# Patient Record
Sex: FEMALE | Race: BLACK OR AFRICAN AMERICAN | Employment: OTHER | URBAN - METROPOLITAN AREA
[De-identification: names, ages, dates, MRNs, and addresses within clinical notes are randomized per-mention and may not be internally consistent; named-entity substitution may affect disease eponyms.]

---

## 2017-06-16 ENCOUNTER — OFFICE VISIT (OUTPATIENT)
Dept: FAMILY MEDICINE CLINIC | Age: 72
End: 2017-06-16

## 2017-06-16 VITALS
SYSTOLIC BLOOD PRESSURE: 128 MMHG | WEIGHT: 202.6 LBS | RESPIRATION RATE: 24 BRPM | DIASTOLIC BLOOD PRESSURE: 88 MMHG | TEMPERATURE: 97.8 F | HEART RATE: 75 BPM | HEIGHT: 60 IN | BODY MASS INDEX: 39.78 KG/M2 | OXYGEN SATURATION: 96 %

## 2017-06-16 DIAGNOSIS — R93.3 ABNORMAL COLONOSCOPY: ICD-10-CM

## 2017-06-16 DIAGNOSIS — E78.2 MIXED HYPERLIPIDEMIA: Chronic | ICD-10-CM

## 2017-06-16 DIAGNOSIS — J44.9 CHRONIC OBSTRUCTIVE PULMONARY DISEASE, UNSPECIFIED COPD TYPE (HCC): Chronic | ICD-10-CM

## 2017-06-16 DIAGNOSIS — Z99.2 TYPE 2 DIABETES MELLITUS WITH CHRONIC KIDNEY DISEASE ON CHRONIC DIALYSIS, WITH LONG-TERM CURRENT USE OF INSULIN (HCC): ICD-10-CM

## 2017-06-16 DIAGNOSIS — I10 ESSENTIAL HYPERTENSION: Chronic | ICD-10-CM

## 2017-06-16 DIAGNOSIS — Z99.2 HEMODIALYSIS PATIENT (HCC): Chronic | ICD-10-CM

## 2017-06-16 DIAGNOSIS — Z79.4 TYPE 2 DIABETES MELLITUS WITH CHRONIC KIDNEY DISEASE ON CHRONIC DIALYSIS, WITH LONG-TERM CURRENT USE OF INSULIN (HCC): Primary | Chronic | ICD-10-CM

## 2017-06-16 DIAGNOSIS — Z11.59 NEED FOR HEPATITIS C SCREENING TEST: ICD-10-CM

## 2017-06-16 DIAGNOSIS — Z13.820 OSTEOPOROSIS SCREENING: ICD-10-CM

## 2017-06-16 DIAGNOSIS — J44.9 CHRONIC OBSTRUCTIVE PULMONARY DISEASE, UNSPECIFIED COPD TYPE (HCC): ICD-10-CM

## 2017-06-16 DIAGNOSIS — E03.9 HYPOTHYROIDISM, UNSPECIFIED TYPE: ICD-10-CM

## 2017-06-16 DIAGNOSIS — E11.22 TYPE 2 DIABETES MELLITUS WITH CHRONIC KIDNEY DISEASE ON CHRONIC DIALYSIS, WITH LONG-TERM CURRENT USE OF INSULIN (HCC): ICD-10-CM

## 2017-06-16 DIAGNOSIS — E11.22 TYPE 2 DIABETES MELLITUS WITH CHRONIC KIDNEY DISEASE ON CHRONIC DIALYSIS, WITH LONG-TERM CURRENT USE OF INSULIN (HCC): Primary | Chronic | ICD-10-CM

## 2017-06-16 DIAGNOSIS — I10 ESSENTIAL HYPERTENSION: ICD-10-CM

## 2017-06-16 DIAGNOSIS — Z99.2 TYPE 2 DIABETES MELLITUS WITH CHRONIC KIDNEY DISEASE ON CHRONIC DIALYSIS, WITH LONG-TERM CURRENT USE OF INSULIN (HCC): Primary | Chronic | ICD-10-CM

## 2017-06-16 DIAGNOSIS — N18.6 TYPE 2 DIABETES MELLITUS WITH CHRONIC KIDNEY DISEASE ON CHRONIC DIALYSIS, WITH LONG-TERM CURRENT USE OF INSULIN (HCC): ICD-10-CM

## 2017-06-16 DIAGNOSIS — G47.30 SLEEP APNEA, UNSPECIFIED TYPE: ICD-10-CM

## 2017-06-16 DIAGNOSIS — J30.9 ALLERGIC RHINITIS, UNSPECIFIED ALLERGIC RHINITIS TRIGGER, UNSPECIFIED RHINITIS SEASONALITY: ICD-10-CM

## 2017-06-16 DIAGNOSIS — K21.9 GASTROESOPHAGEAL REFLUX DISEASE WITHOUT ESOPHAGITIS: ICD-10-CM

## 2017-06-16 DIAGNOSIS — Z79.4 TYPE 2 DIABETES MELLITUS WITH CHRONIC KIDNEY DISEASE ON CHRONIC DIALYSIS, WITH LONG-TERM CURRENT USE OF INSULIN (HCC): ICD-10-CM

## 2017-06-16 DIAGNOSIS — E78.2 MIXED HYPERLIPIDEMIA: ICD-10-CM

## 2017-06-16 DIAGNOSIS — J45.40 MODERATE PERSISTENT ASTHMA WITHOUT COMPLICATION: Chronic | ICD-10-CM

## 2017-06-16 DIAGNOSIS — Z99.2 HEMODIALYSIS PATIENT (HCC): ICD-10-CM

## 2017-06-16 DIAGNOSIS — E03.9 HYPOTHYROIDISM, UNSPECIFIED TYPE: Chronic | ICD-10-CM

## 2017-06-16 DIAGNOSIS — E55.9 VITAMIN D DEFICIENCY: ICD-10-CM

## 2017-06-16 DIAGNOSIS — N18.6 TYPE 2 DIABETES MELLITUS WITH CHRONIC KIDNEY DISEASE ON CHRONIC DIALYSIS, WITH LONG-TERM CURRENT USE OF INSULIN (HCC): Primary | Chronic | ICD-10-CM

## 2017-06-16 LAB
ALBUMIN SERPL-MCNC: 4.1 G/DL (ref 3.9–4.9)
ALP BLD-CCNC: 111 U/L (ref 40–130)
ALT SERPL-CCNC: 24 U/L (ref 0–33)
ANION GAP SERPL CALCULATED.3IONS-SCNC: 14 MEQ/L (ref 7–13)
AST SERPL-CCNC: 24 U/L (ref 0–35)
BASOPHILS ABSOLUTE: 0 K/UL (ref 0–0.2)
BASOPHILS RELATIVE PERCENT: 0.6 %
BILIRUB SERPL-MCNC: 0.2 MG/DL (ref 0–1.2)
BUN BLDV-MCNC: 36 MG/DL (ref 8–23)
CALCIUM SERPL-MCNC: 9 MG/DL (ref 8.6–10.2)
CHLORIDE BLD-SCNC: 102 MEQ/L (ref 98–107)
CHOLESTEROL, TOTAL: 231 MG/DL (ref 0–199)
CO2: 29 MEQ/L (ref 22–29)
CREAT SERPL-MCNC: 3.77 MG/DL (ref 0.5–0.9)
EOSINOPHILS ABSOLUTE: 0.2 K/UL (ref 0–0.7)
EOSINOPHILS RELATIVE PERCENT: 2.9 %
GFR AFRICAN AMERICAN: 14.3
GFR NON-AFRICAN AMERICAN: 11.8
GLOBULIN: 2.8 G/DL (ref 2.3–3.5)
GLUCOSE BLD-MCNC: 75 MG/DL (ref 74–109)
HBA1C MFR BLD: 5.6 % (ref 4.8–5.9)
HCT VFR BLD CALC: 36.1 % (ref 37–47)
HDLC SERPL-MCNC: 81 MG/DL (ref 40–59)
HEMOGLOBIN: 11.1 G/DL (ref 12–16)
HEPATITIS C ANTIBODY INTERPRETATION: NORMAL
LDL CHOLESTEROL CALCULATED: 122 MG/DL (ref 0–129)
LYMPHOCYTES ABSOLUTE: 0.7 K/UL (ref 1–4.8)
LYMPHOCYTES RELATIVE PERCENT: 10.9 %
MCH RBC QN AUTO: 23.9 PG (ref 27–31.3)
MCHC RBC AUTO-ENTMCNC: 30.8 % (ref 33–37)
MCV RBC AUTO: 77.6 FL (ref 82–100)
MONOCYTES ABSOLUTE: 0.8 K/UL (ref 0.2–0.8)
MONOCYTES RELATIVE PERCENT: 13.2 %
NEUTROPHILS ABSOLUTE: 4.5 K/UL (ref 1.4–6.5)
NEUTROPHILS RELATIVE PERCENT: 72.4 %
PDW BLD-RTO: 19.8 % (ref 11.5–14.5)
PLATELET # BLD: 245 K/UL (ref 130–400)
POTASSIUM SERPL-SCNC: 5.2 MEQ/L (ref 3.5–5.1)
RBC # BLD: 4.65 M/UL (ref 4.2–5.4)
SODIUM BLD-SCNC: 145 MEQ/L (ref 132–144)
TOTAL PROTEIN: 6.9 G/DL (ref 6.4–8.1)
TRIGL SERPL-MCNC: 139 MG/DL (ref 0–200)
TSH REFLEX: 1.74 UIU/ML (ref 0.27–4.2)
WBC # BLD: 6.3 K/UL (ref 4.8–10.8)

## 2017-06-16 PROCEDURE — 99204 OFFICE O/P NEW MOD 45 MIN: CPT | Performed by: NURSE PRACTITIONER

## 2017-06-16 RX ORDER — PRAVASTATIN SODIUM 40 MG
40 TABLET ORAL DAILY
COMMUNITY
End: 2017-06-16 | Stop reason: SDUPTHER

## 2017-06-16 RX ORDER — SODIUM BICARBONATE 325 MG/1
10 TABLET ORAL 4 TIMES DAILY
COMMUNITY
End: 2017-06-16 | Stop reason: SDUPTHER

## 2017-06-16 RX ORDER — METOPROLOL SUCCINATE 25 MG/1
25 TABLET, EXTENDED RELEASE ORAL DAILY
Qty: 90 TABLET | Refills: 0 | Status: SHIPPED | OUTPATIENT
Start: 2017-06-16 | End: 2018-08-10 | Stop reason: ALTCHOICE

## 2017-06-16 RX ORDER — CINACALCET 30 MG/1
90 TABLET, FILM COATED ORAL DAILY
Qty: 90 TABLET | Refills: 0 | Status: SHIPPED | OUTPATIENT
Start: 2017-06-16 | End: 2017-07-12

## 2017-06-16 RX ORDER — BUDESONIDE AND FORMOTEROL FUMARATE DIHYDRATE 160; 4.5 UG/1; UG/1
2 AEROSOL RESPIRATORY (INHALATION) 2 TIMES DAILY
Qty: 3 INHALER | Refills: 1 | Status: SHIPPED | OUTPATIENT
Start: 2017-06-16 | End: 2017-12-04 | Stop reason: SDUPTHER

## 2017-06-16 RX ORDER — LANCETS 30 GAUGE
1 EACH MISCELLANEOUS 4 TIMES DAILY
Qty: 200 EACH | Refills: 5 | Status: SHIPPED | OUTPATIENT
Start: 2017-06-16 | End: 2018-03-12

## 2017-06-16 RX ORDER — FLUTICASONE PROPIONATE 50 MCG
1 SPRAY, SUSPENSION (ML) NASAL DAILY
Qty: 3 BOTTLE | Refills: 1 | Status: SHIPPED | OUTPATIENT
Start: 2017-06-16

## 2017-06-16 RX ORDER — OMEPRAZOLE 20 MG/1
40 CAPSULE, DELAYED RELEASE ORAL DAILY
Qty: 90 CAPSULE | Refills: 2 | Status: SHIPPED | OUTPATIENT
Start: 2017-06-16 | End: 2017-07-12 | Stop reason: ALTCHOICE

## 2017-06-16 RX ORDER — METOPROLOL SUCCINATE 25 MG/1
25 TABLET, EXTENDED RELEASE ORAL DAILY
COMMUNITY
End: 2017-06-16 | Stop reason: SDUPTHER

## 2017-06-16 RX ORDER — ALBUTEROL SULFATE 90 UG/1
2 AEROSOL, METERED RESPIRATORY (INHALATION) EVERY 6 HOURS PRN
COMMUNITY
End: 2017-06-16 | Stop reason: SDUPTHER

## 2017-06-16 RX ORDER — PRAVASTATIN SODIUM 40 MG
40 TABLET ORAL DAILY
Qty: 90 TABLET | Refills: 1 | Status: SHIPPED | OUTPATIENT
Start: 2017-06-16 | End: 2017-11-08 | Stop reason: SDUPTHER

## 2017-06-16 RX ORDER — SODIUM BICARBONATE 325 MG/1
325 TABLET ORAL 4 TIMES DAILY
Qty: 120 TABLET | Refills: 0 | Status: SHIPPED | OUTPATIENT
Start: 2017-06-16 | End: 2017-07-12 | Stop reason: ALTCHOICE

## 2017-06-16 RX ORDER — MONTELUKAST SODIUM 10 MG/1
10 TABLET ORAL NIGHTLY
Qty: 90 TABLET | Refills: 1 | Status: SHIPPED | OUTPATIENT
Start: 2017-06-16 | End: 2017-12-07 | Stop reason: SDUPTHER

## 2017-06-16 RX ORDER — MONTELUKAST SODIUM 10 MG/1
10 TABLET ORAL NIGHTLY
COMMUNITY
End: 2017-06-16 | Stop reason: SDUPTHER

## 2017-06-16 RX ORDER — GLUCOSAMINE HCL/CHONDROITIN SU 500-400 MG
1 CAPSULE ORAL 4 TIMES DAILY
Qty: 100 STRIP | Refills: 0 | Status: SHIPPED | OUTPATIENT
Start: 2017-06-16 | End: 2018-06-03 | Stop reason: SDUPTHER

## 2017-06-16 RX ORDER — DEXTROSE 4 G
1 TABLET,CHEWABLE ORAL 4 TIMES DAILY
Qty: 200 EACH | Refills: 0 | Status: SHIPPED | OUTPATIENT
Start: 2017-06-16 | End: 2017-08-02 | Stop reason: SDUPTHER

## 2017-06-16 RX ORDER — ALBUTEROL SULFATE 90 UG/1
2 AEROSOL, METERED RESPIRATORY (INHALATION) EVERY 6 HOURS PRN
Qty: 3 INHALER | Refills: 0 | Status: SHIPPED | OUTPATIENT
Start: 2017-06-16 | End: 2017-08-15 | Stop reason: SDUPTHER

## 2017-06-16 RX ORDER — OMEPRAZOLE 20 MG/1
40 CAPSULE, DELAYED RELEASE ORAL DAILY
COMMUNITY
End: 2017-06-16 | Stop reason: SDUPTHER

## 2017-06-16 RX ORDER — MOMETASONE FUROATE 50 UG/1
2 SPRAY, METERED NASAL DAILY
COMMUNITY
End: 2017-06-16 | Stop reason: CLARIF

## 2017-06-16 RX ORDER — BUDESONIDE AND FORMOTEROL FUMARATE DIHYDRATE 160; 4.5 UG/1; UG/1
2 AEROSOL RESPIRATORY (INHALATION) 2 TIMES DAILY
COMMUNITY
End: 2017-06-16 | Stop reason: SDUPTHER

## 2017-06-16 RX ORDER — GLUCOSAMINE HCL/CHONDROITIN SU 500-400 MG
1 CAPSULE ORAL 4 TIMES DAILY
Qty: 360 STRIP | Refills: 1 | Status: SHIPPED | OUTPATIENT
Start: 2017-06-16

## 2017-06-16 RX ORDER — ALBUTEROL SULFATE 0.63 MG/3ML
1 SOLUTION RESPIRATORY (INHALATION) EVERY 6 HOURS PRN
Qty: 270 ML | Refills: 3 | Status: SHIPPED | OUTPATIENT
Start: 2017-06-16 | End: 2018-03-29 | Stop reason: SDUPTHER

## 2017-06-16 RX ORDER — CINACALCET 30 MG/1
90 TABLET, FILM COATED ORAL DAILY
COMMUNITY
End: 2017-06-16 | Stop reason: SDUPTHER

## 2017-06-16 RX ORDER — DIPHENHYDRAMINE HYDROCHLORIDE 25 MG/1
1 CAPSULE, LIQUID FILLED ORAL 4 TIMES DAILY
Qty: 1 KIT | Refills: 0 | Status: SHIPPED | OUTPATIENT
Start: 2017-06-16 | End: 2018-07-08 | Stop reason: SDUPTHER

## 2017-06-16 ASSESSMENT — PATIENT HEALTH QUESTIONNAIRE - PHQ9
SUM OF ALL RESPONSES TO PHQ9 QUESTIONS 1 & 2: 0
SUM OF ALL RESPONSES TO PHQ QUESTIONS 1-9: 0
1. LITTLE INTEREST OR PLEASURE IN DOING THINGS: 0
2. FEELING DOWN, DEPRESSED OR HOPELESS: 0

## 2017-06-16 ASSESSMENT — ENCOUNTER SYMPTOMS
CHEST TIGHTNESS: 1
DIARRHEA: 0
COUGH: 1
SHORTNESS OF BREATH: 1
BLURRED VISION: 0
VISUAL CHANGE: 0
CONSTIPATION: 0
WHEEZING: 1
BLOOD IN STOOL: 0

## 2017-06-19 ENCOUNTER — HOSPITAL ENCOUNTER (OUTPATIENT)
Dept: GENERAL RADIOLOGY | Age: 72
Discharge: HOME | End: 2017-06-19
Payer: COMMERCIAL

## 2017-06-19 DIAGNOSIS — Z13.820 OSTEOPOROSIS SCREENING: ICD-10-CM

## 2017-06-19 PROCEDURE — 77080 DXA BONE DENSITY AXIAL: CPT

## 2017-06-21 ENCOUNTER — OFFICE VISIT (OUTPATIENT)
Dept: GASTROENTEROLOGY | Age: 72
End: 2017-06-21

## 2017-06-21 ENCOUNTER — TELEPHONE (OUTPATIENT)
Dept: FAMILY MEDICINE CLINIC | Age: 72
End: 2017-06-21

## 2017-06-21 VITALS
TEMPERATURE: 98 F | HEIGHT: 60 IN | BODY MASS INDEX: 39.46 KG/M2 | DIASTOLIC BLOOD PRESSURE: 72 MMHG | WEIGHT: 201 LBS | SYSTOLIC BLOOD PRESSURE: 125 MMHG | RESPIRATION RATE: 18 BRPM | HEART RATE: 60 BPM

## 2017-06-21 DIAGNOSIS — G47.30 SLEEP APNEA, UNSPECIFIED TYPE: ICD-10-CM

## 2017-06-21 DIAGNOSIS — Z86.010 HISTORY OF COLONIC POLYPS: ICD-10-CM

## 2017-06-21 DIAGNOSIS — J45.40 MODERATE PERSISTENT ASTHMA WITHOUT COMPLICATION: Primary | ICD-10-CM

## 2017-06-21 DIAGNOSIS — R93.3 ABNORMAL COLONOSCOPY: Primary | ICD-10-CM

## 2017-06-21 DIAGNOSIS — J44.9 CHRONIC OBSTRUCTIVE PULMONARY DISEASE, UNSPECIFIED COPD TYPE (HCC): ICD-10-CM

## 2017-06-21 PROCEDURE — 99203 OFFICE O/P NEW LOW 30 MIN: CPT | Performed by: INTERNAL MEDICINE

## 2017-06-21 ASSESSMENT — ENCOUNTER SYMPTOMS
ANAL BLEEDING: 0
BLOOD IN STOOL: 0
RESPIRATORY NEGATIVE: 1
EYES NEGATIVE: 1
ABDOMINAL PAIN: 0
RECTAL PAIN: 0
VOMITING: 0
NAUSEA: 0
CONSTIPATION: 0
ABDOMINAL DISTENTION: 0
DIARRHEA: 0

## 2017-06-22 ENCOUNTER — TELEPHONE (OUTPATIENT)
Dept: FAMILY MEDICINE CLINIC | Age: 72
End: 2017-06-22

## 2017-07-12 ENCOUNTER — OFFICE VISIT (OUTPATIENT)
Dept: FAMILY MEDICINE CLINIC | Age: 72
End: 2017-07-12

## 2017-07-12 VITALS — OXYGEN SATURATION: 95 %

## 2017-07-12 DIAGNOSIS — J44.9 CHRONIC OBSTRUCTIVE PULMONARY DISEASE, UNSPECIFIED COPD TYPE (HCC): Primary | ICD-10-CM

## 2017-07-12 PROCEDURE — G8427 DOCREV CUR MEDS BY ELIG CLIN: HCPCS | Performed by: NURSE PRACTITIONER

## 2017-07-12 PROCEDURE — 3017F COLORECTAL CA SCREEN DOC REV: CPT | Performed by: NURSE PRACTITIONER

## 2017-07-12 PROCEDURE — 99212 OFFICE O/P EST SF 10 MIN: CPT | Performed by: NURSE PRACTITIONER

## 2017-07-12 PROCEDURE — 1036F TOBACCO NON-USER: CPT | Performed by: NURSE PRACTITIONER

## 2017-07-12 PROCEDURE — 3014F SCREEN MAMMO DOC REV: CPT | Performed by: NURSE PRACTITIONER

## 2017-07-12 PROCEDURE — G8417 CALC BMI ABV UP PARAM F/U: HCPCS | Performed by: NURSE PRACTITIONER

## 2017-07-12 PROCEDURE — G8926 SPIRO NO PERF OR DOC: HCPCS | Performed by: NURSE PRACTITIONER

## 2017-07-12 PROCEDURE — 1090F PRES/ABSN URINE INCON ASSESS: CPT | Performed by: NURSE PRACTITIONER

## 2017-07-12 PROCEDURE — 3023F SPIROM DOC REV: CPT | Performed by: NURSE PRACTITIONER

## 2017-07-12 PROCEDURE — 1123F ACP DISCUSS/DSCN MKR DOCD: CPT | Performed by: NURSE PRACTITIONER

## 2017-07-12 PROCEDURE — G8399 PT W/DXA RESULTS DOCUMENT: HCPCS | Performed by: NURSE PRACTITIONER

## 2017-07-12 PROCEDURE — 4040F PNEUMOC VAC/ADMIN/RCVD: CPT | Performed by: NURSE PRACTITIONER

## 2017-07-12 RX ORDER — PEN NEEDLE, DIABETIC 31 GX5/16"
NEEDLE, DISPOSABLE MISCELLANEOUS
Refills: 0 | COMMUNITY
Start: 2017-06-16 | End: 2017-11-08 | Stop reason: RX

## 2017-07-12 RX ORDER — GLUCOSAMINE HCL/CHONDROITIN SU 500-400 MG
CAPSULE ORAL
Refills: 11 | COMMUNITY
Start: 2017-06-16 | End: 2018-07-09 | Stop reason: SDUPTHER

## 2017-07-12 RX ORDER — CINACALCET HYDROCHLORIDE 90 MG/1
TABLET, COATED ORAL
COMMUNITY
Start: 2017-06-19 | End: 2017-08-15 | Stop reason: ALTCHOICE

## 2017-07-14 ENCOUNTER — TELEPHONE (OUTPATIENT)
Dept: FAMILY MEDICINE CLINIC | Age: 72
End: 2017-07-14

## 2017-07-14 DIAGNOSIS — E11.8 DIABETIC FOOT (HCC): Primary | ICD-10-CM

## 2017-07-17 ENCOUNTER — TELEPHONE (OUTPATIENT)
Dept: FAMILY MEDICINE CLINIC | Age: 72
End: 2017-07-17

## 2017-08-02 DIAGNOSIS — E11.22 TYPE 2 DIABETES MELLITUS WITH CHRONIC KIDNEY DISEASE ON CHRONIC DIALYSIS, WITH LONG-TERM CURRENT USE OF INSULIN (HCC): Chronic | ICD-10-CM

## 2017-08-02 DIAGNOSIS — Z99.2 TYPE 2 DIABETES MELLITUS WITH CHRONIC KIDNEY DISEASE ON CHRONIC DIALYSIS, WITH LONG-TERM CURRENT USE OF INSULIN (HCC): Chronic | ICD-10-CM

## 2017-08-02 DIAGNOSIS — N18.6 TYPE 2 DIABETES MELLITUS WITH CHRONIC KIDNEY DISEASE ON CHRONIC DIALYSIS, WITH LONG-TERM CURRENT USE OF INSULIN (HCC): Chronic | ICD-10-CM

## 2017-08-02 DIAGNOSIS — Z79.4 TYPE 2 DIABETES MELLITUS WITH CHRONIC KIDNEY DISEASE ON CHRONIC DIALYSIS, WITH LONG-TERM CURRENT USE OF INSULIN (HCC): Chronic | ICD-10-CM

## 2017-08-03 RX ORDER — LANCETS
EACH MISCELLANEOUS
Qty: 200 EACH | Refills: 3 | Status: SHIPPED | OUTPATIENT
Start: 2017-08-03

## 2017-08-15 DIAGNOSIS — J45.40 MODERATE PERSISTENT ASTHMA WITHOUT COMPLICATION: Chronic | ICD-10-CM

## 2017-08-15 DIAGNOSIS — J44.9 CHRONIC OBSTRUCTIVE PULMONARY DISEASE, UNSPECIFIED COPD TYPE (HCC): Chronic | ICD-10-CM

## 2017-08-15 RX ORDER — CINACALCET HYDROCHLORIDE 30 MG/1
TABLET, COATED ORAL
COMMUNITY
Start: 2017-07-13

## 2017-09-08 ENCOUNTER — TELEPHONE (OUTPATIENT)
Dept: FAMILY MEDICINE CLINIC | Age: 72
End: 2017-09-08

## 2017-09-22 RX ORDER — INSULIN ASPART 100 [IU]/ML
INJECTION, SOLUTION INTRAVENOUS; SUBCUTANEOUS
Qty: 30 ML | Refills: 2 | Status: SHIPPED | OUTPATIENT
Start: 2017-09-22 | End: 2017-12-19 | Stop reason: SDUPTHER

## 2017-12-04 DIAGNOSIS — J44.9 CHRONIC OBSTRUCTIVE PULMONARY DISEASE, UNSPECIFIED COPD TYPE (HCC): Chronic | ICD-10-CM

## 2017-12-04 DIAGNOSIS — J45.40 MODERATE PERSISTENT ASTHMA WITHOUT COMPLICATION: Chronic | ICD-10-CM

## 2017-12-04 RX ORDER — BUDESONIDE AND FORMOTEROL FUMARATE DIHYDRATE 160; 4.5 UG/1; UG/1
2 AEROSOL RESPIRATORY (INHALATION) 2 TIMES DAILY
Qty: 3 INHALER | Refills: 1 | Status: SHIPPED | OUTPATIENT
Start: 2017-12-04 | End: 2018-07-06 | Stop reason: SDUPTHER

## 2017-12-07 DIAGNOSIS — J44.9 CHRONIC OBSTRUCTIVE PULMONARY DISEASE, UNSPECIFIED COPD TYPE (HCC): Chronic | ICD-10-CM

## 2017-12-07 DIAGNOSIS — J45.40 MODERATE PERSISTENT ASTHMA WITHOUT COMPLICATION: Chronic | ICD-10-CM

## 2017-12-07 RX ORDER — MONTELUKAST SODIUM 10 MG/1
10 TABLET ORAL NIGHTLY
Qty: 90 TABLET | Refills: 1 | Status: SHIPPED | OUTPATIENT
Start: 2017-12-07 | End: 2018-07-27 | Stop reason: SDUPTHER

## 2017-12-19 RX ORDER — INSULIN DETEMIR 100 [IU]/ML
INJECTION, SOLUTION SUBCUTANEOUS
Qty: 15 ML | Refills: 1 | Status: SHIPPED | OUTPATIENT
Start: 2017-12-19 | End: 2018-07-09 | Stop reason: SDUPTHER

## 2018-03-05 DIAGNOSIS — N18.6 TYPE 2 DIABETES MELLITUS WITH CHRONIC KIDNEY DISEASE ON CHRONIC DIALYSIS, WITH LONG-TERM CURRENT USE OF INSULIN (HCC): Primary | ICD-10-CM

## 2018-03-05 DIAGNOSIS — E11.22 TYPE 2 DIABETES MELLITUS WITH CHRONIC KIDNEY DISEASE ON CHRONIC DIALYSIS, WITH LONG-TERM CURRENT USE OF INSULIN (HCC): Primary | ICD-10-CM

## 2018-03-05 DIAGNOSIS — Z99.2 TYPE 2 DIABETES MELLITUS WITH CHRONIC KIDNEY DISEASE ON CHRONIC DIALYSIS, WITH LONG-TERM CURRENT USE OF INSULIN (HCC): ICD-10-CM

## 2018-03-05 DIAGNOSIS — Z79.4 TYPE 2 DIABETES MELLITUS WITH CHRONIC KIDNEY DISEASE ON CHRONIC DIALYSIS, WITH LONG-TERM CURRENT USE OF INSULIN (HCC): Primary | ICD-10-CM

## 2018-03-05 DIAGNOSIS — N18.6 TYPE 2 DIABETES MELLITUS WITH CHRONIC KIDNEY DISEASE ON CHRONIC DIALYSIS, WITH LONG-TERM CURRENT USE OF INSULIN (HCC): ICD-10-CM

## 2018-03-05 DIAGNOSIS — E11.22 TYPE 2 DIABETES MELLITUS WITH CHRONIC KIDNEY DISEASE ON CHRONIC DIALYSIS, WITH LONG-TERM CURRENT USE OF INSULIN (HCC): ICD-10-CM

## 2018-03-05 DIAGNOSIS — Z99.2 TYPE 2 DIABETES MELLITUS WITH CHRONIC KIDNEY DISEASE ON CHRONIC DIALYSIS, WITH LONG-TERM CURRENT USE OF INSULIN (HCC): Primary | ICD-10-CM

## 2018-03-05 DIAGNOSIS — Z79.4 TYPE 2 DIABETES MELLITUS WITH CHRONIC KIDNEY DISEASE ON CHRONIC DIALYSIS, WITH LONG-TERM CURRENT USE OF INSULIN (HCC): ICD-10-CM

## 2018-03-05 LAB — HBA1C MFR BLD: 5.5 % (ref 4.8–5.9)

## 2018-03-06 ENCOUNTER — TELEPHONE (OUTPATIENT)
Dept: FAMILY MEDICINE CLINIC | Age: 73
End: 2018-03-06

## 2018-03-12 ENCOUNTER — OFFICE VISIT (OUTPATIENT)
Dept: FAMILY MEDICINE CLINIC | Age: 73
End: 2018-03-12
Payer: MEDICARE

## 2018-03-12 VITALS
TEMPERATURE: 97.4 F | SYSTOLIC BLOOD PRESSURE: 108 MMHG | HEART RATE: 78 BPM | OXYGEN SATURATION: 98 % | DIASTOLIC BLOOD PRESSURE: 74 MMHG | HEIGHT: 60 IN | WEIGHT: 213 LBS | RESPIRATION RATE: 16 BRPM | BODY MASS INDEX: 41.82 KG/M2

## 2018-03-12 DIAGNOSIS — Z79.4 TYPE 2 DIABETES MELLITUS WITH CHRONIC KIDNEY DISEASE ON CHRONIC DIALYSIS, WITH LONG-TERM CURRENT USE OF INSULIN (HCC): Primary | ICD-10-CM

## 2018-03-12 DIAGNOSIS — N18.6 TYPE 2 DIABETES MELLITUS WITH CHRONIC KIDNEY DISEASE ON CHRONIC DIALYSIS, WITH LONG-TERM CURRENT USE OF INSULIN (HCC): Primary | ICD-10-CM

## 2018-03-12 DIAGNOSIS — G47.30 SLEEP APNEA, UNSPECIFIED TYPE: ICD-10-CM

## 2018-03-12 DIAGNOSIS — E55.9 VITAMIN D DEFICIENCY: ICD-10-CM

## 2018-03-12 DIAGNOSIS — Z12.31 ENCOUNTER FOR SCREENING MAMMOGRAM FOR BREAST CANCER: ICD-10-CM

## 2018-03-12 DIAGNOSIS — J44.9 CHRONIC OBSTRUCTIVE PULMONARY DISEASE, UNSPECIFIED COPD TYPE (HCC): Chronic | ICD-10-CM

## 2018-03-12 DIAGNOSIS — E11.22 TYPE 2 DIABETES MELLITUS WITH CHRONIC KIDNEY DISEASE ON CHRONIC DIALYSIS, WITH LONG-TERM CURRENT USE OF INSULIN (HCC): Primary | ICD-10-CM

## 2018-03-12 DIAGNOSIS — E66.01 MORBID OBESITY WITH BMI OF 40.0-44.9, ADULT (HCC): ICD-10-CM

## 2018-03-12 DIAGNOSIS — Z99.2 TYPE 2 DIABETES MELLITUS WITH CHRONIC KIDNEY DISEASE ON CHRONIC DIALYSIS, WITH LONG-TERM CURRENT USE OF INSULIN (HCC): Primary | ICD-10-CM

## 2018-03-12 DIAGNOSIS — Z99.2 DIALYSIS PATIENT (HCC): ICD-10-CM

## 2018-03-12 DIAGNOSIS — Z12.11 SCREENING FOR COLON CANCER: ICD-10-CM

## 2018-03-12 PROCEDURE — 1123F ACP DISCUSS/DSCN MKR DOCD: CPT | Performed by: NURSE PRACTITIONER

## 2018-03-12 PROCEDURE — G8926 SPIRO NO PERF OR DOC: HCPCS | Performed by: NURSE PRACTITIONER

## 2018-03-12 PROCEDURE — G8427 DOCREV CUR MEDS BY ELIG CLIN: HCPCS | Performed by: NURSE PRACTITIONER

## 2018-03-12 PROCEDURE — 99214 OFFICE O/P EST MOD 30 MIN: CPT | Performed by: NURSE PRACTITIONER

## 2018-03-12 PROCEDURE — 3044F HG A1C LEVEL LT 7.0%: CPT | Performed by: NURSE PRACTITIONER

## 2018-03-12 PROCEDURE — 1036F TOBACCO NON-USER: CPT | Performed by: NURSE PRACTITIONER

## 2018-03-12 PROCEDURE — 3017F COLORECTAL CA SCREEN DOC REV: CPT | Performed by: NURSE PRACTITIONER

## 2018-03-12 PROCEDURE — 4040F PNEUMOC VAC/ADMIN/RCVD: CPT | Performed by: NURSE PRACTITIONER

## 2018-03-12 PROCEDURE — 1090F PRES/ABSN URINE INCON ASSESS: CPT | Performed by: NURSE PRACTITIONER

## 2018-03-12 PROCEDURE — 3023F SPIROM DOC REV: CPT | Performed by: NURSE PRACTITIONER

## 2018-03-12 PROCEDURE — G8482 FLU IMMUNIZE ORDER/ADMIN: HCPCS | Performed by: NURSE PRACTITIONER

## 2018-03-12 PROCEDURE — G8417 CALC BMI ABV UP PARAM F/U: HCPCS | Performed by: NURSE PRACTITIONER

## 2018-03-12 PROCEDURE — 3014F SCREEN MAMMO DOC REV: CPT | Performed by: NURSE PRACTITIONER

## 2018-03-12 PROCEDURE — G8399 PT W/DXA RESULTS DOCUMENT: HCPCS | Performed by: NURSE PRACTITIONER

## 2018-03-12 RX ORDER — ALBUTEROL SULFATE 90 UG/1
2 AEROSOL, METERED RESPIRATORY (INHALATION) EVERY 4 HOURS PRN
Qty: 2 INHALER | Refills: 5 | Status: SHIPPED | OUTPATIENT
Start: 2018-03-12

## 2018-03-13 ENCOUNTER — TELEPHONE (OUTPATIENT)
Dept: FAMILY MEDICINE CLINIC | Age: 73
End: 2018-03-13

## 2018-03-13 ASSESSMENT — ENCOUNTER SYMPTOMS
CONSTIPATION: 0
CHEST TIGHTNESS: 0
APNEA: 0
DIARRHEA: 0
SHORTNESS OF BREATH: 1
TROUBLE SWALLOWING: 0
SINUS PRESSURE: 0
STRIDOR: 0
BLOOD IN STOOL: 0
CHOKING: 0
COUGH: 0
WHEEZING: 1

## 2018-03-13 NOTE — PROGRESS NOTES
SHORT PEN 31G X 8 MM MISC       Sucroferric Oxyhydroxide 500 MG CHEW Take 1 tablet by mouth 3 times daily (with meals) 270 tablet 1    SENSIPAR 30 MG tablet       Accu-Chek Softclix Lancets MISC USE AS DIRECTED FOUR TIMES DAILY 200 each 3    Alcohol Swabs 70 % PADS TEST QID  11    aspirin 325 MG EC tablet Take 325 mg by mouth daily      metoprolol succinate (TOPROL XL) 25 MG extended release tablet Take 1 tablet by mouth daily 90 tablet 0    Sucroferric Oxyhydroxide (VELPHORO) 500 MG CHEW Take 1 tablet by mouth 3 times daily 270 tablet 1    albuterol (ACCUNEB) 0.63 MG/3ML nebulizer solution Take 3 mLs by nebulization every 6 hours as needed for Wheezing 270 mL 3    insulin aspart (NOVOLOG) 100 UNIT/ML injection pen Inject 10 Units into the skin 3 times daily (before meals) 1 Pen 0    Blood Glucose Monitoring Suppl (BLOOD GLUCOSE MONITOR SYSTEM) w/Device KIT 1 kit by Does not apply route 4 times daily 1 kit 0    Glucose Blood (BLOOD GLUCOSE TEST STRIPS) STRP 1 strip by In Vitro route 4 times daily 100 strip 0    Glucose Blood (BLOOD GLUCOSE TEST STRIPS) STRP 1 each by Does not apply route 4 times daily 360 strip 1    fluticasone (FLONASE) 50 MCG/ACT nasal spray 1 spray by Nasal route daily 3 Bottle 1     No current facility-administered medications for this visit. PMH, Surgical Hx, Family Hx, and Social Hx reviewed and updated. Health Maintenance reviewed. Objective    Vitals:    03/12/18 1558   BP: 108/74   Pulse: 78   Resp: 16   Temp: 97.4 °F (36.3 °C)   TempSrc: Temporal   SpO2: 98%   Weight: 213 lb (96.6 kg)   Height: 5' (1.524 m)       Physical Exam   Constitutional: She is oriented to person, place, and time. She appears well-developed and well-nourished. HENT:   Head: Normocephalic and atraumatic.    Right Ear: Hearing, external ear and ear canal normal.   Left Ear: Hearing, external ear and ear canal normal.   Mouth/Throat: Uvula is midline and mucous membranes are normal.   Eyes:

## 2018-03-19 ENCOUNTER — HOSPITAL ENCOUNTER (OUTPATIENT)
Dept: WOMENS IMAGING | Age: 73
Discharge: HOME | End: 2018-03-21
Payer: MEDICARE

## 2018-03-19 DIAGNOSIS — Z12.31 ENCOUNTER FOR SCREENING MAMMOGRAM FOR BREAST CANCER: ICD-10-CM

## 2018-03-19 DIAGNOSIS — E78.2 MIXED HYPERLIPIDEMIA: ICD-10-CM

## 2018-03-19 PROCEDURE — 77067 SCR MAMMO BI INCL CAD: CPT

## 2018-03-19 RX ORDER — PRAVASTATIN SODIUM 40 MG
TABLET ORAL
Qty: 90 TABLET | Refills: 1 | Status: SHIPPED | OUTPATIENT
Start: 2018-03-19 | End: 2018-08-01 | Stop reason: SDUPTHER

## 2018-03-26 ENCOUNTER — TELEPHONE (OUTPATIENT)
Dept: FAMILY MEDICINE CLINIC | Age: 73
End: 2018-03-26

## 2018-03-26 DIAGNOSIS — J45.40 MODERATE PERSISTENT ASTHMA WITHOUT COMPLICATION: ICD-10-CM

## 2018-03-26 DIAGNOSIS — J45.40 MODERATE PERSISTENT ASTHMA WITHOUT COMPLICATION: Chronic | ICD-10-CM

## 2018-03-26 DIAGNOSIS — J44.9 CHRONIC OBSTRUCTIVE PULMONARY DISEASE, UNSPECIFIED COPD TYPE (HCC): Primary | ICD-10-CM

## 2018-03-26 DIAGNOSIS — J44.9 CHRONIC OBSTRUCTIVE PULMONARY DISEASE, UNSPECIFIED COPD TYPE (HCC): Chronic | ICD-10-CM

## 2018-03-27 ENCOUNTER — HOSPITAL ENCOUNTER (OUTPATIENT)
Dept: GENERAL RADIOLOGY | Age: 73
Discharge: HOME | End: 2018-03-29
Payer: MEDICARE

## 2018-03-27 DIAGNOSIS — R05.9 COUGH: ICD-10-CM

## 2018-03-27 DIAGNOSIS — R06.02 SOB (SHORTNESS OF BREATH): ICD-10-CM

## 2018-03-27 PROCEDURE — 71046 X-RAY EXAM CHEST 2 VIEWS: CPT

## 2018-03-29 RX ORDER — ALBUTEROL SULFATE 0.63 MG/3ML
1 SOLUTION RESPIRATORY (INHALATION)
Qty: 270 ML | Refills: 3 | Status: SHIPPED | OUTPATIENT
Start: 2018-03-29 | End: 2018-06-20 | Stop reason: SDUPTHER

## 2018-03-29 RX ORDER — SOFT LENS DISINFECTANT
1 SOLUTION, NON-ORAL MISCELLANEOUS
Qty: 1 DEVICE | Refills: 0 | Status: SHIPPED | OUTPATIENT
Start: 2018-03-29

## 2018-04-11 ENCOUNTER — OFFICE VISIT (OUTPATIENT)
Dept: FAMILY MEDICINE CLINIC | Age: 73
End: 2018-04-11
Payer: MEDICARE

## 2018-04-11 VITALS
BODY MASS INDEX: 41.43 KG/M2 | HEIGHT: 60 IN | WEIGHT: 211 LBS | OXYGEN SATURATION: 99 % | SYSTOLIC BLOOD PRESSURE: 110 MMHG | TEMPERATURE: 97.8 F | HEART RATE: 80 BPM | RESPIRATION RATE: 16 BRPM | DIASTOLIC BLOOD PRESSURE: 68 MMHG

## 2018-04-11 DIAGNOSIS — R05.9 COUGH: ICD-10-CM

## 2018-04-11 DIAGNOSIS — H65.91 FLUID LEVEL BEHIND TYMPANIC MEMBRANE OF RIGHT EAR: ICD-10-CM

## 2018-04-11 DIAGNOSIS — R09.82 POST-NASAL DRIP: ICD-10-CM

## 2018-04-11 DIAGNOSIS — H66.002 ACUTE SUPPURATIVE OTITIS MEDIA OF LEFT EAR WITHOUT SPONTANEOUS RUPTURE OF TYMPANIC MEMBRANE, RECURRENCE NOT SPECIFIED: Primary | ICD-10-CM

## 2018-04-11 PROCEDURE — G8399 PT W/DXA RESULTS DOCUMENT: HCPCS | Performed by: NURSE PRACTITIONER

## 2018-04-11 PROCEDURE — G8427 DOCREV CUR MEDS BY ELIG CLIN: HCPCS | Performed by: NURSE PRACTITIONER

## 2018-04-11 PROCEDURE — G8417 CALC BMI ABV UP PARAM F/U: HCPCS | Performed by: NURSE PRACTITIONER

## 2018-04-11 PROCEDURE — 1090F PRES/ABSN URINE INCON ASSESS: CPT | Performed by: NURSE PRACTITIONER

## 2018-04-11 PROCEDURE — 1123F ACP DISCUSS/DSCN MKR DOCD: CPT | Performed by: NURSE PRACTITIONER

## 2018-04-11 PROCEDURE — 3014F SCREEN MAMMO DOC REV: CPT | Performed by: NURSE PRACTITIONER

## 2018-04-11 PROCEDURE — 99213 OFFICE O/P EST LOW 20 MIN: CPT | Performed by: NURSE PRACTITIONER

## 2018-04-11 PROCEDURE — 3017F COLORECTAL CA SCREEN DOC REV: CPT | Performed by: NURSE PRACTITIONER

## 2018-04-11 PROCEDURE — 4040F PNEUMOC VAC/ADMIN/RCVD: CPT | Performed by: NURSE PRACTITIONER

## 2018-04-11 PROCEDURE — 1036F TOBACCO NON-USER: CPT | Performed by: NURSE PRACTITIONER

## 2018-04-11 RX ORDER — GUAIFENESIN AND CODEINE PHOSPHATE 100; 10 MG/5ML; MG/5ML
5 SOLUTION ORAL 2 TIMES DAILY PRN
Qty: 50 ML | Refills: 0 | Status: SHIPPED | OUTPATIENT
Start: 2018-04-11 | End: 2018-04-16

## 2018-04-11 RX ORDER — AMOXICILLIN AND CLAVULANATE POTASSIUM 500; 125 MG/1; MG/1
1 TABLET, FILM COATED ORAL 2 TIMES DAILY
Qty: 20 TABLET | Refills: 0 | Status: SHIPPED | OUTPATIENT
Start: 2018-04-11 | End: 2018-04-21

## 2018-04-11 RX ORDER — LEVOCETIRIZINE DIHYDROCHLORIDE 5 MG/1
5 TABLET, FILM COATED ORAL NIGHTLY
Qty: 30 TABLET | Refills: 3 | Status: SHIPPED | OUTPATIENT
Start: 2018-04-11 | End: 2018-08-10

## 2018-04-11 ASSESSMENT — ENCOUNTER SYMPTOMS
WHEEZING: 1
ABDOMINAL PAIN: 0
COUGH: 1
NAUSEA: 0
SORE THROAT: 0
VOMITING: 0
SWOLLEN GLANDS: 0
RHINORRHEA: 1
SINUS PAIN: 0
DIARRHEA: 0

## 2018-04-27 ENCOUNTER — TELEPHONE (OUTPATIENT)
Dept: FAMILY MEDICINE CLINIC | Age: 73
End: 2018-04-27

## 2018-04-27 DIAGNOSIS — Z99.2 HEMODIALYSIS PATIENT (HCC): ICD-10-CM

## 2018-04-27 DIAGNOSIS — N18.6 END STAGE RENAL DISEASE (HCC): Primary | ICD-10-CM

## 2018-05-08 RX ORDER — OMEPRAZOLE 20 MG/1
40 CAPSULE, DELAYED RELEASE ORAL DAILY
Qty: 180 CAPSULE | Refills: 3 | Status: SHIPPED | OUTPATIENT
Start: 2018-05-08

## 2018-06-20 ENCOUNTER — TELEPHONE (OUTPATIENT)
Dept: FAMILY MEDICINE CLINIC | Age: 73
End: 2018-06-20

## 2018-06-20 DIAGNOSIS — J44.9 CHRONIC OBSTRUCTIVE PULMONARY DISEASE, UNSPECIFIED COPD TYPE (HCC): Chronic | ICD-10-CM

## 2018-06-20 DIAGNOSIS — J45.40 MODERATE PERSISTENT ASTHMA WITHOUT COMPLICATION: Chronic | ICD-10-CM

## 2018-06-21 RX ORDER — ALBUTEROL SULFATE 0.63 MG/3ML
1 SOLUTION RESPIRATORY (INHALATION)
Qty: 270 ML | Refills: 3 | Status: SHIPPED | OUTPATIENT
Start: 2018-06-21 | End: 2018-06-21 | Stop reason: SDUPTHER

## 2018-06-22 RX ORDER — ALBUTEROL SULFATE 0.63 MG/3ML
1 SOLUTION RESPIRATORY (INHALATION)
Qty: 270 ML | Refills: 3 | Status: SHIPPED | OUTPATIENT
Start: 2018-06-22

## 2018-07-06 DIAGNOSIS — J44.9 CHRONIC OBSTRUCTIVE PULMONARY DISEASE, UNSPECIFIED COPD TYPE (HCC): Chronic | ICD-10-CM

## 2018-07-06 DIAGNOSIS — J45.40 MODERATE PERSISTENT ASTHMA WITHOUT COMPLICATION: Chronic | ICD-10-CM

## 2018-07-06 RX ORDER — BUDESONIDE AND FORMOTEROL FUMARATE DIHYDRATE 160; 4.5 UG/1; UG/1
2 AEROSOL RESPIRATORY (INHALATION) 2 TIMES DAILY
Qty: 3 INHALER | Refills: 1 | Status: SHIPPED | OUTPATIENT
Start: 2018-07-06 | End: 2019-01-21 | Stop reason: SDUPTHER

## 2018-07-06 NOTE — TELEPHONE ENCOUNTER
Pharmacy requests refill on medication.  Please approve or deny this request.    LOV 4/11/2018    Future Appointments  Date Time Provider Gisell Pacheco   9/12/2018 12:15 PM [address] [facility], [facility] [place] [address]

## 2018-07-08 DIAGNOSIS — Z79.4 TYPE 2 DIABETES MELLITUS WITH CHRONIC KIDNEY DISEASE ON CHRONIC DIALYSIS, WITH LONG-TERM CURRENT USE OF INSULIN (HCC): Chronic | ICD-10-CM

## 2018-07-08 DIAGNOSIS — E11.22 TYPE 2 DIABETES MELLITUS WITH CHRONIC KIDNEY DISEASE ON CHRONIC DIALYSIS, WITH LONG-TERM CURRENT USE OF INSULIN (HCC): Chronic | ICD-10-CM

## 2018-07-08 DIAGNOSIS — Z99.2 TYPE 2 DIABETES MELLITUS WITH CHRONIC KIDNEY DISEASE ON CHRONIC DIALYSIS, WITH LONG-TERM CURRENT USE OF INSULIN (HCC): Chronic | ICD-10-CM

## 2018-07-08 DIAGNOSIS — N18.6 TYPE 2 DIABETES MELLITUS WITH CHRONIC KIDNEY DISEASE ON CHRONIC DIALYSIS, WITH LONG-TERM CURRENT USE OF INSULIN (HCC): Chronic | ICD-10-CM

## 2018-07-09 RX ORDER — INSULIN DETEMIR 100 [IU]/ML
8 INJECTION, SOLUTION SUBCUTANEOUS NIGHTLY
Qty: 15 ML | Refills: 5 | Status: SHIPPED | OUTPATIENT
Start: 2018-07-09

## 2018-07-09 RX ORDER — INSULIN ASPART 100 [IU]/ML
INJECTION, SOLUTION INTRAVENOUS; SUBCUTANEOUS
Qty: 15 ML | Refills: 5 | Status: SHIPPED | OUTPATIENT
Start: 2018-07-09

## 2018-07-09 RX ORDER — BLOOD-GLUCOSE METER
EACH MISCELLANEOUS
Qty: 1 KIT | Refills: 0 | Status: SHIPPED | OUTPATIENT
Start: 2018-07-09

## 2018-07-09 RX ORDER — PEN NEEDLE, DIABETIC 31 GX5/16"
NEEDLE, DISPOSABLE MISCELLANEOUS
Qty: 100 EACH | Refills: 5 | Status: SHIPPED | OUTPATIENT
Start: 2018-07-09

## 2018-07-09 RX ORDER — GLUCOSAMINE HCL/CHONDROITIN SU 500-400 MG
CAPSULE ORAL
Qty: 100 EACH | Refills: 0 | Status: SHIPPED | OUTPATIENT
Start: 2018-07-09

## 2018-07-27 ENCOUNTER — TELEPHONE (OUTPATIENT)
Dept: FAMILY MEDICINE CLINIC | Age: 73
End: 2018-07-27

## 2018-07-27 DIAGNOSIS — J44.9 CHRONIC OBSTRUCTIVE PULMONARY DISEASE, UNSPECIFIED COPD TYPE (HCC): Chronic | ICD-10-CM

## 2018-07-27 DIAGNOSIS — J45.40 MODERATE PERSISTENT ASTHMA WITHOUT COMPLICATION: Chronic | ICD-10-CM

## 2018-07-27 RX ORDER — MONTELUKAST SODIUM 10 MG/1
10 TABLET ORAL NIGHTLY
Qty: 90 TABLET | Refills: 2 | Status: SHIPPED | OUTPATIENT
Start: 2018-07-27

## 2018-07-27 NOTE — TELEPHONE ENCOUNTER
Pt requests refill on medication.  Please approve or deny this request.    LOV 4/11/2018    Future Appointments  Date Time Provider Gisell Pacheco   9/12/2018 12:15 PM 32 [facility], [address] [place] [address]

## 2018-08-01 DIAGNOSIS — E78.2 MIXED HYPERLIPIDEMIA: ICD-10-CM

## 2018-08-01 NOTE — TELEPHONE ENCOUNTER
pharmacy requesting refill. Please approve or deny this refill request. The order is pended. Thank you.     LOV 4/11/2018    Next Visit Date:  Future Appointments  Date Time Provider Gisell Pacheco   9/12/2018 12:15 PM [facility], [address] [facility],[address]

## 2018-08-02 RX ORDER — PRAVASTATIN SODIUM 40 MG
TABLET ORAL
Qty: 90 TABLET | Refills: 1 | Status: SHIPPED | OUTPATIENT
Start: 2018-08-02

## 2018-08-08 ENCOUNTER — SURG/PROC ORDERS (OUTPATIENT)
Dept: INTERVENTIONAL RADIOLOGY/VASCULAR | Age: 73
End: 2018-08-08

## 2018-08-08 DIAGNOSIS — Z99.2 HEMODIALYSIS PATIENT (HCC): ICD-10-CM

## 2018-08-08 DIAGNOSIS — T82.898A INADEQUATE FLOW OF HEMODIALYSIS AV FISTULA, INITIAL ENCOUNTER (HCC): Primary | ICD-10-CM

## 2018-08-08 RX ORDER — LIDOCAINE HYDROCHLORIDE 20 MG/ML
5 INJECTION, SOLUTION INFILTRATION; PERINEURAL
Status: CANCELLED | OUTPATIENT
Start: 2018-08-08 | End: 2019-08-08

## 2018-08-08 RX ORDER — HEPARIN SODIUM 1000 [USP'U]/ML
10000 INJECTION, SOLUTION INTRAVENOUS; SUBCUTANEOUS ONCE
Status: CANCELLED | OUTPATIENT
Start: 2018-08-08 | End: 2018-08-08

## 2018-08-08 RX ORDER — 0.9 % SODIUM CHLORIDE 0.9 %
1000 INTRAVENOUS SOLUTION INTRAVENOUS
Status: CANCELLED | OUTPATIENT
Start: 2018-08-08 | End: 2019-08-08

## 2018-08-10 ENCOUNTER — HOSPITAL ENCOUNTER (OUTPATIENT)
Dept: INTERVENTIONAL RADIOLOGY/VASCULAR | Age: 73
Discharge: HOME | End: 2018-08-12
Payer: MEDICARE

## 2018-08-10 VITALS
DIASTOLIC BLOOD PRESSURE: 65 MMHG | RESPIRATION RATE: 12 BRPM | TEMPERATURE: 97.9 F | SYSTOLIC BLOOD PRESSURE: 145 MMHG | OXYGEN SATURATION: 95 % | HEART RATE: 78 BPM

## 2018-08-10 DIAGNOSIS — T82.898A INADEQUATE FLOW OF HEMODIALYSIS AV FISTULA, INITIAL ENCOUNTER (HCC): ICD-10-CM

## 2018-08-10 PROCEDURE — 2709999900

## 2018-08-10 PROCEDURE — 6360000004 HC RX CONTRAST MEDICATION: Performed by: RADIOLOGY

## 2018-08-10 PROCEDURE — 36907 BALO ANGIOP CTR DIALYSIS SEG: CPT | Performed by: RADIOLOGY

## 2018-08-10 PROCEDURE — 2500000003 HC RX 250 WO HCPCS: Performed by: NURSE PRACTITIONER

## 2018-08-10 PROCEDURE — 2580000003 HC RX 258: Performed by: NURSE PRACTITIONER

## 2018-08-10 PROCEDURE — 6360000002 HC RX W HCPCS: Performed by: NURSE PRACTITIONER

## 2018-08-10 PROCEDURE — 36902 INTRO CATH DIALYSIS CIRCUIT: CPT | Performed by: RADIOLOGY

## 2018-08-10 PROCEDURE — 99204 OFFICE O/P NEW MOD 45 MIN: CPT | Performed by: RADIOLOGY

## 2018-08-10 RX ORDER — HEPARIN SODIUM 1000 [USP'U]/ML
10000 INJECTION, SOLUTION INTRAVENOUS; SUBCUTANEOUS ONCE
Status: COMPLETED | OUTPATIENT
Start: 2018-08-10 | End: 2018-08-10

## 2018-08-10 RX ORDER — IODIXANOL 320 MG/ML
100 INJECTION, SOLUTION INTRAVASCULAR
Status: COMPLETED | OUTPATIENT
Start: 2018-08-10 | End: 2018-08-10

## 2018-08-10 RX ORDER — FENTANYL CITRATE 50 UG/ML
50 INJECTION, SOLUTION INTRAMUSCULAR; INTRAVENOUS
Status: DISCONTINUED | OUTPATIENT
Start: 2018-08-10 | End: 2018-08-13 | Stop reason: HOSPADM

## 2018-08-10 RX ORDER — IODIXANOL 320 MG/ML
INJECTION, SOLUTION INTRAVASCULAR
Status: COMPLETED | OUTPATIENT
Start: 2018-08-10 | End: 2018-08-10

## 2018-08-10 RX ORDER — 0.9 % SODIUM CHLORIDE 0.9 %
250 INTRAVENOUS SOLUTION INTRAVENOUS
Status: DISCONTINUED | OUTPATIENT
Start: 2018-08-10 | End: 2018-08-13 | Stop reason: HOSPADM

## 2018-08-10 RX ORDER — LIDOCAINE HYDROCHLORIDE 20 MG/ML
5 INJECTION, SOLUTION INFILTRATION; PERINEURAL
Status: DISCONTINUED | OUTPATIENT
Start: 2018-08-10 | End: 2018-08-13 | Stop reason: HOSPADM

## 2018-08-10 RX ORDER — ONDANSETRON 2 MG/ML
4 INJECTION INTRAMUSCULAR; INTRAVENOUS ONCE
Status: COMPLETED | OUTPATIENT
Start: 2018-08-10 | End: 2018-08-10

## 2018-08-10 RX ORDER — MIDAZOLAM HYDROCHLORIDE 1 MG/ML
2 INJECTION INTRAMUSCULAR; INTRAVENOUS
Status: DISCONTINUED | OUTPATIENT
Start: 2018-08-10 | End: 2018-08-13 | Stop reason: HOSPADM

## 2018-08-10 RX ORDER — 0.9 % SODIUM CHLORIDE 0.9 %
1000 INTRAVENOUS SOLUTION INTRAVENOUS
Status: DISCONTINUED | OUTPATIENT
Start: 2018-08-10 | End: 2018-08-13 | Stop reason: HOSPADM

## 2018-08-10 RX ADMIN — FENTANYL CITRATE 50 MCG: 50 INJECTION, SOLUTION INTRAMUSCULAR; INTRAVENOUS at 10:05

## 2018-08-10 RX ADMIN — HEPARIN SODIUM 1000 UNITS: 1000 INJECTION, SOLUTION INTRAVENOUS; SUBCUTANEOUS at 09:38

## 2018-08-10 RX ADMIN — ONDANSETRON 4 MG: 2 INJECTION INTRAMUSCULAR; INTRAVENOUS at 12:35

## 2018-08-10 RX ADMIN — SODIUM CHLORIDE 250 ML: 9 INJECTION, SOLUTION INTRAVENOUS at 09:40

## 2018-08-10 RX ADMIN — MIDAZOLAM HYDROCHLORIDE 2 MG: 1 INJECTION, SOLUTION INTRAMUSCULAR; INTRAVENOUS at 10:06

## 2018-08-10 RX ADMIN — IODIXANOL 15 ML: 320 INJECTION, SOLUTION INTRAVASCULAR at 10:55

## 2018-08-10 RX ADMIN — FENTANYL CITRATE 50 MCG: 50 INJECTION, SOLUTION INTRAMUSCULAR; INTRAVENOUS at 11:14

## 2018-08-10 RX ADMIN — IODIXANOL 100 ML: 320 INJECTION, SOLUTION INTRAVASCULAR at 09:35

## 2018-08-10 RX ADMIN — LIDOCAINE HYDROCHLORIDE 5 ML: 20 INJECTION, SOLUTION INFILTRATION; PERINEURAL at 10:10

## 2018-08-10 RX ADMIN — MIDAZOLAM HYDROCHLORIDE 0.5 MG: 1 INJECTION, SOLUTION INTRAMUSCULAR; INTRAVENOUS at 11:13

## 2018-08-10 RX ADMIN — SODIUM CHLORIDE 1000 ML: 9 INJECTION, SOLUTION INTRAVENOUS at 09:38

## 2018-08-10 ASSESSMENT — ENCOUNTER SYMPTOMS
VOMITING: 0
CONSTIPATION: 0
DOUBLE VISION: 0
COUGH: 0
HEMOPTYSIS: 0
EYES NEGATIVE: 1
PHOTOPHOBIA: 0
ABDOMINAL PAIN: 0
BACK PAIN: 0
WHEEZING: 0
GASTROINTESTINAL NEGATIVE: 1
BLURRED VISION: 0
NAUSEA: 0
SPUTUM PRODUCTION: 0
DIARRHEA: 0
RESPIRATORY NEGATIVE: 1
SHORTNESS OF BREATH: 0
HEARTBURN: 0

## 2018-08-10 ASSESSMENT — PAIN SCALES - GENERAL
PAINLEVEL_OUTOF10: 0
PAINLEVEL_OUTOF10: 0
PAINLEVEL_OUTOF10: 7

## 2018-08-10 ASSESSMENT — PAIN - FUNCTIONAL ASSESSMENT: PAIN_FUNCTIONAL_ASSESSMENT: 0-10

## 2018-08-10 NOTE — SEDATION DOCUMENTATION
insert balloon to open narrowed site. Berenstein 5F catheter advanced over glidewire using fluoro guidance for positioning. Pt tolerating well, stating \"I'm doing fine\" when asked. VSS. At 1056, Dr. Elian Whitt using fluoro guidance to place Conquest 40 PTA dilation catheter [id number] with RBP 40atm/nominal 8atm (LOT [id number] exp 2021-03-31) and used with 20/30 Indeflator Inflation Device (LOT [id number] exp 2018-12-31). At 1058, balloon inflated x 1 minute. Balloon removed and contrast injected with images obtained with pt instructed to hold breath. Balloon reinserted and advanced slightly in vein and inflated again x 1 minute. Pt tolerating well stating only feels little pressure. Balloon removed. At 1110, Dr. Elian Whitt inserted Lutonix O35 Drug Coated Balloon PTA catheter [id number] with rated RBP 11atm/nominal 6atm (LOT [id number] exp 2019-08-07). Balloon inflated x 2 minute. Pt c/o pain at this time, medicated with additional versed 0.5mg and Fentanyl 50mcg per verbal Dr. Elian Whitt, see eMar. Dr. Elian Whitt inflated balloon promixally x 1 min. Balloon removed. At  Dr. Elian Whitt now using Conquest 40 PTA Dilation catheter [id number] with RBP 35atm/nominal 8atm (LOT [id number] exp 2020-11-30), again placed with fluoro guidance. Balloon inflated x 1 min. Balloon repositioned using fluoro and inflated again x 1 min. Balloon removed. Berenstein placed and contrast injected and fluoro images obtained with pt instructed to hold breath. Venous flow improved. Dr. Elian Whitt removed both arterial and venous sheath and pursestring sutures used to close sites. Pt tolerating well after add'l versed/fentanyl given. Sites cleansed and DSD applied by Ashlee rad tech. Pt A&Ox 4, resp even and unlabored, skin P/W/D. Pt to go back to CT holding for recovery. Electronically signed by Yen Lund RN on 8/10/2018 at 11:43 AM

## 2018-08-10 NOTE — PRE SEDATION
[place] GOYO Canales CNP   LEVEMIR FLEXTOUCH 100 UNIT/ML injection pen INJECT 8 UNITS INTO THE SKIN NIGHTLY 7/9/18 [facility] CNP   NOVOLOG FLEXPEN 100 UNIT/ML injection pen INJECT 10 UNITS INTO THE SKIN THREE TIMES DAILY BEFORE MEALS 7/9/18 [facility] GOYO Damian CNP   Alcohol Swabs 70 % PADS TEST FOUR TIMES DAILY 7/9/18 [facility] GOYO Damian CNP   SYMBICORT 160-4.5 MCG/ACT AERO INHALE 2 PUFFS INTO THE LUNGS 2 TIMES DAILY 7/6/18 [facility] GOYO Damian CNP   albuterol (ACCUNEB) 0.63 MG/3ML nebulizer solution Take 3 mLs by nebulization every 4-6 hours as needed for Wheezing or Shortness of Breath 6/22/18   GOYO Albarado CNP-LUCITA NANDA PLUS strip TEST FOUR TIMES DAILY 6/3/18   [facility] GOYO Damian CNP   omeprazole (PRILOSEC) 20 MG delayed release capsule Take 2 capsules by mouth daily 5/8/18 [facility] GOYO Damian CNP   Respiratory Therapy Supplies (NEBULIZER) BRANDO 1 kit by Does not apply route every 4-6 hours as needed (shortness of breath or wheezing) 3/29/18   [facility] GOYO Damian CNP   albuterol sulfate HFA (VENTOLIN HFA) 108 (90 Base) MCG/ACT inhaler Inhale 2 puffs into the lungs every 4 hours as needed for Wheezing 3/12/18   [facility] GOYO Damian CNP   vitamin D (CHOLECALCIFEROL) 5000 units CAPS capsule Take 1 capsule by mouth daily 3/12/18   [facility]   SENSIPAR 30 MG tablet  7/13/17   Historical Provider, MD Buck Softclix Lancets MISC USE AS DIRECTED FOUR TIMES DAILY 8/3/17   [facility] We-07-A 1498 GOYO Canales CNP   aspirin 325 MG EC tablet Take 325 mg by mouth daily    Historical Provider, MD   Sucroferric Oxyhydroxide (VELPHORO) 500 MG CHEW Take 1 tablet by mouth 3 times daily 6/16/17   [facility] We-07-A 1498 GOYO Canales CNP   Glucose Blood (BLOOD GLUCOSE TEST STRIPS) STRP 1 each by Does not apply

## 2018-08-10 NOTE — PROGRESS NOTES
Patient to Ct holding room prior to Fistulogram. Procedure was explained and patient has no questions. She signed her consent at [facility]. IV was started without difficulty. Patient tolerated well. Sister at bedside. Chart reviewed and up to date currently.

## 2018-08-10 NOTE — H&P
tenderness. There is no rebound and no guarding. Musculoskeletal: She exhibits no edema, tenderness or deformity. Arms:  Neurological: She is alert and oriented to person, place, and time. Skin: Skin is warm and dry. No rash noted. She is not diaphoretic. No erythema. No pallor. Psychiatric: She has a normal mood and affect. Her behavior is normal. Judgment and thought content normal.       ASSESSMENT AND PLAN:    Assessment: Patient with RUE AVG and previous angioplasty of central vein. Now with high pressure and low flow during dialysis. Plan: Fistulogram with likely angioplasty with drug eluting balloon.      Georg Kussmaul, MD

## 2018-08-11 NOTE — PROGRESS NOTES
Spoke to patient. She recently came home after having a dialysis treatment that went well. The sutures were removed from the right upper arm and that area is slightly sore. No complaints of any additional nausea after arriving home yesterday.

## 2018-12-10 DIAGNOSIS — E78.2 MIXED HYPERLIPIDEMIA: ICD-10-CM

## 2018-12-10 RX ORDER — PRAVASTATIN SODIUM 40 MG
TABLET ORAL
Qty: 90 TABLET | Refills: 1 | OUTPATIENT
Start: 2018-12-10

## 2019-01-21 DIAGNOSIS — J44.9 CHRONIC OBSTRUCTIVE PULMONARY DISEASE, UNSPECIFIED COPD TYPE (HCC): Chronic | ICD-10-CM

## 2019-01-21 DIAGNOSIS — J45.40 MODERATE PERSISTENT ASTHMA WITHOUT COMPLICATION: Chronic | ICD-10-CM

## 2019-01-22 RX ORDER — BUDESONIDE AND FORMOTEROL FUMARATE DIHYDRATE 160; 4.5 UG/1; UG/1
AEROSOL RESPIRATORY (INHALATION)
Qty: 3 INHALER | Refills: 1 | Status: SHIPPED | OUTPATIENT
Start: 2019-01-22 | End: 2019-07-08 | Stop reason: SDUPTHER

## 2019-08-06 ENCOUNTER — APPOINTMENT (OUTPATIENT)
Dept: PREADMISSION TESTING | Facility: HOSPITAL | Age: 74
End: 2019-08-06

## 2019-08-06 VITALS
HEIGHT: 59 IN | RESPIRATION RATE: 18 BRPM | DIASTOLIC BLOOD PRESSURE: 62 MMHG | HEART RATE: 109 BPM | BODY MASS INDEX: 37.29 KG/M2 | SYSTOLIC BLOOD PRESSURE: 111 MMHG | TEMPERATURE: 97.2 F | OXYGEN SATURATION: 99 % | WEIGHT: 185 LBS

## 2019-08-06 LAB
ANION GAP SERPL CALCULATED.3IONS-SCNC: 16.9 MMOL/L (ref 5–15)
BUN BLD-MCNC: 15 MG/DL (ref 8–23)
BUN/CREAT SERPL: 4.4 (ref 7–25)
CALCIUM SPEC-SCNC: 8.5 MG/DL (ref 8.6–10.5)
CHLORIDE SERPL-SCNC: 94 MMOL/L (ref 98–107)
CO2 SERPL-SCNC: 26.1 MMOL/L (ref 22–29)
CREAT BLD-MCNC: 3.38 MG/DL (ref 0.57–1)
DEPRECATED RDW RBC AUTO: 54.1 FL (ref 37–54)
ERYTHROCYTE [DISTWIDTH] IN BLOOD BY AUTOMATED COUNT: 17.8 % (ref 12.3–15.4)
GFR SERPL CREATININE-BSD FRML MDRD: 16 ML/MIN/1.73
GLUCOSE BLD-MCNC: 117 MG/DL (ref 65–99)
HCT VFR BLD AUTO: 30 % (ref 34–46.6)
HGB BLD-MCNC: 8.7 G/DL (ref 12–15.9)
MCH RBC QN AUTO: 24.5 PG (ref 26.6–33)
MCHC RBC AUTO-ENTMCNC: 29 G/DL (ref 31.5–35.7)
MCV RBC AUTO: 84.5 FL (ref 79–97)
PLATELET # BLD AUTO: 290 10*3/MM3 (ref 140–450)
PMV BLD AUTO: 9.9 FL (ref 6–12)
POTASSIUM BLD-SCNC: 3.2 MMOL/L (ref 3.5–5.2)
RBC # BLD AUTO: 3.55 10*6/MM3 (ref 3.77–5.28)
SODIUM BLD-SCNC: 137 MMOL/L (ref 136–145)
WBC NRBC COR # BLD: 8.71 10*3/MM3 (ref 3.4–10.8)

## 2019-08-06 PROCEDURE — 36415 COLL VENOUS BLD VENIPUNCTURE: CPT

## 2019-08-06 PROCEDURE — 93005 ELECTROCARDIOGRAM TRACING: CPT

## 2019-08-06 PROCEDURE — 85027 COMPLETE CBC AUTOMATED: CPT | Performed by: SURGERY

## 2019-08-06 PROCEDURE — 93010 ELECTROCARDIOGRAM REPORT: CPT | Performed by: INTERNAL MEDICINE

## 2019-08-06 PROCEDURE — 80048 BASIC METABOLIC PNL TOTAL CA: CPT | Performed by: SURGERY

## 2019-08-06 RX ORDER — MONTELUKAST SODIUM 10 MG/1
10 TABLET ORAL NIGHTLY
COMMUNITY

## 2019-08-06 RX ORDER — CINACALCET HYDROCHLORIDE 60 MG/1
90 TABLET, COATED ORAL DAILY
COMMUNITY
End: 2019-11-18

## 2019-08-06 RX ORDER — OMEPRAZOLE 40 MG/1
40 CAPSULE, DELAYED RELEASE ORAL EVERY MORNING
COMMUNITY

## 2019-08-06 RX ORDER — BUDESONIDE AND FORMOTEROL FUMARATE DIHYDRATE 160; 4.5 UG/1; UG/1
2 AEROSOL RESPIRATORY (INHALATION) 2 TIMES DAILY
COMMUNITY

## 2019-08-06 RX ORDER — METOPROLOL SUCCINATE 25 MG/1
25 TABLET, EXTENDED RELEASE ORAL EVERY MORNING
COMMUNITY
End: 2021-08-31 | Stop reason: HOSPADM

## 2019-08-06 NOTE — DISCHARGE INSTRUCTIONS
Take the following medications the morning of surgery with a small sip of water: METOPROLOL, OMEPRAZOLE, AND  SYMBICORT    ARRIVE AT 7AM    General Instructions:  • Do not eat solid food after midnight the night before surgery.  • You may drink clear liquids day of surgery but must stop at least one hour before your hospital arrival time.  • It is beneficial for you to have a clear drink that contains carbohydrates the day of surgery.  We suggest a 12 to 20 ounce bottle of Gatorade or Powerade for non-diabetic patients or a 12 to 20 ounce bottle of G2 or Powerade Zero for diabetic patients. (Pediatric patients, are not advised to drink a 12 to 20 ounce carbohydrate drink)    Clear liquids are liquids you can see through.  Nothing red in color.     Plain water                               Sports drinks  Sodas                                   Gelatin (Jell-O)  Fruit juices without pulp such as white grape juice and apple juice  Popsicles that contain no fruit or yogurt  Tea or coffee (no cream or milk added)  Gatorade / Powerade  G2 / Powerade Zero    • Infants may have breast milk up to four hours before surgery.  • Infants drinking formula may drink formula up to six hours before surgery.   • Patients who avoid smoking, chewing tobacco and alcohol for 4 weeks prior to surgery have a reduced risk of post-operative complications.  Quit smoking as many days before surgery as you can.  • Do not smoke, use chewing tobacco or drink alcohol the day of surgery.   • If applicable bring your C-PAP/ BI-PAP machine.  • Bring any papers given to you in the doctor’s office.  • Wear clean comfortable clothes and socks.  • Do not wear contact lenses, false eyelashes or make-up.  Bring a case for your glasses.   • Bring crutches or walker if applicable.  • Remove all piercings.  Leave jewelry and any other valuables at home.  • Hair extensions with metal clips must be removed prior to surgery.  • The Pre-Admission Testing nurse  will instruct you to bring medications if unable to obtain an accurate list in Pre-Admission Testing.            Preventing a Surgical Site Infection:  • For 2 to 3 days before surgery, avoid shaving with a razor because the razor can irritate skin and make it easier to develop an infection.    • Any areas of open skin can increase the risk of a post-operative wound infection by allowing bacteria to enter and travel throughout the body.  Notify your surgeon if you have any skin wounds / rashes even if it is not near the expected surgical site.  The area will need assessed to determine if surgery should be delayed until it is healed.  • The night prior to surgery sleep in a clean bed with clean clothing.  Do not allow pets to sleep with you.  • Shower on the morning of surgery using a fresh bar of anti-bacterial soap (such as Dial) and clean washcloth.  Dry with a clean towel and dress in clean clothing.  • Ask your surgeon if you will be receiving antibiotics prior to surgery.  • Make sure you, your family, and all healthcare providers clean their hands with soap and water or an alcohol based hand  before caring for you or your wound.    Day of surgery:  Upon arrival, a Pre-op nurse and Anesthesiologist will review your health history, obtain vital signs, and answer questions you may have.  The only belongings needed at this time will be a list of your home medications and if applicable your C-PAP/BI-PAP machine.  If you are staying overnight your family can leave the rest of your belongings in the car and bring them to your room later.  A Pre-op nurse will start an IV and you may receive medication in preparation for surgery, including something to help you relax.  Your family will be able to see you in the Pre-op area.  While you are in surgery your family should notify the waiting room  if they leave the waiting room area and provide a contact phone number.    Please be aware that surgery  does come with discomfort.  We want to make every effort to control your discomfort so please discuss any uncontrolled symptoms with your nurse.   Your doctor will most likely have prescribed pain medications.      If you are going home after surgery you will receive individualized written care instructions before being discharged.  A responsible adult must drive you to and from the hospital on the day of your surgery and stay with you for 24 hours.    If you are staying overnight following surgery, you will be transported to your hospital room following the recovery period.  [facility] has all private rooms.    You have received a list of surgical assistants for your reference.  If you have any questions please call Pre-Admission Testing at [phone].  Deductibles and co-payments are collected on the day of service. Please be prepared to pay the required co-pay, deductible or deposit on the day of service as defined by your plan.

## 2019-08-09 ENCOUNTER — HOSPITAL ENCOUNTER (OUTPATIENT)
Facility: HOSPITAL | Age: 74
Setting detail: HOSPITAL OUTPATIENT SURGERY
Discharge: HOME | End: 2019-08-09
Attending: SURGERY | Admitting: SURGERY

## 2019-08-09 ENCOUNTER — ANESTHESIA EVENT (OUTPATIENT)
Dept: PERIOP | Facility: HOSPITAL | Age: 74
End: 2019-08-09

## 2019-08-09 ENCOUNTER — ANESTHESIA (OUTPATIENT)
Dept: PERIOP | Facility: HOSPITAL | Age: 74
End: 2019-08-09

## 2019-08-09 ENCOUNTER — APPOINTMENT (OUTPATIENT)
Dept: GENERAL RADIOLOGY | Facility: HOSPITAL | Age: 74
End: 2019-08-09

## 2019-08-09 VITALS
RESPIRATION RATE: 20 BRPM | SYSTOLIC BLOOD PRESSURE: 140 MMHG | BODY MASS INDEX: 37.58 KG/M2 | HEART RATE: 100 BPM | WEIGHT: 186.4 LBS | TEMPERATURE: 97.6 F | OXYGEN SATURATION: 94 % | DIASTOLIC BLOOD PRESSURE: 64 MMHG | HEIGHT: 59 IN

## 2019-08-09 DIAGNOSIS — T82.868A: ICD-10-CM

## 2019-08-09 PROBLEM — Z99.2 ESRD (END STAGE RENAL DISEASE) ON DIALYSIS (HCC): Status: ACTIVE | Noted: 2019-08-09

## 2019-08-09 PROBLEM — N18.6 ESRD (END STAGE RENAL DISEASE) ON DIALYSIS: Status: ACTIVE | Noted: 2019-08-09

## 2019-08-09 LAB
ANION GAP SERPL CALCULATED.3IONS-SCNC: 17.6 MMOL/L (ref 5–15)
BUN BLD-MCNC: 21 MG/DL (ref 8–23)
BUN/CREAT SERPL: 4.5 (ref 7–25)
CALCIUM SPEC-SCNC: 8 MG/DL (ref 8.6–10.5)
CHLORIDE SERPL-SCNC: 91 MMOL/L (ref 98–107)
CO2 SERPL-SCNC: 24.4 MMOL/L (ref 22–29)
CREAT BLD-MCNC: 4.7 MG/DL (ref 0.57–1)
GFR SERPL CREATININE-BSD FRML MDRD: 11 ML/MIN/1.73
GFR SERPL CREATININE-BSD FRML MDRD: ABNORMAL ML/MIN/1.73
GLUCOSE BLD-MCNC: 118 MG/DL (ref 65–99)
GLUCOSE BLDC GLUCOMTR-MCNC: 121 MG/DL (ref 70–130)
GLUCOSE BLDC GLUCOMTR-MCNC: 123 MG/DL (ref 70–130)
POTASSIUM BLD-SCNC: 3.6 MMOL/L (ref 3.5–5.2)
SODIUM BLD-SCNC: 133 MMOL/L (ref 136–145)

## 2019-08-09 PROCEDURE — C1887 CATHETER, GUIDING: HCPCS | Performed by: SURGERY

## 2019-08-09 PROCEDURE — 25010000002 HEPARIN (PORCINE) PER 1000 UNITS: Performed by: SURGERY

## 2019-08-09 PROCEDURE — 82962 GLUCOSE BLOOD TEST: CPT

## 2019-08-09 PROCEDURE — 87075 CULTR BACTERIA EXCEPT BLOOD: CPT | Performed by: SURGERY

## 2019-08-09 PROCEDURE — 25010000003 CEFAZOLIN PER 500 MG: Performed by: SURGERY

## 2019-08-09 PROCEDURE — 25010000002 VANCOMYCIN 1 G RECONSTITUTED SOLUTION: Performed by: NURSE ANESTHETIST, CERTIFIED REGISTERED

## 2019-08-09 PROCEDURE — 25010000002 FENTANYL CITRATE (PF) 100 MCG/2ML SOLUTION: Performed by: NURSE ANESTHETIST, CERTIFIED REGISTERED

## 2019-08-09 PROCEDURE — 25010000002 PHENYLEPHRINE PER 1 ML: Performed by: NURSE ANESTHETIST, CERTIFIED REGISTERED

## 2019-08-09 PROCEDURE — 25010000002 MIDAZOLAM PER 1 MG: Performed by: ANESTHESIOLOGY

## 2019-08-09 PROCEDURE — C1894 INTRO/SHEATH, NON-LASER: HCPCS | Performed by: SURGERY

## 2019-08-09 PROCEDURE — 25010000002 SUCCINYLCHOLINE PER 20 MG: Performed by: NURSE ANESTHETIST, CERTIFIED REGISTERED

## 2019-08-09 PROCEDURE — 87070 CULTURE OTHR SPECIMN AEROBIC: CPT | Performed by: SURGERY

## 2019-08-09 PROCEDURE — C1769 GUIDE WIRE: HCPCS | Performed by: SURGERY

## 2019-08-09 PROCEDURE — 0 IOPAMIDOL PER 1 ML: Performed by: SURGERY

## 2019-08-09 PROCEDURE — 80048 BASIC METABOLIC PNL TOTAL CA: CPT | Performed by: SURGERY

## 2019-08-09 PROCEDURE — C1874 STENT, COATED/COV W/DEL SYS: HCPCS | Performed by: SURGERY

## 2019-08-09 PROCEDURE — 25010000003 CEFAZOLIN IN DEXTROSE 2-4 GM/100ML-% SOLUTION: Performed by: SURGERY

## 2019-08-09 PROCEDURE — 25010000002 HEPARIN (PORCINE) PER 1000 UNITS: Performed by: NURSE ANESTHETIST, CERTIFIED REGISTERED

## 2019-08-09 PROCEDURE — C1768 GRAFT, VASCULAR: HCPCS | Performed by: SURGERY

## 2019-08-09 PROCEDURE — 25010000002 HYDROMORPHONE PER 4 MG: Performed by: NURSE ANESTHETIST, CERTIFIED REGISTERED

## 2019-08-09 PROCEDURE — C1725 CATH, TRANSLUMIN NON-LASER: HCPCS | Performed by: SURGERY

## 2019-08-09 PROCEDURE — 25010000003 LIDOCAINE 1 % SOLUTION 20 ML VIAL: Performed by: SURGERY

## 2019-08-09 PROCEDURE — 25010000002 PROTAMINE SULFATE PER 10 MG: Performed by: NURSE ANESTHETIST, CERTIFIED REGISTERED

## 2019-08-09 PROCEDURE — C1757 CATH, THROMBECTOMY/EMBOLECT: HCPCS | Performed by: SURGERY

## 2019-08-09 PROCEDURE — 25010000002 PROPOFOL 10 MG/ML EMULSION: Performed by: NURSE ANESTHETIST, CERTIFIED REGISTERED

## 2019-08-09 PROCEDURE — 25010000002 ONDANSETRON PER 1 MG: Performed by: NURSE ANESTHETIST, CERTIFIED REGISTERED

## 2019-08-09 PROCEDURE — 87205 SMEAR GRAM STAIN: CPT | Performed by: SURGERY

## 2019-08-09 DEVICE — GRFT VASC PROPAT HEP IR 4/7 38 45CM: Type: IMPLANTABLE DEVICE | Status: FUNCTIONAL

## 2019-08-09 DEVICE — STENTGR ENDOPROSTH VIABAHN RO HEP 9F 9MM 10X120CM: Type: IMPLANTABLE DEVICE | Status: FUNCTIONAL

## 2019-08-09 RX ORDER — HYDROCODONE BITARTRATE AND ACETAMINOPHEN 5; 325 MG/1; MG/1
1-2 TABLET ORAL EVERY 4 HOURS PRN
Qty: 20 TABLET | Refills: 0 | Status: SHIPPED | OUTPATIENT
Start: 2019-08-09 | End: 2019-11-30 | Stop reason: HOSPADM

## 2019-08-09 RX ORDER — ONDANSETRON 2 MG/ML
INJECTION INTRAMUSCULAR; INTRAVENOUS AS NEEDED
Status: DISCONTINUED | OUTPATIENT
Start: 2019-08-09 | End: 2019-08-09 | Stop reason: SURG

## 2019-08-09 RX ORDER — HYDROMORPHONE HYDROCHLORIDE 1 MG/ML
0.5 INJECTION, SOLUTION INTRAMUSCULAR; INTRAVENOUS; SUBCUTANEOUS
Status: DISCONTINUED | OUTPATIENT
Start: 2019-08-09 | End: 2019-08-09 | Stop reason: HOSPADM

## 2019-08-09 RX ORDER — PROTAMINE SULFATE 10 MG/ML
INJECTION, SOLUTION INTRAVENOUS AS NEEDED
Status: DISCONTINUED | OUTPATIENT
Start: 2019-08-09 | End: 2019-08-09 | Stop reason: SURG

## 2019-08-09 RX ORDER — VANCOMYCIN HYDROCHLORIDE 1 G/20ML
INJECTION, POWDER, LYOPHILIZED, FOR SOLUTION INTRAVENOUS AS NEEDED
Status: DISCONTINUED | OUTPATIENT
Start: 2019-08-09 | End: 2019-08-09 | Stop reason: SURG

## 2019-08-09 RX ORDER — SUCCINYLCHOLINE CHLORIDE 20 MG/ML
INJECTION INTRAMUSCULAR; INTRAVENOUS AS NEEDED
Status: DISCONTINUED | OUTPATIENT
Start: 2019-08-09 | End: 2019-08-09 | Stop reason: SURG

## 2019-08-09 RX ORDER — ROCURONIUM BROMIDE 10 MG/ML
INJECTION, SOLUTION INTRAVENOUS AS NEEDED
Status: DISCONTINUED | OUTPATIENT
Start: 2019-08-09 | End: 2019-08-09 | Stop reason: SURG

## 2019-08-09 RX ORDER — SODIUM CHLORIDE 0.9 % (FLUSH) 0.9 %
1-10 SYRINGE (ML) INJECTION AS NEEDED
Status: DISCONTINUED | OUTPATIENT
Start: 2019-08-09 | End: 2019-08-09 | Stop reason: HOSPADM

## 2019-08-09 RX ORDER — HYDROCODONE BITARTRATE AND ACETAMINOPHEN 7.5; 325 MG/1; MG/1
1 TABLET ORAL ONCE AS NEEDED
Status: COMPLETED | OUTPATIENT
Start: 2019-08-09 | End: 2019-08-09

## 2019-08-09 RX ORDER — OXYCODONE AND ACETAMINOPHEN 7.5; 325 MG/1; MG/1
1 TABLET ORAL ONCE AS NEEDED
Status: DISCONTINUED | OUTPATIENT
Start: 2019-08-09 | End: 2019-08-09 | Stop reason: HOSPADM

## 2019-08-09 RX ORDER — EPHEDRINE SULFATE 50 MG/ML
5 INJECTION, SOLUTION INTRAVENOUS ONCE AS NEEDED
Status: DISCONTINUED | OUTPATIENT
Start: 2019-08-09 | End: 2019-08-09 | Stop reason: HOSPADM

## 2019-08-09 RX ORDER — DIPHENHYDRAMINE HYDROCHLORIDE 50 MG/ML
12.5 INJECTION INTRAMUSCULAR; INTRAVENOUS
Status: DISCONTINUED | OUTPATIENT
Start: 2019-08-09 | End: 2019-08-09 | Stop reason: HOSPADM

## 2019-08-09 RX ORDER — PROMETHAZINE HYDROCHLORIDE 25 MG/ML
6.25 INJECTION, SOLUTION INTRAMUSCULAR; INTRAVENOUS
Status: DISCONTINUED | OUTPATIENT
Start: 2019-08-09 | End: 2019-08-09 | Stop reason: HOSPADM

## 2019-08-09 RX ORDER — MIDAZOLAM HYDROCHLORIDE 1 MG/ML
2 INJECTION INTRAMUSCULAR; INTRAVENOUS
Status: DISCONTINUED | OUTPATIENT
Start: 2019-08-09 | End: 2019-08-09 | Stop reason: HOSPADM

## 2019-08-09 RX ORDER — HEPARIN SODIUM 1000 [USP'U]/ML
INJECTION, SOLUTION INTRAVENOUS; SUBCUTANEOUS AS NEEDED
Status: DISCONTINUED | OUTPATIENT
Start: 2019-08-09 | End: 2019-08-09 | Stop reason: SURG

## 2019-08-09 RX ORDER — PROMETHAZINE HYDROCHLORIDE 25 MG/ML
12.5 INJECTION, SOLUTION INTRAMUSCULAR; INTRAVENOUS ONCE AS NEEDED
Status: DISCONTINUED | OUTPATIENT
Start: 2019-08-09 | End: 2019-08-09 | Stop reason: HOSPADM

## 2019-08-09 RX ORDER — FENTANYL CITRATE 50 UG/ML
INJECTION, SOLUTION INTRAMUSCULAR; INTRAVENOUS AS NEEDED
Status: DISCONTINUED | OUTPATIENT
Start: 2019-08-09 | End: 2019-08-09 | Stop reason: SURG

## 2019-08-09 RX ORDER — NALOXONE HCL 0.4 MG/ML
0.2 VIAL (ML) INJECTION AS NEEDED
Status: DISCONTINUED | OUTPATIENT
Start: 2019-08-09 | End: 2019-08-09 | Stop reason: HOSPADM

## 2019-08-09 RX ORDER — CEFAZOLIN SODIUM 2 G/100ML
2 INJECTION, SOLUTION INTRAVENOUS ONCE
Status: COMPLETED | OUTPATIENT
Start: 2019-08-09 | End: 2019-08-09

## 2019-08-09 RX ORDER — ONDANSETRON 2 MG/ML
4 INJECTION INTRAMUSCULAR; INTRAVENOUS ONCE AS NEEDED
Status: DISCONTINUED | OUTPATIENT
Start: 2019-08-09 | End: 2019-08-09 | Stop reason: HOSPADM

## 2019-08-09 RX ORDER — HYDRALAZINE HYDROCHLORIDE 20 MG/ML
5 INJECTION INTRAMUSCULAR; INTRAVENOUS
Status: DISCONTINUED | OUTPATIENT
Start: 2019-08-09 | End: 2019-08-09 | Stop reason: HOSPADM

## 2019-08-09 RX ORDER — ALBUTEROL SULFATE 2.5 MG/3ML
2.5 SOLUTION RESPIRATORY (INHALATION) 3 TIMES DAILY PRN
COMMUNITY

## 2019-08-09 RX ORDER — SODIUM CHLORIDE 9 MG/ML
9 INJECTION, SOLUTION INTRAVENOUS CONTINUOUS
Status: DISCONTINUED | OUTPATIENT
Start: 2019-08-09 | End: 2019-08-09 | Stop reason: HOSPADM

## 2019-08-09 RX ORDER — ACETAMINOPHEN 325 MG/1
650 TABLET ORAL EVERY 6 HOURS PRN
COMMUNITY
End: 2021-08-31 | Stop reason: HOSPADM

## 2019-08-09 RX ORDER — LIDOCAINE HYDROCHLORIDE 20 MG/ML
INJECTION, SOLUTION INFILTRATION; PERINEURAL AS NEEDED
Status: DISCONTINUED | OUTPATIENT
Start: 2019-08-09 | End: 2019-08-09 | Stop reason: SURG

## 2019-08-09 RX ORDER — MIDAZOLAM HYDROCHLORIDE 1 MG/ML
1 INJECTION INTRAMUSCULAR; INTRAVENOUS
Status: DISCONTINUED | OUTPATIENT
Start: 2019-08-09 | End: 2019-08-09 | Stop reason: HOSPADM

## 2019-08-09 RX ORDER — PROMETHAZINE HYDROCHLORIDE 25 MG/1
25 TABLET ORAL ONCE AS NEEDED
Status: DISCONTINUED | OUTPATIENT
Start: 2019-08-09 | End: 2019-08-09 | Stop reason: HOSPADM

## 2019-08-09 RX ORDER — FENTANYL CITRATE 50 UG/ML
50 INJECTION, SOLUTION INTRAMUSCULAR; INTRAVENOUS
Status: DISCONTINUED | OUTPATIENT
Start: 2019-08-09 | End: 2019-08-09 | Stop reason: HOSPADM

## 2019-08-09 RX ORDER — DIPHENHYDRAMINE HCL 25 MG
25 CAPSULE ORAL
Status: DISCONTINUED | OUTPATIENT
Start: 2019-08-09 | End: 2019-08-09 | Stop reason: HOSPADM

## 2019-08-09 RX ORDER — EPHEDRINE SULFATE 50 MG/ML
INJECTION, SOLUTION INTRAVENOUS AS NEEDED
Status: DISCONTINUED | OUTPATIENT
Start: 2019-08-09 | End: 2019-08-09 | Stop reason: SURG

## 2019-08-09 RX ORDER — LIDOCAINE HYDROCHLORIDE 10 MG/ML
0.5 INJECTION, SOLUTION EPIDURAL; INFILTRATION; INTRACAUDAL; PERINEURAL ONCE AS NEEDED
Status: DISCONTINUED | OUTPATIENT
Start: 2019-08-09 | End: 2019-08-09 | Stop reason: HOSPADM

## 2019-08-09 RX ORDER — LABETALOL HYDROCHLORIDE 5 MG/ML
5 INJECTION, SOLUTION INTRAVENOUS
Status: DISCONTINUED | OUTPATIENT
Start: 2019-08-09 | End: 2019-08-09 | Stop reason: HOSPADM

## 2019-08-09 RX ORDER — SODIUM CHLORIDE, SODIUM LACTATE, POTASSIUM CHLORIDE, CALCIUM CHLORIDE 600; 310; 30; 20 MG/100ML; MG/100ML; MG/100ML; MG/100ML
9 INJECTION, SOLUTION INTRAVENOUS CONTINUOUS
Status: DISCONTINUED | OUTPATIENT
Start: 2019-08-09 | End: 2019-08-09

## 2019-08-09 RX ORDER — PROMETHAZINE HYDROCHLORIDE 25 MG/1
25 SUPPOSITORY RECTAL ONCE AS NEEDED
Status: DISCONTINUED | OUTPATIENT
Start: 2019-08-09 | End: 2019-08-09 | Stop reason: HOSPADM

## 2019-08-09 RX ORDER — ACETAMINOPHEN 325 MG/1
650 TABLET ORAL ONCE AS NEEDED
Status: DISCONTINUED | OUTPATIENT
Start: 2019-08-09 | End: 2019-08-09 | Stop reason: HOSPADM

## 2019-08-09 RX ORDER — FAMOTIDINE 10 MG/ML
20 INJECTION, SOLUTION INTRAVENOUS ONCE
Status: COMPLETED | OUTPATIENT
Start: 2019-08-09 | End: 2019-08-09

## 2019-08-09 RX ORDER — PROPOFOL 10 MG/ML
VIAL (ML) INTRAVENOUS AS NEEDED
Status: DISCONTINUED | OUTPATIENT
Start: 2019-08-09 | End: 2019-08-09 | Stop reason: SURG

## 2019-08-09 RX ADMIN — PHENYLEPHRINE HYDROCHLORIDE 100 MCG: 10 INJECTION INTRAVENOUS at 12:48

## 2019-08-09 RX ADMIN — FENTANYL CITRATE 100 MCG: 50 INJECTION INTRAMUSCULAR; INTRAVENOUS at 11:04

## 2019-08-09 RX ADMIN — PROPOFOL 150 MG: 10 INJECTION, EMULSION INTRAVENOUS at 11:04

## 2019-08-09 RX ADMIN — PROTAMINE SULFATE 20 MG: 10 INJECTION, SOLUTION INTRAVENOUS at 13:29

## 2019-08-09 RX ADMIN — IOPAMIDOL 44 ML: 510 INJECTION, SOLUTION INTRAVASCULAR at 11:10

## 2019-08-09 RX ADMIN — SUCCINYLCHOLINE CHLORIDE 140 MG: 20 INJECTION, SOLUTION INTRAMUSCULAR; INTRAVENOUS; PARENTERAL at 11:04

## 2019-08-09 RX ADMIN — HYDROCODONE BITARTRATE AND ACETAMINOPHEN 1 TABLET: 7.5; 325 TABLET ORAL at 15:07

## 2019-08-09 RX ADMIN — SODIUM CHLORIDE 9 ML/HR: 9 INJECTION, SOLUTION INTRAVENOUS at 07:55

## 2019-08-09 RX ADMIN — EPHEDRINE SULFATE 10 MG: 50 INJECTION INTRAMUSCULAR; INTRAVENOUS; SUBCUTANEOUS at 12:29

## 2019-08-09 RX ADMIN — FENTANYL CITRATE 50 MCG: 50 INJECTION, SOLUTION INTRAMUSCULAR; INTRAVENOUS at 14:25

## 2019-08-09 RX ADMIN — MIDAZOLAM 1 MG: 1 INJECTION INTRAMUSCULAR; INTRAVENOUS at 10:38

## 2019-08-09 RX ADMIN — EPHEDRINE SULFATE 20 MG: 50 INJECTION INTRAMUSCULAR; INTRAVENOUS; SUBCUTANEOUS at 11:11

## 2019-08-09 RX ADMIN — ROCURONIUM BROMIDE 10 MG: 10 INJECTION INTRAVENOUS at 11:04

## 2019-08-09 RX ADMIN — FENTANYL CITRATE 50 MCG: 50 INJECTION, SOLUTION INTRAMUSCULAR; INTRAVENOUS at 14:37

## 2019-08-09 RX ADMIN — PHENYLEPHRINE HYDROCHLORIDE 100 MCG: 10 INJECTION INTRAVENOUS at 13:18

## 2019-08-09 RX ADMIN — SUGAMMADEX 200 MG: 100 INJECTION, SOLUTION INTRAVENOUS at 13:57

## 2019-08-09 RX ADMIN — PHENYLEPHRINE HYDROCHLORIDE 100 MCG: 10 INJECTION INTRAVENOUS at 12:32

## 2019-08-09 RX ADMIN — PHENYLEPHRINE HYDROCHLORIDE 100 MCG: 10 INJECTION INTRAVENOUS at 13:08

## 2019-08-09 RX ADMIN — ROCURONIUM BROMIDE 10 MG: 10 INJECTION INTRAVENOUS at 12:11

## 2019-08-09 RX ADMIN — LIDOCAINE HYDROCHLORIDE 80 MG: 20 INJECTION, SOLUTION INFILTRATION; PERINEURAL at 11:04

## 2019-08-09 RX ADMIN — HEPARIN SODIUM 5000 UNITS: 1000 INJECTION, SOLUTION INTRAVENOUS; SUBCUTANEOUS at 12:26

## 2019-08-09 RX ADMIN — ONDANSETRON 4 MG: 2 INJECTION INTRAMUSCULAR; INTRAVENOUS at 13:18

## 2019-08-09 RX ADMIN — EPHEDRINE SULFATE 20 MG: 50 INJECTION INTRAMUSCULAR; INTRAVENOUS; SUBCUTANEOUS at 11:22

## 2019-08-09 RX ADMIN — PHENYLEPHRINE HYDROCHLORIDE 200 MCG: 10 INJECTION INTRAVENOUS at 13:44

## 2019-08-09 RX ADMIN — ROCURONIUM BROMIDE 30 MG: 10 INJECTION INTRAVENOUS at 11:08

## 2019-08-09 RX ADMIN — MIDAZOLAM 1 MG: 1 INJECTION INTRAMUSCULAR; INTRAVENOUS at 08:35

## 2019-08-09 RX ADMIN — VANCOMYCIN HYDROCHLORIDE 1.5 G: 1 INJECTION, POWDER, LYOPHILIZED, FOR SOLUTION INTRAVENOUS at 12:25

## 2019-08-09 RX ADMIN — CEFAZOLIN SODIUM 2 G: 2 INJECTION, SOLUTION INTRAVENOUS at 11:15

## 2019-08-09 RX ADMIN — FAMOTIDINE 20 MG: 10 INJECTION INTRAVENOUS at 08:35

## 2019-08-09 RX ADMIN — PROPOFOL 20 MG: 10 INJECTION, EMULSION INTRAVENOUS at 12:17

## 2019-08-09 RX ADMIN — HYDROMORPHONE HYDROCHLORIDE 0.5 MG: 1 INJECTION, SOLUTION INTRAMUSCULAR; INTRAVENOUS; SUBCUTANEOUS at 14:56

## 2019-08-09 NOTE — OP NOTE
Date of Admission:  8/9/2019  Today's Date:  08/09/19  Thierry Hardy MD  [facility]    Preoperative Diagnosis:   Thrombosed dialysis access.  Deep hemodialysis access graft with multiple areas of infiltration.    Postoperative Diagnosis:   Same    Procedure Performed:   Right arm hemodialysis graft revision with thrombectomy with tunneling of new looped upper arm graft with 7 mm internal ringed graft.    8 mm angioplasty of subclavian vein stent.    CPT:  67362 Revision w thrombectomy  + 49584 Central angioplasty    Surgeon:   Thierry Hardy MD    Assistant:    Abel FERRELL , provided critical assistance in exposure, retraction, and suctioning that overall decrease blood loss and operative time.    Anesthesia:   General    Estimated Blood Loss:   100-250 cc    Findings:    Successful creation of new upper arm loop graft with arterial and venous graft anastomoses to previously created loop graft.  Thrombectomy of the venous limb with embolectomy catheter, graft thrombectomy catheter, and adherent graft thrombectomy catheter.  Mid axillary artery required stent graft revision with 8 x 10 Viabahn stent graft.  There was some central venous stenosis that was treated with an 8 mm balloon to high pressures.    Implants:    Implant Name Type Inv. Item Serial No.  Lot No. LRB No. Used   GRFT VASC PROPAT HEP IR 4/7 38 45CM - [id number] - [id number] Implant GRFT VASC PROPAT HEP IR 4/7 38 45CM [id number] [employer]  Right 1   STENTGR ENDOPROSTH VIABAHN RO HEP 9F 9MM [id number] - [id number] - [id number] Implant STENTGR ENDOPROSTH VIABAHN RO HEP 9F 9MM [id number] [employer]  Right 1       Staff:   Circulator: Liz Victor RN; Albert Gutierrez RN  Scrub Person: Osman Perea; Geremias Wilson  Assistant: Ankit Best  Vascular Radiology Technician: Marija Duffy Terri    Specimen:   none    Complications:   none    Dispo:   to PACU    Indication for  procedure:   74 y.o. female with thrombosed right upper extremity graft that was percutaneously declotted a couple weeks ago she week thrombosed.  It was elected to place a temporary tunneled catheter and allow for a open graft thrombectomy.  I discussed the risk, benefits, and alternatives to said procedure with the patient and family.  Informed consent was obtained.    Description of procedure:   The patient was taken to the operating room. Anesthesia was induced without difficulty. Surgical sites were prepped and draped in the usual sterile manner. A full surgical timeout was performed. The duplex ultrasound was used to map the trajectory of the previous graft. There were multiple large infiltrations on the medial aspect of the arm. I elected to make a roughly 4-inch incision and dissect down to the infiltration cavities. The first one was encountered; it had resolving old hematoma that was almost a chocolate consistency. I did elect to take some cultures of this although it did not appear surgically to be infected. After the entire cavity was irrigated all this debris was removed. It was irrigated with sterile water. There was a more lateral hematoma that appeared to be a more fresh hematoma, more consistency of jello, that was irrigated and evacuated. A more lateral caudal incision was made over the arterial and venous limbs of the graft; they were circumferentially controlled, and I planned to tunnel a new loop arm graft due to the deep nature of her current arm graft. I did this by making a counterincision down near the antecubitum and used a curved [place] tunneler in sequential manner to tunnel a new 4 x 7 Propaten and join the graft. Heparin was administered. The arterial side of the graft was transected. A #4 Zulema embolectomy catheter was used in multiple passes to remove the arterial plug. There was pulsatile normal end-bleeding. The limb was flushed with heparin saline and clamped with a vascular clamp.  Similar maneuvers were taken on the venous side. First the #4 embolectomy catheter was taken and a moderate amount of clot was removed. I elected to take a graft thrombectomy catheter and keep that in the Viabahn stents. Multiple passes were taken and a large amount of very adherent debris was removed. Then I used a large adherent clot catheter to remove the residual. An 11-Danish sheath was placed and central venogram showed some narrowing in the axillary vein. This was angioplastied and stented with an 8 x 10 Viabahn stent graft. There were some trailing edge stenoses potentially at the central portion of the subclavian vein stent that was angioplastied with resolution. At this point the arteriogram was made of the arterial limb which showed no evidence of flow deficit. Vascular anastomoses were performed to both the arterial and venous limbs with the newly tunneled loop graft. This was done with a 5-0 HS-7 suture. Appropriate flushing maneuvers were taken prior to reinstitution of flow. Good Doppler signal in the graft was obtained. Wounds were all thoroughly irrigated with antibiotic-containing solution. The hematoma cavity itself in the medial incision was closed and all deep tissues closed with Vicryl sutures and the skin stapled. Overall the patient tolerated the procedure reasonably well.     Thierry Hardy MD  08/09/19     Active Hospital Problems    Diagnosis  POA   • **Thrombosis of kidney dialysis arteriovenous graft (CMS/[facility]) [T82.868A]  Yes   • ESRD (end stage renal disease) on dialysis (CMS/[facility]) [N18.6, Z99.2]  Not Applicable      Resolved Hospital Problems   No resolved problems to display.

## 2019-08-09 NOTE — ANESTHESIA POSTPROCEDURE EVALUATION
"Patient: Nellie Vegas    Procedure Summary     Date:  08/09/19 Room / Location:  [facility] OR 18 [facility] / [facility] HYBRID OR 18/19    Anesthesia Start:  1055 Anesthesia Stop:  1422    Procedure:  RIGHT ARM DIALYSIS GRAFT revision and THROMBECTOMY (Right ) Diagnosis:      Surgeon:  Thierry Hardy MD Provider:  Freddie Qiu MD    Anesthesia Type:  general ASA Status:  3          Anesthesia Type: general  Last vitals  BP   140/67 (08/09/19 1532)   Temp   36.4 °C (97.6 °F) (08/09/19 1525)   Pulse   87 (08/09/19 1532)   Resp   16 (08/09/19 1532)     SpO2   94 % (08/09/19 1532)     Post Anesthesia Care and Evaluation    Patient location during evaluation: bedside  Patient participation: complete - patient participated  Level of consciousness: awake  Pain score: 2  Pain management: adequate  Airway patency: patent  Anesthetic complications: No anesthetic complications    Cardiovascular status: acceptable  Respiratory status: acceptable  Hydration status: acceptable    Comments: /67 (BP Location: Left arm, Patient Position: Lying)   Pulse 87   Temp 36.4 °C (97.6 °F) (Oral)   Resp 16   Ht 149.9 cm (59\")   Wt 84.6 kg (186 lb 6.4 oz)   SpO2 94%   BMI 37.65 kg/m²         "

## 2019-08-09 NOTE — ANESTHESIA PREPROCEDURE EVALUATION
Anesthesia Evaluation     Patient summary reviewed and Nursing notes reviewed   NPO Solid Status: > 8 hours  NPO Liquid Status: > 2 hours           Airway   Mallampati: III  Neck ROM: full  No difficulty expected  Dental    (+) edentulous    Pulmonary     breath sounds clear to auscultation  (+) a smoker Former, COPD, asthma, shortness of breath, sleep apnea,   Cardiovascular     (+) hypertension, hyperlipidemia,       Neuro/Psych  (+) weakness,     GI/Hepatic/Renal/Endo    (+)  GERD,  renal disease, diabetes mellitus,     Musculoskeletal     Abdominal   (+) obese,    Substance History      OB/GYN          Other                        Anesthesia Plan    ASA 3     general     intravenous induction   Anesthetic plan, all risks, benefits, and alternatives have been provided, discussed and informed consent has been obtained with: patient.

## 2019-08-09 NOTE — ANESTHESIA PROCEDURE NOTES
Airway  Urgency: elective    Airway not difficult    General Information and Staff    Patient location during procedure: OR  CRNA: Heidi Strickland CRNA    Indications and Patient Condition  Indications for airway management: airway protection    Preoxygenated: yes  Mask difficulty assessment: 1 - vent by mask    Final Airway Details  Final airway type: endotracheal airway      Successful airway: ETT  Cuffed: yes   Successful intubation technique: direct laryngoscopy  Endotracheal tube insertion site: oral  Blade: Tony  Blade size: 3  ETT size (mm): 7.5  Cormack-Lehane Classification: grade I - full view of glottis  Placement verified by: chest auscultation and capnometry   Measured from: lips  ETT to lips (cm): 21  Number of attempts at approach: 1    Additional Comments   ett cuff up at MOP

## 2019-08-10 ENCOUNTER — HOSPITAL ENCOUNTER (OUTPATIENT)
Facility: HOSPITAL | Age: 74
Setting detail: OBSERVATION
Discharge: HOME | End: 2019-08-11
Attending: EMERGENCY MEDICINE | Admitting: HOSPITALIST

## 2019-08-10 ENCOUNTER — APPOINTMENT (OUTPATIENT)
Dept: CT IMAGING | Facility: HOSPITAL | Age: 74
End: 2019-08-10

## 2019-08-10 DIAGNOSIS — N18.5 ANEMIA OF CHRONIC RENAL FAILURE, STAGE 5 (HCC): Primary | ICD-10-CM

## 2019-08-10 DIAGNOSIS — I95.9 HYPOTENSION, UNSPECIFIED HYPOTENSION TYPE: ICD-10-CM

## 2019-08-10 DIAGNOSIS — D63.1 ANEMIA OF CHRONIC RENAL FAILURE, STAGE 5 (HCC): Primary | ICD-10-CM

## 2019-08-10 LAB
ABO GROUP BLD: NORMAL
ALBUMIN SERPL-MCNC: 3.1 G/DL (ref 3.5–5.2)
ALBUMIN/GLOB SERPL: 1 G/DL
ALP SERPL-CCNC: 152 U/L (ref 39–117)
ALT SERPL W P-5'-P-CCNC: <5 U/L (ref 1–33)
ANION GAP SERPL CALCULATED.3IONS-SCNC: 17.7 MMOL/L (ref 5–15)
ANISOCYTOSIS BLD QL: NORMAL
AST SERPL-CCNC: 11 U/L (ref 1–32)
BASOPHILS # BLD AUTO: 0.03 10*3/MM3 (ref 0–0.2)
BASOPHILS NFR BLD AUTO: 0.3 % (ref 0–1.5)
BILIRUB SERPL-MCNC: 0.3 MG/DL (ref 0.2–1.2)
BLD GP AB SCN SERPL QL: NEGATIVE
BUN BLD-MCNC: 13 MG/DL (ref 8–23)
BUN/CREAT SERPL: 3.3 (ref 7–25)
CALCIUM SPEC-SCNC: 7.9 MG/DL (ref 8.6–10.5)
CHLORIDE SERPL-SCNC: 96 MMOL/L (ref 98–107)
CO2 SERPL-SCNC: 22.3 MMOL/L (ref 22–29)
CREAT BLD-MCNC: 3.89 MG/DL (ref 0.57–1)
DEPRECATED RDW RBC AUTO: 59.8 FL (ref 37–54)
EOSINOPHIL # BLD AUTO: 0.06 10*3/MM3 (ref 0–0.4)
EOSINOPHIL NFR BLD AUTO: 0.6 % (ref 0.3–6.2)
ERYTHROCYTE [DISTWIDTH] IN BLOOD BY AUTOMATED COUNT: 18.7 % (ref 12.3–15.4)
GFR SERPL CREATININE-BSD FRML MDRD: 14 ML/MIN/1.73
GFR SERPL CREATININE-BSD FRML MDRD: ABNORMAL ML/MIN/1.73
GLOBULIN UR ELPH-MCNC: 3 GM/DL
GLUCOSE BLD-MCNC: 130 MG/DL (ref 65–99)
GLUCOSE BLDC GLUCOMTR-MCNC: 109 MG/DL (ref 70–130)
HCT VFR BLD AUTO: 26.3 % (ref 34–46.6)
HGB BLD-MCNC: 7.1 G/DL (ref 12–15.9)
LIPASE SERPL-CCNC: 20 U/L (ref 13–60)
LYMPHOCYTES # BLD AUTO: 0.49 10*3/MM3 (ref 0.7–3.1)
LYMPHOCYTES NFR BLD AUTO: 4.7 % (ref 19.6–45.3)
MCH RBC QN AUTO: 24.4 PG (ref 26.6–33)
MCHC RBC AUTO-ENTMCNC: 27 G/DL (ref 31.5–35.7)
MCV RBC AUTO: 90.4 FL (ref 79–97)
MONOCYTES # BLD AUTO: 0.86 10*3/MM3 (ref 0.1–0.9)
MONOCYTES NFR BLD AUTO: 8.2 % (ref 5–12)
NEUTROPHILS # BLD AUTO: 8.92 10*3/MM3 (ref 1.7–7)
NEUTROPHILS NFR BLD AUTO: 84.6 % (ref 42.7–76)
PLAT MORPH BLD: NORMAL
PLATELET # BLD AUTO: 144 10*3/MM3 (ref 140–450)
PMV BLD AUTO: 9.7 FL (ref 6–12)
POLYCHROMASIA BLD QL SMEAR: NORMAL
POTASSIUM BLD-SCNC: 3.4 MMOL/L (ref 3.5–5.2)
PROT SERPL-MCNC: 6.1 G/DL (ref 6–8.5)
RBC # BLD AUTO: 2.91 10*6/MM3 (ref 3.77–5.28)
RH BLD: POSITIVE
SODIUM BLD-SCNC: 136 MMOL/L (ref 136–145)
T&S EXPIRATION DATE: NORMAL
TROPONIN T SERPL-MCNC: 0.02 NG/ML (ref 0–0.03)
WBC MORPH BLD: NORMAL
WBC NRBC COR # BLD: 10.53 10*3/MM3 (ref 3.4–10.8)

## 2019-08-10 PROCEDURE — 86900 BLOOD TYPING SEROLOGIC ABO: CPT

## 2019-08-10 PROCEDURE — 86901 BLOOD TYPING SEROLOGIC RH(D): CPT

## 2019-08-10 PROCEDURE — 74176 CT ABD & PELVIS W/O CONTRAST: CPT

## 2019-08-10 PROCEDURE — 84484 ASSAY OF TROPONIN QUANT: CPT | Performed by: EMERGENCY MEDICINE

## 2019-08-10 PROCEDURE — G0378 HOSPITAL OBSERVATION PER HR: HCPCS

## 2019-08-10 PROCEDURE — 80053 COMPREHEN METABOLIC PANEL: CPT | Performed by: EMERGENCY MEDICINE

## 2019-08-10 PROCEDURE — 85025 COMPLETE CBC W/AUTO DIFF WBC: CPT | Performed by: EMERGENCY MEDICINE

## 2019-08-10 PROCEDURE — 36415 COLL VENOUS BLD VENIPUNCTURE: CPT | Performed by: EMERGENCY MEDICINE

## 2019-08-10 PROCEDURE — 86923 COMPATIBILITY TEST ELECTRIC: CPT

## 2019-08-10 PROCEDURE — 86901 BLOOD TYPING SEROLOGIC RH(D): CPT | Performed by: EMERGENCY MEDICINE

## 2019-08-10 PROCEDURE — 86850 RBC ANTIBODY SCREEN: CPT | Performed by: EMERGENCY MEDICINE

## 2019-08-10 PROCEDURE — 86900 BLOOD TYPING SEROLOGIC ABO: CPT | Performed by: EMERGENCY MEDICINE

## 2019-08-10 PROCEDURE — 93010 ELECTROCARDIOGRAM REPORT: CPT | Performed by: INTERNAL MEDICINE

## 2019-08-10 PROCEDURE — 70450 CT HEAD/BRAIN W/O DYE: CPT

## 2019-08-10 PROCEDURE — P9016 RBC LEUKOCYTES REDUCED: HCPCS

## 2019-08-10 PROCEDURE — 93005 ELECTROCARDIOGRAM TRACING: CPT | Performed by: EMERGENCY MEDICINE

## 2019-08-10 PROCEDURE — 36430 TRANSFUSION BLD/BLD COMPNT: CPT

## 2019-08-10 PROCEDURE — 82962 GLUCOSE BLOOD TEST: CPT

## 2019-08-10 PROCEDURE — 99285 EMERGENCY DEPT VISIT HI MDM: CPT

## 2019-08-10 PROCEDURE — 83690 ASSAY OF LIPASE: CPT | Performed by: EMERGENCY MEDICINE

## 2019-08-10 PROCEDURE — 85007 BL SMEAR W/DIFF WBC COUNT: CPT | Performed by: EMERGENCY MEDICINE

## 2019-08-10 RX ORDER — NITROGLYCERIN 0.4 MG/1
0.4 TABLET SUBLINGUAL
Status: DISCONTINUED | OUTPATIENT
Start: 2019-08-10 | End: 2019-08-11

## 2019-08-10 RX ORDER — NICOTINE POLACRILEX 4 MG
15 LOZENGE BUCCAL
Status: DISCONTINUED | OUTPATIENT
Start: 2019-08-10 | End: 2019-08-11

## 2019-08-10 RX ORDER — DEXTROSE MONOHYDRATE 25 G/50ML
25 INJECTION, SOLUTION INTRAVENOUS
Status: DISCONTINUED | OUTPATIENT
Start: 2019-08-10 | End: 2019-08-11

## 2019-08-10 RX ORDER — ALBUTEROL SULFATE 2.5 MG/3ML
2.5 SOLUTION RESPIRATORY (INHALATION) EVERY 6 HOURS PRN
Status: DISCONTINUED | OUTPATIENT
Start: 2019-08-10 | End: 2019-08-11 | Stop reason: HOSPADM

## 2019-08-10 RX ORDER — SODIUM CHLORIDE 0.9 % (FLUSH) 0.9 %
10 SYRINGE (ML) INJECTION AS NEEDED
Status: DISCONTINUED | OUTPATIENT
Start: 2019-08-10 | End: 2019-08-11 | Stop reason: HOSPADM

## 2019-08-10 RX ORDER — INSULIN GLARGINE 100 [IU]/ML
8 INJECTION, SOLUTION SUBCUTANEOUS NIGHTLY
Status: DISCONTINUED | OUTPATIENT
Start: 2019-08-10 | End: 2019-08-11 | Stop reason: HOSPADM

## 2019-08-10 RX ORDER — MONTELUKAST SODIUM 10 MG/1
10 TABLET ORAL NIGHTLY
Status: DISCONTINUED | OUTPATIENT
Start: 2019-08-10 | End: 2019-08-11

## 2019-08-10 RX ORDER — REPAGLINIDE 2 MG/1
2 TABLET ORAL
Status: ON HOLD | COMMUNITY
End: 2020-12-07

## 2019-08-10 RX ORDER — BUDESONIDE AND FORMOTEROL FUMARATE DIHYDRATE 160; 4.5 UG/1; UG/1
2 AEROSOL RESPIRATORY (INHALATION)
Status: DISCONTINUED | OUTPATIENT
Start: 2019-08-10 | End: 2019-08-11 | Stop reason: HOSPADM

## 2019-08-10 RX ADMIN — MONTELUKAST SODIUM 10 MG: 10 TABLET, FILM COATED ORAL at 23:38

## 2019-08-10 NOTE — ED NOTES
IV therapy unable to gain IV access. MD aware. Will see if lab is able to get blood collection     Cathie Cuevas, RN  08/10/19 8558

## 2019-08-10 NOTE — ED NOTES
Lab has been called multiple times by this RN and charge RN asking for someone to please draw this pt labs.      Cathie Cuevas, DRE  08/10/19 2832

## 2019-08-10 NOTE — ED NOTES
Attempted IV access with no success. IV therapy called due to pt having very limited access. IV therapy currently at bedside     Cathie Cuevas, RN  08/10/19 8463

## 2019-08-10 NOTE — ED NOTES
Pt reporting bilateral lower abdominal pain that started today. Pt denies any n/v/d.     Cathie Cuevas, RN  08/10/19 5835

## 2019-08-10 NOTE — ED PROVIDER NOTES
EMERGENCY DEPARTMENT ENCOUNTER    CHIEF COMPLAINT  Chief Complaint: Hypotension  History given by: Patient   History limited by: Nothing  Room Number: 20/20  PMD: Laurent Cortes DO      HPI:  Pt is a 74 y.o. female who presents complaining of hypotension that began today when her dialysis team noticed her BP was systolic 60's. Pt is also complaining of diffuse abdominal pain that began this morning, loss of appetite, and trouble speaking. Pt denies vomiting, diarrhea, cough, SOA, fever, and chills. Pt reports she had surgery yesterday for a dialysis graft and has not been eating or drinking since. Pt reports dialysis gave her 2L of fluids with no change in BP.    Duration:  PTA (Several hours)  Onset: Gradual  Timing: Constant  Location: [facility]  Radiation: N/a  Quality: Hypotension  Intensity/Severity: Moderate  Progression: Improved  Associated Symptoms: Abdominal pain, loss of appetite, and trouble speaking  Aggravating Factors: None  Alleviating Factors: None  Previous Episodes: None  Treatment before arrival: Dialysis team gave her 2L of fluids with no improvement in BP    PAST MEDICAL HISTORY  Active Ambulatory Problems     Diagnosis Date Noted   • ESRD (end stage renal disease) on dialysis (CMS/[facility]) 08/09/2019   • Thrombosis of kidney dialysis arteriovenous graft (CMS/[facility]) 08/09/2019     Resolved Ambulatory Problems     Diagnosis Date Noted   • No Resolved Ambulatory Problems     Past Medical History:   Diagnosis Date   • Anemia    • Asthma    • COPD (chronic obstructive pulmonary disease) (CMS/[facility])    • Diabetes mellitus (CMS/[facility])    • Dialysis patient (CMS/HCC)    • Disease of thyroid gland    • GERD (gastroesophageal reflux disease)    • History of blood clots    • History of kidney stones    • Hyperlipidemia    • Hypertension    • Knee pain, bilateral    • Renal disease    • Sleep apnea    • SOB (shortness of breath)    • Thrombosis of renal dialysis arteriovenous graft (CMS/[facility])    • Weakness         PAST SURGICAL HISTORY  Past Surgical History:   Procedure Laterality Date   • ARTERIOVENOUS FISTULA Right    • ARTERIOVENOUS FISTULA Left     REMOVED   • CENTRAL VENOUS CATHETER TUNNELED INSERTION DOUBLE LUMEN     •  SECTION     • POLYPECTOMY      UTERINE       FAMILY HISTORY  Family History   Problem Relation Age of Onset   • Malig Hyperthermia Neg Hx        SOCIAL HISTORY  Social History     Socioeconomic History   • Marital status:      Spouse name: Not on file   • Number of children: Not on file   • Years of education: Not on file   • Highest education level: Not on file   Tobacco Use   • Smoking status: Former Smoker     Packs/day: 0.00     Years: 4.00     Pack years: 0.00     Types: Cigarettes     Last attempt to quit:      Years since quittin.6   • Smokeless tobacco: Never Used   • Tobacco comment: 3 CIGS PER DAY   Substance and Sexual Activity   • Alcohol use: No     Frequency: Never   • Drug use: No       ALLERGIES  Sulfa antibiotics and Latex    REVIEW OF SYSTEMS  Review of Systems   Constitutional: Positive for appetite change (loss of). Negative for chills and fever.        Hypotension   HENT: Negative for sore throat.    Eyes: Negative.    Respiratory: Negative for cough and shortness of breath.    Cardiovascular: Negative for chest pain.   Gastrointestinal: Positive for abdominal pain. Negative for diarrhea and vomiting.   Genitourinary: Negative for dysuria.   Musculoskeletal: Negative for neck pain.   Skin: Negative for rash.   Allergic/Immunologic: Negative.    Neurological: Positive for speech difficulty. Negative for weakness, numbness and headaches.   Hematological: Negative.    Psychiatric/Behavioral: Negative.    All other systems reviewed and are negative.      PHYSICAL EXAM  ED Triage Vitals   Temp Heart Rate Resp BP SpO2   08/10/19 1046 08/10/19 1046 08/10/19 1046 08/10/19 1046 08/10/19 1046   98.1 °F (36.7 °C) 80 20 92/67 98 %      Temp src Heart Rate Source  Patient Position BP Location FiO2 (%)   -- 08/10/19 1046 08/10/19 1106 -- --    Monitor Lying         Physical Exam   Constitutional: She is oriented to person, place, and time. No distress.   Awake, alert, and oriented    HENT:   Head: Normocephalic and atraumatic.   Eyes: EOM are normal. Pupils are equal, round, and reactive to light.   Neck: Normal range of motion. Neck supple.   Cardiovascular: Normal rate, regular rhythm and normal heart sounds.   Tunnel catheter to L chest wall   Pulmonary/Chest: Effort normal and breath sounds normal. No respiratory distress.   Abdominal: Soft. There is tenderness (diffuse). There is no rebound and no guarding.   Musculoskeletal: Normal range of motion. She exhibits no edema.   Shunt in R arm   Neurological: She is alert and oriented to person, place, and time. She has normal sensation and normal strength.   Skin: Skin is warm and dry. No rash noted.   Psychiatric: Mood and affect normal.   Nursing note and vitals reviewed.      LAB RESULTS  Lab Results (last 24 hours)     Procedure Component Value Units Date/Time    CBC & Differential [[id number]] Collected:  08/10/19 1352    Specimen:  Blood Updated:  08/10/19 1431    Narrative:       The following orders were created for panel order CBC & Differential.  Procedure                               Abnormality         Status                     ---------                               -----------         ------                     Scan Slide[[id number]]                                       Final result               CBC Auto Differential[[id number]]        Abnormal            Final result                 Please view results for these tests on the individual orders.    Lipase [[id number]]  (Normal) Collected:  08/10/19 1352    Specimen:  Blood Updated:  08/10/19 1459     Lipase 20 U/L     CBC Auto Differential [[id number]]  (Abnormal) Collected:  08/10/19 1352    Specimen:  Blood Updated:  08/10/19 1431     WBC 10.53 10*3/mm3      RBC  2.91 10*6/mm3      Hemoglobin 7.1 g/dL      Hematocrit 26.3 %      MCV 90.4 fL      MCH 24.4 pg      MCHC 27.0 g/dL      RDW 18.7 %      RDW-SD 59.8 fl      MPV 9.7 fL      Platelets 144 10*3/mm3      Neutrophil % 84.6 %      Lymphocyte % 4.7 %      Monocyte % 8.2 %      Eosinophil % 0.6 %      Basophil % 0.3 %      Neutrophils, Absolute 8.92 10*3/mm3      Lymphocytes, Absolute 0.49 10*3/mm3      Monocytes, Absolute 0.86 10*3/mm3      Eosinophils, Absolute 0.06 10*3/mm3      Basophils, Absolute 0.03 10*3/mm3     Scan Slide [[id number]] Collected:  08/10/19 1352    Specimen:  Blood Updated:  08/10/19 1431     Anisocytosis Mod/2+     Polychromasia Slight/1+     WBC Morphology Normal     Platelet Morphology Normal    Comprehensive Metabolic Panel [[id number]]  (Abnormal) Collected:  08/10/19 1553    Specimen:  Blood Updated:  08/10/19 1626     Glucose 130 mg/dL      BUN 13 mg/dL      Creatinine 3.89 mg/dL      Sodium 136 mmol/L      Potassium 3.4 mmol/L      Chloride 96 mmol/L      CO2 22.3 mmol/L      Calcium 7.9 mg/dL      Total Protein 6.1 g/dL      Albumin 3.10 g/dL      ALT (SGPT) <5 U/L      AST (SGOT) 11 U/L      Alkaline Phosphatase 152 U/L      Total Bilirubin 0.3 mg/dL      eGFR Non African Amer -- mL/min/1.73      Comment: <15 Indicative of kidney failure.        eGFR   Amer 14 mL/min/1.73      Comment: <15 Indicative of kidney failure.        Globulin 3.0 gm/dL      A/G Ratio 1.0 g/dL      BUN/Creatinine Ratio 3.3     Anion Gap 17.7 mmol/L     Narrative:       GFR Normal >60  Chronic Kidney Disease <60  Kidney Failure <15    Troponin [[id number]]  (Normal) Collected:  08/10/19 1553    Specimen:  Blood Updated:  08/10/19 1626     Troponin T 0.021 ng/mL     Narrative:       Troponin T Reference Range:  <= 0.03 ng/mL-   Negative for AMI  >0.03 ng/mL-     Abnormal for myocardial necrosis.  Clinicians would have to utilize clinical acumen, EKG, Troponin and serial changes to determine if it is an Acute  Myocardial Infarction or myocardial injury due to an underlying chronic condition.           I ordered the above labs and reviewed the results    RADIOLOGY  CT Abdomen Pelvis Without Contrast   Final Result   No acute process is identified within the abdomen or pelvis.   Sludge and possible tiny gallstones are evident within the gallbladder   lumen. The kidneys are atrophic and contain multiple calcifications that   may be vascular or nonobstructing renal calculi. A moderate-sized simple   renal cyst is noted on the right. There is moderately extensive colonic   diverticulosis without evidence of diverticulitis. Multiple small   calcified uterine leiomyomas are noted.       This report was finalized on 8/10/2019 1:24 PM by Dr. Shahram Lan M.D.          CT Head Without Contrast   Final Result   FINDINGS: The brain and ventricular system appear structurally within  normal limits for patient of this age. There is no evidence of recent or  old infarct or hemorrhage. There are no masses. Minimal calcification is  noted in the basal ganglia bilaterally. The visualized paranasal sinuses  appear grossly well aerated.              I ordered the above noted radiological studies. Interpreted by radiologist. Discussed with radiologist (). Reviewed by me in PACS.       PROCEDURES  Procedures     EKG          EKG time: 1148  Rhythm/Rate: SR 80  P waves and ID: Nml  QRS, axis: RBBB   ST and T waves: Nml     Interpreted Contemporaneously by me, independently viewed  Unchanged compared to prior 8/6/19        PROGRESS AND CONSULTS   1324: Spoke with  (radiology) who stated that CT head was negative and CT A/P showed atrophic kidneys and diverticulosis but no acute changes    1417: Rechecked pt who is resting comfortably and in NAD. Informed pt of negative CT results. Discussed waiting on pending blood work.     1638: Rechecked pt who is resting comfortably and in NAD. Discussed blood work results which showed  hemoglobin of 7.1. Discussed need for blood transfusion and difficulty getting line in. Discussed plan to admit for transfusion and to contact  (nephrology) about possibly using port access. Pt understands and agrees with the plan, all questions answered.    1501: Spoke with  ([facility]) who agreed to admit     1503: Spoke with  (neprhology) who stated the dialysis catheter can be used for transfusion      MEDICAL DECISION MAKING  Results were reviewed/discussed with the patient and they were also made aware of online access. Pt also made aware that some labs, such as cultures, will not be resulted during ER visit and follow up with PMD is necessary.     MDM  Number of Diagnoses or Management Options     Amount and/or Complexity of Data Reviewed  Clinical lab tests: ordered and reviewed (Hemoglobin 7.1)  Tests in the radiology section of CPT®: ordered and reviewed (CT head negative acute. CT A/P showed atropic kidneys and diverticulosis but no acute findings. )  Tests in the medicine section of CPT®: ordered and reviewed (See procedure notes)  Discussion of test results with the performing providers: yes ( (radiology))  Decide to obtain previous medical records or to obtain history from someone other than the patient: yes  Discuss the patient with other providers: yes ( ([facility])   (nephrology))  Independent visualization of images, tracings, or specimens: yes    Patient Progress  Patient progress: stable         DIAGNOSIS  Final diagnoses:   Anemia of chronic renal failure, stage 5 (CMS/HCC)   Hypotension, unspecified hypotension type- during dialysis       DISPOSITION  ADMISSION TO TELE    Discussed treatment plan and reason for admission with pt/family and admitting physician.  Pt/family voiced understanding of the plan for admission for further testing/treatment as needed.       Latest Documented Vital Signs:  As of 3:03 PM  BP- 97/43 HR- 76 Temp- 98.1 °F (36.7 °C) O2  sat- 92%    --  Documentation assistance provided by nolberto Kenyon for .  Information recorded by the mamieibmercedes was done at my direction and has been verified and validated by me.                   Aiyana Kenyon  08/10/19 1520       Tobi Ford MD  08/10/19 [id number]

## 2019-08-10 NOTE — ED NOTES
Bandage to SIXTO changed due to previous one falling off and dirty. RN replaced dressing with telfa dressing.     Cathie Cuevas RN  08/1945

## 2019-08-10 NOTE — ED TRIAGE NOTES
Pt was at dialysis long-term through and states BP systolic 60s Dialysis center bolused 2 L BP now 92/67.

## 2019-08-11 VITALS
HEIGHT: 59 IN | TEMPERATURE: 98.6 F | BODY MASS INDEX: 37.88 KG/M2 | RESPIRATION RATE: 18 BRPM | OXYGEN SATURATION: 100 % | WEIGHT: 187.9 LBS | DIASTOLIC BLOOD PRESSURE: 70 MMHG | SYSTOLIC BLOOD PRESSURE: 126 MMHG | HEART RATE: 88 BPM

## 2019-08-11 LAB
ABO + RH BLD: NORMAL
BH BB BLOOD EXPIRATION DATE: NORMAL
BH BB BLOOD TYPE BARCODE: 7300
BH BB DISPENSE STATUS: NORMAL
BH BB PRODUCT CODE: NORMAL
BH BB UNIT NUMBER: NORMAL
GLUCOSE BLDC GLUCOMTR-MCNC: 100 MG/DL (ref 70–130)
GLUCOSE BLDC GLUCOMTR-MCNC: 109 MG/DL (ref 70–130)
UNIT  ABO: NORMAL
UNIT  RH: NORMAL

## 2019-08-11 PROCEDURE — 94799 UNLISTED PULMONARY SVC/PX: CPT

## 2019-08-11 PROCEDURE — G0378 HOSPITAL OBSERVATION PER HR: HCPCS

## 2019-08-11 PROCEDURE — 82962 GLUCOSE BLOOD TEST: CPT

## 2019-08-11 PROCEDURE — 94640 AIRWAY INHALATION TREATMENT: CPT

## 2019-08-11 RX ORDER — REPAGLINIDE 2 MG/1
2 TABLET ORAL
Status: DISCONTINUED | OUTPATIENT
Start: 2019-08-11 | End: 2019-08-11 | Stop reason: HOSPADM

## 2019-08-11 RX ORDER — ASPIRIN 325 MG
325 TABLET ORAL DAILY
Status: DISCONTINUED | OUTPATIENT
Start: 2019-08-11 | End: 2019-08-11 | Stop reason: HOSPADM

## 2019-08-11 RX ORDER — MELATONIN
1000 DAILY
Status: DISCONTINUED | OUTPATIENT
Start: 2019-08-11 | End: 2019-08-11 | Stop reason: HOSPADM

## 2019-08-11 RX ORDER — METOPROLOL SUCCINATE 25 MG/1
25 TABLET, EXTENDED RELEASE ORAL DAILY
Status: DISCONTINUED | OUTPATIENT
Start: 2019-08-11 | End: 2019-08-11 | Stop reason: HOSPADM

## 2019-08-11 RX ORDER — MONTELUKAST SODIUM 10 MG/1
10 TABLET ORAL NIGHTLY
Status: DISCONTINUED | OUTPATIENT
Start: 2019-08-11 | End: 2019-08-11 | Stop reason: HOSPADM

## 2019-08-11 RX ORDER — PANTOPRAZOLE SODIUM 40 MG/1
40 TABLET, DELAYED RELEASE ORAL EVERY MORNING
Status: DISCONTINUED | OUTPATIENT
Start: 2019-08-12 | End: 2019-08-11 | Stop reason: HOSPADM

## 2019-08-11 RX ADMIN — BUDESONIDE AND FORMOTEROL FUMARATE DIHYDRATE 2 PUFF: 160; 4.5 AEROSOL RESPIRATORY (INHALATION) at 07:55

## 2019-08-11 RX ADMIN — BUDESONIDE AND FORMOTEROL FUMARATE DIHYDRATE 2 PUFF: 160; 4.5 AEROSOL RESPIRATORY (INHALATION) at 00:01

## 2019-08-11 NOTE — PLAN OF CARE
Problem: Patient Care Overview  Goal: Plan of Care Review  Outcome: Ongoing (interventions implemented as appropriate)   08/11/19 0640   Coping/Psychosocial   Plan of Care Reviewed With patient   Plan of Care Review   Progress no change   OTHER   Outcome Summary patient admitted from ED for anemia and hypotension during HD yesterday. 1 unit PRBC given overnight-recheck hgb with am labs. surgical site dsg to RUE CDI-no s/s bleeding overnight. VSS. Nephrology consulted for HD mgmt. 2L o2 in place while asleep d/t sleep apnea and pt does not have cpap machine at bedside. No falls. ctm closely      Goal: Individualization and Mutuality  Outcome: Ongoing (interventions implemented as appropriate)    Goal: Discharge Needs Assessment  Outcome: Ongoing (interventions implemented as appropriate)      Problem: Anemia (Adult)  Goal: Symptom Improvement  Outcome: Ongoing (interventions implemented as appropriate)

## 2019-08-11 NOTE — NURSING NOTE
Lab has attempted multiple times to obtain blood to recheck H&H. Per Dr. Chowdhury, patient is ok to be discharged but will need H&H drawn with next dialysis.

## 2019-08-11 NOTE — PLAN OF CARE
Problem: Patient Care Overview  Goal: Plan of Care Review  Outcome: Ongoing (interventions implemented as appropriate)    Goal: Individualization and Mutuality  Outcome: Ongoing (interventions implemented as appropriate)    Goal: Discharge Needs Assessment  Outcome: Ongoing (interventions implemented as appropriate)      Problem: Fall Risk (Adult)  Goal: Identify Related Risk Factors and Signs and Symptoms  Outcome: Ongoing (interventions implemented as appropriate)    Goal: Absence of Fall  Outcome: Ongoing (interventions implemented as appropriate)      Problem: Pain, Acute (Adult)  Goal: Identify Related Risk Factors and Signs and Symptoms  Outcome: Outcome(s) achieved Date Met: 08/11/19    Goal: Acceptable Pain Control/Comfort Level  Outcome: Ongoing (interventions implemented as appropriate)      Problem: Anemia (Adult)  Goal: Identify Related Risk Factors and Signs and Symptoms  Outcome: Outcome(s) achieved Date Met: 08/11/19    Goal: Symptom Improvement  Outcome: Ongoing (interventions implemented as appropriate)

## 2019-08-11 NOTE — H&P
Short stay summary    Primary care physician  Dr. Laurent Cortes    Chief complaint  Hypertension  Low hemoglobin    History of present illness  74-year-old -American female with history of end-stage renal disease on hemodialysis COPD diabetes mellitus hypertension hyperlipidemia and chronic anemia brought to the emergency with low blood pressure during hemodialysis and also found to have low hemoglobin admitted for blood transfusion.  Patient has no specific complaint except generalized weakness and was dizzy during hemodialysis.  Patient denies any chest pain increased shortness of breath abdominal pain nausea vomiting diarrhea.  Patient denies any fever chills night sweats weight loss or weight gain.  Patient also denies any black stools blood in the stools and back to baseline admitted for blood transfusion.    PAST MEDICAL HISTORY  • Anemia     • Asthma     • COPD (chronic obstructive pulmonary disease) (CMS/HCC)     • Diabetes mellitus (CMS/HCC)     • Dialysis patient (CMS/[facility])     • Disease of thyroid gland     • GERD (gastroesophageal reflux disease)     • History of blood clots     • History of kidney stones     • Hyperlipidemia     • Hypertension     • Knee pain, bilateral     • Renal disease     • Sleep apnea     • SOB (shortness of breath)     • 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)     • Weakness        PAST SURGICAL HISTORY  Surgical History   Past Surgical History:   Procedure Laterality Date   • ARTERIOVENOUS FISTULA Right     • ARTERIOVENOUS FISTULA Left       REMOVED   • CENTRAL VENOUS CATHETER TUNNELED INSERTION DOUBLE LUMEN       •  SECTION       • POLYPECTOMY         UTERINE         FAMILY HISTORY        Family History   Problem Relation Age of Onset   • Malig Hyperthermia Neg Hx           SOCIAL HISTORY  Social History   Social History            Socioeconomic History   • Marital status:        Spouse name: Not on file   • Number of children: Not on file  "  • Years of education: Not on file   • Highest education level: Not on file   Tobacco Use   • Smoking status: Former Smoker       Packs/day: 0.00       Years: 4.00       Pack years: 0.00       Types: Cigarettes       Last attempt to quit: 1966       Years since quittin.6   • Smokeless tobacco: Never Used   • Tobacco comment: 3 CIGS PER DAY   Substance and Sexual Activity   • Alcohol use: No       Frequency: Never   • Drug use: No         ALLERGIES  Sulfa antibiotics and Latex  Home medications reviewed     REVIEW OF SYSTEMS  Constitutional: Positive for appetite change (loss of). Negative for chills and fever.   HENT: Negative for sore throat.    Eyes: Negative.    Respiratory: Negative for cough and shortness of breath.    Cardiovascular: Negative for chest pain.   Gastrointestinal: Positive for abdominal pain. Negative for diarrhea and vomiting.   Genitourinary: Negative for dysuria.   Musculoskeletal: Negative for neck pain.   Skin: Negative for rash.   Allergic/Immunologic: Negative.    Neurological: Positive for speech difficulty. Negative for weakness, numbness and headaches.   Hematological: Negative.    Psychiatric/Behavioral: Negative.    All other systems reviewed and are negative.     PHYSICAL EXAM  Blood pressure 126/70, pulse 88, temperature 98.6 °F (37 °C), temperature source Oral, resp. rate 18, height 149.9 cm (59\"), weight 85.2 kg (187 lb 14.4 oz), SpO2 100 %.    Constitutional: She is oriented to person, place, and time. No distress.   Awake, alert, and oriented    Head: Normocephalic and atraumatic.   Eyes: EOM are normal. Pupils are equal, round, and reactive to light.   Neck: Normal range of motion. Neck supple.   Cardiovascular: Normal rate, regular rhythm and normal heart sounds.   Tunnel catheter to L chest wall   Pulmonary/Chest: Effort normal and breath sounds normal. No respiratory distress.   Abdominal: Soft. There is tenderness (diffuse). There is no rebound and no guarding. "   Musculoskeletal: Normal range of motion. She exhibits no edema.   Neurological: She is alert and oriented to person, place, and time. She has normal sensation and normal strength.   Skin: Skin is warm and dry. No rash noted.   Psychiatric: Mood and affect normal.     LAB RESULTS  Lab Results (last 24 hours)     Procedure Component Value Units Date/Time    POC Glucose Once [[id number]]  (Normal) Collected:  08/11/19 1110    Specimen:  Blood Updated:  08/11/19 1113     Glucose 109 mg/dL     POC Glucose Once [[id number]]  (Normal) Collected:  08/11/19 0636    Specimen:  Blood Updated:  08/11/19 0639     Glucose 100 mg/dL     POC Glucose Once [[id number]]  (Normal) Collected:  08/10/19 2340    Specimen:  Blood Updated:  08/10/19 2341     Glucose 109 mg/dL     Comprehensive Metabolic Panel [[id number]]  (Abnormal) Collected:  08/10/19 1553    Specimen:  Blood Updated:  08/10/19 1626     Glucose 130 mg/dL      BUN 13 mg/dL      Creatinine 3.89 mg/dL      Sodium 136 mmol/L      Potassium 3.4 mmol/L      Chloride 96 mmol/L      CO2 22.3 mmol/L      Calcium 7.9 mg/dL      Total Protein 6.1 g/dL      Albumin 3.10 g/dL      ALT (SGPT) <5 U/L      AST (SGOT) 11 U/L      Alkaline Phosphatase 152 U/L      Total Bilirubin 0.3 mg/dL      eGFR Non African Amer -- mL/min/1.73      Comment: <15 Indicative of kidney failure.        eGFR   Amer 14 mL/min/1.73      Comment: <15 Indicative of kidney failure.        Globulin 3.0 gm/dL      A/G Ratio 1.0 g/dL      BUN/Creatinine Ratio 3.3     Anion Gap 17.7 mmol/L     Narrative:       GFR Normal >60  Chronic Kidney Disease <60  Kidney Failure <15    Troponin [[id number]]  (Normal) Collected:  08/10/19 1553    Specimen:  Blood Updated:  08/10/19 1626     Troponin T 0.021 ng/mL     Narrative:       Troponin T Reference Range:  <= 0.03 ng/mL-   Negative for AMI  >0.03 ng/mL-     Abnormal for myocardial necrosis.  Clinicians would have to utilize clinical acumen, EKG, Troponin and serial  changes to determine if it is an Acute Myocardial Infarction or myocardial injury due to an underlying chronic condition.     Lipase [[id number]]  (Normal) Collected:  08/10/19 1352    Specimen:  Blood Updated:  08/10/19 1459     Lipase 20 U/L     CBC & Differential [[id number]] Collected:  08/10/19 1352    Specimen:  Blood Updated:  08/10/19 1431    Narrative:       The following orders were created for panel order CBC & Differential.  Procedure                               Abnormality         Status                     ---------                               -----------         ------                     Scan Slide[[id number]]                                       Final result               CBC Auto Differential[[id number]]        Abnormal            Final result                 Please view results for these tests on the individual orders.    CBC Auto Differential [[id number]]  (Abnormal) Collected:  08/10/19 1352    Specimen:  Blood Updated:  08/10/19 1431     WBC 10.53 10*3/mm3      RBC 2.91 10*6/mm3      Hemoglobin 7.1 g/dL      Hematocrit 26.3 %      MCV 90.4 fL      MCH 24.4 pg      MCHC 27.0 g/dL      RDW 18.7 %      RDW-SD 59.8 fl      MPV 9.7 fL      Platelets 144 10*3/mm3      Neutrophil % 84.6 %      Lymphocyte % 4.7 %      Monocyte % 8.2 %      Eosinophil % 0.6 %      Basophil % 0.3 %      Neutrophils, Absolute 8.92 10*3/mm3      Lymphocytes, Absolute 0.49 10*3/mm3      Monocytes, Absolute 0.86 10*3/mm3      Eosinophils, Absolute 0.06 10*3/mm3      Basophils, Absolute 0.03 10*3/mm3     Scan Slide [[id number]] Collected:  08/10/19 1352    Specimen:  Blood Updated:  08/10/19 1431     Anisocytosis Mod/2+     Polychromasia Slight/1+     WBC Morphology Normal     Platelet Morphology Normal        Imaging Results (last 24 hours)     Procedure Component Value Units Date/Time    CT Abdomen Pelvis Without Contrast [[id number]] Collected:  08/10/19 1313     Updated:  08/10/19 1328    Narrative:       CT ABDOMEN PELVIS  WO CONTRAST-     CLINICAL HISTORY: Diffuse abdominal pain. Hypotension. Renal failure.     TECHNIQUE: Spiral CT images were acquired through the abdomen and pelvis  with no oral or IV contrast and were reconstructed in 3 mm thick slices.     Radiation dose reduction techniques were utilized, including automated  exposure control and exposure modulation based on body size.     COMPARISON: None     FINDINGS: The liver, spleen, pancreas, and adrenal glands appear within  normal limits. Sludge and possible tiny gallstones are noted layering  posteriorly within the lumen of gallbladder. The gallbladder is  otherwise unremarkable. There is no bile duct dilatation. Both kidneys  are atrophic. There is a large approximately 5.5 cm diameter simple cyst  protruding superiorly from the upper pole of the right kidney. Multiple  small calcifications are present within both kidneys that is suspected  to be primarily vascular in etiology, although small nonobstructing  renal calculi cannot be entirely excluded. A very tiny hiatal hernia is  noted. The stomach is otherwise unremarkable. The small bowel is normal  in caliber. There are multiple diverticuli scattered throughout the  entire colon that are most numerous within the left colon and sigmoid  colon. There is no CT evidence of diverticulitis and there are multiple  small partially calcified leiomyomas scattered throughout the uterus.  They measure up to 2.7 cm in diameter. The abdominal aorta is normal in  caliber. It contains multiple small calcified atherosclerotic plaques.       Impression:       No acute process is identified within the abdomen or pelvis.  Sludge and possible tiny gallstones are evident within the gallbladder  lumen. The kidneys are atrophic and contain multiple calcifications that  may be vascular or nonobstructing renal calculi. A moderate-sized simple  renal cyst is noted on the right. There is moderately extensive colonic  diverticulosis without  evidence of diverticulitis. Multiple small  calcified uterine leiomyomas are noted.     This report was finalized on 8/10/2019 1:24 PM by Dr. Shahram Lan M.D.       CT Head Without Contrast [[id number]] Collected:  08/10/19 1310     Updated:  08/10/19 1316    Narrative:       CT HEAD WO CONTRAST-     CLINICAL HISTORY: Weakness. Confusion.     TECHNIQUE: Transverse 3 mm thick images were acquired from the base of  the skull to the vertex without IV contrast.     Radiation dose reduction techniques were utilized, including automated  exposure control and exposure modulation based on body size.     COMPARISON: None     FINDINGS: The brain and ventricular system appear structurally within  normal limits for patient of this age. There is no evidence of recent or  old infarct or hemorrhage. There are no masses. Minimal calcification is  noted in the basal ganglia bilaterally. The visualized paranasal sinuses  appear grossly well aerated.     IMPRESSIONS: Negative CT scan of the head.     This report was finalized on 8/10/2019 1:13 PM by Dr. Shahram Lan M.D.           EKG                              Rhythm/Rate: SR 80  P waves and ME: Nml  QRS, axis: RBBB   ST and T waves: Nml       Current Facility-Administered Medications:   •  albuterol (PROVENTIL) nebulizer solution 0.083% 2.5 mg/3mL, 2.5 mg, Nebulization, Q6H PRN, Haja Chowdhury MD  •  aspirin tablet 325 mg, 325 mg, Oral, Daily, Haja Chowdhury MD  •  budesonide-formoterol (SYMBICORT) 160-4.5 MCG/ACT inhaler 2 puff, 2 puff, Inhalation, BID - RT, Haja Chowdhury MD, 2 puff at 08/11/19 0755  •  calcium acetate (PHOSLO) capsule 667 mg, 667 mg, Oral, TID With Meals, Haja Chowdhury MD  •  cholecalciferol (VITAMIN D3) tablet 1,000 Units, 1,000 Units, Oral, Daily, Haja Chowdhury MD  •  insulin glargine (LANTUS) injection 8 Units, 8 Units, Subcutaneous, Nightly, Haja Chowdhury MD  •  insulin lispro (humaLOG) injection 0-7 Units, 0-7 Units, Subcutaneous, 4x Daily With Meals &  Nightly, Allan Chowdhury MD  •  metoprolol succinate XL (TOPROL-XL) 24 hr tablet 25 mg, 25 mg, Oral, Daily, Allan Chowdhury MD  •  montelukast (SINGULAIR) tablet 10 mg, 10 mg, Oral, Nightly, Allan Chowdhury MD  •  [START ON 8/12/2019] pantoprazole (PROTONIX) EC tablet 40 mg, 40 mg, Oral, QAM, Allan Chowdhury MD  •  repaglinide (PRANDIN) tablet 2 mg, 2 mg, Oral, TID AC, Allan Chowdhury MD  •  [COMPLETED] Insert peripheral IV, , , Once **AND** sodium chloride 0.9 % flush 10 mL, 10 mL, Intravenous, PRN, Tobi Ford MD     ASSESSMENT  Chronic anemia with drop in H&H  Transient hypotension during hemodialysis resolved  End-stage renal disease on hemodialysis  Hypertension  Asthma  Diabetes mellitus  Gastroesophageal for disease    Hospital course  Patient admitted on monitor floor and received 2 units packed RBC after type and cross match and her blood pressure remained stable.  Patient home medication continued and followed by nephrology.  Patient feeling fine and her blood pressure is 126 x 70 and I am waiting for a H&H after transfusion if hemoglobin looks better she will be discharged home on home medication and continue hemodialysis 3 times a week and follow-up with primary care doctor and nephrology.      Discharge diet regular    Activity as tolerated     Medication as above    Follow-up with primary doctor in 1 week and follow-up with nephrology per the instruction and keep the appointment for hemodialysis     ALLAN CHOWDHURY MD

## 2019-08-11 NOTE — CONSULTS
[facility]                                                                                             Nephrology Initial Consult Note    Patient Identification:  Name: Nellie Vegas MRN: [id number]  Age: 74 y.o. : 1945  Sex: female  Date:2019    Requesting Physician: As per consult order.  Reason for Consultation: End-stage renal disease, dialysis management  Information from:patient/ family/ chart      History of Present Illness: This is a 74 y.o. year old female with a history of end-stage renal disease on dialysis .  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  Per previous discussions with vascular surgery if she thrombosed again the plan was to pursue new access placement.  She became somewhat hypotensive and more confused by the end of her treatment so EMS was called and she was transferred to the emergency department.  She was noted to be more anemic and was transfused yesterday.  She feels back to normal today with no complaints of chest pain shortness of breath or edema.  Denies any cramping, no fevers or chills.      The following medical history and medications personally reviewed by me:    Problem List:   Patient Active Problem List    Diagnosis   • Anemia of chronic renal failure, stage 5 (CMS/[facility]) [N18.5, D63.1]   • ESRD (end stage renal disease) on dialysis (CMS/[facility]) [N18.6, Z99.2]   • Thrombosis of kidney dialysis arteriovenous graft (CMS/[facility]) [T82.868A]       Past Medical History:  Past Medical History:   Diagnosis Date   • Anemia    • Asthma    • COPD (chronic obstructive pulmonary disease) (CMS/[facility])    • Diabetes mellitus (CMS/[facility])    • Dialysis patient (CMS/[facility])    • Disease of thyroid gland    • GERD (gastroesophageal reflux disease)    •  History of blood clots    • History of kidney stones    • Hyperlipidemia    • Hypertension    • Knee pain, bilateral    • Renal disease    • Sleep apnea     CPAP   • SOB (shortness of breath)    • 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)    • Weakness        Past Surgical History:  Past Surgical History:   Procedure Laterality Date   • ARTERIOVENOUS FISTULA Right    • ARTERIOVENOUS FISTULA Left     REMOVED   • CENTRAL VENOUS CATHETER TUNNELED INSERTION DOUBLE LUMEN     •  SECTION     • POLYPECTOMY      UTERINE        Home Meds:   Medications Prior to Admission   Medication Sig Dispense Refill Last Dose   • acetaminophen (TYLENOL) 325 MG tablet Take 650 mg by mouth Every 6 (Six) Hours As Needed for Mild Pain .   2019   • albuterol (PROVENTIL) (2.5 MG/3ML) 0.083% nebulizer solution Take 2.5 mg by nebulization 3 (Three) Times a Day As Needed for Wheezing. UNSURE OF DOSAGE   Past Week at Unknown time   • aspirin 325 MG EC tablet Take 325 mg by mouth Daily. HELD FOR SURGERY   Past Week at Unknown time   • budesonide-formoterol (SYMBICORT) 160-4.5 MCG/ACT inhaler Inhale 2 puffs 2 (Two) Times a Day.   Past Week at Unknown time   • Cholecalciferol (VITAMIN D3) 5000 units capsule capsule Take 5,000 Units by mouth Daily.   2019 at Unknown time   • cinacalcet (SENSIPAR) 60 MG tablet Take 90 mg by mouth Daily.   2019 at Unknown time   • HYDROcodone-acetaminophen (NORCO) 5-325 MG per tablet Take 1-2 tablets by mouth Every 4 (Four) Hours As Needed (Pain). 20 tablet 0    • insulin detemir (LEVEMIR) 100 UNIT/ML injection Inject 8 Units under the skin into the appropriate area as directed Every Night.   2019 at Unknown time   • metoprolol succinate XL (TOPROL-XL) 25 MG 24 hr tablet Take 25 mg by mouth Daily.   2019 at Unknown time   • montelukast (SINGULAIR) 10 MG tablet Take 10 mg by mouth Every Night.   2019 at Unknown time   • omeprazole (priLOSEC) 40 MG capsule Take 40 mg by  mouth Daily.   8/9/2019 at Unknown time   • repaglinide (PRANDIN) 2 MG tablet Take 2 mg by mouth 3 (Three) Times a Day Before Meals. If not eating patient does not take      • Sucroferric Oxyhydroxide (VELPHORO) 500 MG chewable tablet Chew 500 mg 3 (Three) Times a Day.   8/9/2019 at Unknown time       Current Meds:   Current Facility-Administered Medications   Medication Dose Route Frequency Provider Last Rate Last Dose   • albuterol (PROVENTIL) nebulizer solution 0.083% 2.5 mg/3mL  2.5 mg Nebulization Q6H PRN Haja Chowdhury MD       • budesonide-formoterol (SYMBICORT) 160-4.5 MCG/ACT inhaler 2 puff  2 puff Inhalation BID - RT Haja Chowdhury MD   2 puff at 08/11/19 0755   • dextrose (D50W) 25 g/ 50mL Intravenous Solution 25 g  25 g Intravenous Q15 Min PRN Haja Chowdhury MD       • dextrose (GLUTOSE) oral gel 15 g  15 g Oral Q15 Min PRN Haja Chowdhury MD       • glucagon (human recombinant) (GLUCAGEN DIAGNOSTIC) injection 1 mg  1 mg Subcutaneous PRN Haja Chowdhury MD       • insulin glargine (LANTUS) injection 8 Units  8 Units Subcutaneous Nightly Haja Chowdhury MD       • insulin lispro (humaLOG) injection 0-7 Units  0-7 Units Subcutaneous 4x Daily With Meals & Nightly Haja Chowdhury MD       • montelukast (SINGULAIR) tablet 10 mg  10 mg Oral Nightly Haja Chowdhury MD   10 mg at 08/10/19 2338   • nitroglycerin (NITROSTAT) SL tablet 0.4 mg  0.4 mg Sublingual Q5 Min PRN Haja Chowdhury MD       • sodium chloride 0.9 % flush 10 mL  10 mL Intravenous PRN Tobi Ford MD           Allergies:  Allergies   Allergen Reactions   • Sulfa Antibiotics Hives   • Latex Rash       Social History:   Social History     Socioeconomic History   • Marital status:      Spouse name: Not on file   • Number of children: Not on file   • Years of education: Not on file   • Highest education level: Not on file   Tobacco Use   • Smoking status: Former Smoker     Packs/day: 0.00     Years: 4.00     Pack years: 0.00     Types: Cigarettes      "Last attempt to quit: 1966     Years since quittin.6   • Smokeless tobacco: Never Used   • Tobacco comment: 3 CIGS PER DAY   Substance and Sexual Activity   • Alcohol use: No     Frequency: Never   • Drug use: No        Family History:  Family History   Problem Relation Age of Onset   • Malig Hyperthermia Neg Hx         Review of Systems: as per HPI, in addition:    General:      Denies any weakness / fatigue,                       No fevers / chills                       no weight loss  HEENT:       no dysphagia / odynophagia  Neck:           normal range of motion, no swelling  Respiratory: no cough / congestion                      No shortness of air                       No wheezing  CV:              No chest pain                       No palpitations  Abdomen/GI: no nausea / vomiting                      No diarrhea / constipation                      No abdominal pain  :             no dysuria / urinary frequency                       No urgency, normal output  Endocrine:   no polyuria / polydipsia,                      No heat or cold intolerance  Skin:           no rashes or skin breakdown   Vascular:   No edema                     No claudication  Psych:        no depression/ anxiety  Neuro:        no focal weakness, no seizures  Musculoskeletal: no joint pain or deformities      Physical Exam:  Vitals:   Temp (24hrs), Av.1 °F (36.7 °C), Min:97.4 °F (36.3 °C), Max:98.6 °F (37 °C)    /70 (BP Location: Left arm, Patient Position: Lying)   Pulse 88   Temp 98.6 °F (37 °C) (Oral)   Resp 18   Ht 149.9 cm (59\")   Wt 85.2 kg (187 lb 14.4 oz)   SpO2 100%   BMI 37.95 kg/m²   Intake/Output:     Intake/Output Summary (Last 24 hours) at 2019 1118  Last data filed at 2019 0934  Gross per 24 hour   Intake 1110 ml   Output --   Net 1110 ml        Wt Readings from Last 1 Encounters:   19 0500 85.2 kg (187 lb 14.4 oz)   08/10/19 1106 86.5 kg (190 lb 12.8 oz)       Exam:    General " Appearance:  Awake, alert, oriented x3, no acute distress  Obese, well-appearing   Head and Face:  Normocephalic, atraumatic, mucus membranes moist, oropharynx clear   Eyes:  No icterus, pupils equal round and reactive to light, extraocular movements intact    ENMT: Moist mucosa, tongue symmetric    Neck: Supple  no jugular venous distention  no thyromegaly   Pulmonary:  Respiratory effort: Normal  Auscultation of lungs: Clear bilaterally  No wheezes  No rhonchi  Good air movement, good expansion   Chest wall:  No tenderness or deformity   Cardiovascular:  Auscultation of the heart: Normal rhythm, no murmurs  No edema of extremities    Abdomen:  Abdomen: soft, non-tender, normal bowel sounds all four quadrants, no masses   Liver and spleen: no hepatosplenomegaly   Musculoskeletal: Digits and nails: normal  Normal range of motion  No joint swelling or gross deformities    Skin: Skin inspection: color normal, no visible rashes or lesions  Skin palpation: texture, turgor normal, no palpable lesions   Lymphatic:  no cervical lymphadenopathy   Vascular: AV graft with no palpable thrill or audible bruit   Psychiatric: Judgement and insight: normal  Orientation to person place and time: normal  Mood and affect: normal       DATA:  Radiology and Labs:  The following labs independently reviewed by me  Old records independently reviewed showing end-stage renal disease on dialysis  The following radiologic studies independently viewed by me, findings CT head nothing acute, CT abdomen and pelvis no acute process, some gallstones, nonspecific calcifications, simple renal cyst and diverticulosis were seen.  Interval notes, chart personally reviewed by me.   New problems include hypotension, anemia, both improved  Discussed with patient herself at bedside  Platelet count 144,000  Dialysis PT access: Right arm AV graft is clotted, right chest tunneled catheter in place    Risk/ complexity of medical care/ medical decision  making  Other risk          Labs:   Recent Results (from the past 24 hour(s))   Lipase    Collection Time: 08/10/19  1:52 PM   Result Value Ref Range    Lipase 20 13 - 60 U/L   CBC Auto Differential    Collection Time: 08/10/19  1:52 PM   Result Value Ref Range    WBC 10.53 3.40 - 10.80 10*3/mm3    RBC 2.91 (L) 3.77 - 5.28 10*6/mm3    Hemoglobin 7.1 (L) 12.0 - 15.9 g/dL    Hematocrit 26.3 (L) 34.0 - 46.6 %    MCV 90.4 79.0 - 97.0 fL    MCH 24.4 (L) 26.6 - 33.0 pg    MCHC 27.0 (L) 31.5 - 35.7 g/dL    RDW 18.7 (H) 12.3 - 15.4 %    RDW-SD 59.8 (H) 37.0 - 54.0 fl    MPV 9.7 6.0 - 12.0 fL    Platelets 144 140 - 450 10*3/mm3    Neutrophil % 84.6 (H) 42.7 - 76.0 %    Lymphocyte % 4.7 (L) 19.6 - 45.3 %    Monocyte % 8.2 5.0 - 12.0 %    Eosinophil % 0.6 0.3 - 6.2 %    Basophil % 0.3 0.0 - 1.5 %    Neutrophils, Absolute 8.92 (H) 1.70 - 7.00 10*3/mm3    Lymphocytes, Absolute 0.49 (L) 0.70 - 3.10 10*3/mm3    Monocytes, Absolute 0.86 0.10 - 0.90 10*3/mm3    Eosinophils, Absolute 0.06 0.00 - 0.40 10*3/mm3    Basophils, Absolute 0.03 0.00 - 0.20 10*3/mm3   Scan Slide    Collection Time: 08/10/19  1:52 PM   Result Value Ref Range    Anisocytosis Mod/2+ None Seen    Polychromasia Slight/1+ None Seen    WBC Morphology Normal Normal    Platelet Morphology Normal Normal   Comprehensive Metabolic Panel    Collection Time: 08/10/19  3:53 PM   Result Value Ref Range    Glucose 130 (H) 65 - 99 mg/dL    BUN 13 8 - 23 mg/dL    Creatinine 3.89 (H) 0.57 - 1.00 mg/dL    Sodium 136 136 - 145 mmol/L    Potassium 3.4 (L) 3.5 - 5.2 mmol/L    Chloride 96 (L) 98 - 107 mmol/L    CO2 22.3 22.0 - 29.0 mmol/L    Calcium 7.9 (L) 8.6 - 10.5 mg/dL    Total Protein 6.1 6.0 - 8.5 g/dL    Albumin 3.10 (L) 3.50 - 5.20 g/dL    ALT (SGPT) <5 1 - 33 U/L    AST (SGOT) 11 1 - 32 U/L    Alkaline Phosphatase 152 (H) 39 - 117 U/L    Total Bilirubin 0.3 0.2 - 1.2 mg/dL    eGFR Non African Amer  >60 mL/min/1.73    eGFR  African Amer 14 (L) >60 mL/min/1.73    Globulin 3.0  gm/dL    A/G Ratio 1.0 g/dL    BUN/Creatinine Ratio 3.3 (L) 7.0 - 25.0    Anion Gap 17.7 (H) 5.0 - 15.0 mmol/L   Troponin    Collection Time: 08/10/19  3:53 PM   Result Value Ref Range    Troponin T 0.021 0.000 - 0.030 ng/mL   Type & Screen    Collection Time: 08/10/19  3:53 PM   Result Value Ref Range    ABO Type B     RH type Positive     Antibody Screen Negative     T&S Expiration Date 8/13/2019 11:59:59 PM    Prepare RBC, 1 Units    Collection Time: 08/10/19  7:27 PM   Result Value Ref Range    Product Code [id number]     Unit Number [id number]     UNIT  ABO B     UNIT  RH POS     Dispense Status IS     Blood Type BPOS     Blood Expiration Date [id number]     Blood Type Barcode 7300    POC Glucose Once    Collection Time: 08/10/19 11:40 PM   Result Value Ref Range    Glucose 109 70 - 130 mg/dL   POC Glucose Once    Collection Time: 08/11/19  6:36 AM   Result Value Ref Range    Glucose 100 70 - 130 mg/dL   POC Glucose Once    Collection Time: 08/11/19 11:10 AM   Result Value Ref Range    Glucose 109 70 - 130 mg/dL       Radiology:  Imaging Results (last 24 hours)     Procedure Component Value Units Date/Time    CT Abdomen Pelvis Without Contrast [[id number]] Collected:  08/10/19 1313     Updated:  08/10/19 1328    Narrative:       CT ABDOMEN PELVIS WO CONTRAST-     CLINICAL HISTORY: Diffuse abdominal pain. Hypotension. Renal failure.     TECHNIQUE: Spiral CT images were acquired through the abdomen and pelvis  with no oral or IV contrast and were reconstructed in 3 mm thick slices.     Radiation dose reduction techniques were utilized, including automated  exposure control and exposure modulation based on body size.     COMPARISON: None     FINDINGS: The liver, spleen, pancreas, and adrenal glands appear within  normal limits. Sludge and possible tiny gallstones are noted layering  posteriorly within the lumen of gallbladder. The gallbladder is  otherwise unremarkable. There is no bile duct dilatation. Both  kidneys  are atrophic. There is a large approximately 5.5 cm diameter simple cyst  protruding superiorly from the upper pole of the right kidney. Multiple  small calcifications are present within both kidneys that is suspected  to be primarily vascular in etiology, although small nonobstructing  renal calculi cannot be entirely excluded. A very tiny hiatal hernia is  noted. The stomach is otherwise unremarkable. The small bowel is normal  in caliber. There are multiple diverticuli scattered throughout the  entire colon that are most numerous within the left colon and sigmoid  colon. There is no CT evidence of diverticulitis and there are multiple  small partially calcified leiomyomas scattered throughout the uterus.  They measure up to 2.7 cm in diameter. The abdominal aorta is normal in  caliber. It contains multiple small calcified atherosclerotic plaques.       Impression:       No acute process is identified within the abdomen or pelvis.  Sludge and possible tiny gallstones are evident within the gallbladder  lumen. The kidneys are atrophic and contain multiple calcifications that  may be vascular or nonobstructing renal calculi. A moderate-sized simple  renal cyst is noted on the right. There is moderately extensive colonic  diverticulosis without evidence of diverticulitis. Multiple small  calcified uterine leiomyomas are noted.     This report was finalized on 8/10/2019 1:24 PM by Dr. Shahram Lan M.D.       CT Head Without Contrast [[id number]] Collected:  08/10/19 1310     Updated:  08/10/19 1316    Narrative:       CT HEAD WO CONTRAST-     CLINICAL HISTORY: Weakness. Confusion.     TECHNIQUE: Transverse 3 mm thick images were acquired from the base of  the skull to the vertex without IV contrast.     Radiation dose reduction techniques were utilized, including automated  exposure control and exposure modulation based on body size.     COMPARISON: None     FINDINGS: The brain and ventricular system appear  structurally within  normal limits for patient of this age. There is no evidence of recent or  old infarct or hemorrhage. There are no masses. Minimal calcification is  noted in the basal ganglia bilaterally. The visualized paranasal sinuses  appear grossly well aerated.     IMPRESSIONS: Negative CT scan of the head.     This report was finalized on 8/10/2019 1:13 PM by Dr. Shahram Lan M.D.                  ASSESSMENT:   End-stage renal disease on dialysis Tuesday Thursday Saturday  Clotted right arm AV graft tunnel catheter in place  Anemia, improved after transfusion, recheck hemoglobin pending  Hypertension  Hypotension after dialysis yesterday, likely related to excessive ultrafiltration.  She will need her dry weight adjusted at the dialysis unit  Anemia of chronic kidney disease  COPD  Secondary hyperparathyroidism  Hyperphosphatemia      PLAN:   No need for additional dialysis today, continue Tuesday Thursday Saturday schedule  Hypotension after dialysis yesterday likely due to excessive ultrafiltration, she will need her dry weight adjusted  Recheck hemoglobin after transfusion, no occult bleeding, CT negative  Continue Sensipar and phosphorus binders  Stable for discharge at any time from a renal standpoint      I appreciate the opportunity to participate in this patient's care.  Please call with any questions or concerns.       Chester Kang M.D  [facility]  Office phone number: [phone]  Answering service phone number: [phone]        8/11/2019        Dictation via Dragon dictation software

## 2019-08-12 LAB
BACTERIA SPEC AEROBE CULT: NORMAL
BACTERIA SPEC AEROBE CULT: NORMAL
GRAM STN SPEC: NORMAL

## 2019-08-14 LAB
BACTERIA SPEC ANAEROBE CULT: NORMAL
BACTERIA SPEC ANAEROBE CULT: NORMAL

## 2019-08-15 ENCOUNTER — APPOINTMENT (OUTPATIENT)
Dept: PREADMISSION TESTING | Facility: HOSPITAL | Age: 74
End: 2019-08-15

## 2019-08-21 ENCOUNTER — APPOINTMENT (OUTPATIENT)
Dept: PREADMISSION TESTING | Facility: HOSPITAL | Age: 74
End: 2019-08-21

## 2019-08-21 VITALS
RESPIRATION RATE: 20 BRPM | HEIGHT: 59 IN | DIASTOLIC BLOOD PRESSURE: 76 MMHG | WEIGHT: 183 LBS | BODY MASS INDEX: 36.89 KG/M2 | TEMPERATURE: 98.3 F | OXYGEN SATURATION: 100 % | SYSTOLIC BLOOD PRESSURE: 150 MMHG | HEART RATE: 79 BPM

## 2019-08-21 LAB
DEPRECATED RDW RBC AUTO: 61.4 FL (ref 37–54)
ERYTHROCYTE [DISTWIDTH] IN BLOOD BY AUTOMATED COUNT: 19.3 % (ref 12.3–15.4)
HCT VFR BLD AUTO: 28.1 % (ref 34–46.6)
HGB BLD-MCNC: 7.7 G/DL (ref 12–15.9)
MCH RBC QN AUTO: 24.2 PG (ref 26.6–33)
MCHC RBC AUTO-ENTMCNC: 27.4 G/DL (ref 31.5–35.7)
MCV RBC AUTO: 88.4 FL (ref 79–97)
PLATELET # BLD AUTO: 303 10*3/MM3 (ref 140–450)
PMV BLD AUTO: 9.9 FL (ref 6–12)
RBC # BLD AUTO: 3.18 10*6/MM3 (ref 3.77–5.28)
WBC NRBC COR # BLD: 9.2 10*3/MM3 (ref 3.4–10.8)

## 2019-08-21 PROCEDURE — 36415 COLL VENOUS BLD VENIPUNCTURE: CPT

## 2019-08-21 PROCEDURE — 85027 COMPLETE CBC AUTOMATED: CPT | Performed by: SURGERY

## 2019-08-21 RX ORDER — CINACALCET 90 MG/1
90 TABLET, FILM COATED ORAL EVERY EVENING
Status: ON HOLD | COMMUNITY
End: 2020-12-07

## 2019-08-21 NOTE — DISCHARGE INSTRUCTIONS
Take the following medications the morning of surgery with a small sip of water:metoprolol, omeprazole, inhalers    Arrival time 5:30 am    General Instructions:  • Do not eat solid food after midnight the night before surgery.  • You may drink clear liquids day of surgery but must stop at least one hour before your hospital arrival time.  • It is beneficial for you to have a clear drink that contains carbohydrates the day of surgery.  We suggest a 12 to 20 ounce bottle of Gatorade or Powerade for non-diabetic patients or a 12 to 20 ounce bottle of G2 or Powerade Zero for diabetic patients. (Pediatric patients, are not advised to drink a 12 to 20 ounce carbohydrate drink)    Clear liquids are liquids you can see through.  Nothing red in color.     Plain water                               Sports drinks  Sodas                                   Gelatin (Jell-O)  Fruit juices without pulp such as white grape juice and apple juice  Popsicles that contain no fruit or yogurt  Tea or coffee (no cream or milk added)  Gatorade / Powerade  G2 / Powerade Zero    • Infants may have breast milk up to four hours before surgery.  • Infants drinking formula may drink formula up to six hours before surgery.   • Patients who avoid smoking, chewing tobacco and alcohol for 4 weeks prior to surgery have a reduced risk of post-operative complications.  Quit smoking as many days before surgery as you can.  • Do not smoke, use chewing tobacco or drink alcohol the day of surgery.   • If applicable bring your C-PAP/ BI-PAP machine.  • Bring any papers given to you in the doctor’s office.  • Wear clean comfortable clothes and socks.  • Do not wear contact lenses, false eyelashes or make-up.  Bring a case for your glasses.   • Bring crutches or walker if applicable.  • Remove all piercings.  Leave jewelry and any other valuables at home.  • Hair extensions with metal clips must be removed prior to surgery.  • The Pre-Admission Testing nurse  will instruct you to bring medications if unable to obtain an accurate list in Pre-Admission Testing.        If you were given a blood bank ID arm band remember to bring it with you the day of surgery.    Preventing a Surgical Site Infection:  • For 2 to 3 days before surgery, avoid shaving with a razor because the razor can irritate skin and make it easier to develop an infection.    • Any areas of open skin can increase the risk of a post-operative wound infection by allowing bacteria to enter and travel throughout the body.  Notify your surgeon if you have any skin wounds / rashes even if it is not near the expected surgical site.  The area will need assessed to determine if surgery should be delayed until it is healed.  • The night prior to surgery sleep in a clean bed with clean clothing.  Do not allow pets to sleep with you.  • Shower on the morning of surgery using a fresh bar of anti-bacterial soap (such as Dial) and clean washcloth.  Dry with a clean towel and dress in clean clothing.  • Ask your surgeon if you will be receiving antibiotics prior to surgery.  • Make sure you, your family, and all healthcare providers clean their hands with soap and water or an alcohol based hand  before caring for you or your wound.    Day of surgery:  Upon arrival, a Pre-op nurse and Anesthesiologist will review your health history, obtain vital signs, and answer questions you may have.  The only belongings needed at this time will be a list of your home medications and if applicable your C-PAP/BI-PAP machine.  If you are staying overnight your family can leave the rest of your belongings in the car and bring them to your room later.  A Pre-op nurse will start an IV and you may receive medication in preparation for surgery, including something to help you relax.  Your family will be able to see you in the Pre-op area.  While you are in surgery your family should notify the waiting room  if they leave  the waiting room area and provide a contact phone number.    Please be aware that surgery does come with discomfort.  We want to make every effort to control your discomfort so please discuss any uncontrolled symptoms with your nurse.   Your doctor will most likely have prescribed pain medications.      If you are going home after surgery you will receive individualized written care instructions before being discharged.  A responsible adult must drive you to and from the hospital on the day of your surgery and stay with you for 24 hours.    If you are staying overnight following surgery, you will be transported to your hospital room following the recovery period.  [facility] has all private rooms.    You have received a list of surgical assistants for your reference.  If you have any questions please call Pre-Admission Testing at [phone].  Deductibles and co-payments are collected on the day of service. Please be prepared to pay the required co-pay, deductible or deposit on the day of service as defined by your plan.

## 2019-08-22 ENCOUNTER — APPOINTMENT (OUTPATIENT)
Dept: GENERAL RADIOLOGY | Facility: HOSPITAL | Age: 74
End: 2019-08-22

## 2019-08-22 ENCOUNTER — HOSPITAL ENCOUNTER (OUTPATIENT)
Facility: HOSPITAL | Age: 74
Setting detail: HOSPITAL OUTPATIENT SURGERY
Discharge: HOME | End: 2019-08-22
Attending: SURGERY | Admitting: SURGERY

## 2019-08-22 ENCOUNTER — ANESTHESIA EVENT (OUTPATIENT)
Dept: PERIOP | Facility: HOSPITAL | Age: 74
End: 2019-08-22

## 2019-08-22 ENCOUNTER — ANESTHESIA (OUTPATIENT)
Dept: PERIOP | Facility: HOSPITAL | Age: 74
End: 2019-08-22

## 2019-08-22 VITALS
WEIGHT: 184.7 LBS | DIASTOLIC BLOOD PRESSURE: 53 MMHG | OXYGEN SATURATION: 99 % | HEART RATE: 75 BPM | RESPIRATION RATE: 18 BRPM | BODY MASS INDEX: 37.24 KG/M2 | HEIGHT: 59 IN | TEMPERATURE: 98.6 F | SYSTOLIC BLOOD PRESSURE: 98 MMHG

## 2019-08-22 LAB
ANION GAP SERPL CALCULATED.3IONS-SCNC: 14.2 MMOL/L (ref 5–15)
BUN BLD-MCNC: 19 MG/DL (ref 8–23)
BUN/CREAT SERPL: 4.1 (ref 7–25)
CALCIUM SPEC-SCNC: 8.1 MG/DL (ref 8.6–10.5)
CHLORIDE SERPL-SCNC: 100 MMOL/L (ref 98–107)
CO2 SERPL-SCNC: 21.8 MMOL/L (ref 22–29)
CREAT BLD-MCNC: 4.64 MG/DL (ref 0.57–1)
GFR SERPL CREATININE-BSD FRML MDRD: 11 ML/MIN/1.73
GFR SERPL CREATININE-BSD FRML MDRD: ABNORMAL ML/MIN/{1.73_M2}
GLUCOSE BLD-MCNC: 119 MG/DL (ref 65–99)
GLUCOSE BLDC GLUCOMTR-MCNC: 113 MG/DL (ref 70–130)
POTASSIUM BLD-SCNC: 3.7 MMOL/L (ref 3.5–5.2)
SODIUM BLD-SCNC: 136 MMOL/L (ref 136–145)

## 2019-08-22 PROCEDURE — C1769 GUIDE WIRE: HCPCS | Performed by: SURGERY

## 2019-08-22 PROCEDURE — C1894 INTRO/SHEATH, NON-LASER: HCPCS | Performed by: SURGERY

## 2019-08-22 PROCEDURE — 25010000002 HEPARIN (PORCINE) PER 1000 UNITS: Performed by: NURSE ANESTHETIST, CERTIFIED REGISTERED

## 2019-08-22 PROCEDURE — 25010000002 FENTANYL CITRATE (PF) 100 MCG/2ML SOLUTION: Performed by: NURSE ANESTHETIST, CERTIFIED REGISTERED

## 2019-08-22 PROCEDURE — 82962 GLUCOSE BLOOD TEST: CPT

## 2019-08-22 PROCEDURE — 25010000003 CEFAZOLIN IN DEXTROSE 2-4 GM/100ML-% SOLUTION: Performed by: SURGERY

## 2019-08-22 PROCEDURE — C1757 CATH, THROMBECTOMY/EMBOLECT: HCPCS | Performed by: SURGERY

## 2019-08-22 PROCEDURE — C1725 CATH, TRANSLUMIN NON-LASER: HCPCS | Performed by: SURGERY

## 2019-08-22 PROCEDURE — 25010000002 PROPOFOL 10 MG/ML EMULSION: Performed by: NURSE ANESTHETIST, CERTIFIED REGISTERED

## 2019-08-22 PROCEDURE — C1874 STENT, COATED/COV W/DEL SYS: HCPCS | Performed by: SURGERY

## 2019-08-22 PROCEDURE — 0 IOPAMIDOL PER 1 ML: Performed by: SURGERY

## 2019-08-22 PROCEDURE — 25010000002 HEPARIN (PORCINE) PER 1000 UNITS: Performed by: SURGERY

## 2019-08-22 PROCEDURE — 25010000002 PHENYLEPHRINE PER 1 ML: Performed by: NURSE ANESTHETIST, CERTIFIED REGISTERED

## 2019-08-22 PROCEDURE — C1887 CATHETER, GUIDING: HCPCS | Performed by: SURGERY

## 2019-08-22 PROCEDURE — 25010000003 LIDOCAINE 1 % SOLUTION 20 ML VIAL: Performed by: SURGERY

## 2019-08-22 PROCEDURE — 25010000003 CEFAZOLIN PER 500 MG: Performed by: SURGERY

## 2019-08-22 PROCEDURE — 80048 BASIC METABOLIC PNL TOTAL CA: CPT | Performed by: SURGERY

## 2019-08-22 PROCEDURE — 25010000002 ONDANSETRON PER 1 MG: Performed by: NURSE ANESTHETIST, CERTIFIED REGISTERED

## 2019-08-22 DEVICE — VIABAHN SX ENDO HEPARIN 35 9MMX10CM 9FR 120CMCATH
Type: IMPLANTABLE DEVICE | Site: ARM | Status: FUNCTIONAL
Brand: GORE VIABAHN ENDOPROSTHESIS WITH HEPARIN

## 2019-08-22 RX ORDER — DIPHENHYDRAMINE HCL 25 MG
25 CAPSULE ORAL
Status: DISCONTINUED | OUTPATIENT
Start: 2019-08-22 | End: 2019-08-22 | Stop reason: HOSPADM

## 2019-08-22 RX ORDER — DIPHENHYDRAMINE HYDROCHLORIDE 50 MG/ML
12.5 INJECTION INTRAMUSCULAR; INTRAVENOUS
Status: DISCONTINUED | OUTPATIENT
Start: 2019-08-22 | End: 2019-08-22 | Stop reason: HOSPADM

## 2019-08-22 RX ORDER — SODIUM CHLORIDE 9 MG/ML
9 INJECTION, SOLUTION INTRAVENOUS CONTINUOUS PRN
Status: DISCONTINUED | OUTPATIENT
Start: 2019-08-22 | End: 2019-08-22 | Stop reason: HOSPADM

## 2019-08-22 RX ORDER — PROMETHAZINE HYDROCHLORIDE 25 MG/1
25 SUPPOSITORY RECTAL ONCE AS NEEDED
Status: DISCONTINUED | OUTPATIENT
Start: 2019-08-22 | End: 2019-08-22 | Stop reason: HOSPADM

## 2019-08-22 RX ORDER — LABETALOL HYDROCHLORIDE 5 MG/ML
5 INJECTION, SOLUTION INTRAVENOUS
Status: DISCONTINUED | OUTPATIENT
Start: 2019-08-22 | End: 2019-08-22 | Stop reason: HOSPADM

## 2019-08-22 RX ORDER — ONDANSETRON 2 MG/ML
4 INJECTION INTRAMUSCULAR; INTRAVENOUS ONCE AS NEEDED
Status: DISCONTINUED | OUTPATIENT
Start: 2019-08-22 | End: 2019-08-22 | Stop reason: HOSPADM

## 2019-08-22 RX ORDER — FAMOTIDINE 10 MG/ML
20 INJECTION, SOLUTION INTRAVENOUS ONCE
Status: COMPLETED | OUTPATIENT
Start: 2019-08-22 | End: 2019-08-22

## 2019-08-22 RX ORDER — ONDANSETRON 2 MG/ML
INJECTION INTRAMUSCULAR; INTRAVENOUS AS NEEDED
Status: DISCONTINUED | OUTPATIENT
Start: 2019-08-22 | End: 2019-08-22 | Stop reason: SURG

## 2019-08-22 RX ORDER — FLUMAZENIL 0.1 MG/ML
0.2 INJECTION INTRAVENOUS AS NEEDED
Status: DISCONTINUED | OUTPATIENT
Start: 2019-08-22 | End: 2019-08-22 | Stop reason: HOSPADM

## 2019-08-22 RX ORDER — NALOXONE HCL 0.4 MG/ML
0.2 VIAL (ML) INJECTION AS NEEDED
Status: DISCONTINUED | OUTPATIENT
Start: 2019-08-22 | End: 2019-08-22 | Stop reason: HOSPADM

## 2019-08-22 RX ORDER — LIDOCAINE HYDROCHLORIDE 10 MG/ML
0.5 INJECTION, SOLUTION EPIDURAL; INFILTRATION; INTRACAUDAL; PERINEURAL ONCE AS NEEDED
Status: DISCONTINUED | OUTPATIENT
Start: 2019-08-22 | End: 2019-08-22 | Stop reason: HOSPADM

## 2019-08-22 RX ORDER — HEPARIN SODIUM 1000 [USP'U]/ML
INJECTION, SOLUTION INTRAVENOUS; SUBCUTANEOUS AS NEEDED
Status: DISCONTINUED | OUTPATIENT
Start: 2019-08-22 | End: 2019-08-22 | Stop reason: SURG

## 2019-08-22 RX ORDER — SODIUM CHLORIDE 0.9 % (FLUSH) 0.9 %
1-10 SYRINGE (ML) INJECTION AS NEEDED
Status: DISCONTINUED | OUTPATIENT
Start: 2019-08-22 | End: 2019-08-22 | Stop reason: HOSPADM

## 2019-08-22 RX ORDER — PROPOFOL 10 MG/ML
VIAL (ML) INTRAVENOUS CONTINUOUS PRN
Status: DISCONTINUED | OUTPATIENT
Start: 2019-08-22 | End: 2019-08-22 | Stop reason: SURG

## 2019-08-22 RX ORDER — MIDAZOLAM HYDROCHLORIDE 1 MG/ML
2 INJECTION INTRAMUSCULAR; INTRAVENOUS
Status: DISCONTINUED | OUTPATIENT
Start: 2019-08-22 | End: 2019-08-22 | Stop reason: HOSPADM

## 2019-08-22 RX ORDER — HYDRALAZINE HYDROCHLORIDE 20 MG/ML
5 INJECTION INTRAMUSCULAR; INTRAVENOUS
Status: DISCONTINUED | OUTPATIENT
Start: 2019-08-22 | End: 2019-08-22 | Stop reason: HOSPADM

## 2019-08-22 RX ORDER — PROMETHAZINE HYDROCHLORIDE 25 MG/1
25 TABLET ORAL ONCE AS NEEDED
Status: DISCONTINUED | OUTPATIENT
Start: 2019-08-22 | End: 2019-08-22 | Stop reason: HOSPADM

## 2019-08-22 RX ORDER — PROMETHAZINE HYDROCHLORIDE 25 MG/ML
12.5 INJECTION, SOLUTION INTRAMUSCULAR; INTRAVENOUS ONCE AS NEEDED
Status: DISCONTINUED | OUTPATIENT
Start: 2019-08-22 | End: 2019-08-22 | Stop reason: HOSPADM

## 2019-08-22 RX ORDER — ACETAMINOPHEN 325 MG/1
650 TABLET ORAL ONCE AS NEEDED
Status: DISCONTINUED | OUTPATIENT
Start: 2019-08-22 | End: 2019-08-22 | Stop reason: HOSPADM

## 2019-08-22 RX ORDER — HYDROCODONE BITARTRATE AND ACETAMINOPHEN 7.5; 325 MG/1; MG/1
1 TABLET ORAL ONCE AS NEEDED
Status: COMPLETED | OUTPATIENT
Start: 2019-08-22 | End: 2019-08-22

## 2019-08-22 RX ORDER — PROMETHAZINE HYDROCHLORIDE 25 MG/ML
6.25 INJECTION, SOLUTION INTRAMUSCULAR; INTRAVENOUS
Status: DISCONTINUED | OUTPATIENT
Start: 2019-08-22 | End: 2019-08-22 | Stop reason: HOSPADM

## 2019-08-22 RX ORDER — EPHEDRINE SULFATE 50 MG/ML
5 INJECTION, SOLUTION INTRAVENOUS ONCE AS NEEDED
Status: DISCONTINUED | OUTPATIENT
Start: 2019-08-22 | End: 2019-08-22 | Stop reason: HOSPADM

## 2019-08-22 RX ORDER — HYDROMORPHONE HYDROCHLORIDE 1 MG/ML
0.5 INJECTION, SOLUTION INTRAMUSCULAR; INTRAVENOUS; SUBCUTANEOUS
Status: DISCONTINUED | OUTPATIENT
Start: 2019-08-22 | End: 2019-08-22 | Stop reason: HOSPADM

## 2019-08-22 RX ORDER — CEFAZOLIN SODIUM 2 G/100ML
2 INJECTION, SOLUTION INTRAVENOUS ONCE
Status: COMPLETED | OUTPATIENT
Start: 2019-08-22 | End: 2019-08-22

## 2019-08-22 RX ORDER — PROPOFOL 10 MG/ML
VIAL (ML) INTRAVENOUS AS NEEDED
Status: DISCONTINUED | OUTPATIENT
Start: 2019-08-22 | End: 2019-08-22 | Stop reason: SURG

## 2019-08-22 RX ORDER — MIDAZOLAM HYDROCHLORIDE 1 MG/ML
1 INJECTION INTRAMUSCULAR; INTRAVENOUS
Status: DISCONTINUED | OUTPATIENT
Start: 2019-08-22 | End: 2019-08-22 | Stop reason: HOSPADM

## 2019-08-22 RX ORDER — LIDOCAINE HYDROCHLORIDE 20 MG/ML
INJECTION, SOLUTION INFILTRATION; PERINEURAL AS NEEDED
Status: DISCONTINUED | OUTPATIENT
Start: 2019-08-22 | End: 2019-08-22 | Stop reason: SURG

## 2019-08-22 RX ORDER — FENTANYL CITRATE 50 UG/ML
50 INJECTION, SOLUTION INTRAMUSCULAR; INTRAVENOUS
Status: DISCONTINUED | OUTPATIENT
Start: 2019-08-22 | End: 2019-08-22 | Stop reason: HOSPADM

## 2019-08-22 RX ORDER — OXYCODONE AND ACETAMINOPHEN 7.5; 325 MG/1; MG/1
1 TABLET ORAL ONCE AS NEEDED
Status: DISCONTINUED | OUTPATIENT
Start: 2019-08-22 | End: 2019-08-22 | Stop reason: HOSPADM

## 2019-08-22 RX ADMIN — PHENYLEPHRINE HYDROCHLORIDE 100 MCG: 10 INJECTION INTRAVENOUS at 07:53

## 2019-08-22 RX ADMIN — IOPAMIDOL 50 ML: 510 INJECTION, SOLUTION INTRAVASCULAR at 08:25

## 2019-08-22 RX ADMIN — PHENYLEPHRINE HYDROCHLORIDE 200 MCG: 10 INJECTION INTRAVENOUS at 08:17

## 2019-08-22 RX ADMIN — PHENYLEPHRINE HYDROCHLORIDE 100 MCG: 10 INJECTION INTRAVENOUS at 08:11

## 2019-08-22 RX ADMIN — LIDOCAINE HYDROCHLORIDE 80 MG: 20 INJECTION, SOLUTION INFILTRATION; PERINEURAL at 07:42

## 2019-08-22 RX ADMIN — PHENYLEPHRINE HYDROCHLORIDE 200 MCG: 10 INJECTION INTRAVENOUS at 08:04

## 2019-08-22 RX ADMIN — ONDANSETRON 4 MG: 2 INJECTION INTRAMUSCULAR; INTRAVENOUS at 07:42

## 2019-08-22 RX ADMIN — FAMOTIDINE 20 MG: 10 INJECTION INTRAVENOUS at 07:20

## 2019-08-22 RX ADMIN — SODIUM CHLORIDE 9 ML/HR: 9 INJECTION, SOLUTION INTRAVENOUS at 07:20

## 2019-08-22 RX ADMIN — HEPARIN SODIUM 7500 UNITS: 1000 INJECTION, SOLUTION INTRAVENOUS; SUBCUTANEOUS at 08:04

## 2019-08-22 RX ADMIN — PHENYLEPHRINE HYDROCHLORIDE 100 MCG: 10 INJECTION INTRAVENOUS at 07:56

## 2019-08-22 RX ADMIN — PHENYLEPHRINE HYDROCHLORIDE 100 MCG: 10 INJECTION INTRAVENOUS at 07:46

## 2019-08-22 RX ADMIN — HYDROCODONE BITARTRATE AND ACETAMINOPHEN 1 TABLET: 7.5; 325 TABLET ORAL at 09:27

## 2019-08-22 RX ADMIN — FENTANYL CITRATE 50 MCG: 50 INJECTION, SOLUTION INTRAMUSCULAR; INTRAVENOUS at 09:27

## 2019-08-22 RX ADMIN — PROPOFOL 100 MG: 10 INJECTION, EMULSION INTRAVENOUS at 07:42

## 2019-08-22 RX ADMIN — PHENYLEPHRINE HYDROCHLORIDE 100 MCG: 10 INJECTION INTRAVENOUS at 08:44

## 2019-08-22 RX ADMIN — PROPOFOL 140 MCG/KG/MIN: 10 INJECTION, EMULSION INTRAVENOUS at 07:42

## 2019-08-22 RX ADMIN — CEFAZOLIN SODIUM 2 G: 2 INJECTION, SOLUTION INTRAVENOUS at 07:53

## 2019-08-22 NOTE — DISCHARGE INSTRUCTIONS
Start taking your Eliquis tomorrow      Outpatient Surgery Guidelines, Adult  Outpatient procedures are those for which the person having the procedure is allowed to go home the same day as the procedure. Various procedures are done on an outpatient basis. You should follow some general guidelines if you will be having an outpatient procedure.  AFTER THE  PROCEDURE  After surgery, you will be taken to a recovery area, where your progress will be monitored. If there are no complications, you will be allowed to go home when you are awake, stable, and taking fluids well. You may have numbness around the surgical site. Healing will take some time. You will have tenderness at the surgical site and may have some swelling and bruising. You may also have some nausea.  HOME CARE INSTRUCTIONS  · Do not drive for 24 hours, or as directed by your health care provider. Do not drive while taking prescription pain medicines.  · Do not drink alcohol for 24 hours.  · Do not make important decisions or sign legal documents for 24 hours.  · Plan on having a responsible adult stay with your for 24 hours following your procedure.  · You may resume a normal diet and activities as directed.  · Do not lift anything heavier than 10 pounds (4.5 kg) or play contact sports until your health care provider says it is okay.  · Only take over-the-counter or prescription medicines as directed by your health care provider.  · Follow up with your health care provider as directed.  · If you have sleep apnea, surgery and certain medicines can increase your risk for breathing problems. Follow instructions from your health care provider about wearing your sleep device:  ? Anytime you are sleeping, including during daytime naps.  ? While taking prescription pain medicines, sleeping medicines, or medicines that make you drowsy.  ·   SEEK MEDICAL CARE IF:  · You have increased bleeding (more than a small spot) from the surgical site.  · You have  redness, swelling, or increasing pain in the wound.  · You see pus coming from the wound.  · You have a fever > 101.  · You notice a bad smell coming from the wound or dressing.  · You feel lightheaded or faint.  · You develop a rash.  · You have trouble breathing.  · You develop allergies.  MAKE SURE YOU:  · Understand these instructions.  · Will watch your condition.  · Will get help right away if you are not doing well or get worse.

## 2019-08-22 NOTE — ANESTHESIA POSTPROCEDURE EVALUATION
"Patient: Nellie Vegas    Procedure Summary     Date:  08/22/19 [facility] / [facility]    Anesthesia Start:  0729 Anesthesia Stop:  0921    Procedure:  RIGHT ARM DIALYSIS GRAFT THROMBECTOMY WITH STENTING (Right ) Diagnosis:      Surgeon:  Thierry Hardy MD Provider:  Cesar Bardales MD    Anesthesia Type:  MAC ASA Status:  3          Anesthesia Type: MAC  Last vitals  BP   103/54 (08/22/19 0935)   Temp   37 °C (98.6 °F) (08/22/19 0917)   Pulse   75 (08/22/19 0935)   Resp   18 (08/22/19 0935)     SpO2   98 % (08/22/19 0935)     Post Anesthesia Care and Evaluation    Patient location during evaluation: PHASE II  Patient participation: complete - patient participated  Level of consciousness: awake and alert  Pain score: 0  Pain management: adequate  Airway patency: patent  Anesthetic complications: No anesthetic complications    Cardiovascular status: acceptable  Respiratory status: acceptable  Hydration status: acceptable    Comments: /54   Pulse 75   Temp 37 °C (98.6 °F) (Oral)   Resp 18   Ht 149.9 cm (59\")   Wt 83.8 kg (184 lb 11.2 oz)   SpO2 98%   BMI 37.30 kg/m²       "

## 2019-08-22 NOTE — ANESTHESIA PREPROCEDURE EVALUATION
Anesthesia Evaluation     Patient summary reviewed and Nursing notes reviewed   NPO Solid Status: > 8 hours  NPO Liquid Status: > 2 hours           Airway   Mallampati: II  TM distance: >3 FB  Neck ROM: full  Dental - normal exam   (+) edentulous    Pulmonary - normal exam   (+) COPD, asthma, shortness of breath, sleep apnea,   Cardiovascular - normal exam    ECG reviewed    (+) hypertension, hyperlipidemia,       Neuro/Psych  (+) headaches, weakness,     GI/Hepatic/Renal/Endo    (+)  GERD,  diabetes mellitus,     Musculoskeletal (-) negative ROS    Abdominal    Substance History - negative use     OB/GYN negative ob/gyn ROS         Other                Anesthesia Evaluation     Patient summary reviewed and Nursing notes reviewed   NPO Solid Status: > 8 hours  NPO Liquid Status: > 2 hours           Airway   Mallampati: III  Neck ROM: full  No difficulty expected  Dental    (+) edentulous    Pulmonary     breath sounds clear to auscultation  (+) a smoker Former, COPD, asthma, shortness of breath, sleep apnea,   Cardiovascular     (+) hypertension, hyperlipidemia,       Neuro/Psych  (+) weakness,     GI/Hepatic/Renal/Endo    (+)  GERD,  renal disease, diabetes mellitus,     Musculoskeletal     Abdominal   (+) obese,    Substance History      OB/GYN          Other                        Anesthesia Plan    ASA 3     general     intravenous induction   Anesthetic plan, all risks, benefits, and alternatives have been provided, discussed and informed consent has been obtained with: patient.             Anesthesia Plan    ASA 3     MAC     Anesthetic plan, all risks, benefits, and alternatives have been provided, discussed and informed consent has been obtained with: patient.

## 2019-08-22 NOTE — H&P
Name: Nellie Vegas ADMIT: 2019   : 1945  PCP: Laurent Cortes DO    MRN: [id number] LOS: 0 days   AGE/SEX: 74 y.o. female  ROOM: [facility]    Pre-operative H&P    Patient Care Team:  Laurent Cortes DO as PCP - General (Internal Medicine)  Kvng Guerra MD as Consulting Physician (Pulmonary Disease)  No chief complaint on file.    CC: Fistula thrombectomy preoperative evaluation.    Subjective     History of Present Illness  Review of Systems    Past Medical History:   Diagnosis Date   • Anemia    • Anesthesia complication     mother woke up in combative state   • Arthritis     knees   • Asthma    • COPD (chronic obstructive pulmonary disease) (CMS/HCC)    • Diabetes mellitus (CMS/HCC)     type 2   • Dialysis patient (CMS/HCC)    • Disease of thyroid gland    • GERD (gastroesophageal reflux disease)    • Headache     after dialysis   • History of blood clots    • History of kidney stones    • Hyperlipidemia    • Hypertension    • Knee pain, bilateral    • Renal disease    • Sleep apnea     CPAP   • SOB (shortness of breath)    • 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)    • Weakness      Past Surgical History:   Procedure Laterality Date   • ARTERIOVENOUS FISTULA Right    • ARTERIOVENOUS FISTULA Left     REMOVED   • CENTRAL VENOUS CATHETER TUNNELED INSERTION DOUBLE LUMEN     •  SECTION     • POLYPECTOMY      UTERINE   • SHUNT O GRAM Right 2019    Procedure: RIGHT ARM DIALYSIS GRAFT revision and THROMBECTOMY;  Surgeon: Thierry Hardy MD;  Location: [facility] ;  Service: Vascular     Family History   Problem Relation Age of Onset   • Malig Hyperthermia Neg Hx      Social History     Tobacco Use   • Smoking status: Former Smoker     Packs/day: 0.00     Years: 4.00     Pack years: 0.00     Types: Cigarettes     Last attempt to quit: 1966     Years since quittin.6   • Smokeless tobacco: Never Used   • Tobacco comment: 3  CIGS PER DAY   Substance Use Topics   • Alcohol use: No     Frequency: Never   • Drug use: No     Medications Prior to Admission   Medication Sig Dispense Refill Last Dose   • budesonide-formoterol (SYMBICORT) 160-4.5 MCG/ACT inhaler Inhale 2 puffs 2 (Two) Times a Day.   8/22/2019 at 0300   • Cholecalciferol (VITAMIN D3) 5000 units capsule capsule Take 5,000 Units by mouth Every Morning.   8/21/2019 at 1200   • cinacalcet (SENSIPAR) 60 MG tablet Take 90 mg by mouth Daily.   8/21/2019 at 1600   • cinacalcet (SENSIPAR) 90 MG tablet Take 90 mg by mouth Every Evening.   8/21/2019 at 1600   • metoprolol succinate XL (TOPROL-XL) 25 MG 24 hr tablet Take 25 mg by mouth Every Morning.   8/22/2019 at 0300   • montelukast (SINGULAIR) 10 MG tablet Take 10 mg by mouth Every Night.   8/21/2019 at 2100   • omeprazole (priLOSEC) 40 MG capsule Take 40 mg by mouth Every Morning.   8/22/2019 at 0300   • repaglinide (PRANDIN) 2 MG tablet Take 2 mg by mouth 3 (Three) Times a Day Before Meals. If not eating patient does not take   8/21/2019 at 1900   • acetaminophen (TYLENOL) 325 MG tablet Take 650 mg by mouth Every 6 (Six) Hours As Needed for Mild Pain .   8/22/2019   • albuterol (PROVENTIL) (2.5 MG/3ML) 0.083% nebulizer solution Take 2.5 mg by nebulization 3 (Three) Times a Day As Needed for Wheezing. UNSURE OF DOSAGE   8/15/2019   • aspirin 325 MG EC tablet Take 325 mg by mouth Every Night. HELD FOR SURGERY    8/15/2019   • HYDROcodone-acetaminophen (NORCO) 5-325 MG per tablet Take 1-2 tablets by mouth Every 4 (Four) Hours As Needed (Pain). 20 tablet 0 8/19/2019   • insulin detemir (LEVEMIR) 100 UNIT/ML injection Inject 8 Units under the skin into the appropriate area as directed Every Night.   8/20/2019   • Sucroferric Oxyhydroxide (VELPHORO) 500 MG chewable tablet Chew 500 mg 3 (Three) Times a Day.   Unknown at Unknown time       ceFAZolin 2 g Intravenous Once       sodium chloride 9 mL/hr Last Rate: 9 mL/hr (08/22/19 6248)  "    fentanyl  •  lidocaine PF 1%  •  midazolam **OR** midazolam  •  sodium chloride  •  sodium chloride  Sulfa antibiotics and Latex    Objective     Physical Exam:  Physical Exam    Vital Signs and Labs:  Vital Signs Patient Vitals for the past 24 hrs:   BP Temp Temp src Pulse Resp SpO2 Height Weight   08/22/19 0703 116/56 98.2 °F (36.8 °C) Oral 91 18 98 % 149.9 cm (59\") 83.8 kg (184 lb 11.2 oz)     I/O:  No intake/output data recorded.    CBC    Results from last 7 days   Lab Units 08/21/19  1206   WBC 10*3/mm3 9.20   HEMOGLOBIN g/dL 7.7*   PLATELETS 10*3/mm3 303     BMP     Cr Clearance Estimated Creatinine Clearance: 12.8 mL/min (A) (by C-G formula based on SCr of 3.89 mg/dL (H)).  Coag       Active Hospital Problems    Diagnosis  POA   • **Thrombosis of kidney dialysis arteriovenous graft (CMS/[facility]) [T82.868A]  Yes   • ESRD (end stage renal disease) on dialysis (CMS/[facility]) [N18.6, Z99.2]  Not Applicable      Resolved Hospital Problems   No resolved problems to display.       Assessment/Plan       Thrombosis of kidney dialysis arteriovenous graft (CMS/[facility])    ESRD (end stage renal disease) on dialysis (CMS/[facility])      74 y.o. female patient with thrombosed newly placed revised axillary artery axillary vein graft.  She is currently dialyzing through a tunneled catheter.  Discussed with her and her family about attempted salvage with graft thrombectomy.    I discussed the patients findings and my recommendations with patient and family.    Thierry Hardy MD  08/22/19  7:27 AM    Please call my office with any question: [phone]          "

## 2019-08-22 NOTE — OP NOTE
Date of Admission:  8/22/2019  Today's Date:  08/22/19  Thierry Hardy MD  Psychiatric    Preoperative Diagnosis:   Thrombosed dialysis access.    Postoperative Diagnosis:   Same    Procedure Performed:   Right arm arteriovenous graft thrombectomy with arterial anastomosis angioplasty.  Central venous stent grafting.    CPT:  51866 Revision w thrombectomy  + 13824 Central stent placement    Surgeon:   Thierry Hardy MD    Assistant:    Abel FERRELL , provided critical assistance in exposure, retraction, and suctioning that overall decrease blood loss and operative time.    Anesthesia:   MAC with local    Estimated Blood Loss:   100-250 cc    Findings:    Successful thrombectomy of right arm axillary vein axillary artery loop graft.  There appeared to be some densely adherent thrombus on the central portion of her previously placed subclavian vein stent.  This did not respond to thrombectomy.  This was extended with a 9 x 10 Viabahn stent graft centrally.  There was some stenosis at the arterial anastomosis which responded well to a 5 mm angioplasty.    Implants:    Implant Name Type Inv. Item Serial No.  Lot No. LRB No. Used   STENTGR ENDOPROSTH VIABAHN RO HEP 9F 9MM [id number] - [id number] - [id number] Implant STENTGR ENDOPROSTH VIABAHN RO HEP 9F 9MM [id number] [employer] [id number] Right 1       Staff:   Circulator: Liz Victor RN  Scrub Person: Osman Perea  Assistant: Ankit Best  Vascular Radiology Technician: Marivel Godwin    Specimen:   none    Complications:   none    Dispo:   to PACU    Indication for procedure:   74 y.o. female with difficult dialysis access options.  She is currently dialyzing through a left subclavian tunneled catheter.  I revised her right arm graft approximately 2 weeks ago.  It had no thrill no bruit last week in the office.  I tried to schedule her for last week but due to illness she was unable to make it.  She reports  today for attempted graft salvage.    Description of procedure:   The patient was taken to the operating room. Anesthesia was induced without difficulty. Surgical sites were prepped and draped in the usual sterile manner. A full surgical timeout was performed. An area of overlying lateral limb of graft was infiltrated with local. Roughly a 1/4-inch incision was made with a #15 blade. Circumferential control was obtained of the graft just deep. Then 7500 units of heparin was administered. Clamps were placed on the graft. Graftotomy was made. Multiple passes with a #4 Zulema and a large clot-removing catheter were taken with densely adherent subcutaneous thrombus being returned. I was able to get brisk back bleeding. An 11-Yi sheath was placed and central venogram was obtained. There was some thrombus on the central portion of her subclavian vein stent that I was unable to remove despite recurrent attempts at thrombectomy. I elected to extend this slightly central down into the superior vena cava with a 9 x 10 Viabahn stent graft manually within the superior vena cava. All overlaps and residual venous outflow stenosis were angioplastied with a 9 Lohman balloon. The limb was thoroughly irrigated with heparin solution. Arterial limb thrombectomy was performed with a #4 Zulema. I had some difficulty getting this to track across the anastomosis. I was able to get a little bit of bleeding but not brisk enough to be normal. We elected to do an arteriogram which showed a residual small thrombus at the arterial anastomosis with some stenosis. A 5 mm partial inflated balloon was used as an over-the-wire thrombectomy and ultimately an angioplasty of the arterial anastomotic stenosis. This angiographically gave good results with good flow down the brachial artery. Graftotomy was repaired with a 5-0 running to and fro Prolene suture. Appropriate flushing maneuvers were taken prior to re-initiation of inline graft flow.  Overall good Doppler signal was appreciated in the graft as well as at the wrist. The wound was irrigated with antibiotic-containing solution. Good hemostasis was confirmed. Deep tissues were closed with 3-0 Vicryl. Superficial tissues were stapled closed. Overall the patient tolerated the procedure well.     Thierry Hardy MD  08/22/19     Active Hospital Problems    Diagnosis  POA   • **Thrombosis of kidney dialysis arteriovenous graft (CMS/[facility]) [T82.868A]  Yes   • ESRD (end stage renal disease) on dialysis (CMS/[facility]) [N18.6, Z99.2]  Not Applicable      Resolved Hospital Problems   No resolved problems to display.

## 2019-08-29 ENCOUNTER — TRANSCRIBE ORDERS (OUTPATIENT)
Dept: ADMINISTRATIVE | Facility: HOSPITAL | Age: 74
End: 2019-08-29

## 2019-08-29 DIAGNOSIS — D50.8 OTHER IRON DEFICIENCY ANEMIA: Primary | ICD-10-CM

## 2019-08-30 ENCOUNTER — HOSPITAL ENCOUNTER (OUTPATIENT)
Dept: INFUSION THERAPY | Facility: HOSPITAL | Age: 74
Discharge: HOME | End: 2019-08-30
Admitting: INTERNAL MEDICINE

## 2019-08-30 VITALS
SYSTOLIC BLOOD PRESSURE: 150 MMHG | OXYGEN SATURATION: 100 % | TEMPERATURE: 97.8 F | HEART RATE: 81 BPM | RESPIRATION RATE: 18 BRPM | DIASTOLIC BLOOD PRESSURE: 65 MMHG

## 2019-08-30 DIAGNOSIS — D50.8 OTHER IRON DEFICIENCY ANEMIA: ICD-10-CM

## 2019-08-30 LAB
ABO GROUP BLD: NORMAL
BLD GP AB SCN SERPL QL: NEGATIVE
RH BLD: POSITIVE
T&S EXPIRATION DATE: NORMAL

## 2019-08-30 PROCEDURE — 86923 COMPATIBILITY TEST ELECTRIC: CPT

## 2019-08-30 PROCEDURE — 86900 BLOOD TYPING SEROLOGIC ABO: CPT

## 2019-08-30 PROCEDURE — 86900 BLOOD TYPING SEROLOGIC ABO: CPT | Performed by: INTERNAL MEDICINE

## 2019-08-30 PROCEDURE — 86901 BLOOD TYPING SEROLOGIC RH(D): CPT | Performed by: INTERNAL MEDICINE

## 2019-08-30 PROCEDURE — 36415 COLL VENOUS BLD VENIPUNCTURE: CPT

## 2019-08-30 PROCEDURE — 86850 RBC ANTIBODY SCREEN: CPT | Performed by: INTERNAL MEDICINE

## 2019-08-30 PROCEDURE — 36430 TRANSFUSION BLD/BLD COMPNT: CPT

## 2019-08-30 PROCEDURE — P9016 RBC LEUKOCYTES REDUCED: HCPCS

## 2019-08-31 LAB
ABO + RH BLD: NORMAL
BH BB BLOOD EXPIRATION DATE: NORMAL
BH BB BLOOD TYPE BARCODE: 7300
BH BB DISPENSE STATUS: NORMAL
BH BB PRODUCT CODE: NORMAL
BH BB UNIT NUMBER: NORMAL
UNIT  ABO: NORMAL
UNIT  RH: NORMAL

## 2019-10-11 LAB
AVERAGE GLUCOSE: NORMAL
HBA1C MFR BLD: 5.5 %

## 2019-11-18 ENCOUNTER — OFFICE VISIT (OUTPATIENT)
Dept: ENDOCRINOLOGY | Age: 74
End: 2019-11-18

## 2019-11-18 VITALS
WEIGHT: 179 LBS | HEART RATE: 88 BPM | DIASTOLIC BLOOD PRESSURE: 86 MMHG | HEIGHT: 61 IN | SYSTOLIC BLOOD PRESSURE: 138 MMHG | OXYGEN SATURATION: 98 % | BODY MASS INDEX: 33.79 KG/M2

## 2019-11-18 DIAGNOSIS — N18.6 ESRD (END STAGE RENAL DISEASE) ON DIALYSIS (HCC): ICD-10-CM

## 2019-11-18 DIAGNOSIS — Z99.2 ESRD (END STAGE RENAL DISEASE) ON DIALYSIS (HCC): ICD-10-CM

## 2019-11-18 DIAGNOSIS — E04.1 THYROID NODULE: Primary | ICD-10-CM

## 2019-11-18 PROCEDURE — 99204 OFFICE O/P NEW MOD 45 MIN: CPT | Performed by: INTERNAL MEDICINE

## 2019-11-18 NOTE — PROGRESS NOTES
Chief Complaint   Patient presents with   • Diabetes   NEW PATIENT APPOINTMENT/ POSSIBLE THYROID NODULE      74-year-old -American female is here as a new patient for the evaluation of thyroid nodule.  Consulted by Dr. Guerra.   Patient reports that the left thyroid lobe nodule was found incidentally on a CT scan of the chest which was performed for her COPD and asthma.  Patient never had a thyroid ultrasound.  Questionable history of family history of thyroid disease.  Does complain of difficulty in breathing, occasional issues of choking, no issues with swallowing or change in voice.  Never been on thyroid medication.    Her energy levels are poor especially due to her breathing situation, weight has been stable for most part.    Type 2 diabetes mellitus  On Levemir 8 units at bedtime, Prandin 2 mg 3 times a day with meals.  Managed by primary care physician.      Reviewed primary care physician's/consulting physician documentation and lab results :     I have reviewed the patient's allergies, medicines, past medical hx, family hx and social hx in detail.    Past Medical History:   Diagnosis Date   • Anemia    • Anesthesia complication     mother woke up in combative state   • Arthritis     knees   • Asthma    • COPD (chronic obstructive pulmonary disease) (CMS/[facility])    • Diabetes mellitus (CMS/HCC)     type 2   • Dialysis patient (CMS/[facility])    • Disease of thyroid gland    • GERD (gastroesophageal reflux disease)    • Headache     after dialysis   • History of blood clots    • History of kidney stones    • Hyperlipidemia    • Hypertension    • Knee pain, bilateral    • Renal disease    • Sleep apnea     CPAP   • SOB (shortness of breath)    • Thrombosis of renal dialysis arteriovenous graft (CMS/[facility])    • Weakness        Family History   Problem Relation Age of Onset   • Malig Hyperthermia Neg Hx        Social History     Socioeconomic History   • Marital status:      Spouse name: Not on file   •  Number of children: Not on file   • Years of education: Not on file   • Highest education level: Not on file   Tobacco Use   • Smoking status: Former Smoker     Packs/day: 0.00     Years: 4.00     Pack years: 0.00     Types: Cigarettes     Last attempt to quit: 1966     Years since quittin.9   • Smokeless tobacco: Never Used   • Tobacco comment: 3 CIGS PER DAY   Substance and Sexual Activity   • Alcohol use: No     Frequency: Never   • Drug use: No       Allergies   Allergen Reactions   • Sulfa Antibiotics Hives   • Latex Rash         Current Outpatient Medications:   •  albuterol (PROVENTIL) (2.5 MG/3ML) 0.083% nebulizer solution, Take 2.5 mg by nebulization 3 (Three) Times a Day As Needed for Wheezing. UNSURE OF DOSAGE, Disp: , Rfl:   •  apixaban (ELIQUIS) 5 MG tablet tablet, Take 5 mg by mouth 1 (One) Time., Disp: , Rfl:   •  budesonide-formoterol (SYMBICORT) 160-4.5 MCG/ACT inhaler, Inhale 2 puffs 2 (Two) Times a Day., Disp: , Rfl:   •  Cholecalciferol (VITAMIN D3) 5000 units capsule capsule, Take 5,000 Units by mouth Every Morning., Disp: , Rfl:   •  cinacalcet (SENSIPAR) 90 MG tablet, Take 90 mg by mouth Every Evening., Disp: , Rfl:   •  HYDROcodone-acetaminophen (NORCO) 5-325 MG per tablet, Take 1-2 tablets by mouth Every 4 (Four) Hours As Needed (Pain)., Disp: 20 tablet, Rfl: 0  •  insulin detemir (LEVEMIR) 100 UNIT/ML injection, Inject 8 Units under the skin into the appropriate area as directed Every Night., Disp: , Rfl:   •  metoprolol succinate XL (TOPROL-XL) 25 MG 24 hr tablet, Take 25 mg by mouth Every Morning., Disp: , Rfl:   •  montelukast (SINGULAIR) 10 MG tablet, Take 10 mg by mouth Every Night., Disp: , Rfl:   •  omeprazole (priLOSEC) 40 MG capsule, Take 40 mg by mouth Every Morning., Disp: , Rfl:   •  repaglinide (PRANDIN) 2 MG tablet, Take 2 mg by mouth 3 (Three) Times a Day Before Meals. If not eating patient does not take, Disp: , Rfl:   •  acetaminophen (TYLENOL) 325 MG tablet, Take 650  "mg by mouth Every 6 (Six) Hours As Needed for Mild Pain ., Disp: , Rfl:   •  aspirin 325 MG EC tablet, Take 325 mg by mouth Every Night. HELD FOR SURGERY , Disp: , Rfl:   •  Sucroferric Oxyhydroxide (VELPHORO) 500 MG chewable tablet, Chew 500 mg 3 (Three) Times a Day., Disp: , Rfl:     Review of Systems   Constitutional: Positive for appetite change and fatigue. Negative for fever.   Eyes: Negative for visual disturbance.   Respiratory: Negative for shortness of breath.    Cardiovascular: Positive for leg swelling. Negative for palpitations.   Gastrointestinal: Negative for abdominal pain and vomiting.   Endocrine: Positive for polydipsia. Negative for polyuria.   Musculoskeletal: Negative for joint swelling and neck pain.   Skin: Negative for rash.   Neurological: Negative for weakness and numbness.   Psychiatric/Behavioral: Negative for behavioral problems.     I have reviewed the ROS as documented by the MA; Dee Linares MD.      Objective:     /86   Pulse 88   Ht 154.9 cm (61\")   Wt 81.2 kg (179 lb)   SpO2 98%   BMI 33.82 kg/m²     Physical Exam   Constitutional: She is oriented to person, place, and time. She appears well-nourished.   Obese     HENT:   Head: Normocephalic and atraumatic.   Wide neck   Eyes: Conjunctivae and EOM are normal. No scleral icterus.   Neck: Normal range of motion. Neck supple. No thyromegaly present.   Acanthosis nigricans, nodule palpable   Cardiovascular: Normal rate and normal heart sounds.   Pulmonary/Chest: Effort normal and breath sounds normal. No stridor. She has no wheezes.   Abdominal: Soft. Bowel sounds are normal. She exhibits no distension. There is no tenderness.   Central obesity   Musculoskeletal: She exhibits edema. She exhibits no tenderness.   Neurological: She is alert and oriented to person, place, and time.   Skin: Skin is warm and dry. She is not diaphoretic.   Psychiatric: She has a normal mood and affect.   Vitals reviewed.         Results " "Review:    I reviewed the patient's new clinical results.    Hospital Outpatient Visit on 08/30/2019   Component Date Value Ref Range Status   • ABO Type 08/30/2019 B   Final   • RH type 08/30/2019 Positive   Final   • Antibody Screen 08/30/2019 Negative   Final   • T&S Expiration Date 08/30/2019 9/2/2019 11:59:59 PM   Final   • Product Code 08/31/2019 [id number]   Final   • Unit Number 08/31/2019 [id number]   Final   • UNIT  ABO 08/31/2019 B   Final   • UNIT  RH 08/31/2019 POS   Final   • Dispense Status 08/31/2019 PT   Final   • Blood Type 08/31/2019 BPOS   Final   • Blood Expiration Date 08/31/2019 [id number]   Final   • Blood Type Barcode 08/31/2019 7300   Final       Nellie was seen today for diabetes.    Diagnoses and all orders for this visit:    Thyroid nodule  -     TSH  -     T4, Free  -     Thyroid Peroxidase Antibody  -     US Thyroid; Future    ESRD (end stage renal disease) on dialysis (CMS/[facility])  -     TSH  -     T4, Free  -     Thyroid Peroxidase Antibody  -     US Thyroid; Future      Thyroid nodule  Proceed with thyroid ultrasound  Based on the thyroid ultrasound findings would proceed with a biopsy.  Given patient's other comorbidities especially her breathing situation if the nodule is not greater than 1.5 cm in size might proceed with serial monitoring with frequent thyroid ultrasounds then proceeding with a biopsy.    Type 2 diabetes mellitus  Patient would want to get this managed through her primary care.  HbA1c from October-5.5%.  This could be spuriously better given her renal issue and also the report injections.    Chronic fatigue  Check thyroid panel    Thank you for asking me to see your patient, Nellie Vegas in consultation.        Dee Linares MD  11/18/19    EMR Dragon / transcription disclaimer:     \"Dictated utilizing Dragon dictation\".   "

## 2019-11-19 LAB
T4 FREE SERPL-MCNC: 1.07 NG/DL (ref 0.93–1.7)
THYROPEROXIDASE AB SERPL-ACNC: 11 IU/ML (ref 0–34)
TSH SERPL DL<=0.005 MIU/L-ACNC: 0.79 UIU/ML (ref 0.27–4.2)

## 2019-11-27 ENCOUNTER — APPOINTMENT (OUTPATIENT)
Dept: GENERAL RADIOLOGY | Facility: HOSPITAL | Age: 74
End: 2019-11-27

## 2019-11-27 ENCOUNTER — HOSPITAL ENCOUNTER (INPATIENT)
Facility: HOSPITAL | Age: 74
LOS: 2 days | Discharge: HOME | End: 2019-11-30
Attending: EMERGENCY MEDICINE | Admitting: HOSPITALIST

## 2019-11-27 DIAGNOSIS — N18.6 ESRD (END STAGE RENAL DISEASE) (HCC): ICD-10-CM

## 2019-11-27 DIAGNOSIS — J44.1 COPD EXACERBATION (HCC): Primary | ICD-10-CM

## 2019-11-27 DIAGNOSIS — R77.8 ELEVATED TROPONIN: ICD-10-CM

## 2019-11-27 DIAGNOSIS — B34.8 RHINOVIRUS: ICD-10-CM

## 2019-11-27 PROCEDURE — 71046 X-RAY EXAM CHEST 2 VIEWS: CPT

## 2019-11-27 PROCEDURE — 93005 ELECTROCARDIOGRAM TRACING: CPT | Performed by: EMERGENCY MEDICINE

## 2019-11-27 PROCEDURE — 99284 EMERGENCY DEPT VISIT MOD MDM: CPT

## 2019-11-27 RX ORDER — SODIUM CHLORIDE 0.9 % (FLUSH) 0.9 %
10 SYRINGE (ML) INJECTION AS NEEDED
Status: DISCONTINUED | OUTPATIENT
Start: 2019-11-27 | End: 2019-11-30 | Stop reason: HOSPADM

## 2019-11-28 ENCOUNTER — APPOINTMENT (OUTPATIENT)
Dept: CARDIOLOGY | Facility: HOSPITAL | Age: 74
End: 2019-11-28

## 2019-11-28 PROBLEM — J44.1 COPD EXACERBATION (HCC): Status: ACTIVE | Noted: 2019-11-28

## 2019-11-28 LAB
ALBUMIN SERPL-MCNC: 3.3 G/DL (ref 3.5–5.2)
ALBUMIN/GLOB SERPL: 0.9 G/DL
ALP SERPL-CCNC: 238 U/L (ref 39–117)
ALT SERPL W P-5'-P-CCNC: 16 U/L (ref 1–33)
ANION GAP SERPL CALCULATED.3IONS-SCNC: 15.6 MMOL/L (ref 5–15)
AORTIC DIMENSIONLESS INDEX: 1 (DI)
AST SERPL-CCNC: 21 U/L (ref 1–32)
B PARAPERT DNA SPEC QL NAA+PROBE: NOT DETECTED
B PERT DNA SPEC QL NAA+PROBE: NOT DETECTED
BASOPHILS # BLD AUTO: 0.04 10*3/MM3 (ref 0–0.2)
BASOPHILS NFR BLD AUTO: 0.7 % (ref 0–1.5)
BH CV ECHO MEAS - ACS: 1.7 CM
BH CV ECHO MEAS - AO MAX PG (FULL): 7.1 MMHG
BH CV ECHO MEAS - AO MAX PG: 20.8 MMHG
BH CV ECHO MEAS - AO MEAN PG (FULL): 2 MMHG
BH CV ECHO MEAS - AO MEAN PG: 9 MMHG
BH CV ECHO MEAS - AO ROOT AREA (BSA CORRECTED): 1.9
BH CV ECHO MEAS - AO ROOT AREA: 8.6 CM^2
BH CV ECHO MEAS - AO ROOT DIAM: 3.3 CM
BH CV ECHO MEAS - AO V2 MAX: 228 CM/SEC
BH CV ECHO MEAS - AO V2 MEAN: 129 CM/SEC
BH CV ECHO MEAS - AO V2 VTI: 33.1 CM
BH CV ECHO MEAS - AVA(I,A): 3.1 CM^2
BH CV ECHO MEAS - AVA(I,D): 3.1 CM^2
BH CV ECHO MEAS - AVA(V,A): 2.5 CM^2
BH CV ECHO MEAS - AVA(V,D): 2.5 CM^2
BH CV ECHO MEAS - BSA(HAYCOCK): 1.9 M^2
BH CV ECHO MEAS - BSA: 1.8 M^2
BH CV ECHO MEAS - BZI_BMI: 34.2 KILOGRAMS/M^2
BH CV ECHO MEAS - BZI_METRIC_HEIGHT: 152 CM
BH CV ECHO MEAS - BZI_METRIC_WEIGHT: 79 KG
BH CV ECHO MEAS - EDV(CUBED): 132.7 ML
BH CV ECHO MEAS - EDV(MOD-SP2): 33 ML
BH CV ECHO MEAS - EDV(MOD-SP4): 30 ML
BH CV ECHO MEAS - EDV(TEICH): 123.8 ML
BH CV ECHO MEAS - EF(CUBED): 77.5 %
BH CV ECHO MEAS - EF(MOD-BP): 61 %
BH CV ECHO MEAS - EF(MOD-SP2): 63.6 %
BH CV ECHO MEAS - EF(MOD-SP4): 63.3 %
BH CV ECHO MEAS - EF(TEICH): 69.4 %
BH CV ECHO MEAS - ESV(CUBED): 29.8 ML
BH CV ECHO MEAS - ESV(MOD-SP2): 12 ML
BH CV ECHO MEAS - ESV(MOD-SP4): 11 ML
BH CV ECHO MEAS - ESV(TEICH): 37.9 ML
BH CV ECHO MEAS - FS: 39.2 %
BH CV ECHO MEAS - IVS/LVPW: 0.91
BH CV ECHO MEAS - IVSD: 1 CM
BH CV ECHO MEAS - LAT PEAK E' VEL: 7.4 CM/SEC
BH CV ECHO MEAS - LV DIASTOLIC VOL/BSA (35-75): 17.1 ML/M^2
BH CV ECHO MEAS - LV MASS(C)D: 200.8 GRAMS
BH CV ECHO MEAS - LV MASS(C)DI: 114.3 GRAMS/M^2
BH CV ECHO MEAS - LV MAX PG: 13.7 MMHG
BH CV ECHO MEAS - LV MEAN PG: 7 MMHG
BH CV ECHO MEAS - LV SYSTOLIC VOL/BSA (12-30): 6.3 ML/M^2
BH CV ECHO MEAS - LV V1 MAX: 185 CM/SEC
BH CV ECHO MEAS - LV V1 MEAN: 129 CM/SEC
BH CV ECHO MEAS - LV V1 VTI: 32.3 CM
BH CV ECHO MEAS - LVIDD: 5.1 CM
BH CV ECHO MEAS - LVIDS: 3.1 CM
BH CV ECHO MEAS - LVLD AP2: 5.4 CM
BH CV ECHO MEAS - LVLD AP4: 5.8 CM
BH CV ECHO MEAS - LVLS AP2: 3.8 CM
BH CV ECHO MEAS - LVLS AP4: 4.3 CM
BH CV ECHO MEAS - LVOT AREA (M): 3.1 CM^2
BH CV ECHO MEAS - LVOT AREA: 3.1 CM^2
BH CV ECHO MEAS - LVOT DIAM: 2 CM
BH CV ECHO MEAS - LVPWD: 1.1 CM
BH CV ECHO MEAS - MED PEAK E' VEL: 6.31 CM/SEC
BH CV ECHO MEAS - MV A DUR: 98 SEC
BH CV ECHO MEAS - MV A MAX VEL: 94.3 CM/SEC
BH CV ECHO MEAS - MV DEC SLOPE: 700 CM/SEC^2
BH CV ECHO MEAS - MV DEC TIME: 161 SEC
BH CV ECHO MEAS - MV E MAX VEL: 55.7 CM/SEC
BH CV ECHO MEAS - MV E/A: 0.59
BH CV ECHO MEAS - MV MAX PG: 4.8 MMHG
BH CV ECHO MEAS - MV MEAN PG: 2 MMHG
BH CV ECHO MEAS - MV P1/2T MAX VEL: 82.8 CM/SEC
BH CV ECHO MEAS - MV P1/2T: 34.6 MSEC
BH CV ECHO MEAS - MV V2 MAX: 109 CM/SEC
BH CV ECHO MEAS - MV V2 MEAN: 60.6 CM/SEC
BH CV ECHO MEAS - MV V2 VTI: 18.7 CM
BH CV ECHO MEAS - MVA P1/2T LCG: 2.7 CM^2
BH CV ECHO MEAS - MVA(P1/2T): 6.4 CM^2
BH CV ECHO MEAS - MVA(VTI): 5.4 CM^2
BH CV ECHO MEAS - PA ACC TIME: 0.04 SEC
BH CV ECHO MEAS - PA MAX PG (FULL): 2.3 MMHG
BH CV ECHO MEAS - PA MAX PG: 11.2 MMHG
BH CV ECHO MEAS - PA PR(ACCEL): 60.1 MMHG
BH CV ECHO MEAS - PA V2 MAX: 167 CM/SEC
BH CV ECHO MEAS - PVA(V,A): 3.1 CM^2
BH CV ECHO MEAS - PVA(V,D): 3.1 CM^2
BH CV ECHO MEAS - QP/QS: 1.1
BH CV ECHO MEAS - RV MAX PG: 8.9 MMHG
BH CV ECHO MEAS - RV MEAN PG: 5 MMHG
BH CV ECHO MEAS - RV V1 MAX: 149 CM/SEC
BH CV ECHO MEAS - RV V1 MEAN: 107 CM/SEC
BH CV ECHO MEAS - RV V1 VTI: 32.2 CM
BH CV ECHO MEAS - RVOT AREA: 3.5 CM^2
BH CV ECHO MEAS - RVOT DIAM: 2.1 CM
BH CV ECHO MEAS - SI(AO): 161.2 ML/M^2
BH CV ECHO MEAS - SI(CUBED): 58.6 ML/M^2
BH CV ECHO MEAS - SI(LVOT): 57.8 ML/M^2
BH CV ECHO MEAS - SI(MOD-SP2): 12 ML/M^2
BH CV ECHO MEAS - SI(MOD-SP4): 10.8 ML/M^2
BH CV ECHO MEAS - SI(TEICH): 48.9 ML/M^2
BH CV ECHO MEAS - SV(AO): 283.1 ML
BH CV ECHO MEAS - SV(CUBED): 102.9 ML
BH CV ECHO MEAS - SV(LVOT): 101.5 ML
BH CV ECHO MEAS - SV(MOD-SP2): 21 ML
BH CV ECHO MEAS - SV(MOD-SP4): 19 ML
BH CV ECHO MEAS - SV(RVOT): 111.5 ML
BH CV ECHO MEAS - SV(TEICH): 85.9 ML
BH CV ECHO MEAS - TAPSE (>1.6): 2.9 CM2
BH CV ECHO MEASUREMENTS AVERAGE E/E' RATIO: 8.13
BH CV XLRA - RV BASE: 3.5 CM
BH CV XLRA - TDI S': 16.9 CM/SEC
BILIRUB SERPL-MCNC: 0.4 MG/DL (ref 0.2–1.2)
BUN BLD-MCNC: 9 MG/DL (ref 8–23)
BUN/CREAT SERPL: 2.7 (ref 7–25)
C PNEUM DNA NPH QL NAA+NON-PROBE: NOT DETECTED
CALCIUM SPEC-SCNC: 7.8 MG/DL (ref 8.6–10.5)
CHLORIDE SERPL-SCNC: 99 MMOL/L (ref 98–107)
CO2 SERPL-SCNC: 26.4 MMOL/L (ref 22–29)
CREAT BLD-MCNC: 3.3 MG/DL (ref 0.57–1)
DEPRECATED RDW RBC AUTO: 52.3 FL (ref 37–54)
EOSINOPHIL # BLD AUTO: 0.12 10*3/MM3 (ref 0–0.4)
EOSINOPHIL NFR BLD AUTO: 2.1 % (ref 0.3–6.2)
ERYTHROCYTE [DISTWIDTH] IN BLOOD BY AUTOMATED COUNT: 17.8 % (ref 12.3–15.4)
FLUAV H1 2009 PAND RNA NPH QL NAA+PROBE: NOT DETECTED
FLUAV H1 HA GENE NPH QL NAA+PROBE: NOT DETECTED
FLUAV H3 RNA NPH QL NAA+PROBE: NOT DETECTED
FLUAV SUBTYP SPEC NAA+PROBE: NOT DETECTED
FLUBV RNA ISLT QL NAA+PROBE: NOT DETECTED
GFR SERPL CREATININE-BSD FRML MDRD: 17 ML/MIN/1.73
GLOBULIN UR ELPH-MCNC: 3.5 GM/DL
GLUCOSE BLD-MCNC: 68 MG/DL (ref 65–99)
GLUCOSE BLDC GLUCOMTR-MCNC: 141 MG/DL (ref 70–130)
GLUCOSE BLDC GLUCOMTR-MCNC: 236 MG/DL (ref 70–130)
GLUCOSE BLDC GLUCOMTR-MCNC: 270 MG/DL (ref 70–130)
HADV DNA SPEC NAA+PROBE: NOT DETECTED
HCOV 229E RNA SPEC QL NAA+PROBE: NOT DETECTED
HCOV HKU1 RNA SPEC QL NAA+PROBE: NOT DETECTED
HCOV NL63 RNA SPEC QL NAA+PROBE: NOT DETECTED
HCOV OC43 RNA SPEC QL NAA+PROBE: NOT DETECTED
HCT VFR BLD AUTO: 34.3 % (ref 34–46.6)
HGB BLD-MCNC: 10.1 G/DL (ref 12–15.9)
HMPV RNA NPH QL NAA+NON-PROBE: NOT DETECTED
HOLD SPECIMEN: NORMAL
HOLD SPECIMEN: NORMAL
HPIV1 RNA SPEC QL NAA+PROBE: NOT DETECTED
HPIV2 RNA SPEC QL NAA+PROBE: NOT DETECTED
HPIV3 RNA NPH QL NAA+PROBE: NOT DETECTED
HPIV4 P GENE NPH QL NAA+PROBE: NOT DETECTED
IMM GRANULOCYTES # BLD AUTO: 0.02 10*3/MM3 (ref 0–0.05)
IMM GRANULOCYTES NFR BLD AUTO: 0.4 % (ref 0–0.5)
LEFT ATRIUM VOLUME INDEX: 21 ML/M2
LV EF 2D ECHO EST: 61 %
LYMPHOCYTES # BLD AUTO: 0.4 10*3/MM3 (ref 0.7–3.1)
LYMPHOCYTES NFR BLD AUTO: 7.2 % (ref 19.6–45.3)
M PNEUMO IGG SER IA-ACNC: NOT DETECTED
MAXIMAL PREDICTED HEART RATE: 147 BPM
MCH RBC QN AUTO: 23.9 PG (ref 26.6–33)
MCHC RBC AUTO-ENTMCNC: 29.4 G/DL (ref 31.5–35.7)
MCV RBC AUTO: 81.1 FL (ref 79–97)
MONOCYTES # BLD AUTO: 0.75 10*3/MM3 (ref 0.1–0.9)
MONOCYTES NFR BLD AUTO: 13.4 % (ref 5–12)
NEUTROPHILS # BLD AUTO: 4.26 10*3/MM3 (ref 1.7–7)
NEUTROPHILS NFR BLD AUTO: 76.2 % (ref 42.7–76)
NRBC BLD AUTO-RTO: 0.2 /100 WBC (ref 0–0.2)
NT-PROBNP SERPL-MCNC: 7686 PG/ML (ref 5–900)
PLATELET # BLD AUTO: 201 10*3/MM3 (ref 140–450)
PMV BLD AUTO: 9.3 FL (ref 6–12)
POTASSIUM BLD-SCNC: 3.5 MMOL/L (ref 3.5–5.2)
PROT SERPL-MCNC: 6.8 G/DL (ref 6–8.5)
RBC # BLD AUTO: 4.23 10*6/MM3 (ref 3.77–5.28)
RHINOVIRUS RNA SPEC NAA+PROBE: DETECTED
RSV RNA NPH QL NAA+NON-PROBE: NOT DETECTED
SODIUM BLD-SCNC: 141 MMOL/L (ref 136–145)
STRESS TARGET HR: 125 BPM
TROPONIN T SERPL-MCNC: 0.04 NG/ML (ref 0–0.03)
TROPONIN T SERPL-MCNC: 0.05 NG/ML (ref 0–0.03)
WBC NRBC COR # BLD: 5.59 10*3/MM3 (ref 3.4–10.8)
WHOLE BLOOD HOLD SPECIMEN: NORMAL
WHOLE BLOOD HOLD SPECIMEN: NORMAL

## 2019-11-28 PROCEDURE — 84484 ASSAY OF TROPONIN QUANT: CPT | Performed by: EMERGENCY MEDICINE

## 2019-11-28 PROCEDURE — 63710000001 INSULIN LISPRO (HUMAN) PER 5 UNITS: Performed by: HOSPITALIST

## 2019-11-28 PROCEDURE — 83880 ASSAY OF NATRIURETIC PEPTIDE: CPT | Performed by: EMERGENCY MEDICINE

## 2019-11-28 PROCEDURE — 94640 AIRWAY INHALATION TREATMENT: CPT

## 2019-11-28 PROCEDURE — 93306 TTE W/DOPPLER COMPLETE: CPT | Performed by: INTERNAL MEDICINE

## 2019-11-28 PROCEDURE — 94799 UNLISTED PULMONARY SVC/PX: CPT

## 2019-11-28 PROCEDURE — 93010 ELECTROCARDIOGRAM REPORT: CPT | Performed by: INTERNAL MEDICINE

## 2019-11-28 PROCEDURE — 93306 TTE W/DOPPLER COMPLETE: CPT

## 2019-11-28 PROCEDURE — 63710000001 INSULIN GLARGINE PER 5 UNITS: Performed by: HOSPITALIST

## 2019-11-28 PROCEDURE — 80053 COMPREHEN METABOLIC PANEL: CPT | Performed by: EMERGENCY MEDICINE

## 2019-11-28 PROCEDURE — 25010000002 METHYLPREDNISOLONE PER 125 MG: Performed by: EMERGENCY MEDICINE

## 2019-11-28 PROCEDURE — 85025 COMPLETE CBC W/AUTO DIFF WBC: CPT | Performed by: EMERGENCY MEDICINE

## 2019-11-28 PROCEDURE — 82962 GLUCOSE BLOOD TEST: CPT

## 2019-11-28 PROCEDURE — 25010000002 METHYLPREDNISOLONE PER 40 MG: Performed by: HOSPITALIST

## 2019-11-28 PROCEDURE — 0100U HC BIOFIRE FILMARRAY RESP PANEL 2: CPT | Performed by: EMERGENCY MEDICINE

## 2019-11-28 RX ORDER — DEXTROMETHORPHAN POLISTIREX 30 MG/5ML
60 SUSPENSION ORAL EVERY 12 HOURS SCHEDULED
Status: DISCONTINUED | OUTPATIENT
Start: 2019-11-28 | End: 2019-11-30 | Stop reason: HOSPADM

## 2019-11-28 RX ORDER — IPRATROPIUM BROMIDE AND ALBUTEROL SULFATE 2.5; .5 MG/3ML; MG/3ML
3 SOLUTION RESPIRATORY (INHALATION) ONCE
Status: DISCONTINUED | OUTPATIENT
Start: 2019-11-28 | End: 2019-11-28

## 2019-11-28 RX ORDER — ISOSORBIDE DINITRATE 10 MG/1
10 TABLET ORAL
Status: DISCONTINUED | OUTPATIENT
Start: 2019-11-28 | End: 2019-11-30 | Stop reason: HOSPADM

## 2019-11-28 RX ORDER — METOPROLOL SUCCINATE 25 MG/1
25 TABLET, EXTENDED RELEASE ORAL EVERY MORNING
Status: DISCONTINUED | OUTPATIENT
Start: 2019-11-29 | End: 2019-11-29

## 2019-11-28 RX ORDER — AMLODIPINE BESYLATE 10 MG/1
10 TABLET ORAL
Status: DISCONTINUED | OUTPATIENT
Start: 2019-11-28 | End: 2019-11-30 | Stop reason: HOSPADM

## 2019-11-28 RX ORDER — INSULIN GLARGINE 100 [IU]/ML
10 INJECTION, SOLUTION SUBCUTANEOUS NIGHTLY
Status: DISCONTINUED | OUTPATIENT
Start: 2019-11-28 | End: 2019-11-30 | Stop reason: HOSPADM

## 2019-11-28 RX ORDER — IPRATROPIUM BROMIDE AND ALBUTEROL SULFATE 2.5; .5 MG/3ML; MG/3ML
3 SOLUTION RESPIRATORY (INHALATION)
Status: DISCONTINUED | OUTPATIENT
Start: 2019-11-28 | End: 2019-11-30 | Stop reason: HOSPADM

## 2019-11-28 RX ORDER — METHYLPREDNISOLONE SODIUM SUCCINATE 125 MG/2ML
125 INJECTION, POWDER, LYOPHILIZED, FOR SOLUTION INTRAMUSCULAR; INTRAVENOUS ONCE
Status: COMPLETED | OUTPATIENT
Start: 2019-11-28 | End: 2019-11-28

## 2019-11-28 RX ORDER — IPRATROPIUM BROMIDE AND ALBUTEROL SULFATE 2.5; .5 MG/3ML; MG/3ML
3 SOLUTION RESPIRATORY (INHALATION) ONCE
Status: COMPLETED | OUTPATIENT
Start: 2019-11-28 | End: 2019-11-28

## 2019-11-28 RX ORDER — REPAGLINIDE 2 MG/1
2 TABLET ORAL
Status: DISCONTINUED | OUTPATIENT
Start: 2019-11-28 | End: 2019-11-28

## 2019-11-28 RX ORDER — GUAIFENESIN 600 MG/1
600 TABLET, EXTENDED RELEASE ORAL EVERY 12 HOURS SCHEDULED
Status: DISCONTINUED | OUTPATIENT
Start: 2019-11-28 | End: 2019-11-30 | Stop reason: HOSPADM

## 2019-11-28 RX ORDER — ASPIRIN 81 MG/1
81 TABLET, CHEWABLE ORAL DAILY
Status: DISCONTINUED | OUTPATIENT
Start: 2019-11-28 | End: 2019-11-30 | Stop reason: HOSPADM

## 2019-11-28 RX ORDER — METHYLPREDNISOLONE SODIUM SUCCINATE 125 MG/2ML
125 INJECTION, POWDER, LYOPHILIZED, FOR SOLUTION INTRAMUSCULAR; INTRAVENOUS ONCE
Status: DISCONTINUED | OUTPATIENT
Start: 2019-11-28 | End: 2019-11-28

## 2019-11-28 RX ORDER — BUDESONIDE AND FORMOTEROL FUMARATE DIHYDRATE 160; 4.5 UG/1; UG/1
2 AEROSOL RESPIRATORY (INHALATION) 2 TIMES DAILY
Status: DISCONTINUED | OUTPATIENT
Start: 2019-11-28 | End: 2019-11-30 | Stop reason: HOSPADM

## 2019-11-28 RX ORDER — MONTELUKAST SODIUM 10 MG/1
10 TABLET ORAL NIGHTLY
Status: DISCONTINUED | OUTPATIENT
Start: 2019-11-28 | End: 2019-11-30 | Stop reason: HOSPADM

## 2019-11-28 RX ORDER — CETIRIZINE HYDROCHLORIDE 10 MG/1
5 TABLET ORAL DAILY
Status: DISCONTINUED | OUTPATIENT
Start: 2019-11-28 | End: 2019-11-30 | Stop reason: HOSPADM

## 2019-11-28 RX ORDER — METHYLPREDNISOLONE SODIUM SUCCINATE 40 MG/ML
40 INJECTION, POWDER, LYOPHILIZED, FOR SOLUTION INTRAMUSCULAR; INTRAVENOUS EVERY 12 HOURS
Status: DISCONTINUED | OUTPATIENT
Start: 2019-11-28 | End: 2019-11-29

## 2019-11-28 RX ORDER — PANTOPRAZOLE SODIUM 40 MG/1
40 TABLET, DELAYED RELEASE ORAL EVERY MORNING
Status: DISCONTINUED | OUTPATIENT
Start: 2019-11-29 | End: 2019-11-30 | Stop reason: HOSPADM

## 2019-11-28 RX ORDER — ASPIRIN 81 MG/1
324 TABLET, CHEWABLE ORAL ONCE
Status: COMPLETED | OUTPATIENT
Start: 2019-11-28 | End: 2019-11-28

## 2019-11-28 RX ORDER — PANTOPRAZOLE SODIUM 40 MG/1
40 TABLET, DELAYED RELEASE ORAL
Status: DISCONTINUED | OUTPATIENT
Start: 2019-11-28 | End: 2019-11-28

## 2019-11-28 RX ORDER — AMLODIPINE BESYLATE 5 MG/1
5 TABLET ORAL
Status: DISCONTINUED | OUTPATIENT
Start: 2019-11-28 | End: 2019-11-28

## 2019-11-28 RX ORDER — DOXYCYCLINE 100 MG/1
100 CAPSULE ORAL ONCE
Status: DISCONTINUED | OUTPATIENT
Start: 2019-11-28 | End: 2019-11-28

## 2019-11-28 RX ADMIN — IPRATROPIUM BROMIDE AND ALBUTEROL SULFATE 3 ML: 2.5; .5 SOLUTION RESPIRATORY (INHALATION) at 07:24

## 2019-11-28 RX ADMIN — METHYLPREDNISOLONE SODIUM SUCCINATE 40 MG: 40 INJECTION, POWDER, FOR SOLUTION INTRAMUSCULAR; INTRAVENOUS at 08:25

## 2019-11-28 RX ADMIN — INSULIN LISPRO 3 UNITS: 100 INJECTION, SOLUTION INTRAVENOUS; SUBCUTANEOUS at 21:26

## 2019-11-28 RX ADMIN — ISOSORBIDE DINITRATE 10 MG: 10 TABLET ORAL at 18:19

## 2019-11-28 RX ADMIN — APIXABAN 5 MG: 5 TABLET, FILM COATED ORAL at 21:26

## 2019-11-28 RX ADMIN — GUAIFENESIN 600 MG: 600 TABLET, EXTENDED RELEASE ORAL at 21:26

## 2019-11-28 RX ADMIN — MONTELUKAST SODIUM 10 MG: 10 TABLET, FILM COATED ORAL at 21:26

## 2019-11-28 RX ADMIN — IPRATROPIUM BROMIDE AND ALBUTEROL SULFATE 3 ML: 2.5; .5 SOLUTION RESPIRATORY (INHALATION) at 00:18

## 2019-11-28 RX ADMIN — APIXABAN 5 MG: 5 TABLET, FILM COATED ORAL at 15:47

## 2019-11-28 RX ADMIN — INSULIN GLARGINE 10 UNITS: 100 INJECTION, SOLUTION SUBCUTANEOUS at 21:27

## 2019-11-28 RX ADMIN — AMLODIPINE BESYLATE 10 MG: 10 TABLET ORAL at 15:41

## 2019-11-28 RX ADMIN — CETIRIZINE HYDROCHLORIDE 5 MG: 10 TABLET, FILM COATED ORAL at 15:41

## 2019-11-28 RX ADMIN — GUAIFENESIN 600 MG: 600 TABLET, EXTENDED RELEASE ORAL at 15:41

## 2019-11-28 RX ADMIN — BUDESONIDE AND FORMOTEROL FUMARATE DIHYDRATE 2 PUFF: 160; 4.5 AEROSOL RESPIRATORY (INHALATION) at 19:53

## 2019-11-28 RX ADMIN — IPRATROPIUM BROMIDE AND ALBUTEROL SULFATE 3 ML: 2.5; .5 SOLUTION RESPIRATORY (INHALATION) at 15:08

## 2019-11-28 RX ADMIN — IPRATROPIUM BROMIDE AND ALBUTEROL SULFATE 3 ML: 2.5; .5 SOLUTION RESPIRATORY (INHALATION) at 04:28

## 2019-11-28 RX ADMIN — PANTOPRAZOLE SODIUM 40 MG: 40 TABLET, DELAYED RELEASE ORAL at 08:25

## 2019-11-28 RX ADMIN — IPRATROPIUM BROMIDE AND ALBUTEROL SULFATE 3 ML: 2.5; .5 SOLUTION RESPIRATORY (INHALATION) at 23:42

## 2019-11-28 RX ADMIN — ASPIRIN 324 MG: 81 TABLET, CHEWABLE ORAL at 03:17

## 2019-11-28 RX ADMIN — ISOSORBIDE DINITRATE 10 MG: 10 TABLET ORAL at 15:41

## 2019-11-28 RX ADMIN — METHYLPREDNISOLONE SODIUM SUCCINATE 125 MG: 125 INJECTION, POWDER, FOR SOLUTION INTRAMUSCULAR; INTRAVENOUS at 01:01

## 2019-11-28 RX ADMIN — ASPIRIN 81 MG: 81 TABLET, CHEWABLE ORAL at 15:47

## 2019-11-28 RX ADMIN — SODIUM CHLORIDE, PRESERVATIVE FREE 10 ML: 5 INJECTION INTRAVENOUS at 01:02

## 2019-11-28 RX ADMIN — METHYLPREDNISOLONE SODIUM SUCCINATE 40 MG: 40 INJECTION, POWDER, FOR SOLUTION INTRAMUSCULAR; INTRAVENOUS at 18:19

## 2019-11-28 RX ADMIN — SODIUM CHLORIDE, PRESERVATIVE FREE 10 ML: 5 INJECTION INTRAVENOUS at 21:26

## 2019-11-28 RX ADMIN — IPRATROPIUM BROMIDE AND ALBUTEROL SULFATE 3 ML: 2.5; .5 SOLUTION RESPIRATORY (INHALATION) at 10:46

## 2019-11-28 RX ADMIN — INSULIN LISPRO 4 UNITS: 100 INJECTION, SOLUTION INTRAVENOUS; SUBCUTANEOUS at 14:31

## 2019-11-29 LAB
ALBUMIN SERPL-MCNC: 3.5 G/DL (ref 3.5–5.2)
ALBUMIN/GLOB SERPL: 1.1 G/DL
ALP SERPL-CCNC: 220 U/L (ref 39–117)
ALT SERPL W P-5'-P-CCNC: 15 U/L (ref 1–33)
ANION GAP SERPL CALCULATED.3IONS-SCNC: 17.2 MMOL/L (ref 5–15)
AST SERPL-CCNC: 20 U/L (ref 1–32)
BASOPHILS # BLD AUTO: 0.02 10*3/MM3 (ref 0–0.2)
BASOPHILS NFR BLD AUTO: 0.2 % (ref 0–1.5)
BILIRUB SERPL-MCNC: 0.2 MG/DL (ref 0.2–1.2)
BUN BLD-MCNC: 26 MG/DL (ref 8–23)
BUN/CREAT SERPL: 4.2 (ref 7–25)
CALCIUM SPEC-SCNC: 7.9 MG/DL (ref 8.6–10.5)
CHLORIDE SERPL-SCNC: 95 MMOL/L (ref 98–107)
CHOLEST SERPL-MCNC: 185 MG/DL (ref 0–200)
CO2 SERPL-SCNC: 24.8 MMOL/L (ref 22–29)
CREAT BLD-MCNC: 6.12 MG/DL (ref 0.57–1)
DEPRECATED RDW RBC AUTO: 48.6 FL (ref 37–54)
EOSINOPHIL # BLD AUTO: 0 10*3/MM3 (ref 0–0.4)
EOSINOPHIL NFR BLD AUTO: 0 % (ref 0.3–6.2)
ERYTHROCYTE [DISTWIDTH] IN BLOOD BY AUTOMATED COUNT: 17.1 % (ref 12.3–15.4)
GFR SERPL CREATININE-BSD FRML MDRD: 8 ML/MIN/1.73
GFR SERPL CREATININE-BSD FRML MDRD: ABNORMAL ML/MIN/{1.73_M2}
GLOBULIN UR ELPH-MCNC: 3.2 GM/DL
GLUCOSE BLD-MCNC: 122 MG/DL (ref 65–99)
GLUCOSE BLDC GLUCOMTR-MCNC: 103 MG/DL (ref 70–130)
GLUCOSE BLDC GLUCOMTR-MCNC: 124 MG/DL (ref 70–130)
GLUCOSE BLDC GLUCOMTR-MCNC: 127 MG/DL (ref 70–130)
GLUCOSE BLDC GLUCOMTR-MCNC: 137 MG/DL (ref 70–130)
HBA1C MFR BLD: 5.7 % (ref 4.8–5.6)
HBV SURFACE AG SERPL QL IA: NORMAL
HCT VFR BLD AUTO: 31.4 % (ref 34–46.6)
HDLC SERPL-MCNC: 73 MG/DL (ref 40–60)
HGB BLD-MCNC: 9.8 G/DL (ref 12–15.9)
IMM GRANULOCYTES # BLD AUTO: 0.1 10*3/MM3 (ref 0–0.05)
IMM GRANULOCYTES NFR BLD AUTO: 0.9 % (ref 0–0.5)
LDLC SERPL CALC-MCNC: 99 MG/DL (ref 0–100)
LDLC/HDLC SERPL: 1.36 {RATIO}
LYMPHOCYTES # BLD AUTO: 0.24 10*3/MM3 (ref 0.7–3.1)
LYMPHOCYTES NFR BLD AUTO: 2.1 % (ref 19.6–45.3)
MCH RBC QN AUTO: 24.9 PG (ref 26.6–33)
MCHC RBC AUTO-ENTMCNC: 31.2 G/DL (ref 31.5–35.7)
MCV RBC AUTO: 79.7 FL (ref 79–97)
MONOCYTES # BLD AUTO: 1.05 10*3/MM3 (ref 0.1–0.9)
MONOCYTES NFR BLD AUTO: 9.3 % (ref 5–12)
NEUTROPHILS # BLD AUTO: 9.94 10*3/MM3 (ref 1.7–7)
NEUTROPHILS NFR BLD AUTO: 87.5 % (ref 42.7–76)
NRBC BLD AUTO-RTO: 0.1 /100 WBC (ref 0–0.2)
NT-PROBNP SERPL-MCNC: 8707 PG/ML (ref 5–900)
PLATELET # BLD AUTO: 234 10*3/MM3 (ref 140–450)
PMV BLD AUTO: 9.4 FL (ref 6–12)
POTASSIUM BLD-SCNC: 4.3 MMOL/L (ref 3.5–5.2)
PROT SERPL-MCNC: 6.7 G/DL (ref 6–8.5)
RBC # BLD AUTO: 3.94 10*6/MM3 (ref 3.77–5.28)
SODIUM BLD-SCNC: 137 MMOL/L (ref 136–145)
T4 FREE SERPL-MCNC: 1.05 NG/DL (ref 0.93–1.7)
TRIGL SERPL-MCNC: 65 MG/DL (ref 0–150)
TROPONIN T SERPL-MCNC: 0.03 NG/ML (ref 0–0.03)
TSH SERPL DL<=0.05 MIU/L-ACNC: 0.42 UIU/ML (ref 0.27–4.2)
VLDLC SERPL-MCNC: 13 MG/DL (ref 5–40)
WBC NRBC COR # BLD: 11.35 10*3/MM3 (ref 3.4–10.8)

## 2019-11-29 PROCEDURE — 94799 UNLISTED PULMONARY SVC/PX: CPT

## 2019-11-29 PROCEDURE — 85025 COMPLETE CBC W/AUTO DIFF WBC: CPT | Performed by: HOSPITALIST

## 2019-11-29 PROCEDURE — 63710000001 INSULIN GLARGINE PER 5 UNITS: Performed by: HOSPITALIST

## 2019-11-29 PROCEDURE — 5A1D70Z PERFORMANCE OF URINARY FILTRATION, INTERMITTENT, LESS THAN 6 HOURS PER DAY: ICD-10-PCS | Performed by: INTERNAL MEDICINE

## 2019-11-29 PROCEDURE — 84439 ASSAY OF FREE THYROXINE: CPT | Performed by: HOSPITALIST

## 2019-11-29 PROCEDURE — 84484 ASSAY OF TROPONIN QUANT: CPT | Performed by: HOSPITALIST

## 2019-11-29 PROCEDURE — 80061 LIPID PANEL: CPT | Performed by: HOSPITALIST

## 2019-11-29 PROCEDURE — 25010000002 METHYLPREDNISOLONE PER 40 MG: Performed by: HOSPITALIST

## 2019-11-29 PROCEDURE — 82962 GLUCOSE BLOOD TEST: CPT

## 2019-11-29 PROCEDURE — 83880 ASSAY OF NATRIURETIC PEPTIDE: CPT | Performed by: HOSPITALIST

## 2019-11-29 PROCEDURE — 84443 ASSAY THYROID STIM HORMONE: CPT | Performed by: HOSPITALIST

## 2019-11-29 PROCEDURE — 80053 COMPREHEN METABOLIC PANEL: CPT | Performed by: HOSPITALIST

## 2019-11-29 PROCEDURE — 83036 HEMOGLOBIN GLYCOSYLATED A1C: CPT | Performed by: HOSPITALIST

## 2019-11-29 PROCEDURE — 87340 HEPATITIS B SURFACE AG IA: CPT | Performed by: INTERNAL MEDICINE

## 2019-11-29 RX ORDER — ALBUMIN (HUMAN) 12.5 G/50ML
12.5 SOLUTION INTRAVENOUS AS NEEDED
Status: DISCONTINUED | OUTPATIENT
Start: 2019-11-29 | End: 2019-11-30 | Stop reason: HOSPADM

## 2019-11-29 RX ORDER — METHYLPREDNISOLONE SODIUM SUCCINATE 40 MG/ML
20 INJECTION, POWDER, LYOPHILIZED, FOR SOLUTION INTRAMUSCULAR; INTRAVENOUS EVERY 12 HOURS
Status: DISCONTINUED | OUTPATIENT
Start: 2019-11-29 | End: 2019-11-30

## 2019-11-29 RX ORDER — METOPROLOL SUCCINATE 50 MG/1
50 TABLET, EXTENDED RELEASE ORAL EVERY MORNING
Status: DISCONTINUED | OUTPATIENT
Start: 2019-11-30 | End: 2019-11-30 | Stop reason: HOSPADM

## 2019-11-29 RX ADMIN — INSULIN GLARGINE 10 UNITS: 100 INJECTION, SOLUTION SUBCUTANEOUS at 21:29

## 2019-11-29 RX ADMIN — GUAIFENESIN 600 MG: 600 TABLET, EXTENDED RELEASE ORAL at 21:28

## 2019-11-29 RX ADMIN — MONTELUKAST SODIUM 10 MG: 10 TABLET, FILM COATED ORAL at 21:28

## 2019-11-29 RX ADMIN — PANTOPRAZOLE SODIUM 40 MG: 40 TABLET, DELAYED RELEASE ORAL at 06:21

## 2019-11-29 RX ADMIN — BUDESONIDE AND FORMOTEROL FUMARATE DIHYDRATE 2 PUFF: 160; 4.5 AEROSOL RESPIRATORY (INHALATION) at 10:14

## 2019-11-29 RX ADMIN — AMLODIPINE BESYLATE 10 MG: 10 TABLET ORAL at 09:43

## 2019-11-29 RX ADMIN — BUDESONIDE AND FORMOTEROL FUMARATE DIHYDRATE 2 PUFF: 160; 4.5 AEROSOL RESPIRATORY (INHALATION) at 20:09

## 2019-11-29 RX ADMIN — SODIUM CHLORIDE, PRESERVATIVE FREE 10 ML: 5 INJECTION INTRAVENOUS at 09:42

## 2019-11-29 RX ADMIN — METHYLPREDNISOLONE SODIUM SUCCINATE 20 MG: 40 INJECTION, POWDER, FOR SOLUTION INTRAMUSCULAR; INTRAVENOUS at 21:28

## 2019-11-29 RX ADMIN — METHYLPREDNISOLONE SODIUM SUCCINATE 40 MG: 40 INJECTION, POWDER, FOR SOLUTION INTRAMUSCULAR; INTRAVENOUS at 09:42

## 2019-11-29 RX ADMIN — ISOSORBIDE DINITRATE 10 MG: 10 TABLET ORAL at 18:10

## 2019-11-29 RX ADMIN — GUAIFENESIN 600 MG: 600 TABLET, EXTENDED RELEASE ORAL at 09:43

## 2019-11-29 RX ADMIN — IPRATROPIUM BROMIDE AND ALBUTEROL SULFATE 3 ML: 2.5; .5 SOLUTION RESPIRATORY (INHALATION) at 06:30

## 2019-11-29 RX ADMIN — APIXABAN 5 MG: 5 TABLET, FILM COATED ORAL at 09:43

## 2019-11-29 RX ADMIN — DEXTROMETHORPHAN POLISTIREX 60 MG: 30 SUSPENSION ORAL at 09:50

## 2019-11-29 RX ADMIN — ISOSORBIDE DINITRATE 10 MG: 10 TABLET ORAL at 09:43

## 2019-11-29 RX ADMIN — CETIRIZINE HYDROCHLORIDE 5 MG: 10 TABLET, FILM COATED ORAL at 09:42

## 2019-11-29 RX ADMIN — ASPIRIN 81 MG: 81 TABLET, CHEWABLE ORAL at 09:43

## 2019-11-29 RX ADMIN — IPRATROPIUM BROMIDE AND ALBUTEROL SULFATE 3 ML: 2.5; .5 SOLUTION RESPIRATORY (INHALATION) at 03:39

## 2019-11-29 RX ADMIN — DEXTROMETHORPHAN POLISTIREX 60 MG: 30 SUSPENSION ORAL at 22:37

## 2019-11-29 RX ADMIN — METOPROLOL SUCCINATE 25 MG: 25 TABLET, FILM COATED, EXTENDED RELEASE ORAL at 06:21

## 2019-11-29 RX ADMIN — APIXABAN 5 MG: 5 TABLET, FILM COATED ORAL at 21:28

## 2019-11-30 VITALS
HEART RATE: 68 BPM | TEMPERATURE: 97.6 F | BODY MASS INDEX: 33.33 KG/M2 | WEIGHT: 169.75 LBS | DIASTOLIC BLOOD PRESSURE: 60 MMHG | RESPIRATION RATE: 20 BRPM | HEIGHT: 60 IN | SYSTOLIC BLOOD PRESSURE: 109 MMHG | OXYGEN SATURATION: 96 %

## 2019-11-30 LAB
GLUCOSE BLDC GLUCOMTR-MCNC: 108 MG/DL (ref 70–130)
GLUCOSE BLDC GLUCOMTR-MCNC: 160 MG/DL (ref 70–130)
GLUCOSE BLDC GLUCOMTR-MCNC: 92 MG/DL (ref 70–130)

## 2019-11-30 PROCEDURE — 63710000001 PREDNISONE PER 1 MG: Performed by: INTERNAL MEDICINE

## 2019-11-30 PROCEDURE — 25010000002 METHYLPREDNISOLONE PER 40 MG: Performed by: HOSPITALIST

## 2019-11-30 PROCEDURE — 94799 UNLISTED PULMONARY SVC/PX: CPT

## 2019-11-30 PROCEDURE — 82962 GLUCOSE BLOOD TEST: CPT

## 2019-11-30 PROCEDURE — 94618 PULMONARY STRESS TESTING: CPT

## 2019-11-30 PROCEDURE — 5A1D70Z PERFORMANCE OF URINARY FILTRATION, INTERMITTENT, LESS THAN 6 HOURS PER DAY: ICD-10-PCS | Performed by: INTERNAL MEDICINE

## 2019-11-30 PROCEDURE — 63710000001 INSULIN LISPRO (HUMAN) PER 5 UNITS: Performed by: HOSPITALIST

## 2019-11-30 RX ORDER — GUAIFENESIN 600 MG/1
600 TABLET, EXTENDED RELEASE ORAL EVERY 12 HOURS SCHEDULED
Start: 2019-11-30 | End: 2021-08-31 | Stop reason: HOSPADM

## 2019-11-30 RX ORDER — PREDNISONE 20 MG/1
20 TABLET ORAL 2 TIMES DAILY WITH MEALS
Qty: 6 TABLET | Refills: 0 | Status: SHIPPED | OUTPATIENT
Start: 2019-11-30 | End: 2019-12-03

## 2019-11-30 RX ORDER — ALBUMIN (HUMAN) 12.5 G/50ML
12.5 SOLUTION INTRAVENOUS AS NEEDED
Status: CANCELLED | OUTPATIENT
Start: 2019-11-30 | End: 2019-12-01

## 2019-11-30 RX ORDER — AMLODIPINE BESYLATE 10 MG/1
10 TABLET ORAL
Qty: 30 TABLET | Refills: 0 | Status: SHIPPED | OUTPATIENT
Start: 2019-12-01 | End: 2021-07-14 | Stop reason: HOSPADM

## 2019-11-30 RX ORDER — PREDNISONE 20 MG/1
20 TABLET ORAL 2 TIMES DAILY WITH MEALS
Status: DISCONTINUED | OUTPATIENT
Start: 2019-11-30 | End: 2019-11-30 | Stop reason: HOSPADM

## 2019-11-30 RX ORDER — DEXTROMETHORPHAN POLISTIREX 30 MG/5ML
60 SUSPENSION ORAL EVERY 12 HOURS SCHEDULED
Qty: 280 ML | Status: ON HOLD
Start: 2019-11-30 | End: 2020-12-07

## 2019-11-30 RX ORDER — ISOSORBIDE DINITRATE 10 MG/1
10 TABLET ORAL
Qty: 90 TABLET | Refills: 0 | Status: SHIPPED | OUTPATIENT
Start: 2019-11-30 | End: 2021-07-14 | Stop reason: HOSPADM

## 2019-11-30 RX ADMIN — INSULIN LISPRO 2 UNITS: 100 INJECTION, SOLUTION INTRAVENOUS; SUBCUTANEOUS at 08:07

## 2019-11-30 RX ADMIN — AMLODIPINE BESYLATE 10 MG: 10 TABLET ORAL at 08:08

## 2019-11-30 RX ADMIN — IPRATROPIUM BROMIDE AND ALBUTEROL SULFATE 3 ML: 2.5; .5 SOLUTION RESPIRATORY (INHALATION) at 00:01

## 2019-11-30 RX ADMIN — GUAIFENESIN 600 MG: 600 TABLET, EXTENDED RELEASE ORAL at 08:08

## 2019-11-30 RX ADMIN — IPRATROPIUM BROMIDE AND ALBUTEROL SULFATE 3 ML: 2.5; .5 SOLUTION RESPIRATORY (INHALATION) at 15:45

## 2019-11-30 RX ADMIN — PREDNISONE 20 MG: 20 TABLET ORAL at 18:27

## 2019-11-30 RX ADMIN — IPRATROPIUM BROMIDE AND ALBUTEROL SULFATE 3 ML: 2.5; .5 SOLUTION RESPIRATORY (INHALATION) at 03:23

## 2019-11-30 RX ADMIN — MONTELUKAST SODIUM 10 MG: 10 TABLET, FILM COATED ORAL at 19:49

## 2019-11-30 RX ADMIN — ISOSORBIDE DINITRATE 10 MG: 10 TABLET ORAL at 08:08

## 2019-11-30 RX ADMIN — GUAIFENESIN 600 MG: 600 TABLET, EXTENDED RELEASE ORAL at 19:48

## 2019-11-30 RX ADMIN — APIXABAN 5 MG: 5 TABLET, FILM COATED ORAL at 19:49

## 2019-11-30 RX ADMIN — CETIRIZINE HYDROCHLORIDE 5 MG: 10 TABLET, FILM COATED ORAL at 08:08

## 2019-11-30 RX ADMIN — METOPROLOL SUCCINATE 50 MG: 50 TABLET, FILM COATED, EXTENDED RELEASE ORAL at 06:48

## 2019-11-30 RX ADMIN — APIXABAN 5 MG: 5 TABLET, FILM COATED ORAL at 08:08

## 2019-11-30 RX ADMIN — ISOSORBIDE DINITRATE 10 MG: 10 TABLET ORAL at 12:01

## 2019-11-30 RX ADMIN — ISOSORBIDE DINITRATE 10 MG: 10 TABLET ORAL at 18:35

## 2019-11-30 RX ADMIN — BUDESONIDE AND FORMOTEROL FUMARATE DIHYDRATE 2 PUFF: 160; 4.5 AEROSOL RESPIRATORY (INHALATION) at 19:54

## 2019-11-30 RX ADMIN — ASPIRIN 81 MG: 81 TABLET, CHEWABLE ORAL at 08:08

## 2019-11-30 RX ADMIN — METHYLPREDNISOLONE SODIUM SUCCINATE 20 MG: 40 INJECTION, POWDER, FOR SOLUTION INTRAMUSCULAR; INTRAVENOUS at 08:08

## 2019-11-30 RX ADMIN — PANTOPRAZOLE SODIUM 40 MG: 40 TABLET, DELAYED RELEASE ORAL at 06:48

## 2019-11-30 RX ADMIN — DEXTROMETHORPHAN POLISTIREX 60 MG: 30 SUSPENSION ORAL at 08:10

## 2019-12-01 ENCOUNTER — READMISSION MANAGEMENT (OUTPATIENT)
Dept: CALL CENTER | Facility: HOSPITAL | Age: 74
End: 2019-12-01

## 2019-12-01 NOTE — OUTREACH NOTE
Prep Survey      Responses   Facility patient discharged from?  [facility]   Is patient eligible?  Yes   Discharge diagnosis  COPD   Does the patient have one of the following disease processes/diagnoses(primary or secondary)?  COPD/Pneumonia   Is there a DME ordered?  Yes   What DME was ordered?  [facility]   Comments regarding appointments  see AVS   Prep survey completed?  Yes          Gayatri Kaminski RN

## 2019-12-01 NOTE — DISCHARGE SUMMARY
PHYSICIAN DISCHARGE SUMMARY                                                                        [facility]    Patient Identification:  Name: Nellie Vegas  Age: 74 y.o.  Sex: female  :  1945  MRN: [id number]  Primary Care Physician: Laurent Cortes DO    Admit date: 2019  Discharge date and time:19    Discharged Condition: good    Discharge Diagnoses:  COPD exacerbation (CMS/[facility])   acute hypoxic respiratory failure  esrd on hd  Anemia of esrd  Rhinovirus  Diabetes  Obesity affecting all aspects of care  Patient Active Problem List   Diagnosis Code   • ESRD (end stage renal disease) on dialysis (CMS/[facility]ly [facility]) N18.6, Z99.2   • Thrombosis of kidney dialysis arteriovenous graft (CMS/HCC) T82.868A   • Anemia of chronic renal failure, stage 5 (CMS/HCC) N18.5, D63.1   • COPD exacerbation (CMS/HCC) J44.1       PMHX:   Past Medical History:   Diagnosis Date   • Anemia    • Anesthesia complication     mother woke up in combative state   • Arthritis     knees   • Asthma    • COPD (chronic obstructive pulmonary disease) (CMS/[facility])    • Diabetes mellitus (CMS/[facility])     type 2   • Dialysis patient (CMS/[facility]ly [facility])    • Disease of thyroid gland    • GERD (gastroesophageal reflux disease)    • Headache     after dialysis   • History of blood clots    • History of kidney stones    • Hyperlipidemia    • Hypertension    • Knee pain, bilateral    • Renal disease    • Sleep apnea     CPAP   • SOB (shortness of breath)    • Thrombosis of renal dialysis arteriovenous graft (CMS/[facility])    • Weakness      PSHX:   Past Surgical History:   Procedure Laterality Date   • ARTERIOVENOUS FISTULA Right    • ARTERIOVENOUS FISTULA Left     REMOVED   • CENTRAL VENOUS CATHETER TUNNELED INSERTION DOUBLE LUMEN     •  SECTION     • POLYPECTOMY      UTERINE   • SHUNT O GRAM Right 2019    Procedure: RIGHT ARM DIALYSIS GRAFT revision and THROMBECTOMY;   Surgeon: Thierry Hardy MD;  Location: [facility] OR 18/19;  Service: Vascular   • SHUNT O GRAM Right 8/22/2019    Procedure: RIGHT ARM DIALYSIS GRAFT THROMBECTOMY WITH STENTING;  Surgeon: Thierry Hardy MD;  Location: [facility] 18/19;  Service: Vascular       Hospital Course: Nellie Vegas  Was admitted with shortness of breath, cough and congestion; she had a copd exacerbation and tested positive for rhinovirus; she was seen by pulmonary and started on steroids and mininebs; she also has esrd and is on dialysis; nephrology was consulted and this was continued; she improved fairly quickly and was changed to po steroids and cleared for discharge; home oxygen was arranged; she will follow up with her pcp, nephrology and pulmonary    Consults:     Consults     Date and Time Order Name Status Description    11/29/2019 0030 Inpatient Pulmonology Consult Completed     11/28/2019 1241 Inpatient Pulmonology Consult Completed     11/28/2019 1240 Inpatient Nephrology Consult      11/28/2019 0318 VENTURA (on-call MD unless specified) Completed         Results from last 7 days   Lab Units 11/29/19  0533   WBC 10*3/mm3 11.35*   HEMOGLOBIN g/dL 9.8*   HEMATOCRIT % 31.4*   PLATELETS 10*3/mm3 234     Results from last 7 days   Lab Units 11/29/19  0533   SODIUM mmol/L 137   POTASSIUM mmol/L 4.3   CHLORIDE mmol/L 95*   CO2 mmol/L 24.8   BUN mg/dL 26*   CREATININE mg/dL 6.12*   GLUCOSE mg/dL 122*   CALCIUM mg/dL 7.9*     Significant Diagnostic Studies:   Labs in chart were reviewed.  Lab Results   Component Value Date    WBC 11.35 (H) 11/29/2019    HGB 9.8 (L) 11/29/2019    HCT 31.4 (L) 11/29/2019     11/29/2019     Lab Results   Component Value Date     11/29/2019    K 4.3 11/29/2019    CL 95 (L) 11/29/2019    CO2 24.8 11/29/2019    BUN 26 (H) 11/29/2019    CREATININE 6.12 (H) 11/29/2019    GLUCOSE 122 (H) 11/29/2019     Lab Results   Component Value Date    CALCIUM 7.9 (L) 11/29/2019     Lab Results  "  Component Value Date    AST 20 11/29/2019    ALT 15 11/29/2019    ALKPHOS 220 (H) 11/29/2019     Imaging Results (All)     Procedure Component Value Units Date/Time    XR Chest 2 View [[id number]] Collected:  11/28/19 0017     Updated:  11/28/19 0022    Narrative:       PA AND LATERAL CHEST RADIOGRAPH     HISTORY: Shortness of air and cough     COMPARISON: 04/05/2014     FINDINGS:  Cardiomegaly is identified. There is mild vascular congestion. The  patient has multiple vascular stents extending from the right brachial  region into the superior vena cava. No pneumothorax or pleural effusion  is seen. There is some chronic scarring identified at the left lung  base.       Impression:       Cardiomegaly and mild vascular congestion. No definite acute  infiltrates.     This report was finalized on 11/28/2019 12:19 AM by Dr. Erin Gonzales M.D.           /60 (BP Location: Left arm, Patient Position: Sitting)   Pulse 68   Temp 97.6 °F (36.4 °C) (Oral)   Resp 20   Ht 152 cm (59.84\")   Wt 77 kg (169 lb 12.1 oz)   SpO2 96%   BMI 33.33 kg/m²     Discharge Exam:  General Appearance:    Alert, cooperative, no distress, AAOx3                          Head:    Normocephalic, without obvious abnormality, atraumatic                          Eyes:    PERRL, conjunctiva/corneas clear, EOM's intact, both eyes                        Throat:   Lips, tongue, gums normal; oral mucosa pink and moist                          Neck:   Supple, symmetrical, trachea midline, no JVD                        Lungs:     wheezes bilaterally, respirations unlabored                Chest Wall:    No tenderness or deformity                        Heart:    Regular rate and rhythm, S1 and S2 normal, no murmur,no  Rub                                       or gallop                  Abdomen:     Soft, non-tender, bowel sounds active, no masses, no                                                        organomegaly                  " Extremities:   Extremities normal, atraumatic, no cyanosis or edema, pulses                                           palpable in all extremities                             Skin:   Skin is warm and dry,  no rashes or palpable lesions                  Neurologic:   CNII-XII intact, motor strength grossly intact, sensation grossly                                           intact to light touch, no focal deficits noted     Disposition:  Home    Patient Instructions:      Discharge Medications      New Medications      Instructions Start Date   amLODIPine 10 MG tablet  Commonly known as:  NORVASC   10 mg, Oral, Every 24 Hours Scheduled   Start Date:  12/1/2019     dextromethorphan polistirex ER 30 MG/5ML Suspension Extended Release oral suspension  Commonly known as:  DELSYM   60 mg, Oral, Every 12 Hours Scheduled      guaiFENesin 600 MG 12 hr tablet  Commonly known as:  MUCINEX   600 mg, Oral, Every 12 Hours Scheduled      isosorbide dinitrate 10 MG tablet  Commonly known as:  ISORDIL   10 mg, Oral, 3 Times Daily (Nitrates)      predniSONE 20 MG tablet  Commonly known as:  DELTASONE   20 mg, Oral, 2 Times Daily With Meals         Continue These Medications      Instructions Start Date   acetaminophen 325 MG tablet  Commonly known as:  TYLENOL   650 mg, Oral, Every 6 Hours PRN      albuterol (2.5 MG/3ML) 0.083% nebulizer solution  Commonly known as:  PROVENTIL  Notes to patient:  Take as directed   2.5 mg, Nebulization, 3 Times Daily PRN, UNSURE OF DOSAGE       apixaban 5 MG tablet tablet  Commonly known as:  ELIQUIS   5 mg, Oral, Every 12 Hours Scheduled      aspirin 325 MG EC tablet   325 mg, Oral, Nightly, HELD FOR SURGERY      budesonide-formoterol 160-4.5 MCG/ACT inhaler  Commonly known as:  SYMBICORT   2 puffs, Inhalation, 2 Times Daily      insulin detemir 100 UNIT/ML injection  Commonly known as:  LEVEMIR   8 Units, Subcutaneous, Nightly      metoprolol succinate XL 25 MG 24 hr tablet  Commonly known as:   TOPROL-XL   25 mg, Oral, Every Morning      montelukast 10 MG tablet  Commonly known as:  SINGULAIR   10 mg, Oral, Nightly      omeprazole 40 MG capsule  Commonly known as:  priLOSEC   40 mg, Oral, Every Morning      repaglinide 2 MG tablet  Commonly known as:  PRANDIN  Notes to patient:  Take as directed with food   2 mg, Oral, 3 Times Daily Before Meals, If not eating patient does not take       SENSIPAR 90 MG tablet  Generic drug:  cinacalcet   90 mg, Oral, Every Evening      VELPHORO 500 MG chewable tablet  Generic drug:  Sucroferric Oxyhydroxide   500 mg, Oral, 3 Times Daily      vitamin D3 125 MCG (5000 UT) capsule capsule   5,000 Units, Oral, Every Morning         Stop These Medications    HYDROcodone-acetaminophen 5-325 MG per tablet  Commonly known as:  NORCO            Future Appointments   Date Time Provider Department Center   12/9/2019  3:30 PM [facility]  3  [facility]  [facility]   5/18/2020  2:00 PM Dee Linares MD [facility] None      Contact information for follow-up providers     Laurent Cortes DO .    Specialty:  Internal Medicine  Contact information:  [address]  [address]  [facility] [address]  [phone]                   Contact information for after-discharge care     Durable Medical Equipment     [facility] .    Service:  Durable Medical Equipment  Contact information:  [address]  [facility] [address]  [phone]                           Discharge Order (From admission, onward)    Start     Ordered    11/30/19 1432  Discharge patient  Once     Expected Discharge Date:  11/30/19    Discharge Disposition:  Home or Self Care    Physician of Record for Attribution - Please select from Treatment Team:  ALLAN MARTIN [[id number]]    Review needed by CMO to determine Physician of Record:  No       Question Answer Comment   Physician of Record for Attribution - Please select from Treatment Team ALLAN MARTIN    Review needed by CMO to determine Physician of Record  No        11/30/19 1433          Total time spent discharging patient including evaluation,post hospitalization follow up,  medication and post hospitalization instructions and education total time exceeds 30 minutes.    Signed:  Nery Melendez MD  11/30/2019  9:43 PM

## 2019-12-02 ENCOUNTER — READMISSION MANAGEMENT (OUTPATIENT)
Dept: CALL CENTER | Facility: HOSPITAL | Age: 74
End: 2019-12-02

## 2019-12-02 NOTE — OUTREACH NOTE
COPD/PN Week 1 Survey      Responses   Facility patient discharged from?  [facility]   Does the patient have one of the following disease processes/diagnoses(primary or secondary)?  COPD/Pneumonia   Is there a successful TCM telephone encounter documented?  No   Was the primary reason for admission:  COPD exacerbation   Week 1 attempt successful?  Yes   Call start time  1639   Revoke  Decline to participate   Call end time  1640   Discharge diagnosis  COPD          Rancho Banegas RN

## 2019-12-02 NOTE — PROGRESS NOTES
Case Management Discharge Note      Final Note: Home         Destination      No service has been selected for the patient.      Durable Medical Equipment - Selection Complete      Service Provider Request Status Selected Services Address Phone Number Fax Number    [facility] Selected Durable Medical Equipment [address], [facility] [address] [phone]       Jyoti Potts RN 11/30/2019 8078    Called and left a msg for on-call staff to discuss O2 delivery to hospital before DC ... Spoke with [employer] and they will be able to deliver O2 tank to pt's room                       Transportation Services  Private: Car    Final Discharge Disposition Code: 01 - home or self-care

## 2019-12-09 ENCOUNTER — HOSPITAL ENCOUNTER (OUTPATIENT)
Dept: ULTRASOUND IMAGING | Facility: HOSPITAL | Age: 74
Discharge: HOME | End: 2019-12-09
Admitting: INTERNAL MEDICINE

## 2019-12-09 DIAGNOSIS — N18.6 ESRD (END STAGE RENAL DISEASE) ON DIALYSIS (HCC): ICD-10-CM

## 2019-12-09 DIAGNOSIS — Z99.2 ESRD (END STAGE RENAL DISEASE) ON DIALYSIS (HCC): ICD-10-CM

## 2019-12-09 DIAGNOSIS — E04.1 THYROID NODULE: ICD-10-CM

## 2019-12-09 PROCEDURE — 76536 US EXAM OF HEAD AND NECK: CPT

## 2020-05-18 ENCOUNTER — DOCUMENTATION (OUTPATIENT)
Dept: ENDOCRINOLOGY | Age: 75
End: 2020-05-18

## 2020-05-18 NOTE — PROGRESS NOTES
Patient was able to talk to the  staff and also to Klarissa.  This is how the encounter was created but when I was trying to reach the patient it was either no answer or my phone was going to the voice message.  Called the patient multiple times with no response back as a result we will try to reschedule the patient for some other day

## 2020-08-05 DIAGNOSIS — N18.6 ESRD (END STAGE RENAL DISEASE) ON DIALYSIS (HCC): Primary | ICD-10-CM

## 2020-08-05 DIAGNOSIS — Z99.2 ESRD (END STAGE RENAL DISEASE) ON DIALYSIS (HCC): Primary | ICD-10-CM

## 2020-08-05 RX ORDER — HYDROCODONE BITARTRATE AND ACETAMINOPHEN 5; 325 MG/1; MG/1
1 TABLET ORAL EVERY 6 HOURS PRN
Qty: 5 TABLET | Refills: 0 | Status: ON HOLD | OUTPATIENT
Start: 2020-08-05 | End: 2020-12-07

## 2020-12-07 ENCOUNTER — ANESTHESIA (OUTPATIENT)
Dept: PERIOP | Facility: HOSPITAL | Age: 75
End: 2020-12-07

## 2020-12-07 ENCOUNTER — APPOINTMENT (OUTPATIENT)
Dept: GENERAL RADIOLOGY | Facility: HOSPITAL | Age: 75
End: 2020-12-07

## 2020-12-07 ENCOUNTER — ANESTHESIA EVENT (OUTPATIENT)
Dept: PERIOP | Facility: HOSPITAL | Age: 75
End: 2020-12-07

## 2020-12-07 ENCOUNTER — HOSPITAL ENCOUNTER (OUTPATIENT)
Facility: HOSPITAL | Age: 75
Setting detail: SURGERY ADMIT
Discharge: HOME | End: 2020-12-07
Attending: SURGERY | Admitting: SURGERY

## 2020-12-07 VITALS
HEIGHT: 60 IN | RESPIRATION RATE: 16 BRPM | DIASTOLIC BLOOD PRESSURE: 51 MMHG | HEART RATE: 68 BPM | BODY MASS INDEX: 33.59 KG/M2 | WEIGHT: 171.1 LBS | SYSTOLIC BLOOD PRESSURE: 139 MMHG | TEMPERATURE: 98.4 F | OXYGEN SATURATION: 98 %

## 2020-12-07 DIAGNOSIS — Z99.2 ESRD (END STAGE RENAL DISEASE) ON DIALYSIS (HCC): Primary | ICD-10-CM

## 2020-12-07 DIAGNOSIS — I82.90 THROMBUS: ICD-10-CM

## 2020-12-07 DIAGNOSIS — N18.6 ESRD (END STAGE RENAL DISEASE) ON DIALYSIS (HCC): Primary | ICD-10-CM

## 2020-12-07 PROBLEM — T82.898A PROBLEM WITH DIALYSIS ACCESS (HCC): Status: ACTIVE | Noted: 2020-12-07

## 2020-12-07 LAB
ANION GAP SERPL CALCULATED.3IONS-SCNC: 16 MMOL/L (ref 5–15)
B PARAPERT DNA SPEC QL NAA+PROBE: NOT DETECTED
B PERT DNA SPEC QL NAA+PROBE: NOT DETECTED
BUN SERPL-MCNC: 56 MG/DL (ref 8–23)
BUN/CREAT SERPL: 5.9 (ref 7–25)
C PNEUM DNA NPH QL NAA+NON-PROBE: NOT DETECTED
CALCIUM SPEC-SCNC: 8.3 MG/DL (ref 8.6–10.5)
CHLORIDE SERPL-SCNC: 106 MMOL/L (ref 98–107)
CO2 SERPL-SCNC: 23 MMOL/L (ref 22–29)
CREAT SERPL-MCNC: 9.54 MG/DL (ref 0.57–1)
DEPRECATED RDW RBC AUTO: 48.8 FL (ref 37–54)
ERYTHROCYTE [DISTWIDTH] IN BLOOD BY AUTOMATED COUNT: 16.5 % (ref 12.3–15.4)
FLUAV SUBTYP SPEC NAA+PROBE: NOT DETECTED
FLUBV RNA ISLT QL NAA+PROBE: NOT DETECTED
GFR SERPL CREATININE-BSD FRML MDRD: 5 ML/MIN/1.73
GFR SERPL CREATININE-BSD FRML MDRD: ABNORMAL ML/MIN/{1.73_M2}
GLUCOSE BLDC GLUCOMTR-MCNC: 88 MG/DL (ref 70–130)
GLUCOSE SERPL-MCNC: 95 MG/DL (ref 65–99)
HADV DNA SPEC NAA+PROBE: NOT DETECTED
HCOV 229E RNA SPEC QL NAA+PROBE: NOT DETECTED
HCOV HKU1 RNA SPEC QL NAA+PROBE: NOT DETECTED
HCOV NL63 RNA SPEC QL NAA+PROBE: NOT DETECTED
HCOV OC43 RNA SPEC QL NAA+PROBE: NOT DETECTED
HCT VFR BLD AUTO: 32.6 % (ref 34–46.6)
HGB BLD-MCNC: 10.1 G/DL (ref 12–15.9)
HMPV RNA NPH QL NAA+NON-PROBE: NOT DETECTED
HPIV1 RNA SPEC QL NAA+PROBE: NOT DETECTED
HPIV2 RNA SPEC QL NAA+PROBE: NOT DETECTED
HPIV3 RNA NPH QL NAA+PROBE: NOT DETECTED
HPIV4 P GENE NPH QL NAA+PROBE: NOT DETECTED
M PNEUMO IGG SER IA-ACNC: NOT DETECTED
MCH RBC QN AUTO: 25.6 PG (ref 26.6–33)
MCHC RBC AUTO-ENTMCNC: 31 G/DL (ref 31.5–35.7)
MCV RBC AUTO: 82.7 FL (ref 79–97)
PLATELET # BLD AUTO: 200 10*3/MM3 (ref 140–450)
PMV BLD AUTO: 10.3 FL (ref 6–12)
POTASSIUM SERPL-SCNC: 4.4 MMOL/L (ref 3.5–5.2)
QT INTERVAL: 434 MS
RBC # BLD AUTO: 3.94 10*6/MM3 (ref 3.77–5.28)
RHINOVIRUS RNA SPEC NAA+PROBE: NOT DETECTED
RSV RNA NPH QL NAA+NON-PROBE: NOT DETECTED
SARS-COV-2 RNA NPH QL NAA+NON-PROBE: NOT DETECTED
SODIUM SERPL-SCNC: 145 MMOL/L (ref 136–145)
WBC # BLD AUTO: 4.41 10*3/MM3 (ref 3.4–10.8)

## 2020-12-07 PROCEDURE — 25010000002 PHENYLEPHRINE PER 1 ML: Performed by: NURSE ANESTHETIST, CERTIFIED REGISTERED

## 2020-12-07 PROCEDURE — 25010000002 FENTANYL CITRATE (PF) 100 MCG/2ML SOLUTION: Performed by: NURSE ANESTHETIST, CERTIFIED REGISTERED

## 2020-12-07 PROCEDURE — 0202U NFCT DS 22 TRGT SARS-COV-2: CPT | Performed by: SURGERY

## 2020-12-07 PROCEDURE — 0 IOPAMIDOL PER 1 ML: Performed by: SURGERY

## 2020-12-07 PROCEDURE — 25010000002 PROPOFOL 10 MG/ML EMULSION: Performed by: NURSE ANESTHETIST, CERTIFIED REGISTERED

## 2020-12-07 PROCEDURE — 88304 TISSUE EXAM BY PATHOLOGIST: CPT | Performed by: SURGERY

## 2020-12-07 PROCEDURE — 25010000002 HYDROCORTISONE SODIUM SUCCINATE 100 MG RECONSTITUTED SOLUTION: Performed by: NURSE ANESTHETIST, CERTIFIED REGISTERED

## 2020-12-07 PROCEDURE — 85027 COMPLETE CBC AUTOMATED: CPT | Performed by: SURGERY

## 2020-12-07 PROCEDURE — 25010000002 DIPHENHYDRAMINE PER 50 MG: Performed by: NURSE ANESTHETIST, CERTIFIED REGISTERED

## 2020-12-07 PROCEDURE — C1894 INTRO/SHEATH, NON-LASER: HCPCS | Performed by: SURGERY

## 2020-12-07 PROCEDURE — 25010000002 HEPARIN (PORCINE) PER 1000 UNITS: Performed by: NURSE ANESTHETIST, CERTIFIED REGISTERED

## 2020-12-07 PROCEDURE — 93005 ELECTROCARDIOGRAM TRACING: CPT | Performed by: SURGERY

## 2020-12-07 PROCEDURE — 82962 GLUCOSE BLOOD TEST: CPT

## 2020-12-07 PROCEDURE — 25010000003 CEFAZOLIN IN DEXTROSE 2-4 GM/100ML-% SOLUTION: Performed by: SURGERY

## 2020-12-07 PROCEDURE — C1769 GUIDE WIRE: HCPCS | Performed by: SURGERY

## 2020-12-07 PROCEDURE — 71045 X-RAY EXAM CHEST 1 VIEW: CPT

## 2020-12-07 PROCEDURE — 93010 ELECTROCARDIOGRAM REPORT: CPT | Performed by: INTERNAL MEDICINE

## 2020-12-07 PROCEDURE — C1757 CATH, THROMBECTOMY/EMBOLECT: HCPCS | Performed by: SURGERY

## 2020-12-07 PROCEDURE — 80048 BASIC METABOLIC PNL TOTAL CA: CPT | Performed by: SURGERY

## 2020-12-07 PROCEDURE — 25010000002 HEPARIN (PORCINE) PER 1000 UNITS: Performed by: SURGERY

## 2020-12-07 RX ORDER — SODIUM CHLORIDE 0.9 % (FLUSH) 0.9 %
3 SYRINGE (ML) INJECTION EVERY 12 HOURS SCHEDULED
Status: DISCONTINUED | OUTPATIENT
Start: 2020-12-07 | End: 2020-12-07 | Stop reason: HOSPADM

## 2020-12-07 RX ORDER — SODIUM CHLORIDE 9 MG/ML
INJECTION, SOLUTION INTRAVENOUS CONTINUOUS PRN
Status: DISCONTINUED | OUTPATIENT
Start: 2020-12-07 | End: 2020-12-07 | Stop reason: SURG

## 2020-12-07 RX ORDER — FENTANYL CITRATE 50 UG/ML
50 INJECTION, SOLUTION INTRAMUSCULAR; INTRAVENOUS
Status: DISCONTINUED | OUTPATIENT
Start: 2020-12-07 | End: 2020-12-07 | Stop reason: HOSPADM

## 2020-12-07 RX ORDER — MIDODRINE HYDROCHLORIDE 10 MG/1
10 TABLET ORAL AS NEEDED
COMMUNITY
End: 2021-07-14 | Stop reason: HOSPADM

## 2020-12-07 RX ORDER — HYDROCODONE BITARTRATE AND ACETAMINOPHEN 5; 325 MG/1; MG/1
1 TABLET ORAL EVERY 8 HOURS PRN
Qty: 5 TABLET | Refills: 0 | Status: ON HOLD | OUTPATIENT
Start: 2020-12-07 | End: 2021-07-15

## 2020-12-07 RX ORDER — NALOXONE HCL 0.4 MG/ML
0.2 VIAL (ML) INJECTION AS NEEDED
Status: DISCONTINUED | OUTPATIENT
Start: 2020-12-07 | End: 2020-12-07 | Stop reason: HOSPADM

## 2020-12-07 RX ORDER — CEFAZOLIN SODIUM 2 G/100ML
2 INJECTION, SOLUTION INTRAVENOUS ONCE
Status: COMPLETED | OUTPATIENT
Start: 2020-12-07 | End: 2020-12-07

## 2020-12-07 RX ORDER — MIDAZOLAM HYDROCHLORIDE 1 MG/ML
1 INJECTION INTRAMUSCULAR; INTRAVENOUS
Status: DISCONTINUED | OUTPATIENT
Start: 2020-12-07 | End: 2020-12-07 | Stop reason: HOSPADM

## 2020-12-07 RX ORDER — EPHEDRINE SULFATE 50 MG/ML
5 INJECTION, SOLUTION INTRAVENOUS ONCE AS NEEDED
Status: DISCONTINUED | OUTPATIENT
Start: 2020-12-07 | End: 2020-12-07 | Stop reason: HOSPADM

## 2020-12-07 RX ORDER — LIDOCAINE HYDROCHLORIDE 10 MG/ML
0.5 INJECTION, SOLUTION EPIDURAL; INFILTRATION; INTRACAUDAL; PERINEURAL ONCE AS NEEDED
Status: DISCONTINUED | OUTPATIENT
Start: 2020-12-07 | End: 2020-12-07 | Stop reason: HOSPADM

## 2020-12-07 RX ORDER — SODIUM CHLORIDE 0.9 % (FLUSH) 0.9 %
3-10 SYRINGE (ML) INJECTION AS NEEDED
Status: DISCONTINUED | OUTPATIENT
Start: 2020-12-07 | End: 2020-12-07 | Stop reason: HOSPADM

## 2020-12-07 RX ORDER — DIPHENHYDRAMINE HYDROCHLORIDE 50 MG/ML
INJECTION INTRAMUSCULAR; INTRAVENOUS AS NEEDED
Status: DISCONTINUED | OUTPATIENT
Start: 2020-12-07 | End: 2020-12-07

## 2020-12-07 RX ORDER — HYDROCODONE BITARTRATE AND ACETAMINOPHEN 5; 325 MG/1; MG/1
1 TABLET ORAL ONCE
Status: COMPLETED | OUTPATIENT
Start: 2020-12-07 | End: 2020-12-07

## 2020-12-07 RX ORDER — PROPOFOL 10 MG/ML
VIAL (ML) INTRAVENOUS AS NEEDED
Status: DISCONTINUED | OUTPATIENT
Start: 2020-12-07 | End: 2020-12-07 | Stop reason: SURG

## 2020-12-07 RX ORDER — SODIUM CHLORIDE, SODIUM LACTATE, POTASSIUM CHLORIDE, CALCIUM CHLORIDE 600; 310; 30; 20 MG/100ML; MG/100ML; MG/100ML; MG/100ML
9 INJECTION, SOLUTION INTRAVENOUS CONTINUOUS
Status: DISCONTINUED | OUTPATIENT
Start: 2020-12-07 | End: 2020-12-07 | Stop reason: HOSPADM

## 2020-12-07 RX ORDER — ONDANSETRON 2 MG/ML
4 INJECTION INTRAMUSCULAR; INTRAVENOUS ONCE AS NEEDED
Status: DISCONTINUED | OUTPATIENT
Start: 2020-12-07 | End: 2020-12-07 | Stop reason: HOSPADM

## 2020-12-07 RX ORDER — FLUMAZENIL 0.1 MG/ML
0.2 INJECTION INTRAVENOUS AS NEEDED
Status: DISCONTINUED | OUTPATIENT
Start: 2020-12-07 | End: 2020-12-07 | Stop reason: HOSPADM

## 2020-12-07 RX ORDER — FAMOTIDINE 10 MG/ML
20 INJECTION, SOLUTION INTRAVENOUS ONCE
Status: COMPLETED | OUTPATIENT
Start: 2020-12-07 | End: 2020-12-07

## 2020-12-07 RX ORDER — FENTANYL CITRATE 50 UG/ML
INJECTION, SOLUTION INTRAMUSCULAR; INTRAVENOUS AS NEEDED
Status: DISCONTINUED | OUTPATIENT
Start: 2020-12-07 | End: 2020-12-07 | Stop reason: SURG

## 2020-12-07 RX ORDER — HEPARIN SODIUM 1000 [USP'U]/ML
INJECTION, SOLUTION INTRAVENOUS; SUBCUTANEOUS AS NEEDED
Status: DISCONTINUED | OUTPATIENT
Start: 2020-12-07 | End: 2020-12-07 | Stop reason: SURG

## 2020-12-07 RX ORDER — LIDOCAINE HYDROCHLORIDE 20 MG/ML
INJECTION, SOLUTION INFILTRATION; PERINEURAL AS NEEDED
Status: DISCONTINUED | OUTPATIENT
Start: 2020-12-07 | End: 2020-12-07 | Stop reason: SURG

## 2020-12-07 RX ORDER — ALBUTEROL SULFATE 90 UG/1
AEROSOL, METERED RESPIRATORY (INHALATION)
COMMUNITY
Start: 2020-10-07

## 2020-12-07 RX ADMIN — PHENYLEPHRINE HYDROCHLORIDE 200 MCG: 10 INJECTION INTRAVENOUS at 13:13

## 2020-12-07 RX ADMIN — DIPHENHYDRAMINE HYDROCHLORIDE 25 MG: 50 INJECTION, SOLUTION INTRAMUSCULAR; INTRAVENOUS at 13:20

## 2020-12-07 RX ADMIN — HEPARIN SODIUM 5000 UNITS: 1000 INJECTION, SOLUTION INTRAVENOUS; SUBCUTANEOUS at 13:22

## 2020-12-07 RX ADMIN — CEFAZOLIN SODIUM 2 G: 2 INJECTION, SOLUTION INTRAVENOUS at 13:07

## 2020-12-07 RX ADMIN — IOPAMIDOL 32 ML: 510 INJECTION, SOLUTION INTRAVASCULAR at 13:51

## 2020-12-07 RX ADMIN — LIDOCAINE HYDROCHLORIDE 100 MG: 20 INJECTION, SOLUTION INFILTRATION; PERINEURAL at 12:59

## 2020-12-07 RX ADMIN — FENTANYL CITRATE 50 MCG: 50 INJECTION INTRAMUSCULAR; INTRAVENOUS at 13:36

## 2020-12-07 RX ADMIN — HYDROCORTISONE SODIUM SUCCINATE 100 MG: 100 INJECTION, POWDER, FOR SOLUTION INTRAMUSCULAR; INTRAVENOUS at 13:24

## 2020-12-07 RX ADMIN — HYDROCODONE BITARTRATE AND ACETAMINOPHEN 1 TABLET: 5; 325 TABLET ORAL at 14:49

## 2020-12-07 RX ADMIN — PROPOFOL 150 MG: 10 INJECTION, EMULSION INTRAVENOUS at 12:59

## 2020-12-07 RX ADMIN — SODIUM CHLORIDE: 9 INJECTION, SOLUTION INTRAVENOUS at 12:57

## 2020-12-07 RX ADMIN — FENTANYL CITRATE 25 MCG: 50 INJECTION INTRAMUSCULAR; INTRAVENOUS at 13:08

## 2020-12-07 RX ADMIN — FAMOTIDINE 20 MG: 10 INJECTION INTRAVENOUS at 12:22

## 2020-12-07 NOTE — OP NOTE
Date of Admission:  12/7/2020  Today's Date:  12/07/20  Thierry Hardy MD  [facility]    Preoperative Diagnosis:   Non/poorly functioning dialysis access.    Postoperative Diagnosis:   Same    Procedure Performed:   Right arm fistulogram.  Right arm graft thrombectomy.    CPT:  01266 Revision w thrombectomy  42672 AVF-o-gram    Surgeon:   Thieryr Hardy MD    Assistant:    Britney Juárez, Provided critical assistance in exposure, retraction, and suction that overall decrease blood loss and operative time.    Anesthesia:   General    Estimated Blood Loss:   100-250 cc    Findings:    Diagnostic fistulogram revealed a severe chronic thrombus related stenosis at the central portion of the accessed graft.    Utilizing a graft thrombectomy catheter this was removed in an open manner.  Completion graft a gram showed no evidence of hemodynamically significant stenosis.  There was a small flap of chronic tissue that was trivial and not restricting flow.    Implants:    Nothing was implanted during the procedure    Staff:   Circulator: Lilliana Hale RN; Klarissa Henderson RN  Radiology Technologist: Kath Bourne, DEANNA  Scrub Person: Marley Paulino  Orientee: Fe Barajas  Vascular Radiology Technician: Marivel Godwin    Specimen:   none    Complications:   none    Dispo:   to PACU    Indication for procedure:   75 y.o. female with complicated right arm dialysis access graft.  She has extensive central stenting.  Dialysis center called today stating it was clotted.  On my evaluation preop he had a thrill although quite diminished and very pulsatile.  I suspected an outflow stenosis.  I discussed this with her.  Informed consent was obtained.    Description of procedure:   Patient was taken to the operating room. Anesthesia was induced without difficulty. Surgical site was prepped and draped in the usual sterile manner. A full surgical timeout was performed. Ultrasound-guided vascular access was  performed of the graft with a microneedle. Microwire was placed without difficulty. Placement was confirmed with fluoroscopy ultimately upsizing to a microsheath. Fistulogram was performed with the above findings. I elected to make a small transverse incision overlying the graft, circumferentially controlled the graft. We gave heparin and then ultimately vascular clamps were applied to the proximal and distal graft and a graftotomy was made. Pulsatile normal arterial inflow bleeding was confirmed. The area of thrombus was recently angioplastied in the office with good angiographic resolution there but promptly recurred placing her dialysis access at risk of losing it. I went straight to a #5 graft thrombectomy/corkscrew catheter and took a total of 3 or 4 passes with removal of significant chronic-appearing thrombus. This was sent off for specimen. On completion fistulogram there was no evidence of any flow-restricting stenosis. The graftotomy was closed with a to-and-fro 5-0 Prolene with appropriate flushing maneuvers prior to restoration of flow. The graft had a wonderful thrill to palpation and bruit to Doppler. Protamine was not given as we had good hemostasis. Deep tissues closed with 3-0, skin closed with 4-0 Vicryl and Dermabond applied. Overall patient tolerated the procedure well.     Thierry Hardy MD  12/07/20     Active Hospital Problems    Diagnosis  POA   • **Problem with dialysis access (CMS/[facility]) [T82.898A]  Yes   • ESRD (end stage renal disease) on dialysis (CMS/[facility]) [N18.6, Z99.2]  Not Applicable      Resolved Hospital Problems   No resolved problems to display.

## 2020-12-07 NOTE — ANESTHESIA POSTPROCEDURE EVALUATION
Patient: Nellie Vegas    Procedure Summary     Date: 12/07/20 Room / Location: [facility] OR 18 [facility] HYBRID OR 18/19    Anesthesia Start: 1257 Anesthesia Stop: 1407    Procedure: RIGHT ARM ARTERIOVENOUS SHUNTOGRAM WITH THROMBECTOMY (Right ) Diagnosis:     Surgeon: Thierry Hardy MD Provider: Steven Mckeon MD    Anesthesia Type: general ASA Status: 3          Anesthesia Type: general    Vitals  Vitals Value Taken Time   /78 12/07/20 1500   Temp     Pulse 79 12/07/20 1459   Resp 16 12/07/20 1445   SpO2 100 % 12/07/20 1501   Vitals shown include unvalidated device data.        Post Anesthesia Care and Evaluation    Patient location during evaluation: bedside  Pain management: adequate  Airway patency: patent  Anesthetic complications: No anesthetic complications    Cardiovascular status: acceptable  Respiratory status: acceptable  Hydration status: acceptable

## 2020-12-07 NOTE — ANESTHESIA PREPROCEDURE EVALUATION
Anesthesia Evaluation     NPO Solid Status: > 8 hours             Airway   Mallampati: I  TM distance: >3 FB  Neck ROM: full  Dental    (+) edentulous    Pulmonary - normal exam   (+) COPD mild, sleep apnea on CPAP,   Cardiovascular - normal exam    (+) hypertension, hyperlipidemia,   (-) past MI      Neuro/Psych  GI/Hepatic/Renal/Endo    (+)   renal disease ESRD and dialysis, diabetes mellitus,     Musculoskeletal     Abdominal    Substance History      OB/GYN          Other                        Anesthesia Plan    ASA 3     general   (Patient has had previous shuntograms with thrombectomy under both general anesthesia and MAC without complication.)    Anesthetic plan, all risks, benefits, and alternatives have been provided, discussed and informed consent has been obtained with: patient.

## 2020-12-07 NOTE — DISCHARGE INSTRUCTIONS
[facility]  Omer Rod, Latonia Muller Rachel, Scherrer, Thomas  [address]  [place] [address]  [phone]        Discharge Instructions for Fistulogram/ Shuntogram      1. Go home, rest and take it easy today.       2. You may experience some dizziness or memory loss from the anesthesia.  This may last for the next 24 hours.  Someone should plan on staying with you for the first 24 hours for your safety.    3. Do not make any important legal decisions or sign any legal papers for the next 24 hours.      4. Eat and drink lightly today.  Start off with liquids, jello, soup, crackers or other bland foods at first. You may advance your diet tomorrow as tolerated as long as you do not experience any nausea or vomiting.    5. No driving for the remainder of the day.  You may resume limited driving tomorrow, if necessary.     6. You may resume routine medications including blood thinners. However, Glucophage should not be started until 72 hours after the dye is given.      7. No lifting over 10 pounds and no strenuous activity for the next 2-3 days with the involved arm.        8. Limit going up and down steps for 2 days.    9. Leave the dressing on until tomorrow morning.  You may then take this off, as well as the small see through dressing with gauze, tomorrow.  If this does not come off easily, do not pull this off.  If it is stuck, shower and let the warm water loosen it before removal.     10. Check your groin/ arm dressing regularly for bleeding through the dressing (under the pressure dressing).  A small amount of blood contained by the gauze is normal: the whole dressing should not be filled with blood, or leaking out under the sides.  If the bandage is saturated, you may remove it and replace it with a new dressing.     11.  If you experience bleeding through the gauze/ clear dressing, lay flat and have someone apply direct pressure for 15 minutes.  If bleeding has  stopped after this, you may put a clean gauze and tape over the area.  Continue to lie flat for 1-2 hours.  If bleeding continues after 15 minutes of pressure, call us at [phone]. There is an MD available after hours for urgent matters.           12.  If you experience heavy bleeding continue to hold firm pressure and call 911.    Do not call the MD on call.    13.  If your doctor gave you a prescription for a narcotic pill (Tylenol #3, Vicodin, Hydrocodone, Oxycodone or Percocet), you may not drive a vehicle or operate machinery while taking this type of medication.     13. Severe pain is not expected after this procedure.  If you experience severe pain, please call our office at [phone].         14.  Remember to contact our office for any of the following:    • Fever > 101 degrees  • Severe pain   • Severe nausea or vomiting   • Significant bleeding of your incisions  • Drainage that has a bad smell or is yellow or green in appearance  • Any other questions or concerns

## 2020-12-07 NOTE — H&P
Name: Nellie Vegas ADMIT: 2020   : 1945  PCP: Laurent Cortes DO    MRN: [id number] LOS: 0 days   AGE/SEX: 75 y.o. female  ROOM: [facility]      Patient Care Team:  Laurent Cortes DO as PCP - General (Internal Medicine)  Kvng Guerra MD as Consulting Physician (Pulmonary Disease)  No chief complaint on file.    CC: Hemodialysis access issues evaluation    Subjective     Consults  Patient has a right arm looped axillary shunt with major issues on dialysis access cannulization.  Her last HD was on Thursday.  They reported that they danay clotted blood from it when she reported dialysis today.  She has moderate right arm swelling associated with her graft.    Review of Systems   All other systems reviewed and are negative.      Past Medical History:   Diagnosis Date   • Anemia    • Anesthesia complication     mother woke up in combative state   • Arthritis     knees   • Asthma    • COPD (chronic obstructive pulmonary disease) (CMS/[facility])    • Diabetes mellitus (CMS/[facility])     type 2   • Dialysis patient (CMS/[facility])    • Disease of thyroid gland    • GERD (gastroesophageal reflux disease)    • Headache     after dialysis   • History of blood clots     BUE   • History of kidney stones    • Hyperlipidemia    • Hypertension    • Knee pain, bilateral    • Renal disease    • Sleep apnea     CPAP   • SOB (shortness of breath)    • Thrombosis of renal dialysis arteriovenous graft (CMS/[facility])    • Weakness      Past Surgical History:   Procedure Laterality Date   • ARTERIOVENOUS FISTULA Right    • ARTERIOVENOUS FISTULA Left     REMOVED   • CENTRAL VENOUS CATHETER TUNNELED INSERTION DOUBLE LUMEN     •  SECTION     • POLYPECTOMY      UTERINE   • SHUNT O GRAM Right 2019    Procedure: RIGHT ARM DIALYSIS GRAFT revision and THROMBECTOMY;  Surgeon: Thierry Hardy MD;  Location: [facility] OR ;  Service: Vascular   • SHUNT O GRAM Right 2019    Procedure: RIGHT ARM  DIALYSIS GRAFT THROMBECTOMY WITH STENTING;  Surgeon: Thierry Hardy MD;  [facility] ;  Service: Vascular     Family History   Problem Relation Age of Onset   • Malig Hyperthermia Neg Hx        Social History     Tobacco Use   • Smoking status: Former Smoker     Packs/day: 0.00     Years: 4.00     Pack years: 0.00     Types: Cigarettes     Quit date: 1966     Years since quittin.9   • Smokeless tobacco: Never Used   Substance Use Topics   • Alcohol use: No     Frequency: Never   • Drug use: No     Medications Prior to Admission   Medication Sig Dispense Refill Last Dose   • amLODIPine (NORVASC) 10 MG tablet Take 1 tablet by mouth Daily. 30 tablet 0 2020 at 0700   • budesonide-formoterol (SYMBICORT) 160-4.5 MCG/ACT inhaler Inhale 2 puffs 2 (Two) Times a Day.   Past Month at Unknown time   • guaiFENesin (MUCINEX) 600 MG 12 hr tablet Take 1 tablet by mouth Every 12 (Twelve) Hours.   2020 at Unknown time   • isosorbide dinitrate (ISORDIL) 10 MG tablet Take 1 tablet by mouth 3 (Three) Times a Day. 90 tablet 0 2020 at 1800   • midodrine (PROAMATINE) 10 MG tablet Take 10 mg by mouth As Needed. As needed for hypotension post hemodialysis   2020   • acetaminophen (TYLENOL) 325 MG tablet Take 650 mg by mouth Every 6 (Six) Hours As Needed for Mild Pain .   More than a month at Unknown time   • albuterol (PROVENTIL) (2.5 MG/3ML) 0.083% nebulizer solution Take 2.5 mg by nebulization 3 (Three) Times a Day As Needed for Wheezing. UNSURE OF DOSAGE   More than a month at Unknown time   • albuterol sulfate  (90 Base) MCG/ACT inhaler INHALE 2 PUFFS INTO THE LUNGS EVERY 4 HOURS AS NEEDED FOR WHEEZING   More than a month at Unknown time   • apixaban (ELIQUIS) 5 MG tablet tablet Take 5 mg by mouth Every 12 (Twelve) Hours.   12/3/2020   • Cholecalciferol (VITAMIN D3) 5000 units capsule capsule Take 5,000 Units by mouth Every Morning.   12/3/2020   • insulin detemir (LEVEMIR) 100  UNIT/ML injection Inject 8 Units under the skin into the appropriate area as directed Every Night.   12/3/2020   • metoprolol succinate XL (TOPROL-XL) 25 MG 24 hr tablet Take 25 mg by mouth Every Morning.   12/5/2020   • montelukast (SINGULAIR) 10 MG tablet Take 10 mg by mouth Every Night.   12/5/2020   • omeprazole (priLOSEC) 40 MG capsule Take 40 mg by mouth Every Morning.   12/5/2020     ceFAZolin, 2 g, Intravenous, Once  sodium chloride, 3 mL, Intravenous, Q12H      lactated ringers, 9 mL/hr      fentanyl  •  lidocaine PF 1%  •  midazolam  •  sodium chloride  Sulfa antibiotics and Latex    Objective     Physical Exam:  Physical Exam  Vitals signs and nursing note reviewed.   Constitutional:       Appearance: Normal appearance.   HENT:      Head: Normocephalic and atraumatic.      Right Ear: External ear normal.      Left Ear: External ear normal.      Nose: Nose normal.      Mouth/Throat:      Mouth: Mucous membranes are moist.   Eyes:      Extraocular Movements: Extraocular movements intact.      Pupils: Pupils are equal, round, and reactive to light.   Neck:      Musculoskeletal: Normal range of motion and neck supple.   Cardiovascular:      Rate and Rhythm: Normal rate and regular rhythm.      Pulses: Normal pulses.      Heart sounds: Normal heart sounds.   Pulmonary:      Effort: Pulmonary effort is normal.      Breath sounds: Normal breath sounds.   Abdominal:      General: Abdomen is flat.      Palpations: Abdomen is soft.   Musculoskeletal: Normal range of motion.   Skin:     General: Skin is warm and dry.   Neurological:      General: No focal deficit present.      Mental Status: She is alert and oriented to person, place, and time.   Psychiatric:         Mood and Affect: Mood normal.         Behavior: Behavior normal.     Right arm loop graft has pulse present.  Bruit present but diminished.    Vital Signs and Labs:  Vital Signs   Patient Vitals for the past 24 hrs:   BP Temp Temp src Pulse Resp SpO2  "Height Weight   12/07/20 1113 163/70 98.4 °F (36.9 °C) Oral 65 20 100 % 152.4 cm (60\") 77.6 kg (171 lb 1.6 oz)     I/O:  No intake/output data recorded.    CBC    Results from last 7 days   Lab Units 12/07/20  1150   WBC 10*3/mm3 4.41   HEMOGLOBIN g/dL 10.1*   PLATELETS 10*3/mm3 200     BMP   Results from last 7 days   Lab Units 12/07/20  1150   SODIUM mmol/L 145   POTASSIUM mmol/L 4.4   CHLORIDE mmol/L 106   CO2 mmol/L 23.0   BUN mg/dL 56*   CREATININE mg/dL 9.54*   GLUCOSE mg/dL 95     Cr Clearance Estimated Creatinine Clearance: 4.8 mL/min (A) (by C-G formula based on SCr of 9.54 mg/dL (H)).  Coag     HbA1C   Lab Results   Component Value Date    HGBA1C 5.70 (H) 11/29/2019    HGBA1C 5.5 10/11/2019    HGBA1C 6.8 (H) 07/12/2019     Blood Glucose   Glucose   Date/Time Value Ref Range Status   12/07/2020 1042 88 70 - 130 mg/dL Final     Infection     CMP   Results from last 7 days   Lab Units 12/07/20  1150   SODIUM mmol/L 145   POTASSIUM mmol/L 4.4   CHLORIDE mmol/L 106   CO2 mmol/L 23.0   BUN mg/dL 56*   CREATININE mg/dL 9.54*   GLUCOSE mg/dL 95     ABG        Active Hospital Problems    Diagnosis  POA   • **Problem with dialysis access (CMS/[facility]) [T82.898A]  Yes   • ESRD (end stage renal disease) on dialysis (CMS/[facility]) [N18.6, Z99.2]  Not Applicable      Resolved Hospital Problems   No resolved problems to display.     Problem Points:  4:  Patient has a new problem, with additional work-up planned  Total problem points:4 or more    Data Points:  1:  I have reviewed or order clinical lab test  1:  I have reviewed or order radiology test (except heart catheterization or echo)  2:  I have personally and independently review of image, tracing, or specimen  Total data points:4 or more    Risk Points:  High:  Chronic illness with severe exacerbation or progression    MDM requires 2/3 (Problem points, Data points and Risk)  MDM Prob point Data point Risk   SF 1 1 Minimal   Low 2 2 Low   Mod 3 3 Moderate   High 4 4 High " "    Code requires 3/3 (MDM, History and Exam)  Code MDM History Exam Time   46793 SF/Low Detailed Detailed 30   57422 Mod Comprehensive Comprehensive 50   18506 High Comprehensive Comprehensive 70     Detailed history:  4 elements HPI or status of 3 chronic problems; 2-9 system ROS  Comprehensive:  4 elements HPI or status of 3 chronic problems;  10 system ROS    Detailed Exam:  12 findings from any organ system  Comprehensive Exam:  2 findings from each of 9 systems.   16288    Assessment/Plan       Problem with dialysis access (CMS/[facility])    ESRD (end stage renal disease) on dialysis (CMS/[facility])      75 y.o. female nonfunctioning right arm hemodialysis access graft.  She has a complicated axillary artery to axillary vein loop graft with severe and significant stenting of her venous outflow.  She is undergone open thrombectomy a couple months ago and then a couple weeks ago underwent percutaneous intervention.  She did have difficulties accessing the fistula and have \"\" clotted blood on dialysis.  To me her right arm graft still has a pulse to it in a very weakened bruit.  My suspicions are she has a severe stenosis.  As such we will proceed with diagnostic graft a gram with possible open thrombectomy as clinically indicated.  Risk, benefits, and alternatives all discussed with patient.  Informed consent was obtained.    Personal protective equipment used for this patient encounter:  Patient wearing surgical mask []    Provider wearing a surgical mask [x]    Gloves [x]    Eye protection [x]    Face Shield []    Gown []    N 95 respirator or CAPR/PAPR []   Duration of interaction 15    I discussed the patients findings and my recommendations with patient and nursing staff.    Thierry Hardy MD  12/07/20  12:59 EST    Please call my office with any question: [phone]            "

## 2020-12-08 LAB
CYTO UR: NORMAL
LAB AP CASE REPORT: NORMAL
PATH REPORT.FINAL DX SPEC: NORMAL
PATH REPORT.GROSS SPEC: NORMAL

## 2021-07-05 ENCOUNTER — HOSPITAL ENCOUNTER (INPATIENT)
Facility: HOSPITAL | Age: 76
LOS: 9 days | Discharge: HOME | End: 2021-07-14
Attending: EMERGENCY MEDICINE | Admitting: SURGERY

## 2021-07-05 ENCOUNTER — APPOINTMENT (OUTPATIENT)
Dept: CT IMAGING | Facility: HOSPITAL | Age: 76
End: 2021-07-05

## 2021-07-05 DIAGNOSIS — T82.898D PROBLEM WITH DIALYSIS ACCESS, SUBSEQUENT ENCOUNTER: ICD-10-CM

## 2021-07-05 DIAGNOSIS — N18.6 ESRD (END STAGE RENAL DISEASE) ON DIALYSIS (HCC): ICD-10-CM

## 2021-07-05 DIAGNOSIS — Z99.2 ESRD (END STAGE RENAL DISEASE) ON DIALYSIS (HCC): ICD-10-CM

## 2021-07-05 DIAGNOSIS — D62 ACUTE BLOOD LOSS ANEMIA: ICD-10-CM

## 2021-07-05 DIAGNOSIS — K92.2 LOWER GI BLEEDING: Primary | ICD-10-CM

## 2021-07-05 LAB
ABO GROUP BLD: NORMAL
ALBUMIN SERPL-MCNC: 3.6 G/DL (ref 3.5–5.2)
ALBUMIN/GLOB SERPL: 1.4 G/DL
ALP SERPL-CCNC: 138 U/L (ref 39–117)
ALT SERPL W P-5'-P-CCNC: 10 U/L (ref 1–33)
ANION GAP SERPL CALCULATED.3IONS-SCNC: 20 MMOL/L (ref 5–15)
APTT PPP: 34.9 SECONDS (ref 22.7–35.4)
AST SERPL-CCNC: 13 U/L (ref 1–32)
BASOPHILS # BLD AUTO: 0.04 10*3/MM3 (ref 0–0.2)
BASOPHILS NFR BLD AUTO: 0.6 % (ref 0–1.5)
BILIRUB SERPL-MCNC: 0.2 MG/DL (ref 0–1.2)
BLD GP AB SCN SERPL QL: NEGATIVE
BUN SERPL-MCNC: 36 MG/DL (ref 8–23)
BUN/CREAT SERPL: 4.6 (ref 7–25)
CALCIUM SPEC-SCNC: 6.9 MG/DL (ref 8.6–10.5)
CHLORIDE SERPL-SCNC: 101 MMOL/L (ref 98–107)
CO2 SERPL-SCNC: 20 MMOL/L (ref 22–29)
CREAT SERPL-MCNC: 7.86 MG/DL (ref 0.57–1)
D-LACTATE SERPL-SCNC: 1.4 MMOL/L (ref 0.5–2)
D-LACTATE SERPL-SCNC: 2.1 MMOL/L (ref 0.5–2)
DEPRECATED RDW RBC AUTO: 47.7 FL (ref 37–54)
EOSINOPHIL # BLD AUTO: 0.08 10*3/MM3 (ref 0–0.4)
EOSINOPHIL NFR BLD AUTO: 1.2 % (ref 0.3–6.2)
ERYTHROCYTE [DISTWIDTH] IN BLOOD BY AUTOMATED COUNT: 15.7 % (ref 12.3–15.4)
GFR SERPL CREATININE-BSD FRML MDRD: 6 ML/MIN/1.73
GFR SERPL CREATININE-BSD FRML MDRD: ABNORMAL ML/MIN/{1.73_M2}
GLOBULIN UR ELPH-MCNC: 2.5 GM/DL
GLUCOSE BLDC GLUCOMTR-MCNC: 132 MG/DL (ref 70–130)
GLUCOSE SERPL-MCNC: 131 MG/DL (ref 65–99)
HCT VFR BLD AUTO: 23.5 % (ref 34–46.6)
HGB BLD-MCNC: 7.3 G/DL (ref 12–15.9)
IMM GRANULOCYTES # BLD AUTO: 0.04 10*3/MM3 (ref 0–0.05)
IMM GRANULOCYTES NFR BLD AUTO: 0.6 % (ref 0–0.5)
INR PPP: 1.49 (ref 0.9–1.1)
LYMPHOCYTES # BLD AUTO: 0.59 10*3/MM3 (ref 0.7–3.1)
LYMPHOCYTES NFR BLD AUTO: 8.7 % (ref 19.6–45.3)
MCH RBC QN AUTO: 26.4 PG (ref 26.6–33)
MCHC RBC AUTO-ENTMCNC: 31.1 G/DL (ref 31.5–35.7)
MCV RBC AUTO: 84.8 FL (ref 79–97)
MONOCYTES # BLD AUTO: 0.7 10*3/MM3 (ref 0.1–0.9)
MONOCYTES NFR BLD AUTO: 10.3 % (ref 5–12)
NEUTROPHILS NFR BLD AUTO: 5.32 10*3/MM3 (ref 1.7–7)
NEUTROPHILS NFR BLD AUTO: 78.6 % (ref 42.7–76)
NRBC BLD AUTO-RTO: 0 /100 WBC (ref 0–0.2)
PLATELET # BLD AUTO: 295 10*3/MM3 (ref 140–450)
PMV BLD AUTO: 11 FL (ref 6–12)
POTASSIUM SERPL-SCNC: 3.6 MMOL/L (ref 3.5–5.2)
PROT SERPL-MCNC: 6.1 G/DL (ref 6–8.5)
PROTHROMBIN TIME: 17.8 SECONDS (ref 11.7–14.2)
RBC # BLD AUTO: 2.77 10*6/MM3 (ref 3.77–5.28)
RH BLD: POSITIVE
SARS-COV-2 ORF1AB RESP QL NAA+PROBE: NOT DETECTED
SODIUM SERPL-SCNC: 141 MMOL/L (ref 136–145)
T&S EXPIRATION DATE: NORMAL
WBC # BLD AUTO: 6.77 10*3/MM3 (ref 3.4–10.8)

## 2021-07-05 PROCEDURE — 99284 EMERGENCY DEPT VISIT MOD MDM: CPT

## 2021-07-05 PROCEDURE — 85730 THROMBOPLASTIN TIME PARTIAL: CPT | Performed by: EMERGENCY MEDICINE

## 2021-07-05 PROCEDURE — 86923 COMPATIBILITY TEST ELECTRIC: CPT

## 2021-07-05 PROCEDURE — 85025 COMPLETE CBC W/AUTO DIFF WBC: CPT | Performed by: EMERGENCY MEDICINE

## 2021-07-05 PROCEDURE — 86850 RBC ANTIBODY SCREEN: CPT | Performed by: HOSPITALIST

## 2021-07-05 PROCEDURE — 82962 GLUCOSE BLOOD TEST: CPT

## 2021-07-05 PROCEDURE — 86900 BLOOD TYPING SEROLOGIC ABO: CPT | Performed by: HOSPITALIST

## 2021-07-05 PROCEDURE — 80053 COMPREHEN METABOLIC PANEL: CPT | Performed by: EMERGENCY MEDICINE

## 2021-07-05 PROCEDURE — 86900 BLOOD TYPING SEROLOGIC ABO: CPT

## 2021-07-05 PROCEDURE — 74177 CT ABD & PELVIS W/CONTRAST: CPT

## 2021-07-05 PROCEDURE — 86901 BLOOD TYPING SEROLOGIC RH(D): CPT

## 2021-07-05 PROCEDURE — 85610 PROTHROMBIN TIME: CPT | Performed by: EMERGENCY MEDICINE

## 2021-07-05 PROCEDURE — U0004 COV-19 TEST NON-CDC HGH THRU: HCPCS | Performed by: EMERGENCY MEDICINE

## 2021-07-05 PROCEDURE — 25010000002 IOPAMIDOL 61 % SOLUTION: Performed by: EMERGENCY MEDICINE

## 2021-07-05 PROCEDURE — 83605 ASSAY OF LACTIC ACID: CPT | Performed by: EMERGENCY MEDICINE

## 2021-07-05 PROCEDURE — 86901 BLOOD TYPING SEROLOGIC RH(D): CPT | Performed by: HOSPITALIST

## 2021-07-05 RX ORDER — PANTOPRAZOLE SODIUM 40 MG/10ML
40 INJECTION, POWDER, LYOPHILIZED, FOR SOLUTION INTRAVENOUS
Status: DISCONTINUED | OUTPATIENT
Start: 2021-07-06 | End: 2021-07-06

## 2021-07-05 RX ORDER — SODIUM CHLORIDE 0.9 % (FLUSH) 0.9 %
10 SYRINGE (ML) INJECTION AS NEEDED
Status: DISCONTINUED | OUTPATIENT
Start: 2021-07-05 | End: 2021-07-14 | Stop reason: HOSPADM

## 2021-07-05 RX ADMIN — IOPAMIDOL 85 ML: 612 INJECTION, SOLUTION INTRAVENOUS at 17:31

## 2021-07-05 NOTE — ED PROVIDER NOTES
" EMERGENCY DEPARTMENT ENCOUNTER    Room Number:  34/34  Date of encounter:  7/5/2021  PCP: Laurent Cortes DO  Historian: Patient      HPI:  Chief Complaint: Bloody diarrhea  A complete HPI/ROS/PMH/PSH/SH/FH are unobtainable due to: N/A    Context: Nellie Vegas is a 76 y.o. female who presents to the ED c/o bloody diarrhea which started on Saturday.  She has had 4 episodes today.  She has mild generalized abdominal pain that does not radiate.  There are no aggravating or alleviating factors.  She has never had these symptoms before.  No nausea or vomiting.  No fevers.  She has felt chilled at times.  She feels a little weaker than normal but denies chest pain or shortness of breath.    She is a dialysis patient and was last dialyzed on Saturday.  She is due for dialysis tomorrow.    Onset-sudden  Timing-persistent  Character-\"pain\"  Severity-mild  Location-lower abdomen  Radiation-none  Aggravating/alleviating factors-none  Prior episodes-none      The patient was placed in a mask in triage, hand hygiene was performed before and after my interaction with the patient.  I wore a mask, safety glasses and gloves during my entire interaction with the patient.    PAST MEDICAL HISTORY  Active Ambulatory Problems     Diagnosis Date Noted   • ESRD (end stage renal disease) on dialysis (CMS/[facility]) 08/09/2019   • Thrombosis of kidney dialysis arteriovenous graft (CMS/[facility]) 08/09/2019   • Anemia of chronic renal failure, stage 5 (CMS/[facility]) 08/10/2019   • COPD exacerbation (CMS/[facility]) 11/28/2019   • Problem with dialysis access (CMS/[facility]) 12/07/2020     Resolved Ambulatory Problems     Diagnosis Date Noted   • No Resolved Ambulatory Problems     Past Medical History:   Diagnosis Date   • Anemia    • Anesthesia complication    • Arthritis    • Asthma    • COPD (chronic obstructive pulmonary disease) (CMS/[facility])    • Diabetes mellitus (CMS/[facility])    • Dialysis patient (CMS/[facility])    • Disease of thyroid gland    • GERD (gastroesophageal " reflux disease)    • Headache    • History of blood clots    • History of kidney stones    • Hyperlipidemia    • Hypertension    • Knee pain, bilateral    • Renal disease    • Sleep apnea    • SOB (shortness of breath)    • 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)    • Weakness          PAST SURGICAL HISTORY  Past Surgical History:   Procedure Laterality Date   • ARTERIOVENOUS FISTULA Right    • ARTERIOVENOUS FISTULA Left     REMOVED   • CENTRAL VENOUS CATHETER TUNNELED INSERTION DOUBLE LUMEN     •  SECTION     • POLYPECTOMY      UTERINE   • SHUNT O GRAM Right 2019    Procedure: RIGHT ARM DIALYSIS GRAFT revision and THROMBECTOMY;  Surgeon: Thierry Hardy MD;  Location: [facility] ;  Service: Vascular   • SHUNT O GRAM Right 2019    Procedure: RIGHT ARM DIALYSIS GRAFT THROMBECTOMY WITH STENTING;  Surgeon: Thierry Hardy MD;  Location: [facility] ;  Service: Vascular   • SHUNT O GRAM Right 2020    Procedure: RIGHT ARM ARTERIOVENOUS SHUNTOGRAM WITH THROMBECTOMY;  Surgeon: Thierry Hardy MD;  Location: [facility] ;  Service: Vascular;  Laterality: Right;         FAMILY HISTORY  Family History   Problem Relation Age of Onset   • Malig Hyperthermia Neg Hx          SOCIAL HISTORY  Social History     Socioeconomic History   • Marital status:      Spouse name: Not on file   • Number of children: Not on file   • Years of education: Not on file   • Highest education level: Not on file   Tobacco Use   • Smoking status: Former Smoker     Packs/day: 0.00     Years: 4.00     Pack years: 0.00     Types: Cigarettes     Quit date: 1966     Years since quittin.5   • Smokeless tobacco: Never Used   Vaping Use   • Vaping Use: Never used   Substance and Sexual Activity   • Alcohol use: No   • Drug use: No   • Sexual activity: Defer         ALLERGIES  Sulfa antibiotics and Latex        REVIEW OF SYSTEMS  Review of Systems   Constitutional:  Positive for fatigue. Negative for chills and fever.   Respiratory: Negative for chest tightness and shortness of breath.    Cardiovascular: Negative for chest pain.   Gastrointestinal: Positive for abdominal pain and blood in stool. Negative for nausea, rectal pain and vomiting.   Neurological: Negative for light-headedness and headaches.        All systems reviewed and negative except for those discussed in HPI.       PHYSICAL EXAM    I have reviewed the triage vital signs and nursing notes.    ED Triage Vitals   Temp Heart Rate Resp BP SpO2   07/05/21 1537 07/05/21 1537 07/05/21 1537 07/05/21 1546 07/05/21 1537   97.6 °F (36.4 °C) 95 16 152/62 100 %      Temp src Heart Rate Source Patient Position BP Location FiO2 (%)   07/05/21 1537 07/05/21 1537 07/05/21 1546 07/05/21 1546 --   Tympanic Monitor Lying Right arm        Physical Exam   Constitutional: Pt. is oriented to person, place, and time and well-developed, well-nourished, and in no distress.   HENT: Normocephalic and atraumatic. Oropharynx moist/nonerythematous.  Neck: Normal range of motion. Neck supple. No JVD present.   Cardiovascular: Normal rate, regular rhythm and normal heart sounds. Exam reveals no gallop and no friction rub.   No murmur heard.  Pulmonary/Chest: Effort normal and breath sounds normal. No stridor. No respiratory distress. No wheezes, no rales.   Abdominal: Soft. Bowel sounds are normal. No distension. There is no tenderness. There is no rebound and no guarding.   Musculoskeletal: Normal range of motion. No edema, tenderness or deformity.   Neurological: Pt. is alert and oriented to person, place, and time.  She has no focal neurologic deficits  Skin: Skin is warm and dry. No rash noted. Pt. is not diaphoretic. No erythema.   Psychiatric: Mood, affect and judgment normal.  She is pleasant and cooperative.  Nursing note and vitals reviewed.        LAB RESULTS  Recent Results (from the past 24 hour(s))   Comprehensive Metabolic Panel     Collection Time: 07/05/21  4:26 PM    Specimen: Blood   Result Value Ref Range    Glucose 131 (H) 65 - 99 mg/dL    BUN 36 (H) 8 - 23 mg/dL    Creatinine 7.86 (H) 0.57 - 1.00 mg/dL    Sodium 141 136 - 145 mmol/L    Potassium 3.6 3.5 - 5.2 mmol/L    Chloride 101 98 - 107 mmol/L    CO2 20.0 (L) 22.0 - 29.0 mmol/L    Calcium 6.9 (L) 8.6 - 10.5 mg/dL    Total Protein 6.1 6.0 - 8.5 g/dL    Albumin 3.60 3.50 - 5.20 g/dL    ALT (SGPT) 10 1 - 33 U/L    AST (SGOT) 13 1 - 32 U/L    Alkaline Phosphatase 138 (H) 39 - 117 U/L    Total Bilirubin 0.2 0.0 - 1.2 mg/dL    eGFR Non  Amer      eGFR  African Amer 6 (L) >60 mL/min/1.73    Globulin 2.5 gm/dL    A/G Ratio 1.4 g/dL    BUN/Creatinine Ratio 4.6 (L) 7.0 - 25.0    Anion Gap 20.0 (H) 5.0 - 15.0 mmol/L   Protime-INR    Collection Time: 07/05/21  4:26 PM    Specimen: Blood   Result Value Ref Range    Protime 17.8 (H) 11.7 - 14.2 Seconds    INR 1.49 (H) 0.90 - 1.10   aPTT    Collection Time: 07/05/21  4:26 PM    Specimen: Blood   Result Value Ref Range    PTT 34.9 22.7 - 35.4 seconds   Lactic Acid, Plasma    Collection Time: 07/05/21  4:26 PM    Specimen: Blood   Result Value Ref Range    Lactate 2.1 (C) 0.5 - 2.0 mmol/L   CBC Auto Differential    Collection Time: 07/05/21  4:26 PM    Specimen: Blood   Result Value Ref Range    WBC 6.77 3.40 - 10.80 10*3/mm3    RBC 2.77 (L) 3.77 - 5.28 10*6/mm3    Hemoglobin 7.3 (L) 12.0 - 15.9 g/dL    Hematocrit 23.5 (L) 34.0 - 46.6 %    MCV 84.8 79.0 - 97.0 fL    MCH 26.4 (L) 26.6 - 33.0 pg    MCHC 31.1 (L) 31.5 - 35.7 g/dL    RDW 15.7 (H) 12.3 - 15.4 %    RDW-SD 47.7 37.0 - 54.0 fl    MPV 11.0 6.0 - 12.0 fL    Platelets 295 140 - 450 10*3/mm3    Neutrophil % 78.6 (H) 42.7 - 76.0 %    Lymphocyte % 8.7 (L) 19.6 - 45.3 %    Monocyte % 10.3 5.0 - 12.0 %    Eosinophil % 1.2 0.3 - 6.2 %    Basophil % 0.6 0.0 - 1.5 %    Immature Grans % 0.6 (H) 0.0 - 0.5 %    Neutrophils, Absolute 5.32 1.70 - 7.00 10*3/mm3    Lymphocytes, Absolute 0.59 (L) 0.70 -  3.10 10*3/mm3    Monocytes, Absolute 0.70 0.10 - 0.90 10*3/mm3    Eosinophils, Absolute 0.08 0.00 - 0.40 10*3/mm3    Basophils, Absolute 0.04 0.00 - 0.20 10*3/mm3    Immature Grans, Absolute 0.04 0.00 - 0.05 10*3/mm3    nRBC 0.0 0.0 - 0.2 /100 WBC       Ordered the above labs and independently reviewed the results.        RADIOLOGY  CT Abdomen Pelvis With Contrast    Result Date: 7/5/2021  CT ABDOMEN PELVIS W CONTRAST-  Radiation dose reduction techniques were utilized, including automated exposure control and exposure modulation based on body size.  CLINICAL: Bloody diarrhea  COMPARISON:  CT of the abdomen and pelvis 08/10/2019  FINDINGS: At the base of the cecum there is a lobulated density which does not have the appearance of fecal material measuring 3.7 cm transverse, 2.4 cm craniocaudal and 3.9 cm AP. Possible large polyp. The appendix is normal. The ileocecal valve is typical in appearance. There is extensive diverticulosis of the colon without acute diverticulitis. No abnormality to suggest colitis.  Small sliding-type hiatal hernia. The stomach is collapsed, contour within normal limits. The duodenum is unremarkable. Caliber of the small bowel is satisfactory, no small bowel abnormality is seen. Sizable duodenal diverticulum without atypical features.  The gallbladder is satisfactory in appearance. Tiny hepatic cysts left lobe of the liver is suggested on image 28, the liver is otherwise within normal limits. No biliary duct dilatation.  There is pancreatic ductal dilatation involving the tail to the mid body and at the transition point no discrete mass is demonstrated. Etiology of the obstruction is indeterminant. Although not seen well on the previous examination, some ductal dilatation with truncation at the same location suggested on the 2019 examination. Endoscopic ultrasound may be the best means for further evaluation. PET/CT may also be helpful. Biopsy may be indicated. Tumor not excluded.  Spleen  is normal in size and shape and both adrenal glands have a satisfactory appearance. Several tiny subcentimeter size nonobstructing bilateral renal calculi seen. Large cyst arising from the upper pole of the right kidney, measuring 5.4 cm in diameter. A few tiny left sided renal calculi. Both kidneys are small in size for the patient's given body habitus. Atherosclerotic plaque formation at the origin of the celiac axis, SMA and bilateral renal arteries. Diameter of the aorta is within normal limits. The bladder is collapsed. The uterus is lobulated, enlarged and there are several calcified fibroids within the myometrium. Both ovaries are satisfactory in size, there is calcification associated with the left ovary.  CONCLUSION: 1. Lobulated indeterminant density at the base of the cecum, endoscopy or barium enema recommended as follow-up. 2. Hiatal hernia. 3. Pancreatic ductal dilatation to the mid body where there is abrupt truncation and at this location no discrete mass identified. This may have been present on the previous CT examination from 2019, however, not well seen. Further evaluation with endoscopic ultrasound and/or PET/CT. Tumor not excluded. 4. Bilateral nonobstructing renal calculi and renal cysts. 5. Diverticulosis of the colon. 6. A sizable duodenal diverticulum.  Findings of this report called to Dr. Novak in the emergency room at the time of completion, 6:15 PM.     This report was finalized on 7/5/2021 6:30 PM by Dr. Nahum Mcintyre M.D.        I ordered the above noted radiological studies. Reviewed by me and discussed with radiologist.  See dictation for official radiology interpretation.      PROCEDURES    Procedures      MEDICATIONS GIVEN IN ER    Medications   sodium chloride 0.9 % flush 10 mL (has no administration in time range)   iopamidol (ISOVUE-300) 61 % injection 100 mL (85 mL Intravenous Given by Other 7/5/21 9674)         PROGRESS, DATA ANALYSIS, CONSULTS, AND MEDICAL DECISION  MAKING    Any/all labs have been independently reviewed by me.  Any/all radiology studies have been reviewed by me and discussed with radiologist dictating the report.   EKG's independently viewed and interpreted by me.  Discussion below represents my analysis of pertinent findings related to patient's condition, differential diagnosis, treatment plan and final disposition.      ED Course as of Jul 05 1842   Mon Jul 05, 2021   1641 3 months ago, hemoglobin was 11.6.  This is the most recent hemoglobin available for her.   Hemoglobin(!): 7.3 [WC]   1641 Hematocrit(!): 23.5 [WC]   1830 CT of the abdomen and pelvis was separately viewed by me and discussed with Dr. Lr (radiology)-for official interpretation, see dictated report.    [WC]   1838 Case discussed with Dr. Chowdhury (internal medicine)-he agrees to move the patient to telemetry bed.    [WC]      ED Course User Index  [WC] Gagan Novak MD       AS OF 18:42 EDT VITALS:    BP - 118/51  HR - 80  TEMP - 97.6 °F (36.4 °C) (Tympanic)  02 SATS - 99%        DIAGNOSIS  Final diagnoses:   Lower GI bleeding   Acute blood loss anemia   ESRD (end stage renal disease) on dialysis (CMS/[facility])         DISPOSITION  Admitted-telemetry           Gagan Novak MD  07/05/21 1842

## 2021-07-05 NOTE — ED TRIAGE NOTES
Has had rectal bleed x 2 days.  Is on eliquis.  Is a dialysis pt    Patient was placed in face mask during first look triage.  Patient was wearing a face mask throughout encounter.  I wore personal protective equipment throughout the encounter.  Hand hygiene was performed before and after patient encounter.

## 2021-07-05 NOTE — ED NOTES
Nursing report ED to floor  Nellie Vegas  76 y.o.  female    HPI (triage note):   Chief Complaint   Patient presents with   • Rectal Bleeding       Admitting doctor:   Haja Chowdhury MD    Admitting diagnosis:   The primary encounter diagnosis was Lower GI bleeding. Diagnoses of Acute blood loss anemia and ESRD (end stage renal disease) on dialysis (CMS/[facility]) were also pertinent to this visit.    Code status:   Current Code Status     Date Active Code Status Order ID Comments User Context       Prior    Advance Care Planning Activity          Allergies:   Sulfa antibiotics and Latex    Weight:       07/05/21  1546   Weight: 74.5 kg (164 lb 3.9 oz)       Most recent vitals:   Vitals:    07/05/21 1830 07/05/21 1847 07/05/21 1900 07/05/21 1914   BP: 141/65  144/78    BP Location:       Patient Position:       Pulse: 77 77 74 86   Resp:       Temp:       TempSrc:       SpO2: 97% 94%     Weight:       Height:           Active LDAs/IV Access:   Lines, Drains & Airways    Active LDAs     Name:   Placement date:   Placement time:   Site:   Days:    Peripheral IV 07/05/21 1559 Left Antecubital   07/05/21    1559    Antecubital   less than 1                Labs (abnormal labs have a star):   Labs Reviewed   COMPREHENSIVE METABOLIC PANEL - Abnormal; Notable for the following components:       Result Value    Glucose 131 (*)     BUN 36 (*)     Creatinine 7.86 (*)     CO2 20.0 (*)     Calcium 6.9 (*)     Alkaline Phosphatase 138 (*)     eGFR   Amer 6 (*)     BUN/Creatinine Ratio 4.6 (*)     Anion Gap 20.0 (*)     All other components within normal limits    Narrative:     GFR Normal >60  Chronic Kidney Disease <60  Kidney Failure <15     PROTIME-INR - Abnormal; Notable for the following components:    Protime 17.8 (*)     INR 1.49 (*)     All other components within normal limits   LACTIC ACID, PLASMA - Abnormal; Notable for the following components:    Lactate 2.1 (*)     All other components within normal limits   CBC  WITH AUTO DIFFERENTIAL - Abnormal; Notable for the following components:    RBC 2.77 (*)     Hemoglobin 7.3 (*)     Hematocrit 23.5 (*)     MCH 26.4 (*)     MCHC 31.1 (*)     RDW 15.7 (*)     Neutrophil % 78.6 (*)     Lymphocyte % 8.7 (*)     Immature Grans % 0.6 (*)     Lymphocytes, Absolute 0.59 (*)     All other components within normal limits   APTT - Normal   COVID PRE-OP / PRE-PROCEDURE SCREENING ORDER (NO ISOLATION)    Narrative:     The following orders were created for panel order COVID PRE-OP / PRE-PROCEDURE SCREENING ORDER (NO ISOLATION) - Swab, Nasopharynx.  Procedure                               Abnormality         Status                     ---------                               -----------         ------                     COVID-19,APTIMA PANTHER,...[[id number]]                      In process                   Please view results for these tests on the individual orders.   COVID-19,APTIMA PANTHER,[facility],NP/OP SWAB IN UTM/VTM/[facility] TRANSPORT MEDIA,24 HR TAT   LACTIC ACID, REFLEX   CBC AND DIFFERENTIAL    Narrative:     The following orders were created for panel order CBC & Differential.  Procedure                               Abnormality         Status                     ---------                               -----------         ------                     CBC Auto Differential[[id number]]        Abnormal            Final result                 Please view results for these tests on the individual orders.       EKG:   No orders to display       Meds given in ED:   Medications   sodium chloride 0.9 % flush 10 mL (has no administration in time range)   iopamidol (ISOVUE-300) 61 % injection 100 mL (85 mL Intravenous Given by Other 7/5/21 5351)       Imaging results:  No radiology results for the last day    Ambulatory status:   - assist    Social issues:   Social History     Socioeconomic History   • Marital status:      Spouse name: Not on file   • Number of children: Not on file   •  Years of education: Not on file   • Highest education level: Not on file   Tobacco Use   • Smoking status: Former Smoker     Packs/day: 0.00     Years: 4.00     Pack years: 0.00     Types: Cigarettes     Quit date:      Years since quittin.5   • Smokeless tobacco: Never Used   Vaping Use   • Vaping Use: Never used   Substance and Sexual Activity   • Alcohol use: No   • Drug use: No   • Sexual activity: Defer    Nursing report ED to floor       Radha Banegas RN  [phone]

## 2021-07-06 LAB
ANION GAP SERPL CALCULATED.3IONS-SCNC: 19.8 MMOL/L (ref 5–15)
BASOPHILS # BLD AUTO: 0.02 10*3/MM3 (ref 0–0.2)
BASOPHILS NFR BLD AUTO: 0.4 % (ref 0–1.5)
BH BB BLOOD EXPIRATION DATE: NORMAL
BH BB BLOOD TYPE BARCODE: 7300
BH BB DISPENSE STATUS: NORMAL
BH BB PRODUCT CODE: NORMAL
BH BB UNIT NUMBER: NORMAL
BUN SERPL-MCNC: 42 MG/DL (ref 8–23)
BUN/CREAT SERPL: 5.2 (ref 7–25)
CALCIUM SPEC-SCNC: 6.9 MG/DL (ref 8.6–10.5)
CHLORIDE SERPL-SCNC: 100 MMOL/L (ref 98–107)
CO2 SERPL-SCNC: 18.2 MMOL/L (ref 22–29)
CREAT SERPL-MCNC: 8.09 MG/DL (ref 0.57–1)
CROSSMATCH INTERPRETATION: NORMAL
DEPRECATED RDW RBC AUTO: 49.3 FL (ref 37–54)
EOSINOPHIL # BLD AUTO: 0.1 10*3/MM3 (ref 0–0.4)
EOSINOPHIL NFR BLD AUTO: 1.9 % (ref 0.3–6.2)
ERYTHROCYTE [DISTWIDTH] IN BLOOD BY AUTOMATED COUNT: 16.2 % (ref 12.3–15.4)
GFR SERPL CREATININE-BSD FRML MDRD: 6 ML/MIN/1.73
GFR SERPL CREATININE-BSD FRML MDRD: ABNORMAL ML/MIN/{1.73_M2}
GLUCOSE BLDC GLUCOMTR-MCNC: 101 MG/DL (ref 70–130)
GLUCOSE BLDC GLUCOMTR-MCNC: 35 MG/DL (ref 70–130)
GLUCOSE BLDC GLUCOMTR-MCNC: 82 MG/DL (ref 70–130)
GLUCOSE SERPL-MCNC: 92 MG/DL (ref 65–99)
HCT VFR BLD AUTO: 25.3 % (ref 34–46.6)
HGB BLD-MCNC: 7.6 G/DL (ref 12–15.9)
IMM GRANULOCYTES # BLD AUTO: 0.03 10*3/MM3 (ref 0–0.05)
IMM GRANULOCYTES NFR BLD AUTO: 0.6 % (ref 0–0.5)
LYMPHOCYTES # BLD AUTO: 0.46 10*3/MM3 (ref 0.7–3.1)
LYMPHOCYTES NFR BLD AUTO: 8.5 % (ref 19.6–45.3)
MCH RBC QN AUTO: 25.4 PG (ref 26.6–33)
MCHC RBC AUTO-ENTMCNC: 30 G/DL (ref 31.5–35.7)
MCV RBC AUTO: 84.6 FL (ref 79–97)
MONOCYTES # BLD AUTO: 0.77 10*3/MM3 (ref 0.1–0.9)
MONOCYTES NFR BLD AUTO: 14.3 % (ref 5–12)
NEUTROPHILS NFR BLD AUTO: 4.01 10*3/MM3 (ref 1.7–7)
NEUTROPHILS NFR BLD AUTO: 74.3 % (ref 42.7–76)
NRBC BLD AUTO-RTO: 0 /100 WBC (ref 0–0.2)
PLATELET # BLD AUTO: 234 10*3/MM3 (ref 140–450)
PMV BLD AUTO: 10.4 FL (ref 6–12)
POTASSIUM SERPL-SCNC: 4 MMOL/L (ref 3.5–5.2)
RBC # BLD AUTO: 2.99 10*6/MM3 (ref 3.77–5.28)
SODIUM SERPL-SCNC: 138 MMOL/L (ref 136–145)
UNIT  ABO: NORMAL
UNIT  RH: NORMAL
WBC # BLD AUTO: 5.39 10*3/MM3 (ref 3.4–10.8)

## 2021-07-06 PROCEDURE — 82962 GLUCOSE BLOOD TEST: CPT

## 2021-07-06 PROCEDURE — 80048 BASIC METABOLIC PNL TOTAL CA: CPT | Performed by: HOSPITALIST

## 2021-07-06 PROCEDURE — 86900 BLOOD TYPING SEROLOGIC ABO: CPT

## 2021-07-06 PROCEDURE — 99222 1ST HOSP IP/OBS MODERATE 55: CPT | Performed by: INTERNAL MEDICINE

## 2021-07-06 PROCEDURE — P9016 RBC LEUKOCYTES REDUCED: HCPCS

## 2021-07-06 PROCEDURE — 85025 COMPLETE CBC W/AUTO DIFF WBC: CPT | Performed by: HOSPITALIST

## 2021-07-06 RX ORDER — METOPROLOL SUCCINATE 25 MG/1
25 TABLET, EXTENDED RELEASE ORAL EVERY MORNING
Status: DISCONTINUED | OUTPATIENT
Start: 2021-07-07 | End: 2021-07-09

## 2021-07-06 RX ORDER — INSULIN GLARGINE 100 [IU]/ML
10 INJECTION, SOLUTION SUBCUTANEOUS NIGHTLY
Status: DISCONTINUED | OUTPATIENT
Start: 2021-07-06 | End: 2021-07-06

## 2021-07-06 RX ORDER — PANTOPRAZOLE SODIUM 40 MG/10ML
40 INJECTION, POWDER, LYOPHILIZED, FOR SOLUTION INTRAVENOUS 2 TIMES DAILY
Status: DISCONTINUED | OUTPATIENT
Start: 2021-07-06 | End: 2021-07-08

## 2021-07-06 RX ORDER — MIDODRINE HYDROCHLORIDE 5 MG/1
10 TABLET ORAL
Status: DISCONTINUED | OUTPATIENT
Start: 2021-07-06 | End: 2021-07-06

## 2021-07-06 RX ORDER — ISOSORBIDE DINITRATE 10 MG/1
10 TABLET ORAL
Status: DISCONTINUED | OUTPATIENT
Start: 2021-07-06 | End: 2021-07-06

## 2021-07-06 RX ORDER — MONTELUKAST SODIUM 10 MG/1
10 TABLET ORAL NIGHTLY
Status: DISCONTINUED | OUTPATIENT
Start: 2021-07-06 | End: 2021-07-14 | Stop reason: HOSPADM

## 2021-07-06 RX ORDER — AMLODIPINE BESYLATE 10 MG/1
10 TABLET ORAL
Status: DISCONTINUED | OUTPATIENT
Start: 2021-07-06 | End: 2021-07-07

## 2021-07-06 RX ORDER — GUAIFENESIN 600 MG/1
600 TABLET, EXTENDED RELEASE ORAL EVERY 12 HOURS SCHEDULED
Status: DISCONTINUED | OUTPATIENT
Start: 2021-07-06 | End: 2021-07-07

## 2021-07-06 RX ORDER — NICOTINE POLACRILEX 4 MG
15 LOZENGE BUCCAL
Status: DISCONTINUED | OUTPATIENT
Start: 2021-07-06 | End: 2021-07-14

## 2021-07-06 RX ORDER — DEXTROSE MONOHYDRATE 25 G/50ML
INJECTION, SOLUTION INTRAVENOUS
Status: COMPLETED
Start: 2021-07-06 | End: 2021-07-07

## 2021-07-06 RX ORDER — HYDROCODONE BITARTRATE AND ACETAMINOPHEN 5; 325 MG/1; MG/1
1 TABLET ORAL EVERY 8 HOURS PRN
Status: DISCONTINUED | OUTPATIENT
Start: 2021-07-06 | End: 2021-07-10

## 2021-07-06 RX ORDER — DEXTROSE MONOHYDRATE 25 G/50ML
25 INJECTION, SOLUTION INTRAVENOUS
Status: DISCONTINUED | OUTPATIENT
Start: 2021-07-06 | End: 2021-07-14

## 2021-07-06 RX ORDER — BUDESONIDE AND FORMOTEROL FUMARATE DIHYDRATE 160; 4.5 UG/1; UG/1
2 AEROSOL RESPIRATORY (INHALATION)
Status: DISCONTINUED | OUTPATIENT
Start: 2021-07-06 | End: 2021-07-14 | Stop reason: HOSPADM

## 2021-07-06 RX ORDER — INSULIN LISPRO 100 [IU]/ML
0-7 INJECTION, SOLUTION INTRAVENOUS; SUBCUTANEOUS
Status: DISCONTINUED | OUTPATIENT
Start: 2021-07-06 | End: 2021-07-14

## 2021-07-06 RX ORDER — ALBUTEROL SULFATE 2.5 MG/3ML
2.5 SOLUTION RESPIRATORY (INHALATION) 3 TIMES DAILY PRN
Status: DISCONTINUED | OUTPATIENT
Start: 2021-07-06 | End: 2021-07-14

## 2021-07-06 RX ADMIN — MONTELUKAST SODIUM 10 MG: 10 TABLET, FILM COATED ORAL at 20:00

## 2021-07-06 RX ADMIN — PANTOPRAZOLE SODIUM 40 MG: 40 INJECTION, POWDER, FOR SOLUTION INTRAVENOUS at 20:00

## 2021-07-06 RX ADMIN — GUAIFENESIN 600 MG: 600 TABLET, EXTENDED RELEASE ORAL at 20:00

## 2021-07-06 RX ADMIN — PANTOPRAZOLE SODIUM 40 MG: 40 INJECTION, POWDER, FOR SOLUTION INTRAVENOUS at 08:48

## 2021-07-06 RX ADMIN — AMLODIPINE BESYLATE 10 MG: 10 TABLET ORAL at 20:18

## 2021-07-06 RX ADMIN — DEXTROSE MONOHYDRATE 50 ML: 500 INJECTION PARENTERAL at 19:59

## 2021-07-06 RX ADMIN — POLYETHYLENE GLYCOL 3350 AND ELECTROLYTES WITH LEMON FLAVOR 2000 ML: 236; 22.74; 6.74; 5.86; 2.97 POWDER, FOR SOLUTION ORAL at 20:19

## 2021-07-06 NOTE — CASE MANAGEMENT/SOCIAL WORK
Discharge Planning Assessment  [facility]     Patient Name: Nellie Vegas  MRN: [id number]  Today's Date: 7/6/2021    Admit Date: 7/5/2021    Discharge Needs Assessment     Row Name 07/06/21 1157       Living Environment    Lives With  child(paco), adult    Name(s) of Who Lives With Patient  Yuniel Bautista    Current Living Arrangements  home/apartment/condo    Primary Care Provided by  self    Provides Primary Care For  no one, unable/limited ability to care for self    Family Caregiver if Needed  child(paco), adult    Family Caregiver Names  Daughter Lily [phone] or daughter Yeyo Coello [phone]    Quality of Family Relationships  helpful    Able to Return to Prior Arrangements  yes       Resource/Environmental Concerns    Resource/Environmental Concerns  none       Transition Planning    Patient/Family Anticipates Transition to  home with family    Patient/Family Anticipated Services at Transition  none    Transportation Anticipated  family or friend will provide       Discharge Needs Assessment    Readmission Within the Last 30 Days  no previous admission in last 30 days    Equipment Currently Used at Home  cpap;cane, straight;rollator    Concerns to be Addressed  denies needs/concerns at this time    Anticipated Changes Related to Illness  none    Equipment Needed After Discharge  none        Discharge Plan     Row Name 07/06/21 9907       Plan    Plan  Return home with family    Patient/Family in Agreement with Plan  yes    Plan Comments  Spoke with patient at bedside.  She lives with daughter Lily [phone], is IADL, uses a cane or a rollator and a C-pap.  She Has never used HH or been to SNF.  [facility] [address] [facility] and states [facility] transports her.  Additional contact is her daughter Yeyo Coello [phone].  She states daughter help with driving, shopping, appointments as needed.  She plans to return home at DC and does not anticipate any DC  needs.  CCP will follow as needed.  BHumeniuk RN        Continued Care and Services - Admitted Since 7/5/2021     Dialysis/Infusion Coordination complete    Service Provider Request Status Selected Services Address Phone Fax Patient Preferred    [facility]   Selected Dialysis [address] [facility] [address] [facility], [place] [address] [phone] --                Demographic Summary     Row Name 07/06/21 1154       General Information    Admission Type  inpatient    Arrived From  home    Referral Source  admission list    Reason for Consult  discharge planning    Preferred Language  English     Used During This Interaction  no        Functional Status     Row Name 07/06/21 1154       Functional Status    Usual Activity Tolerance  moderate    Current Activity Tolerance  fair       Functional Status, IADL    Medications  independent    Meal Preparation  independent;assistive person Lives with daughter    Housekeeping  assistive person;independent    Laundry  independent;assistive person    Shopping  assistive person       Mental Status    General Appearance WDL  WDL       Mental Status Summary    Recent Changes in Mental Status/Cognitive Functioning  no changes                Becky S. Humeniuk, RN

## 2021-07-06 NOTE — H&P (VIEW-ONLY)
[facility]  Initial Inpatient Consult Note    Referring Provider: Luciana    Reason for Consultation: Rectal bleeding    Subjective     History of present illness:    76 y.o. female admitted through the emergency room for rectal bleeding that began Saturday.  No abdominal pain.  She has never had bleeding before.  She has no syncope, presyncope, chest pain or shortness of air.  She has end-stage renal disease, she will be dialyzed today.  She will receive packed red blood cells with dialysis.  Her GI past medical history includes a colonoscopy with Dr. Cisneros in 2016 for anemia.  He found a 1 cm sigmoid polyp removed tubular adenoma.  He found a large villous polyp in the cecum that he piecemeal removed.  Came back as tubulovillous adenoma with high-grade dysplasia.  According to care everywhere she was then sent to gastroenterology [facility] in [place], where they wanted to repeat colonoscopy at the 1 year andrade.  For unknown reasons this was never performed    Past Medical History:  Past Medical History:   Diagnosis Date   • Anemia    • Anesthesia complication     mother woke up in combative state   • Arthritis     knees   • Asthma    • COPD (chronic obstructive pulmonary disease) (CMS/HCC)    • Diabetes mellitus (CMS/HCC)     type 2   • Dialysis patient (CMS/HCC)    • Disease of thyroid gland    • GERD (gastroesophageal reflux disease)    • Headache     after dialysis   • History of blood clots     BUE   • History of kidney stones    • Hyperlipidemia    • Hypertension    • Knee pain, bilateral    • Renal disease    • Sleep apnea     CPAP   • SOB (shortness of breath)    • 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)    • Weakness      Past Surgical History:  Past Surgical History:   Procedure Laterality Date   • ARTERIOVENOUS FISTULA Right    • ARTERIOVENOUS FISTULA Left     REMOVED   • CENTRAL VENOUS CATHETER TUNNELED INSERTION DOUBLE LUMEN     •  SECTION     •  POLYPECTOMY      UTERINE   • SHUNT O GRAM Right 2019    Procedure: RIGHT ARM DIALYSIS GRAFT revision and THROMBECTOMY;  Surgeon: Thierry Hardy MD;  Location: [facility] OR ;  Service: Vascular   • SHUNT O GRAM Right 2019    Procedure: RIGHT ARM DIALYSIS GRAFT THROMBECTOMY WITH STENTING;  Surgeon: Thierry Hardy MD;  Location: [facility] OR ;  Service: Vascular   • SHUNT O GRAM Right 2020    Procedure: RIGHT ARM ARTERIOVENOUS SHUNTOGRAM WITH THROMBECTOMY;  Surgeon: Thierry Hardy MD;  Location: [facility] OR ;  Service: Vascular;  Laterality: Right;      Social History:   Social History     Tobacco Use   • Smoking status: Former Smoker     Packs/day: 0.00     Years: 4.00     Pack years: 0.00     Types: Cigarettes     Quit date:      Years since quittin.5   • Smokeless tobacco: Never Used   Substance Use Topics   • Alcohol use: No      Family History:  Family History   Problem Relation Age of Onset   • Malig Hyperthermia Neg Hx        Home Meds:  Medications Prior to Admission   Medication Sig Dispense Refill Last Dose   • acetaminophen (TYLENOL) 325 MG tablet Take 650 mg by mouth Every 6 (Six) Hours As Needed for Mild Pain .      • albuterol (PROVENTIL) (2.5 MG/3ML) 0.083% nebulizer solution Take 2.5 mg by nebulization 3 (Three) Times a Day As Needed for Wheezing. UNSURE OF DOSAGE      • albuterol sulfate  (90 Base) MCG/ACT inhaler INHALE 2 PUFFS INTO THE LUNGS EVERY 4 HOURS AS NEEDED FOR WHEEZING      • amLODIPine (NORVASC) 10 MG tablet Take 1 tablet by mouth Daily. 30 tablet 0    • apixaban (ELIQUIS) 2.5 MG tablet tablet Take 1 tablet by mouth Every 12 (Twelve) Hours. 60 tablet 5    • insulin detemir (LEVEMIR) 100 UNIT/ML injection Inject 8 Units under the skin into the appropriate area as directed Every Night.      • metoprolol succinate XL (TOPROL-XL) 25 MG 24 hr tablet Take 25 mg by mouth Every Morning.      • midodrine (PROAMATINE) 10 MG tablet  Take 10 mg by mouth As Needed. As needed for hypotension post hemodialysis      • montelukast (SINGULAIR) 10 MG tablet Take 10 mg by mouth Every Night.      • omeprazole (priLOSEC) 40 MG capsule Take 40 mg by mouth Every Morning.      • budesonide-formoterol (SYMBICORT) 160-4.5 MCG/ACT inhaler Inhale 2 puffs 2 (Two) Times a Day.      • Cholecalciferol (VITAMIN D3) 5000 units capsule capsule Take 5,000 Units by mouth Every Morning.      • guaiFENesin (MUCINEX) 600 MG 12 hr tablet Take 1 tablet by mouth Every 12 (Twelve) Hours.      • HYDROcodone-acetaminophen (Norco) 5-325 MG per tablet Take 1 tablet by mouth Every 8 (Eight) Hours As Needed for Severe Pain. 5 tablet 0    • isosorbide dinitrate (ISORDIL) 10 MG tablet Take 1 tablet by mouth 3 (Three) Times a Day. 90 tablet 0      Current Meds:   epoetin polina/polina-epbx, 10,000 Units, Intravenous, Once per day on Tue Thu Sat  pantoprazole, 40 mg, Intravenous, BID      Allergies:  Allergies   Allergen Reactions   • Sulfa Antibiotics Hives   • Latex Rash     Review of Systems  There is weakness of fatigue all other systems reviewed and negative     Objective     Vital Signs  Temp:  [97.6 °F (36.4 °C)-98.1 °F (36.7 °C)] 97.6 °F (36.4 °C)  Heart Rate:  [] 67  Resp:  [16] 16  BP: (114-178)/() 160/80  Physical Exam:  General Appearance:    Alert, cooperative, in no acute distress   Head:    Normocephalic, without obvious abnormality, atraumatic   Eyes:          conjunctivae and sclerae normal, no   icterus   Throat:   no thrush, oral mucosa moist   Neck:   Supple, no adenopathy   Lungs:     Clear to auscultation bilaterally    Heart:    Regular rhythm and normal rate    Chest Wall:    No abnormalities observed   Abdomen:     Soft, nondistended, nontender; normal bowel sounds   Extremities:   no edema, no redness   Skin:   No bruising or rash   Psychiatric:  normal mood and insight     Results Review:   I reviewed the patient's new clinical results.    Results from  last 7 days   Lab Units 07/06/21  0934 07/05/21  1626   WBC 10*3/mm3 5.39 6.77   HEMOGLOBIN g/dL 7.6* 7.3*   HEMATOCRIT % 25.3* 23.5*   PLATELETS 10*3/mm3 234 295     Results from last 7 days   Lab Units 07/06/21  0934 07/05/21  1626   SODIUM mmol/L 138 141   POTASSIUM mmol/L 4.0 3.6   CHLORIDE mmol/L 100 101   CO2 mmol/L 18.2* 20.0*   BUN mg/dL 42* 36*   CREATININE mg/dL 8.09* 7.86*   CALCIUM mg/dL 6.9* 6.9*   BILIRUBIN mg/dL  --  0.2   ALK PHOS U/L  --  138*   ALT (SGPT) U/L  --  10   AST (SGOT) U/L  --  13   GLUCOSE mg/dL 92 131*     Results from last 7 days   Lab Units 07/05/21  1626   INR  1.49*     Lab Results   Lab Value Date/Time    LIPASE 20 08/10/2019 1352          CONCLUSION:  1. Lobulated indeterminant density at the base of the cecum, endoscopy  or barium enema recommended as follow-up.  2. Hiatal hernia.  3. Pancreatic ductal dilatation to the mid body where there is abrupt  truncation and at this location no discrete mass identified. This may  have been present on the previous CT examination from 2019, however, not  well seen. Further evaluation with endoscopic ultrasound and/or PET/CT.  Tumor not excluded.  4. Bilateral nonobstructing renal calculi and renal cysts.  5. Diverticulosis of the colon.  6. A sizable duodenal diverticulum    Assessment/Plan   Patient Active Problem List   Diagnosis   • ESRD (end stage renal disease) on dialysis (CMS/HCC)   • Thrombosis of kidney dialysis arteriovenous graft (CMS/HCC)   • Anemia of chronic renal failure, stage 5 (CMS/HCC)   • COPD exacerbation (CMS/HCC)   • Problem with dialysis access (CMS/HCC)   • Lower GI bleeding       Assessment:  1. Anemia  2. End-stage renal disease  3. Abnormal imaging see above  4. Lobulated density in the cecum, could be recurrence of colon polyps in 2016  5. Colon polyp cecum 2016, tubulovillous adenoma with high-grade dysplasia, repeat colonoscopy has not been performed since 2016  6. Sigmoid polyp tubular adenoma removed  2016  7. Rectal bleeding  8. Abnormal imaging above with pancreatic ductal dilation    Plan:  · Plan for colonoscopy tomorrow, there appears to be a recurrence of the polyp possibly even a malignancy in the right colon  · She will dialyze today, colonoscopy would be appropriate tomorrow  · Follow hemoglobin  · I would recommend an MRI/MRCP before discharge to evaluate pancreatic ductal dilation in the mid body.  · Clear liquid diet okay      I discussed the patients findings and my recommendations with patient and nursing staff.    Fabricio Srivastava MD

## 2021-07-06 NOTE — PLAN OF CARE
Goal Outcome Evaluation:  Plan of Care Reviewed With: patient        Progress: improving  Outcome Summary: Pt admitted for lower GI bleed, Hgb : 7.3, 1 unit PRBC given. No bleeding noted this shift. Pt on hemodialysis Tue, Thu, Sat. GI, and nephology consult placed. Pt on clear liquid, RA, SR. VSS. Will continue to monitor.

## 2021-07-06 NOTE — H&P
History and physical    Primary care physician  Dr. Cortes    Chief complaint  Bloody diarrhea    History of present illness  73-year-old -American female with history of end-stage renal disease on hemodialysis COPD diabetes mellitus hypertension and chronic anemia brought to the emergency room with black diarrhea started on Saturday.  Patient has 4 episodes.  Patient also have abdominal discomfort.  Patient has been noticing black stools for several days.  Patient denies any nausea vomiting.  Patient denies any fever cough chest pain shortness of breath.  Patient work-up in ER revealed acute blood loss anemia with lower GI bleed admit for management.     PAST MEDICAL HISTORY  • Anemia     • Anesthesia complication     • Arthritis     • Asthma     • COPD (chronic obstructive pulmonary disease) (CMS/[facility])     • Diabetes mellitus (CMS/HCC)     • Dialysis patient (CMS/[facility])     • Disease of thyroid gland     • GERD (gastroesophageal reflux disease)     • Headache     • History of blood clots     • History of kidney stones     • Hyperlipidemia     • Hypertension     • Knee pain, bilateral     • Renal disease     • Sleep apnea     • SOB (shortness of breath)     • 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)     • Weakness        PAST SURGICAL HISTORY              Procedure Laterality Date   • ARTERIOVENOUS FISTULA Right     • ARTERIOVENOUS FISTULA Left       REMOVED   • CENTRAL VENOUS CATHETER TUNNELED INSERTION DOUBLE LUMEN       •  SECTION       • POLYPECTOMY         UTERINE   • SHUNT O GRAM Right 2019     Procedure: RIGHT ARM DIALYSIS GRAFT revision and THROMBECTOMY;  Surgeon: Thierry Hardy MD;  Location: [facility] OR ;  Service: Vascular   • SHUNT O GRAM Right 2019     Procedure: RIGHT ARM DIALYSIS GRAFT THROMBECTOMY WITH STENTING;  Surgeon: Thierry Hardy MD;  Location: [facility] OR ;  Service: Vascular   • SHUNT O GRAM Right 2020     Procedure: RIGHT  "ARM ARTERIOVENOUS SHUNTOGRAM WITH THROMBECTOMY;  Surgeon: Thierry Hardy MD;  Location: [facility] OR ;  Service: Vascular;  Laterality: Right;         FAMILY HISTORY           Problem Relation Age of Onset   • Malig Hyperthermia Neg Hx        SOCIAL HISTORY                 Socioeconomic History   • Marital status:        Spouse name: Not on file   • Number of children: Not on file   • Years of education: Not on file   • Highest education level: Not on file   Tobacco Use   • Smoking status: Former Smoker       Packs/day: 0.00       Years: 4.00       Pack years: 0.00       Types: Cigarettes       Quit date:        Years since quittin.5   • Smokeless tobacco: Never Used   Vaping Use   • Vaping Use: Never used   Substance and Sexual Activity   • Alcohol use: No   • Drug use: No   • Sexual activity: Defer         ALLERGIES  Sulfa antibiotics and Latex  Home medications reviewed     REVIEW OF SYSTEMS  Constitutional: Positive for fatigue. Negative for chills and fever.   Respiratory: Negative for chest tightness and shortness of breath.    Cardiovascular: Negative for chest pain.   Gastrointestinal: Positive for abdominal pain and blood in stool. Negative for nausea, rectal pain and vomiting.   Neurological: Negative for light-headedness and headaches.      PHYSICAL EXAM   Blood pressure 138/68, pulse 72, temperature 97.6 °F (36.4 °C), temperature source Oral, resp. rate 16, height 152.4 cm (60\"), weight 76.6 kg (168 lb 14.4 oz), SpO2 98 %, not currently breastfeeding.    Constitutional: Pt. is oriented to person, place, and time   HENT: Normocephalic and atraumatic. Oropharynx moist/nonerythematous.  Neck: Normal range of motion. Neck supple. No JVD present.   Cardiovascular: Normal rate, regular rhythm and normal heart sounds. Exam reveals no gallop and no friction rub. No murmur heard.  Pulmonary/Chest: Effort normal and breath sounds normal. No stridor. No respiratory distress. No wheezes, " no rales.   Abdominal: Soft. Bowel sounds are normal. No distension. There is no tenderness. There is no rebound and no guarding.   Musculoskeletal: Normal range of motion. No edema, tenderness or deformity.   Neurological: Pt. is alert and oriented to person, place, and time.  She has no focal neurologic deficits  Skin: Skin is warm and dry. No rash noted. Pt. is not diaphoretic. No erythema.   Psychiatric: Mood, affect and judgment normal.  She is pleasant and cooperative.    LAB RESULTS  Lab Results (last 24 hours)     Procedure Component Value Units Date/Time    POC Glucose Once [[id number]]  (Normal) Collected: 07/06/21 1101    Specimen: Blood Updated: 07/06/21 1103     Glucose 82 mg/dL      Comment: Meter: [id number] : [id number] Barb WADDELL       Basic Metabolic Panel [[id number]]  (Abnormal) Collected: 07/06/21 0934    Specimen: Blood Updated: 07/06/21 1033     Glucose 92 mg/dL      BUN 42 mg/dL      Creatinine 8.09 mg/dL      Sodium 138 mmol/L      Potassium 4.0 mmol/L      Chloride 100 mmol/L      CO2 18.2 mmol/L      Calcium 6.9 mg/dL      eGFR  African Amer 6 mL/min/1.73      Comment: <15 Indicative of kidney failure.        eGFR Non  Amer --     Comment: <15 Indicative of kidney failure.        BUN/Creatinine Ratio 5.2     Anion Gap 19.8 mmol/L     Narrative:      GFR Normal >60  Chronic Kidney Disease <60  Kidney Failure <15      CBC & Differential [[id number]]  (Abnormal) Collected: 07/06/21 0934    Specimen: Blood Updated: 07/06/21 0959    Narrative:      The following orders were created for panel order CBC & Differential.  Procedure                               Abnormality         Status                     ---------                               -----------         ------                     CBC Auto Differential[[id number]]        Abnormal            Final result                 Please view results for these tests on the individual orders.    CBC Auto Differential [[id number]]   (Abnormal) Collected: 07/06/21 0934    Specimen: Blood Updated: 07/06/21 0959     WBC 5.39 10*3/mm3      RBC 2.99 10*6/mm3      Hemoglobin 7.6 g/dL      Hematocrit 25.3 %      MCV 84.6 fL      MCH 25.4 pg      MCHC 30.0 g/dL      RDW 16.2 %      RDW-SD 49.3 fl      MPV 10.4 fL      Platelets 234 10*3/mm3      Neutrophil % 74.3 %      Lymphocyte % 8.5 %      Monocyte % 14.3 %      Eosinophil % 1.9 %      Basophil % 0.4 %      Immature Grans % 0.6 %      Neutrophils, Absolute 4.01 10*3/mm3      Lymphocytes, Absolute 0.46 10*3/mm3      Monocytes, Absolute 0.77 10*3/mm3      Eosinophils, Absolute 0.10 10*3/mm3      Basophils, Absolute 0.02 10*3/mm3      Immature Grans, Absolute 0.03 10*3/mm3      nRBC 0.0 /100 WBC     COVID PRE-OP / PRE-PROCEDURE SCREENING ORDER (NO ISOLATION) - Swab, Nasopharynx [[id number]]  (Normal) Collected: 07/05/21 1918    Specimen: Swab from Nasopharynx Updated: 07/05/21 2339    Narrative:      The following orders were created for panel order COVID PRE-OP / PRE-PROCEDURE SCREENING ORDER (NO ISOLATION) - Swab, Nasopharynx.  Procedure                               Abnormality         Status                     ---------                               -----------         ------                     COVID-19,APTIMA PANTHER,...[[id number]]  Normal              Final result                 Please view results for these tests on the individual orders.    COVID-19,APTIMA PANTHER,[facility], NP/OP SWAB IN UTM/VTM/[facility] TRANSPORT MEDIA,24 HR TAT - Swab, Nasopharynx [[id number]]  (Normal) Collected: 07/05/21 1918    Specimen: Swab from Nasopharynx Updated: 07/05/21 2339     COVID19 Not Detected    Narrative:      Fact sheet for providers: https://www.fda.gov/media/680575/download     Fact sheet for patients: https://www.fda.gov/media/888595/download    Test performed by RT PCR.    POC Glucose Once [[id number]]  (Abnormal) Collected: 07/05/21 2023    Specimen: Blood Updated: 07/05/21 2024     Glucose 132  mg/dL      Comment: Meter: [id number] : [id number] Monica aSha CNA       STAT Lactic Acid, Reflex [[id number]]  (Normal) Collected: 07/05/21 1921    Specimen: Blood Updated: 07/05/21 1942     Lactate 1.4 mmol/L         Imaging Results (Last 24 Hours)     Procedure Component Value Units Date/Time    CT Abdomen Pelvis With Contrast [[id number]] Collected: 07/05/21 1829     Updated: 07/05/21 1833    Narrative:      CT ABDOMEN PELVIS W CONTRAST-     Radiation dose reduction techniques were utilized, including automated  exposure control and exposure modulation based on body size.     CLINICAL: Bloody diarrhea     COMPARISON:  CT of the abdomen and pelvis 08/10/2019     FINDINGS: At the base of the cecum there is a lobulated density which  does not have the appearance of fecal material measuring 3.7 cm  transverse, 2.4 cm craniocaudal and 3.9 cm AP. Possible large polyp. The  appendix is normal. The ileocecal valve is typical in appearance. There  is extensive diverticulosis of the colon without acute diverticulitis.  No abnormality to suggest colitis.     Small sliding-type hiatal hernia. The stomach is collapsed, contour  within normal limits. The duodenum is unremarkable. Caliber of the small  bowel is satisfactory, no small bowel abnormality is seen. Sizable  duodenal diverticulum without atypical features.     The gallbladder is satisfactory in appearance. Tiny hepatic cysts left  lobe of the liver is suggested on image 28, the liver is otherwise  within normal limits. No biliary duct dilatation.     There is pancreatic ductal dilatation involving the tail to the mid body  and at the transition point no discrete mass is demonstrated. Etiology  of the obstruction is indeterminant. Although not seen well on the  previous examination, some ductal dilatation with truncation at the same  location suggested on the 2019 examination. Endoscopic ultrasound may be  the best means for further evaluation. PET/CT may also be  helpful.  Biopsy may be indicated. Tumor not excluded.     Spleen is normal in size and shape and both adrenal glands have a  satisfactory appearance. Several tiny subcentimeter size nonobstructing  bilateral renal calculi seen. Large cyst arising from the upper pole of  the right kidney, measuring 5.4 cm in diameter. A few tiny left sided  renal calculi. Both kidneys are small in size for the patient's given  body habitus. Atherosclerotic plaque formation at the origin of the  celiac axis, SMA and bilateral renal arteries. Diameter of the aorta is  within normal limits. The bladder is collapsed. The uterus is lobulated,  enlarged and there are several calcified fibroids within the myometrium.  Both ovaries are satisfactory in size, there is calcification associated  with the left ovary.     CONCLUSION:  1. Lobulated indeterminant density at the base of the cecum, endoscopy  or barium enema recommended as follow-up.  2. Hiatal hernia.  3. Pancreatic ductal dilatation to the mid body where there is abrupt  truncation and at this location no discrete mass identified. This may  have been present on the previous CT examination from 2019, however, not  well seen. Further evaluation with endoscopic ultrasound and/or PET/CT.  Tumor not excluded.  4. Bilateral nonobstructing renal calculi and renal cysts.  5. Diverticulosis of the colon.  6. A sizable duodenal diverticulum.     Findings of this report called to Dr. Novak in the emergency room at  the time of completion, 6:15 PM.              This report was finalized on 7/5/2021 6:30 PM by Dr. Nahum Mcintyre M.D.             Current Facility-Administered Medications:   •  albuterol (PROVENTIL) nebulizer solution 0.083% 2.5 mg/3mL, 2.5 mg, Nebulization, TID PRN, Haja Chowdhury MD  •  amLODIPine (NORVASC) tablet 10 mg, 10 mg, Oral, Q24H, Haja Chowdhury MD  •  budesonide-formoterol (SYMBICORT) 160-4.5 MCG/ACT inhaler 2 puff, 2 puff, Inhalation, BID - RT, Haja Chowdhury MD  •   epoetin polina-epbx (RETACRIT) injection 10,000 Units, 10,000 Units, Intravenous, Once per day on Tue Thu [place] Andrew James, MD  •  guaiFENesin (MUCINEX) 12 hr tablet 600 mg, 600 mg, Oral, Q12H, Allan Chowdhury MD  •  HYDROcodone-acetaminophen (NORCO) 5-325 MG per tablet 1 tablet, 1 tablet, Oral, Q8H PRN, Allan Chowdhury MD  •  insulin lispro (ADMELOG) injection 0-7 Units, 0-7 Units, Subcutaneous, TID AC, Allan Chowdhury MD  •  [START ON 7/7/2021] metoprolol succinate XL (TOPROL-XL) 24 hr tablet 25 mg, 25 mg, Oral, QAM, Allan Chowdhury MD  •  montelukast (SINGULAIR) tablet 10 mg, 10 mg, Oral, Nightly, Allan Chowdhury MD  •  pantoprazole (PROTONIX) injection 40 mg, 40 mg, Intravenous, BID, Allan Chowdhury MD, 40 mg at 07/06/21 0848  •  polyethylene glycol (GoLYTELY) solution 2,000 mL, 2,000 mL, Oral, Q8H, Fabricio Srivastava MD  •  [COMPLETED] Insert peripheral IV, , , Once **AND** sodium chloride 0.9 % flush 10 mL, 10 mL, Intravenous, PRN, Gagan Novak MD     ASSESSMENT  Melena/hematochezia consistent with lower GI bleed  Acute blood loss anemia  End-stage renal disease on hemodialysis  COPD  Hypertension  Diabetes mellitus  Chronic anemia  Gastroesophageal reflux disease    PLAN  Admit  IV Protonix  Hold Eliquis  Monitor H&H  Transfuse as needed  GI consult  Nephrology to follow patient for hemodialysis  Accu-Chek sliding scale insulin  Adjust home medications  Stress ulcer DVT prophylaxis  Supportive care  Patient is full code   Discussed with nursing staff  Follow closely further recommendation according to hospital course    ALLAN CHOWDHURY MD

## 2021-07-06 NOTE — CONSULTS
[facility]  Initial Inpatient Consult Note    Referring Provider: Luciana    Reason for Consultation: Rectal bleeding    Subjective     History of present illness:    76 y.o. female admitted through the emergency room for rectal bleeding that began Saturday.  No abdominal pain.  She has never had bleeding before.  She has no syncope, presyncope, chest pain or shortness of air.  She has end-stage renal disease, she will be dialyzed today.  She will receive packed red blood cells with dialysis.  Her GI past medical history includes a colonoscopy with Dr. Cisneros in 2016 for anemia.  He found a 1 cm sigmoid polyp removed tubular adenoma.  He found a large villous polyp in the cecum that he piecemeal removed.  Came back as tubulovillous adenoma with high-grade dysplasia.  According to care everywhere she was then sent to gastroenterology I [facility] in [place], where they wanted to repeat colonoscopy at the 1 year andrade.  For unknown reasons this was never performed    Past Medical History:  Past Medical History:   Diagnosis Date   • Anemia    • Anesthesia complication     mother woke up in combative state   • Arthritis     knees   • Asthma    • COPD (chronic obstructive pulmonary disease) (CMS/HCC)    • Diabetes mellitus (CMS/HCC)     type 2   • Dialysis patient (CMS/HCC)    • Disease of thyroid gland    • GERD (gastroesophageal reflux disease)    • Headache     after dialysis   • History of blood clots     BUE   • History of kidney stones    • Hyperlipidemia    • Hypertension    • Knee pain, bilateral    • Renal disease    • Sleep apnea     CPAP   • SOB (shortness of breath)    • 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)    • Weakness      Past Surgical History:  Past Surgical History:   Procedure Laterality Date   • ARTERIOVENOUS FISTULA Right    • ARTERIOVENOUS FISTULA Left     REMOVED   • CENTRAL VENOUS CATHETER TUNNELED INSERTION DOUBLE LUMEN     •  SECTION     •  POLYPECTOMY      UTERINE   • SHUNT O GRAM Right 2019    Procedure: RIGHT ARM DIALYSIS GRAFT revision and THROMBECTOMY;  Surgeon: Thierry Hardy MD;  Location: [facility] OR ;  Service: Vascular   • SHUNT O GRAM Right 2019    Procedure: RIGHT ARM DIALYSIS GRAFT THROMBECTOMY WITH STENTING;  Surgeon: Thierry Hardy MD;  Location: [facility] OR ;  Service: Vascular   • SHUNT O GRAM Right 2020    Procedure: RIGHT ARM ARTERIOVENOUS SHUNTOGRAM WITH THROMBECTOMY;  Surgeon: Thierry Hardy MD;  Location: [facility] OR ;  Service: Vascular;  Laterality: Right;      Social History:   Social History     Tobacco Use   • Smoking status: Former Smoker     Packs/day: 0.00     Years: 4.00     Pack years: 0.00     Types: Cigarettes     Quit date:      Years since quittin.5   • Smokeless tobacco: Never Used   Substance Use Topics   • Alcohol use: No      Family History:  Family History   Problem Relation Age of Onset   • Malig Hyperthermia Neg Hx        Home Meds:  Medications Prior to Admission   Medication Sig Dispense Refill Last Dose   • acetaminophen (TYLENOL) 325 MG tablet Take 650 mg by mouth Every 6 (Six) Hours As Needed for Mild Pain .      • albuterol (PROVENTIL) (2.5 MG/3ML) 0.083% nebulizer solution Take 2.5 mg by nebulization 3 (Three) Times a Day As Needed for Wheezing. UNSURE OF DOSAGE      • albuterol sulfate  (90 Base) MCG/ACT inhaler INHALE 2 PUFFS INTO THE LUNGS EVERY 4 HOURS AS NEEDED FOR WHEEZING      • amLODIPine (NORVASC) 10 MG tablet Take 1 tablet by mouth Daily. 30 tablet 0    • apixaban (ELIQUIS) 2.5 MG tablet tablet Take 1 tablet by mouth Every 12 (Twelve) Hours. 60 tablet 5    • insulin detemir (LEVEMIR) 100 UNIT/ML injection Inject 8 Units under the skin into the appropriate area as directed Every Night.      • metoprolol succinate XL (TOPROL-XL) 25 MG 24 hr tablet Take 25 mg by mouth Every Morning.      • midodrine (PROAMATINE) 10 MG tablet  Take 10 mg by mouth As Needed. As needed for hypotension post hemodialysis      • montelukast (SINGULAIR) 10 MG tablet Take 10 mg by mouth Every Night.      • omeprazole (priLOSEC) 40 MG capsule Take 40 mg by mouth Every Morning.      • budesonide-formoterol (SYMBICORT) 160-4.5 MCG/ACT inhaler Inhale 2 puffs 2 (Two) Times a Day.      • Cholecalciferol (VITAMIN D3) 5000 units capsule capsule Take 5,000 Units by mouth Every Morning.      • guaiFENesin (MUCINEX) 600 MG 12 hr tablet Take 1 tablet by mouth Every 12 (Twelve) Hours.      • HYDROcodone-acetaminophen (Norco) 5-325 MG per tablet Take 1 tablet by mouth Every 8 (Eight) Hours As Needed for Severe Pain. 5 tablet 0    • isosorbide dinitrate (ISORDIL) 10 MG tablet Take 1 tablet by mouth 3 (Three) Times a Day. 90 tablet 0      Current Meds:   epoetin polina/polina-epbx, 10,000 Units, Intravenous, Once per day on Tue Thu Sat  pantoprazole, 40 mg, Intravenous, BID      Allergies:  Allergies   Allergen Reactions   • Sulfa Antibiotics Hives   • Latex Rash     Review of Systems  There is weakness of fatigue all other systems reviewed and negative     Objective     Vital Signs  Temp:  [97.6 °F (36.4 °C)-98.1 °F (36.7 °C)] 97.6 °F (36.4 °C)  Heart Rate:  [] 67  Resp:  [16] 16  BP: (114-178)/() 160/80  Physical Exam:  General Appearance:    Alert, cooperative, in no acute distress   Head:    Normocephalic, without obvious abnormality, atraumatic   Eyes:          conjunctivae and sclerae normal, no   icterus   Throat:   no thrush, oral mucosa moist   Neck:   Supple, no adenopathy   Lungs:     Clear to auscultation bilaterally    Heart:    Regular rhythm and normal rate    Chest Wall:    No abnormalities observed   Abdomen:     Soft, nondistended, nontender; normal bowel sounds   Extremities:   no edema, no redness   Skin:   No bruising or rash   Psychiatric:  normal mood and insight     Results Review:   I reviewed the patient's new clinical results.    Results from  last 7 days   Lab Units 07/06/21  0934 07/05/21  1626   WBC 10*3/mm3 5.39 6.77   HEMOGLOBIN g/dL 7.6* 7.3*   HEMATOCRIT % 25.3* 23.5*   PLATELETS 10*3/mm3 234 295     Results from last 7 days   Lab Units 07/06/21  0934 07/05/21  1626   SODIUM mmol/L 138 141   POTASSIUM mmol/L 4.0 3.6   CHLORIDE mmol/L 100 101   CO2 mmol/L 18.2* 20.0*   BUN mg/dL 42* 36*   CREATININE mg/dL 8.09* 7.86*   CALCIUM mg/dL 6.9* 6.9*   BILIRUBIN mg/dL  --  0.2   ALK PHOS U/L  --  138*   ALT (SGPT) U/L  --  10   AST (SGOT) U/L  --  13   GLUCOSE mg/dL 92 131*     Results from last 7 days   Lab Units 07/05/21  1626   INR  1.49*     Lab Results   Lab Value Date/Time    LIPASE 20 08/10/2019 1352          CONCLUSION:  1. Lobulated indeterminant density at the base of the cecum, endoscopy  or barium enema recommended as follow-up.  2. Hiatal hernia.  3. Pancreatic ductal dilatation to the mid body where there is abrupt  truncation and at this location no discrete mass identified. This may  have been present on the previous CT examination from 2019, however, not  well seen. Further evaluation with endoscopic ultrasound and/or PET/CT.  Tumor not excluded.  4. Bilateral nonobstructing renal calculi and renal cysts.  5. Diverticulosis of the colon.  6. A sizable duodenal diverticulum    Assessment/Plan   Patient Active Problem List   Diagnosis   • ESRD (end stage renal disease) on dialysis (CMS/HCC)   • Thrombosis of kidney dialysis arteriovenous graft (CMS/HCC)   • Anemia of chronic renal failure, stage 5 (CMS/HCC)   • COPD exacerbation (CMS/HCC)   • Problem with dialysis access (CMS/HCC)   • Lower GI bleeding       Assessment:  1. Anemia  2. End-stage renal disease  3. Abnormal imaging see above  4. Lobulated density in the cecum, could be recurrence of colon polyps in 2016  5. Colon polyp cecum 2016, tubulovillous adenoma with high-grade dysplasia, repeat colonoscopy has not been performed since 2016  6. Sigmoid polyp tubular adenoma removed  2016  7. Rectal bleeding  8. Abnormal imaging above with pancreatic ductal dilation    Plan:  · Plan for colonoscopy tomorrow, there appears to be a recurrence of the polyp possibly even a malignancy in the right colon  · She will dialyze today, colonoscopy would be appropriate tomorrow  · Follow hemoglobin  · I would recommend an MRI/MRCP before discharge to evaluate pancreatic ductal dilation in the mid body.  · Clear liquid diet okay      I discussed the patients findings and my recommendations with patient and nursing staff.    Fabricio Srivastava MD

## 2021-07-06 NOTE — NURSING NOTE
HD WITHOUT INCIDENT OR C/O. TOLERATED WELL. REMOVED 1 L AS ORDERED.  NO MEDS ORDERED / NO MEDS GIGEN. AVF NEEDLES REMOVED X 2. HEMOSTASIS ACHIEVED.  PT. STABLE WITHOUT C/O UPON COMPLETION AND RETURN TO ROOM.

## 2021-07-06 NOTE — CONSULTS
[facility]                                                                                             Nephrology Initial Consult Note    Patient Identification:  Name: Nellie Vegas MRN: [id number]  Age: 76 y.o. : 1945  Sex: female  Date:2021    Requesting Physician: As per consult order.  Reason for Consultation: ESRD, GI bleed  Information from:patient/ family/ chart      History of Present Illness: This is a 76 y.o. year old female with a history of ESRD on dialysis , well-known to me from the outpatient unit.  Very compliant with outpatient dialysis, last treatment was Saturday, went well with no problems.  Right arm AV fistula has been functioning well and she has an appointment later this week with vascular for checkup.  She presented to the hospital last night with lower GI bleeding status post transfusion feels well today with no complaints of shortness of breath chest pain fevers chills or edema.  Normally takes off about 1 L with dialysis.    The following medical history and medications personally reviewed by me:    Problem List:   Patient Active Problem List    Diagnosis    • Lower GI bleeding [K92.2]    • Problem with dialysis access (CMS/[facility]) [T82.898A]    • COPD exacerbation (CMS/[facility]) [J44.1]    • Anemia of chronic renal failure, stage 5 (CMS/[facility]) [N18.5, D63.1]    • ESRD (end stage renal disease) on dialysis (CMS/[facility]) [N18.6, Z99.2]    • Thrombosis of kidney dialysis arteriovenous graft (CMS/[facility]) [T82.868A]        Past Medical History:  Past Medical History:   Diagnosis Date   • Anemia    • Anesthesia complication     mother woke up in combative state   • Arthritis     knees   • Asthma    • COPD (chronic obstructive pulmonary disease) (CMS/[facility])    • Diabetes mellitus (CMS/[facility])     type 2   • Dialysis patient (CMS/[facility])    • Disease of thyroid gland    •  GERD (gastroesophageal reflux disease)    • Headache     after dialysis   • History of blood clots     BUE   • History of kidney stones    • Hyperlipidemia    • Hypertension    • Knee pain, bilateral    • Renal disease    • Sleep apnea     CPAP   • SOB (shortness of breath)    • 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)    • Weakness        Past Surgical History:  Past Surgical History:   Procedure Laterality Date   • ARTERIOVENOUS FISTULA Right    • ARTERIOVENOUS FISTULA Left     REMOVED   • CENTRAL VENOUS CATHETER TUNNELED INSERTION DOUBLE LUMEN     •  SECTION     • POLYPECTOMY      UTERINE   • SHUNT O GRAM Right 2019    Procedure: RIGHT ARM DIALYSIS GRAFT revision and THROMBECTOMY;  Surgeon: Thierry Hardy MD;  Location: [facility] ;  Service: Vascular   • SHUNT O GRAM Right 2019    Procedure: RIGHT ARM DIALYSIS GRAFT THROMBECTOMY WITH STENTING;  Surgeon: Thierry Hardy MD;  Location: [facility] ;  Service: Vascular   • SHUNT O GRAM Right 2020    Procedure: RIGHT ARM ARTERIOVENOUS SHUNTOGRAM WITH THROMBECTOMY;  Surgeon: Thierry Hardy MD;  Location: [facility] ;  Service: Vascular;  Laterality: Right;        Home Meds:   Medications Prior to Admission   Medication Sig Dispense Refill Last Dose   • acetaminophen (TYLENOL) 325 MG tablet Take 650 mg by mouth Every 6 (Six) Hours As Needed for Mild Pain .      • albuterol (PROVENTIL) (2.5 MG/3ML) 0.083% nebulizer solution Take 2.5 mg by nebulization 3 (Three) Times a Day As Needed for Wheezing. UNSURE OF DOSAGE      • albuterol sulfate  (90 Base) MCG/ACT inhaler INHALE 2 PUFFS INTO THE LUNGS EVERY 4 HOURS AS NEEDED FOR WHEEZING      • amLODIPine (NORVASC) 10 MG tablet Take 1 tablet by mouth Daily. 30 tablet 0    • apixaban (ELIQUIS) 2.5 MG tablet tablet Take 1 tablet by mouth Every 12 (Twelve) Hours. 60 tablet 5    • insulin detemir (LEVEMIR) 100 UNIT/ML injection Inject 8 Units  under the skin into the appropriate area as directed Every Night.      • metoprolol succinate XL (TOPROL-XL) 25 MG 24 hr tablet Take 25 mg by mouth Every Morning.      • midodrine (PROAMATINE) 10 MG tablet Take 10 mg by mouth As Needed. As needed for hypotension post hemodialysis      • montelukast (SINGULAIR) 10 MG tablet Take 10 mg by mouth Every Night.      • omeprazole (priLOSEC) 40 MG capsule Take 40 mg by mouth Every Morning.      • budesonide-formoterol (SYMBICORT) 160-4.5 MCG/ACT inhaler Inhale 2 puffs 2 (Two) Times a Day.      • Cholecalciferol (VITAMIN D3) 5000 units capsule capsule Take 5,000 Units by mouth Every Morning.      • guaiFENesin (MUCINEX) 600 MG 12 hr tablet Take 1 tablet by mouth Every 12 (Twelve) Hours.      • HYDROcodone-acetaminophen (Norco) 5-325 MG per tablet Take 1 tablet by mouth Every 8 (Eight) Hours As Needed for Severe Pain. 5 tablet 0    • isosorbide dinitrate (ISORDIL) 10 MG tablet Take 1 tablet by mouth 3 (Three) Times a Day. 90 tablet 0        Current Meds:   Current Facility-Administered Medications   Medication Dose Route Frequency Provider Last Rate Last Admin   • pantoprazole (PROTONIX) injection 40 mg  40 mg Intravenous BID Haja Chowdhury MD   40 mg at 21 0848   • sodium chloride 0.9 % flush 10 mL  10 mL Intravenous PRN Gagan Novak MD           Allergies:  Allergies   Allergen Reactions   • Sulfa Antibiotics Hives   • Latex Rash       Social History:   Social History     Socioeconomic History   • Marital status:      Spouse name: Not on file   • Number of children: Not on file   • Years of education: Not on file   • Highest education level: Not on file   Tobacco Use   • Smoking status: Former Smoker     Packs/day: 0.00     Years: 4.00     Pack years: 0.00     Types: Cigarettes     Quit date: 1966     Years since quittin.5   • Smokeless tobacco: Never Used   Vaping Use   • Vaping Use: Never used   Substance and Sexual Activity   • Alcohol use: No   •  "Drug use: No   • Sexual activity: Defer        Family History:  Family History   Problem Relation Age of Onset   • Malig Hyperthermia Neg Hx         Review of Systems: as per HPI, in addition:    General:      Denies weakness / fatigue,                       No fevers / chills                       no weight loss  HEENT:       no dysphagia / odynophagia  Neck:           normal range of motion, no swelling  Respiratory: no cough / congestion                      No shortness of air                       No wheezing  CV:              No chest pain                       No palpitations  Abdomen/GI: no nausea / vomiting                      No diarrhea / constipation                      No abdominal pain  :             no dysuria / urinary frequency                       No urgency, normal output  Endocrine:   no polyuria / polydipsia,                      No heat or cold intolerance  Skin:           no rashes or skin breakdown   Vascular:   No edema                     No claudication  Psych:        no depression/ anxiety  Neuro:        no focal weakness, no seizures  Musculoskeletal: no joint pain or deformities      Physical Exam:  Vitals:   Temp (24hrs), Av.9 °F (36.6 °C), Min:97.6 °F (36.4 °C), Max:98.1 °F (36.7 °C)    /80 (BP Location: Left arm, Patient Position: Lying)   Pulse 67   Temp 97.6 °F (36.4 °C) (Oral)   Resp 16   Ht 152.4 cm (60\")   Wt 73.5 kg (162 lb 1.6 oz)   SpO2 98%   BMI 31.66 kg/m²   Intake/Output:     Intake/Output Summary (Last 24 hours) at 2021 0920  Last data filed at 2021 0239  Gross per 24 hour   Intake 300 ml   Output --   Net 300 ml        Wt Readings from Last 1 Encounters:   21 73.5 kg (162 lb 1.6 oz)   21 1546 74.5 kg (164 lb 3.9 oz)       Exam:    General Appearance:  Awake, alert, oriented x3, no acute distress  Well-appearing, obese   Head and Face:  Normocephalic, atraumatic, mucus membranes moist, oropharynx clear   Eyes:  No icterus, " pupils equal round and reactive to light, extraocular movements intact    ENMT: Moist mucosa, tongue symmetric    Neck: Supple  no jugular venous distention  no thyromegaly   Pulmonary:  Respiratory effort: Normal  Auscultation of lungs: Clear bilaterally  No wheezes  No rhonchi  Good air movement, good expansion   Chest wall:  No tenderness or deformity   Cardiovascular:  Auscultation of the heart: Normal rhythm, no murmurs  No edema of bilateral lower extremities, right arm AV fistula with good thrill and bruit   Abdomen:  Abdomen: soft, non-tender, normal bowel sounds all four quadrants, no masses   Liver and spleen: no hepatosplenomegaly   Musculoskeletal: Digits and nails: normal  Normal range of motion  No joint swelling or gross deformities    Skin: Skin inspection: color normal, no visible rashes or lesions  Skin palpation: texture, turgor normal, no palpable lesions   Lymphatic:  no cervical lymphadenopathy    Psychiatric: Judgement and insight: normal  Orientation to person place and time: normal  Mood and affect: normal       DATA:  Radiology and Labs:  The following labs independently reviewed by me, additional AM labs ordered  Old records independently reviewed showing ESRD  The following radiologic studies independently viewed by me, findings CT abdomen pelvis showing a lobulated cecal density  Interval notes, chart personally reviewed by me.  I have reviewed and summarized old records as detailed above  Plan of care discussed with patient  New problems include GI bleeding    Dialysis patient access: Right arm AV fistula    Risk/ complexity of medical care/ medical decision making: Moderate  Chronic illness with severe exacerbation or progression      Labs:   Recent Results (from the past 24 hour(s))   Comprehensive Metabolic Panel    Collection Time: 07/05/21  4:26 PM    Specimen: Blood   Result Value Ref Range    Glucose 131 (H) 65 - 99 mg/dL    BUN 36 (H) 8 - 23 mg/dL    Creatinine 7.86 (H) 0.57 -  1.00 mg/dL    Sodium 141 136 - 145 mmol/L    Potassium 3.6 3.5 - 5.2 mmol/L    Chloride 101 98 - 107 mmol/L    CO2 20.0 (L) 22.0 - 29.0 mmol/L    Calcium 6.9 (L) 8.6 - 10.5 mg/dL    Total Protein 6.1 6.0 - 8.5 g/dL    Albumin 3.60 3.50 - 5.20 g/dL    ALT (SGPT) 10 1 - 33 U/L    AST (SGOT) 13 1 - 32 U/L    Alkaline Phosphatase 138 (H) 39 - 117 U/L    Total Bilirubin 0.2 0.0 - 1.2 mg/dL    eGFR Non  Amer      eGFR  African Amer 6 (L) >60 mL/min/1.73    Globulin 2.5 gm/dL    A/G Ratio 1.4 g/dL    BUN/Creatinine Ratio 4.6 (L) 7.0 - 25.0    Anion Gap 20.0 (H) 5.0 - 15.0 mmol/L   Protime-INR    Collection Time: 07/05/21  4:26 PM    Specimen: Blood   Result Value Ref Range    Protime 17.8 (H) 11.7 - 14.2 Seconds    INR 1.49 (H) 0.90 - 1.10   aPTT    Collection Time: 07/05/21  4:26 PM    Specimen: Blood   Result Value Ref Range    PTT 34.9 22.7 - 35.4 seconds   Lactic Acid, Plasma    Collection Time: 07/05/21  4:26 PM    Specimen: Blood   Result Value Ref Range    Lactate 2.1 (C) 0.5 - 2.0 mmol/L   CBC Auto Differential    Collection Time: 07/05/21  4:26 PM    Specimen: Blood   Result Value Ref Range    WBC 6.77 3.40 - 10.80 10*3/mm3    RBC 2.77 (L) 3.77 - 5.28 10*6/mm3    Hemoglobin 7.3 (L) 12.0 - 15.9 g/dL    Hematocrit 23.5 (L) 34.0 - 46.6 %    MCV 84.8 79.0 - 97.0 fL    MCH 26.4 (L) 26.6 - 33.0 pg    MCHC 31.1 (L) 31.5 - 35.7 g/dL    RDW 15.7 (H) 12.3 - 15.4 %    RDW-SD 47.7 37.0 - 54.0 fl    MPV 11.0 6.0 - 12.0 fL    Platelets 295 140 - 450 10*3/mm3    Neutrophil % 78.6 (H) 42.7 - 76.0 %    Lymphocyte % 8.7 (L) 19.6 - 45.3 %    Monocyte % 10.3 5.0 - 12.0 %    Eosinophil % 1.2 0.3 - 6.2 %    Basophil % 0.6 0.0 - 1.5 %    Immature Grans % 0.6 (H) 0.0 - 0.5 %    Neutrophils, Absolute 5.32 1.70 - 7.00 10*3/mm3    Lymphocytes, Absolute 0.59 (L) 0.70 - 3.10 10*3/mm3    Monocytes, Absolute 0.70 0.10 - 0.90 10*3/mm3    Eosinophils, Absolute 0.08 0.00 - 0.40 10*3/mm3    Basophils, Absolute 0.04 0.00 - 0.20 10*3/mm3     Immature Grans, Absolute 0.04 0.00 - 0.05 10*3/mm3    nRBC 0.0 0.0 - 0.2 /100 WBC   COVID-19,[facility], NP/OP SWAB IN UTM/VTM/[facility] TRANSPORT MEDIA,24 HR TAT - Swab, Nasopharynx    Collection Time: 07/05/21  7:18 PM    Specimen: Nasopharynx; Swab   Result Value Ref Range    COVID19 Not Detected Not Detected - Ref. Range   STAT Lactic Acid, Reflex    Collection Time: 07/05/21  7:21 PM    Specimen: Blood   Result Value Ref Range    Lactate 1.4 0.5 - 2.0 mmol/L   POC Glucose Once    Collection Time: 07/05/21  8:23 PM    Specimen: Blood   Result Value Ref Range    Glucose 132 (H) 70 - 130 mg/dL   Type & Screen    Collection Time: 07/05/21  9:45 PM    Specimen: Blood   Result Value Ref Range    ABO Type B     RH type Positive     Antibody Screen Negative     T&S Expiration Date 7/8/2021 11:59:59 PM    Prepare RBC, 1 Units    Collection Time: 07/06/21 12:06 AM   Result Value Ref Range    Product Code [id number]     Unit Number [id number]     UNIT  ABO B     UNIT  RH POS     Crossmatch Interpretation Compatible     Dispense Status IS     Blood Expiration Date [id number]     Blood Type Barcode 7300        Radiology:  Imaging Results (Last 24 Hours)     Procedure Component Value Units Date/Time    CT Abdomen Pelvis With Contrast [[id number]] Collected: 07/05/21 1829     Updated: 07/05/21 1833    Narrative:      CT ABDOMEN PELVIS W CONTRAST-     Radiation dose reduction techniques were utilized, including automated  exposure control and exposure modulation based on body size.     CLINICAL: Bloody diarrhea     COMPARISON:  CT of the abdomen and pelvis 08/10/2019     FINDINGS: At the base of the cecum there is a lobulated density which  does not have the appearance of fecal material measuring 3.7 cm  transverse, 2.4 cm craniocaudal and 3.9 cm AP. Possible large polyp. The  appendix is normal. The ileocecal valve is typical in appearance. There  is extensive diverticulosis of the colon without acute  diverticulitis.  No abnormality to suggest colitis.     Small sliding-type hiatal hernia. The stomach is collapsed, contour  within normal limits. The duodenum is unremarkable. Caliber of the small  bowel is satisfactory, no small bowel abnormality is seen. Sizable  duodenal diverticulum without atypical features.     The gallbladder is satisfactory in appearance. Tiny hepatic cysts left  lobe of the liver is suggested on image 28, the liver is otherwise  within normal limits. No biliary duct dilatation.     There is pancreatic ductal dilatation involving the tail to the mid body  and at the transition point no discrete mass is demonstrated. Etiology  of the obstruction is indeterminant. Although not seen well on the  previous examination, some ductal dilatation with truncation at the same  location suggested on the 2019 examination. Endoscopic ultrasound may be  the best means for further evaluation. PET/CT may also be helpful.  Biopsy may be indicated. Tumor not excluded.     Spleen is normal in size and shape and both adrenal glands have a  satisfactory appearance. Several tiny subcentimeter size nonobstructing  bilateral renal calculi seen. Large cyst arising from the upper pole of  the right kidney, measuring 5.4 cm in diameter. A few tiny left sided  renal calculi. Both kidneys are small in size for the patient's given  body habitus. Atherosclerotic plaque formation at the origin of the  celiac axis, SMA and bilateral renal arteries. Diameter of the aorta is  within normal limits. The bladder is collapsed. The uterus is lobulated,  enlarged and there are several calcified fibroids within the myometrium.  Both ovaries are satisfactory in size, there is calcification associated  with the left ovary.     CONCLUSION:  1. Lobulated indeterminant density at the base of the cecum, endoscopy  or barium enema recommended as follow-up.  2. Hiatal hernia.  3. Pancreatic ductal dilatation to the mid body where there is  abrupt  truncation and at this location no discrete mass identified. This may  have been present on the previous CT examination from 2019, however, not  well seen. Further evaluation with endoscopic ultrasound and/or PET/CT.  Tumor not excluded.  4. Bilateral nonobstructing renal calculi and renal cysts.  5. Diverticulosis of the colon.  6. A sizable duodenal diverticulum.     Findings of this report called to Dr. Novak in the emergency room at  the time of completion, 6:15 PM.              This report was finalized on 7/5/2021 6:30 PM by Dr. Nahum Mcintyre M.D.                  ASSESSMENT:   ESRD  Cecal density seen on CT, GI consulted    Lower GI bleeding  Hypertension  Anemia of CKD  COPD      PLAN:   We will proceed with dialysis today with no heparin  Continue Tuesday Thursday Saturday schedule  UF with dialysis as tolerated  Resume Epogen with dialysis for anemia  GI to see    Continue to monitor electrolytes and volume closely  I appreciate the consult request, please call if any questions      Chester Kang M.D  [facility]  Office phone number: [phone]  Answering service phone number: [phone]        7/6/2021        Dictation via Dragon dictation software

## 2021-07-07 ENCOUNTER — ANESTHESIA EVENT (OUTPATIENT)
Dept: GASTROENTEROLOGY | Facility: HOSPITAL | Age: 76
End: 2021-07-07

## 2021-07-07 ENCOUNTER — ANESTHESIA (OUTPATIENT)
Dept: GASTROENTEROLOGY | Facility: HOSPITAL | Age: 76
End: 2021-07-07

## 2021-07-07 LAB
ALBUMIN SERPL-MCNC: 2.6 G/DL (ref 3.5–5.2)
ALBUMIN/GLOB SERPL: 1 G/DL
ALP SERPL-CCNC: 97 U/L (ref 39–117)
ALT SERPL W P-5'-P-CCNC: 8 U/L (ref 1–33)
ANION GAP SERPL CALCULATED.3IONS-SCNC: 16 MMOL/L (ref 5–15)
AST SERPL-CCNC: 16 U/L (ref 1–32)
BASOPHILS # BLD AUTO: 0.02 10*3/MM3 (ref 0–0.2)
BASOPHILS NFR BLD AUTO: 0.4 % (ref 0–1.5)
BILIRUB SERPL-MCNC: 0.2 MG/DL (ref 0–1.2)
BUN SERPL-MCNC: 19 MG/DL (ref 8–23)
BUN/CREAT SERPL: 3.7 (ref 7–25)
CALCIUM SPEC-SCNC: 7 MG/DL (ref 8.6–10.5)
CANCER AG19-9 SERPL-ACNC: <0.6 U/ML
CEA SERPL-MCNC: 3.12 NG/ML
CHLORIDE SERPL-SCNC: 98 MMOL/L (ref 98–107)
CHOLEST SERPL-MCNC: 180 MG/DL (ref 0–200)
CO2 SERPL-SCNC: 21 MMOL/L (ref 22–29)
CREAT SERPL-MCNC: 5.19 MG/DL (ref 0.57–1)
DEPRECATED RDW RBC AUTO: 50.1 FL (ref 37–54)
EOSINOPHIL # BLD AUTO: 0.13 10*3/MM3 (ref 0–0.4)
EOSINOPHIL NFR BLD AUTO: 2.3 % (ref 0.3–6.2)
ERYTHROCYTE [DISTWIDTH] IN BLOOD BY AUTOMATED COUNT: 16.4 % (ref 12.3–15.4)
GFR SERPL CREATININE-BSD FRML MDRD: 10 ML/MIN/1.73
GFR SERPL CREATININE-BSD FRML MDRD: ABNORMAL ML/MIN/{1.73_M2}
GLOBULIN UR ELPH-MCNC: 2.5 GM/DL
GLUCOSE BLDC GLUCOMTR-MCNC: 100 MG/DL (ref 70–130)
GLUCOSE BLDC GLUCOMTR-MCNC: 101 MG/DL (ref 70–130)
GLUCOSE BLDC GLUCOMTR-MCNC: 103 MG/DL (ref 70–130)
GLUCOSE BLDC GLUCOMTR-MCNC: 46 MG/DL (ref 70–130)
GLUCOSE BLDC GLUCOMTR-MCNC: 48 MG/DL (ref 70–130)
GLUCOSE BLDC GLUCOMTR-MCNC: 56 MG/DL (ref 70–130)
GLUCOSE BLDC GLUCOMTR-MCNC: 60 MG/DL (ref 70–130)
GLUCOSE BLDC GLUCOMTR-MCNC: 91 MG/DL (ref 70–130)
GLUCOSE BLDC GLUCOMTR-MCNC: 91 MG/DL (ref 70–130)
GLUCOSE SERPL-MCNC: 71 MG/DL (ref 65–99)
HBA1C MFR BLD: 5.3 % (ref 4.8–5.6)
HCT VFR BLD AUTO: 21.6 % (ref 34–46.6)
HCT VFR BLD AUTO: 26.8 % (ref 34–46.6)
HDLC SERPL-MCNC: 38 MG/DL (ref 40–60)
HGB BLD-MCNC: 6.6 G/DL (ref 12–15.9)
HGB BLD-MCNC: 8.3 G/DL (ref 12–15.9)
IMM GRANULOCYTES # BLD AUTO: 0.02 10*3/MM3 (ref 0–0.05)
IMM GRANULOCYTES NFR BLD AUTO: 0.4 % (ref 0–0.5)
INR PPP: 1.1 (ref 0.9–1.1)
LDLC SERPL CALC-MCNC: 121 MG/DL (ref 0–100)
LDLC/HDLC SERPL: 3.13 {RATIO}
LYMPHOCYTES # BLD AUTO: 0.46 10*3/MM3 (ref 0.7–3.1)
LYMPHOCYTES NFR BLD AUTO: 8.1 % (ref 19.6–45.3)
MCH RBC QN AUTO: 26 PG (ref 26.6–33)
MCHC RBC AUTO-ENTMCNC: 30.6 G/DL (ref 31.5–35.7)
MCV RBC AUTO: 85 FL (ref 79–97)
MONOCYTES # BLD AUTO: 0.64 10*3/MM3 (ref 0.1–0.9)
MONOCYTES NFR BLD AUTO: 11.2 % (ref 5–12)
NEUTROPHILS NFR BLD AUTO: 4.43 10*3/MM3 (ref 1.7–7)
NEUTROPHILS NFR BLD AUTO: 77.6 % (ref 42.7–76)
NRBC BLD AUTO-RTO: 0 /100 WBC (ref 0–0.2)
PLATELET # BLD AUTO: 198 10*3/MM3 (ref 140–450)
PMV BLD AUTO: 10.6 FL (ref 6–12)
POTASSIUM SERPL-SCNC: 3.8 MMOL/L (ref 3.5–5.2)
PROT SERPL-MCNC: 5.1 G/DL (ref 6–8.5)
PROTHROMBIN TIME: 14.1 SECONDS (ref 11.7–14.2)
RBC # BLD AUTO: 2.54 10*6/MM3 (ref 3.77–5.28)
SODIUM SERPL-SCNC: 135 MMOL/L (ref 136–145)
TRIGL SERPL-MCNC: 116 MG/DL (ref 0–150)
TSH SERPL DL<=0.05 MIU/L-ACNC: 3.07 UIU/ML (ref 0.27–4.2)
VLDLC SERPL-MCNC: 21 MG/DL (ref 5–40)
WBC # BLD AUTO: 5.7 10*3/MM3 (ref 3.4–10.8)

## 2021-07-07 PROCEDURE — 83036 HEMOGLOBIN GLYCOSYLATED A1C: CPT | Performed by: HOSPITALIST

## 2021-07-07 PROCEDURE — 45380 COLONOSCOPY AND BIOPSY: CPT | Performed by: INTERNAL MEDICINE

## 2021-07-07 PROCEDURE — P9016 RBC LEUKOCYTES REDUCED: HCPCS

## 2021-07-07 PROCEDURE — 82962 GLUCOSE BLOOD TEST: CPT

## 2021-07-07 PROCEDURE — 94640 AIRWAY INHALATION TREATMENT: CPT

## 2021-07-07 PROCEDURE — 85018 HEMOGLOBIN: CPT | Performed by: HOSPITALIST

## 2021-07-07 PROCEDURE — 99221 1ST HOSP IP/OBS SF/LOW 40: CPT | Performed by: SURGERY

## 2021-07-07 PROCEDURE — 86900 BLOOD TYPING SEROLOGIC ABO: CPT

## 2021-07-07 PROCEDURE — 25010000002 PROPOFOL 10 MG/ML EMULSION: Performed by: ANESTHESIOLOGY

## 2021-07-07 PROCEDURE — 88305 TISSUE EXAM BY PATHOLOGIST: CPT | Performed by: INTERNAL MEDICINE

## 2021-07-07 PROCEDURE — 80053 COMPREHEN METABOLIC PANEL: CPT | Performed by: INTERNAL MEDICINE

## 2021-07-07 PROCEDURE — 94664 DEMO&/EVAL PT USE INHALER: CPT

## 2021-07-07 PROCEDURE — 0DJ08ZZ INSPECTION OF UPPER INTESTINAL TRACT, VIA NATURAL OR ARTIFICIAL OPENING ENDOSCOPIC: ICD-10-PCS | Performed by: INTERNAL MEDICINE

## 2021-07-07 PROCEDURE — 85014 HEMATOCRIT: CPT | Performed by: HOSPITALIST

## 2021-07-07 PROCEDURE — 45385 COLONOSCOPY W/LESION REMOVAL: CPT | Performed by: INTERNAL MEDICINE

## 2021-07-07 PROCEDURE — 86301 IMMUNOASSAY TUMOR CA 19-9: CPT | Performed by: SURGERY

## 2021-07-07 PROCEDURE — 85610 PROTHROMBIN TIME: CPT | Performed by: INTERNAL MEDICINE

## 2021-07-07 PROCEDURE — 36430 TRANSFUSION BLD/BLD COMPNT: CPT

## 2021-07-07 PROCEDURE — 82378 CARCINOEMBRYONIC ANTIGEN: CPT | Performed by: SURGERY

## 2021-07-07 PROCEDURE — 25010000002 PHENYLEPHRINE PER 1 ML: Performed by: ANESTHESIOLOGY

## 2021-07-07 PROCEDURE — 85025 COMPLETE CBC W/AUTO DIFF WBC: CPT | Performed by: INTERNAL MEDICINE

## 2021-07-07 PROCEDURE — 80061 LIPID PANEL: CPT | Performed by: HOSPITALIST

## 2021-07-07 PROCEDURE — 84443 ASSAY THYROID STIM HORMONE: CPT | Performed by: HOSPITALIST

## 2021-07-07 RX ORDER — LIDOCAINE HYDROCHLORIDE 20 MG/ML
INJECTION, SOLUTION INFILTRATION; PERINEURAL AS NEEDED
Status: DISCONTINUED | OUTPATIENT
Start: 2021-07-07 | End: 2021-07-07 | Stop reason: SURG

## 2021-07-07 RX ORDER — SODIUM CHLORIDE 9 MG/ML
30 INJECTION, SOLUTION INTRAVENOUS CONTINUOUS PRN
Status: DISCONTINUED | OUTPATIENT
Start: 2021-07-07 | End: 2021-07-12

## 2021-07-07 RX ORDER — PROPOFOL 10 MG/ML
VIAL (ML) INTRAVENOUS CONTINUOUS PRN
Status: DISCONTINUED | OUTPATIENT
Start: 2021-07-07 | End: 2021-07-07 | Stop reason: SURG

## 2021-07-07 RX ADMIN — DEXTROSE MONOHYDRATE 25 G: 500 INJECTION PARENTERAL at 16:50

## 2021-07-07 RX ADMIN — SODIUM CHLORIDE 30 ML/HR: 9 INJECTION, SOLUTION INTRAVENOUS at 11:00

## 2021-07-07 RX ADMIN — BUDESONIDE AND FORMOTEROL FUMARATE DIHYDRATE 2 PUFF: 160; 4.5 AEROSOL RESPIRATORY (INHALATION) at 20:46

## 2021-07-07 RX ADMIN — METOPROLOL SUCCINATE 25 MG: 25 TABLET, EXTENDED RELEASE ORAL at 06:19

## 2021-07-07 RX ADMIN — PHENYLEPHRINE HYDROCHLORIDE 200 MCG: 10 INJECTION INTRAVENOUS at 12:06

## 2021-07-07 RX ADMIN — PHENYLEPHRINE HYDROCHLORIDE 100 MCG: 10 INJECTION INTRAVENOUS at 12:10

## 2021-07-07 RX ADMIN — MONTELUKAST SODIUM 10 MG: 10 TABLET, FILM COATED ORAL at 20:55

## 2021-07-07 RX ADMIN — PROPOFOL 120 MCG/KG/MIN: 10 INJECTION, EMULSION INTRAVENOUS at 11:38

## 2021-07-07 RX ADMIN — SODIUM CHLORIDE, PRESERVATIVE FREE 10 ML: 5 INJECTION INTRAVENOUS at 20:55

## 2021-07-07 RX ADMIN — PANTOPRAZOLE SODIUM 40 MG: 40 INJECTION, POWDER, FOR SOLUTION INTRAVENOUS at 20:55

## 2021-07-07 RX ADMIN — PANTOPRAZOLE SODIUM 40 MG: 40 INJECTION, POWDER, FOR SOLUTION INTRAVENOUS at 09:14

## 2021-07-07 RX ADMIN — PHENYLEPHRINE HYDROCHLORIDE 100 MCG: 10 INJECTION INTRAVENOUS at 11:55

## 2021-07-07 RX ADMIN — PHENYLEPHRINE HYDROCHLORIDE 100 MCG: 10 INJECTION INTRAVENOUS at 11:51

## 2021-07-07 RX ADMIN — DEXTROSE MONOHYDRATE 25 G: 500 INJECTION PARENTERAL at 06:19

## 2021-07-07 RX ADMIN — POLYETHYLENE GLYCOL 3350 AND ELECTROLYTES WITH LEMON FLAVOR 2000 ML: 236; 22.74; 6.74; 5.86; 2.97 POWDER, FOR SOLUTION ORAL at 06:20

## 2021-07-07 RX ADMIN — PHENYLEPHRINE HYDROCHLORIDE 100 MCG: 10 INJECTION INTRAVENOUS at 12:01

## 2021-07-07 RX ADMIN — DEXTROSE MONOHYDRATE 25 G: 500 INJECTION PARENTERAL at 11:06

## 2021-07-07 RX ADMIN — LIDOCAINE HYDROCHLORIDE 60 MG: 20 INJECTION, SOLUTION INFILTRATION; PERINEURAL at 11:36

## 2021-07-07 NOTE — PLAN OF CARE
Goal Outcome Evaluation:  Plan of Care Reviewed With: patient        Progress: no change  Outcome Summary: C-scope completed today- cecal mass found; general surgery and vascular surgery consulted; 1 U of PRBC completed; D50 given x2 today; pt BG runs low -  notified; pt is up ad walt in room; still on clears; on room air; VSS.

## 2021-07-07 NOTE — ANESTHESIA PREPROCEDURE EVALUATION
Anesthesia Evaluation     Patient summary reviewed and Nursing notes reviewed   no history of anesthetic complications:  NPO Solid Status: > 6 hours  NPO Liquid Status: > 6 hours           Airway   Mallampati: II  TM distance: >3 FB  Neck ROM: full  no difficulty expected and No difficulty expected  Dental - normal exam     Pulmonary - normal exam    breath sounds clear to auscultation  (+) COPD, asthma,shortness of breath, sleep apnea,   (-) rhonchi, decreased breath sounds, wheezes, rales, stridor  Cardiovascular - normal exam    NYHA Classification: I  Patient on routine beta blocker and Beta blocker given within 24 hours of surgery  Rhythm: regular  Rate: normal    (+) hypertension, hyperlipidemia,   (-) murmur, weak pulses, friction rub, systolic click, carotid bruits, JVD, peripheral edema      Neuro/Psych  (+) weakness,     GI/Hepatic/Renal/Endo    (+) obesity,  GERD, GI bleeding lower active bleeding, renal disease ESRD, diabetes mellitus type 2 poorly controlled,     Musculoskeletal (-) negative ROS    Abdominal  - normal exam    Abdomen: soft.   Substance History - negative use     OB/GYN negative ob/gyn ROS         Other   blood dyscrasia anemia,                     Anesthesia Plan    ASA 4     MAC     intravenous induction     Anesthetic plan, all risks, benefits, and alternatives have been provided, discussed and informed consent has been obtained with: patient.

## 2021-07-07 NOTE — CONSULTS
Name: Nellie Vegas ADMIT: 2021   : 1945  PCP: Laurent Cortes DO    MRN: [id number] LOS: 2 days   AGE/SEX: 76 y.o. female  ROOM: [place] [id number]/     Inpatient Vascular Surgery Consult  Consult performed by: Speedy Coello MD  Consult ordered by: Chester Kang MD  Reason for consult: Problems with dialysis access        CC: High venous pressures on dialysis  Subjective     History of Present Illness  76 y.o. female who is currently mid to the hospital with anemia and found to have a right cecal mass. She was actually on the schedule to come to our office for a right arm shuntogram because of increasing venous pressures and prolonged bleeding on Friday and follows with Dr. Thierry Hardy.. However, because of her admission we were asked to see her as an inpatient consult instead. The patient reports that the inpatient dialysis nurse told her that she had high venous pressures but was able to dialyze yesterday without major issues.    HPI Elements:  Location: Right arm axillary artery to axillary vein AV loop graft  Severity: Least a moderate stenosis  Duration: Progressive over weeks  Context: Extensive venous outflow stenting  Associated Signs and Symptoms: No swelling    Review of Systems   Constitutional: Positive for fatigue.   Cardiovascular: Negative for chest pain.   Gastrointestinal: Positive for blood in stool and nausea.   All other systems reviewed and are negative.      History Review Reviewed Comments   Past Medical History:  [x]   End-stage renal disease, sleep apnea, diabetes   Past Surgical History: [x]   Right ax ax loop by Dr. Prado and thrombectomy last year   Family History: [x]   No aneurysmal disease   Social History: [x]   Patient is a former smoker but quit in the 60s     Allergies: Sulfa antibiotics and Latex      Objective   Temp:  [97.3 °F (36.3 °C)-98.6 °F (37 °C)] 97.5 °F (36.4 °C)  Heart Rate:  [56-81] 71  Resp:  [16-19] 18  BP: ()/(38-76)  127/72    I/O this shift:  In: 1060 [P.O.:560; I.V.:200; Blood:300]  Out: -     Physical Exam  Constitutional:       Appearance: She is obese. She is ill-appearing.   HENT:      Head: Normocephalic.      Right Ear: External ear normal.      Left Ear: External ear normal.      Nose: Nose normal.      Mouth/Throat:      Pharynx: Oropharyngeal exudate present. No posterior oropharyngeal erythema.   Eyes:      Pupils: Pupils are equal, round, and reactive to light.   Cardiovascular:      Rate and Rhythm: Normal rate.      Arteriovenous access: right arteriovenous access is present.     Comments: She does have a bruit. Mildly pulsatile. Very large pendulous upper arm adipose tissue.  Pulmonary:      Effort: Pulmonary effort is normal.      Breath sounds: Normal breath sounds.   Abdominal:      General: Abdomen is flat.      Palpations: Abdomen is soft.   Musculoskeletal:         General: No swelling.   Neurological:      General: No focal deficit present.      Mental Status: She is oriented to person, place, and time.       Data Reviewed:  CBC    Results from last 7 days   Lab Units 07/07/21  0442 07/06/21  0934 07/05/21  1626   WBC 10*3/mm3 5.70 5.39 6.77   HEMOGLOBIN g/dL 6.6* 7.6* 7.3*   PLATELETS 10*3/mm3 198 234 295     BMP   Results from last 7 days   Lab Units 07/07/21  0442 07/06/21  0934 07/05/21  1626   SODIUM mmol/L 135* 138 141   POTASSIUM mmol/L 3.8 4.0 3.6   CHLORIDE mmol/L 98 100 101   CO2 mmol/L 21.0* 18.2* 20.0*   BUN mg/dL 19 42* 36*   CREATININE mg/dL 5.19* 8.09* 7.86*   GLUCOSE mg/dL 71 92 131*     HbA1C   Lab Results   Component Value Date    HGBA1C 5.30 07/07/2021    HGBA1C 5.8 (H) 03/22/2021    HGBA1C 5.70 (H) 11/29/2019     Infection     Radiology(recent) No radiology results for the last day    Labs significant for: Hemoglobin was 6.6.    Imaging Studies:  Recent images from a fistulogram in our office are reviewed. Patient had extensive peripheral and central venous stenting and then had some  mid graft outflow stenosis treated with balloon angioplasty successfully.      Active Hospital Problems    Diagnosis  POA   • **ESRD (end stage renal disease) on dialysis (CMS/[facility]) [N18.6, Z99.2]  Not Applicable   • Lower GI bleeding [K92.2]  Yes      Resolved Hospital Problems   No resolved problems to display.       Data Points:  During this visit the following were done:  Labs Reviewed [x]    Labs Ordered []    Radiology Reports Reviewed [x]    Radiology Ordered []    EKG, echo, and/or stress test reviewed []    Vascular lab results reviewed  []    Vascular lab images reviewed and interpreted per myself   []    Referring Provider Records Reviewed []    ER Records Reviewed []    Hospital Records Reviewed/Summarized [x]    History Obtained From Family []    Radiological images view and Interpreted per myself [x]    Case Discussed with referring provider []     Decision to obtain and request outside records  []    Total data points:4 or more    Active Hospital Problems:   Active Hospital Problems    Diagnosis  POA   • **ESRD (end stage renal disease) on dialysis (CMS/[facility]) [N18.6, Z99.2]  Not Applicable   • Lower GI bleeding [K92.2]  Yes      Resolved Hospital Problems   No resolved problems to display.      Assessment/Plan   Billin  Assessment / Plan     76 y.o. female who is typically followed by Dr. Thierry Hardy in our office. She has a right arm axillary artery to axillary vein AV loop graft and has had extensive peripheral and central venous covered stents placed after thrombectomies in order to maintain secondary patency. Her last fistulogram in our office was in February and she had some mid graft outflow stenosis angioplastied successfully. She has been having progressive problems with cannulation and increased venous pressures over the last few weeks and was scheduled to come into our office on Friday for a shuntogram and possible intervention. However, she is been admitted to the hospital with a GI  bleed hemoglobin of 6.6 and found to have a cecal mass. Looks like this is likely going to require a right colon resection. While she was dialyzing yesterday, she was told by the inpatient dialysis nurse that she did indeed have increased venous pressures suggestive of an outflow obstruction. On exam, she has pendulous upper arms and an AV loop graft in place. There is a good bruit but there is some pulsatility noted as well.    I recommend that we get a duplex scan of the access. If she has a critical stenosis I would like to try to intervene prior to her colon resection. My thinking here is that hypotension during that surgery could lead to graft thrombosis which would be a bigger problem. I could do this as early as Friday. Surgery consultation is pending. If stenosis is not severe by duplex then I think we could continue to manage this as an outpatient.    We will follow. Discussed with patient. She agrees.    I discussed the patients findings and my recommendations with patient.  Please call my office with any question: [phone]

## 2021-07-07 NOTE — PROGRESS NOTES
LOS: 2 days     Chief Complaint/ Reason for encounter: ESRD, dialysis management  Chief Complaint   Patient presents with   • Rectal Bleeding         Subjective   Feels well no new complaints  Colonoscopy completed, large cecal mass seen, general surgery consulted  Tolerated dialysis well last night with no problems      Medical history reviewed:  History of Present Illness    Subjective    History taken from: Patient and chart    Vital Signs  Temp:  [97.3 °F (36.3 °C)-98.6 °F (37 °C)] 97.5 °F (36.4 °C)  Heart Rate:  [56-81] 71  Resp:  [16-19] 18  BP: ()/(38-76) 127/72       Wt Readings from Last 1 Encounters:   07/07/21 0619 78.2 kg (172 lb 6.4 oz)   07/06/21 0926 76.6 kg (168 lb 14.4 oz)   07/05/21 2011 73.5 kg (162 lb 1.6 oz)   07/05/21 1546 74.5 kg (164 lb 3.9 oz)       Objective    Objective:  General Appearance:  Comfortable, obese, well-appearing, in no acute distress and not in pain.  Awake, alert, oriented  HEENT: Mucous membranes moist, no injury, oropharynx clear  Lungs:  Normal effort and normal respiratory rate.  Breath sounds clear to auscultation.  No  respiratory distress.  No rales, decreased breath sounds or rhonchi.    Heart: Normal rate.  Regular rhythm.  S1 normal.  No murmur.   Abdomen: Abdomen is soft.  Bowel sounds are normal, no abdominal tenderness.  There is no rebound or guarding  Extremities: Normal range of motion.  Trace edema of bilateral lower extremities, distal pulses intact  Neurological: No focal motor or sensory deficits, pupils reactive  Skin:  Warm and dry.  No rash or cyanosis.       Results Review:    Intake/Output:     Intake/Output Summary (Last 24 hours) at 7/7/2021 1520  Last data filed at 7/7/2021 1256  Gross per 24 hour   Intake 1410 ml   Output 1000 ml   Net 410 ml         DATA:  Radiology and Labs:  The following labs independently reviewed by me. Additional labs ordered for tomorrow a.m.  Interval notes, chart personally reviewed by me.   Old records  independently reviewed showing ESRD on dialysis  The following radiologic studies independently viewed by me, findings colonoscopy findings noted showing large cecal mass  New problems include cecal mass, possible colon cancer  Discussed with patient    Risk/ complexity of medical care/ medical decision making moderate    Labs:   Recent Results (from the past 24 hour(s))   Prepare RBC, 1 Units    Collection Time: 07/06/21  6:00 PM   Result Value Ref Range    Product Code F6951D34     Unit Number [id number]     UNIT  ABO B     UNIT  RH POS     Crossmatch Interpretation Compatible     Dispense Status PT     Blood Expiration Date 914535020115     Blood Type Barcode 7300    POC Glucose Once    Collection Time: 07/06/21  7:48 PM    Specimen: Blood   Result Value Ref Range    Glucose 35 (C) 70 - 130 mg/dL   POC Glucose Once    Collection Time: 07/06/21 10:18 PM    Specimen: Blood   Result Value Ref Range    Glucose 101 70 - 130 mg/dL   Protime-INR    Collection Time: 07/07/21  4:42 AM    Specimen: Blood   Result Value Ref Range    Protime 14.1 11.7 - 14.2 Seconds    INR 1.10 0.90 - 1.10   Comprehensive Metabolic Panel    Collection Time: 07/07/21  4:42 AM    Specimen: Blood   Result Value Ref Range    Glucose 71 65 - 99 mg/dL    BUN 19 8 - 23 mg/dL    Creatinine 5.19 (H) 0.57 - 1.00 mg/dL    Sodium 135 (L) 136 - 145 mmol/L    Potassium 3.8 3.5 - 5.2 mmol/L    Chloride 98 98 - 107 mmol/L    CO2 21.0 (L) 22.0 - 29.0 mmol/L    Calcium 7.0 (L) 8.6 - 10.5 mg/dL    Total Protein 5.1 (L) 6.0 - 8.5 g/dL    Albumin 2.60 (L) 3.50 - 5.20 g/dL    ALT (SGPT) 8 1 - 33 U/L    AST (SGOT) 16 1 - 32 U/L    Alkaline Phosphatase 97 39 - 117 U/L    Total Bilirubin 0.2 0.0 - 1.2 mg/dL    eGFR Non  Amer      eGFR  African Amer 10 (L) >60 mL/min/1.73    Globulin 2.5 gm/dL    A/G Ratio 1.0 g/dL    BUN/Creatinine Ratio 3.7 (L) 7.0 - 25.0    Anion Gap 16.0 (H) 5.0 - 15.0 mmol/L   TSH    Collection Time: 07/07/21  4:42 AM    Specimen:  Blood   Result Value Ref Range    TSH 3.070 0.270 - 4.200 uIU/mL   Lipid Panel    Collection Time: 07/07/21  4:42 AM    Specimen: Blood   Result Value Ref Range    Total Cholesterol 180 0 - 200 mg/dL    Triglycerides 116 0 - 150 mg/dL    HDL Cholesterol 38 (L) 40 - 60 mg/dL    LDL Cholesterol  121 (H) 0 - 100 mg/dL    VLDL Cholesterol 21 5 - 40 mg/dL    LDL/HDL Ratio 3.13    Hemoglobin A1c    Collection Time: 07/07/21  4:42 AM    Specimen: Blood   Result Value Ref Range    Hemoglobin A1C 5.30 4.80 - 5.60 %   CBC Auto Differential    Collection Time: 07/07/21  4:42 AM    Specimen: Blood   Result Value Ref Range    WBC 5.70 3.40 - 10.80 10*3/mm3    RBC 2.54 (L) 3.77 - 5.28 10*6/mm3    Hemoglobin 6.6 (C) 12.0 - 15.9 g/dL    Hematocrit 21.6 (L) 34.0 - 46.6 %    MCV 85.0 79.0 - 97.0 fL    MCH 26.0 (L) 26.6 - 33.0 pg    MCHC 30.6 (L) 31.5 - 35.7 g/dL    RDW 16.4 (H) 12.3 - 15.4 %    RDW-SD 50.1 37.0 - 54.0 fl    MPV 10.6 6.0 - 12.0 fL    Platelets 198 140 - 450 10*3/mm3    Neutrophil % 77.6 (H) 42.7 - 76.0 %    Lymphocyte % 8.1 (L) 19.6 - 45.3 %    Monocyte % 11.2 5.0 - 12.0 %    Eosinophil % 2.3 0.3 - 6.2 %    Basophil % 0.4 0.0 - 1.5 %    Immature Grans % 0.4 0.0 - 0.5 %    Neutrophils, Absolute 4.43 1.70 - 7.00 10*3/mm3    Lymphocytes, Absolute 0.46 (L) 0.70 - 3.10 10*3/mm3    Monocytes, Absolute 0.64 0.10 - 0.90 10*3/mm3    Eosinophils, Absolute 0.13 0.00 - 0.40 10*3/mm3    Basophils, Absolute 0.02 0.00 - 0.20 10*3/mm3    Immature Grans, Absolute 0.02 0.00 - 0.05 10*3/mm3    nRBC 0.0 0.0 - 0.2 /100 WBC   POC Glucose Once    Collection Time: 07/07/21  6:03 AM    Specimen: Blood   Result Value Ref Range    Glucose 56 (L) 70 - 130 mg/dL   Prepare RBC, 1 Units    Collection Time: 07/07/21  6:34 AM   Result Value Ref Range    Product Code D4703R57     Unit Number [id number]     UNIT  ABO B     UNIT  RH POS     Crossmatch Interpretation Compatible     Dispense Status IS     Blood Expiration Date [id number]     Blood  Type Barcode 7300    POC Glucose Once    Collection Time: 07/07/21  7:34 AM    Specimen: Blood   Result Value Ref Range    Glucose 91 70 - 130 mg/dL   POC Glucose Once    Collection Time: 07/07/21 11:03 AM    Specimen: Blood   Result Value Ref Range    Glucose 48 (C) 70 - 130 mg/dL   POC Glucose Once    Collection Time: 07/07/21 11:27 AM    Specimen: Blood   Result Value Ref Range    Glucose 101 70 - 130 mg/dL   POC Glucose Once    Collection Time: 07/07/21 12:24 PM    Specimen: Blood   Result Value Ref Range    Glucose 103 70 - 130 mg/dL   POC Glucose Once    Collection Time: 07/07/21 12:55 PM    Specimen: Blood   Result Value Ref Range    Glucose 60 (L) 70 - 130 mg/dL       Radiology:  Imaging Results (Last 24 Hours)     ** No results found for the last 24 hours. **             Medications have been reviewed:  Current Facility-Administered Medications   Medication Dose Route Frequency Provider Last Rate Last Admin   • albuterol (PROVENTIL) nebulizer solution 0.083% 2.5 mg/3mL  2.5 mg Nebulization TID PRN Fabricio Monroe MD       • budesonide-formoterol (SYMBICORT) 160-4.5 MCG/ACT inhaler 2 puff  2 puff Inhalation BID - RT Fabricio Monroe MD       • dextrose (D50W) 25 g/ 50mL Intravenous Solution 25 g  25 g Intravenous Q15 Min PRN Fabricio Monroe MD   25 g at 07/07/21 1106   • dextrose (GLUTOSE) oral gel 15 g  15 g Oral Q15 Min PRN Fabricio Monroe MD       • epoetin polina-epbx (RETACRIT) injection 10,000 Units  10,000 Units Intravenous Once per day on Tue Thu Sat Fabricio Monroe MD       • glucagon (human recombinant) (GLUCAGEN DIAGNOSTIC) injection 1 mg  1 mg Subcutaneous Q15 Min PRN Fabricio Monroe MD       • HYDROcodone-acetaminophen (NORCO) 5-325 MG per tablet 1 tablet  1 tablet Oral Q8H PRN Fabricio Monroe MD       • insulin lispro (ADMELOG) injection 0-7 Units  0-7 Units Subcutaneous TID AC Fabricio Monroe MD       • metoprolol succinate XL (TOPROL-XL) 24  hr tablet 25 mg  25 mg Oral QAM Fabricio Monroe MD   25 mg at 07/07/21 0619   • montelukast (SINGULAIR) tablet 10 mg  10 mg Oral Nightly Fabricio Monroe MD   10 mg at 07/06/21 2000   • pantoprazole (PROTONIX) injection 40 mg  40 mg Intravenous BID Fabricio Monroe MD   40 mg at 07/07/21 0914   • sodium chloride 0.9 % flush 10 mL  10 mL Intravenous PRN Fabricio Monroe MD       • sodium chloride 0.9 % infusion  30 mL/hr Intravenous Continuous PRN Fabricio Monroe MD 30 mL/hr at 07/07/21 1118 Restarted at 07/07/21 1214       ASSESSMENT:  ESRD  Cecal cecal mass on colonoscopy, await biopsy, surgery consulted    Lower GI bleeding from a cecal mass  Hypertension  Anemia of CKD  COPD  AV fistula dysfunction       PLAN:   Continue Tuesday Thursday Saturday hemodialysis schedule  UF with dialysis as tolerated  No heparin with dialysis  Resume Epogen with dialysis for anemia  Endoscopy findings noted, surgery to see  Problems with AV fistula cannulation yesterday on HD, was scheduled for vascular access evaluation this week, will consult vascular to see     Continue to monitor electrolytes and volume closely         Chester Kang MD   [facility]   Office phone number: [phone]  Answering service phone number: [phone]    07/07/21  15:20 EDT    Dictation performed using Dragon dictation software

## 2021-07-07 NOTE — ANESTHESIA POSTPROCEDURE EVALUATION
"Patient: Nellie Vegas    Procedure Summary     Date: 07/07/21 Room / Location:  [facility]    Anesthesia Start: 1132 Anesthesia Stop: 1215    Procedure: COLONOSCOPY into cecum with biopsy (N/A ) Diagnosis:       ESRD (end stage renal disease) on dialysis (CMS/[facility])      (ESRD (end stage renal disease) on dialysis (CMS/[facility]) [N18.6, Z99.2])    Surgeons: Fabricio Monroe MD Provider: Ivan Wilson MD    Anesthesia Type: MAC ASA Status: 4          Anesthesia Type: MAC    Vitals  Vitals Value Taken Time   /53 07/07/21 1230   Temp     Pulse 70 07/07/21 1230   Resp 18 07/07/21 1230   SpO2 100 % 07/07/21 1230           Post Anesthesia Care and Evaluation    Patient location during evaluation: bedside  Patient participation: complete - patient participated  Level of consciousness: awake and alert  Pain management: adequate  Airway patency: patent  Anesthetic complications: No anesthetic complications    Cardiovascular status: acceptable  Respiratory status: acceptable  Hydration status: acceptable    Comments: /53 (BP Location: Left leg, Patient Position: Lying)   Pulse 70   Temp 36.5 °C (97.7 °F) (Oral)   Resp 18   Ht 152.4 cm (60\")   Wt 78.2 kg (172 lb 6.4 oz)   SpO2 100%   BMI 33.67 kg/m²           "

## 2021-07-07 NOTE — CONSULTS
Consultation note    Referring physician: Haja Chowdhury MD    Consulting Physician: Zbigniew Conner MD    Reason for consultation: Cecal mass, GI bleed    Chief Complaint   Patient presents with   • Rectal Bleeding       HPI:   The patient is a very pleasant 76 y.o. years old female that presented to the hospital with GI bleed.  The patient was in her usual state of health until last Saturday when she went to dialysis.  She noticed rectal bleeding after dialysis.  She presented to the emergency room for further evaluation.  She was found to have a hemoglobin of 6.6 and CT scan of the abdomen and pelvis was performed that showed evidence of a large cecal mass.  She underwent colonoscopy today that show evidence of a large cecal mass.  Patient has history of large cecal polyp that was removed piecemeal 2016 by Dr. Queen with.  She had a tubulovillous adenoma with high-grade dysplasia.  Patient was followed by by another GI doctor after she relocated the did not repeat her colonoscopy pathology is pending.  She has been also having issues with her AV fistula and this is being evaluated by Dr. Speedy Coello.  There is no family history of colon cancer      Past Medical History:   Diagnosis Date   • Anemia    • Anesthesia complication     mother woke up in combative state   • Arthritis     knees   • Asthma    • COPD (chronic obstructive pulmonary disease) (CMS/HCC)    • Diabetes mellitus (CMS/HCC)     type 2   • Dialysis patient (CMS/HCC)    • Disease of thyroid gland    • GERD (gastroesophageal reflux disease)    • Headache     after dialysis   • History of blood clots     BUE   • History of kidney stones    • Hyperlipidemia    • Hypertension    • Knee pain, bilateral    • Renal disease    • Sleep apnea     CPAP   • SOB (shortness of breath)    • 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)    • Weakness        Past Surgical History:   Procedure Laterality Date   • ARTERIOVENOUS FISTULA Right    •  ARTERIOVENOUS FISTULA Left     REMOVED   • CENTRAL VENOUS CATHETER TUNNELED INSERTION DOUBLE LUMEN     •  SECTION     • COLONOSCOPY  2016   • POLYPECTOMY      UTERINE   • SHUNT O GRAM Right 2019    Procedure: RIGHT ARM DIALYSIS GRAFT revision and THROMBECTOMY;  Surgeon: Thierry Hardy MD;  Location: [facility] OR ;  Service: Vascular   • SHUNT O GRAM Right 2019    Procedure: RIGHT ARM DIALYSIS GRAFT THROMBECTOMY WITH STENTING;  Surgeon: Thierry Hardy MD;  Location: [facility] OR ;  Service: Vascular   • SHUNT O GRAM Right 2020    Procedure: RIGHT ARM ARTERIOVENOUS SHUNTOGRAM WITH THROMBECTOMY;  Surgeon: Thierry Hardy MD;  Location: [facility] OR ;  Service: Vascular;  Laterality: Right;         Current Facility-Administered Medications:   •  albuterol (PROVENTIL) nebulizer solution 0.083% 2.5 mg/3mL, 2.5 mg, Nebulization, TID PRN, Fabricio Monroe MD  •  budesonide-formoterol (SYMBICORT) 160-4.5 MCG/ACT inhaler 2 puff, 2 puff, Inhalation, BID - RT, Fabricio Monroe MD  •  dextrose (D50W) 25 g/ 50mL Intravenous Solution 25 g, 25 g, Intravenous, Q15 Min PRN, Fabricio Monroe MD, 25 g at 21 1650  •  dextrose (GLUTOSE) oral gel 15 g, 15 g, Oral, Q15 Min PRN, Fabricio Monroe MD  •  epoetin polina-epbx (RETACRIT) injection 10,000 Units, 10,000 Units, Intravenous, Once per day on  Sat, Fabricio Monroe MD  •  glucagon (human recombinant) (GLUCAGEN DIAGNOSTIC) injection 1 mg, 1 mg, Subcutaneous, Q15 Min PRN, Fabricio Monroe MD  •  HYDROcodone-acetaminophen (NORCO) 5-325 MG per tablet 1 tablet, 1 tablet, Oral, Q8H PRN, Fabricio Monroe MD  •  insulin lispro (ADMELOG) injection 0-7 Units, 0-7 Units, Subcutaneous, TID AC, Fabricio Monroe MD  •  metoprolol succinate XL (TOPROL-XL) 24 hr tablet 25 mg, 25 mg, Oral, Fer OLIVARES Brian David, MD, 25 mg at 21 0619  •  montelukast (SINGULAIR) tablet 10 mg, 10  mg, Oral, Nightly, Fabricio Monroe MD, 10 mg at 21  •  pantoprazole (PROTONIX) injection 40 mg, 40 mg, Intravenous, BID, Fabricio Monroe MD, 40 mg at 21 0914  •  [COMPLETED] Insert peripheral IV, , , Once **AND** sodium chloride 0.9 % flush 10 mL, 10 mL, Intravenous, PRN, Fabricio Monroe MD  •  sodium chloride 0.9 % infusion, 30 mL/hr, Intravenous, Continuous PRN, Fabricio Monroe MD, Last Rate: 30 mL/hr at 21 1118, Restarted at 21 1214    Allergies   Allergen Reactions   • Sulfa Antibiotics Hives   • Latex Rash       Social History     Socioeconomic History   • Marital status:      Spouse name: Not on file   • Number of children: Not on file   • Years of education: Not on file   • Highest education level: Not on file   Tobacco Use   • Smoking status: Former Smoker     Packs/day: 0.00     Years: 4.00     Pack years: 0.00     Types: Cigarettes     Quit date:      Years since quittin.5   • Smokeless tobacco: Never Used   Vaping Use   • Vaping Use: Never used   Substance and Sexual Activity   • Alcohol use: No   • Drug use: No   • Sexual activity: Defer       Family History   Problem Relation Age of Onset   • Malig Hyperthermia Neg Hx        ROS:   Constitutional: denies any weight changes, fatigue or weakness.  Eyes: : denies blurred or double vision  Cardiovascular: denies chest pain, palpitations, edemas.  Respiratory: denies cough, sputum, SOB.  Gastrointestinal: denies N&V, abd pain, diarrhea, constipation.  Reports rectal bleeding  Genitourinary: denies dysuria, frequency.  Endocrine: denies cold intolerance, lethargy and flushing.  Hem: denies excessive bruising and postop bleeding.  Musculoskeletal: denies weakness, joint swelling, pain or stiffness.  Neuro: denies seizures, CVA, paresthesia, or peripheral neuropathy.   Skin: denies change in nevi, rashes, masses.    Physical Exam:   Vitals:    21 1256   BP: 127/72   Pulse: 71   Resp:  18   Temp: 97.5 °F (36.4 °C)   SpO2: 95%     GENERAL:oriented to person, place, and time and chronically ill appearing  HEENT: normochephalic, atraumatic, no scleral icterus moist mucous membranes.  NECK: Supple there is no thyromegaly or lymphadenopathy  CHEST: clear to auscultation, no wheezes, rales or rhonchi, symmetric air entry  CARDIAC: regular rate and rhythm    ABDOMEN: soft, nontender, nondistended, no masses or organomegaly  EXTREMITIES: no cyanosis, clubbing or edema, AV fistula on right arm  NEURO: alert and oriented, normal speech, cranial nerves 2-12 grossly intact, no focal deficits   SKIN: Moist, warm, no rashes.    Diagnostic workup:   Colonoscopy done by Dr. Holder was reviewed by me  Patient has 3 sessile polyps in the hepatic flexure and 3 in the transverse colon.  Large cecal mass.  All polyps were removed.  Mass was biopsied    CT scan abdomen pelvis 7/7/2021: Mass of the cecum, hiatal hernia, pancreatic duct dilation without evidence of mass    CT scan abdomen pelvis 2019.  There is    Hemoglobin 6.6 from 7.6  Patient has last hemoglobin of 11.6 on 3/22/2021  Creatinine 5.1, BUN 19  More blood cell count 5.7, platelets 198    Assessment and plan:   The patient is a very pleasant 76 y.o. years old female with large cecal polyp with prior history of tubulovillous adenoma with high-grade dysplasia over the area.  I reviewed the images and is likely that the polypoid mass has some type of high-grade dysplasia.  Regardless I am not sure if this is the source of the bleeding but it seems highly likely.  I recommend the patient undergoes laparoscopic right hemicolectomy.  Patient is undergoing work-up for malfunction of her AV fistula and will await this before proceeding with laparoscopic right hemicolectomy.   Pancreatic tail abnormality.  We will need to have MRCP for further evaluation  I will order CEA and CA 19-9    Case was discussed with Haja Chowdhury MD and patient.    Risk and benefits of  the current recommended treatment were explained to the patient that had time to ask questions that where answered, verbalized understanding and agreed with the plan.     Zbigniew Conner MD  General, Minimally Invasive and Endoscopic Surgery  [facility]    [address], [address]  [place], [place], [address]  P: [phone]  F: [phone]

## 2021-07-07 NOTE — PROGRESS NOTES
"Daily progress note    Chief complaint  Doing better  No new complaints  Going for colonoscopy    History of present illness  73-year-old -American female with history of end-stage renal disease on hemodialysis COPD diabetes mellitus hypertension and chronic anemia brought to the emergency room with black diarrhea started on Saturday.  Patient has 4 episodes.  Patient also have abdominal discomfort.  Patient has been noticing black stools for several days.  Patient denies any nausea vomiting.  Patient denies any fever cough chest pain shortness of breath.  Patient work-up in ER revealed acute blood loss anemia with lower GI bleed admit for management.      REVIEW OF SYSTEMS  Constitutional: Positive for fatigue. Negative for chills and fever.   Respiratory: Negative for chest tightness and shortness of breath.    Cardiovascular: Negative for chest pain.   Gastrointestinal: Positive for abdominal pain and blood in stool. Negative for nausea, rectal pain and vomiting.   Neurological: Negative for light-headedness and headaches.      PHYSICAL EXAM   Blood pressure 110/53, pulse 70, temperature 97.7 °F (36.5 °C), temperature source Oral, resp. rate 18, height 152.4 cm (60\"), weight 78.2 kg (172 lb 6.4 oz), SpO2 100 %, not currently breastfeeding.    Constitutional: Pt. is oriented to person, place, and time   HENT: Normocephalic and atraumatic. Oropharynx moist/nonerythematous.  Neck: Normal range of motion. Neck supple. No JVD present.   Cardiovascular: Normal rate, regular rhythm and normal heart sounds. Exam reveals no gallop and no friction rub. No murmur heard.  Pulmonary/Chest: Effort normal and breath sounds normal. No stridor. No respiratory distress. No wheezes, no rales.   Abdominal: Soft. Bowel sounds are normal. No distension. There is no tenderness. There is no rebound and no guarding.   Musculoskeletal: Normal range of motion. No edema, tenderness or deformity.   Neurological: Pt. is alert and " oriented to person, place, and time.  She has no focal neurologic deficits  Skin: Skin is warm and dry. No rash noted. Pt. is not diaphoretic. No erythema.   Psychiatric: Mood, affect and judgment normal.  She is pleasant and cooperative.    LAB RESULTS  Lab Results (last 24 hours)     Procedure Component Value Units Date/Time    POC Glucose Once [[id number]]  (Normal) Collected: 07/07/21 1224    Specimen: Blood Updated: 07/07/21 1225     Glucose 103 mg/dL      Comment: Meter: [id number] : [id number] Bassam Wu RN       POC Glucose Once [[id number]]  (Normal) Collected: 07/07/21 1127    Specimen: Blood Updated: 07/07/21 1133     Glucose 101 mg/dL      Comment: Meter: [id number] : [id number] King Essie VU RN       POC Glucose Once [[id number]]  (Abnormal) Collected: 07/07/21 1103    Specimen: Blood Updated: 07/07/21 1105     Glucose 48 mg/dL      Comment: MD Notified R and V Meter: [id number] : [id number] Dandy Amaya RN       Hemoglobin A1c [[id number]]  (Normal) Collected: 07/07/21 0442    Specimen: Blood Updated: 07/07/21 0739     Hemoglobin A1C 5.30 %     Narrative:      Hemoglobin A1C Ranges:    Increased Risk for Diabetes  5.7% to 6.4%  Diabetes                     >= 6.5%  Diabetic Goal                < 7.0%    POC Glucose Once [[id number]]  (Normal) Collected: 07/07/21 0734    Specimen: Blood Updated: 07/07/21 0736     Glucose 91 mg/dL      Comment: Meter: [id number] : [id number] Rochelle Melvin RN       POC Glucose Once [[id number]]  (Abnormal) Collected: 07/07/21 0603    Specimen: Blood Updated: 07/07/21 0604     Glucose 56 mg/dL      Comment: Meter: [id number] : [id number] Will WADDELL       CBC & Differential [[id number]]  (Abnormal) Collected: 07/07/21 0442    Specimen: Blood Updated: 07/07/21 0601    Narrative:      The following orders were created for panel order CBC & Differential.  Procedure                               Abnormality         Status                     ---------                                -----------         ------                     CBC Auto Differential[[id number]]        Abnormal            Final result                 Please view results for these tests on the individual orders.    CBC Auto Differential [[id number]]  (Abnormal) Collected: 07/07/21 0442    Specimen: Blood Updated: 07/07/21 0601     WBC 5.70 10*3/mm3      RBC 2.54 10*6/mm3      Hemoglobin 6.6 g/dL      Hematocrit 21.6 %      MCV 85.0 fL      MCH 26.0 pg      MCHC 30.6 g/dL      RDW 16.4 %      RDW-SD 50.1 fl      MPV 10.6 fL      Platelets 198 10*3/mm3      Neutrophil % 77.6 %      Lymphocyte % 8.1 %      Monocyte % 11.2 %      Eosinophil % 2.3 %      Basophil % 0.4 %      Immature Grans % 0.4 %      Neutrophils, Absolute 4.43 10*3/mm3      Lymphocytes, Absolute 0.46 10*3/mm3      Monocytes, Absolute 0.64 10*3/mm3      Eosinophils, Absolute 0.13 10*3/mm3      Basophils, Absolute 0.02 10*3/mm3      Immature Grans, Absolute 0.02 10*3/mm3      nRBC 0.0 /100 WBC     Comprehensive Metabolic Panel [[id number]]  (Abnormal) Collected: 07/07/21 0442    Specimen: Blood Updated: 07/07/21 0558     Glucose 71 mg/dL      BUN 19 mg/dL      Creatinine 5.19 mg/dL      Sodium 135 mmol/L      Potassium 3.8 mmol/L      Comment: Slight hemolysis detected by analyzer. Results may be affected.        Chloride 98 mmol/L      CO2 21.0 mmol/L      Calcium 7.0 mg/dL      Total Protein 5.1 g/dL      Albumin 2.60 g/dL      ALT (SGPT) 8 U/L      AST (SGOT) 16 U/L      Alkaline Phosphatase 97 U/L      Total Bilirubin 0.2 mg/dL      eGFR Non  Amer --     Comment: <15 Indicative of kidney failure.        eGFR  African Amer 10 mL/min/1.73      Comment: <15 Indicative of kidney failure.        Globulin 2.5 gm/dL      A/G Ratio 1.0 g/dL      BUN/Creatinine Ratio 3.7     Anion Gap 16.0 mmol/L     Narrative:      GFR Normal >60  Chronic Kidney Disease <60  Kidney Failure <15      TSH [[id number]]  (Normal) Collected: 07/07/21 0442     Specimen: Blood Updated: 07/07/21 0558     TSH 3.070 uIU/mL     Lipid Panel [[id number]]  (Abnormal) Collected: 07/07/21 0442    Specimen: Blood Updated: 07/07/21 0555     Total Cholesterol 180 mg/dL      Triglycerides 116 mg/dL      HDL Cholesterol 38 mg/dL      LDL Cholesterol  121 mg/dL      VLDL Cholesterol 21 mg/dL      LDL/HDL Ratio 3.13    Narrative:      Cholesterol Reference Ranges  (U.S. Department of Health and Human Services ATP III Classifications)    Desirable          <200 mg/dL  Borderline High    200-239 mg/dL  High Risk          >240 mg/dL      Triglyceride Reference Ranges  (U.S. Department of Health and Human Services ATP III Classifications)    Normal           <150 mg/dL  Borderline High  150-199 mg/dL  High             200-499 mg/dL  Very High        >500 mg/dL    HDL Reference Ranges  (U.S. Department of Health and Human Services ATP III Classifcations)    Low     <40 mg/dl (major risk factor for CHD)  High    >60 mg/dl ('negative' risk factor for CHD)        LDL Reference Ranges  (U.S. Department of Health and Human Services ATP III Classifcations)    Optimal          <100 mg/dL  Near Optimal     100-129 mg/dL  Borderline High  130-159 mg/dL  High             160-189 mg/dL  Very High        >189 mg/dL    Protime-INR [[id number]]  (Normal) Collected: 07/07/21 0442    Specimen: Blood Updated: 07/07/21 0545     Protime 14.1 Seconds      INR 1.10    POC Glucose Once [[id number]]  (Normal) Collected: 07/06/21 2218    Specimen: Blood Updated: 07/06/21 2219     Glucose 101 mg/dL      Comment: Meter: [id number] : [id number] Will WADDELL       POC Glucose Once [[id number]]  (Abnormal) Collected: 07/06/21 1948    Specimen: Blood Updated: 07/06/21 1950     Glucose 35 mg/dL      Comment: RN Notified R and V Meter: [id number] : [id number] Will WADDELL           Imaging Results (Last 24 Hours)     ** No results found for the last 24 hours. **          Current Facility-Administered Medications:    •  albuterol (PROVENTIL) nebulizer solution 0.083% 2.5 mg/3mL, 2.5 mg, Nebulization, TID PRN, Haja Chowdhury MD  •  amLODIPine (NORVASC) tablet 10 mg, 10 mg, Oral, Q24H, Haja Chowdhury MD, Stopped at 07/07/21 0914  •  budesonide-formoterol (SYMBICORT) 160-4.5 MCG/ACT inhaler 2 puff, 2 puff, Inhalation, BID - RT, Haja Chowdhury MD  •  dextrose (D50W) 25 g/ 50mL Intravenous Solution 25 g, 25 g, Intravenous, Q15 Min PRN, Haja Chowdhury MD, 25 g at 07/07/21 1106  •  dextrose (GLUTOSE) oral gel 15 g, 15 g, Oral, Q15 Min PRN, Haja Chowdhury MD  •  epoetin polina-epbx (RETACRIT) injection 10,000 Units, 10,000 Units, Intravenous, Once per day on Tue Thu SatPearl Andrew James, MD  •  glucagon (human recombinant) (GLUCAGEN DIAGNOSTIC) injection 1 mg, 1 mg, Subcutaneous, Q15 Min PRN, Haja Chowdhury MD  •  guaiFENesin (MUCINEX) 12 hr tablet 600 mg, 600 mg, Oral, Q12H, Haja Chowdhury MD, Stopped at 07/07/21 0915  •  HYDROcodone-acetaminophen (NORCO) 5-325 MG per tablet 1 tablet, 1 tablet, Oral, Q8H PRN, Haja Chowdhury MD  •  insulin lispro (ADMELOG) injection 0-7 Units, 0-7 Units, Subcutaneous, TID AC, Haja Chowdhury MD  •  metoprolol succinate XL (TOPROL-XL) 24 hr tablet 25 mg, 25 mg, Oral, QAM, Haja Chowdhury MD, 25 mg at 07/07/21 0619  •  montelukast (SINGULAIR) tablet 10 mg, 10 mg, Oral, Nightly, Haja Chowdhury MD, 10 mg at 07/06/21 2000  •  pantoprazole (PROTONIX) injection 40 mg, 40 mg, Intravenous, BID, Haja Chowdhury MD, 40 mg at 07/07/21 0914  •  [COMPLETED] Insert peripheral IV, , , Once **AND** sodium chloride 0.9 % flush 10 mL, 10 mL, Intravenous, PRN, Gagan Novak MD  •  sodium chloride 0.9 % infusion, 30 mL/hr, Intravenous, Continuous PRN, Fabricio Monroe MD, Last Rate: 30 mL/hr at 07/07/21 1118, Restarted at 07/07/21 1214     ASSESSMENT  Lower GI bleed  Acute blood loss anemia  End-stage renal disease on hemodialysis  COPD  Hypertension  Diabetes mellitus  Chronic anemia  Gastroesophageal reflux  disease    PLAN  CPM  IV Protonix  Continue hold Eliquis  Monitor H&H  Transfuse 2 units packed RBC   Colonoscopy today  GI consult appreciated  Nephrology to follow patient for hemodialysis  Accu-Chek sliding scale insulin  Adjust home medications  Stress ulcer DVT prophylaxis  Supportive care  Discussed with nursing staff  Follow closely further recommendation according to hospital course    ALLAN MARTIN MD

## 2021-07-07 NOTE — PLAN OF CARE
Goal Outcome Evaluation:  Plan of Care Reviewed With: patient  Progress: improving  Outcome Summary: All needs met. Up ad walt to BSC. Clear liquid diet. Bowel prep started and completed first half per order. VSS. Mental status at baseline. On room air. NSR on the monitor. No complaints. Bedtime sugar 35, D50 given and effective. Blood glucose monitoring continued. Will CTM. Plan for colonosopy today, consent and education signed and in the chart.

## 2021-07-08 ENCOUNTER — APPOINTMENT (OUTPATIENT)
Dept: CARDIOLOGY | Facility: HOSPITAL | Age: 76
End: 2021-07-08

## 2021-07-08 LAB
ANION GAP SERPL CALCULATED.3IONS-SCNC: 18.5 MMOL/L (ref 5–15)
BASOPHILS # BLD AUTO: 0.02 10*3/MM3 (ref 0–0.2)
BASOPHILS NFR BLD AUTO: 0.4 % (ref 0–1.5)
BH BB BLOOD EXPIRATION DATE: NORMAL
BH BB BLOOD TYPE BARCODE: 7300
BH BB DISPENSE STATUS: NORMAL
BH BB PRODUCT CODE: NORMAL
BH BB UNIT NUMBER: NORMAL
BH CV VAS DIALYSIS ARTERIAL ANASTOMOSIS DIAMETER: 0.71 CM
BH CV VAS DIALYSIS ARTERIAL ANASTOMOSIS EDV: 101 CM/SEC
BH CV VAS DIALYSIS ARTERIAL ANASTOMOSIS PSV: 209 CM/SEC
BH CV VAS DIALYSIS CONDUIT DIST DEPTH: 0.25 CM
BH CV VAS DIALYSIS CONDUIT DIST DIAMETER: 0.6 CM
BH CV VAS DIALYSIS CONDUIT DIST EDV: 81 CM/SEC
BH CV VAS DIALYSIS CONDUIT DIST FLOW VOL: 607 ML/MIN
BH CV VAS DIALYSIS CONDUIT DIST PSV: 152 CM/SEC
BH CV VAS DIALYSIS CONDUIT MID DEPTH: 0.21 CM
BH CV VAS DIALYSIS CONDUIT MID DIAMETER: 0.53 CM
BH CV VAS DIALYSIS CONDUIT MID EDV: 47 CM/SEC
BH CV VAS DIALYSIS CONDUIT MID FLOW VOL: 317 ML/MIN
BH CV VAS DIALYSIS CONDUIT MID PSV: 90 CM/SEC
BH CV VAS DIALYSIS CONDUIT MID/DIST DEPTH: 0.14 CM
BH CV VAS DIALYSIS CONDUIT MID/DIST DIAMETER: 0.8 CM
BH CV VAS DIALYSIS CONDUIT MID/DIST EDV: 368 CM/SEC
BH CV VAS DIALYSIS CONDUIT MID/DIST PSV: 675 CM/SEC
BH CV VAS DIALYSIS CONDUIT PROX DEPTH: 0.32 CM
BH CV VAS DIALYSIS CONDUIT PROX DIAMETER: 0.71 CM
BH CV VAS DIALYSIS CONDUIT PROX EDV: 81 CM/SEC
BH CV VAS DIALYSIS CONDUIT PROX FLOW VOL: 750 ML/MIN
BH CV VAS DIALYSIS CONDUIT PROX PSV: 182 CM/SEC
BH CV VAS DIALYSIS CONDUIT PROX/MID DEPTH: 0.39 CM
BH CV VAS DIALYSIS CONDUIT PROX/MID DIAMETER: 0.55 CM
BH CV VAS DIALYSIS CONDUIT PROX/MID EDV: 52 CM/SEC
BH CV VAS DIALYSIS CONDUIT PROX/MID FLOW VOL: 570 ML/MIN
BH CV VAS DIALYSIS CONDUIT PROX/MID PSV: 96 CM/SEC
BH CV VAS DIALYSIS PRE-INFLOW AXILLARY EDV: 39 CM/SEC
BH CV VAS DIALYSIS PRE-INFLOW AXILLARY FLOW VOL: 781 ML/MIN
BH CV VAS DIALYSIS PRE-INFLOW AXILLARY PSV: 108 CM/SEC
BH CV VAS DIALYSIS PRE-INFLOW BRACHIAL PSV: 78 CM/SEC
BH CV VAS DIALYSIS PRE-INFLOW RADIAL PSV: 108 CM/SEC
BH CV VAS DIALYSIS PRE-INFLOW SUBCLAV PSV: 116 CM/SEC
BH CV VAS DIALYSIS PRE-INFLOW SUBCLAVIAN EDV: 35 CM/SEC
BH CV VAS DIALYSIS STENT ONE  DIST STENT PSV: 63 CM/SEC
BH CV VAS DIALYSIS STENT ONE MID STENT PSV: 67 CM/SEC
BH CV VAS DIALYSIS STENT ONE PROX STENT PSV: 131 CM/SEC
BH CV VAS DIALYSIS VENOUS ANASTOMOSIS EDV: 65 CM/SEC
BH CV VAS DIALYSIS VENOUS ANASTOMOSIS PSV: 131 CM/SEC
BH CV VAS FREE TEXT STENT ONE: NORMAL
BUN SERPL-MCNC: 23 MG/DL (ref 8–23)
BUN/CREAT SERPL: 4 (ref 7–25)
CALCIUM SPEC-SCNC: 6.6 MG/DL (ref 8.6–10.5)
CHLORIDE SERPL-SCNC: 95 MMOL/L (ref 98–107)
CO2 SERPL-SCNC: 20.5 MMOL/L (ref 22–29)
CREAT SERPL-MCNC: 5.81 MG/DL (ref 0.57–1)
CROSSMATCH INTERPRETATION: NORMAL
CYTO UR: NORMAL
DEPRECATED RDW RBC AUTO: 54.2 FL (ref 37–54)
EOSINOPHIL # BLD AUTO: 0.19 10*3/MM3 (ref 0–0.4)
EOSINOPHIL NFR BLD AUTO: 3.8 % (ref 0.3–6.2)
ERYTHROCYTE [DISTWIDTH] IN BLOOD BY AUTOMATED COUNT: 18.4 % (ref 12.3–15.4)
GFR SERPL CREATININE-BSD FRML MDRD: 9 ML/MIN/1.73
GFR SERPL CREATININE-BSD FRML MDRD: ABNORMAL ML/MIN/{1.73_M2}
GLUCOSE BLDC GLUCOMTR-MCNC: 119 MG/DL (ref 70–130)
GLUCOSE BLDC GLUCOMTR-MCNC: 130 MG/DL (ref 70–130)
GLUCOSE BLDC GLUCOMTR-MCNC: 138 MG/DL (ref 70–130)
GLUCOSE BLDC GLUCOMTR-MCNC: 60 MG/DL (ref 70–130)
GLUCOSE BLDC GLUCOMTR-MCNC: 86 MG/DL (ref 70–130)
GLUCOSE BLDC GLUCOMTR-MCNC: 88 MG/DL (ref 70–130)
GLUCOSE BLDC GLUCOMTR-MCNC: 93 MG/DL (ref 70–130)
GLUCOSE SERPL-MCNC: 62 MG/DL (ref 65–99)
HCT VFR BLD AUTO: 25.5 % (ref 34–46.6)
HGB BLD-MCNC: 7.9 G/DL (ref 12–15.9)
IMM GRANULOCYTES # BLD AUTO: 0.01 10*3/MM3 (ref 0–0.05)
IMM GRANULOCYTES NFR BLD AUTO: 0.2 % (ref 0–0.5)
LAB AP CASE REPORT: NORMAL
LAB AP DIAGNOSIS COMMENT: NORMAL
LYMPHOCYTES # BLD AUTO: 0.52 10*3/MM3 (ref 0.7–3.1)
LYMPHOCYTES NFR BLD AUTO: 10.3 % (ref 19.6–45.3)
MAXIMAL PREDICTED HEART RATE: 145 BPM
MCH RBC QN AUTO: 25 PG (ref 26.6–33)
MCHC RBC AUTO-ENTMCNC: 31 G/DL (ref 31.5–35.7)
MCV RBC AUTO: 80.7 FL (ref 79–97)
MONOCYTES # BLD AUTO: 0.69 10*3/MM3 (ref 0.1–0.9)
MONOCYTES NFR BLD AUTO: 13.6 % (ref 5–12)
NEUTROPHILS NFR BLD AUTO: 3.63 10*3/MM3 (ref 1.7–7)
NEUTROPHILS NFR BLD AUTO: 71.7 % (ref 42.7–76)
NRBC BLD AUTO-RTO: 0 /100 WBC (ref 0–0.2)
PATH REPORT.FINAL DX SPEC: NORMAL
PATH REPORT.GROSS SPEC: NORMAL
PLATELET # BLD AUTO: 188 10*3/MM3 (ref 140–450)
PMV BLD AUTO: 10.2 FL (ref 6–12)
POTASSIUM SERPL-SCNC: 3.4 MMOL/L (ref 3.5–5.2)
RBC # BLD AUTO: 3.16 10*6/MM3 (ref 3.77–5.28)
SARS-COV-2 RNA RESP QL NAA+PROBE: NOT DETECTED
SODIUM SERPL-SCNC: 134 MMOL/L (ref 136–145)
STRESS TARGET HR: 123 BPM
UNIT  ABO: NORMAL
UNIT  RH: NORMAL
WBC # BLD AUTO: 5.06 10*3/MM3 (ref 3.4–10.8)

## 2021-07-08 PROCEDURE — 94799 UNLISTED PULMONARY SVC/PX: CPT

## 2021-07-08 PROCEDURE — 80048 BASIC METABOLIC PNL TOTAL CA: CPT | Performed by: HOSPITALIST

## 2021-07-08 PROCEDURE — 99231 SBSQ HOSP IP/OBS SF/LOW 25: CPT | Performed by: SURGERY

## 2021-07-08 PROCEDURE — U0003 INFECTIOUS AGENT DETECTION BY NUCLEIC ACID (DNA OR RNA); SEVERE ACUTE RESPIRATORY SYNDROME CORONAVIRUS 2 (SARS-COV-2) (CORONAVIRUS DISEASE [COVID-19]), AMPLIFIED PROBE TECHNIQUE, MAKING USE OF HIGH THROUGHPUT TECHNOLOGIES AS DESCRIBED BY CMS-2020-01-R: HCPCS | Performed by: SURGERY

## 2021-07-08 PROCEDURE — 85025 COMPLETE CBC W/AUTO DIFF WBC: CPT | Performed by: HOSPITALIST

## 2021-07-08 PROCEDURE — 99232 SBSQ HOSP IP/OBS MODERATE 35: CPT | Performed by: INTERNAL MEDICINE

## 2021-07-08 PROCEDURE — 25010000002 EPOETIN ALFA-EPBX 10000 UNIT/ML SOLUTION: Performed by: INTERNAL MEDICINE

## 2021-07-08 PROCEDURE — 82962 GLUCOSE BLOOD TEST: CPT

## 2021-07-08 PROCEDURE — 93990 DOPPLER FLOW TESTING: CPT

## 2021-07-08 RX ORDER — PANTOPRAZOLE SODIUM 40 MG/1
40 TABLET, DELAYED RELEASE ORAL
Status: DISCONTINUED | OUTPATIENT
Start: 2021-07-09 | End: 2021-07-09

## 2021-07-08 RX ADMIN — PANTOPRAZOLE SODIUM 40 MG: 40 INJECTION, POWDER, FOR SOLUTION INTRAVENOUS at 12:04

## 2021-07-08 RX ADMIN — SODIUM CHLORIDE, PRESERVATIVE FREE 10 ML: 5 INJECTION INTRAVENOUS at 20:05

## 2021-07-08 RX ADMIN — EPOETIN ALFA-EPBX 10000 UNITS: 10000 INJECTION, SOLUTION INTRAVENOUS; SUBCUTANEOUS at 15:12

## 2021-07-08 RX ADMIN — METOPROLOL SUCCINATE 25 MG: 25 TABLET, EXTENDED RELEASE ORAL at 06:09

## 2021-07-08 RX ADMIN — BUDESONIDE AND FORMOTEROL FUMARATE DIHYDRATE 2 PUFF: 160; 4.5 AEROSOL RESPIRATORY (INHALATION) at 07:54

## 2021-07-08 RX ADMIN — MONTELUKAST SODIUM 10 MG: 10 TABLET, FILM COATED ORAL at 20:05

## 2021-07-08 RX ADMIN — BUDESONIDE AND FORMOTEROL FUMARATE DIHYDRATE 2 PUFF: 160; 4.5 AEROSOL RESPIRATORY (INHALATION) at 21:34

## 2021-07-08 NOTE — PROGRESS NOTES
"Daily progress note    Chief complaint  Doing same  No new complaints  Family at bedside    History of present illness  73-year-old -American female with history of end-stage renal disease on hemodialysis COPD diabetes mellitus hypertension and chronic anemia brought to the emergency room with black diarrhea started on Saturday.  Patient has 4 episodes.  Patient also have abdominal discomfort.  Patient has been noticing black stools for several days.  Patient denies any nausea vomiting.  Patient denies any fever cough chest pain shortness of breath.  Patient work-up in ER revealed acute blood loss anemia with lower GI bleed admit for management.      REVIEW OF SYSTEMS  Unremarkable except for mild abdominal pain    PHYSICAL EXAM   Blood pressure 121/70, pulse 74, temperature 98.1 °F (36.7 °C), temperature source Oral, resp. rate 16, height 152.4 cm (60\"), weight 78 kg (171 lb 14.4 oz), SpO2 96 %, not currently breastfeeding.    Constitutional: Pt. is oriented to person, place, and time   HENT: Normocephalic and atraumatic. Oropharynx moist/nonerythematous.  Neck: Normal range of motion. Neck supple. No JVD present.   Cardiovascular: Normal rate, regular rhythm and normal heart sounds. Exam reveals no gallop and no friction rub. No murmur heard.  Pulmonary/Chest: Effort normal and breath sounds normal. No stridor. No respiratory distress. No wheezes, no rales.   Abdominal: Soft. Bowel sounds are normal. No distension. There is no tenderness. There is no rebound and no guarding.   Musculoskeletal: Normal range of motion. No edema, tenderness or deformity.   Neurological: Pt. is alert and oriented to person, place, and time.  She has no focal neurologic deficits  Skin: Skin is warm and dry. No rash noted. Pt. is not diaphoretic. No erythema.   Psychiatric: Mood, affect and judgment normal.  She is pleasant and cooperative.    LAB RESULTS  Lab Results (last 24 hours)     Procedure Component Value Units Date/Time " "   POC Glucose Once [[id number]]  (Normal) Collected: 07/08/21 1130    Specimen: Blood Updated: 07/08/21 1132     Glucose 86 mg/dL      Comment: Meter: [id number] : [id number] Tito Dutton CNA       Tissue Pathology Exam [[id number]] Collected: 07/07/21 1154    Specimen: Tissue from Large Intestine, Cecum; Tissue from Large Intestine; Tissue from Large Intestine, Left / Descending Colon Updated: 07/08/21 1002     Case Report --     Surgical Pathology Report                         Case: [id number]                                  Authorizing Provider:  Fabricio Monroe MD  Collected:           07/07/2021 11:54 AM          Ordering Location:     [facility]  Received:            07/07/2021 01:26 PM                                 ENDO SUITES                                                                  Pathologist:           Calderon Lara MD                                                          Specimens:   1) - Large Intestine, Cecum, mass                                                                   2) - Large Intestine, hepatic flexure x3                                                            3) - Large Intestine, Left / Descending Colon                                               Final Diagnosis --     1. Colon, Cecum, Biopsy:    A. Superficial fragments of tubulovillous adenoma. See comment.    2. Hepatic Flexure, Biopsy:   A. Fragments of tubular adenoma.    3. Colon, Descending, Biopsy:   A. Tubular adenoma.    mec/pkm        Comment --     Specimen #1 designated \"cecal mass\" consists of superficial fragments of a tubulovillous adenoma. No definitive intraepithelial high-grade dysplasia or invasive component is identified. Part 1 of this case is shared in internal consultation with Dr. Patel who concurs. Clinical and endoscopic correlation are recommended. If a mass lesion exists final classification would rest on the complete excision specimen.    mec/pkm        Gross " Description --     1.The specimen is received in formalin labeled with the patient's name and further designated 'cecum mass biopsy' are multiple small fragments of gray-tan tissue. The specimen is submitted for embedding as received.    2.The specimen is received in formalin labeled with the patient's name and further designated 'hepatic flexure x3 biopsy' are multiple small fragments of gray-tan tissue. The specimen is submitted for embedding as received.    3.The specimen is received in formalin labeled with the patient's name and further designated 'descending colon biopsy' are multiple small fragments of gray-tan tissue. The specimen is submitted for embedding as received.         Microscopic Description --     Performed, incorporated in diagnosis.      POC Glucose Once [[id number]]  (Normal) Collected: 07/08/21 0836    Specimen: Blood Updated: 07/08/21 0837     Glucose 93 mg/dL      Comment: Meter: [id number] : [id number] Tito Dutton CNA       Basic Metabolic Panel [[id number]]  (Abnormal) Collected: 07/08/21 0447    Specimen: Blood Updated: 07/08/21 0815     Glucose 62 mg/dL      BUN 23 mg/dL      Creatinine 5.81 mg/dL      Sodium 134 mmol/L      Potassium 3.4 mmol/L      Chloride 95 mmol/L      CO2 20.5 mmol/L      Calcium 6.6 mg/dL      eGFR  African Amer 9 mL/min/1.73      Comment: <15 Indicative of kidney failure.        eGFR Non  Amer --     Comment: <15 Indicative of kidney failure.        BUN/Creatinine Ratio 4.0     Anion Gap 18.5 mmol/L     Narrative:      GFR Normal >60  Chronic Kidney Disease <60  Kidney Failure <15      CBC & Differential [[id number]]  (Abnormal) Collected: 07/08/21 0447    Specimen: Blood Updated: 07/08/21 0725    Narrative:      The following orders were created for panel order CBC & Differential.  Procedure                               Abnormality         Status                     ---------                               -----------         ------                      CBC Auto Differential[[id number]]        Abnormal            Final result                 Please view results for these tests on the individual orders.    CBC Auto Differential [[id number]]  (Abnormal) Collected: 07/08/21 0447    Specimen: Blood Updated: 07/08/21 0725     WBC 5.06 10*3/mm3      RBC 3.16 10*6/mm3      Hemoglobin 7.9 g/dL      Hematocrit 25.5 %      MCV 80.7 fL      MCH 25.0 pg      MCHC 31.0 g/dL      RDW 18.4 %      RDW-SD 54.2 fl      MPV 10.2 fL      Platelets 188 10*3/mm3      Neutrophil % 71.7 %      Lymphocyte % 10.3 %      Monocyte % 13.6 %      Eosinophil % 3.8 %      Basophil % 0.4 %      Immature Grans % 0.2 %      Neutrophils, Absolute 3.63 10*3/mm3      Lymphocytes, Absolute 0.52 10*3/mm3      Monocytes, Absolute 0.69 10*3/mm3      Eosinophils, Absolute 0.19 10*3/mm3      Basophils, Absolute 0.02 10*3/mm3      Immature Grans, Absolute 0.01 10*3/mm3      nRBC 0.0 /100 WBC     POC Glucose Once [[id number]]  (Normal) Collected: 07/08/21 0638    Specimen: Blood Updated: 07/08/21 0640     Glucose 88 mg/dL      Comment: Meter: [id number] : [id number] [employer] NA       POC Glucose Once [[id number]]  (Normal) Collected: 07/08/21 0027    Specimen: Blood Updated: 07/08/21 0028     Glucose 119 mg/dL      Comment: Meter: [id number] : [id number] [employer] NA       Cancer Antigen 19-9 [[id number]]  (Normal) Collected: 07/07/21 2208    Specimen: Blood Updated: 07/07/21 2346     CA 19-9 <0.6 U/mL     Narrative:      Results may be falsely decreased if patient taking Biotin.     CEA [[id number]] Collected: 07/07/21 2208    Specimen: Blood Updated: 07/07/21 2311     CEA 3.12 ng/mL     Narrative:      CEA Reference Range:    Non Smokers:   Less than 3 ng/mL  Smokers:       Less than 5 ng/mL  Results may be falsely decreased if patient taking Biotin.      Hemoglobin & Hematocrit, Blood [[id number]]  (Abnormal) Collected: 07/07/21 2208    Specimen: Blood Updated: 07/07/21 2247     Hemoglobin 8.3  g/dL      Hematocrit 26.8 %     POC Glucose Once [[id number]]  (Normal) Collected: 07/07/21 2117    Specimen: Blood Updated: 07/07/21 2118     Glucose 91 mg/dL      Comment: Meter: [id number] : [id number] Will Dhaliwal BAIRON           Imaging Results (Last 24 Hours)     ** No results found for the last 24 hours. **        Colonoscopy results noted and discussed with patient and family    Current Facility-Administered Medications:   •  albuterol (PROVENTIL) nebulizer solution 0.083% 2.5 mg/3mL, 2.5 mg, Nebulization, TID PRN, Fabricio Monroe MD  •  budesonide-formoterol (SYMBICORT) 160-4.5 MCG/ACT inhaler 2 puff, 2 puff, Inhalation, BID - RT, Fabricio Monroe MD, 2 puff at 07/08/21 0754  •  dextrose (D50W) 25 g/ 50mL Intravenous Solution 25 g, 25 g, Intravenous, Q15 Min PRN, Fabricio Monroe MD, 25 g at 07/07/21 1650  •  dextrose (GLUTOSE) oral gel 15 g, 15 g, Oral, Q15 Min PRN, Fabricio Monroe MD  •  epoetin polina-epbx (RETACRIT) injection 10,000 Units, 10,000 Units, Intravenous, Once per day on Tue Thu Sat, Fabricio Monroe MD, 10,000 Units at 07/08/21 1512  •  glucagon (human recombinant) (GLUCAGEN DIAGNOSTIC) injection 1 mg, 1 mg, Subcutaneous, Q15 Min PRN, Fabricio Monroe MD  •  HYDROcodone-acetaminophen (NORCO) 5-325 MG per tablet 1 tablet, 1 tablet, Oral, Q8H PRN, Fabricio Monroe MD  •  insulin lispro (ADMELOG) injection 0-7 Units, 0-7 Units, Subcutaneous, TID AC, Fabricio Monroe MD  •  metoprolol succinate XL (TOPROL-XL) 24 hr tablet 25 mg, 25 mg, Oral, QAM, Fabricio Monroe MD, 25 mg at 07/08/21 0609  •  montelukast (SINGULAIR) tablet 10 mg, 10 mg, Oral, Nightly, Fabricio Monroe MD, 10 mg at 07/07/21 2055  •  pantoprazole (PROTONIX) injection 40 mg, 40 mg, Intravenous, BID, Fabricio Monroe MD, 40 mg at 07/08/21 1204  •  [COMPLETED] Insert peripheral IV, , , Once **AND** sodium chloride 0.9 % flush 10 mL, 10 mL, Intravenous, PRN, Fer,  Fabricio Omalley MD, 10 mL at 07/07/21 2055  •  sodium chloride 0.9 % infusion, 30 mL/hr, Intravenous, Continuous PRN, Fabricio Monroe MD, Last Rate: 30 mL/hr at 07/07/21 1118, Restarted at 07/07/21 1214     ASSESSMENT  Cecal mass consistent with tubular adenoma  Lower GI bleed  Acute blood loss anemia  End-stage renal disease on hemodialysis  AV fistula stenosis  COPD  Hypertension  Diabetes mellitus  Chronic anemia  Gastroesophageal reflux disease    PLAN  CPM  Protonix  Continue hold [facility]  General surgery input appreciated  Vascular surgery gastroenterology nephrology to follow patient  Ultrasound today  Transfuse PRN  Accu-Chek sliding scale insulin  Adjust home medications  Stress ulcer DVT prophylaxis  Supportive care  Discussed with nursing staff and family  Follow closely further recommendation according to hospital course    ALLAN MARTIN MD

## 2021-07-08 NOTE — PROGRESS NOTES
LOS: 3 days     Chief Complaint/ Reason for encounter: ESRD, dialysis management  Chief Complaint   Patient presents with   • Rectal Bleeding         Subjective      Patient was seen on hemodialysis  Treatment orders discussed with the dialysis [facility]ama  Arterial pressure/ Venous pressure: 220/180  Blood flow rate/Dialysate flow rate: 350/500  Blood pressure: 120s over 70s  Potassium bath/Fluid removal goal: 3K, 2 L    Still some problems with needle cannulation but fistula seems to be working well today      Medical history reviewed:  History of Present Illness    Subjective    History taken from: Patient and chart    Vital Signs  Temp:  [97.9 °F (36.6 °C)-98.3 °F (36.8 °C)] 98.1 °F (36.7 °C)  Heart Rate:  [64-75] 74  Resp:  [16-18] 16  BP: ()/(44-70) 121/70       Wt Readings from Last 1 Encounters:   07/08/21 0456 78 kg (171 lb 14.4 oz)   07/07/21 0619 78.2 kg (172 lb 6.4 oz)   07/06/21 0926 76.6 kg (168 lb 14.4 oz)   07/05/21 2011 73.5 kg (162 lb 1.6 oz)   07/05/21 1546 74.5 kg (164 lb 3.9 oz)       Objective    Objective:  General Appearance:  Comfortable, obese, well-appearing, in no acute distress and not in pain.  Awake, alert, oriented  HEENT: Mucous membranes moist, no injury, oropharynx clear  Lungs:  Normal effort and normal respiratory rate.  Breath sounds clear to auscultation.  No  respiratory distress.  No rales, decreased breath sounds or rhonchi.    Heart: Normal rate.  Regular rhythm.  S1 normal.  No murmur.   Abdomen: Abdomen is soft.  Bowel sounds are normal, no abdominal tenderness.  There is no rebound or guarding  Extremities: Normal range of motion.  Trace edema of bilateral lower extremities, distal pulses intact  Neurological: No focal motor or sensory deficits, pupils reactive  Skin:  Warm and dry.  No rash or cyanosis.       Results Review:    Intake/Output:     Intake/Output Summary (Last 24 hours) at 7/8/2021 1346  Last data filed at 7/8/2021 0030  Gross per 24 hour   Intake  1250 ml   Output --   Net 1250 ml         DATA:  Radiology and Labs:  The following labs independently reviewed by me. Additional labs ordered for tomorrow a.m.  Interval notes, chart personally reviewed by me.   Old records independently reviewed showing ESRD on dialysis  Discussed with patient    Risk/ complexity of medical care/ medical decision making moderate    Labs:   Recent Results (from the past 24 hour(s))   POC Glucose Once    Collection Time: 07/07/21  4:32 PM    Specimen: Blood   Result Value Ref Range    Glucose 46 (C) 70 - 130 mg/dL   POC Glucose Once    Collection Time: 07/07/21  5:31 PM    Specimen: Blood   Result Value Ref Range    Glucose 100 70 - 130 mg/dL   POC Glucose Once    Collection Time: 07/07/21  9:17 PM    Specimen: Blood   Result Value Ref Range    Glucose 91 70 - 130 mg/dL   Hemoglobin & Hematocrit, Blood    Collection Time: 07/07/21 10:08 PM    Specimen: Blood   Result Value Ref Range    Hemoglobin 8.3 (L) 12.0 - 15.9 g/dL    Hematocrit 26.8 (L) 34.0 - 46.6 %   Cancer Antigen 19-9    Collection Time: 07/07/21 10:08 PM    Specimen: Blood   Result Value Ref Range    CA 19-9 <0.6 <=35.0 U/mL   CEA    Collection Time: 07/07/21 10:08 PM    Specimen: Blood   Result Value Ref Range    CEA 3.12 ng/mL   POC Glucose Once    Collection Time: 07/08/21 12:27 AM    Specimen: Blood   Result Value Ref Range    Glucose 119 70 - 130 mg/dL   Basic Metabolic Panel    Collection Time: 07/08/21  4:47 AM    Specimen: Blood   Result Value Ref Range    Glucose 62 (L) 65 - 99 mg/dL    BUN 23 8 - 23 mg/dL    Creatinine 5.81 (H) 0.57 - 1.00 mg/dL    Sodium 134 (L) 136 - 145 mmol/L    Potassium 3.4 (L) 3.5 - 5.2 mmol/L    Chloride 95 (L) 98 - 107 mmol/L    CO2 20.5 (L) 22.0 - 29.0 mmol/L    Calcium 6.6 (L) 8.6 - 10.5 mg/dL    eGFR  African Amer 9 (L) >60 mL/min/1.73    eGFR Non African Amer      BUN/Creatinine Ratio 4.0 (L) 7.0 - 25.0    Anion Gap 18.5 (H) 5.0 - 15.0 mmol/L   CBC Auto Differential    Collection  Time: 07/08/21  4:47 AM    Specimen: Blood   Result Value Ref Range    WBC 5.06 3.40 - 10.80 10*3/mm3    RBC 3.16 (L) 3.77 - 5.28 10*6/mm3    Hemoglobin 7.9 (L) 12.0 - 15.9 g/dL    Hematocrit 25.5 (L) 34.0 - 46.6 %    MCV 80.7 79.0 - 97.0 fL    MCH 25.0 (L) 26.6 - 33.0 pg    MCHC 31.0 (L) 31.5 - 35.7 g/dL    RDW 18.4 (H) 12.3 - 15.4 %    RDW-SD 54.2 (H) 37.0 - 54.0 fl    MPV 10.2 6.0 - 12.0 fL    Platelets 188 140 - 450 10*3/mm3    Neutrophil % 71.7 42.7 - 76.0 %    Lymphocyte % 10.3 (L) 19.6 - 45.3 %    Monocyte % 13.6 (H) 5.0 - 12.0 %    Eosinophil % 3.8 0.3 - 6.2 %    Basophil % 0.4 0.0 - 1.5 %    Immature Grans % 0.2 0.0 - 0.5 %    Neutrophils, Absolute 3.63 1.70 - 7.00 10*3/mm3    Lymphocytes, Absolute 0.52 (L) 0.70 - 3.10 10*3/mm3    Monocytes, Absolute 0.69 0.10 - 0.90 10*3/mm3    Eosinophils, Absolute 0.19 0.00 - 0.40 10*3/mm3    Basophils, Absolute 0.02 0.00 - 0.20 10*3/mm3    Immature Grans, Absolute 0.01 0.00 - 0.05 10*3/mm3    nRBC 0.0 0.0 - 0.2 /100 WBC   Prepare RBC, 1 Units    Collection Time: 07/08/21  6:00 AM   Result Value Ref Range    Product Code Y0288A10     Unit Number [id number]     UNIT  ABO B     UNIT  RH POS     Crossmatch Interpretation Compatible     Dispense Status PT     Blood Expiration Date [id number]     Blood Type Barcode 7300    POC Glucose Once    Collection Time: 07/08/21  6:38 AM    Specimen: Blood   Result Value Ref Range    Glucose 88 70 - 130 mg/dL   POC Glucose Once    Collection Time: 07/08/21  8:36 AM    Specimen: Blood   Result Value Ref Range    Glucose 93 70 - 130 mg/dL   Duplex Hemodialysis Access CAR    Collection Time: 07/08/21 10:04 AM   Result Value Ref Range    Target HR (85%) 123 bpm    Max. Pred. HR (100%) 145 bpm    PRE-INFLOW AXILLARY  cm/sec    PRE-INFLOW AXILLARY EDV 39 cm/sec    PRE-INFLOW AXILLARY FLOW  mL/min    PRE-INFLOW BRACHIAL PSV 78 cm/sec    PRE-INFLOW RADIAL  cm/sec    ARTERIAL ANASTOMOSIS  cm/sec    ARTERIAL  ANASTOMOSIS  cm/sec    ARTERIAL ANASTOMOSIS DIAMETER 0.71 cm    CONDUIT PROX  cm/sec    CONDUIT PROX EDV 81 cm/sec    CONDUIT PROX DIAMETER 0.71 cm    CONDUIT PROX FLOW  mL/min    CONDUIT PROX DEPTH 0.32 cm    CONDUIT PROX/MID PSV 96 cm/sec    CONDUIT PROX/MID EDV 52 cm/sec    CONDUIT PROX/MID DIAMETER 0.55 cm    CONDUIT PROX/MID FLOW  mL/min    CONDUIT PROX/MID DEPTH 0.39 cm    CONDUIT MID PSV 90 cm/sec     CONDUIT MID EDV 47 cm/sec    CONDUIT MID DIAMETER 0.53 cm    CONDUIT MID FLOW  mL/min    CONDUIT MID DEPTH 0.21 cm    CONDUIT MID/DIST  cm/sec    CONDUIT MID/DIST  cm/sec    CONDUIT MID/DIST DIAMETER 0.80 cm    CONDUIT MID/DIST DEPTH 0.14 cm    CONDUIT DIST  cm/sec    CONDUIT DIST EDV 81 cm/sec    CONDUIT DIST DIAMETER 0.60 cm    CONDUIT DIST FLOW  mL/min    CONDUIT DIST DEPTH 0.25 cm    VENOUS ANASTOMOSIS  cm/sec    VENOUS ANASTOMOSIS EDV 65 cm/sec    PRE-INFLOW SUBCLAV  cm/sec    PRE-INFLOW SUBCLAV EDV 35 cm/sec    STENT ONE PROX STENT  cm/sec    STENT ONE MID STENT PSV 67 cm/sec    STENT ONE DIST STENT PSV 63 cm/sec    FREE TEXT STENT ONE outflow, AXV, SCV    POC Glucose Once    Collection Time: 07/08/21 11:30 AM    Specimen: Blood   Result Value Ref Range    Glucose 86 70 - 130 mg/dL       Radiology:  Imaging Results (Last 24 Hours)     ** No results found for the last 24 hours. **             Medications have been reviewed:  Current Facility-Administered Medications   Medication Dose Route Frequency Provider Last Rate Last Admin   • albuterol (PROVENTIL) nebulizer solution 0.083% 2.5 mg/3mL  2.5 mg Nebulization TID PRN Fabricio Monroe MD       • budesonide-formoterol (SYMBICORT) 160-4.5 MCG/ACT inhaler 2 puff  2 puff Inhalation BID - RT Fabricio Monroe MD   2 puff at 07/08/21 0757   • dextrose (D50W) 25 g/ 50mL Intravenous Solution 25 g  25 g Intravenous Q15 Min PRN Fabricio Monroe MD   25 g at 07/07/21  1650   • dextrose (GLUTOSE) oral gel 15 g  15 g Oral Q15 Min PRN Fabricio Monroe MD       • epoetin polina-epbx (RETACRIT) injection 10,000 Units  10,000 Units Intravenous Once per day on Tue Thu Sat Fabricio Monroe MD       • glucagon (human recombinant) (GLUCAGEN DIAGNOSTIC) injection 1 mg  1 mg Subcutaneous Q15 Min PRN Fabricio Monroe MD       • HYDROcodone-acetaminophen (NORCO) 5-325 MG per tablet 1 tablet  1 tablet Oral Q8H PRN Fabricio Monroe MD       • insulin lispro (ADMELOG) injection 0-7 Units  0-7 Units Subcutaneous TID AC Fabricio Monroe MD       • metoprolol succinate XL (TOPROL-XL) 24 hr tablet 25 mg  25 mg Oral QAM Fabricio Monroe MD   25 mg at 07/08/21 0609   • montelukast (SINGULAIR) tablet 10 mg  10 mg Oral Nightly Fabricio Monroe MD   10 mg at 07/07/21 2055   • pantoprazole (PROTONIX) injection 40 mg  40 mg Intravenous BID Fabricio Monroe MD   40 mg at 07/08/21 1204   • sodium chloride 0.9 % flush 10 mL  10 mL Intravenous PRN Fabricio Monroe MD   10 mL at 07/07/21 2055   • sodium chloride 0.9 % infusion  30 mL/hr Intravenous Continuous PRN Fabricio Monroe MD 30 mL/hr at 07/07/21 1118 Restarted at 07/07/21 1214       ASSESSMENT:  ESRD  Cecal cecal mass on colonoscopy, await biopsy, surgery consulted  Lower GI bleeding from a cecal mass  Hypertension  Anemia of CKD  COPD  AV fistula dysfunction       PLAN:  Dialysis today and continue Tuesday Thursday Saturday hemodialysis schedule  Fistula seems to be working well at this time after some initial difficult cannulation at the start of HD  Arterial and venous pressures acceptable  Moderate stenosis seen on Doppler study  Looks like the plan will be for hemicolectomy tomorrow and intervention of her AV fistula sometime next week  Discussed with Dr. Mode Mishra with HD for anemia, UF as tolerated     Continue to monitor electrolytes and volume closely  Stable for surgery from renal  standpoint anytime       Chester Kang MD   [facility]   Office phone number: [phone]  Answering service phone number: [phone]    07/08/21  13:46 EDT    Dictation performed using Dragon dictation [employer]

## 2021-07-08 NOTE — NURSING NOTE
Spoke w/ Dr. Kang about patient's HD for today. Pending potassium results okay to re-schedule dialysis for tomorrow potentially to allow for patient to go down to vascular for fistula eval and potential surgery later today. Will CTM.

## 2021-07-08 NOTE — PLAN OF CARE
Goal Outcome Evaluation:  Plan of Care Reviewed With: patient  Progress: no change  Outcome Summary: All needs met. Rested well. Up ad walt. NPO at midnight. Daily weight. VSS. Mental status at baseline. On room air. NSR on the monior. Plan for R hemicolectomy today, consent signed and in the chart. Evaluation of AV fistula to occur today as well. No complaints. Blood glucose requiring no treatment. Will CTM.

## 2021-07-08 NOTE — PROGRESS NOTES
[facility]  Inpatient Progress Note    Reason for Follow Up:  Colon mass    Subjective     Interval History:   Colonoscopy yesterday with large polypoid mass in cecum.  Biopsies pending.  No further GI bleeding reported.    Current Facility-Administered Medications:   •  albuterol (PROVENTIL) nebulizer solution 0.083% 2.5 mg/3mL, 2.5 mg, Nebulization, TID PRN, Fabricio Monroe MD  •  budesonide-formoterol (SYMBICORT) 160-4.5 MCG/ACT inhaler 2 puff, 2 puff, Inhalation, BID - RT, Fabricio Monroe MD, 2 puff at 07/08/21 0754  •  dextrose (D50W) 25 g/ 50mL Intravenous Solution 25 g, 25 g, Intravenous, Q15 Min PRN, Fabricio Monroe MD, 25 g at 07/07/21 1650  •  dextrose (GLUTOSE) oral gel 15 g, 15 g, Oral, Q15 Min PRN, Fabricio Monroe MD  •  epoetin polina-epbx (RETACRIT) injection 10,000 Units, 10,000 Units, Intravenous, Once per day on Tue Thu Sat, Fabricio Monroe MD  •  glucagon (human recombinant) (GLUCAGEN DIAGNOSTIC) injection 1 mg, 1 mg, Subcutaneous, Q15 Min PRN, Fabricio Monroe MD  •  HYDROcodone-acetaminophen (NORCO) 5-325 MG per tablet 1 tablet, 1 tablet, Oral, Q8H PRN, Fabricio Monroe MD  •  insulin lispro (ADMELOG) injection 0-7 Units, 0-7 Units, Subcutaneous, TID AC, Fabricio Monroe MD  •  metoprolol succinate XL (TOPROL-XL) 24 hr tablet 25 mg, 25 mg, Oral, QAM, Fabricio Monroe MD, 25 mg at 07/08/21 0609  •  montelukast (SINGULAIR) tablet 10 mg, 10 mg, Oral, Nightly, Fabricio Monroe MD, 10 mg at 07/07/21 2055  •  pantoprazole (PROTONIX) injection 40 mg, 40 mg, Intravenous, BID, Fabricio Monroe MD, 40 mg at 07/07/21 2055  •  [COMPLETED] Insert peripheral IV, , , Once **AND** sodium chloride 0.9 % flush 10 mL, 10 mL, Intravenous, PRN, Fabricio Monroe MD, 10 mL at 07/07/21 2055  •  sodium chloride 0.9 % infusion, 30 mL/hr, Intravenous, Continuous PRN, Fabricio Monroe MD, Last Rate: 30 mL/hr at  07/07/21 1118, Restarted at 07/07/21 1214  Review of Systems:    The following systems were reviewed and negative;  gastrointestinal    Objective     Vital Signs  Temp:  [97.5 °F (36.4 °C)-98.3 °F (36.8 °C)] 98.1 °F (36.7 °C)  Heart Rate:  [56-75] 74  Resp:  [16-19] 16  BP: ()/(38-76) 121/70  Body mass index is 33.57 kg/m².    Intake/Output Summary (Last 24 hours) at 7/8/2021 0806  Last data filed at 7/8/2021 0030  Gross per 24 hour   Intake 1750 ml   Output --   Net 1750 ml     No intake/output data recorded.     Physical Exam:   General: patient awake, alert and cooperative   Abdomen: soft, nontender, nondistended; normal bowel sounds   Rectal: deferred   Extremities: no rash or edema   Psychiatric: Normal mood and behavior; memory intact     Results Review:     I reviewed the patient's new clinical results.    Results from last 7 days   Lab Units 07/08/21 0447 07/07/21 2208 07/07/21  0442 07/06/21  0934   WBC 10*3/mm3 5.06  --  5.70 5.39   HEMOGLOBIN g/dL 7.9* 8.3* 6.6* 7.6*   HEMATOCRIT % 25.5* 26.8* 21.6* 25.3*   PLATELETS 10*3/mm3 188  --  198 234     Results from last 7 days   Lab Units 07/07/21  0442 07/06/21  0934 07/05/21  1626   SODIUM mmol/L 135* 138 141   POTASSIUM mmol/L 3.8 4.0 3.6   CHLORIDE mmol/L 98 100 101   CO2 mmol/L 21.0* 18.2* 20.0*   BUN mg/dL 19 42* 36*   CREATININE mg/dL 5.19* 8.09* 7.86*   CALCIUM mg/dL 7.0* 6.9* 6.9*   BILIRUBIN mg/dL 0.2  --  0.2   ALK PHOS U/L 97  --  138*   ALT (SGPT) U/L 8  --  10   AST (SGOT) U/L 16  --  13   GLUCOSE mg/dL 71 92 131*     Results from last 7 days   Lab Units 07/07/21  0442 07/05/21  1626   INR  1.10 1.49*     Lab Results   Lab Value Date/Time    LIPASE 20 08/10/2019 1352         Assessment/Plan   Assessment:   1.  Cecal mass  2.  Rectal bleeding  3.  ESRD on HD  4.  Pancreatic duct abnormality on CT    Plan:   Await path from yesterday's colonoscopy  Plan for MRCP w/o contrast to evaluate dilated distal pancreatic duct, although findings do  appear similar to CT scan from 2019, and CA 19-9 is normal.  Appreciate general surgery attention to patient    I discussed the patients findings and my recommendations with patient.         Fabricio Monroe M.D.  [facility]  [address] [place] [address]  Office: [phone]

## 2021-07-08 NOTE — PROGRESS NOTES
Name: Nellie Vegas ADMIT: 2021   : 1945  PCP: Laurent Cortes DO    MRN: [id number] LOS: 3 days   AGE/SEX: 76 y.o. female  ROOM:  [place] [address]     CC: AV graft stenosis  Interval History: She did well overnight.  No new complaints.  Subjective   Subjective     Review of Systems  Objective   Objective     Vitals:   Temp:  [97.5 °F (36.4 °C)-98.3 °F (36.8 °C)] 98.1 °F (36.7 °C)  Heart Rate:  [56-75] 74  Resp:  [16-19] 16  BP: ()/(38-76) 121/70    No intake/output data recorded.    Scheduled Meds:     budesonide-formoterol, 2 puff, Inhalation, BID - RT  epoetin polina/polina-epbx, 10,000 Units, Intravenous, Once per day on  Sat  insulin lispro, 0-7 Units, Subcutaneous, TID AC  metoprolol succinate XL, 25 mg, Oral, QAM  montelukast, 10 mg, Oral, Nightly  pantoprazole, 40 mg, Intravenous, BID      IV Meds:   sodium chloride, 30 mL/hr, Last Rate: 30 mL/hr (21 1118)        Physical Exam  Vascular: Palpable bruit    Data Reviewed:  CBC    Results from last 7 days   Lab Units 21  2208 21  0934 21  1626   WBC 10*3/mm3 5.06  --  5.70 5.39 6.77   HEMOGLOBIN g/dL 7.9* 8.3* 6.6* 7.6* 7.3*   PLATELETS 10*3/mm3 188  --  198 234 295     BMP   Results from last 7 days   Lab Units 21  0442 21  0934 21  1626   SODIUM mmol/L 134* 135* 138 141   POTASSIUM mmol/L 3.4* 3.8 4.0 3.6   CHLORIDE mmol/L 95* 98 100 101   CO2 mmol/L 20.5* 21.0* 18.2* 20.0*   BUN mg/dL 23 19 42* 36*   CREATININE mg/dL 5.81* 5.19* 8.09* 7.86*   GLUCOSE mg/dL 62* 71 92 131*       Most notable findings include: Hemoglobin 7.9    Active Hospital Problems:   Active Hospital Problems    Diagnosis  POA   • **ESRD (end stage renal disease) on dialysis (CMS/[facility]) [N18.6, Z99.2]  Not Applicable   • Lower GI bleeding [K92.2]  Yes      Resolved Hospital Problems   No resolved problems to display.      Assessment/Plan   [place]  Care  Assessment / Plan     Right arm AV graft stenosis: Discussed with Dr. Zbigniew Conner.  Plan ultrasound today.  If fistula stenosis is severe, would favor angioplasty tomorrow.  If not, I would be okay with moving forward with colon resection.  Okay to dialyze today from our standpoint.    Speedy Coello MD  07/08/21  08:36 EDT  Office Number [phone]

## 2021-07-08 NOTE — PLAN OF CARE
Goal Outcome Evaluation:  Plan of Care Reviewed With: patient        Progress: no change  Outcome Summary: Patient has had no complaints this shift. Ultrasound of fistula completed and moderate stenosis found. In dialysis currently. Plan for right hemicolectomy tomorrow. Remain on clear liquids for now. NPO at midnight. VSS. Will continue to monitor.

## 2021-07-08 NOTE — PROGRESS NOTES
"CC: Cecal mass    S: No events overnight.  Tolerating dialysis without any problem.  No more rectal bleeding    O:   Vitals:    07/08/21 0609 07/08/21 0700 07/08/21 0754 07/08/21 1659   BP: 111/53 121/70  111/56   BP Location:  Left arm  Left arm   Patient Position:  Sitting  Lying   Pulse: 75 65 74 60   Resp:  18 16 18   Temp:  98.1 °F (36.7 °C)  97.5 °F (36.4 °C)   TempSrc:  Oral  Temporal   SpO2:   96%    Weight:       Height:         Alert, no acute distress  Abdomen soft, nontender    Hemoglobin 7.9 from 8.3, other labs stable    CA 19-9 less than 0.6, CEA 3.12    Final Diagnosis   1. Colon, Cecum, Biopsy:                A. Superficial fragments of tubulovillous adenoma. See comment.     2. Hepatic Flexure, Biopsy:               A. Fragments of tubular adenoma.     3. Colon, Descending, Biopsy:               A. Tubular adenoma.     [facility]    Electronically signed by Calderon Lara MD on 7/8/2021 at 1002   Comment    Specimen #1 designated \"cecal mass\" consists of superficial fragments of a tubulovillous adenoma. No definitive intraepithelial high-grade dysplasia or invasive component is identified     Assessment and plan    76-year female with cecal mass with pathology showing fragments of tubulovillous adenoma.  No high-grade dysplasia was fine.  She had prior high-grade dysplasia on colonoscopy.  I think that regardless her pathology she has a large mass in the cecum that will need to be surgically removed since this mass is endoscopically unresectable.  Discussed with patient about the further step.  Recommend that she undergoes laparoscopic right hemicolectomy.  Risk and benefits of procedure including bleeding, infection, leak, abscess formation were discussed in detail with the patient that verbalized understanding and agree with plan.  Hemoglobin 7.9, stable    -Continue clear liquid diet  -N.p.o. after midnight  -We will check CBC in the morning, will need to get transfuse if less than 7.9 tomorrow " morning    Zbigniew Conner MD, FACS  General, Minimally Invasive and Endoscopic Surgery  [facility]    [address], [address]  [place], [place], [address]  P: [phone]  F: [phone]

## 2021-07-09 ENCOUNTER — ANESTHESIA EVENT (OUTPATIENT)
Dept: PERIOP | Facility: HOSPITAL | Age: 76
End: 2021-07-09

## 2021-07-09 ENCOUNTER — ANESTHESIA (OUTPATIENT)
Dept: PERIOP | Facility: HOSPITAL | Age: 76
End: 2021-07-09

## 2021-07-09 ENCOUNTER — TELEPHONE (OUTPATIENT)
Dept: GASTROENTEROLOGY | Facility: CLINIC | Age: 76
End: 2021-07-09

## 2021-07-09 LAB
ABO GROUP BLD: NORMAL
ANION GAP SERPL CALCULATED.3IONS-SCNC: 15.5 MMOL/L (ref 5–15)
BASOPHILS # BLD AUTO: 0.03 10*3/MM3 (ref 0–0.2)
BASOPHILS NFR BLD AUTO: 0.8 % (ref 0–1.5)
BLD GP AB SCN SERPL QL: NEGATIVE
BUN SERPL-MCNC: 12 MG/DL (ref 8–23)
BUN/CREAT SERPL: 2.6 (ref 7–25)
CALCIUM SPEC-SCNC: 6.9 MG/DL (ref 8.6–10.5)
CHLORIDE SERPL-SCNC: 99 MMOL/L (ref 98–107)
CO2 SERPL-SCNC: 23.5 MMOL/L (ref 22–29)
CREAT SERPL-MCNC: 4.54 MG/DL (ref 0.57–1)
DEPRECATED RDW RBC AUTO: 52 FL (ref 37–54)
EOSINOPHIL # BLD AUTO: 0.14 10*3/MM3 (ref 0–0.4)
EOSINOPHIL NFR BLD AUTO: 3.7 % (ref 0.3–6.2)
ERYTHROCYTE [DISTWIDTH] IN BLOOD BY AUTOMATED COUNT: 17.8 % (ref 12.3–15.4)
GFR SERPL CREATININE-BSD FRML MDRD: 11 ML/MIN/1.73
GFR SERPL CREATININE-BSD FRML MDRD: ABNORMAL ML/MIN/{1.73_M2}
GLUCOSE BLDC GLUCOMTR-MCNC: 158 MG/DL (ref 70–130)
GLUCOSE BLDC GLUCOMTR-MCNC: 160 MG/DL (ref 70–130)
GLUCOSE BLDC GLUCOMTR-MCNC: 178 MG/DL (ref 70–130)
GLUCOSE BLDC GLUCOMTR-MCNC: 97 MG/DL (ref 70–130)
GLUCOSE SERPL-MCNC: 80 MG/DL (ref 65–99)
HCT VFR BLD AUTO: 25 % (ref 34–46.6)
HGB BLD-MCNC: 8 G/DL (ref 12–15.9)
IMM GRANULOCYTES # BLD AUTO: 0.02 10*3/MM3 (ref 0–0.05)
IMM GRANULOCYTES NFR BLD AUTO: 0.5 % (ref 0–0.5)
LYMPHOCYTES # BLD AUTO: 0.54 10*3/MM3 (ref 0.7–3.1)
LYMPHOCYTES NFR BLD AUTO: 14.4 % (ref 19.6–45.3)
MCH RBC QN AUTO: 25.4 PG (ref 26.6–33)
MCHC RBC AUTO-ENTMCNC: 32 G/DL (ref 31.5–35.7)
MCV RBC AUTO: 79.4 FL (ref 79–97)
MONOCYTES # BLD AUTO: 0.58 10*3/MM3 (ref 0.1–0.9)
MONOCYTES NFR BLD AUTO: 15.5 % (ref 5–12)
NEUTROPHILS NFR BLD AUTO: 2.43 10*3/MM3 (ref 1.7–7)
NEUTROPHILS NFR BLD AUTO: 65.1 % (ref 42.7–76)
NRBC BLD AUTO-RTO: 0 /100 WBC (ref 0–0.2)
PLATELET # BLD AUTO: 172 10*3/MM3 (ref 140–450)
PMV BLD AUTO: 10.2 FL (ref 6–12)
POTASSIUM SERPL-SCNC: 3.6 MMOL/L (ref 3.5–5.2)
RBC # BLD AUTO: 3.15 10*6/MM3 (ref 3.77–5.28)
RH BLD: POSITIVE
SODIUM SERPL-SCNC: 138 MMOL/L (ref 136–145)
T&S EXPIRATION DATE: NORMAL
WBC # BLD AUTO: 3.74 10*3/MM3 (ref 3.4–10.8)

## 2021-07-09 PROCEDURE — 85025 COMPLETE CBC W/AUTO DIFF WBC: CPT | Performed by: HOSPITALIST

## 2021-07-09 PROCEDURE — C1889 IMPLANT/INSERT DEVICE, NOC: HCPCS | Performed by: SURGERY

## 2021-07-09 PROCEDURE — 82962 GLUCOSE BLOOD TEST: CPT

## 2021-07-09 PROCEDURE — 25010000002 CEFOXITIN PER 1 G: Performed by: SURGERY

## 2021-07-09 PROCEDURE — 86901 BLOOD TYPING SEROLOGIC RH(D): CPT | Performed by: ANESTHESIOLOGY

## 2021-07-09 PROCEDURE — 88309 TISSUE EXAM BY PATHOLOGIST: CPT | Performed by: SURGERY

## 2021-07-09 PROCEDURE — 94799 UNLISTED PULMONARY SVC/PX: CPT

## 2021-07-09 PROCEDURE — 25010000002 ONDANSETRON PER 1 MG: Performed by: NURSE ANESTHETIST, CERTIFIED REGISTERED

## 2021-07-09 PROCEDURE — 76942 ECHO GUIDE FOR BIOPSY: CPT

## 2021-07-09 PROCEDURE — 25010000002 DEXAMETHASONE PER 1 MG: Performed by: NURSE ANESTHETIST, CERTIFIED REGISTERED

## 2021-07-09 PROCEDURE — C9290 INJ, BUPIVACAINE LIPOSOME: HCPCS | Performed by: ANESTHESIOLOGY

## 2021-07-09 PROCEDURE — 25010000002 NEOSTIGMINE 5 MG/10ML SOLUTION: Performed by: NURSE ANESTHETIST, CERTIFIED REGISTERED

## 2021-07-09 PROCEDURE — 80048 BASIC METABOLIC PNL TOTAL CA: CPT | Performed by: HOSPITALIST

## 2021-07-09 PROCEDURE — 94760 N-INVAS EAR/PLS OXIMETRY 1: CPT

## 2021-07-09 PROCEDURE — 86850 RBC ANTIBODY SCREEN: CPT | Performed by: ANESTHESIOLOGY

## 2021-07-09 PROCEDURE — 44160 REMOVAL OF COLON: CPT | Performed by: SURGERY

## 2021-07-09 PROCEDURE — 25010000002 HYDROMORPHONE PER 4 MG: Performed by: SURGERY

## 2021-07-09 PROCEDURE — 86900 BLOOD TYPING SEROLOGIC ABO: CPT | Performed by: ANESTHESIOLOGY

## 2021-07-09 PROCEDURE — 25010000003 BUPIVACAINE LIPOSOME 1.3 % SUSPENSION: Performed by: ANESTHESIOLOGY

## 2021-07-09 PROCEDURE — 25010000002 FENTANYL CITRATE (PF) 50 MCG/ML SOLUTION: Performed by: NURSE ANESTHETIST, CERTIFIED REGISTERED

## 2021-07-09 PROCEDURE — 25010000002 PHENYLEPHRINE PER 1 ML: Performed by: NURSE ANESTHETIST, CERTIFIED REGISTERED

## 2021-07-09 PROCEDURE — 25010000002 PROPOFOL 10 MG/ML EMULSION: Performed by: NURSE ANESTHETIST, CERTIFIED REGISTERED

## 2021-07-09 PROCEDURE — 0DTF4ZZ RESECTION OF RIGHT LARGE INTESTINE, PERCUTANEOUS ENDOSCOPIC APPROACH: ICD-10-PCS | Performed by: SURGERY

## 2021-07-09 PROCEDURE — 25010000002 CEFOXITIN PER 1 G: Performed by: NURSE ANESTHETIST, CERTIFIED REGISTERED

## 2021-07-09 PROCEDURE — 99232 SBSQ HOSP IP/OBS MODERATE 35: CPT | Performed by: INTERNAL MEDICINE

## 2021-07-09 DEVICE — ENDOPATH ECHELON ENDOSCOPIC LINEAR CUTTER RELOADS, BLUE, 60MM
Type: IMPLANTABLE DEVICE | Site: ABDOMEN | Status: FUNCTIONAL
Brand: ECHELON ENDOPATH

## 2021-07-09 DEVICE — HORIZON TI LG 6 CLIPS/CART
Type: IMPLANTABLE DEVICE | Site: ABDOMEN | Status: FUNCTIONAL
Brand: WECK

## 2021-07-09 DEVICE — ECHELON FLEX 60 ARTICULATING ENDOSCOPIC LINEAR CUTTER (NO CARTRIDGE)
Type: IMPLANTABLE DEVICE | Site: ABDOMEN | Status: FUNCTIONAL
Brand: ECHELON FLEX ENDOPATH

## 2021-07-09 DEVICE — HORIZON TI ML 6 CLIPS/CART
Type: IMPLANTABLE DEVICE | Site: ABDOMEN | Status: FUNCTIONAL
Brand: WECK

## 2021-07-09 RX ORDER — LIDOCAINE HYDROCHLORIDE 40 MG/ML
SOLUTION TOPICAL AS NEEDED
Status: DISCONTINUED | OUTPATIENT
Start: 2021-07-09 | End: 2021-07-09 | Stop reason: SURG

## 2021-07-09 RX ORDER — OXYCODONE HYDROCHLORIDE AND ACETAMINOPHEN 5; 325 MG/1; MG/1
1 TABLET ORAL EVERY 6 HOURS PRN
Status: DISCONTINUED | OUTPATIENT
Start: 2021-07-09 | End: 2021-07-10

## 2021-07-09 RX ORDER — IBUPROFEN 600 MG/1
600 TABLET ORAL ONCE AS NEEDED
Status: DISCONTINUED | OUTPATIENT
Start: 2021-07-09 | End: 2021-07-09 | Stop reason: HOSPADM

## 2021-07-09 RX ORDER — NEOSTIGMINE METHYLSULFATE 0.5 MG/ML
INJECTION, SOLUTION INTRAVENOUS AS NEEDED
Status: DISCONTINUED | OUTPATIENT
Start: 2021-07-09 | End: 2021-07-09 | Stop reason: SURG

## 2021-07-09 RX ORDER — LIDOCAINE HYDROCHLORIDE 20 MG/ML
INJECTION, SOLUTION INFILTRATION; PERINEURAL AS NEEDED
Status: DISCONTINUED | OUTPATIENT
Start: 2021-07-09 | End: 2021-07-09 | Stop reason: SURG

## 2021-07-09 RX ORDER — HYDROCODONE BITARTRATE AND ACETAMINOPHEN 7.5; 325 MG/1; MG/1
1 TABLET ORAL ONCE AS NEEDED
Status: DISCONTINUED | OUTPATIENT
Start: 2021-07-09 | End: 2021-07-09 | Stop reason: HOSPADM

## 2021-07-09 RX ORDER — LABETALOL HYDROCHLORIDE 5 MG/ML
5 INJECTION, SOLUTION INTRAVENOUS
Status: DISCONTINUED | OUTPATIENT
Start: 2021-07-09 | End: 2021-07-09 | Stop reason: HOSPADM

## 2021-07-09 RX ORDER — NALOXONE HCL 0.4 MG/ML
0.2 VIAL (ML) INJECTION AS NEEDED
Status: DISCONTINUED | OUTPATIENT
Start: 2021-07-09 | End: 2021-07-09 | Stop reason: HOSPADM

## 2021-07-09 RX ORDER — DEXAMETHASONE SODIUM PHOSPHATE 10 MG/ML
INJECTION INTRAMUSCULAR; INTRAVENOUS AS NEEDED
Status: DISCONTINUED | OUTPATIENT
Start: 2021-07-09 | End: 2021-07-09 | Stop reason: SURG

## 2021-07-09 RX ORDER — SODIUM CHLORIDE 0.9 % (FLUSH) 0.9 %
3 SYRINGE (ML) INJECTION EVERY 12 HOURS SCHEDULED
Status: DISCONTINUED | OUTPATIENT
Start: 2021-07-09 | End: 2021-07-09 | Stop reason: HOSPADM

## 2021-07-09 RX ORDER — CEFOXITIN 2 G/1
INJECTION, POWDER, FOR SOLUTION INTRAVENOUS AS NEEDED
Status: DISCONTINUED | OUTPATIENT
Start: 2021-07-09 | End: 2021-07-09 | Stop reason: SURG

## 2021-07-09 RX ORDER — PANTOPRAZOLE SODIUM 40 MG/1
40 TABLET, DELAYED RELEASE ORAL
Status: DISCONTINUED | OUTPATIENT
Start: 2021-07-10 | End: 2021-07-14 | Stop reason: HOSPADM

## 2021-07-09 RX ORDER — BUPIVACAINE HYDROCHLORIDE 5 MG/ML
INJECTION, SOLUTION EPIDURAL; INTRACAUDAL
Status: COMPLETED | OUTPATIENT
Start: 2021-07-09 | End: 2021-07-09

## 2021-07-09 RX ORDER — SODIUM CHLORIDE, SODIUM LACTATE, POTASSIUM CHLORIDE, CALCIUM CHLORIDE 600; 310; 30; 20 MG/100ML; MG/100ML; MG/100ML; MG/100ML
9 INJECTION, SOLUTION INTRAVENOUS CONTINUOUS
Status: DISCONTINUED | OUTPATIENT
Start: 2021-07-09 | End: 2021-07-09

## 2021-07-09 RX ORDER — SODIUM CHLORIDE 0.9 % (FLUSH) 0.9 %
3-10 SYRINGE (ML) INJECTION AS NEEDED
Status: DISCONTINUED | OUTPATIENT
Start: 2021-07-09 | End: 2021-07-09 | Stop reason: HOSPADM

## 2021-07-09 RX ORDER — EPHEDRINE SULFATE 50 MG/ML
INJECTION, SOLUTION INTRAVENOUS AS NEEDED
Status: DISCONTINUED | OUTPATIENT
Start: 2021-07-09 | End: 2021-07-09 | Stop reason: SURG

## 2021-07-09 RX ORDER — ROCURONIUM BROMIDE 10 MG/ML
INJECTION, SOLUTION INTRAVENOUS AS NEEDED
Status: DISCONTINUED | OUTPATIENT
Start: 2021-07-09 | End: 2021-07-09 | Stop reason: SURG

## 2021-07-09 RX ORDER — SODIUM CHLORIDE 9 MG/ML
INJECTION, SOLUTION INTRAVENOUS AS NEEDED
Status: DISCONTINUED | OUTPATIENT
Start: 2021-07-09 | End: 2021-07-09 | Stop reason: HOSPADM

## 2021-07-09 RX ORDER — FENTANYL CITRATE 50 UG/ML
50 INJECTION, SOLUTION INTRAMUSCULAR; INTRAVENOUS
Status: DISCONTINUED | OUTPATIENT
Start: 2021-07-09 | End: 2021-07-09 | Stop reason: HOSPADM

## 2021-07-09 RX ORDER — ONDANSETRON 2 MG/ML
INJECTION INTRAMUSCULAR; INTRAVENOUS AS NEEDED
Status: DISCONTINUED | OUTPATIENT
Start: 2021-07-09 | End: 2021-07-09 | Stop reason: SURG

## 2021-07-09 RX ORDER — PROMETHAZINE HYDROCHLORIDE 25 MG/1
25 SUPPOSITORY RECTAL ONCE AS NEEDED
Status: DISCONTINUED | OUTPATIENT
Start: 2021-07-09 | End: 2021-07-09 | Stop reason: HOSPADM

## 2021-07-09 RX ORDER — FAMOTIDINE 10 MG/ML
20 INJECTION, SOLUTION INTRAVENOUS ONCE
Status: COMPLETED | OUTPATIENT
Start: 2021-07-09 | End: 2021-07-09

## 2021-07-09 RX ORDER — HYDRALAZINE HYDROCHLORIDE 20 MG/ML
5 INJECTION INTRAMUSCULAR; INTRAVENOUS
Status: DISCONTINUED | OUTPATIENT
Start: 2021-07-09 | End: 2021-07-09 | Stop reason: HOSPADM

## 2021-07-09 RX ORDER — DIPHENHYDRAMINE HCL 25 MG
25 CAPSULE ORAL
Status: DISCONTINUED | OUTPATIENT
Start: 2021-07-09 | End: 2021-07-09 | Stop reason: HOSPADM

## 2021-07-09 RX ORDER — HYDROMORPHONE HYDROCHLORIDE 1 MG/ML
0.5 INJECTION, SOLUTION INTRAMUSCULAR; INTRAVENOUS; SUBCUTANEOUS
Status: DISCONTINUED | OUTPATIENT
Start: 2021-07-09 | End: 2021-07-10

## 2021-07-09 RX ORDER — ALBUTEROL SULFATE 2.5 MG/3ML
2.5 SOLUTION RESPIRATORY (INHALATION) ONCE AS NEEDED
Status: DISCONTINUED | OUTPATIENT
Start: 2021-07-09 | End: 2021-07-09 | Stop reason: HOSPADM

## 2021-07-09 RX ORDER — CEFOXITIN 2 G/1
2 INJECTION, POWDER, FOR SOLUTION INTRAVENOUS ONCE
Status: COMPLETED | OUTPATIENT
Start: 2021-07-09 | End: 2021-07-09

## 2021-07-09 RX ORDER — EPHEDRINE SULFATE 50 MG/ML
5 INJECTION, SOLUTION INTRAVENOUS ONCE AS NEEDED
Status: DISCONTINUED | OUTPATIENT
Start: 2021-07-09 | End: 2021-07-09 | Stop reason: HOSPADM

## 2021-07-09 RX ORDER — HYDROMORPHONE HYDROCHLORIDE 1 MG/ML
0.5 INJECTION, SOLUTION INTRAMUSCULAR; INTRAVENOUS; SUBCUTANEOUS
Status: DISCONTINUED | OUTPATIENT
Start: 2021-07-09 | End: 2021-07-09 | Stop reason: HOSPADM

## 2021-07-09 RX ORDER — ONDANSETRON 2 MG/ML
4 INJECTION INTRAMUSCULAR; INTRAVENOUS ONCE AS NEEDED
Status: DISCONTINUED | OUTPATIENT
Start: 2021-07-09 | End: 2021-07-09 | Stop reason: HOSPADM

## 2021-07-09 RX ORDER — GLYCOPYRROLATE 0.2 MG/ML
INJECTION INTRAMUSCULAR; INTRAVENOUS AS NEEDED
Status: DISCONTINUED | OUTPATIENT
Start: 2021-07-09 | End: 2021-07-09 | Stop reason: SURG

## 2021-07-09 RX ORDER — FENTANYL CITRATE 50 UG/ML
INJECTION, SOLUTION INTRAMUSCULAR; INTRAVENOUS AS NEEDED
Status: DISCONTINUED | OUTPATIENT
Start: 2021-07-09 | End: 2021-07-09 | Stop reason: SURG

## 2021-07-09 RX ORDER — SODIUM CHLORIDE 9 MG/ML
9 INJECTION, SOLUTION INTRAVENOUS CONTINUOUS
Status: DISCONTINUED | OUTPATIENT
Start: 2021-07-09 | End: 2021-07-10

## 2021-07-09 RX ORDER — OXYCODONE AND ACETAMINOPHEN 10; 325 MG/1; MG/1
1 TABLET ORAL EVERY 4 HOURS PRN
Status: DISCONTINUED | OUTPATIENT
Start: 2021-07-09 | End: 2021-07-09 | Stop reason: HOSPADM

## 2021-07-09 RX ORDER — DIPHENHYDRAMINE HYDROCHLORIDE 50 MG/ML
12.5 INJECTION INTRAMUSCULAR; INTRAVENOUS
Status: DISCONTINUED | OUTPATIENT
Start: 2021-07-09 | End: 2021-07-09 | Stop reason: HOSPADM

## 2021-07-09 RX ORDER — PROPOFOL 10 MG/ML
VIAL (ML) INTRAVENOUS AS NEEDED
Status: DISCONTINUED | OUTPATIENT
Start: 2021-07-09 | End: 2021-07-09 | Stop reason: SURG

## 2021-07-09 RX ORDER — MAGNESIUM HYDROXIDE 1200 MG/15ML
LIQUID ORAL AS NEEDED
Status: DISCONTINUED | OUTPATIENT
Start: 2021-07-09 | End: 2021-07-09 | Stop reason: HOSPADM

## 2021-07-09 RX ORDER — MIDAZOLAM HYDROCHLORIDE 1 MG/ML
0.5 INJECTION INTRAMUSCULAR; INTRAVENOUS
Status: DISCONTINUED | OUTPATIENT
Start: 2021-07-09 | End: 2021-07-09 | Stop reason: HOSPADM

## 2021-07-09 RX ORDER — FLUMAZENIL 0.1 MG/ML
0.2 INJECTION INTRAVENOUS AS NEEDED
Status: DISCONTINUED | OUTPATIENT
Start: 2021-07-09 | End: 2021-07-09 | Stop reason: HOSPADM

## 2021-07-09 RX ORDER — PROMETHAZINE HYDROCHLORIDE 25 MG/1
25 TABLET ORAL ONCE AS NEEDED
Status: DISCONTINUED | OUTPATIENT
Start: 2021-07-09 | End: 2021-07-09 | Stop reason: HOSPADM

## 2021-07-09 RX ORDER — ACETAMINOPHEN 500 MG
1000 TABLET ORAL EVERY 6 HOURS
Status: DISCONTINUED | OUTPATIENT
Start: 2021-07-09 | End: 2021-07-09 | Stop reason: HOSPADM

## 2021-07-09 RX ORDER — LIDOCAINE HYDROCHLORIDE 10 MG/ML
0.5 INJECTION, SOLUTION EPIDURAL; INFILTRATION; INTRACAUDAL; PERINEURAL ONCE AS NEEDED
Status: DISCONTINUED | OUTPATIENT
Start: 2021-07-09 | End: 2021-07-09 | Stop reason: HOSPADM

## 2021-07-09 RX ORDER — METOPROLOL SUCCINATE 25 MG/1
25 TABLET, EXTENDED RELEASE ORAL DAILY
Status: DISCONTINUED | OUTPATIENT
Start: 2021-07-10 | End: 2021-07-14 | Stop reason: HOSPADM

## 2021-07-09 RX ORDER — BUPIVACAINE HYDROCHLORIDE AND EPINEPHRINE 5; 5 MG/ML; UG/ML
INJECTION, SOLUTION PERINEURAL AS NEEDED
Status: DISCONTINUED | OUTPATIENT
Start: 2021-07-09 | End: 2021-07-09 | Stop reason: HOSPADM

## 2021-07-09 RX ORDER — SODIUM CHLORIDE 9 MG/ML
INJECTION, SOLUTION INTRAVENOUS CONTINUOUS PRN
Status: DISCONTINUED | OUTPATIENT
Start: 2021-07-09 | End: 2021-07-09 | Stop reason: SURG

## 2021-07-09 RX ADMIN — FENTANYL CITRATE 50 MCG: 50 INJECTION INTRAMUSCULAR; INTRAVENOUS at 10:25

## 2021-07-09 RX ADMIN — OXYCODONE AND ACETAMINOPHEN 1 TABLET: 5; 325 TABLET ORAL at 20:34

## 2021-07-09 RX ADMIN — MONTELUKAST SODIUM 10 MG: 10 TABLET, FILM COATED ORAL at 20:34

## 2021-07-09 RX ADMIN — EPHEDRINE SULFATE 10 MG: 50 INJECTION INTRAVENOUS at 10:32

## 2021-07-09 RX ADMIN — PHENYLEPHRINE HYDROCHLORIDE 100 MCG: 10 INJECTION INTRAVENOUS at 10:34

## 2021-07-09 RX ADMIN — EPHEDRINE SULFATE 5 MG: 50 INJECTION INTRAVENOUS at 12:17

## 2021-07-09 RX ADMIN — SUGAMMADEX 200 MG: 100 INJECTION, SOLUTION INTRAVENOUS at 13:18

## 2021-07-09 RX ADMIN — ONDANSETRON 4 MG: 2 INJECTION INTRAMUSCULAR; INTRAVENOUS at 12:14

## 2021-07-09 RX ADMIN — SODIUM CHLORIDE: 9 INJECTION, SOLUTION INTRAVENOUS at 09:12

## 2021-07-09 RX ADMIN — PROPOFOL 150 MG: 10 INJECTION, EMULSION INTRAVENOUS at 10:25

## 2021-07-09 RX ADMIN — DEXAMETHASONE SODIUM PHOSPHATE 6 MG: 10 INJECTION INTRAMUSCULAR; INTRAVENOUS at 10:46

## 2021-07-09 RX ADMIN — BUPIVACAINE HYDROCHLORIDE 20 ML: 5 INJECTION, SOLUTION EPIDURAL; INTRACAUDAL; PERINEURAL at 10:37

## 2021-07-09 RX ADMIN — METOPROLOL SUCCINATE 25 MG: 25 TABLET, EXTENDED RELEASE ORAL at 06:53

## 2021-07-09 RX ADMIN — HYDROMORPHONE HYDROCHLORIDE 0.5 MG: 1 INJECTION, SOLUTION INTRAMUSCULAR; INTRAVENOUS; SUBCUTANEOUS at 18:09

## 2021-07-09 RX ADMIN — LIDOCAINE HYDROCHLORIDE 80 MG: 20 INJECTION, SOLUTION INFILTRATION; PERINEURAL at 10:25

## 2021-07-09 RX ADMIN — BUDESONIDE AND FORMOTEROL FUMARATE DIHYDRATE 2 PUFF: 160; 4.5 AEROSOL RESPIRATORY (INHALATION) at 19:41

## 2021-07-09 RX ADMIN — LIDOCAINE HYDROCHLORIDE 1 EACH: 40 SOLUTION TOPICAL at 10:25

## 2021-07-09 RX ADMIN — PHENYLEPHRINE HYDROCHLORIDE 100 MCG: 10 INJECTION INTRAVENOUS at 12:01

## 2021-07-09 RX ADMIN — FENTANYL CITRATE 50 MCG: 50 INJECTION, SOLUTION INTRAMUSCULAR; INTRAVENOUS at 14:07

## 2021-07-09 RX ADMIN — EPHEDRINE SULFATE 20 MG: 50 INJECTION INTRAVENOUS at 10:53

## 2021-07-09 RX ADMIN — FENTANYL CITRATE 50 MCG: 50 INJECTION INTRAMUSCULAR; INTRAVENOUS at 12:50

## 2021-07-09 RX ADMIN — CEFOXITIN SODIUM 2 G: 2 POWDER, FOR SOLUTION INTRAVENOUS at 10:04

## 2021-07-09 RX ADMIN — EPHEDRINE SULFATE 10 MG: 50 INJECTION INTRAVENOUS at 10:46

## 2021-07-09 RX ADMIN — FAMOTIDINE 20 MG: 10 INJECTION INTRAVENOUS at 09:50

## 2021-07-09 RX ADMIN — ROCURONIUM BROMIDE 50 MG: 50 INJECTION INTRAVENOUS at 10:25

## 2021-07-09 RX ADMIN — GLYCOPYRROLATE 0.6 MG: 0.2 INJECTION INTRAMUSCULAR; INTRAVENOUS at 12:19

## 2021-07-09 RX ADMIN — ACETAMINOPHEN 1000 MG: 500 TABLET, FILM COATED ORAL at 09:50

## 2021-07-09 RX ADMIN — GLYCOPYRROLATE 0.3 MG: 0.2 INJECTION INTRAMUSCULAR; INTRAVENOUS at 10:53

## 2021-07-09 RX ADMIN — BUPIVACAINE 20 ML: 13.3 INJECTION, SUSPENSION, LIPOSOMAL INFILTRATION at 10:37

## 2021-07-09 RX ADMIN — NEOSTIGMINE METHYLSULFATE 3 MG: 0.5 INJECTION INTRAVENOUS at 12:19

## 2021-07-09 RX ADMIN — CEFOXITIN SODIUM 2 G: 2 POWDER, FOR SOLUTION INTRAVENOUS at 11:00

## 2021-07-09 RX ADMIN — BUDESONIDE AND FORMOTEROL FUMARATE DIHYDRATE 2 PUFF: 160; 4.5 AEROSOL RESPIRATORY (INHALATION) at 07:21

## 2021-07-09 RX ADMIN — SODIUM CHLORIDE 9 ML/HR: 9 INJECTION, SOLUTION INTRAVENOUS at 09:50

## 2021-07-09 NOTE — PROGRESS NOTES
Patient was off the floor for the colon resection today.    We will check on her on Sunday and, schedule permitting, plan right arm AV shuntogram with possible angioplasty on Monday.

## 2021-07-09 NOTE — BRIEF OP NOTE
COLON RESECTION RIGHT OR TRANSVERSE LAPAROSCOPIC  Progress Note    Nellie RANDOLPH Ascencion  7/9/2021    Pre-op Diagnosis:   Lower GI bleeding [K92.2]       Post-Op Diagnosis Codes:     * Lower GI bleeding [K92.2]    Procedure/CPT® Codes:        Procedure(s):  RIGHT HEMICOLECTOMY LAPAROSCOPIC    Surgeon(s):  Zbigniew Conner MD    Anesthesia: General with Block    Staff:   Circulator: Cesar Reyna RN  Scrub Person: Jorden Lozada; Cathie Elena RN  Assistant: Enrique Pretty CSA  Assistant: Enrique Pretty CSA      Estimated Blood Loss: minimal    Urine Voided: * No values recorded between 7/9/2021 10:10 AM and 7/9/2021 12:11 PM *    Specimens:                Specimens     ID Source Type Tests Collected By Collected At Frozen?    A Large Intestine, Right / Ascending Colon Tissue · TISSUE PATHOLOGY EXAM   Zbigniew Conner MD 7/9/21 1142     Description: Right colon                Drains: * No LDAs found *    Findings: Cecal mass    Complications: None    Assistant: Enrique Pretty CSA  was responsible for performing the following activities: Retraction, Suction, Irrigation, Suturing, Closing, Placing Dressing and Held/Positioned Camera and their skilled assistance was necessary for the success of this case.    Zbigniew Conner MD     Date: 7/9/2021  Time: 12:24 EDT

## 2021-07-09 NOTE — PROGRESS NOTES
[facility]  Inpatient Progress Note    Reason for Follow Up:  Colon mass    Subjective     Interval History:   Colonoscopy yesterday with large polypoid mass in cecum.  Plans for OR today for R hemicolectomy    Current Facility-Administered Medications:   •  albuterol (PROVENTIL) nebulizer solution 0.083% 2.5 mg/3mL, 2.5 mg, Nebulization, TID PRN, Fabricio Monroe MD  •  budesonide-formoterol (SYMBICORT) 160-4.5 MCG/ACT inhaler 2 puff, 2 puff, Inhalation, BID - RT, Fabricio Monroe MD, 2 puff at 07/09/21 0721  •  dextrose (D50W) 25 g/ 50mL Intravenous Solution 25 g, 25 g, Intravenous, Q15 Min PRN, Fabricio Monroe MD, 25 g at 07/07/21 1650  •  dextrose (GLUTOSE) oral gel 15 g, 15 g, Oral, Q15 Min PRN, Fabricio Monroe MD  •  epoetin polina-epbx (RETACRIT) injection 10,000 Units, 10,000 Units, Intravenous, Once per day on Tue Thu Sat, Fabricio Monroe MD, 10,000 Units at 07/08/21 1512  •  glucagon (human recombinant) (GLUCAGEN DIAGNOSTIC) injection 1 mg, 1 mg, Subcutaneous, Q15 Min PRN, Fabricio Monroe MD  •  HYDROcodone-acetaminophen (NORCO) 5-325 MG per tablet 1 tablet, 1 tablet, Oral, Q8H PRN, Fabricio Monroe MD  •  insulin lispro (ADMELOG) injection 0-7 Units, 0-7 Units, Subcutaneous, TID AC, Fabricio Monroe MD  •  metoprolol succinate XL (TOPROL-XL) 24 hr tablet 25 mg, 25 mg, Oral, QAM, Fabricio Monroe MD, 25 mg at 07/09/21 0653  •  montelukast (SINGULAIR) tablet 10 mg, 10 mg, Oral, Nightly, Fabricio Monroe MD, 10 mg at 07/08/21 2005  •  pantoprazole (PROTONIX) EC tablet 40 mg, 40 mg, Oral, Q AM, Haja Chowdhury MD  •  [COMPLETED] Insert peripheral IV, , , Once **AND** sodium chloride 0.9 % flush 10 mL, 10 mL, Intravenous, PRN, Fabricio Monroe MD, 10 mL at 07/08/21 2005  •  sodium chloride 0.9 % infusion, 30 mL/hr, Intravenous, Continuous PRN, Fabricio Monroe MD, Last Rate: 30 mL/hr at 07/07/21 1118, Restarted at  07/07/21 1214  Review of Systems:    The following systems were reviewed and negative;  gastrointestinal    Objective     Vital Signs  Temp:  [97.5 °F (36.4 °C)-98 °F (36.7 °C)] 98 °F (36.7 °C)  Heart Rate:  [60-74] 71  Resp:  [16-20] 18  BP: ()/(43-56) 114/47  Body mass index is 32.99 kg/m².    Intake/Output Summary (Last 24 hours) at 7/9/2021 0733  Last data filed at 7/9/2021 0024  Gross per 24 hour   Intake 570 ml   Output 2000 ml   Net -1430 ml     No intake/output data recorded.     Physical Exam:   General: patient awake, alert and cooperative   Abdomen: soft, nontender, nondistended; normal bowel sounds   Rectal: deferred   Extremities: no rash or edema   Psychiatric: Normal mood and behavior; memory intact     Results Review:     I reviewed the patient's new clinical results.    Results from last 7 days   Lab Units 07/08/21 0447 07/07/21 2208 07/07/21 0442 07/06/21  0934   WBC 10*3/mm3 5.06  --  5.70 5.39   HEMOGLOBIN g/dL 7.9* 8.3* 6.6* 7.6*   HEMATOCRIT % 25.5* 26.8* 21.6* 25.3*   PLATELETS 10*3/mm3 188  --  198 234     Results from last 7 days   Lab Units 07/08/21 0447 07/07/21 0442 07/06/21  0934 07/05/21  1626   SODIUM mmol/L 134* 135* 138 141   POTASSIUM mmol/L 3.4* 3.8 4.0 3.6   CHLORIDE mmol/L 95* 98 100 101   CO2 mmol/L 20.5* 21.0* 18.2* 20.0*   BUN mg/dL 23 19 42* 36*   CREATININE mg/dL 5.81* 5.19* 8.09* 7.86*   CALCIUM mg/dL 6.6* 7.0* 6.9* 6.9*   BILIRUBIN mg/dL  --  0.2  --  0.2   ALK PHOS U/L  --  97  --  138*   ALT (SGPT) U/L  --  8  --  10   AST (SGOT) U/L  --  16  --  13   GLUCOSE mg/dL 62* 71 92 131*     Results from last 7 days   Lab Units 07/07/21  0442 07/05/21  1626   INR  1.10 1.49*     Lab Results   Lab Value Date/Time    LIPASE 20 08/10/2019 1352         Assessment/Plan   Assessment:   1.  Cecal mass  2.  Rectal bleeding  3.  ESRD on HD  4.  Pancreatic duct abnormality on CT    Plan:   To OR today for R hemicolectomy  Recommend outpt workup of the dilated pancreatic duct  seen on CT, this appears to be somewhat chronic, and CA 19-9 was normal.  Would start with non contrasted MRI/MRCP, then may need eventual EUS.  She can f/u in office in 6 weeks.  GI will see again as needed.    I discussed the patients findings and my recommendations with patient.         Fabricio Monroe M.D.  [facility]  [address] [place] [address]  Office: [phone]

## 2021-07-09 NOTE — ANESTHESIA POSTPROCEDURE EVALUATION
"Patient: Nellie Vegas    Procedure Summary     Date: 07/09/21 Room / Location: [facility] OR [address] [place] [address] MAIN OR    Anesthesia Start: 1010 Anesthesia Stop: 1251    Procedure: RIGHT HEMICOLECTOMY LAPAROSCOPIC (Right Abdomen) Diagnosis:       Lower GI bleeding      (Lower GI bleeding [K92.2])    Surgeons: Zbigniew Conner MD Provider: Mode Watson MD    Anesthesia Type: general ASA Status: 3          Anesthesia Type: general    Vitals  Vitals Value Taken Time   /60 07/09/21 1415   Temp 36.5 °C (97.7 °F) 07/09/21 1249   Pulse 59 07/09/21 1415   Resp 10 07/09/21 1415   SpO2 93 % 07/09/21 1415           Post Anesthesia Care and Evaluation    Pain management: adequate  Airway patency: patent  Anesthetic complications: No anesthetic complications    Cardiovascular status: acceptable  Respiratory status: acceptable  Hydration status: acceptable    Comments: /60   Pulse 59   Temp 36.5 °C (97.7 °F) (Oral)   Resp 10   Ht 152.4 cm (60\")   Wt 76.6 kg (168 lb 14.4 oz)   SpO2 93%   BMI 32.99 kg/m²         "

## 2021-07-09 NOTE — OP NOTE
Date of procedure: 7/9/2021    Primary surgeon: Dr. Zbigniew Conner    Assistant: Enrique Pretty CSA  was responsible for performing the following activities: Retraction, Suction, Irrigation, Suturing, Closing, Placing Dressing and Held/Positioned Camera and their skilled assistance was necessary for the success of this case.    Preoperative diagnosis: Cecal mass    Postoperative diagnosis: Same    Procedure performed:   -Laparoscopic right hemicolectomy    Anesthesia: General plus block    EBL: Minimal    Complications: None    Findings: Cecal mass    Condition of patient: Stable to recovery    Indications: 80-year-old female with GI bleed diagnosed with endoscopically unresectable cecal polyp.  I discussed with the patient about treatment options.  I recommend that she undergoes laparoscopic right hemicolectomy.  Risk and benefits of procedure including bleeding, infection, anastomotic leak, intra-abdominal injuries were discussed in detail with the patient that verbalized understanding and agree with the plan.    Description:   Patient was taken to operative room and she was placed in the OR table in supine position.  Preoperative antibiotics were given and SCDs were placed.  Patient underwent general endotracheal anesthesia.  Patient abdomen was then prepped and draped in the usual sterile fashion.  Timeout was performed and patient was correctly identified.  Half percent Marcaine with epinephrine was injected in the left mid abdomen.  Pneumoperitoneum was insufflated after a 5 mm trocar was placed with Optiview technique.  Exploration of the abdominal cavity revealed no injury from trocar placement.   another 2 5 mm trochars were placed in the suprapubic and the right lower quadrant.  A 12 mm trocar was placed in the epigastric area.  This was done under direct laparoscopic vision.  Started procedure by taking the retroperitoneal attachments from the Toldt fascia at the level of the cecum.  Dissection was  carried down TO the hepatic flexure.  Colon was mobilized lateral to medial.  I then proceeded to place the patient in steep reverse Trendelenburg position.  Gastrocolic ligament was taken down to the level of the mid colon.  Mobilization of the body flexure was performed by transecting the attachments of the colon to the retroperitoneum.  The colon was extremely floppy and at this time dissection was already plenty so I decided to externalize the colon for transection of anastomosis.  Midline incision was performed with scalpel.  Section was carried into subcutaneous tissue and abdominal wall fascia.  Medium size Blayne retractor was placed.  The colon was externalized.  The terminal ileum was transected with Roper blue load stapler.  The proximal transverse colon was transected with Roper blue load stapler.  Mesentery and vessels were transected with harmonic device.  Ileocolic pedicle was transected between clamps and ties.  Specimen was removed and sent for pathological analysis.  I then tested both ends with Doppler and both have great blood supply.  I decided to perform a side-to-side staple anastomosis.,  Enterotomy was closed with running interlocking 3-0 chromic and Lembert 2-0 silk sutures.  Crutches anastomosis was reinforced with 2-0 Lembert sutures.  We discarded contaminated instruments and changed gloves.  Mesentery was closed with interrupted figure of eight 2-0 silk sutures.  Staple lines were reinforced with Lembert 2-0 silk sutures.  Abdominal cavity was explored.  There was no evidence of injury from the procedure.  All the trochars were removed.  Midline laparotomy was closed with running 2-0 PDS suture from inferior superior portion of the wound.  Skin was closed with staplers.  All laparoscopic incisions were closed with sutures.  Dressings were placed.  The patient tolerated procedure well and was sent to recovery area in stable condition.  Instrument sponge count were correct    Zbigniew  SANTANA Conner MD, FACS  General, Minimally Invasive and Endoscopic Surgery  [facility]    [address], [address]  [place], [place], [address]  P: [phone]  F: [phone]

## 2021-07-09 NOTE — PLAN OF CARE
Goal Outcome Evaluation:  Plan of Care Reviewed With: patient      Patient down to surgery for right hemicolectomy today. 2 lap sites and small midline incision noted with dressing in place, scant drainage noted. Patient back on RA and all other VSS. On clear liquid diet. ACHS. HD orders for tomorrow called. Patient resting comfortably now. Will CTM.

## 2021-07-09 NOTE — PROGRESS NOTES
"   LOS: 4 days     Chief Complaint/ Reason for encounter: ESRD, dialysis management  Chief Complaint   Patient presents with   • Rectal Bleeding         Subjective      In the OR today for hemicolectomy, fistula scan showed moderate stenosis  Vascular following  Labs stable today, dialysis went well yesterday    Medical history reviewed:  History of Present Illness    Subjective    History taken from: Patient and chart    Vital Signs  Temp:  [97.5 °F (36.4 °C)-98.4 °F (36.9 °C)] 98.3 °F (36.8 °C)  Heart Rate:  [60-71] 64  Resp:  [18-20] 18  BP: ()/(43-62) 128/62       Wt Readings from Last 1 Encounters:   07/09/21 0513 76.6 kg (168 lb 14.4 oz)   07/08/21 0456 78 kg (171 lb 14.4 oz)   07/07/21 0619 78.2 kg (172 lb 6.4 oz)   07/06/21 0926 76.6 kg (168 lb 14.4 oz)   07/05/21 2011 73.5 kg (162 lb 1.6 oz)   07/05/21 1546 74.5 kg (164 lb 3.9 oz)       Objective:  Vital signs: (most recent): Blood pressure 128/62, pulse 64, temperature 98.3 °F (36.8 °C), temperature source Oral, resp. rate 18, height 152.4 cm (60\"), weight 76.6 kg (168 lb 14.4 oz), SpO2 100 %, not currently breastfeeding.              Objective:  In OR today, not examined      Results Review:    Intake/Output:     Intake/Output Summary (Last 24 hours) at 7/9/2021 1136  Last data filed at 7/9/2021 0024  Gross per 24 hour   Intake 570 ml   Output 2000 ml   Net -1430 ml         DATA:  Radiology and Labs:  The following labs independently reviewed by me. Additional labs ordered for tomorrow a.m.  Interval notes, chart personally reviewed by me.   Old records independently reviewed showing ESRD on dialysis  Discussed with patient    Risk/ complexity of medical care/ medical decision making moderate    Labs:   Recent Results (from the past 24 hour(s))   POC Glucose Once    Collection Time: 07/08/21  5:28 PM    Specimen: Blood   Result Value Ref Range    Glucose 60 (L) 70 - 130 mg/dL   POC Glucose Once    Collection Time: 07/08/21  6:45 PM    Specimen: " Blood   Result Value Ref Range    Glucose 138 (H) 70 - 130 mg/dL   POC Glucose Once    Collection Time: 07/08/21  7:51 PM    Specimen: Blood   Result Value Ref Range    Glucose 130 70 - 130 mg/dL   COVID-19,[facility] CEPHEID/TAB NP SWAB IN TRANSPORT MEDIA 8-12 HR TAT - Swab, Nasopharynx    Collection Time: 07/08/21  8:15 PM    Specimen: Nasopharynx; Swab   Result Value Ref Range    COVID19 Not Detected Not Detected - Ref. Range   POC Glucose Once    Collection Time: 07/09/21  6:20 AM    Specimen: Blood   Result Value Ref Range    Glucose 97 70 - 130 mg/dL   Basic Metabolic Panel    Collection Time: 07/09/21  6:51 AM    Specimen: Arm, Left; Blood   Result Value Ref Range    Glucose 80 65 - 99 mg/dL    BUN 12 8 - 23 mg/dL    Creatinine 4.54 (H) 0.57 - 1.00 mg/dL    Sodium 138 136 - 145 mmol/L    Potassium 3.6 3.5 - 5.2 mmol/L    Chloride 99 98 - 107 mmol/L    CO2 23.5 22.0 - 29.0 mmol/L    Calcium 6.9 (L) 8.6 - 10.5 mg/dL    eGFR  African Amer 11 (L) >60 mL/min/1.73    eGFR Non African Amer      BUN/Creatinine Ratio 2.6 (L) 7.0 - 25.0    Anion Gap 15.5 (H) 5.0 - 15.0 mmol/L   CBC Auto Differential    Collection Time: 07/09/21  6:51 AM    Specimen: Blood   Result Value Ref Range    WBC 3.74 3.40 - 10.80 10*3/mm3    RBC 3.15 (L) 3.77 - 5.28 10*6/mm3    Hemoglobin 8.0 (L) 12.0 - 15.9 g/dL    Hematocrit 25.0 (L) 34.0 - 46.6 %    MCV 79.4 79.0 - 97.0 fL    MCH 25.4 (L) 26.6 - 33.0 pg    MCHC 32.0 31.5 - 35.7 g/dL    RDW 17.8 (H) 12.3 - 15.4 %    RDW-SD 52.0 37.0 - 54.0 fl    MPV 10.2 6.0 - 12.0 fL    Platelets 172 140 - 450 10*3/mm3    Neutrophil % 65.1 42.7 - 76.0 %    Lymphocyte % 14.4 (L) 19.6 - 45.3 %    Monocyte % 15.5 (H) 5.0 - 12.0 %    Eosinophil % 3.7 0.3 - 6.2 %    Basophil % 0.8 0.0 - 1.5 %    Immature Grans % 0.5 0.0 - 0.5 %    Neutrophils, Absolute 2.43 1.70 - 7.00 10*3/mm3    Lymphocytes, Absolute 0.54 (L) 0.70 - 3.10 10*3/mm3    Monocytes, Absolute 0.58 0.10 - 0.90 10*3/mm3    Eosinophils, Absolute  0.14 0.00 - 0.40 10*3/mm3    Basophils, Absolute 0.03 0.00 - 0.20 10*3/mm3    Immature Grans, Absolute 0.02 0.00 - 0.05 10*3/mm3    nRBC 0.0 0.0 - 0.2 /100 WBC   Type & Screen    Collection Time: 07/09/21  9:57 AM    Specimen: Blood   Result Value Ref Range    ABO Type B     RH type Positive     Antibody Screen Negative     T&S Expiration Date 7/12/2021 11:59:59 PM        Radiology:  Imaging Results (Last 24 Hours)     ** No results found for the last 24 hours. **             Medications have been reviewed:  Current Facility-Administered Medications   Medication Dose Route Frequency Provider Last Rate Last Admin   • acetaminophen (TYLENOL) tablet 1,000 mg  1,000 mg Oral Q6H Zbigniew Conner MD   1,000 mg at 07/09/21 0950   • [MAR Hold] albuterol (PROVENTIL) nebulizer solution 0.083% 2.5 mg/3mL  2.5 mg Nebulization TID PRN Fabricio Monroe MD       • [MAR Hold] budesonide-formoterol (SYMBICORT) 160-4.5 MCG/ACT inhaler 2 puff  2 puff Inhalation BID - RT Fabricio Monroe MD   2 puff at 07/09/21 0721   • bupivacaine-EPINEPHrine (MARCAINE w/EPI) injection    PRN Zbigniew Conner MD   30 mL at 07/09/21 1117   • [MAR Hold] dextrose (D50W) 25 g/ 50mL Intravenous Solution 25 g  25 g Intravenous Q15 Min PRN Fabricio Monroe MD   25 g at 07/07/21 1650   • [MAR Hold] dextrose (GLUTOSE) oral gel 15 g  15 g Oral Q15 Min PRN Fabricio Monroe MD       • [MAR Hold] epoetin polnia-epbx (RETACRIT) injection 10,000 Units  10,000 Units Intravenous Once per day on Tue Thu Sat Fabricio Monroe MD   10,000 Units at 07/08/21 1512   • fentaNYL citrate (PF) (SUBLIMAZE) injection 50 mcg  50 mcg Intravenous Q10 Min PRN Mode Watson MD       • [MAR Hold] glucagon (human recombinant) (GLUCAGEN DIAGNOSTIC) injection 1 mg  1 mg Subcutaneous Q15 Min PRN Fabricio Monroe MD       • [MAR Hold] HYDROcodone-acetaminophen (NORCO) 5-325 MG per tablet 1 tablet  1 tablet Oral Q8H PRN Fabricio Monroe  MD Shahram       • [MAR Hold] insulin lispro (ADMELOG) injection 0-7 Units  0-7 Units Subcutaneous TID AC Fabricio Monroe MD       • lidocaine PF 1% (XYLOCAINE) injection 0.5 mL  0.5 mL Injection Once PRN Mode Watson MD       • metoprolol succinate XL (TOPROL-XL) 24 hr tablet 25 mg  25 mg Oral QAM Fabricio Monroe MD   25 mg at 07/09/21 0653   • midazolam (VERSED) injection 0.5 mg  0.5 mg Intravenous Q10 Min PRN Mode Watson MD       • [MAR Hold] montelukast (SINGULAIR) tablet 10 mg  10 mg Oral Nightly Fabricio Monroe MD   10 mg at 07/08/21 2005   • [MAR Hold] pantoprazole (PROTONIX) EC tablet 40 mg  40 mg Oral Q AM Haja Chowdhury MD       • [MAR Hold] sodium chloride 0.9 % flush 10 mL  10 mL Intravenous PRN Fabricio Monroe MD   10 mL at 07/08/21 2005   • sodium chloride 0.9 % flush 3 mL  3 mL Intravenous Q12H Mode Watson MD       • sodium chloride 0.9 % flush 3-10 mL  3-10 mL Intravenous PRN Mode Watson MD       • sodium chloride 0.9 % infusion  30 mL/hr Intravenous Continuous PRN Fabricio Monroe MD 30 mL/hr at 07/07/21 1118 Restarted at 07/07/21 1214   • sodium chloride 0.9 % infusion  9 mL/hr Intravenous Continuous Mode Watson MD 9 mL/hr at 07/09/21 0950 9 mL/hr at 07/09/21 0950   • sodium chloride 0.9 % solution    PRN Zbigniew Conner MD   1,000 mL at 07/09/21 1117   • sterile water irrigation solution    PRN Zbigniew Conner MD   1,000 mL at 07/09/21 1118     Facility-Administered Medications Ordered in Other Encounters   Medication Dose Route Frequency Provider Last Rate Last Admin   • cefOXItin (MEFOXIN) injection   Intravenous PRN Gagan Hernandez CRNA   2 g at 07/09/21 1100   • dexamethasone (DECADRON) injection   Intravenous PRN Gagan Hernandez CRNA   6 mg at 07/09/21 1046   • ePHEDrine injection   Intravenous PRN Gagan Hernandez, CRNA   20 mg at 07/09/21 1053   • fentaNYL citrate (PF) (SUBLIMAZE) injection   Intravenous PRN  Gagan Hernandez CRNA   50 mcg at 07/09/21 1025   • glycopyrrolate (ROBINUL) injection   Intravenous PRN Gagan Hernandez CRNA   0.3 mg at 07/09/21 1053   • lidocaine (LTA KIT) 4 % laryngotracheal solution   Topical PRN Gagan Hernandez CRNA   1 each at 07/09/21 1025   • lidocaine (XYLOCAINE) 2% injection   Injection PRN Gagan Hernandez CRNA   80 mg at 07/09/21 1025   • phenylephrine (REGINO-SYNEPHRINE) injection   Intravenous PRN Gagan Hernandez CRNA   100 mcg at 07/09/21 1034   • Propofol (DIPRIVAN) injection   Intravenous PRN Gagan Hernandez CRNA   150 mg at 07/09/21 1025   • rocuronium (ZEMURON) injection   Intravenous PRN Gagan Hernandez CRNA   50 mg at 07/09/21 1025   • sodium chloride 0.9 % infusion   Intravenous Continuous PRN Gagan Hernandez CRNA   New Bag at 07/09/21 0912       ASSESSMENT:  ESRD  Cecal cecal mass on colonoscopy, await biopsy, surgery consulted  Lower GI bleeding from a cecal mass  Hypertension  Anemia of CKD  COPD  AV fistula dysfunction       PLAN:    Hemicolectomy today  Continue Tuesday Thursday Saturday hemodialysis schedule, HD a.m. with no heparin  Vascular following for AV fistula dysfunction, Doppler showed moderate stenosis, possible intervention next week after right hemicolectomy  Potassium 3.6, use 3K bath for now  Recheck hemoglobin in a.m., transfuse as needed for hemoglobin less than 7    Epogen with HD for anemia, UF as tolerated     Continue to monitor electrolytes and volume closely       Chester Kang MD   [facility]   Office phone number: [phone]  Answering service phone number: [phone]    07/09/21  11:36 EDT    Dictation performed using Dragon dictation software

## 2021-07-09 NOTE — TELEPHONE ENCOUNTER
----- Message from Fabricio Monroe MD sent at 7/9/2021  9:00 AM EDT -----  Regarding: Hospital f/u  Hospital f/u with me or BG/SM in 6 weeks

## 2021-07-09 NOTE — ANESTHESIA PREPROCEDURE EVALUATION
Anesthesia Evaluation     NPO Solid Status: > 8 hours             Airway   Mallampati: III  Dental    (+) edentulous    Pulmonary    (+) a smoker Former, COPD, asthma,sleep apnea on CPAP,   Cardiovascular     (+) hypertension, BARRETT, hyperlipidemia,   (-) angina      Neuro/Psych  (+) headaches,     GI/Hepatic/Renal/Endo    (+) obesity,  GERD, GI bleeding , renal disease dialysis and ESRD, diabetes mellitus,     Musculoskeletal     Abdominal    Substance History      OB/GYN          Other                        Anesthesia Plan    ASA 3     general   (Block placed for postoperative pain control per surgeon request.  )    Anesthetic plan, all risks, benefits, and alternatives have been provided, discussed and informed consent has been obtained with: patient.

## 2021-07-09 NOTE — PROGRESS NOTES
"Daily progress note    Chief complaint  Doing same  No new complaints  Going for surgery  Family at bedside    History of present illness  73-year-old -American female with history of end-stage renal disease on hemodialysis COPD diabetes mellitus hypertension and chronic anemia brought to the emergency room with black diarrhea started on Saturday.  Patient has 4 episodes.  Patient also have abdominal discomfort.  Patient has been noticing black stools for several days.  Patient denies any nausea vomiting.  Patient denies any fever cough chest pain shortness of breath.  Patient work-up in ER revealed acute blood loss anemia with lower GI bleed admit for management.      REVIEW OF SYSTEMS  Unremarkable except for mild abdominal pain    PHYSICAL EXAM   Blood pressure 116/60, pulse 59, temperature 97.7 °F (36.5 °C), temperature source Oral, resp. rate 10, height 152.4 cm (60\"), weight 76.6 kg (168 lb 14.4 oz), SpO2 93 %, not currently breastfeeding.    Constitutional: Pt. is oriented to person, place, and time   HENT: Normocephalic and atraumatic. Oropharynx moist/nonerythematous.  Neck: Normal range of motion. Neck supple. No JVD present.   Cardiovascular: Normal rate, regular rhythm and normal heart sounds. Exam reveals no gallop and no friction rub. No murmur heard.  Pulmonary/Chest: Effort normal and breath sounds normal. No stridor. No respiratory distress. No wheezes, no rales.   Abdominal: Soft. Bowel sounds are normal. No distension. There is no tenderness. There is no rebound and no guarding.   Musculoskeletal: Normal range of motion. No edema, tenderness or deformity.   Neurological: Pt. is alert and oriented to person, place, and time.  She has no focal neurologic deficits  Skin: Skin is warm and dry. No rash noted. Pt. is not diaphoretic. No erythema.   Psychiatric: Mood, affect and judgment normal.  She is pleasant and cooperative.    LAB RESULTS  Lab Results (last 24 hours)     Procedure Component " Value Units Date/Time    Tissue Pathology Exam [[id number]] Collected: 07/09/21 1142    Specimen: Tissue from Large Intestine, Right / Ascending Colon Updated: 07/09/21 1417    POC Glucose Once [[id number]]  (Abnormal) Collected: 07/09/21 1254    Specimen: Blood Updated: 07/09/21 1256     Glucose 158 mg/dL      Comment: Meter: [id number] : [id number] Reji WILKINS RN       Basic Metabolic Panel [[id number]]  (Abnormal) Collected: 07/09/21 0651    Specimen: Blood from Arm, Left Updated: 07/09/21 0801     Glucose 80 mg/dL      BUN 12 mg/dL      Creatinine 4.54 mg/dL      Sodium 138 mmol/L      Potassium 3.6 mmol/L      Chloride 99 mmol/L      CO2 23.5 mmol/L      Calcium 6.9 mg/dL      eGFR  African Amer 11 mL/min/1.73      Comment: <15 Indicative of kidney failure.        eGFR Non  Amer --     Comment: <15 Indicative of kidney failure.        BUN/Creatinine Ratio 2.6     Anion Gap 15.5 mmol/L     Narrative:      GFR Normal >60  Chronic Kidney Disease <60  Kidney Failure <15      CBC & Differential [[id number]]  (Abnormal) Collected: 07/09/21 0651    Specimen: Blood Updated: 07/09/21 0746    Narrative:      The following orders were created for panel order CBC & Differential.  Procedure                               Abnormality         Status                     ---------                               -----------         ------                     CBC Auto Differential[[id number]]        Abnormal            Final result                 Please view results for these tests on the individual orders.    CBC Auto Differential [[id number]]  (Abnormal) Collected: 07/09/21 0651    Specimen: Blood Updated: 07/09/21 0746     WBC 3.74 10*3/mm3      RBC 3.15 10*6/mm3      Hemoglobin 8.0 g/dL      Hematocrit 25.0 %      MCV 79.4 fL      MCH 25.4 pg      MCHC 32.0 g/dL      RDW 17.8 %      RDW-SD 52.0 fl      MPV 10.2 fL      Platelets 172 10*3/mm3      Neutrophil % 65.1 %      Lymphocyte % 14.4 %      Monocyte % 15.5 %       Eosinophil % 3.7 %      Basophil % 0.8 %      Immature Grans % 0.5 %      Neutrophils, Absolute 2.43 10*3/mm3      Lymphocytes, Absolute 0.54 10*3/mm3      Monocytes, Absolute 0.58 10*3/mm3      Eosinophils, Absolute 0.14 10*3/mm3      Basophils, Absolute 0.03 10*3/mm3      Immature Grans, Absolute 0.02 10*3/mm3      nRBC 0.0 /100 WBC     POC Glucose Once [[id number]]  (Normal) Collected: 07/09/21 0620    Specimen: Blood Updated: 07/09/21 0622     Glucose 97 mg/dL      Comment: Meter: [id number] : [id number] [employer] NA       COVID PRE-OP / PRE-PROCEDURE SCREENING ORDER (NO ISOLATION) - Swab, Nasopharynx [[id number]]  (Normal) Collected: 07/08/21 2015    Specimen: Swab from Nasopharynx Updated: 07/08/21 2142    Narrative:      The following orders were created for panel order COVID PRE-OP / PRE-PROCEDURE SCREENING ORDER (NO ISOLATION) - Swab, Nasopharynx.  Procedure                               Abnormality         Status                     ---------                               -----------         ------                     COVID-19,[facility]...[[id number]]  Normal              Final result                 Please view results for these tests on the individual orders.    COVID-19,[facility] CEPHEID/TAB NP SWAB IN TRANSPORT MEDIA 8-12 HR TAT - Swab, Nasopharynx [[id number]]  (Normal) Collected: 07/08/21 2015    Specimen: Swab from Nasopharynx Updated: 07/08/21 2142     COVID19 Not Detected    Narrative:      Fact sheet for providers: https://www.fda.gov/media/124320/download     Fact sheet for patients: https://www.fda.gov/media/663463/download    POC Glucose Once [[id number]]  (Normal) Collected: 07/08/21 1951    Specimen: Blood Updated: 07/08/21 1952     Glucose 130 mg/dL      Comment: Meter: [id number] : [id number] [employer] NA       POC Glucose Once [[id number]]  (Abnormal) Collected: 07/08/21 1845    Specimen: Blood Updated: 07/08/21 1846     Glucose 138 mg/dL      Comment: Meter:  [id number] : [id number] Tito Dutton CNA           Imaging Results (Last 24 Hours)     ** No results found for the last 24 hours. **        Colonoscopy results noted and discussed with patient and family    Current Facility-Administered Medications:   •  [MAR Hold] albuterol (PROVENTIL) nebulizer solution 0.083% 2.5 mg/3mL, 2.5 mg, Nebulization, TID PRN, Fabricio Monroe MD  •  albuterol (PROVENTIL) nebulizer solution 0.083% 2.5 mg/3mL, 2.5 mg, Nebulization, Once PRN, Gagan Hernandez CRNA  •  [MAR Hold] budesonide-formoterol (SYMBICORT) 160-4.5 MCG/ACT inhaler 2 puff, 2 puff, Inhalation, BID - RT, Fabricio Monroe MD, 2 puff at 07/09/21 0721  •  [MAR Hold] dextrose (D50W) 25 g/ 50mL Intravenous Solution 25 g, 25 g, Intravenous, Q15 Min PRN, Fabricio Monroe MD, 25 g at 07/07/21 1650  •  [MAR Hold] dextrose (GLUTOSE) oral gel 15 g, 15 g, Oral, Q15 Min PRN, Fabricio Monroe MD  •  diphenhydrAMINE (BENADRYL) capsule 25 mg, 25 mg, Oral, Q30 Min PRN, Gagan Hernandez CRNA  •  diphenhydrAMINE (BENADRYL) injection 12.5 mg, 12.5 mg, Intravenous, Q15 Min PRN, Gagan Hernandez CRNA  •  ePHEDrine injection 5 mg, 5 mg, Intravenous, Once PRN, Gagan Hernandez CRNA  •  [MAR Hold] epoetin polina-epbx (RETACRIT) injection 10,000 Units, 10,000 Units, Intravenous, Once per day on Tue Thu Sat, Fabricio Monroe MD, 10,000 Units at 07/08/21 1512  •  fentaNYL citrate (PF) (SUBLIMAZE) injection 50 mcg, 50 mcg, Intravenous, Q5 Min PRN, Gagan Hernandez CRNA, 50 mcg at 07/09/21 1407  •  flumazenil (ROMAZICON) injection 0.2 mg, 0.2 mg, Intravenous, PRN, Gagan Hernandez CRNA  •  [MAR Hold] glucagon (human recombinant) (GLUCAGEN DIAGNOSTIC) injection 1 mg, 1 mg, Subcutaneous, Q15 Min PRN, Fabricio Monroe MD  •  hydrALAZINE (APRESOLINE) injection 5 mg, 5 mg, Intravenous, Q10 Min PRN, Gagan Hernandez CRNA  •  [MAR Hold] HYDROcodone-acetaminophen (NORCO) 5-325 MG per tablet 1 tablet,  1 tablet, Oral, Q8H PRN, Fabricio Monroe MD  •  HYDROcodone-acetaminophen (NORCO) 7.5-325 MG per tablet 1 tablet, 1 tablet, Oral, Once PRN, Gagan Hernandez CRNA  •  HYDROmorphone (DILAUDID) injection 0.5 mg, 0.5 mg, Intravenous, Q5 Min PRN, Gagan Hernandez CRNA  •  ibuprofen (ADVIL,MOTRIN) tablet 600 mg, 600 mg, Oral, Once PRN, Gagan Hernandez CRNA  •  [MAR Hold] insulin lispro (ADMELOG) injection 0-7 Units, 0-7 Units, Subcutaneous, TID AC, Fabricio Monroe MD  •  labetalol (NORMODYNE,TRANDATE) injection 5 mg, 5 mg, Intravenous, Q5 Min PRN, Gagan Hernandez CRNA  •  metoprolol succinate XL (TOPROL-XL) 24 hr tablet 25 mg, 25 mg, Oral, QAM, Fabricio Monroe MD, 25 mg at 07/09/21 0653  •  [MAR Hold] montelukast (SINGULAIR) tablet 10 mg, 10 mg, Oral, Nightly, Fabricio Monroe MD, 10 mg at 07/08/21 2005  •  naloxone (NARCAN) injection 0.2 mg, 0.2 mg, Intravenous, PRN, Gagan Hernandez CRNA  •  ondansetron (ZOFRAN) injection 4 mg, 4 mg, Intravenous, Once PRN, Gagan Hernandez CRNA  •  oxyCODONE-acetaminophen (PERCOCET)  MG per tablet 1 tablet, 1 tablet, Oral, Q4H PRN, Gagan Hernandez CRNA  •  [MAR Hold] pantoprazole (PROTONIX) EC tablet 40 mg, 40 mg, Oral, Q AM, Haja Chowdhury MD  •  promethazine (PHENERGAN) suppository 25 mg, 25 mg, Rectal, Once PRN **OR** promethazine (PHENERGAN) tablet 25 mg, 25 mg, Oral, Once PRN, Gagan Hernandez CRNA  •  [COMPLETED] Insert peripheral IV, , , Once **AND** [MAR Hold] sodium chloride 0.9 % flush 10 mL, 10 mL, Intravenous, PRN, Fabricio Monroe MD, 10 mL at 07/08/21 2005  •  sodium chloride 0.9 % infusion, 30 mL/hr, Intravenous, Continuous PRN, Fabricio Monroe MD, Last Rate: 30 mL/hr at 07/07/21 1118, Restarted at 07/07/21 1214  •  sodium chloride 0.9 % infusion, 9 mL/hr, Intravenous, Continuous, ShirleyMode MD, Last Rate: 9 mL/hr at 07/09/21 0950, 9 mL/hr at 07/09/21 0950     ASSESSMENT  Cecal mass consistent  with tubular adenoma  Lower GI bleed  Acute blood loss anemia  End-stage renal disease on hemodialysis  AV fistula stenosis  COPD  Hypertension  Diabetes mellitus  Chronic anemia  Gastroesophageal reflux disease    PLAN  CPM  Protonix  Continue hold Eliquis  Laparoscopic right hemicolectomy today  General surgery input appreciated  Vascular surgery gastroenterology nephrology to follow patient  Ultrasound today  Transfuse PRN  Accu-Chek sliding scale insulin  Adjust home medications  Stress ulcer DVT prophylaxis  Supportive care  Discussed with nursing staff and family  Follow closely further recommendation according to hospital course    ALLAN MARTIN MD

## 2021-07-09 NOTE — PLAN OF CARE
Goal Outcome Evaluation:  Plan of Care Reviewed With: patient  Progress: improving  Outcome Summary: All needs met. Rested well. Up ad walt. NPO at midnight. VSS. No mental status changes. On room air. NSR on the monitor. Plan for colon resection today with MD Conner. New IV placed for surgery. Sugars remain appropriate, continuing to watch BG. Will CTM. Consent signed and in chart. COVID negative.

## 2021-07-10 LAB
ALBUMIN SERPL-MCNC: 3 G/DL (ref 3.5–5.2)
ANION GAP SERPL CALCULATED.3IONS-SCNC: 15.8 MMOL/L (ref 5–15)
BUN SERPL-MCNC: 21 MG/DL (ref 8–23)
BUN/CREAT SERPL: 3.1 (ref 7–25)
CALCIUM SPEC-SCNC: 6.6 MG/DL (ref 8.6–10.5)
CHLORIDE SERPL-SCNC: 98 MMOL/L (ref 98–107)
CO2 SERPL-SCNC: 19.2 MMOL/L (ref 22–29)
CREAT SERPL-MCNC: 6.67 MG/DL (ref 0.57–1)
DEPRECATED RDW RBC AUTO: 53.5 FL (ref 37–54)
ERYTHROCYTE [DISTWIDTH] IN BLOOD BY AUTOMATED COUNT: 18.5 % (ref 12.3–15.4)
GFR SERPL CREATININE-BSD FRML MDRD: 7 ML/MIN/1.73
GFR SERPL CREATININE-BSD FRML MDRD: ABNORMAL ML/MIN/{1.73_M2}
GLUCOSE BLDC GLUCOMTR-MCNC: 150 MG/DL (ref 70–130)
GLUCOSE BLDC GLUCOMTR-MCNC: 58 MG/DL (ref 70–130)
GLUCOSE BLDC GLUCOMTR-MCNC: 76 MG/DL (ref 70–130)
GLUCOSE BLDC GLUCOMTR-MCNC: 81 MG/DL (ref 70–130)
GLUCOSE SERPL-MCNC: 100 MG/DL (ref 65–99)
HCT VFR BLD AUTO: 24.5 % (ref 34–46.6)
HGB BLD-MCNC: 7.9 G/DL (ref 12–15.9)
MCH RBC QN AUTO: 25.7 PG (ref 26.6–33)
MCHC RBC AUTO-ENTMCNC: 32.2 G/DL (ref 31.5–35.7)
MCV RBC AUTO: 79.8 FL (ref 79–97)
PHOSPHATE SERPL-MCNC: 7.1 MG/DL (ref 2.5–4.5)
PLATELET # BLD AUTO: 182 10*3/MM3 (ref 140–450)
PMV BLD AUTO: 10 FL (ref 6–12)
POTASSIUM SERPL-SCNC: 6.7 MMOL/L (ref 3.5–5.2)
RBC # BLD AUTO: 3.07 10*6/MM3 (ref 3.77–5.28)
SODIUM SERPL-SCNC: 133 MMOL/L (ref 136–145)
WBC # BLD AUTO: 11.78 10*3/MM3 (ref 3.4–10.8)

## 2021-07-10 PROCEDURE — 63710000001 INSULIN LISPRO (HUMAN) PER 5 UNITS: Performed by: SURGERY

## 2021-07-10 PROCEDURE — 85027 COMPLETE CBC AUTOMATED: CPT | Performed by: SURGERY

## 2021-07-10 PROCEDURE — 94760 N-INVAS EAR/PLS OXIMETRY 1: CPT

## 2021-07-10 PROCEDURE — 80069 RENAL FUNCTION PANEL: CPT | Performed by: SURGERY

## 2021-07-10 PROCEDURE — 94799 UNLISTED PULMONARY SVC/PX: CPT

## 2021-07-10 PROCEDURE — 82962 GLUCOSE BLOOD TEST: CPT

## 2021-07-10 PROCEDURE — 25010000002 EPOETIN ALFA-EPBX 10000 UNIT/ML SOLUTION: Performed by: SURGERY

## 2021-07-10 PROCEDURE — 99024 POSTOP FOLLOW-UP VISIT: CPT | Performed by: SURGERY

## 2021-07-10 RX ORDER — OXYCODONE HYDROCHLORIDE AND ACETAMINOPHEN 5; 325 MG/1; MG/1
1 TABLET ORAL EVERY 4 HOURS PRN
Status: DISCONTINUED | OUTPATIENT
Start: 2021-07-10 | End: 2021-07-14

## 2021-07-10 RX ADMIN — OXYCODONE AND ACETAMINOPHEN 1 TABLET: 5; 325 TABLET ORAL at 09:23

## 2021-07-10 RX ADMIN — INSULIN LISPRO 2 UNITS: 100 INJECTION, SOLUTION INTRAVENOUS; SUBCUTANEOUS at 09:18

## 2021-07-10 RX ADMIN — PANTOPRAZOLE SODIUM 40 MG: 40 TABLET, DELAYED RELEASE ORAL at 09:19

## 2021-07-10 RX ADMIN — MONTELUKAST SODIUM 10 MG: 10 TABLET, FILM COATED ORAL at 21:12

## 2021-07-10 RX ADMIN — HYDROCODONE BITARTRATE AND ACETAMINOPHEN 1 TABLET: 5; 325 TABLET ORAL at 02:54

## 2021-07-10 RX ADMIN — BUDESONIDE AND FORMOTEROL FUMARATE DIHYDRATE 2 PUFF: 160; 4.5 AEROSOL RESPIRATORY (INHALATION) at 19:55

## 2021-07-10 RX ADMIN — SODIUM CHLORIDE, PRESERVATIVE FREE 10 ML: 5 INJECTION INTRAVENOUS at 21:12

## 2021-07-10 RX ADMIN — OXYCODONE AND ACETAMINOPHEN 1 TABLET: 5; 325 TABLET ORAL at 21:17

## 2021-07-10 RX ADMIN — BUDESONIDE AND FORMOTEROL FUMARATE DIHYDRATE 2 PUFF: 160; 4.5 AEROSOL RESPIRATORY (INHALATION) at 07:52

## 2021-07-10 RX ADMIN — EPOETIN ALFA-EPBX 10000 UNITS: 10000 INJECTION, SOLUTION INTRAVENOUS; SUBCUTANEOUS at 18:56

## 2021-07-10 NOTE — PROGRESS NOTES
[facility]     Progress Note    Patient Name: Nellie Vegas  : 1945  MRN: [id number]  Primary Care Physician:  Laurent Cortes DO  Date of admission: 2021    Subjective   Subjective     Chief Complaint: ESRD    History of Present Illness  Patient is a 76 year old with ESRD on hd t/ admitted with rectal bleed. Found to have mass s/p  hemicolectomy    Review of Systems    Objective   Objective     Vitals:   Temp:  [97.4 °F (36.3 °C)-97.8 °F (36.6 °C)] 97.8 °F (36.6 °C)  Heart Rate:  [59-71] 67  Resp:  [8-18] 18  BP: (104-158)/(52-75) 104/52  Flow (L/min):  [2-5] 2    Physical Exam   General Appearance:  In no acute distress.    HEENT: Normal HEENT exam.     Extremities: .  There is no deformity, effusion or dependent edema.    Neurological: Patient is alert and oriented to person, place and time.    Skin:  Warm and dry.  No rash.       Result Review    Result Review:  I have personally reviewed the results from the time of this admission to 7/10/2021 09:15 EDT and agree with these findings:  []  Laboratory  []  Microbiology  []  Radiology  []  EKG/Telemetry   []  Cardiology/Vascular   []  Pathology  []  Old records  []  Other:      Assessment/Plan   Assessment / Plan     Brief Patient Summary:  Nellie Vegas is a 76 y.o. female who has ESRD on hd /    Active Hospital Problems:  Active Hospital Problems    Diagnosis    • **ESRD (end stage renal disease) on dialysis (CMS/[facility])    • Lower GI bleeding      Plan:   ESRD  Cecal cecal mass on colonoscopy, await biopsy, surgery consulted  Lower GI bleeding from a cecal mass  Hypertension  Anemia of CKD  COPD  AV fistula dysfunction    Patient seen and examined. S/p colon resection yesterday. Without complaints. Labs reviewed. Awaiting hd today. Will continue /    Electronically signed by Rupinder Yanez MD, 07/10/21, 9:15 AM EDT.

## 2021-07-10 NOTE — PROGRESS NOTES
"Daily progress note    Chief complaint  Status post laparoscopic right hemicolectomy  Doing same  No new complaints  Tolerating liquid diet  Family at bedside    History of present illness  73-year-old -American female with history of end-stage renal disease on hemodialysis COPD diabetes mellitus hypertension and chronic anemia brought to the emergency room with black diarrhea started on Saturday.  Patient has 4 episodes.  Patient also have abdominal discomfort.  Patient has been noticing black stools for several days.  Patient denies any nausea vomiting.  Patient denies any fever cough chest pain shortness of breath.  Patient work-up in ER revealed acute blood loss anemia with lower GI bleed admit for management.      REVIEW OF SYSTEMS  Unremarkable except for mild abdominal pain    PHYSICAL EXAM   Blood pressure 98/51, pulse 71, temperature 98.9 °F (37.2 °C), temperature source Oral, resp. rate 16, height 152.4 cm (60\"), weight 75 kg (165 lb 5.5 oz), SpO2 98 %, not currently breastfeeding.    Constitutional: Pt. is oriented to person, place, and time   HENT: Normocephalic and atraumatic. Oropharynx moist/nonerythematous.  Neck: Normal range of motion. Neck supple. No JVD present.   Cardiovascular: Normal rate, regular rhythm and normal heart sounds. Exam reveals no gallop and no friction rub. No murmur heard.  Pulmonary/Chest: Effort normal and breath sounds normal. No stridor. No respiratory distress. No wheezes, no rales.   Abdominal: Soft. Bowel sounds are normal. No distension. There is no tenderness. There is no rebound and no guarding.   Musculoskeletal: Normal range of motion. No edema, tenderness or deformity.   Neurological: Pt. is alert and oriented to person, place, and time.  She has no focal neurologic deficits  Skin: Skin is warm and dry. No rash noted. Pt. is not diaphoretic. No erythema.   Psychiatric: Mood, affect and judgment normal.  She is pleasant and cooperative.    LAB RESULTS  Lab " Results (last 24 hours)     Procedure Component Value Units Date/Time    POC Glucose Once [[id number]]  (Normal) Collected: 07/10/21 1121    Specimen: Blood Updated: 07/10/21 1123     Glucose 81 mg/dL      Comment: Meter: [id number] : [id number] Macario WADDELL       CBC (No Diff) [[id number]]  (Abnormal) Collected: 07/10/21 0948    Specimen: Blood Updated: 07/10/21 1007     WBC 11.78 10*3/mm3      RBC 3.07 10*6/mm3      Hemoglobin 7.9 g/dL      Hematocrit 24.5 %      MCV 79.8 fL      MCH 25.7 pg      MCHC 32.2 g/dL      RDW 18.5 %      RDW-SD 53.5 fl      MPV 10.0 fL      Platelets 182 10*3/mm3     Renal Function Panel [[id number]]  (Abnormal) Collected: 07/10/21 0825    Specimen: Blood Updated: 07/10/21 0929     Glucose 100 mg/dL      BUN 21 mg/dL      Creatinine 6.67 mg/dL      Sodium 133 mmol/L      Potassium 6.7 mmol/L      Comment: Slight hemolysis detected by analyzer. Results may be affected.        Chloride 98 mmol/L      CO2 19.2 mmol/L      Calcium 6.6 mg/dL      Albumin 3.00 g/dL      Phosphorus 7.1 mg/dL      Anion Gap 15.8 mmol/L      BUN/Creatinine Ratio 3.1     eGFR Non  Amer --     Comment: <15 Indicative of kidney failure.        eGFR   Amer 7 mL/min/1.73      Comment: <15 Indicative of kidney failure.       Narrative:      GFR Normal >60  Chronic Kidney Disease <60  Kidney Failure <15      POC Glucose Once [[id number]]  (Abnormal) Collected: 07/10/21 0601    Specimen: Blood Updated: 07/10/21 0603     Glucose 150 mg/dL      Comment: Meter: [id number] : [id number] Clay WADDELL       POC Glucose Once [[id number]]  (Abnormal) Collected: 07/09/21 2045    Specimen: Blood Updated: 07/09/21 2047     Glucose 160 mg/dL      Comment: Meter: [id number] : [id number] Clay AWDDELL           Imaging Results (Last 24 Hours)     ** No results found for the last 24 hours. **        Colonoscopy results noted and discussed with patient and family    Current Facility-Administered Medications:    •  albuterol (PROVENTIL) nebulizer solution 0.083% 2.5 mg/3mL, 2.5 mg, Nebulization, TID PRN, Zbigniew Conner MD  •  budesonide-formoterol (SYMBICORT) 160-4.5 MCG/ACT inhaler 2 puff, 2 puff, Inhalation, BID - RT, Zbigniew Conner MD, 2 puff at 07/10/21 0752  •  dextrose (D50W) 25 g/ 50mL Intravenous Solution 25 g, 25 g, Intravenous, Q15 Min PRN, Zbigniew Conner MD, 25 g at 07/07/21 1650  •  dextrose (GLUTOSE) oral gel 15 g, 15 g, Oral, Q15 Min PRN, Zbigniew Conner MD  •  epoetin polina-epbx (RETACRIT) injection 10,000 Units, 10,000 Units, Intravenous, Once per day on Tue Thu Sat, Zbigniew Conner MD, 10,000 Units at 07/08/21 1512  •  glucagon (human recombinant) (GLUCAGEN DIAGNOSTIC) injection 1 mg, 1 mg, Subcutaneous, Q15 Min PRN, Zbigniew Conner MD  •  HYDROcodone-acetaminophen (NORCO) 5-325 MG per tablet 1 tablet, 1 tablet, Oral, Q8H PRN, Zbigniew Conner MD, 1 tablet at 07/10/21 0254  •  HYDROmorphone (DILAUDID) injection 0.5 mg, 0.5 mg, Intravenous, Q2H PRN, Zbigniew Conner MD, 0.5 mg at 07/09/21 1809  •  insulin lispro (ADMELOG) injection 0-7 Units, 0-7 Units, Subcutaneous, TID AC, Zbigniew Conner MD, 2 Units at 07/10/21 0918  •  metoprolol succinate XL (TOPROL-XL) 24 hr tablet 25 mg, 25 mg, Oral, Daily, Haja Chowdhury MD  •  montelukast (SINGULAIR) tablet 10 mg, 10 mg, Oral, Nightly, Zbigniew Conner MD, 10 mg at 07/09/21 2034  •  oxyCODONE-acetaminophen (PERCOCET) 5-325 MG per tablet 1 tablet, 1 tablet, Oral, Q6H PRN, Zbigniew Conner MD, 1 tablet at 07/10/21 0923  •  pantoprazole (PROTONIX) EC tablet 40 mg, 40 mg, Oral, QAM Luciana OROSCO Aftab, MD, 40 mg at 07/10/21 0919  •  [COMPLETED] Insert peripheral IV, , , Once **AND** sodium chloride 0.9 % flush 10 mL, 10 mL, Intravenous, PRN, Zbigniew Conner MD, 10 mL at 07/08/21 2005  •  sodium chloride 0.9 % infusion, 30 mL/hr, Intravenous, Continuous  PRN, Zbigniew Conner MD, Last Rate: 30 mL/hr at 07/07/21 1118, Restarted at 07/07/21 1214  •  sodium chloride 0.9 % infusion, 9 mL/hr, Intravenous, Continuous, Zbigniew Conner MD, Last Rate: 9 mL/hr at 07/09/21 0950, 9 mL/hr at 07/09/21 0950     ASSESSMENT  Cecal mass consistent with tubular adenoma status post laparoscopic right hemicolectomy  Lower GI bleed  Acute blood loss anemia  End-stage renal disease on hemodialysis  AV fistula stenosis  COPD  Hypertension  Diabetes mellitus  Chronic anemia  Gastroesophageal reflux disease    PLAN  CPM  Postop care  Protonix  Resume Eliquis once okay with surgery  Transfuse PRN  Hemodialysis TIW  Accu-Chek sliding scale insulin  Adjust home medications  Stress ulcer DVT prophylaxis  Supportive care  PT/OT  Discussed with nursing staff and family  Follow closely further recommendation according to hospital course    ALLAN MARTIN MD

## 2021-07-10 NOTE — NURSING NOTE
Dialysis completed, removed 200 ml with this treatment, vital signs are in epic and patient tolerated well.

## 2021-07-10 NOTE — PROGRESS NOTES
Postoperative day 1 status post laparoscopic right hemicolectomy    S: No events overnight.  She is feeling great, tolerating clear liquid diet.  Minimal abdominal pain.  Passing gas    O:   Vitals:    07/10/21 0659 07/10/21 0752 07/10/21 0919 07/10/21 1300   BP: 104/52   98/51   BP Location: Left arm   Left arm   Patient Position: Lying   Lying   Pulse:   71    Resp: 16 18  16   Temp: 97.8 °F (36.6 °C)   98.9 °F (37.2 °C)   TempSrc: Oral   Oral   SpO2:    98%   Weight:       Height:         Alert, no acute distress  Breathing comfortable  Abdomen soft, nontender nondistended, incision clean dry and intact    Potassium 6.7, sodium 133, chloride 98, white blood cell count 11.7, hemoglobin 7.9, stable    Postop day 1 status post laparoscopic right hemicolectomy, doing great from surgical standpoint, having small amount of bowel function and tolerating clear liquid diet.  Potassium 6.7 and she will go to dialysis today.    -Advance diet as tolerated from my standpoint.  P.o. pain medication  -Hopefully discharge home soon

## 2021-07-10 NOTE — PLAN OF CARE
Goal Outcome Evaluation:           Progress: improving  Outcome Summary: VSS, A&Ox4, c/o pain prn pain meds see mar, POD1, incisions CDI, ice to abd prn pain, BS hypo, no yet passing flatus, fistula to RUE CDI, pt hemodialysis T Th Sat, plan for dialysis later today, up x1 assist, ctm

## 2021-07-11 LAB
ANION GAP SERPL CALCULATED.3IONS-SCNC: 10.1 MMOL/L (ref 5–15)
BASOPHILS # BLD AUTO: 0.01 10*3/MM3 (ref 0–0.2)
BASOPHILS NFR BLD AUTO: 0.2 % (ref 0–1.5)
BUN SERPL-MCNC: 9 MG/DL (ref 8–23)
BUN/CREAT SERPL: 2.5 (ref 7–25)
CALCIUM SPEC-SCNC: 7.4 MG/DL (ref 8.6–10.5)
CHLORIDE SERPL-SCNC: 97 MMOL/L (ref 98–107)
CO2 SERPL-SCNC: 26.9 MMOL/L (ref 22–29)
CREAT SERPL-MCNC: 3.63 MG/DL (ref 0.57–1)
DEPRECATED RDW RBC AUTO: 48.7 FL (ref 37–54)
EOSINOPHIL # BLD AUTO: 0.05 10*3/MM3 (ref 0–0.4)
EOSINOPHIL NFR BLD AUTO: 0.8 % (ref 0.3–6.2)
ERYTHROCYTE [DISTWIDTH] IN BLOOD BY AUTOMATED COUNT: 17.1 % (ref 12.3–15.4)
GFR SERPL CREATININE-BSD FRML MDRD: 15 ML/MIN/1.73
GLUCOSE BLDC GLUCOMTR-MCNC: 125 MG/DL (ref 70–130)
GLUCOSE BLDC GLUCOMTR-MCNC: 66 MG/DL (ref 70–130)
GLUCOSE BLDC GLUCOMTR-MCNC: 92 MG/DL (ref 70–130)
GLUCOSE BLDC GLUCOMTR-MCNC: 93 MG/DL (ref 70–130)
GLUCOSE SERPL-MCNC: 81 MG/DL (ref 65–99)
HCT VFR BLD AUTO: 23.5 % (ref 34–46.6)
HGB BLD-MCNC: 7.7 G/DL (ref 12–15.9)
IMM GRANULOCYTES # BLD AUTO: 0.05 10*3/MM3 (ref 0–0.05)
IMM GRANULOCYTES NFR BLD AUTO: 0.8 % (ref 0–0.5)
LYMPHOCYTES # BLD AUTO: 0.35 10*3/MM3 (ref 0.7–3.1)
LYMPHOCYTES NFR BLD AUTO: 5.6 % (ref 19.6–45.3)
MCH RBC QN AUTO: 25.4 PG (ref 26.6–33)
MCHC RBC AUTO-ENTMCNC: 32.8 G/DL (ref 31.5–35.7)
MCV RBC AUTO: 77.6 FL (ref 79–97)
MONOCYTES # BLD AUTO: 0.64 10*3/MM3 (ref 0.1–0.9)
MONOCYTES NFR BLD AUTO: 10.2 % (ref 5–12)
NEUTROPHILS NFR BLD AUTO: 5.18 10*3/MM3 (ref 1.7–7)
NEUTROPHILS NFR BLD AUTO: 82.4 % (ref 42.7–76)
NRBC BLD AUTO-RTO: 0 /100 WBC (ref 0–0.2)
PLATELET # BLD AUTO: 189 10*3/MM3 (ref 140–450)
PMV BLD AUTO: 10.9 FL (ref 6–12)
POTASSIUM SERPL-SCNC: 3.7 MMOL/L (ref 3.5–5.2)
RBC # BLD AUTO: 3.03 10*6/MM3 (ref 3.77–5.28)
SODIUM SERPL-SCNC: 134 MMOL/L (ref 136–145)
WBC # BLD AUTO: 6.28 10*3/MM3 (ref 3.4–10.8)

## 2021-07-11 PROCEDURE — 94799 UNLISTED PULMONARY SVC/PX: CPT

## 2021-07-11 PROCEDURE — 85025 COMPLETE CBC W/AUTO DIFF WBC: CPT | Performed by: HOSPITALIST

## 2021-07-11 PROCEDURE — 80048 BASIC METABOLIC PNL TOTAL CA: CPT | Performed by: HOSPITALIST

## 2021-07-11 PROCEDURE — 82962 GLUCOSE BLOOD TEST: CPT

## 2021-07-11 PROCEDURE — 97162 PT EVAL MOD COMPLEX 30 MIN: CPT | Performed by: PHYSICAL THERAPIST

## 2021-07-11 PROCEDURE — 99024 POSTOP FOLLOW-UP VISIT: CPT | Performed by: SURGERY

## 2021-07-11 RX ADMIN — PANTOPRAZOLE SODIUM 40 MG: 40 TABLET, DELAYED RELEASE ORAL at 09:27

## 2021-07-11 RX ADMIN — MONTELUKAST SODIUM 10 MG: 10 TABLET, FILM COATED ORAL at 20:54

## 2021-07-11 RX ADMIN — BUDESONIDE AND FORMOTEROL FUMARATE DIHYDRATE 2 PUFF: 160; 4.5 AEROSOL RESPIRATORY (INHALATION) at 19:56

## 2021-07-11 RX ADMIN — OXYCODONE AND ACETAMINOPHEN 1 TABLET: 5; 325 TABLET ORAL at 15:59

## 2021-07-11 RX ADMIN — BUDESONIDE AND FORMOTEROL FUMARATE DIHYDRATE 2 PUFF: 160; 4.5 AEROSOL RESPIRATORY (INHALATION) at 08:37

## 2021-07-11 NOTE — PROGRESS NOTES
Name: Nellie Vegas ADMIT: 2021   : 1945  PCP: Laurent Cortes DO    MRN: [id number] LOS: 6 days   AGE/SEX: 76 y.o. female  ROOM: [address]    [place], Subsequent Hospital Care    76 y.o. female with right axilloaxillary loop dialysis shunt with history of central vein stenoses, post angioplasties and stent placements.  Had been experiencing high venous pressures, difficulty with cannulation and possibly bleeding, and had been scheduled for shuntogram with possible intervention prior to being admitted with severe anemia and cecal mass.  Has been receiving hemodialysis in hospital.  Last dialyzed yesterday without difficulty.    Scheduled Medications:   budesonide-formoterol, 2 puff, Inhalation, BID - RT  epoetin polina/polina-epbx, 10,000 Units, Intravenous, Once per day on Tue Thu Sat  insulin lispro, 0-7 Units, Subcutaneous, TID AC  metoprolol succinate XL, 25 mg, Oral, Daily  montelukast, 10 mg, Oral, Nightly  pantoprazole, 40 mg, Oral, QAM AC    Active Infusions:  sodium chloride, 30 mL/hr, Last Rate: 30 mL/hr (21 1118)    Vital Signs  Vital Signs Patient Vitals for the past 24 hrs:   BP Temp Temp src Pulse Resp SpO2 Weight   21 0835 -- -- -- -- 16 95 % --   21 0704 98/48 98.3 °F (36.8 °C) Oral -- 16 -- --   21 0500 -- -- -- -- -- -- 75 kg (165 lb 5.5 oz)   07/10/21 2340 94/73 98 °F (36.7 °C) -- 60 20 97 % --   07/10/21 1955 -- -- -- 92 20 98 % --   07/10/21 1933 124/51 97.7 °F (36.5 °C) Temporal 62 20 -- --   07/10/21 1515 105/54 98.1 °F (36.7 °C) Temporal 68 18 -- --   07/10/21 1300 98/51 98.9 °F (37.2 °C) Oral -- 16 98 % --     Physical Exam:    Right ax ax loop shunt patent with pulsatile thrill.     CBC    Results from last 7 days   Lab Units 21  0600 07/10/21  0948 21  0651 21  0447 21  2208 21  0442 21  0934 21  1626   WBC 10*3/mm3 6.28 11.78* 3.74 5.06  --  5.70 5.39 6.77   HEMOGLOBIN g/dL 7.7* 7.9* 8.0* 7.9*  8.3* 6.6* 7.6* 7.3*   PLATELETS 10*3/mm3 189 182 172 188  --  198 234 295     BMP   Results from last 7 days   Lab Units 07/11/21  0600 07/10/21  0825 07/09/21  0651 07/08/21  0447 07/07/21  0442 07/06/21  0934 07/05/21  1626   SODIUM mmol/L 134* 133* 138 134* 135* 138 141   POTASSIUM mmol/L 3.7 6.7* 3.6 3.4* 3.8 4.0 3.6   CHLORIDE mmol/L 97* 98 99 95* 98 100 101   CO2 mmol/L 26.9 19.2* 23.5 20.5* 21.0* 18.2* 20.0*   BUN mg/dL 9 21 12 23 19 42* 36*   CREATININE mg/dL 3.63* 6.67* 4.54* 5.81* 5.19* 8.09* 7.86*   GLUCOSE mg/dL 81 100* 80 62* 71 92 131*   PHOSPHORUS mg/dL  --  7.1*  --   --   --   --   --      AV graft scan 7/8/2021 with thrombus in midportion of graft and decreased flow volumes.  Proximal stenosis.    Assessment/Plan   Assessment & Plan    ESRD (end stage renal disease) on dialysis (CMS/[facility])    Lower GI bleeding    76 y.o. female with end-stage renal disease on hemodialysis with dysfunctional AV graft.  Patient appears to be doing well post colectomy.  Will add-on for AV shuntogram with intervention tomorrow.    Personal protective equipment used for this patient encounter:  Patient wearing surgical mask []    Provider wearing a surgical mask [x]    Gloves []    Eye protection []    Face Shield []    Gown []    N 95 respirator or CAPR/PAPR []   Duration of interaction about 3 minutes.    Shahram Gallagher MD  07/11/21  09:30 EDT    Please call my office with any question: [phone]    Active Hospital Problems    Diagnosis  POA   • **ESRD (end stage renal disease) on dialysis (CMS/[facility]) [N18.6, Z99.2]  Not Applicable   • Lower GI bleeding [K92.2]  Yes      Resolved Hospital Problems   No resolved problems to display.

## 2021-07-11 NOTE — PROGRESS NOTES
[facility]     Progress Note    Patient Name: Nellie Vegas  : 1945  MRN: [id number]  Primary Care Physician:  Laurent Cortes DO  Date of admission: 2021    Subjective   Subjective     Chief Complaint: ESRD    History of Present Illness    Patient is a 76 year old with ESRD on hd t//s admitted with rectal bleed. Found to have mass s/p  hemicolectomy    Review of Systems      Objective   Objective     Vitals:   Temp:  [97.7 °F (36.5 °C)-98.9 °F (37.2 °C)] 98.3 °F (36.8 °C)  Heart Rate:  [60-92] 60  Resp:  [16-20] 16  BP: ()/(48-73) 98/48  Flow (L/min):  [2-3] 2    Physical Exam     General Appearance:  In no acute distress.    HEENT: Normal HEENT exam.     Extremities: .  There is no deformity, effusion or dependent edema.    Neurological: Patient is alert and oriented to person, place and time.    Skin:  Warm and dry.  No rash.       Result Review    Result Review:  I have personally reviewed the results from the time of this admission to 2021 08:00 EDT and agree with these findings:  []  Laboratory  []  Microbiology  []  Radiology  []  EKG/Telemetry   []  Cardiology/Vascular   []  Pathology  []  Old records  []  Other:      Assessment/Plan   Assessment / Plan     Brief Patient Summary:  Nellie Vegas is a 76 y.o. female who has ESRD on hd /    Active Hospital Problems:  Active Hospital Problems    Diagnosis    • **ESRD (end stage renal disease) on dialysis (CMS/[facility])    • Lower GI bleeding      Plan:   ESRD  Cecal cecal mass on colonoscopy, await biopsy, surgery consulted  Lower GI bleeding from a cecal mass  Hypertension  Anemia of CKD  COPD  AV fistula dysfunction    Patient seen and examined. Without complaints. Labs reviewed. S/p hd yesterday.tolerated well. Angiogram tomorrow. Will continue hd /    Electronically signed by Rupinder Yanez MD, 07/10/21, 9:15 AM EDT.

## 2021-07-11 NOTE — PLAN OF CARE
Goal Outcome Evaluation:  Plan of Care Reviewed With: patient           Outcome Summary: Pt is POD#2.  Pt was up to chair several times and walked with PT this shift.  Ambulation encouraged.  Pt on room air this shift.  Consent for right arm shuntogram signed and in chart, procedure planned for tomorrow.  Pt still not passing gas and with no BM, CLD resumed by GI.  VSS, all needs met this shift, will continue to monitor

## 2021-07-11 NOTE — PROGRESS NOTES
Postoperative day 2 status post laparoscopic right hemicolectomy    S: No bowel function yet.  No abdominal pain but no passing gas or having any bowel movements.  Reports nausea and vomiting    O:   Vitals:    07/10/21 2340 07/11/21 0500 07/11/21 0704 07/11/21 0835   BP: 94/73  98/48    BP Location:   Left arm    Patient Position:   Lying    Pulse: 60      Resp: 20  16 16   Temp: 98 °F (36.7 °C)  98.3 °F (36.8 °C)    TempSrc:   Oral    SpO2: 97%   95%   Weight:  75 kg (165 lb 5.5 oz)     Height:         Alert, chronically ill-appearing  Abdomen appropriate tender, mildly distended, incisions clean dry and intact    Hemoglobin 7.7, stable, white blood cell count 6.2, otherwise stable labs    Postop day 2 status post laparotomy, postop ileus,    -Clear liquid diet, ambulate

## 2021-07-11 NOTE — PROGRESS NOTES
"Daily progress note    Chief complaint  Doing same  No new complaints  Family at bedside    History of present illness  73-year-old -American female with history of end-stage renal disease on hemodialysis COPD diabetes mellitus hypertension and chronic anemia brought to the emergency room with black diarrhea started on Saturday.  Patient has 4 episodes.  Patient also have abdominal discomfort.  Patient has been noticing black stools for several days.  Patient denies any nausea vomiting.  Patient denies any fever cough chest pain shortness of breath.  Patient work-up in ER revealed acute blood loss anemia with lower GI bleed admit for management.      REVIEW OF SYSTEMS  Unremarkable except for mild abdominal pain    PHYSICAL EXAM   Blood pressure 98/48, pulse 60, temperature 98.3 °F (36.8 °C), temperature source Oral, resp. rate 16, height 152.4 cm (60\"), weight 75 kg (165 lb 5.5 oz), SpO2 95 %, not currently breastfeeding.    Constitutional: Pt. is oriented to person, place, and time   HENT: Normocephalic and atraumatic. Oropharynx moist/nonerythematous.  Neck: Normal range of motion. Neck supple. No JVD present.   Cardiovascular: Normal rate, regular rhythm and normal heart sounds. Exam reveals no gallop and no friction rub. No murmur heard.  Pulmonary/Chest: Effort normal and breath sounds normal. No stridor. No respiratory distress. No wheezes, no rales.   Abdominal: Soft. Bowel sounds are normal. No distension. There is no tenderness. There is no rebound and no guarding.   Musculoskeletal: Normal range of motion. No edema, tenderness or deformity.   Neurological: Pt. is alert and oriented to person, place, and time.  She has no focal neurologic deficits  Skin: Skin is warm and dry. No rash noted. Pt. is not diaphoretic. No erythema.   Psychiatric: Mood, affect and judgment normal.  She is pleasant and cooperative.    LAB RESULTS  Lab Results (last 24 hours)     Procedure Component Value Units Date/Time    " POC Glucose Once [[id number]]  (Abnormal) Collected: 07/11/21 1145    Specimen: Blood Updated: 07/11/21 1148     Glucose 66 mg/dL      Comment: Meter: [id number] : [id number] Macario WADDELL       Basic Metabolic Panel [[id number]]  (Abnormal) Collected: 07/11/21 0600    Specimen: Blood from Arm, Left Updated: 07/11/21 0715     Glucose 81 mg/dL      BUN 9 mg/dL      Creatinine 3.63 mg/dL      Sodium 134 mmol/L      Potassium 3.7 mmol/L      Chloride 97 mmol/L      CO2 26.9 mmol/L      Calcium 7.4 mg/dL      eGFR  African Amer 15 mL/min/1.73      BUN/Creatinine Ratio 2.5     Anion Gap 10.1 mmol/L     Narrative:      GFR Normal >60  Chronic Kidney Disease <60  Kidney Failure <15      CBC & Differential [[id number]]  (Abnormal) Collected: 07/11/21 0600    Specimen: Blood Updated: 07/11/21 0652    Narrative:      The following orders were created for panel order CBC & Differential.  Procedure                               Abnormality         Status                     ---------                               -----------         ------                     CBC Auto Differential[[id number]]        Abnormal            Final result                 Please view results for these tests on the individual orders.    CBC Auto Differential [[id number]]  (Abnormal) Collected: 07/11/21 0600    Specimen: Blood Updated: 07/11/21 0652     WBC 6.28 10*3/mm3      RBC 3.03 10*6/mm3      Hemoglobin 7.7 g/dL      Hematocrit 23.5 %      MCV 77.6 fL      MCH 25.4 pg      MCHC 32.8 g/dL      RDW 17.1 %      RDW-SD 48.7 fl      MPV 10.9 fL      Platelets 189 10*3/mm3      Neutrophil % 82.4 %      Lymphocyte % 5.6 %      Monocyte % 10.2 %      Eosinophil % 0.8 %      Basophil % 0.2 %      Immature Grans % 0.8 %      Neutrophils, Absolute 5.18 10*3/mm3      Lymphocytes, Absolute 0.35 10*3/mm3      Monocytes, Absolute 0.64 10*3/mm3      Eosinophils, Absolute 0.05 10*3/mm3      Basophils, Absolute 0.01 10*3/mm3      Immature Grans, Absolute 0.05  10*3/mm3      nRBC 0.0 /100 WBC     POC Glucose Once [[id number]]  (Normal) Collected: 07/11/21 0638    Specimen: Blood Updated: 07/11/21 0639     Glucose 92 mg/dL      Comment: Meter: [id number] : [id number] Williamsonkalee Montes BAIRON       POC Glucose Once [[id number]]  (Normal) Collected: 07/10/21 2108    Specimen: Blood Updated: 07/10/21 2110     Glucose 76 mg/dL      Comment: Meter: [id number] : [id number] Katherine Griffith RN       POC Glucose Once [[id number]]  (Abnormal) Collected: 07/10/21 2039    Specimen: Blood Updated: 07/10/21 2040     Glucose 58 mg/dL      Comment: Meter: [id number] : [id number] Clay WADDELL           Imaging Results (Last 24 Hours)     ** No results found for the last 24 hours. **        Colonoscopy results noted and discussed with patient and family    Current Facility-Administered Medications:   •  albuterol (PROVENTIL) nebulizer solution 0.083% 2.5 mg/3mL, 2.5 mg, Nebulization, TID PRN, Zbigniew Conner MD  •  budesonide-formoterol (SYMBICORT) 160-4.5 MCG/ACT inhaler 2 puff, 2 puff, Inhalation, BID - RT, Zbigniew Conner MD, 2 puff at 07/11/21 0837  •  dextrose (D50W) 25 g/ 50mL Intravenous Solution 25 g, 25 g, Intravenous, Q15 Min PRN, Zbigniew Conner MD, 25 g at 07/07/21 1650  •  dextrose (GLUTOSE) oral gel 15 g, 15 g, Oral, Q15 Min PRN, Zbigniew Conner MD  •  epoetin polina-epbx (RETACRIT) injection 10,000 Units, 10,000 Units, Intravenous, Once per day on Tue Thu Ubaldo Cam Alberto Sebastian, MD, 10,000 Units at 07/10/21 1856  •  glucagon (human recombinant) (GLUCAGEN DIAGNOSTIC) injection 1 mg, 1 mg, Subcutaneous, Q15 Min PRN, Zbigniew Conner MD  •  insulin lispro (ADMELOG) injection 0-7 Units, 0-7 Units, Subcutaneous, TID AC, Zbigniew Conner MD, 2 Units at 07/10/21 0918  •  metoprolol succinate XL (TOPROL-XL) 24 hr tablet 25 mg, 25 mg, Oral, Daily, Haja Chowdhury MD  •  montelukast (SINGULAIR) tablet 10 mg, 10 mg,  Oral, Nightly, Zbigniew Conner MD, 10 mg at 07/10/21 2112  •  oxyCODONE-acetaminophen (PERCOCET) 5-325 MG per tablet 1 tablet, 1 tablet, Oral, Q4H PRN, Allan Chowdhury MD, 1 tablet at 07/10/21 2117  •  pantoprazole (PROTONIX) EC tablet 40 mg, 40 mg, Oral, QAM AC, Allan Chowdhury MD, 40 mg at 07/11/21 0927  •  [COMPLETED] Insert peripheral IV, , , Once **AND** sodium chloride 0.9 % flush 10 mL, 10 mL, Intravenous, PRN, Zbigniew Conner MD, 10 mL at 07/10/21 2112  •  sodium chloride 0.9 % infusion, 30 mL/hr, Intravenous, Continuous PRN, Zbigniew Conner MD, Last Rate: 30 mL/hr at 07/07/21 1118, Restarted at 07/07/21 1214     ASSESSMENT  Cecal mass consistent with tubular adenoma status post laparoscopic right hemicolectomy  Lower GI bleed  Acute blood loss anemia  End-stage renal disease on hemodialysis  AV fistula stenosis  COPD  Hypertension  Diabetes mellitus  Chronic anemia  Gastroesophageal reflux disease    PLAN  CPM  Postop care  Protonix  Resume Eliquis once okay with surgery  AV shuntogram with intervention in a.m.  Transfuse PRN  Hemodialysis TIW  Accu-Chek sliding scale insulin  Adjust home medications  Stress ulcer DVT prophylaxis  Supportive care  PT/OT  Discussed with nursing staff and family  Follow closely further recommendation according to hospital course    ALLAN CHOWDHURY MD

## 2021-07-11 NOTE — PLAN OF CARE
Goal Outcome Evaluation:  Plan of Care Reviewed With: patient           Outcome Summary: Pt is s/p hemicolectomy and presents with decreased endurance, impaired balacne and unsteady gait. Pt was ageeable to therapy and ambualted 100 gt CGA with Rwx. At baseline, pt ambulated with rollator and lives with her daughter. Pt would benefit from increased mobility throughout the day to return to baseline. PT will follow 3x week to ensure pt is progressing. Pt was encouraged to ambualte with nursing staff 2-3 times per day to increased endurance. Pt is safe to d/c home when medically stable.

## 2021-07-11 NOTE — THERAPY EVALUATION
Patient Name: Nellie Vegas  : 1945    MRN: [id number]                              Today's Date: 2021       Admit Date: 2021    Visit Dx:     ICD-10-CM ICD-9-CM   1. Lower GI bleeding  K92.2 578.9   2. Acute blood loss anemia  D62 285.1   3. ESRD (end stage renal disease) on dialysis (CMS/[facility])  N18.6 585.6    Z99.2 V45.11   4. Problem with dialysis access, subsequent encounter  T82.898D V58.89     996.73     Patient Active Problem List   Diagnosis   • ESRD (end stage renal disease) on dialysis (CMS/[facility])   • Thrombosis of kidney dialysis arteriovenous graft (CMS/[facility])   • Anemia of chronic renal failure, stage 5 (CMS/[facility])   • COPD exacerbation (CMS/[facility])   • Problem with dialysis access (CMS/[facility])   • Lower GI bleeding     Past Medical History:   Diagnosis Date   • Anemia    • Anesthesia complication     mother woke up in combative state   • Arthritis     knees   • Asthma    • COPD (chronic obstructive pulmonary disease) (CMS/[facility])    • Diabetes mellitus (CMS/[facility])     type 2   • Dialysis patient (CMS/[facility])    • Disease of thyroid gland    • GERD (gastroesophageal reflux disease)    • Headache     after dialysis   • History of blood clots     BUE   • History of kidney stones    • Hyperlipidemia    • Hypertension    • Knee pain, bilateral    • Renal disease    • Sleep apnea     CPAP   • SOB (shortness of breath)    • Thrombosis of renal dialysis arteriovenous graft (CMS/[facility])    • Weakness      Past Surgical History:   Procedure Laterality Date   • ARTERIOVENOUS FISTULA Right    • ARTERIOVENOUS FISTULA Left     REMOVED   • CENTRAL VENOUS CATHETER TUNNELED INSERTION DOUBLE LUMEN     •  SECTION     • COLONOSCOPY  2016   • COLONOSCOPY N/A 2021    Procedure: COLONOSCOPY into cecum with biopsy;  Surgeon: Fabricio Monroe MD;  Location: [facility] ENDOSCOPY;  Service: Gastroenterology;  Laterality: N/A;  cecal mass   • POLYPECTOMY      UTERINE   • SHUNT O GRAM Right 2019    Procedure: RIGHT  ARM DIALYSIS GRAFT revision and THROMBECTOMY;  Surgeon: Thierry Hardy MD;  Location: [facility] OR 18/19;  Service: Vascular   • SHUNT O GRAM Right 8/22/2019    Procedure: RIGHT ARM DIALYSIS GRAFT THROMBECTOMY WITH STENTING;  Surgeon: Thierry Hardy MD;  Location: [facility] OR 18/19;  Service: Vascular   • SHUNT O GRAM Right 12/7/2020    Procedure: RIGHT ARM ARTERIOVENOUS SHUNTOGRAM WITH THROMBECTOMY;  Surgeon: Thierry Hardy MD;  Location: [facility] OR 18/19;  Service: Vascular;  Laterality: Right;     General Information     [facility] Name 07/11/21 1418          Physical Therapy Time and Intention    Document Type  evaluation  -     Mode of Treatment  individual therapy;physical therapy  -     Row Name 07/11/21 1418          General Information    Prior Level of Function  independent:  -     Existing Precautions/Restrictions  fall  -     Barriers to Rehab  none identified  -     Row Name 07/11/21 1418          Living Environment    Lives With  child(paco), adult  -     Row Name 07/11/21 1418          Home Main Entrance    Number of Stairs, Main Entrance  none  -     Row Name 07/11/21 1418          Cognition    Orientation Status (Cognition)  oriented x 4  -       User Key  (r) = Recorded By, (t) = Taken By, (c) = Cosigned By    Initials Name Provider Type     Polly Patel, PT Physical Therapist        Mobility     Row Name 07/11/21 1418          Bed Mobility    Comment (Bed Mobility)  up in chair  -     Row Name 07/11/21 1418          Sit-Stand Transfer    Sit-Stand [place] (Transfers)  standby assist  -     Assistive Device (Sit-Stand Transfers)  walker, front-wheeled  -     Row Name 07/11/21 1418          Gait/Stairs (Locomotion)    [place] Level (Gait)  contact guard  -     Assistive Device (Gait)  walker, front-wheeled  -     Distance in Feet (Gait)  100  -     Deviations/Abnormal Patterns (Gait)  antalgic;gait speed decreased  -       User Key   (r) = Recorded By, (t) = Taken By, (c) = Cosigned By    Initials Name Provider Type    Polly Bobby PT Physical Therapist        Obj/Interventions     [facility] Name 07/11/21 1419          Range of Motion Comprehensive    General Range of Motion  no range of motion deficits identified  -KH     Row Name 07/11/21 1419          Strength Comprehensive (MMT)    General Manual Muscle Testing (MMT) Assessment  no strength deficits identified  -KH     Row Name 07/11/21 1419          Balance    Balance Assessment  sitting static balance;sitting dynamic balance;standing static balance;standing dynamic balance  -KH     Static Sitting Balance  WNL  -KH     Dynamic Sitting Balance  WNL  -KH     Static Standing Balance  WFL  -KH     Dynamic Standing Balance  mild impairment  -       User Key  (r) = Recorded By, (t) = Taken By, (c) = Cosigned By    Initials Name Provider Type    Polly Bobby PT Physical Therapist        Goals/Plan     Row Name 07/11/21 1422          Bed Mobility Goal 1 (PT)    Activity/Assistive Device (Bed Mobility Goal 1, PT)  bed mobility activities, all  -KH     [place] Level/Cues Needed (Bed Mobility Goal 1, PT)  independent  -KH     Time Frame (Bed Mobility Goal 1, PT)  1 week  -KH     Row Name 07/11/21 1422          Transfer Goal 1 (PT)    Activity/Assistive Device (Transfer Goal 1, PT)  transfers, all;walker, rolling  -KH     [place] Level/Cues Needed (Transfer Goal 1, PT)  supervision required  -KH     Time Frame (Transfer Goal 1, PT)  1 week  -KH     Row Name 07/11/21 1422          Gait Training Goal 1 (PT)    Activity/Assistive Device (Gait Training Goal 1, PT)  gait (walking locomotion);walker, rolling  -KH     [place] Level (Gait Training Goal 1, PT)  supervision required  -KH     Distance (Gait Training Goal 1, PT)  150  -KH     Time Frame (Gait Training Goal 1, PT)  1 week  -[facility] Name 07/11/21 1422          Patient Education Goal (PT)    Activity  (Patient Education Goal, PT)  hep  -     Barre/Cues/Accuracy (Memory Goal 2, PT)  demonstrates adequately  -     Time Frame (Patient Education Goal, PT)  1 week  -       User Key  (r) = Recorded By, (t) = Taken By, (c) = Cosigned By    Initials Name Provider Type    Polly Bobby, PT Physical Therapist        Clinical Impression     Row Name 07/11/21 1419          Pain    Additional Documentation  Pain Scale: Numbers Pre/Post-Treatment (Group)  -     Row Name 07/11/21 1419          Pain Scale: Numbers Pre/Post-Treatment    Pretreatment Pain Rating  2/10  -     Posttreatment Pain Rating  2/10  -     Pain Location - Orientation  generalized  -     Pain Intervention(s)  Repositioned  -     Row Name 07/11/21 1419          Plan of Care Review    Plan of Care Reviewed With  patient  -     Outcome Summary  Pt is s/p hemicolectomy and presents with decreased endurance, impaired balacne and unsteady gait. Pt was ageeable to therapy and ambualted 100 gt CGA with Rwx. At baseline, pt ambulated with rollator and lives with her daughter. Pt would benefit from increased mobility throughout the day to return to baseline. PT will follow 3x week to ensure pt is progressing. Pt was encouraged to ambualte with nursing staff 2-3 times per day to increased endurance. Pt is safe to d/c home when medically stable.  -     Row Name 07/11/21 1419          Therapy Assessment/Plan (PT)    Patient/Family Therapy Goals Statement (PT)  return home with daughter  -     Rehab Potential (PT)  good, to achieve stated therapy goals  -     Criteria for Skilled Interventions Met (PT)  yes  -     Row Name 07/11/21 1419          Positioning and Restraints    Pre-Treatment Position  sitting in chair/recliner  -     Post Treatment Position  chair  -KH     In Chair  sitting;call light within reach;encouraged to call for assist;exit alarm on;with family/caregiver;notified ns  -       User Key  (r) = Recorded  By, (t) = Taken By, (c) = Cosigned By    Initials Name Provider Type    Polly Bobby, PT Physical Therapist        Outcome Measures     Row Name 07/11/21 1423          How much help from another person do you currently need...    Turning from your back to your side while in flat bed without using bedrails?  4  -KH     Moving from lying on back to sitting on the side of a flat bed without bedrails?  4  -KH     Moving to and from a bed to a chair (including a wheelchair)?  3  -KH     Standing up from a chair using your arms (e.g., wheelchair, bedside chair)?  3  -KH     Climbing 3-5 steps with a railing?  2  -KH     To walk in hospital room?  3  -KH     AM-PAC 6 Clicks Score (PT)  19  -     Row Name 07/11/21 1423          Functional Assessment    Outcome Measure Options  AM-PAC 6 Clicks Basic Mobility (PT)  -       User Key  (r) = Recorded By, (t) = Taken By, (c) = Cosigned By    Initials Name Provider Type    Polly Bobby PT Physical Therapist        Physical Therapy Education                 Title: PT OT SLP Therapies (Done)     Topic: Physical Therapy (Done)     Point: Mobility training (Done)     Learning Progress Summary           Patient Acceptance, E, VU,NR by  at 7/11/2021 1423                   Point: Home exercise program (Done)     Learning Progress Summary           Patient Acceptance, E, VU,NR by  at 7/11/2021 1423                   Point: Body mechanics (Done)     Learning Progress Summary           Patient Acceptance, E, VU,NR by  at 7/11/2021 1423                   Point: Precautions (Done)     Learning Progress Summary           Patient Acceptance, E, VU,NR by  at 7/11/2021 1423                               User Key     Initials Effective Dates Name Provider Type [facility] 06/16/21 -  Polly Patel, PT Physical Therapist PT              PT Recommendation and Plan  Planned Therapy Interventions (PT): gait training, bed mobility training,  patient/family education, transfer training, stair training, balance training  Plan of Care Reviewed With: patient  Outcome Summary: Pt is s/p hemicolectomy and presents with decreased endurance, impaired balacne and unsteady gait. Pt was ageeable to therapy and ambualted 100 gt CGA with Rwx. At baseline, pt ambulated with rollator and lives with her daughter. Pt would benefit from increased mobility throughout the day to return to baseline. PT will follow 3x week to ensure pt is progressing. Pt was encouraged to ambualte with nursing staff 2-3 times per day to increased endurance. Pt is safe to d/c home when medically stable.     Time Calculation:   PT Charges     Row Name 07/11/21 1424             Time Calculation    Start Time  1149  -KH      Stop Time  1200  -KH      Time Calculation (min)  11 min  -KH         Untimed Charges    PT Eval/Re-eval Minutes  11  -KH         Total Minutes    Untimed Charges Total Minutes  11  -KH       Total Minutes  11  -KH        User Key  (r) = Recorded By, (t) = Taken By, (c) = Cosigned By    Initials Name Provider Type    Polly Bobby, PT Physical Therapist        Therapy Charges for Today     Code Description Service Date Service Provider Modifiers Qty    33107919607 HC PT EVAL MOD COMPLEXITY 2 7/11/2021 Polly Patel, PT GP 1          PT G-Codes  Outcome Measure Options: AM-PAC 6 Clicks Basic Mobility (PT)  AM-PAC 6 Clicks Score (PT): 19    Polly Patel, PT  7/11/2021

## 2021-07-12 ENCOUNTER — ANESTHESIA EVENT (OUTPATIENT)
Dept: PERIOP | Facility: HOSPITAL | Age: 76
End: 2021-07-12

## 2021-07-12 ENCOUNTER — ANESTHESIA (OUTPATIENT)
Dept: PERIOP | Facility: HOSPITAL | Age: 76
End: 2021-07-12

## 2021-07-12 ENCOUNTER — APPOINTMENT (OUTPATIENT)
Dept: GENERAL RADIOLOGY | Facility: HOSPITAL | Age: 76
End: 2021-07-12

## 2021-07-12 PROBLEM — K63.89 COLONIC MASS: Status: ACTIVE | Noted: 2021-07-12

## 2021-07-12 LAB
ANION GAP SERPL CALCULATED.3IONS-SCNC: 11.8 MMOL/L (ref 5–15)
ANION GAP SERPL CALCULATED.3IONS-SCNC: 13.3 MMOL/L (ref 5–15)
BASOPHILS # BLD AUTO: 0.02 10*3/MM3 (ref 0–0.2)
BASOPHILS NFR BLD AUTO: 0.4 % (ref 0–1.5)
BUN SERPL-MCNC: 15 MG/DL (ref 8–23)
BUN SERPL-MCNC: 15 MG/DL (ref 8–23)
BUN/CREAT SERPL: 2.8 (ref 7–25)
BUN/CREAT SERPL: 3 (ref 7–25)
CALCIUM SPEC-SCNC: 6.6 MG/DL (ref 8.6–10.5)
CALCIUM SPEC-SCNC: 7.5 MG/DL (ref 8.6–10.5)
CHLORIDE SERPL-SCNC: 95 MMOL/L (ref 98–107)
CHLORIDE SERPL-SCNC: 99 MMOL/L (ref 98–107)
CO2 SERPL-SCNC: 20.2 MMOL/L (ref 22–29)
CO2 SERPL-SCNC: 22.7 MMOL/L (ref 22–29)
CREAT SERPL-MCNC: 4.98 MG/DL (ref 0.57–1)
CREAT SERPL-MCNC: 5.28 MG/DL (ref 0.57–1)
CYTO UR: NORMAL
DEPRECATED RDW RBC AUTO: 50.5 FL (ref 37–54)
EOSINOPHIL # BLD AUTO: 0.15 10*3/MM3 (ref 0–0.4)
EOSINOPHIL NFR BLD AUTO: 2.9 % (ref 0.3–6.2)
ERYTHROCYTE [DISTWIDTH] IN BLOOD BY AUTOMATED COUNT: 17.4 % (ref 12.3–15.4)
GFR SERPL CREATININE-BSD FRML MDRD: 10 ML/MIN/1.73
GFR SERPL CREATININE-BSD FRML MDRD: 10 ML/MIN/1.73
GFR SERPL CREATININE-BSD FRML MDRD: ABNORMAL ML/MIN/{1.73_M2}
GFR SERPL CREATININE-BSD FRML MDRD: ABNORMAL ML/MIN/{1.73_M2}
GLUCOSE BLDC GLUCOMTR-MCNC: 103 MG/DL (ref 70–130)
GLUCOSE BLDC GLUCOMTR-MCNC: 118 MG/DL (ref 70–130)
GLUCOSE BLDC GLUCOMTR-MCNC: 184 MG/DL (ref 70–130)
GLUCOSE BLDC GLUCOMTR-MCNC: 28 MG/DL (ref 70–130)
GLUCOSE BLDC GLUCOMTR-MCNC: 33 MG/DL (ref 70–130)
GLUCOSE BLDC GLUCOMTR-MCNC: 59 MG/DL (ref 70–130)
GLUCOSE BLDC GLUCOMTR-MCNC: 61 MG/DL (ref 70–130)
GLUCOSE BLDC GLUCOMTR-MCNC: 75 MG/DL (ref 70–130)
GLUCOSE BLDC GLUCOMTR-MCNC: 87 MG/DL (ref 70–130)
GLUCOSE BLDC GLUCOMTR-MCNC: 89 MG/DL (ref 70–130)
GLUCOSE BLDC GLUCOMTR-MCNC: 95 MG/DL (ref 70–130)
GLUCOSE SERPL-MCNC: 114 MG/DL (ref 65–99)
GLUCOSE SERPL-MCNC: 88 MG/DL (ref 65–99)
HCT VFR BLD AUTO: 25.2 % (ref 34–46.6)
HGB BLD-MCNC: 7.9 G/DL (ref 12–15.9)
IMM GRANULOCYTES # BLD AUTO: 0.05 10*3/MM3 (ref 0–0.05)
IMM GRANULOCYTES NFR BLD AUTO: 1 % (ref 0–0.5)
LAB AP CASE REPORT: NORMAL
LYMPHOCYTES # BLD AUTO: 0.4 10*3/MM3 (ref 0.7–3.1)
LYMPHOCYTES NFR BLD AUTO: 7.6 % (ref 19.6–45.3)
MAGNESIUM SERPL-MCNC: 1.8 MG/DL (ref 1.6–2.4)
MCH RBC QN AUTO: 25.5 PG (ref 26.6–33)
MCHC RBC AUTO-ENTMCNC: 31.3 G/DL (ref 31.5–35.7)
MCV RBC AUTO: 81.3 FL (ref 79–97)
MONOCYTES # BLD AUTO: 0.68 10*3/MM3 (ref 0.1–0.9)
MONOCYTES NFR BLD AUTO: 13 % (ref 5–12)
NEUTROPHILS NFR BLD AUTO: 3.95 10*3/MM3 (ref 1.7–7)
NEUTROPHILS NFR BLD AUTO: 75.1 % (ref 42.7–76)
NRBC BLD AUTO-RTO: 0.2 /100 WBC (ref 0–0.2)
PATH REPORT.FINAL DX SPEC: NORMAL
PATH REPORT.GROSS SPEC: NORMAL
PLATELET # BLD AUTO: 211 10*3/MM3 (ref 140–450)
PMV BLD AUTO: 10.8 FL (ref 6–12)
POTASSIUM SERPL-SCNC: 3.8 MMOL/L (ref 3.5–5.2)
POTASSIUM SERPL-SCNC: 4.2 MMOL/L (ref 3.5–5.2)
QT INTERVAL: 419 MS
RBC # BLD AUTO: 3.1 10*6/MM3 (ref 3.77–5.28)
SARS-COV-2 RNA RESP QL NAA+PROBE: NOT DETECTED
SODIUM SERPL-SCNC: 131 MMOL/L (ref 136–145)
SODIUM SERPL-SCNC: 131 MMOL/L (ref 136–145)
WBC # BLD AUTO: 5.25 10*3/MM3 (ref 3.4–10.8)

## 2021-07-12 PROCEDURE — 94799 UNLISTED PULMONARY SVC/PX: CPT

## 2021-07-12 PROCEDURE — 03C53ZZ EXTIRPATION OF MATTER FROM RIGHT AXILLARY ARTERY, PERCUTANEOUS APPROACH: ICD-10-PCS | Performed by: SURGERY

## 2021-07-12 PROCEDURE — 93005 ELECTROCARDIOGRAM TRACING: CPT | Performed by: HOSPITALIST

## 2021-07-12 PROCEDURE — C1757 CATH, THROMBECTOMY/EMBOLECT: HCPCS | Performed by: SURGERY

## 2021-07-12 PROCEDURE — 82962 GLUCOSE BLOOD TEST: CPT

## 2021-07-12 PROCEDURE — C1769 GUIDE WIRE: HCPCS | Performed by: SURGERY

## 2021-07-12 PROCEDURE — 25010000003 LIDOCAINE 1 % SOLUTION 20 ML VIAL: Performed by: SURGERY

## 2021-07-12 PROCEDURE — U0003 INFECTIOUS AGENT DETECTION BY NUCLEIC ACID (DNA OR RNA); SEVERE ACUTE RESPIRATORY SYNDROME CORONAVIRUS 2 (SARS-COV-2) (CORONAVIRUS DISEASE [COVID-19]), AMPLIFIED PROBE TECHNIQUE, MAKING USE OF HIGH THROUGHPUT TECHNOLOGIES AS DESCRIBED BY CMS-2020-01-R: HCPCS | Performed by: SURGERY

## 2021-07-12 PROCEDURE — 99024 POSTOP FOLLOW-UP VISIT: CPT | Performed by: SURGERY

## 2021-07-12 PROCEDURE — B50W1ZZ PLAIN RADIOGRAPHY OF DIALYSIS SHUNT/FISTULA USING LOW OSMOLAR CONTRAST: ICD-10-PCS | Performed by: SURGERY

## 2021-07-12 PROCEDURE — 93010 ELECTROCARDIOGRAM REPORT: CPT | Performed by: INTERNAL MEDICINE

## 2021-07-12 PROCEDURE — 80048 BASIC METABOLIC PNL TOTAL CA: CPT | Performed by: HOSPITALIST

## 2021-07-12 PROCEDURE — 25010000003 CEFAZOLIN PER 500 MG: Performed by: SURGERY

## 2021-07-12 PROCEDURE — 25010000002 HEPARIN (PORCINE) PER 1000 UNITS: Performed by: NURSE ANESTHETIST, CERTIFIED REGISTERED

## 2021-07-12 PROCEDURE — 85025 COMPLETE CBC W/AUTO DIFF WBC: CPT | Performed by: HOSPITALIST

## 2021-07-12 PROCEDURE — 25010000002 HEPARIN (PORCINE) PER 1000 UNITS: Performed by: SURGERY

## 2021-07-12 PROCEDURE — 83735 ASSAY OF MAGNESIUM: CPT | Performed by: HOSPITALIST

## 2021-07-12 PROCEDURE — C1894 INTRO/SHEATH, NON-LASER: HCPCS | Performed by: SURGERY

## 2021-07-12 PROCEDURE — 25010000002 PROPOFOL 10 MG/ML EMULSION: Performed by: NURSE ANESTHETIST, CERTIFIED REGISTERED

## 2021-07-12 PROCEDURE — 25010000003 CEFAZOLIN IN DEXTROSE 2-4 GM/100ML-% SOLUTION: Performed by: SURGERY

## 2021-07-12 PROCEDURE — 25010000002 DIPHENHYDRAMINE PER 50 MG: Performed by: NURSE ANESTHETIST, CERTIFIED REGISTERED

## 2021-07-12 PROCEDURE — 80048 BASIC METABOLIC PNL TOTAL CA: CPT | Performed by: ANESTHESIOLOGY

## 2021-07-12 PROCEDURE — 0 IOPAMIDOL PER 1 ML: Performed by: SURGERY

## 2021-07-12 PROCEDURE — C1889 IMPLANT/INSERT DEVICE, NOC: HCPCS | Performed by: SURGERY

## 2021-07-12 PROCEDURE — 94760 N-INVAS EAR/PLS OXIMETRY 1: CPT

## 2021-07-12 PROCEDURE — 25010000002 DEXAMETHASONE PER 1 MG: Performed by: NURSE ANESTHETIST, CERTIFIED REGISTERED

## 2021-07-12 DEVICE — LIGACLIP MCA MULTIPLE CLIP APPLIERS, 20 SMALL CLIPS
Type: IMPLANTABLE DEVICE | Site: ARM | Status: FUNCTIONAL
Brand: LIGACLIP

## 2021-07-12 RX ORDER — HYDROMORPHONE HYDROCHLORIDE 1 MG/ML
0.5 INJECTION, SOLUTION INTRAMUSCULAR; INTRAVENOUS; SUBCUTANEOUS
Status: DISCONTINUED | OUTPATIENT
Start: 2021-07-12 | End: 2021-07-12

## 2021-07-12 RX ORDER — FENTANYL CITRATE 50 UG/ML
50 INJECTION, SOLUTION INTRAMUSCULAR; INTRAVENOUS
Status: DISCONTINUED | OUTPATIENT
Start: 2021-07-12 | End: 2021-07-12

## 2021-07-12 RX ORDER — DIPHENHYDRAMINE HCL 25 MG
25 CAPSULE ORAL
Status: DISCONTINUED | OUTPATIENT
Start: 2021-07-12 | End: 2021-07-12

## 2021-07-12 RX ORDER — DIPHENHYDRAMINE HYDROCHLORIDE 50 MG/ML
12.5 INJECTION INTRAMUSCULAR; INTRAVENOUS
Status: DISCONTINUED | OUTPATIENT
Start: 2021-07-12 | End: 2021-07-12

## 2021-07-12 RX ORDER — SODIUM CHLORIDE, SODIUM LACTATE, POTASSIUM CHLORIDE, CALCIUM CHLORIDE 600; 310; 30; 20 MG/100ML; MG/100ML; MG/100ML; MG/100ML
9 INJECTION, SOLUTION INTRAVENOUS CONTINUOUS
Status: DISCONTINUED | OUTPATIENT
Start: 2021-07-12 | End: 2021-07-12

## 2021-07-12 RX ORDER — CEFAZOLIN SODIUM 2 G/100ML
2 INJECTION, SOLUTION INTRAVENOUS ONCE
Status: COMPLETED | OUTPATIENT
Start: 2021-07-12 | End: 2021-07-12

## 2021-07-12 RX ORDER — HYDRALAZINE HYDROCHLORIDE 20 MG/ML
5 INJECTION INTRAMUSCULAR; INTRAVENOUS
Status: DISCONTINUED | OUTPATIENT
Start: 2021-07-12 | End: 2021-07-12

## 2021-07-12 RX ORDER — LIDOCAINE HYDROCHLORIDE 10 MG/ML
0.5 INJECTION, SOLUTION EPIDURAL; INFILTRATION; INTRACAUDAL; PERINEURAL ONCE AS NEEDED
Status: DISCONTINUED | OUTPATIENT
Start: 2021-07-12 | End: 2021-07-12 | Stop reason: HOSPADM

## 2021-07-12 RX ORDER — DIPHENHYDRAMINE HYDROCHLORIDE 50 MG/ML
INJECTION INTRAMUSCULAR; INTRAVENOUS AS NEEDED
Status: DISCONTINUED | OUTPATIENT
Start: 2021-07-12 | End: 2021-07-12 | Stop reason: SURG

## 2021-07-12 RX ORDER — ONDANSETRON 2 MG/ML
4 INJECTION INTRAMUSCULAR; INTRAVENOUS ONCE AS NEEDED
Status: DISCONTINUED | OUTPATIENT
Start: 2021-07-12 | End: 2021-07-12

## 2021-07-12 RX ORDER — FAMOTIDINE 10 MG/ML
20 INJECTION, SOLUTION INTRAVENOUS ONCE
Status: COMPLETED | OUTPATIENT
Start: 2021-07-12 | End: 2021-07-12

## 2021-07-12 RX ORDER — EPHEDRINE SULFATE 50 MG/ML
INJECTION, SOLUTION INTRAVENOUS AS NEEDED
Status: DISCONTINUED | OUTPATIENT
Start: 2021-07-12 | End: 2021-07-12 | Stop reason: SURG

## 2021-07-12 RX ORDER — HEPARIN SODIUM 1000 [USP'U]/ML
INJECTION, SOLUTION INTRAVENOUS; SUBCUTANEOUS AS NEEDED
Status: DISCONTINUED | OUTPATIENT
Start: 2021-07-12 | End: 2021-07-12 | Stop reason: SURG

## 2021-07-12 RX ORDER — NITROGLYCERIN 0.4 MG/1
0.4 TABLET SUBLINGUAL
Status: DISCONTINUED | OUTPATIENT
Start: 2021-07-12 | End: 2021-07-13

## 2021-07-12 RX ORDER — SODIUM CHLORIDE 0.9 % (FLUSH) 0.9 %
3-10 SYRINGE (ML) INJECTION AS NEEDED
Status: DISCONTINUED | OUTPATIENT
Start: 2021-07-12 | End: 2021-07-12 | Stop reason: HOSPADM

## 2021-07-12 RX ORDER — PROMETHAZINE HYDROCHLORIDE 25 MG/1
25 SUPPOSITORY RECTAL ONCE AS NEEDED
Status: DISCONTINUED | OUTPATIENT
Start: 2021-07-12 | End: 2021-07-12

## 2021-07-12 RX ORDER — MIDAZOLAM HYDROCHLORIDE 1 MG/ML
0.5 INJECTION INTRAMUSCULAR; INTRAVENOUS
Status: DISCONTINUED | OUTPATIENT
Start: 2021-07-12 | End: 2021-07-12 | Stop reason: HOSPADM

## 2021-07-12 RX ORDER — PROMETHAZINE HYDROCHLORIDE 25 MG/1
25 TABLET ORAL ONCE AS NEEDED
Status: DISCONTINUED | OUTPATIENT
Start: 2021-07-12 | End: 2021-07-12

## 2021-07-12 RX ORDER — LABETALOL HYDROCHLORIDE 5 MG/ML
5 INJECTION, SOLUTION INTRAVENOUS
Status: DISCONTINUED | OUTPATIENT
Start: 2021-07-12 | End: 2021-07-12

## 2021-07-12 RX ORDER — NALOXONE HCL 0.4 MG/ML
0.2 VIAL (ML) INJECTION AS NEEDED
Status: DISCONTINUED | OUTPATIENT
Start: 2021-07-12 | End: 2021-07-12

## 2021-07-12 RX ORDER — EPHEDRINE SULFATE 50 MG/ML
5 INJECTION, SOLUTION INTRAVENOUS ONCE AS NEEDED
Status: DISCONTINUED | OUTPATIENT
Start: 2021-07-12 | End: 2021-07-12

## 2021-07-12 RX ORDER — IBUPROFEN 600 MG/1
600 TABLET ORAL ONCE AS NEEDED
Status: DISCONTINUED | OUTPATIENT
Start: 2021-07-12 | End: 2021-07-12

## 2021-07-12 RX ORDER — HYDROCODONE BITARTRATE AND ACETAMINOPHEN 7.5; 325 MG/1; MG/1
1 TABLET ORAL ONCE AS NEEDED
Status: DISCONTINUED | OUTPATIENT
Start: 2021-07-12 | End: 2021-07-12

## 2021-07-12 RX ORDER — FLUMAZENIL 0.1 MG/ML
0.2 INJECTION INTRAVENOUS AS NEEDED
Status: DISCONTINUED | OUTPATIENT
Start: 2021-07-12 | End: 2021-07-12

## 2021-07-12 RX ORDER — LIDOCAINE HYDROCHLORIDE 20 MG/ML
INJECTION, SOLUTION INFILTRATION; PERINEURAL AS NEEDED
Status: DISCONTINUED | OUTPATIENT
Start: 2021-07-12 | End: 2021-07-12 | Stop reason: SURG

## 2021-07-12 RX ORDER — DEXAMETHASONE SODIUM PHOSPHATE 4 MG/ML
INJECTION, SOLUTION INTRA-ARTICULAR; INTRALESIONAL; INTRAMUSCULAR; INTRAVENOUS; SOFT TISSUE AS NEEDED
Status: DISCONTINUED | OUTPATIENT
Start: 2021-07-12 | End: 2021-07-12 | Stop reason: SURG

## 2021-07-12 RX ORDER — PROPOFOL 10 MG/ML
VIAL (ML) INTRAVENOUS CONTINUOUS PRN
Status: DISCONTINUED | OUTPATIENT
Start: 2021-07-12 | End: 2021-07-12 | Stop reason: SURG

## 2021-07-12 RX ORDER — SODIUM CHLORIDE 9 MG/ML
9 INJECTION, SOLUTION INTRAVENOUS CONTINUOUS
Status: DISCONTINUED | OUTPATIENT
Start: 2021-07-12 | End: 2021-07-12

## 2021-07-12 RX ORDER — FENTANYL CITRATE 50 UG/ML
50 INJECTION, SOLUTION INTRAMUSCULAR; INTRAVENOUS
Status: DISCONTINUED | OUTPATIENT
Start: 2021-07-12 | End: 2021-07-12 | Stop reason: HOSPADM

## 2021-07-12 RX ORDER — OXYCODONE AND ACETAMINOPHEN 10; 325 MG/1; MG/1
1 TABLET ORAL EVERY 4 HOURS PRN
Status: DISCONTINUED | OUTPATIENT
Start: 2021-07-12 | End: 2021-07-12

## 2021-07-12 RX ORDER — SODIUM CHLORIDE 0.9 % (FLUSH) 0.9 %
3 SYRINGE (ML) INJECTION EVERY 12 HOURS SCHEDULED
Status: DISCONTINUED | OUTPATIENT
Start: 2021-07-12 | End: 2021-07-12 | Stop reason: HOSPADM

## 2021-07-12 RX ADMIN — CEFAZOLIN SODIUM 2 G: 2 INJECTION, SOLUTION INTRAVENOUS at 13:38

## 2021-07-12 RX ADMIN — SODIUM CHLORIDE 9 ML/HR: 9 INJECTION, SOLUTION INTRAVENOUS at 11:26

## 2021-07-12 RX ADMIN — DEXTROSE MONOHYDRATE 25 G: 500 INJECTION PARENTERAL at 13:11

## 2021-07-12 RX ADMIN — MONTELUKAST SODIUM 10 MG: 10 TABLET, FILM COATED ORAL at 20:24

## 2021-07-12 RX ADMIN — FAMOTIDINE 20 MG: 10 INJECTION INTRAVENOUS at 11:26

## 2021-07-12 RX ADMIN — PROPOFOL 50 MCG/KG/MIN: 10 INJECTION, EMULSION INTRAVENOUS at 14:03

## 2021-07-12 RX ADMIN — BUDESONIDE AND FORMOTEROL FUMARATE DIHYDRATE 2 PUFF: 160; 4.5 AEROSOL RESPIRATORY (INHALATION) at 09:07

## 2021-07-12 RX ADMIN — DEXTROSE MONOHYDRATE 25 G: 500 INJECTION PARENTERAL at 10:55

## 2021-07-12 RX ADMIN — EPHEDRINE SULFATE 10 MG: 50 INJECTION INTRAVENOUS at 14:41

## 2021-07-12 RX ADMIN — DEXAMETHASONE SODIUM PHOSPHATE 4 MG: 4 INJECTION, SOLUTION INTRAMUSCULAR; INTRAVENOUS at 14:32

## 2021-07-12 RX ADMIN — METOPROLOL SUCCINATE 25 MG: 25 TABLET, EXTENDED RELEASE ORAL at 11:23

## 2021-07-12 RX ADMIN — DEXTROSE MONOHYDRATE 25 G: 500 INJECTION PARENTERAL at 11:43

## 2021-07-12 RX ADMIN — BUDESONIDE AND FORMOTEROL FUMARATE DIHYDRATE 2 PUFF: 160; 4.5 AEROSOL RESPIRATORY (INHALATION) at 20:04

## 2021-07-12 RX ADMIN — HEPARIN SODIUM 7000 UNITS: 1000 INJECTION INTRAVENOUS; SUBCUTANEOUS at 14:20

## 2021-07-12 RX ADMIN — PROPOFOL 50 MG: 10 INJECTION, EMULSION INTRAVENOUS at 14:02

## 2021-07-12 RX ADMIN — EPHEDRINE SULFATE 10 MG: 50 INJECTION INTRAVENOUS at 14:25

## 2021-07-12 RX ADMIN — LIDOCAINE HYDROCHLORIDE 80 MG: 20 INJECTION, SOLUTION INFILTRATION; PERINEURAL at 14:02

## 2021-07-12 RX ADMIN — IOPAMIDOL 10 ML: 510 INJECTION, SOLUTION INTRAVASCULAR at 15:14

## 2021-07-12 RX ADMIN — DIPHENHYDRAMINE HYDROCHLORIDE 25 MG: 50 INJECTION, SOLUTION INTRAMUSCULAR; INTRAVENOUS at 14:32

## 2021-07-12 NOTE — PERIOPERATIVE NURSING NOTE
Rechecked blood sugar since 1135 accucheck was 28 and 1139 accucheck was 33 (after previously receiving 1/2 amp d50).  Called and informed Dr Mckeon of this as well as lab to confirm/collect BMP.  Dr Mckeon states to give the other 1/2 amp d50 while waiting lab to collect BMP.

## 2021-07-12 NOTE — PROGRESS NOTES
"   LOS: 7 days     Chief Complaint/ Reason for encounter: ESRD, dialysis management  Chief Complaint   Patient presents with   • Rectal Bleeding         Subjective    No complaints today, still no bowel movement after surgery  Denies shortness of breath chest pain nausea vomiting or edema  Dialysis went well yesterday  Vascular intervention for AV fistula today in OR      Medical history reviewed:  Rectal Bleeding        Subjective    History taken from: Patient and chart    Vital Signs  Temp:  [98.1 °F (36.7 °C)-98.4 °F (36.9 °C)] 98.3 °F (36.8 °C)  Heart Rate:  [71-84] 75  Resp:  [16-20] 18  BP: ()/(46-79) 163/79       Wt Readings from Last 1 Encounters:   07/12/21 0517 78.6 kg (173 lb 4.8 oz)   07/11/21 0500 75 kg (165 lb 5.5 oz)   07/10/21 0500 75 kg (165 lb 5.5 oz)   07/09/21 0513 76.6 kg (168 lb 14.4 oz)   07/08/21 0456 78 kg (171 lb 14.4 oz)   07/07/21 0619 78.2 kg (172 lb 6.4 oz)   07/06/21 0926 76.6 kg (168 lb 14.4 oz)   07/05/21 2011 73.5 kg (162 lb 1.6 oz)   07/05/21 1546 74.5 kg (164 lb 3.9 oz)       Objective:  Vital signs: (most recent): Blood pressure 163/79, pulse 75, temperature 98.3 °F (36.8 °C), temperature source Oral, resp. rate 18, height 152.4 cm (60\"), weight 78.6 kg (173 lb 4.8 oz), SpO2 93 %, not currently breastfeeding.              Objective:  In OR today, not examined      Results Review:    Intake/Output:     Intake/Output Summary (Last 24 hours) at 7/12/2021 0920  Last data filed at 7/11/2021 1439  Gross per 24 hour   Intake 360 ml   Output --   Net 360 ml         DATA:  Radiology and Labs:  The following labs independently reviewed by me. Additional labs ordered for tomorrow a.m.  Interval notes, chart personally reviewed by me.   Old records independently reviewed showing ESRD on dialysis, recent problems with AV fistula cannulation  Discussed with patient    Risk/ complexity of medical care/ medical decision making moderate    Labs:   Recent Results (from the past 24 hour(s)) "   POC Glucose Once    Collection Time: 07/11/21 11:45 AM    Specimen: Blood   Result Value Ref Range    Glucose 66 (L) 70 - 130 mg/dL   POC Glucose Once    Collection Time: 07/11/21  4:22 PM    Specimen: Blood   Result Value Ref Range    Glucose 93 70 - 130 mg/dL   POC Glucose Once    Collection Time: 07/11/21  9:00 PM    Specimen: Blood   Result Value Ref Range    Glucose 125 70 - 130 mg/dL   COVID-19,[facility] IN-HOUSE CEPHEID/TAB NP SWAB IN TRANSPORT MEDIA 8-12 HR TAT - Swab, Nasopharynx    Collection Time: 07/12/21  1:32 AM    Specimen: Nasopharynx; Swab   Result Value Ref Range    COVID19 Not Detected Not Detected - Ref. Range   ECG 12 Lead    Collection Time: 07/12/21  3:08 AM   Result Value Ref Range    QT Interval 419 ms   Basic Metabolic Panel    Collection Time: 07/12/21  5:27 AM    Specimen: Blood   Result Value Ref Range    Glucose 88 65 - 99 mg/dL    BUN 15 8 - 23 mg/dL    Creatinine 4.98 (H) 0.57 - 1.00 mg/dL    Sodium 131 (L) 136 - 145 mmol/L    Potassium 3.8 3.5 - 5.2 mmol/L    Chloride 95 (L) 98 - 107 mmol/L    CO2 22.7 22.0 - 29.0 mmol/L    Calcium 7.5 (L) 8.6 - 10.5 mg/dL    eGFR  African Amer 10 (L) >60 mL/min/1.73    eGFR Non African Amer      BUN/Creatinine Ratio 3.0 (L) 7.0 - 25.0    Anion Gap 13.3 5.0 - 15.0 mmol/L   CBC Auto Differential    Collection Time: 07/12/21  5:27 AM    Specimen: Blood   Result Value Ref Range    WBC 5.25 3.40 - 10.80 10*3/mm3    RBC 3.10 (L) 3.77 - 5.28 10*6/mm3    Hemoglobin 7.9 (L) 12.0 - 15.9 g/dL    Hematocrit 25.2 (L) 34.0 - 46.6 %    MCV 81.3 79.0 - 97.0 fL    MCH 25.5 (L) 26.6 - 33.0 pg    MCHC 31.3 (L) 31.5 - 35.7 g/dL    RDW 17.4 (H) 12.3 - 15.4 %    RDW-SD 50.5 37.0 - 54.0 fl    MPV 10.8 6.0 - 12.0 fL    Platelets 211 140 - 450 10*3/mm3    Neutrophil % 75.1 42.7 - 76.0 %    Lymphocyte % 7.6 (L) 19.6 - 45.3 %    Monocyte % 13.0 (H) 5.0 - 12.0 %    Eosinophil % 2.9 0.3 - 6.2 %    Basophil % 0.4 0.0 - 1.5 %    Immature Grans % 1.0 (H) 0.0 - 0.5 %     Neutrophils, Absolute 3.95 1.70 - 7.00 10*3/mm3    Lymphocytes, Absolute 0.40 (L) 0.70 - 3.10 10*3/mm3    Monocytes, Absolute 0.68 0.10 - 0.90 10*3/mm3    Eosinophils, Absolute 0.15 0.00 - 0.40 10*3/mm3    Basophils, Absolute 0.02 0.00 - 0.20 10*3/mm3    Immature Grans, Absolute 0.05 0.00 - 0.05 10*3/mm3    nRBC 0.2 0.0 - 0.2 /100 WBC   Magnesium    Collection Time: 07/12/21  5:27 AM    Specimen: Blood   Result Value Ref Range    Magnesium 1.8 1.6 - 2.4 mg/dL   POC Glucose Once    Collection Time: 07/12/21  6:31 AM    Specimen: Blood   Result Value Ref Range    Glucose 95 70 - 130 mg/dL       Radiology:  Imaging Results (Last 24 Hours)     ** No results found for the last 24 hours. **             Medications have been reviewed:  Current Facility-Administered Medications   Medication Dose Route Frequency Provider Last Rate Last Admin   • albuterol (PROVENTIL) nebulizer solution 0.083% 2.5 mg/3mL  2.5 mg Nebulization TID PRN Zbigniew Conner MD       • budesonide-formoterol (SYMBICORT) 160-4.5 MCG/ACT inhaler 2 puff  2 puff Inhalation BID - RT Zbigniew Conner MD   2 puff at 07/12/21 0907   • dextrose (D50W) 25 g/ 50mL Intravenous Solution 25 g  25 g Intravenous Q15 Min PRN Zbiginew Conner MD   25 g at 07/07/21 1650   • dextrose (GLUTOSE) oral gel 15 g  15 g Oral Q15 Min PRN Zbigniew Conner MD       • epoetin polina-epbx (RETACRIT) injection 10,000 Units  10,000 Units Intravenous Once per day on Tue Thu Sat Zbigniew Conner MD   10,000 Units at 07/10/21 1856   • glucagon (human recombinant) (GLUCAGEN DIAGNOSTIC) injection 1 mg  1 mg Subcutaneous Q15 Min PRN Zbigniew Conner MD       • insulin lispro (ADMELOG) injection 0-7 Units  0-7 Units Subcutaneous TID AC Zbigniew Conner MD   2 Units at 07/10/21 0918   • metoprolol succinate XL (TOPROL-XL) 24 hr tablet 25 mg  25 mg Oral Daily Haja Chowdhury MD       • montelukast (SINGULAIR) tablet 10 mg  10 mg Oral  Nightly Zbigniew Conner MD   10 mg at 07/11/21 2054   • oxyCODONE-acetaminophen (PERCOCET) 5-325 MG per tablet 1 tablet  1 tablet Oral Q4H PRN Haja Chowdhury MD   1 tablet at 07/11/21 1559   • pantoprazole (PROTONIX) EC tablet 40 mg  40 mg Oral QAM AC Haja Chowdhury MD   40 mg at 07/11/21 0927   • sodium chloride 0.9 % flush 10 mL  10 mL Intravenous PRN Zbigniew Conner MD   10 mL at 07/10/21 2112   • sodium chloride 0.9 % infusion  30 mL/hr Intravenous Continuous PRN Zbigniew Conner MD 30 mL/hr at 07/07/21 1118 Restarted at 07/07/21 1214       ASSESSMENT:  ESRD  Cecal mass status post hemicolectomy  Lower GI bleeding from cecal mass, pathology shows tubular adenoma  Tubular adenoma the colon  Hypertension  Anemia of CKD  COPD  AV fistula dysfunction       PLAN:    Continue Tuesday Thursday Saturday hemodialysis schedule, HD a.m. resume tight heparin  Vascular following for AV fistula dysfunction, shuntogram planned for today  Potassium 3.6, use 3K bath for now  Epogen with HD for anemia, UF as tolerated with HD  Continue current BP regimen    monitor electrolytes and volume closely       Chester Kang MD   [facility]   Office phone number: [phone]  Answering service phone number: [phone]    07/12/21  09:20 EDT    Dictation performed using Dragon dictation software

## 2021-07-12 NOTE — PLAN OF CARE
Goal Outcome Evaluation:              Outcome Summary: No complaints. 2L o2 overnight. NPO since 0001. R arm shuntogram planned for today. VSS. Will continue to monitor.

## 2021-07-12 NOTE — PROGRESS NOTES
Postoperative day 3 status post laparoscopic right hemicolectomy    No issues today.  Her revision of her fistula today  Has been tolerating clear liquid diet.  No bowel function yet    Vitals:    07/12/21 1530 07/12/21 1600 07/12/21 1615 07/12/21 1654   BP: 128/65 125/90 128/76 128/62   BP Location:    Left leg   Patient Position:    Lying   Pulse: 57 63 62 67   Resp: 14 16 16 20   Temp:   98.3 °F (36.8 °C) 97.5 °F (36.4 °C)   TempSrc:   Oral Oral   SpO2: 100% 98% 97% 99%   Weight:       Height:         Alert, no acute distress  Abdomen soft, mildly distended, mildly tender over incisions, incisions clean dry and intact    Final Diagnosis   1. Colon, Right, Right Hemicolectomy: Benign colon and small bowel  with two adenomas identified.               A. Adenoma #1:   1. The first adenoma is a tubulovillous adenoma measuring 4.5 x 3.5 x 2.5 cm.   2. The adenoma involves the ileocecal valve and extends into the cecal pouch.  3. The mass is 4.8 cm from the proximal margin and 14.5 cm from the distal        margin.  B. Adenoma #2:               1. Adenoma #2 is a tubular adenoma measuring 0.2 cm.               2. The adenoma comes within 12.3 cm of the proximal margin and 6.5 cm of the        distal margin.               C. Numerous diverticula.               D. Thirteen benign lymph nodes.               E. Benign appendix with fibrous obliteration of the tip     Comment: No invasive tumor is identified.     Labs stable    Assessment and plan    Postop day 3 status post laparoscopic right hemicolectomy, no bowel function yet recovering slowly as expected, pathology was related to the patient and she does not need to have any further therapies since pathology is benign.  Repeat colonoscopy in 5 years for surveillance  Advance to full liquid diet as tolerated  Ambulate

## 2021-07-12 NOTE — ANESTHESIA POSTPROCEDURE EVALUATION
"Patient: Nellie Vegas    Procedure Summary     Date: 07/12/21 Room / Location:  [facility]    Anesthesia Start: 1347 Anesthesia Stop: 1516    Procedure: Right arm dialysis shuntogram with shunt thrombectomy (Right ) Diagnosis:       Problem with dialysis access, subsequent encounter      (Problem with dialysis access, subsequent encounter [T80.773W])    Surgeons: Shahram Gallagher MD Provider: Henry Salazar DO    Anesthesia Type: MAC ASA Status: 3          Anesthesia Type: MAC    Vitals  Vitals Value Taken Time   /76 07/12/21 1615   Temp 36.4 °C (97.6 °F) 07/12/21 1513   Pulse 62 07/12/21 1616   Resp 14 07/12/21 1530   SpO2 97 % 07/12/21 1616   Vitals shown include unvalidated device data.        Post Anesthesia Care and Evaluation    Patient location during evaluation: PACU  Patient participation: complete - patient participated  Level of consciousness: awake and alert  Pain management: adequate  Airway patency: patent  Anesthetic complications: No anesthetic complications    Cardiovascular status: acceptable  Respiratory status: acceptable  Hydration status: acceptable    Comments: /65   Pulse 57   Temp 36.4 °C (97.6 °F) (Oral)   Resp 14   Ht 152.4 cm (60\")   Wt 78.6 kg (173 lb 4.8 oz)   SpO2 100%   BMI 33.85 kg/m²         "

## 2021-07-12 NOTE — PROGRESS NOTES
"Daily progress note    Chief complaint  Doing same  No new complaints  Family at bedside    History of present illness  73-year-old -American female with history of end-stage renal disease on hemodialysis COPD diabetes mellitus hypertension and chronic anemia brought to the emergency room with black diarrhea started on Saturday.  Patient has 4 episodes.  Patient also have abdominal discomfort.  Patient has been noticing black stools for several days.  Patient denies any nausea vomiting.  Patient denies any fever cough chest pain shortness of breath.  Patient work-up in ER revealed acute blood loss anemia with lower GI bleed admit for management.      REVIEW OF SYSTEMS  Unremarkable     PHYSICAL EXAM   Blood pressure 149/63, pulse 63, temperature 98.3 °F (36.8 °C), temperature source Oral, resp. rate 18, height 152.4 cm (60\"), weight 78.6 kg (173 lb 4.8 oz), SpO2 99 %, not currently breastfeeding.    Constitutional: Pt. is oriented to person, place, and time   HENT: Normocephalic and atraumatic. Oropharynx moist/nonerythematous.  Neck: Normal range of motion. Neck supple. No JVD present.   Cardiovascular: Normal rate, regular rhythm and normal heart sounds. Exam reveals no gallop and no friction rub. No murmur heard.  Pulmonary/Chest: Effort normal and breath sounds normal. No stridor. No respiratory distress. No wheezes, no rales.   Abdominal: Soft. Bowel sounds are normal. No distension. There is no tenderness. There is no rebound and no guarding.   Musculoskeletal: Normal range of motion. No edema, tenderness or deformity.   Neurological: Pt. is alert and oriented to person, place, and time.  She has no focal neurologic deficits  Skin: Skin is warm and dry. No rash noted. Pt. is not diaphoretic. No erythema.   Psychiatric: Mood, affect and judgment normal.  She is pleasant and cooperative.    LAB RESULTS  Lab Results (last 24 hours)     Procedure Component Value Units Date/Time    POC Glucose Once " [[id number]]  (Normal) Collected: 07/12/21 1326    Specimen: Blood Updated: 07/12/21 1327     Glucose 118 mg/dL      Comment: Meter: [id number] : [id number] Jayesh Lund RN       POC Glucose Once [[id number]]  (Abnormal) Collected: 07/12/21 1304    Specimen: Blood Updated: 07/12/21 1305     Glucose 59 mg/dL      Comment: Meter: [id number] : [id number] Brad ANDERSON RN       POC Glucose Once [[id number]]  (Normal) Collected: 07/12/21 1233    Specimen: Blood Updated: 07/12/21 1234     Glucose 89 mg/dL      Comment: Meter: [id number] : [id number] Leydi Stockton RN       Basic Metabolic Panel [[id number]]  (Abnormal) Collected: 07/12/21 1153    Specimen: Blood from Hand, Left Updated: 07/12/21 1219     Glucose 114 mg/dL      BUN 15 mg/dL      Creatinine 5.28 mg/dL      Sodium 131 mmol/L      Potassium 4.2 mmol/L      Chloride 99 mmol/L      CO2 20.2 mmol/L      Calcium 6.6 mg/dL      eGFR  African Amer 10 mL/min/1.73      Comment: <15 Indicative of kidney failure.        eGFR Non  Amer --     Comment: <15 Indicative of kidney failure.        BUN/Creatinine Ratio 2.8     Anion Gap 11.8 mmol/L     Narrative:      GFR Normal >60  Chronic Kidney Disease <60  Kidney Failure <15      POC Glucose Once [[id number]]  (Normal) Collected: 07/12/21 1152    Specimen: Blood Updated: 07/12/21 1155     Glucose 103 mg/dL      Comment: Meter: [id number] : [id number] Brad ANDERSON RN       POC Glucose Once [[id number]]  (Abnormal) Collected: 07/12/21 1139    Specimen: Blood Updated: 07/12/21 1141     Glucose 33 mg/dL      Comment: Verify with Lab Meter: [id number] : [id number] Brad ANDERSON RN       POC Glucose Once [[id number]]  (Abnormal) Collected: 07/12/21 1135    Specimen: Blood Updated: 07/12/21 1137     Glucose 28 mg/dL      Comment: Repeat Test Verify with Lab Meter: [id number] : [id number] Brad ANDERSON       POC Glucose Once [[id number]]  (Abnormal) Collected: 07/12/21 1053    Specimen:  Blood Updated: 07/12/21 1055     Glucose 61 mg/dL      Comment: Meter: [id number] : [id number] Jayesh Lund RN       POC Glucose Once [[id number]]  (Normal) Collected: 07/12/21 0631    Specimen: Blood Updated: 07/12/21 0632     Glucose 95 mg/dL      Comment: Meter: [id number] : [id number] Will WADDELL       Basic Metabolic Panel [[id number]]  (Abnormal) Collected: 07/12/21 0527    Specimen: Blood Updated: 07/12/21 0624     Glucose 88 mg/dL      BUN 15 mg/dL      Creatinine 4.98 mg/dL      Sodium 131 mmol/L      Potassium 3.8 mmol/L      Chloride 95 mmol/L      CO2 22.7 mmol/L      Calcium 7.5 mg/dL      eGFR  African Amer 10 mL/min/1.73      Comment: <15 Indicative of kidney failure.        eGFR Non  Amer --     Comment: <15 Indicative of kidney failure.        BUN/Creatinine Ratio 3.0     Anion Gap 13.3 mmol/L     Narrative:      GFR Normal >60  Chronic Kidney Disease <60  Kidney Failure <15      Magnesium [[id number]]  (Normal) Collected: 07/12/21 0527    Specimen: Blood Updated: 07/12/21 0615     Magnesium 1.8 mg/dL     CBC & Differential [[id number]]  (Abnormal) Collected: 07/12/21 0527    Specimen: Blood Updated: 07/12/21 0542    Narrative:      The following orders were created for panel order CBC & Differential.  Procedure                               Abnormality         Status                     ---------                               -----------         ------                     CBC Auto Differential[[id number]]        Abnormal            Final result                 Please view results for these tests on the individual orders.    CBC Auto Differential [[id number]]  (Abnormal) Collected: 07/12/21 0527    Specimen: Blood Updated: 07/12/21 0542     WBC 5.25 10*3/mm3      RBC 3.10 10*6/mm3      Hemoglobin 7.9 g/dL      Hematocrit 25.2 %      MCV 81.3 fL      MCH 25.5 pg      MCHC 31.3 g/dL      RDW 17.4 %      RDW-SD 50.5 fl      MPV 10.8 fL      Platelets 211 10*3/mm3      Neutrophil % 75.1 %       Lymphocyte % 7.6 %      Monocyte % 13.0 %      Eosinophil % 2.9 %      Basophil % 0.4 %      Immature Grans % 1.0 %      Neutrophils, Absolute 3.95 10*3/mm3      Lymphocytes, Absolute 0.40 10*3/mm3      Monocytes, Absolute 0.68 10*3/mm3      Eosinophils, Absolute 0.15 10*3/mm3      Basophils, Absolute 0.02 10*3/mm3      Immature Grans, Absolute 0.05 10*3/mm3      nRBC 0.2 /100 WBC     COVID PRE-OP / PRE-PROCEDURE SCREENING ORDER (NO ISOLATION) - Swab, Nasopharynx [[id number]]  (Normal) Collected: 07/12/21 0132    Specimen: Swab from Nasopharynx Updated: 07/12/21 0235    Narrative:      The following orders were created for panel order COVID PRE-OP / PRE-PROCEDURE SCREENING ORDER (NO ISOLATION) - Swab, Nasopharynx.  Procedure                               Abnormality         Status                     ---------                               -----------         ------                     COVID-19,[facility]...[[id number]]  Normal              Final result                 Please view results for these tests on the individual orders.    COVID-19,[facility] CEPHEID/TAB NP SWAB IN TRANSPORT MEDIA 8-12 HR TAT - Swab, Nasopharynx [[id number]]  (Normal) Collected: 07/12/21 0132    Specimen: Swab from Nasopharynx Updated: 07/12/21 0235     COVID19 Not Detected    Narrative:      Fact sheet for providers: https://www.fda.gov/media/163200/download     Fact sheet for patients: https://www.fda.gov/media/644195/download    POC Glucose Once [[id number]]  (Normal) Collected: 07/11/21 2100    Specimen: Blood Updated: 07/11/21 2101     Glucose 125 mg/dL      Comment: Meter: [id number] : [id number] Will WADDELL           Imaging Results (Last 24 Hours)     ** No results found for the last 24 hours. **        Colonoscopy results noted and discussed with patient and family    Current Facility-Administered Medications:   •  [MAR Hold] albuterol (PROVENTIL) nebulizer solution 0.083% 2.5 mg/3mL, 2.5 mg, Nebulization, TID  PRN, Zbigniew Conner MD  •  [MAR Hold] budesonide-formoterol (SYMBICORT) 160-4.5 MCG/ACT inhaler 2 puff, 2 puff, Inhalation, BID - RT, Zbigniew Conner MD, 2 puff at 07/12/21 0907  •  [MAR Hold] dextrose (D50W) 25 g/ 50mL Intravenous Solution 25 g, 25 g, Intravenous, Q15 Min PRN, Zbigniew Conner MD, 25 g at 07/12/21 1311  •  [MAR Hold] dextrose (GLUTOSE) oral gel 15 g, 15 g, Oral, Q15 Min PRN, Zbigniew Conner MD  •  [MAR Hold] epoetin polina-epbx (RETACRIT) injection 10,000 Units, 10,000 Units, Intravenous, Once per day on Tue Thu Sat, Zbigniew Conner MD, 10,000 Units at 07/10/21 1856  •  fentaNYL citrate (PF) (SUBLIMAZE) injection 50 mcg, 50 mcg, Intravenous, Q10 Min PRN, Steven Mckeon MD  •  [MAR Hold] glucagon (human recombinant) (GLUCAGEN DIAGNOSTIC) injection 1 mg, 1 mg, Subcutaneous, Q15 Min PRN, Zbigniew Conner MD  •  [MAR Hold] insulin lispro (ADMELOG) injection 0-7 Units, 0-7 Units, Subcutaneous, TID AC, Zbigniew Conner MD, 2 Units at 07/10/21 0918  •  lidocaine PF 1% (XYLOCAINE) injection 0.5 mL, 0.5 mL, Injection, Once PRN, Steven Mckeon MD  •  metoprolol succinate XL (TOPROL-XL) 24 hr tablet 25 mg, 25 mg, Oral, Daily, Haja Chowdhury MD, 25 mg at 07/12/21 1123  •  midazolam (VERSED) injection 0.5 mg, 0.5 mg, Intravenous, Q10 Min PRN, Steven Mckeon MD  •  [MAR Hold] montelukast (SINGULAIR) tablet 10 mg, 10 mg, Oral, Nightly, Zbigniew Conner MD, 10 mg at 07/11/21 2054  •  [MAR Hold] oxyCODONE-acetaminophen (PERCOCET) 5-325 MG per tablet 1 tablet, 1 tablet, Oral, Q4H PRN, Haja Chowdhury MD, 1 tablet at 07/11/21 1559  •  [MAR Hold] pantoprazole (PROTONIX) EC tablet 40 mg, 40 mg, Oral, QAM AC, Haja Chowdhury MD, 40 mg at 07/11/21 0927  •  [COMPLETED] Insert peripheral IV, , , Once **AND** [MAR Hold] sodium chloride 0.9 % flush 10 mL, 10 mL, Intravenous, PRN, Zbigniew Conner MD, 10 mL at 07/10/21 2112  •   sodium chloride 0.9 % flush 3 mL, 3 mL, Intravenous, Q12H, Steven Mckeon MD  •  sodium chloride 0.9 % flush 3-10 mL, 3-10 mL, Intravenous, PRN, tSeven Mckeon MD  •  sodium chloride 0.9 % infusion, 30 mL/hr, Intravenous, Continuous PRN, Zbigniew Conner MD, Last Rate: 30 mL/hr at 07/07/21 1118, Restarted at 07/07/21 1214  •  sodium chloride 0.9 % infusion, 9 mL/hr, Intravenous, Continuous, Steven Mckeon MD, Last Rate: 9 mL/hr at 07/12/21 1126, Restarted at 07/12/21 1348    Facility-Administered Medications Ordered in Other Encounters:   •  lidocaine (XYLOCAINE) 2% injection, , Intravenous, PRN, Emmy Tony CRNA, 80 mg at 07/12/21 1402  •  Propofol (DIPRIVAN) injection, , Intravenous, Continuous PRN, Emmy Tony CRNA, Last Rate: 23.58 mL/hr at 07/12/21 1403, 50 mcg/kg/min at 07/12/21 1403     ASSESSMENT  Cecal mass consistent with tubular adenoma status post laparoscopic right hemicolectomy  Lower GI bleed  Acute blood loss anemia  End-stage renal disease on hemodialysis  AV fistula stenosis  COPD  Hypertension  Diabetes mellitus with low blood sugar  Chronic anemia  Gastroesophageal reflux disease    PLAN  CPM  Postop care  Protonix  Resume Eliquis once okay with surgery  AV shuntogram with intervention today  Transfuse PRN  Hemodialysis TIW  Accu-Chek sliding scale insulin  Adjust home medications  Stress ulcer DVT prophylaxis  Supportive care  PT/OT  Discussed with nursing staff and family  Follow closely further recommendation according to hospital course    ALLAN MARTIN MD

## 2021-07-12 NOTE — OP NOTE
Operative Note  Location: [facility]  Date of Admission:  7/5/2021  OR Date: 7/12/2021    Pre-op Diagnosis:  1.  Right axilloaxillary loop dialysis shunt dysfunction, with high venous pressures and poor flow  2.  End-stage renal disease on hemodialysis    Post-op Diagnosis:  1.  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  End-stage renal disease on hemodialysis    Procedure:   1.  Ultrasound-guided access to the right axilloaxillary loop dialysis shunt  2.  Right axilloaxillary loop dialysis shuntogram  3.  Right axilloaxillary loop dialysis shunt thrombectomy with intraoperative shuntogram    Surgeon: Shahram Gallagher MD    Assistant: Britney Juárez RN, [facility], Provided critical assistance in exposure, retraction, and suction that overall decrease blood loss and operative time.    Anesthesia: Monitored Anesthesia Care    Staff:   Circulator: Ailyn Hauser RN  Scrub Person: Tori Garcia PCT; Casandra Palacios; Monty Fishman  Assistant: Britney Juárez  Vascular Radiology Technician: Niya Lake; Gm Madrigal    Estimated Blood Loss: 75 mL    Specimen: None    Complications: None    Findings: Initial shuntogram demonstrated shunt thrombosis.  Thrombectomy was performed, using first a #4 Zulema balloon tip catheter, followed by a 4/6 adherent clot catheter.  I was able to retrieve a large amount of fresh and chronic thrombus from within the shunt.  Intraoperative open shuntogram is demonstrated widely patent shunt and venous stents extending into the superior vena cava.  There was no residual thrombus or stenosis and no need for intervention.  Patient had a strongly palpable thrill at the conclusion of her procedure.    Implants:   Implant Name Type Inv. Item Serial No.  Lot No. LRB No. Used Action   CLIPAPPLR M/ ENDO LIGACLIP 9 3/8IN SM - [id number] Implant CLIPAPPLR M/ ENDO LIGACLIP 9 3/8IN   [employer]  DIV OF J AND J [id number] Right 1 Implanted     Indications:  This 76-year-old woman has end-stage renal disease and requires hemodialysis.  She has a right axilloaxillary loop dialysis shunt, and has been receiving shunt base access.  She has experienced numerous problems related to her shunt, and is undergone a number of percutaneous interventions, with stenting of nearly the entire venous outflow from the venous anastomosis into the superior vena cava.  She was scheduled for a shuntogram based upon poor flows with dialysis and high venous pressures, but was admitted to the hospital with severe anemia.  Ultimately she was identified with a cecal mass, and underwent laparoscopic colectomy.  While she was hospitalized, we were asked to see her in view of poor flow at hemodialysis.  Arrangements were made for shuntogram and she submits now for procedure.       Procedure:  The patient was given Kefzol IV.  She was transferred to the operating room and positioned supine in the operating table with her right arm extended onto an arm table.  IV sedation was administered by members of the anesthesia team.  I washed her upper arm with Hibiclens, and dried it carefully.  The extremity was then prepared with ChloraPrep and draped in a sterile fashion.  Was not able to feel a thrill in the shunt.  There appeared to be 2 grafts in the medial portion of the upper arm.  To ensure that I cannulated the correct one, I used ultrasound to trace the shunts.  The overlying skin was infiltrated with lidocaine.  Ultrasound-guided puncture of the correct access was performed and near the apex of the shunt in the distal arm.  I did not get a blood return, even though the ultrasound confirmed the tip of the needle was within the graft.  A wire was passed and I placed the dilator for the micropuncture catheter over the wire and remove the wire.  I obtained a shuntogram, demonstrating shunt thrombosis.  We did not anticipate that her shunt would be thrombosed.  Because of recurrent stenosis, we were  unable to feel a thrill even when her shunt was patent.  Plans were made for an open shunt thrombectomy.  Counts were made.  The patient was given heparin 7000 units IV.  More lidocaine was infiltrated into the skin in the distal arm near the apex of the graft.  A longitudinal skin incision was made and carried through the subcutaneous tissue.  The graft was identified and dissected over several centimeters.  A transverse graftotomy was made, and the shunt was confirmed to be occluded.  A #4 Zulema balloon tip catheter was passed into the venous limb and venous thrombectomy was undertaken.  After 2 passages, I was able to pass the Zulema catheter into the central venous circulation, but there were several areas of narrowing that I could proceed as I withdrew the catheter.  I obtained an adherent clot catheter and I used a 4/6 adherent clot catheter and was able to retrieve a large amount of chronic appearing thrombus as well as some residual fresh thrombus.  After several additional catheter passages, I was unable to retrieve any further clot.  I passed balloontipped catheters and adherent clot catheters in alternating succession.  Central venous backbleeding was restored.  The venous limb was flushed with heparinized saline.  Thrombectomy of the arterial limb was undertaken.  I used the #4 Zulema balloon tip catheter.  I made a mental note about the distance of the arterial anastomosis from the graftotomy.  I used the adherent clot catheter as well, but was careful not to advance the catheter too far into the axillary artery.  I retrieved the arterial plug and a moderate amount of chronic mural thrombus.  Vigorous, pulsatile inflow was restored.  Shuntogram was obtained by direct injection of half-strength contrast material into the shunt using a Adrianna tree.  This demonstrated the venous limb to be widely patent.  There was stent or stent graft material extending from the region of the venous anastomosis all  the way into the superior vena cava.  There was no residual thrombus and no significant stenosis.  The limb was flushed with heparinized saline and clamped.  A retrograde shuntogram was obtained of the arterial limb, which similarly confirmed the limb to be widely patent.  The configuration of the arterial anastomosis was normal.  The graft limb was clamped.  The graftotomy was closed with a continuous 5-0 Prolene suture.  Following release of clamps a good thrill was palpated in the graft.  Wound and suture line hemostasis was complete.  The heparin was not reversed.  The wound was closed in layers with Vicryl sutures.  Dermal adhesive was applied.  At its conclusion the patient had tolerated the procedure well, and without apparent complications.  Sponge and needle counts were correct.  The patient was taken to the recovery area in stable condition.    Shahram Gallagher MD     Date: 7/12/2021  Time: 15:25 EDT

## 2021-07-12 NOTE — ANESTHESIA PREPROCEDURE EVALUATION
Anesthesia Evaluation     NPO Solid Status: > 8 hours             Airway   Mallampati: II  TM distance: >3 FB  Neck ROM: full  Dental    (+) edentulous    Pulmonary - normal exam   (+) COPD, sleep apnea on CPAP,   Cardiovascular - normal exam    (+) hypertension,       Neuro/Psych  GI/Hepatic/Renal/Endo    (+)   renal disease ESRD and dialysis, diabetes mellitus poorly controlled,     Musculoskeletal     Abdominal    Substance History      OB/GYN          Other                        Anesthesia Plan    ASA 3     MAC       Anesthetic plan, all risks, benefits, and alternatives have been provided, discussed and informed consent has been obtained with: patient.

## 2021-07-13 ENCOUNTER — TELEPHONE (OUTPATIENT)
Dept: GASTROENTEROLOGY | Facility: CLINIC | Age: 76
End: 2021-07-13

## 2021-07-13 LAB
ANION GAP SERPL CALCULATED.3IONS-SCNC: 21.3 MMOL/L (ref 5–15)
BASOPHILS # BLD AUTO: 0 10*3/MM3 (ref 0–0.2)
BASOPHILS NFR BLD AUTO: 0 % (ref 0–1.5)
BUN SERPL-MCNC: 21 MG/DL (ref 8–23)
BUN/CREAT SERPL: 3.3 (ref 7–25)
CALCIUM SPEC-SCNC: 7.6 MG/DL (ref 8.6–10.5)
CHLORIDE SERPL-SCNC: 91 MMOL/L (ref 98–107)
CO2 SERPL-SCNC: 19.7 MMOL/L (ref 22–29)
CREAT SERPL-MCNC: 6.37 MG/DL (ref 0.57–1)
DEPRECATED RDW RBC AUTO: 51.1 FL (ref 37–54)
DIGOXIN SERPL-MCNC: <0.3 NG/ML (ref 0.6–1.2)
EOSINOPHIL # BLD AUTO: 0 10*3/MM3 (ref 0–0.4)
EOSINOPHIL NFR BLD AUTO: 0 % (ref 0.3–6.2)
ERYTHROCYTE [DISTWIDTH] IN BLOOD BY AUTOMATED COUNT: 17.2 % (ref 12.3–15.4)
GFR SERPL CREATININE-BSD FRML MDRD: 8 ML/MIN/1.73
GFR SERPL CREATININE-BSD FRML MDRD: ABNORMAL ML/MIN/{1.73_M2}
GLUCOSE BLDC GLUCOMTR-MCNC: 117 MG/DL (ref 70–130)
GLUCOSE BLDC GLUCOMTR-MCNC: 117 MG/DL (ref 70–130)
GLUCOSE BLDC GLUCOMTR-MCNC: 139 MG/DL (ref 70–130)
GLUCOSE BLDC GLUCOMTR-MCNC: 58 MG/DL (ref 70–130)
GLUCOSE SERPL-MCNC: 120 MG/DL (ref 65–99)
HCT VFR BLD AUTO: 26.3 % (ref 34–46.6)
HGB BLD-MCNC: 8 G/DL (ref 12–15.9)
IMM GRANULOCYTES # BLD AUTO: 0.04 10*3/MM3 (ref 0–0.05)
IMM GRANULOCYTES NFR BLD AUTO: 0.7 % (ref 0–0.5)
LYMPHOCYTES # BLD AUTO: 0.18 10*3/MM3 (ref 0.7–3.1)
LYMPHOCYTES NFR BLD AUTO: 3.3 % (ref 19.6–45.3)
MAGNESIUM SERPL-MCNC: 1.9 MG/DL (ref 1.6–2.4)
MCH RBC QN AUTO: 24.8 PG (ref 26.6–33)
MCHC RBC AUTO-ENTMCNC: 30.4 G/DL (ref 31.5–35.7)
MCV RBC AUTO: 81.7 FL (ref 79–97)
MONOCYTES # BLD AUTO: 0.46 10*3/MM3 (ref 0.1–0.9)
MONOCYTES NFR BLD AUTO: 8.5 % (ref 5–12)
NEUTROPHILS NFR BLD AUTO: 4.73 10*3/MM3 (ref 1.7–7)
NEUTROPHILS NFR BLD AUTO: 87.5 % (ref 42.7–76)
NRBC BLD AUTO-RTO: 0.2 /100 WBC (ref 0–0.2)
PLATELET # BLD AUTO: 217 10*3/MM3 (ref 140–450)
PMV BLD AUTO: 11 FL (ref 6–12)
POTASSIUM SERPL-SCNC: 5 MMOL/L (ref 3.5–5.2)
QT INTERVAL: 412 MS
RBC # BLD AUTO: 3.22 10*6/MM3 (ref 3.77–5.28)
SODIUM SERPL-SCNC: 132 MMOL/L (ref 136–145)
TROPONIN T SERPL-MCNC: 0.03 NG/ML (ref 0–0.03)
WBC # BLD AUTO: 5.41 10*3/MM3 (ref 3.4–10.8)

## 2021-07-13 PROCEDURE — 94799 UNLISTED PULMONARY SVC/PX: CPT

## 2021-07-13 PROCEDURE — 97110 THERAPEUTIC EXERCISES: CPT

## 2021-07-13 PROCEDURE — 97165 OT EVAL LOW COMPLEX 30 MIN: CPT

## 2021-07-13 PROCEDURE — 25010000002 HEPARIN (PORCINE) PER 1000 UNITS: Performed by: INTERNAL MEDICINE

## 2021-07-13 PROCEDURE — 25010000002 EPOETIN ALFA-EPBX 10000 UNIT/ML SOLUTION: Performed by: SURGERY

## 2021-07-13 PROCEDURE — 80162 ASSAY OF DIGOXIN TOTAL: CPT | Performed by: HOSPITALIST

## 2021-07-13 PROCEDURE — 80048 BASIC METABOLIC PNL TOTAL CA: CPT | Performed by: SURGERY

## 2021-07-13 PROCEDURE — 84484 ASSAY OF TROPONIN QUANT: CPT | Performed by: HOSPITALIST

## 2021-07-13 PROCEDURE — 85025 COMPLETE CBC W/AUTO DIFF WBC: CPT | Performed by: SURGERY

## 2021-07-13 PROCEDURE — 82962 GLUCOSE BLOOD TEST: CPT

## 2021-07-13 PROCEDURE — 97535 SELF CARE MNGMENT TRAINING: CPT

## 2021-07-13 PROCEDURE — 99024 POSTOP FOLLOW-UP VISIT: CPT | Performed by: SURGERY

## 2021-07-13 PROCEDURE — 83735 ASSAY OF MAGNESIUM: CPT | Performed by: HOSPITALIST

## 2021-07-13 RX ORDER — HEPARIN SODIUM 1000 [USP'U]/ML
4000 INJECTION, SOLUTION INTRAVENOUS; SUBCUTANEOUS AS NEEDED
Status: DISCONTINUED | OUTPATIENT
Start: 2021-07-13 | End: 2021-07-14

## 2021-07-13 RX ORDER — HEPARIN SODIUM 1000 [USP'U]/ML
4000 INJECTION, SOLUTION INTRAVENOUS; SUBCUTANEOUS ONCE
Status: DISCONTINUED | OUTPATIENT
Start: 2021-07-13 | End: 2021-07-14

## 2021-07-13 RX ADMIN — EPOETIN ALFA-EPBX 10000 UNITS: 10000 INJECTION, SOLUTION INTRAVENOUS; SUBCUTANEOUS at 10:22

## 2021-07-13 RX ADMIN — MONTELUKAST SODIUM 10 MG: 10 TABLET, FILM COATED ORAL at 20:06

## 2021-07-13 RX ADMIN — HEPARIN SODIUM 4000 UNITS: 1000 INJECTION INTRAVENOUS; SUBCUTANEOUS at 10:24

## 2021-07-13 RX ADMIN — BUDESONIDE AND FORMOTEROL FUMARATE DIHYDRATE 2 PUFF: 160; 4.5 AEROSOL RESPIRATORY (INHALATION) at 20:43

## 2021-07-13 RX ADMIN — PANTOPRAZOLE SODIUM 40 MG: 40 TABLET, DELAYED RELEASE ORAL at 06:36

## 2021-07-13 RX ADMIN — SODIUM CHLORIDE, PRESERVATIVE FREE 10 ML: 5 INJECTION INTRAVENOUS at 20:06

## 2021-07-13 RX ADMIN — BUDESONIDE AND FORMOTEROL FUMARATE DIHYDRATE 2 PUFF: 160; 4.5 AEROSOL RESPIRATORY (INHALATION) at 08:05

## 2021-07-13 RX ADMIN — OXYCODONE AND ACETAMINOPHEN 1 TABLET: 5; 325 TABLET ORAL at 20:06

## 2021-07-13 NOTE — PLAN OF CARE
Goal Outcome Evaluation:  Plan of Care Reviewed With: patient        Progress: improving  Outcome Summary: NAD NOTED. VSS. A/O X4. MMM. RR E/U. MIDLINE INCISION LOOKS GOOD, ZIGGY, NO REDNESS SWELLING OR DRAINAGE. REMAINS ON 2LPM VIA NASAL CANNULA. WILL CTM.

## 2021-07-13 NOTE — TELEPHONE ENCOUNTER
spoke with pt scheduled at [facility] onaug 9 arrive at 1240 pm pradeep harley---ade

## 2021-07-13 NOTE — CASE MANAGEMENT/SOCIAL WORK
Continued Stay Note  [facility]     Patient Name: Nellie Vegas  MRN: [id number]  Today's Date: 7/13/2021    Admit Date: 7/5/2021    Discharge Plan     Row Name 07/13/21 1506       Plan    Plan  Return home with family vs. SNF vs. HH    Plan Comments  Spoke with patient at bedside.  She was given list of SNF that do in house HD.  Also given list of HH agencies.  She is aware that PT is recommending SNF/HH.  She states she will think about it.  CCP will follow.  [place] RN Becky S. [place], RN

## 2021-07-13 NOTE — PLAN OF CARE
Goal Outcome Evaluation:           Progress: improving  Outcome Summary: Pt is a 75 yo female s/p hemicolectomy. Pt reports living with her daughter and being independent with adls and functional transfers with use of a rollator for longer distances. On this date, pt completed adls and functional transfers with supervision/SBA and appears to be near baseline level of function. Pt does not warrant the need for skilled OT services on this date. OT signing off.    Pt wore face mask. OT wore all PPE and hand hygiene completed before and after.

## 2021-07-13 NOTE — PROGRESS NOTES
Postoperative day 4 status post laparoscopic right hemicolectomy    S: No events overnight.  Feeling much better.  Passing gas and having bowel movements.  Tolerating full liquid diet    O:   Vitals:    07/13/21 0805 07/13/21 1030 07/13/21 1422 07/13/21 1500   BP:  139/68 121/45 125/58   BP Location:  Left arm Right arm Right arm   Patient Position:  Lying Lying Sitting   Pulse: 64 63 67 85   Resp: 16 18 16 20   Temp:  97.3 °F (36.3 °C) 97.7 °F (36.5 °C) 98.4 °F (36.9 °C)   TempSrc:  Temporal Temporal Oral   SpO2: 100%   100%   Weight:       Height:         Alert oriented x3, no acute  Abdomen soft, mildly distended, nontender  Incision clean dry and intact, no hernia    Hemoglobin 8 stable, other labs stable    Assessment and plan     status post laparoscopic right hemicolectomy, having bowel function    -Advance to regular diet  -Hopefully home tomorrow

## 2021-07-13 NOTE — PROGRESS NOTES
"   LOS: 8 days     Chief Complaint/ Reason for encounter: ESRD, dialysis management  Chief Complaint   Patient presents with   • Rectal Bleeding         Subjective    No complaints today, awaiting BM postop  Denies shortness of breath chest pain nausea vomiting or edema  Dialysis went well yesterday  Status post shunt thrombosis yesterday, due for dialysis today      Medical history reviewed:  Rectal Bleeding        Subjective    History taken from: Patient and chart    Vital Signs  Temp:  [96.6 °F (35.9 °C)-98.4 °F (36.9 °C)] 96.6 °F (35.9 °C)  Heart Rate:  [57-68] 64  Resp:  [14-20] 16  BP: (114-149)/(52-90) 116/52       Wt Readings from Last 1 Encounters:   07/13/21 0453 79.4 kg (175 lb 0.7 oz)   07/12/21 0517 78.6 kg (173 lb 4.8 oz)   07/11/21 0500 75 kg (165 lb 5.5 oz)   07/10/21 0500 75 kg (165 lb 5.5 oz)   07/09/21 0513 76.6 kg (168 lb 14.4 oz)   07/08/21 0456 78 kg (171 lb 14.4 oz)   07/07/21 0619 78.2 kg (172 lb 6.4 oz)   07/06/21 0926 76.6 kg (168 lb 14.4 oz)   07/05/21 2011 73.5 kg (162 lb 1.6 oz)   07/05/21 1546 74.5 kg (164 lb 3.9 oz)       Objective:  Vital signs: (most recent): Blood pressure 116/52, pulse 64, temperature 96.6 °F (35.9 °C), temperature source Oral, resp. rate 16, height 152.4 cm (60\"), weight 79.4 kg (175 lb 0.7 oz), SpO2 100 %, not currently breastfeeding.              Objective:  In OR today, not examined      Results Review:    Intake/Output:     Intake/Output Summary (Last 24 hours) at 7/13/2021 0959  Last data filed at 7/12/2021 1509  Gross per 24 hour   Intake 300 ml   Output --   Net 300 ml         DATA:  Radiology and Labs:  The following labs independently reviewed by me. Additional labs ordered for tomorrow a.m.  Interval notes, chart personally reviewed by me.   Old records independently reviewed showing ESRD on dialysis, recent problems with AV fistula cannulation  Discussed with patient    Risk/ complexity of medical care/ medical decision making moderate    Labs: "   Recent Results (from the past 24 hour(s))   POC Glucose Once    Collection Time: 07/12/21 10:53 AM    Specimen: Blood   Result Value Ref Range    Glucose 61 (L) 70 - 130 mg/dL   POC Glucose Once    Collection Time: 07/12/21 11:35 AM    Specimen: Blood   Result Value Ref Range    Glucose 28 (C) 70 - 130 mg/dL   POC Glucose Once    Collection Time: 07/12/21 11:39 AM    Specimen: Blood   Result Value Ref Range    Glucose 33 (C) 70 - 130 mg/dL   POC Glucose Once    Collection Time: 07/12/21 11:52 AM    Specimen: Blood   Result Value Ref Range    Glucose 103 70 - 130 mg/dL   Basic Metabolic Panel    Collection Time: 07/12/21 11:53 AM    Specimen: Hand, Left; Blood   Result Value Ref Range    Glucose 114 (H) 65 - 99 mg/dL    BUN 15 8 - 23 mg/dL    Creatinine 5.28 (H) 0.57 - 1.00 mg/dL    Sodium 131 (L) 136 - 145 mmol/L    Potassium 4.2 3.5 - 5.2 mmol/L    Chloride 99 98 - 107 mmol/L    CO2 20.2 (L) 22.0 - 29.0 mmol/L    Calcium 6.6 (L) 8.6 - 10.5 mg/dL    eGFR  African Amer 10 (L) >60 mL/min/1.73    eGFR Non African Amer      BUN/Creatinine Ratio 2.8 (L) 7.0 - 25.0    Anion Gap 11.8 5.0 - 15.0 mmol/L   POC Glucose Once    Collection Time: 07/12/21 12:33 PM    Specimen: Blood   Result Value Ref Range    Glucose 89 70 - 130 mg/dL   POC Glucose Once    Collection Time: 07/12/21  1:04 PM    Specimen: Blood   Result Value Ref Range    Glucose 59 (L) 70 - 130 mg/dL   POC Glucose Once    Collection Time: 07/12/21  1:26 PM    Specimen: Blood   Result Value Ref Range    Glucose 118 70 - 130 mg/dL   POC Glucose Once    Collection Time: 07/12/21  3:22 PM    Specimen: Blood   Result Value Ref Range    Glucose 75 70 - 130 mg/dL   POC Glucose Once    Collection Time: 07/12/21  5:01 PM    Specimen: Blood   Result Value Ref Range    Glucose 87 70 - 130 mg/dL   POC Glucose Once    Collection Time: 07/12/21  8:20 PM    Specimen: Blood   Result Value Ref Range    Glucose 184 (H) 70 - 130 mg/dL   ECG 12 Lead    Collection Time: 07/12/21   9:20 PM   Result Value Ref Range    QT Interval 412 ms   Magnesium    Collection Time: 07/13/21  4:41 AM    Specimen: Blood   Result Value Ref Range    Magnesium 1.9 1.6 - 2.4 mg/dL   Troponin    Collection Time: 07/13/21  4:41 AM    Specimen: Blood   Result Value Ref Range    Troponin T 0.028 0.000 - 0.030 ng/mL   Digoxin Level    Collection Time: 07/13/21  4:41 AM    Specimen: Blood   Result Value Ref Range    Digoxin <0.30 (L) 0.60 - 1.20 ng/mL   Basic Metabolic Panel    Collection Time: 07/13/21  4:41 AM    Specimen: Blood   Result Value Ref Range    Glucose 120 (H) 65 - 99 mg/dL    BUN 21 8 - 23 mg/dL    Creatinine 6.37 (H) 0.57 - 1.00 mg/dL    Sodium 132 (L) 136 - 145 mmol/L    Potassium 5.0 3.5 - 5.2 mmol/L    Chloride 91 (L) 98 - 107 mmol/L    CO2 19.7 (L) 22.0 - 29.0 mmol/L    Calcium 7.6 (L) 8.6 - 10.5 mg/dL    eGFR  African Amer 8 (L) >60 mL/min/1.73    eGFR Non African Amer      BUN/Creatinine Ratio 3.3 (L) 7.0 - 25.0    Anion Gap 21.3 (H) 5.0 - 15.0 mmol/L   CBC Auto Differential    Collection Time: 07/13/21  4:41 AM    Specimen: Blood   Result Value Ref Range    WBC 5.41 3.40 - 10.80 10*3/mm3    RBC 3.22 (L) 3.77 - 5.28 10*6/mm3    Hemoglobin 8.0 (L) 12.0 - 15.9 g/dL    Hematocrit 26.3 (L) 34.0 - 46.6 %    MCV 81.7 79.0 - 97.0 fL    MCH 24.8 (L) 26.6 - 33.0 pg    MCHC 30.4 (L) 31.5 - 35.7 g/dL    RDW 17.2 (H) 12.3 - 15.4 %    RDW-SD 51.1 37.0 - 54.0 fl    MPV 11.0 6.0 - 12.0 fL    Platelets 217 140 - 450 10*3/mm3    Neutrophil % 87.5 (H) 42.7 - 76.0 %    Lymphocyte % 3.3 (L) 19.6 - 45.3 %    Monocyte % 8.5 5.0 - 12.0 %    Eosinophil % 0.0 (L) 0.3 - 6.2 %    Basophil % 0.0 0.0 - 1.5 %    Immature Grans % 0.7 (H) 0.0 - 0.5 %    Neutrophils, Absolute 4.73 1.70 - 7.00 10*3/mm3    Lymphocytes, Absolute 0.18 (L) 0.70 - 3.10 10*3/mm3    Monocytes, Absolute 0.46 0.10 - 0.90 10*3/mm3    Eosinophils, Absolute 0.00 0.00 - 0.40 10*3/mm3    Basophils, Absolute 0.00 0.00 - 0.20 10*3/mm3    Immature Grans, Absolute  0.04 0.00 - 0.05 10*3/mm3    nRBC 0.2 0.0 - 0.2 /100 WBC   POC Glucose Once    Collection Time: 07/13/21  6:29 AM    Specimen: Blood   Result Value Ref Range    Glucose 117 70 - 130 mg/dL       Radiology:  Imaging Results (Last 24 Hours)     Procedure Component Value Units Date/Time    Arteriogram (Autofinalize) [[id number]] Resulted: 07/12/21 1518     Updated: 07/12/21 1518    Narrative:      This procedure was auto-finalized with no dictation required.             Medications have been reviewed:  Current Facility-Administered Medications   Medication Dose Route Frequency Provider Last Rate Last Admin   • albuterol (PROVENTIL) nebulizer solution 0.083% 2.5 mg/3mL  2.5 mg Nebulization TID PRN Shahram Gallagher MD       • budesonide-formoterol (SYMBICORT) 160-4.5 MCG/ACT inhaler 2 puff  2 puff Inhalation BID - RT Shahram Gallagher MD   2 puff at 07/13/21 0805   • dextrose (D50W) 25 g/ 50mL Intravenous Solution 25 g  25 g Intravenous Q15 Min PRN Shahram Gallagher MD   25 g at 07/12/21 1311   • dextrose (GLUTOSE) oral gel 15 g  15 g Oral Q15 Min PRN Shahram Gallagher MD       • epoetin polina-epbx (RETACRIT) injection 10,000 Units  10,000 Units Intravenous Once per day on Tue Thu Sat Shahram Gallagher MD   10,000 Units at 07/10/21 1856   • glucagon (human recombinant) (GLUCAGEN DIAGNOSTIC) injection 1 mg  1 mg Subcutaneous Q15 Min PRN Shahram Gallagher MD       • heparin (porcine) injection 4,000 Units  4,000 Units Intracatheter PRN Chester Kang MD       • heparin (porcine) injection 4,000 Units  4,000 Units Intravenous Once Chester Kang MD       • insulin lispro (ADMELOG) injection 0-7 Units  0-7 Units Subcutaneous TID AC Shahram Gallagher MD   2 Units at 07/10/21 0918   • metoprolol succinate XL (TOPROL-XL) 24 hr tablet 25 mg  25 mg Oral Daily Shahram Gallagher MD   Stopped at 07/13/21 3362   • montelukast (SINGULAIR) tablet 10 mg  10 mg Oral Nightly Elliott,  Shahram Han MD   10 mg at 07/12/21 2024   • nitroglycerin (NITROSTAT) SL tablet 0.4 mg  0.4 mg Sublingual Q5 Min PRN Haja Chowdhury MD       • oxyCODONE-acetaminophen (PERCOCET) 5-325 MG per tablet 1 tablet  1 tablet Oral Q4H PRN Shahram Gallagher MD   1 tablet at 07/11/21 1559   • pantoprazole (PROTONIX) EC tablet 40 mg  40 mg Oral QAM AC Shahram Gallagher MD   40 mg at 07/13/21 0636   • sodium chloride 0.9 % flush 10 mL  10 mL Intravenous PRN Shahram Gallagher MD   10 mL at 07/10/21 2112       ASSESSMENT:  ESRD  Cecal mass status post hemicolectomy, benign pathology  Lower GI bleeding from cecal mass, pathology shows tubular adenoma  Tubular adenoma of the colon  Hypertension well controlled  Anemia of CKD, stable on Epogen  COPD  AV fistula dysfunction, 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 resume heparin with dialysis  Status post shuntogram in OR yesterday  2K bath with HD  Epogen with HD for anemia, UF as tolerated with HD  Continue current BP regimen   monitor electrolytes and volume closely but no need for daily labs at this point       Chester Kang MD   [facility]   Office phone number: [phone]  Answering service phone number: [phone]    07/13/21  09:59 EDT    Dictation performed using Dragon dictation software

## 2021-07-13 NOTE — PLAN OF CARE
Goal Outcome Evaluation:  Plan of Care Reviewed With: patient        Progress: improving  Outcome Summary: Metoprolol on hold due to HD appointment. Pt went to HD today. Pt tolerated HD without complication. Pt's diet was advanced to regular and has tolerated so far. Encouraged to get to chair. VSS. Will continue to monitor.

## 2021-07-13 NOTE — PROGRESS NOTES
Name: Nellie Vegas ADMIT: 2021   : 1945  PCP: Laurent Cortes DO    MRN: [id number] LOS: 8 days   AGE/SEX: 76 y.o. female  ROOM: [address]    [place], [facility] [employer]    76 y.o. female with end-stage renal disease requiring hemodialysis.  Developed shunt thrombosis yesterday and underwent open shunt thrombectomy with shuntogram.  Comfortable overnight.  Feeling better post colectomy.  No right upper extremity symptoms.    Scheduled Medications:   budesonide-formoterol, 2 puff, Inhalation, BID - RT  epoetin polina/polina-epbx, 10,000 Units, Intravenous, Once per day on Tue Thu Sat  insulin lispro, 0-7 Units, Subcutaneous, TID AC  metoprolol succinate XL, 25 mg, Oral, Daily  montelukast, 10 mg, Oral, Nightly  pantoprazole, 40 mg, Oral, QAM AC    Vital Signs  Vital Signs Patient Vitals for the past 24 hrs:   BP Temp Temp src Pulse Resp SpO2 Weight   21 0805 -- -- -- 64 16 100 % --   21 0723 116/52 96.6 °F (35.9 °C) Oral 67 16 100 % --   21 0453 -- -- -- -- -- -- 79.4 kg (175 lb 0.7 oz)   21 0044 132/66 98.1 °F (36.7 °C) Oral 62 18 99 % --   21 114/75 98.4 °F (36.9 °C) Oral 68 18 100 % --   21 -- -- -- -- -- 90 % --   21 1654 128/62 97.5 °F (36.4 °C) Oral 67 20 99 % --   21 1615 128/76 98.3 °F (36.8 °C) Oral 62 16 97 % --   21 1600 125/90 -- -- 63 16 98 % --   21 1530 128/65 -- -- 57 14 100 % --   21 1525 119/62 -- -- 59 14 100 % --   21 1520 123/60 -- -- 66 14 98 % --   21 1515 118/78 -- -- 63 14 99 % --   21 1513 123/69 97.6 °F (36.4 °C) Oral 62 16 100 % --   21 1042 149/63 98.3 °F (36.8 °C) Oral 63 18 99 % --   21 1001 -- -- -- 57 -- -- --   21 0908 -- -- -- 75 18 93 % --     Physical Exam:    Right arm incision clean and dry.  No hematoma.  Soft thrill good bruit and access.  No upper extremity swelling    CBC    Results from last 7 days   Lab Units 21  5078  07/12/21  0527 07/11/21  0600 07/10/21  0948 07/09/21  0651 07/08/21 0447 07/07/21  2208 07/07/21  0442   WBC 10*3/mm3 5.41 5.25 6.28 11.78* 3.74 5.06  --  5.70   HEMOGLOBIN g/dL 8.0* 7.9* 7.7* 7.9* 8.0* 7.9* 8.3* 6.6*   PLATELETS 10*3/mm3 217 211 189 182 172 188  --  198     BMP   Results from last 7 days   Lab Units 07/13/21  0441 07/12/21  1153 07/12/21  0527 07/11/21  0600 07/10/21  0825 07/09/21  0651 07/08/21 0447   SODIUM mmol/L 132* 131* 131* 134* 133* 138 134*   POTASSIUM mmol/L 5.0 4.2 3.8 3.7 6.7* 3.6 3.4*   CHLORIDE mmol/L 91* 99 95* 97* 98 99 95*   CO2 mmol/L 19.7* 20.2* 22.7 26.9 19.2* 23.5 20.5*   BUN mg/dL 21 15 15 9 21 12 23   CREATININE mg/dL 6.37* 5.28* 4.98* 3.63* 6.67* 4.54* 5.81*   GLUCOSE mg/dL 120* 114* 88 81 100* 80 62*   MAGNESIUM mg/dL 1.9  --  1.8  --   --   --   --    PHOSPHORUS mg/dL  --   --   --   --  7.1*  --   --      Assessment/Plan   Assessment & Plan    ESRD (end stage renal disease) on dialysis (CMS/[facility])    Problem with dialysis access (CMS/[facility])    Lower GI bleeding    Colonic mass    76 y.o. female with right axilloaxillary loop dialysis shunt thrombosis, doing well postop day 1 from shunt thrombectomy with shuntogram.  No residual problems identified with shunt.  For hemodialysis today.  We will see again this admission on request.    Personal protective equipment used for this patient encounter:  Patient wearing surgical mask []    Provider wearing a surgical mask [x]    Gloves []    Eye protection []    Face Shield []    Gown []    N 95 respirator or CAPR/PAPR []   Duration of interaction about 3 minutes    Shahram Gallagher MD  07/13/21  09:03 EDT    Please call my office with any question: [phone]    Active Hospital Problems    Diagnosis  POA   • **ESRD (end stage renal disease) on dialysis (CMS/[facility]) [N18.6, Z99.2]  Not Applicable   • Colonic mass [K63.89]  Unknown   • Lower GI bleeding [K92.2]  Yes   • Problem with dialysis access (CMS/[facility]) [T82.548A]  Yes       Resolved Hospital Problems   No resolved problems to display.

## 2021-07-13 NOTE — PLAN OF CARE
Goal Outcome Evaluation:           Progress: (P) improving  Outcome Summary: (P) Pt is a 76 y.o. female admitted to the hospital for ESRN and dialysis. Upon PT tx, pt was seated in chair, alert, and oriented. Pt was supervision with sit-stand and CGA with ambulation. Following PT tx, pt was left in the bed and with the dialysis transport team. Pt would benefit from skilled PT and is still recommended to go home with HHPT following discharge from the hospital.

## 2021-07-13 NOTE — THERAPY TREATMENT NOTE
Patient Name: Nellie Vegas  : 1945    MRN: [id number]                              Today's Date: 2021       Admit Date: 2021    Visit Dx:     ICD-10-CM ICD-9-CM   1. Lower GI bleeding  K92.2 578.9   2. Acute blood loss anemia  D62 285.1   3. ESRD (end stage renal disease) on dialysis (CMS/[facility])  N18.6 585.6    Z99.2 V45.11   4. Problem with dialysis access, subsequent encounter  T82.898D V58.89     996.73     Patient Active Problem List   Diagnosis   • ESRD (end stage renal disease) on dialysis (CMS/[facility])   • Thrombosis of kidney dialysis arteriovenous graft (CMS/[facility])   • Anemia of chronic renal failure, stage 5 (CMS/[facility])   • COPD exacerbation (CMS/[facility])   • Problem with dialysis access (CMS/[facility])   • Lower GI bleeding   • Colonic mass     Past Medical History:   Diagnosis Date   • Anemia    • Anesthesia complication     mother woke up in combative state   • Arthritis     knees   • Asthma    • COPD (chronic obstructive pulmonary disease) (CMS/[facility])    • Diabetes mellitus (CMS/[facility])     type 2   • Dialysis patient (CMS/[facility])    • Disease of thyroid gland    • GERD (gastroesophageal reflux disease)    • Headache     after dialysis   • History of blood clots     BUE   • History of kidney stones    • Hyperlipidemia    • Hypertension    • Knee pain, bilateral    • Renal disease    • Sleep apnea     CPAP   • SOB (shortness of breath)    • Thrombosis of renal dialysis arteriovenous graft (CMS/[facility])    • Weakness      Past Surgical History:   Procedure Laterality Date   • ARTERIOVENOUS FISTULA Right    • ARTERIOVENOUS FISTULA Left     REMOVED   • CENTRAL VENOUS CATHETER TUNNELED INSERTION DOUBLE LUMEN     •  SECTION     • COLON RESECTION Right 2021    Procedure: RIGHT HEMICOLECTOMY LAPAROSCOPIC;  Surgeon: Zbigniew Conner MD;  Location: [facility];  Service: General;  Laterality: Right;   • COLONOSCOPY     • COLONOSCOPY N/A 2021    Procedure: COLONOSCOPY into cecum with biopsy;   Surgeon: Fabricio Monroe MD;  Location: [facility] ENDOSCOPY;  Service: Gastroenterology;  Laterality: N/A;  cecal mass   • POLYPECTOMY      UTERINE   • SHUNT O GRAM Right 8/9/2019    Procedure: RIGHT ARM DIALYSIS GRAFT revision and THROMBECTOMY;  Surgeon: Thierry Hardy MD;  Location: [facility] OR 18/19;  Service: Vascular   • SHUNT O GRAM Right 8/22/2019    Procedure: RIGHT ARM DIALYSIS GRAFT THROMBECTOMY WITH STENTING;  Surgeon: Thierry Hardy MD;  Location: [facility] OR 18/19;  Service: Vascular   • SHUNT O GRAM Right 12/7/2020    Procedure: RIGHT ARM ARTERIOVENOUS SHUNTOGRAM WITH THROMBECTOMY;  Surgeon: Thierry Hardy MD;  Location: [facility] OR 18/19;  Service: Vascular;  Laterality: Right;   • SHUNT O GRAM Right 7/12/2021    Procedure: Right arm dialysis shuntogram with shunt thrombectomy;  Surgeon: Shahram Gallagher MD;  Location: [facility] OR 18/19;  Service: Vascular;  Laterality: Right;     General Information     Row Name 07/13/21 1026          Physical Therapy Time and Intention    Document Type  therapy note (daily note)  (Pended)   -ME     Mode of Treatment  individual therapy;physical therapy  (Pended)   -ME     Row Name 07/13/21 1026          General Information    Patient Profile Reviewed  yes  (Pended)   -ME     Prior Level of Function  independent:  (Pended)   -ME     Existing Precautions/Restrictions  fall  (Pended)   -ME     Row Name 07/13/21 1026          Living Environment    Lives With  child(paco), adult  (Pended)   -ME     Row Name 07/13/21 1026          Home Main Entrance    Number of Stairs, Main Entrance  none  (Pended)   -ME     Row Name 07/13/21 1026          Cognition    Orientation Status (Cognition)  oriented x 4  (Pended)   -ME       User Key  (r) = Recorded By, (t) = Taken By, (c) = Cosigned By    Initials Name Provider Type    ME Karson Pritchett, PT Student PT Student        Mobility     Row Name 07/13/21 1027          Bed Mobility    Comment (Bed  Mobility)  Up in chair  (Pended)   -ME     Row Name 07/13/21 1027          Sit-Stand Transfer    Sit-Stand [place] (Transfers)  supervision  (Pended)   -ME     Assistive Device (Sit-Stand Transfers)  walker, front-wheeled  (Pended)   -ME     Row Name 07/13/21 1027          Gait/Stairs (Locomotion)    [place] Level (Gait)  contact guard  (Pended)   -ME     Assistive Device (Gait)  walker, front-wheeled  (Pended)   -ME     Distance in Feet (Gait)  100  (Pended)   -ME     Deviations/Abnormal Patterns (Gait)  base of support, narrow;gait speed decreased  (Pended)   -ME     Bilateral Gait Deviations  forward flexed posture  (Pended)   -ME       User Key  (r) = Recorded By, (t) = Taken By, (c) = Cosigned By    Initials Name Provider Type    ME Karson Pritchett, PT Student PT Student        Obj/Interventions     Row Name 07/13/21 1029          Range of Motion Comprehensive    General Range of Motion  bilateral lower extremity ROM WFL  (Pended)   -ME     Row Name 07/13/21 1029          Strength Comprehensive (MMT)    General Manual Muscle Testing (MMT) Assessment  lower extremity strength deficits identified  (Pended)   -ME     Comment, General Manual Muscle Testing (MMT) Assessment  Pt grossly 3/5 B  (Pended)   -ME       User Key  (r) = Recorded By, (t) = Taken By, (c) = Cosigned By    Initials Name Provider Type    ME Karson Pritchett, PT Student PT Student        Goals/Plan     Row Name 07/13/21 1106          Bed Mobility Goal 1 (PT)    Activity/Assistive Device (Bed Mobility Goal 1, PT)  --  (Pended)   -ME     [place] Level/Cues Needed (Bed Mobility Goal 1, PT)  --  (Pended)   -ME     Time Frame (Bed Mobility Goal 1, PT)  --  (Pended)   -ME     Row Name 07/13/21 1106          Transfer Goal 1 (PT)    Activity/Assistive Device (Transfer Goal 1, PT)  --  (Pended)   -ME     [place] Level/Cues Needed (Transfer Goal 1, PT)  --  (Pended)   -ME     Time Frame (Transfer Goal 1, PT)  --  (Pended)   -[facility]  Name 07/13/21 1106          Gait Training Goal 1 (PT)    Activity/Assistive Device (Gait Training Goal 1, PT)  --  (Pended)   -ME     [place] Level (Gait Training Goal 1, PT)  --  (Pended)   -ME     Distance (Gait Training Goal 1, PT)  --  (Pended)   -ME     Time Frame (Gait Training Goal 1, PT)  --  (Pended)   -ME       User Key  (r) = Recorded By, (t) = Taken By, (c) = Cosigned By    Initials Name Provider Type    ME Karson Pritchett, PT Student PT Student        Clinical Impression     Row Name 07/13/21 1030          Pain    Additional Documentation  Pain Scale: FACES Pre/Post-Treatment (Group)  (Pended)   -ME     Row Name 07/13/21 1030          Pain Scale: FACES Pre/Post-Treatment    Pain: FACES Scale, Pretreatment  0-->no hurt  (Pended)   -ME     Posttreatment Pain Rating  0-->no hurt  (Pended)   -ME     Row Name 07/13/21 1030          Plan of Care Review    Progress  improving  (Pended)   -ME     Outcome Summary  Pt is a 76 y.o. female admitted to the hospital for ESRN and dialysis. Upon PT tx, pt was seated in chair, alert, and oriented. Pt was supervision with sit-stand and CGA with ambulation. Following PT tx, pt was left in the bed and with the dialysis transport team. Pt would benefit from skilled PT and is still recommended to go home with HHPT following discharge from the hospital.  (Pended)   -ME     Row Name 07/13/21 1030          Therapy Assessment/Plan (PT)    Rehab Potential (PT)  --  (Pended)   -ME     Criteria for Skilled Interventions Met (PT)  --  (Pended)   -[facility] Name 07/13/21 1030          Positioning and Restraints    Pre-Treatment Position  sitting in chair/recliner  (Pended)   -ME     Post Treatment Position  bed  (Pended)   -ME     In Bed  --  (Pended)  Left pt with dialysis transport team  -ME       User Key  (r) = Recorded By, (t) = Taken By, (c) = Cosigned By    Initials Name Provider Type    Karson [place], PT Student PT Student        Outcome Measures     Row Name  07/13/21 1107          How much help from another person do you currently need...    Turning from your back to your side while in flat bed without using bedrails?  4  (Pended)   -ME     Moving from lying on back to sitting on the side of a flat bed without bedrails?  4  (Pended)   -ME     Moving to and from a bed to a chair (including a wheelchair)?  3  (Pended)   -ME     Standing up from a chair using your arms (e.g., wheelchair, bedside chair)?  3  (Pended)   -ME     Climbing 3-5 steps with a railing?  2  (Pended)   -ME     To walk in hospital room?  3  (Pended)   -ME     AM-PAC 6 Clicks Score (PT)  19  -LS (r) ME (t)     Row Name 07/13/21 1107 07/13/21 1055       Functional Assessment    Outcome Measure Options  AM-PAC 6 Clicks Basic Mobility (PT)  (Pended)   -ME  AM-PAC 6 Clicks Daily Activity (OT)  -BT      User Key  (r) = Recorded By, (t) = Taken By, (c) = Cosigned By    Initials Name Provider Type     Blanche Ballesteros, RN Registered Nurse    BT Sanaz England, OT Occupational Therapist    ME Karson Pritchett, PT Student PT Student        Physical Therapy Education                 Title: PT OT SLP Therapies (Done)     Topic: Physical Therapy (Done)     Point: Mobility training (Done)     Learning Progress Summary           Patient Acceptance, E, VU,NR by  at 7/11/2021 1423                   Point: Home exercise program (Done)     Learning Progress Summary           Patient Acceptance, E, VU,NR by  at 7/11/2021 1423                   Point: Body mechanics (Done)     Learning Progress Summary           Patient Acceptance, E, VU,NR by  at 7/11/2021 1423                   Point: Precautions (Done)     Learning Progress Summary           Patient Acceptance, E, VU by ME at 7/13/2021 1108    Acceptance, E, VU,NR by  at 7/11/2021 1423                               User Key     Initials Effective Dates Name Provider Type [facility] 06/16/21 -  Polly Patel, PT Physical Therapist PT    ME 07/01/21  -  Karson Pritchett, PT Student PT Student PT              PT Recommendation and Plan     Progress: (P) improving  Outcome Summary: (P) Pt is a 76 y.o. female admitted to the hospital for ESRN and dialysis. Upon PT tx, pt was seated in chair, alert, and oriented. Pt was supervision with sit-stand and CGA with ambulation. Following PT tx, pt was left in the bed and with the dialysis transport team. Pt would benefit from skilled PT and is still recommended to go home with HHPT following discharge from the hospital.     Time Calculation:   PT Charges     Row Name 07/13/21 1025             Time Calculation    Start Time  1004  (Pended)   -ME      Stop Time  1014  (Pended)   -ME      Time Calculation (min)  10 min  (Pended)   -ME      PT Received On  07/13/21  (Pended)   -ME      PT - Next Appointment  07/14/21  (Pended)   -ME      PT Goal Re-Cert Due Date  07/20/21  (Pended)   -ME        User Key  (r) = Recorded By, (t) = Taken By, (c) = Cosigned By    Initials Name Provider Type    ME Karson Pritchett, PT Student PT Student        Therapy Charges for Today     Code Description Service Date Service Provider Modifiers Qty    70293742758 HC PT THER PROC EA 15 MIN 7/13/2021 Karson Pritchett, PT Student GP 1          PT G-Codes  Outcome Measure Options: (P) AM-PAC 6 Clicks Basic Mobility (PT)  AM-PAC 6 Clicks Score (PT): 19  AM-PAC 6 Clicks Score (OT): 24    Karson Pritchett PT Student  7/13/2021

## 2021-07-13 NOTE — THERAPY EVALUATION
Patient Name: Nellie Vegas  : 1945    MRN: [id number]                              Today's Date: 2021       Admit Date: 2021    Visit Dx:     ICD-10-CM ICD-9-CM   1. Lower GI bleeding  K92.2 578.9   2. Acute blood loss anemia  D62 285.1   3. ESRD (end stage renal disease) on dialysis (CMS/[facility])  N18.6 585.6    Z99.2 V45.11   4. Problem with dialysis access, subsequent encounter  T82.898D V58.89     996.73     Patient Active Problem List   Diagnosis   • ESRD (end stage renal disease) on dialysis (CMS/[facility])   • Thrombosis of kidney dialysis arteriovenous graft (CMS/[facility])   • Anemia of chronic renal failure, stage 5 (CMS/[facility])   • COPD exacerbation (CMS/[facility])   • Problem with dialysis access (CMS/[facility])   • Lower GI bleeding   • Colonic mass     Past Medical History:   Diagnosis Date   • Anemia    • Anesthesia complication     mother woke up in combative state   • Arthritis     knees   • Asthma    • COPD (chronic obstructive pulmonary disease) (CMS/[facility])    • Diabetes mellitus (CMS/[facility])     type 2   • Dialysis patient (CMS/[facility])    • Disease of thyroid gland    • GERD (gastroesophageal reflux disease)    • Headache     after dialysis   • History of blood clots     BUE   • History of kidney stones    • Hyperlipidemia    • Hypertension    • Knee pain, bilateral    • Renal disease    • Sleep apnea     CPAP   • SOB (shortness of breath)    • Thrombosis of renal dialysis arteriovenous graft (CMS/[facility])    • Weakness      Past Surgical History:   Procedure Laterality Date   • ARTERIOVENOUS FISTULA Right    • ARTERIOVENOUS FISTULA Left     REMOVED   • CENTRAL VENOUS CATHETER TUNNELED INSERTION DOUBLE LUMEN     •  SECTION     • COLON RESECTION Right 2021    Procedure: RIGHT HEMICOLECTOMY LAPAROSCOPIC;  Surgeon: Zbigniew Conner MD;  Location: [facility];  Service: General;  Laterality: Right;   • COLONOSCOPY     • COLONOSCOPY N/A 2021    Procedure: COLONOSCOPY into cecum with biopsy;   Surgeon: Fabricio Monroe MD;  Location: [facility] ENDOSCOPY;  Service: Gastroenterology;  Laterality: N/A;  cecal mass   • POLYPECTOMY      UTERINE   • SHUNT O GRAM Right 8/9/2019    Procedure: RIGHT ARM DIALYSIS GRAFT revision and THROMBECTOMY;  Surgeon: Thierry Hardy MD;  Location: [facility] OR 18/19;  Service: Vascular   • SHUNT O GRAM Right 8/22/2019    Procedure: RIGHT ARM DIALYSIS GRAFT THROMBECTOMY WITH STENTING;  Surgeon: Thierry Hardy MD;  Location: [facility] OR 18/19;  Service: Vascular   • SHUNT O GRAM Right 12/7/2020    Procedure: RIGHT ARM ARTERIOVENOUS SHUNTOGRAM WITH THROMBECTOMY;  Surgeon: Thierry Hardy MD;  Location: [facility] OR 18/19;  Service: Vascular;  Laterality: Right;     General Information     Row Name 07/13/21 1048          OT Time and Intention    Document Type  discharge evaluation/summary  -BT     Mode of Treatment  individual therapy;occupational therapy  -BT     Row Name 07/13/21 1048          General Information    Patient Profile Reviewed  yes  -BT     Prior Level of Function  independent:;ADL's;transfer with rollator for longer distances  -BT     Barriers to Rehab  none identified  -BT     Row Name 07/13/21 1048          Living Environment    Lives With  child(paco), adult  -BT     Row Name 07/13/21 1048          Cognition    Orientation Status (Cognition)  oriented x 4  -BT       User Key  (r) = Recorded By, (t) = Taken By, (c) = Cosigned By    Initials Name Provider Type    BT Sanaz England, CATIA Occupational Therapist          Mobility/ADL's     Row Name 07/13/21 1049          Bed Mobility    Comment (Bed Mobility)  up in chair  -BT     Row Name 07/13/21 1049          Transfers    Transfers  sit-stand transfer  -BT     Sit-Stand [place] (Transfers)  supervision;standby assist  -BT     Row Name 07/13/21 1049          Sit-Stand Transfer    Assistive Device (Sit-Stand Transfers)  -- no AD  -BT     Row Name 07/13/21 1049          Activities of Daily  Living    BADL Assessment/Intervention  toileting;grooming;lower body dressing  -BT     [facility] Name 07/13/21 1049          Toileting Assessment/Training    [place] Level (Toileting)  toileting skills;supervision  -BT     Position (Toileting)  unsupported sitting;unsupported standing  -BT     [facility] Name 07/13/21 1049          Grooming Assessment/Training    [place] Level (Grooming)  grooming skills;supervision  -BT     Position (Grooming)  sink side;unsupported standing  -BT     Row Name 07/13/21 1049          Lower Body Dressing Assessment/Training    [place] Level (Lower Body Dressing)  lower body dressing skills;doff;don;socks;supervision  -BT     Position (Lower Body Dressing)  supported sitting  -BT       User Key  (r) = Recorded By, (t) = Taken By, (c) = Cosigned By    Initials Name Provider Type    BT Sanaz England OT Occupational Therapist        Obj/Interventions     Row Name 07/13/21 1051          Range of Motion Comprehensive    General Range of Motion  no range of motion deficits identified  -BT     Row Name 07/13/21 1051          Strength Comprehensive (MMT)    General Manual Muscle Testing (MMT) Assessment  no strength deficits identified  -BT     Row Name 07/13/21 1051          Balance    Balance Assessment  sitting static balance;sitting dynamic balance;standing static balance;standing dynamic balance  -BT     Static Sitting Balance  WNL  -BT     Dynamic Sitting Balance  WNL  -BT     Static Standing Balance  WFL  -BT     Dynamic Standing Balance  WFL  -BT     Balance Interventions  sitting;standing;static;dynamic;occupation based/functional task  -BT       User Key  (r) = Recorded By, (t) = Taken By, (c) = Cosigned By    Initials Name Provider Type    BT Sanaz England OT Occupational Therapist        Goals/Plan     Row Name 07/13/21 1055          Toileting Goal 1 (OT)    Activity/Device (Toileting Goal 1, OT)  toileting skills, all  -BT     [place] Level/Cues Needed (Toileting Goal 1,  OT)  supervision required  -BT     Time Frame (Toileting Goal 1, OT)  short term goal (STG);1 day  -BT     Progress/Outcome (Toileting Goal 1, OT)  goal met  -BT       User Key  (r) = Recorded By, (t) = Taken By, (c) = Cosigned By    Initials Name Provider Type    BT Sanaz England OT Occupational Therapist        Clinical Impression     Row Name 07/13/21 1051          Pain Assessment    Additional Documentation  Pain Scale: Numbers Pre/Post-Treatment (Group)  -BT     Row Name 07/13/21 1051          Pain Scale: Numbers Pre/Post-Treatment    Pretreatment Pain Rating  0/10 - no pain  -BT     Posttreatment Pain Rating  0/10 - no pain  -BT     [facility] Name 07/13/21 1051          Plan of Care Review    Progress  improving  -BT     Outcome Summary  Pt is a 75 yo female s/p hemicolectomy. Pt reports living with her daughter and being independent with adls and functional transfers with use of a rollator for longer distances. On this date, pt completed adls and functional transfers with supervision/SBA and appears to be near baseline level of function. Pt does not warrant the need for skilled OT services on this date. OT signing off.  -BT     Row Name 07/13/21 1051          Therapy Assessment/Plan (OT)    Criteria for Skilled Therapeutic Interventions Met (OT)  no  -BT     Therapy Frequency (OT)  evaluation only  -BT     Row Name 07/13/21 1051          Therapy Plan Review/Discharge Plan (OT)    Anticipated Discharge Disposition (OT)  home with assist  -BT     [facility] Name 07/13/21 1051          Positioning and Restraints    Pre-Treatment Position  sitting in chair/recliner  -BT     Post Treatment Position  chair  -BT     In Chair  notified nsg;sitting;call light within reach;encouraged to call for assist  -BT       User Key  (r) = Recorded By, (t) = Taken By, (c) = Cosigned By    Initials Name Provider Type    BT Sanaz England OT Occupational Therapist        Outcome Measures     Row Name 07/13/21 1055          How much help from  another is currently needed...    Putting on and taking off regular lower body clothing?  4  -BT     Bathing (including washing, rinsing, and drying)  4  -BT     Toileting (which includes using toilet bed pan or urinal)  4  -BT     Putting on and taking off regular upper body clothing  4  -BT     Taking care of personal grooming (such as brushing teeth)  4  -BT     Eating meals  4  -BT     AM-PAC 6 Clicks Score (OT)  24  -BT     Row Name 07/13/21 1055          Functional Assessment    Outcome Measure Options  AM-PAC 6 Clicks Daily Activity (OT)  -BT       User Key  (r) = Recorded By, (t) = Taken By, (c) = Cosigned By    Initials Name Provider Type    BT Sanaz England OT Occupational Therapist        Occupational Therapy Education                 Title: PT OT SLP Therapies (Done)     Topic: Occupational Therapy (Done)     Point: ADL training (Done)     Description:   Instruct learner(s) on proper safety adaptation and remediation techniques during self care or transfers.   Instruct in proper use of assistive devices.              Learning Progress Summary           Patient Acceptance, E, VU by BT at 7/13/2021 1056    Comment: purpose of OT, adl retraining, functional transfer training                   Point: Home exercise program (Done)     Description:   Instruct learner(s) on appropriate technique for monitoring, assisting and/or progressing therapeutic exercises/activities.              Learning Progress Summary           Patient Acceptance, E, VU by BT at 7/13/2021 1056    Comment: purpose of OT, adl retraining, functional transfer training                   Point: Precautions (Done)     Description:   Instruct learner(s) on prescribed precautions during self-care and functional transfers.              Learning Progress Summary           Patient Acceptance, E, VU by BT at 7/13/2021 1056    Comment: purpose of OT, adl retraining, functional transfer training                   Point: Body mechanics (Done)      Description:   Instruct learner(s) on proper positioning and spine alignment during self-care, functional mobility activities and/or exercises.              Learning Progress Summary           Patient Acceptance, E, VU by  at 7/13/2021 1056    Comment: purpose of OT, adl retraining, functional transfer training                               User Key     Initials Effective Dates Name Provider Type Discipline     11/16/20 -  Sanaz England OT Occupational Therapist OT              OT Recommendation and Plan  Therapy Frequency (OT): evaluation only  Plan of Care Review  Progress: improving  Outcome Summary: Pt is a 75 yo female s/p hemicolectomy. Pt reports living with her daughter and being independent with adls and functional transfers with use of a rollator for longer distances. On this date, pt completed adls and functional transfers with supervision/SBA and appears to be near baseline level of function. Pt does not warrant the need for skilled OT services on this date. OT signing off.     Time Calculation:   Time Calculation- OT     Row Name 07/13/21 1056             Time Calculation- OT    OT Start Time  0930  -BT      OT Stop Time  0950  -BT      OT Time Calculation (min)  20 min  -BT      Total Timed Code Minutes- OT  15 minute(s)  -BT      OT Received On  07/13/21  -BT         Timed Charges    70360 - OT Self Care/Mgmt Minutes  15  -BT         Untimed Charges    OT Eval/Re-eval Minutes  5  -BT         Total Minutes    Timed Charges Total Minutes  15  -BT      Untimed Charges Total Minutes  5  -BT       Total Minutes  20  -BT        User Key  (r) = Recorded By, (t) = Taken By, (c) = Cosigned By    Initials Name Provider Type    BT Sanaz England OT Occupational Therapist        Therapy Charges for Today     Code Description Service Date Service Provider Modifiers Qty    27110410324 HC OT SELF CARE/MGMT/TRAIN EA 15 MIN 7/13/2021 Sanaz England OT GO 1    20592832833 HC OT EVAL LOW COMPLEXITY 2 7/13/2021  Sanaz England, OT GO 1               Sanaz England, CATIA  7/13/2021

## 2021-07-13 NOTE — PROGRESS NOTES
"Daily progress note    Chief complaint  S/p right axilloaxillary dialysis shunt thrombectomy with shuntogram  Doing better  No new complaints  Getting hemodialysis  Family at bedside    History of present illness  73-year-old -American female with history of end-stage renal disease on hemodialysis COPD diabetes mellitus hypertension and chronic anemia brought to the emergency room with black diarrhea started on Saturday.  Patient has 4 episodes.  Patient also have abdominal discomfort.  Patient has been noticing black stools for several days.  Patient denies any nausea vomiting.  Patient denies any fever cough chest pain shortness of breath.  Patient work-up in ER revealed acute blood loss anemia with lower GI bleed admit for management.      REVIEW OF SYSTEMS  Unremarkable     PHYSICAL EXAM   Blood pressure 139/68, pulse 63, temperature 97.3 °F (36.3 °C), temperature source Temporal, resp. rate 18, height 152.4 cm (60\"), weight 79.4 kg (175 lb 0.7 oz), SpO2 100 %, not currently breastfeeding.    Constitutional: Pt. is oriented to person, place, and time   HENT: Normocephalic and atraumatic. Oropharynx moist/nonerythematous.  Neck: Normal range of motion. Neck supple. No JVD present.   Cardiovascular: Normal rate, regular rhythm and normal heart sounds. Exam reveals no gallop and no friction rub. No murmur heard.  Pulmonary/Chest: Effort normal and breath sounds normal. No stridor. No respiratory distress. No wheezes, no rales.   Abdominal: Soft. Bowel sounds are normal. No distension. There is no tenderness. There is no rebound and no guarding.   Musculoskeletal: Normal range of motion. No edema, tenderness or deformity.   Neurological: Pt. is alert and oriented to person, place, and time.  She has no focal neurologic deficits  Skin: Skin is warm and dry. No rash noted. Pt. is not diaphoretic. No erythema.   Psychiatric: Mood, affect and judgment normal.  She is pleasant and cooperative.    LAB RESULTS  Lab " Results (last 24 hours)     Procedure Component Value Units Date/Time    POC Glucose Once [[id number]]  (Normal) Collected: 07/13/21 0629    Specimen: Blood Updated: 07/13/21 0630     Glucose 117 mg/dL      Comment: Meter: [id number] : [id number] Will WADDELL       Digoxin Level [[id number]]  (Abnormal) Collected: 07/13/21 0441    Specimen: Blood Updated: 07/13/21 0604     Digoxin <0.30 ng/mL     Basic Metabolic Panel [[id number]]  (Abnormal) Collected: 07/13/21 0441    Specimen: Blood Updated: 07/13/21 0604     Glucose 120 mg/dL      BUN 21 mg/dL      Creatinine 6.37 mg/dL      Sodium 132 mmol/L      Potassium 5.0 mmol/L      Chloride 91 mmol/L      CO2 19.7 mmol/L      Calcium 7.6 mg/dL      eGFR  African Amer 8 mL/min/1.73      Comment: <15 Indicative of kidney failure.        eGFR Non  Amer --     Comment: <15 Indicative of kidney failure.        BUN/Creatinine Ratio 3.3     Anion Gap 21.3 mmol/L     Narrative:      GFR Normal >60  Chronic Kidney Disease <60  Kidney Failure <15      Magnesium [[id number]]  (Normal) Collected: 07/13/21 0441    Specimen: Blood Updated: 07/13/21 0555     Magnesium 1.9 mg/dL     Troponin [[id number]]  (Normal) Collected: 07/13/21 0441    Specimen: Blood Updated: 07/13/21 0554     Troponin T 0.028 ng/mL     Narrative:      Troponin T Reference Range:  <= 0.03 ng/mL-   Negative for AMI  >0.03 ng/mL-     Abnormal for myocardial necrosis.  Clinicians would have to utilize clinical acumen, EKG, Troponin and serial changes to determine if it is an Acute Myocardial Infarction or myocardial injury due to an underlying chronic condition.       Results may be falsely decreased if patient taking Biotin.      CBC & Differential [[id number]]  (Abnormal) Collected: 07/13/21 0441    Specimen: Blood Updated: 07/13/21 0523    Narrative:      The following orders were created for panel order CBC & Differential.  Procedure                               Abnormality         Status                      ---------                               -----------         ------                     CBC Auto Differential[[id number]]        Abnormal            Final result                 Please view results for these tests on the individual orders.    CBC Auto Differential [[id number]]  (Abnormal) Collected: 07/13/21 0441    Specimen: Blood Updated: 07/13/21 0523     WBC 5.41 10*3/mm3      RBC 3.22 10*6/mm3      Hemoglobin 8.0 g/dL      Hematocrit 26.3 %      MCV 81.7 fL      MCH 24.8 pg      MCHC 30.4 g/dL      RDW 17.2 %      RDW-SD 51.1 fl      MPV 11.0 fL      Platelets 217 10*3/mm3      Neutrophil % 87.5 %      Lymphocyte % 3.3 %      Monocyte % 8.5 %      Eosinophil % 0.0 %      Basophil % 0.0 %      Immature Grans % 0.7 %      Neutrophils, Absolute 4.73 10*3/mm3      Lymphocytes, Absolute 0.18 10*3/mm3      Monocytes, Absolute 0.46 10*3/mm3      Eosinophils, Absolute 0.00 10*3/mm3      Basophils, Absolute 0.00 10*3/mm3      Immature Grans, Absolute 0.04 10*3/mm3      nRBC 0.2 /100 WBC     POC Glucose Once [[id number]]  (Abnormal) Collected: 07/12/21 2020    Specimen: Blood Updated: 07/12/21 2021     Glucose 184 mg/dL      Comment: Meter: [id number] : [id number] Will Garciada NA       POC Glucose Once [[id number]]  (Normal) Collected: 07/12/21 1701    Specimen: Blood Updated: 07/12/21 1702     Glucose 87 mg/dL      Comment: Meter: [id number] : [id number] [facility]           Imaging Results (Last 24 Hours)     ** No results found for the last 24 hours. **        Colonoscopy results noted and discussed with patient and family    Current Facility-Administered Medications:   •  albuterol (PROVENTIL) nebulizer solution 0.083% 2.5 mg/3mL, 2.5 mg, Nebulization, TID PRN, Shahram Gallagher MD  •  budesonide-formoterol (SYMBICORT) 160-4.5 MCG/ACT inhaler 2 puff, 2 puff, Inhalation, BID - RT, Shahram Gallagher MD, 2 puff at 07/13/21 0805  •  dextrose (D50W) 25 g/ 50mL Intravenous Solution 25 g, 25 g,  Intravenous, Q15 Min PRN, Shahram Gallagher MD, 25 g at 07/12/21 1311  •  dextrose (GLUTOSE) oral gel 15 g, 15 g, Oral, Q15 Min PRN, Shahram Gallagher MD  •  epoetin polina-epbx (RETACRIT) injection 10,000 Units, 10,000 Units, Intravenous, Once per day on Tue Thu Sat, Shahram Gallagher MD, 10,000 Units at 07/13/21 1022  •  glucagon (human recombinant) (GLUCAGEN DIAGNOSTIC) injection 1 mg, 1 mg, Subcutaneous, Q15 Min PRN, Shahram Gallagher MD  •  heparin (porcine) injection 4,000 Units, 4,000 Units, Intracatheter, PRN, Chester Kang MD, 4,000 Units at 07/13/21 1024  •  heparin (porcine) injection 4,000 Units, 4,000 Units, Intravenous, Once, Chester Kang MD  •  insulin lispro (ADMELOG) injection 0-7 Units, 0-7 Units, Subcutaneous, TID AC, Shahram Gallagher MD, 2 Units at 07/10/21 0918  •  metoprolol succinate XL (TOPROL-XL) 24 hr tablet 25 mg, 25 mg, Oral, Daily, Shahram Gallagher MD, Stopped at 07/13/21 0826  •  montelukast (SINGULAIR) tablet 10 mg, 10 mg, Oral, Nightly, Shahram Gallagher MD, 10 mg at 07/12/21 2024  •  nitroglycerin (NITROSTAT) SL tablet 0.4 mg, 0.4 mg, Sublingual, Q5 Min PRN, Haja Chowdhury MD  •  oxyCODONE-acetaminophen (PERCOCET) 5-325 MG per tablet 1 tablet, 1 tablet, Oral, Q4H PRN, Shahram Gallagher MD, 1 tablet at 07/11/21 1559  •  pantoprazole (PROTONIX) EC tablet 40 mg, 40 mg, Oral, QAM ACElliott David Anthony, MD, 40 mg at 07/13/21 0636  •  [COMPLETED] Insert peripheral IV, , , Once **AND** sodium chloride 0.9 % flush 10 mL, 10 mL, Intravenous, PRN, Shahram Gallagher MD, 10 mL at 07/10/21 2112     ASSESSMENT  Cecal mass consistent with tubular adenoma status post laparoscopic right hemicolectomy  Lower GI bleed  Acute blood loss anemia  End-stage renal disease on hemodialysis  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

## 2021-07-13 NOTE — THERAPY EVALUATION
Patient Name: Nellie Vegas  : 1945    MRN: [id number]                              Today's Date: 2021       Admit Date: 2021    Visit Dx:     ICD-10-CM ICD-9-CM   1. Lower GI bleeding  K92.2 578.9   2. Acute blood loss anemia  D62 285.1   3. ESRD (end stage renal disease) on dialysis (CMS/[facility])  N18.6 585.6    Z99.2 V45.11   4. Problem with dialysis access, subsequent encounter  T82.898D V58.89     996.73     Patient Active Problem List   Diagnosis   • ESRD (end stage renal disease) on dialysis (CMS/[facility])   • Thrombosis of kidney dialysis arteriovenous graft (CMS/[facility])   • Anemia of chronic renal failure, stage 5 (CMS/[facility])   • COPD exacerbation (CMS/[facility])   • Problem with dialysis access (CMS/[facility])   • Lower GI bleeding   • Colonic mass     Past Medical History:   Diagnosis Date   • Anemia    • Anesthesia complication     mother woke up in combative state   • Arthritis     knees   • Asthma    • COPD (chronic obstructive pulmonary disease) (CMS/[facility])    • Diabetes mellitus (CMS/[facility])     type 2   • Dialysis patient (CMS/[facility])    • Disease of thyroid gland    • GERD (gastroesophageal reflux disease)    • Headache     after dialysis   • History of blood clots     BUE   • History of kidney stones    • Hyperlipidemia    • Hypertension    • Knee pain, bilateral    • Renal disease    • Sleep apnea     CPAP   • SOB (shortness of breath)    • Thrombosis of renal dialysis arteriovenous graft (CMS/[facility])    • Weakness      Past Surgical History:   Procedure Laterality Date   • ARTERIOVENOUS FISTULA Right    • ARTERIOVENOUS FISTULA Left     REMOVED   • CENTRAL VENOUS CATHETER TUNNELED INSERTION DOUBLE LUMEN     •  SECTION     • COLON RESECTION Right 2021    Procedure: RIGHT HEMICOLECTOMY LAPAROSCOPIC;  Surgeon: Zbigniew Conner MD;  Location: [facility];  Service: General;  Laterality: Right;   • COLONOSCOPY     • COLONOSCOPY N/A 2021    Procedure: COLONOSCOPY into cecum with biopsy;   Surgeon: Fabricio Monroe MD;  Location: [facility] ENDOSCOPY;  Service: Gastroenterology;  Laterality: N/A;  cecal mass   • POLYPECTOMY      UTERINE   • SHUNT O GRAM Right 8/9/2019    Procedure: RIGHT ARM DIALYSIS GRAFT revision and THROMBECTOMY;  Surgeon: Thierry Hardy MD;  Location: [facility] OR 18/19;  Service: Vascular   • SHUNT O GRAM Right 8/22/2019    Procedure: RIGHT ARM DIALYSIS GRAFT THROMBECTOMY WITH STENTING;  Surgeon: Thierry Hardy MD;  Location: [facility] OR 18/19;  Service: Vascular   • SHUNT O GRAM Right 12/7/2020    Procedure: RIGHT ARM ARTERIOVENOUS SHUNTOGRAM WITH THROMBECTOMY;  Surgeon: Thierry Hardy MD;  Location: [facility] OR 18/19;  Service: Vascular;  Laterality: Right;   • SHUNT O GRAM Right 7/12/2021    Procedure: Right arm dialysis shuntogram with shunt thrombectomy;  Surgeon: Shahram Gallagher MD;  Location: [facility] OR 18/19;  Service: Vascular;  Laterality: Right;     General Information     Row Name 07/13/21 1026          Physical Therapy Time and Intention    Document Type  therapy note (daily note)  (Pended)   -ME     Mode of Treatment  individual therapy;physical therapy  (Pended)   -ME     Row Name 07/13/21 1026          General Information    Patient Profile Reviewed  yes  (Pended)   -ME     Prior Level of Function  independent:  (Pended)   -ME     Existing Precautions/Restrictions  fall  (Pended)   -ME     Row Name 07/13/21 1026          Living Environment    Lives With  child(paco), adult  (Pended)   -ME     Row Name 07/13/21 1026          Home Main Entrance    Number of Stairs, Main Entrance  none  (Pended)   -ME     Row Name 07/13/21 1026          Cognition    Orientation Status (Cognition)  oriented x 4  (Pended)   -ME       User Key  (r) = Recorded By, (t) = Taken By, (c) = Cosigned By    Initials Name Provider Type    ME Karson Pritchett, PT Student PT Student        Mobility     Row Name 07/13/21 1027          Bed Mobility    Comment (Bed  Mobility)  Up in chair  (Pended)   -ME     Row Name 07/13/21 1027          Sit-Stand Transfer    Sit-Stand [place] (Transfers)  supervision  (Pended)   -ME     Assistive Device (Sit-Stand Transfers)  walker, front-wheeled  (Pended)   -ME     Row Name 07/13/21 1027          Gait/Stairs (Locomotion)    [place] Level (Gait)  contact guard  (Pended)   -ME     Assistive Device (Gait)  walker, front-wheeled  (Pended)   -ME     Distance in Feet (Gait)  100  (Pended)   -ME     Deviations/Abnormal Patterns (Gait)  base of support, narrow;gait speed decreased  (Pended)   -ME     Bilateral Gait Deviations  forward flexed posture  (Pended)   -ME       User Key  (r) = Recorded By, (t) = Taken By, (c) = Cosigned By    Initials Name Provider Type    ME Karson Pritchett, PT Student PT Student        Obj/Interventions     Row Name 07/13/21 1029          Range of Motion Comprehensive    General Range of Motion  bilateral lower extremity ROM WFL  (Pended)   -ME     Row Name 07/13/21 1029          Strength Comprehensive (MMT)    General Manual Muscle Testing (MMT) Assessment  lower extremity strength deficits identified  (Pended)   -ME     Comment, General Manual Muscle Testing (MMT) Assessment  Pt grossly 3/5 B  (Pended)   -ME       User Key  (r) = Recorded By, (t) = Taken By, (c) = Cosigned By    Initials Name Provider Type    ME Karson Pritchett, PT Student PT Student        Goals/Plan     Row Name 07/13/21 1106          Bed Mobility Goal 1 (PT)    Activity/Assistive Device (Bed Mobility Goal 1, PT)  --  (Pended)   -ME     [place] Level/Cues Needed (Bed Mobility Goal 1, PT)  --  (Pended)   -ME     Time Frame (Bed Mobility Goal 1, PT)  --  (Pended)   -ME     Row Name 07/13/21 1106          Transfer Goal 1 (PT)    Activity/Assistive Device (Transfer Goal 1, PT)  --  (Pended)   -ME     [place] Level/Cues Needed (Transfer Goal 1, PT)  --  (Pended)   -ME     Time Frame (Transfer Goal 1, PT)  --  (Pended)   -[facility]  Name 07/13/21 1106          Gait Training Goal 1 (PT)    Activity/Assistive Device (Gait Training Goal 1, PT)  --  (Pended)   -ME     [place] Level (Gait Training Goal 1, PT)  --  (Pended)   -ME     Distance (Gait Training Goal 1, PT)  --  (Pended)   -ME     Time Frame (Gait Training Goal 1, PT)  --  (Pended)   -ME       User Key  (r) = Recorded By, (t) = Taken By, (c) = Cosigned By    Initials Name Provider Type    ME Karson Pritchett, PT Student PT Student        Clinical Impression     Row Name 07/13/21 1030          Pain    Additional Documentation  Pain Scale: FACES Pre/Post-Treatment (Group)  (Pended)   -ME     Row Name 07/13/21 1030          Pain Scale: FACES Pre/Post-Treatment    Pain: FACES Scale, Pretreatment  0-->no hurt  (Pended)   -ME     Posttreatment Pain Rating  0-->no hurt  (Pended)   -ME     Row Name 07/13/21 1030          Plan of Care Review    Progress  improving  (Pended)   -ME     Outcome Summary  Pt is a 76 y.o. female admitted to the hospital for ESRN and dialysis. Upon PT tx, pt was seated in chair, alert, and oriented. Pt was supervision with sit-stand and CGA with ambulation. Following PT tx, pt was left in the bed and with the dialysis transport team. Pt would benefit from skilled PT and is still recommended to go home with HHPT following discharge from the hospital.  (Pended)   -ME     Row Name 07/13/21 1030          Therapy Assessment/Plan (PT)    Rehab Potential (PT)  --  (Pended)   -ME     Criteria for Skilled Interventions Met (PT)  --  (Pended)   -[facility] Name 07/13/21 1030          Positioning and Restraints    Pre-Treatment Position  sitting in chair/recliner  (Pended)   -ME     Post Treatment Position  bed  (Pended)   -ME     In Bed  --  (Pended)  Left pt with dialysis transport team  -ME       User Key  (r) = Recorded By, (t) = Taken By, (c) = Cosigned By    Initials Name Provider Type    Karson West, PT Student PT Student        Outcome Measures     Row Name  07/13/21 1107          How much help from another person do you currently need...    Turning from your back to your side while in flat bed without using bedrails?  4  (Pended)   -ME     Moving from lying on back to sitting on the side of a flat bed without bedrails?  4  (Pended)   -ME     Moving to and from a bed to a chair (including a wheelchair)?  3  (Pended)   -ME     Standing up from a chair using your arms (e.g., wheelchair, bedside chair)?  3  (Pended)   -ME     Climbing 3-5 steps with a railing?  2  (Pended)   -ME     To walk in hospital room?  3  (Pended)   -ME     AM-PAC 6 Clicks Score (PT)  19  -LS (r) ME (t)     Row Name 07/13/21 1107 07/13/21 1055       Functional Assessment    Outcome Measure Options  AM-PAC 6 Clicks Basic Mobility (PT)  (Pended)   -ME  AM-PAC 6 Clicks Daily Activity (OT)  -BT      User Key  (r) = Recorded By, (t) = Taken By, (c) = Cosigned By    Initials Name Provider Type     Blanche Ballesteros, RN Registered Nurse    BT Sanaz England, OT Occupational Therapist    ME Karson Pritchett, PT Student PT Student        Physical Therapy Education                 Title: PT OT SLP Therapies (Done)     Topic: Physical Therapy (Done)     Point: Mobility training (Done)     Learning Progress Summary           Patient Acceptance, E, VU,NR by  at 7/11/2021 1423                   Point: Home exercise program (Done)     Learning Progress Summary           Patient Acceptance, E, VU,NR by  at 7/11/2021 1423                   Point: Body mechanics (Done)     Learning Progress Summary           Patient Acceptance, E, VU,NR by  at 7/11/2021 1423                   Point: Precautions (Done)     Learning Progress Summary           Patient Acceptance, E, VU by ME at 7/13/2021 1108    Acceptance, E, VU,NR by  at 7/11/2021 1423                               User Key     Initials Effective Dates Name Provider Type [facility] 06/16/21 -  Polly Patel, PT Physical Therapist PT    ME 07/01/21  -  Karson Pritchett, PT Student PT Student PT              PT Recommendation and Plan     Progress: (P) improving  Outcome Summary: (P) Pt is a 76 y.o. female admitted to the hospital for ESRN and dialysis. Upon PT tx, pt was seated in chair, alert, and oriented. Pt was supervision with sit-stand and CGA with ambulation. Following PT tx, pt was left in the bed and with the dialysis transport team. Pt would benefit from skilled PT and is still recommended to go home with HHPT following discharge from the hospital.     Time Calculation:   PT Charges     Row Name 07/13/21 1025             Time Calculation    Start Time  1004  (Pended)   -ME      Stop Time  1014  (Pended)   -ME      Time Calculation (min)  10 min  (Pended)   -ME      PT Received On  07/13/21  (Pended)   -ME      PT - Next Appointment  07/14/21  (Pended)   -ME      PT Goal Re-Cert Due Date  07/20/21  (Pended)   -ME        User Key  (r) = Recorded By, (t) = Taken By, (c) = Cosigned By    Initials Name Provider Type    ME Karson Pritchett, PT Student PT Student        Therapy Charges for Today     Code Description Service Date Service Provider Modifiers Qty    [id number] HC PT THER PROC EA 15 MIN 7/13/2021 Karson Pritchett, PT Student GP 1          PT G-Codes  Outcome Measure Options: (P) AM-PAC 6 Clicks Basic Mobility (PT)  AM-PAC 6 Clicks Score (PT): 19  AM-PAC 6 Clicks Score (OT): 24    Karson Pritchett PT Student  7/13/2021

## 2021-07-14 VITALS
BODY MASS INDEX: 34.04 KG/M2 | SYSTOLIC BLOOD PRESSURE: 126 MMHG | DIASTOLIC BLOOD PRESSURE: 62 MMHG | RESPIRATION RATE: 18 BRPM | TEMPERATURE: 98.6 F | HEART RATE: 65 BPM | WEIGHT: 173.4 LBS | OXYGEN SATURATION: 99 % | HEIGHT: 60 IN

## 2021-07-14 LAB
ANION GAP SERPL CALCULATED.3IONS-SCNC: 12.7 MMOL/L (ref 5–15)
BASOPHILS # BLD AUTO: 0.04 10*3/MM3 (ref 0–0.2)
BASOPHILS NFR BLD AUTO: 0.8 % (ref 0–1.5)
BUN SERPL-MCNC: 11 MG/DL (ref 8–23)
BUN/CREAT SERPL: 2.8 (ref 7–25)
CALCIUM SPEC-SCNC: 7.8 MG/DL (ref 8.6–10.5)
CHLORIDE SERPL-SCNC: 95 MMOL/L (ref 98–107)
CO2 SERPL-SCNC: 25.3 MMOL/L (ref 22–29)
CREAT SERPL-MCNC: 3.86 MG/DL (ref 0.57–1)
DEPRECATED RDW RBC AUTO: 50.3 FL (ref 37–54)
EOSINOPHIL # BLD AUTO: 0.18 10*3/MM3 (ref 0–0.4)
EOSINOPHIL NFR BLD AUTO: 3.5 % (ref 0.3–6.2)
ERYTHROCYTE [DISTWIDTH] IN BLOOD BY AUTOMATED COUNT: 17.2 % (ref 12.3–15.4)
GFR SERPL CREATININE-BSD FRML MDRD: 14 ML/MIN/1.73
GFR SERPL CREATININE-BSD FRML MDRD: ABNORMAL ML/MIN/{1.73_M2}
GLUCOSE BLDC GLUCOMTR-MCNC: 103 MG/DL (ref 70–130)
GLUCOSE BLDC GLUCOMTR-MCNC: 80 MG/DL (ref 70–130)
GLUCOSE SERPL-MCNC: 81 MG/DL (ref 65–99)
HCT VFR BLD AUTO: 25.8 % (ref 34–46.6)
HGB BLD-MCNC: 8.1 G/DL (ref 12–15.9)
IMM GRANULOCYTES # BLD AUTO: 0.13 10*3/MM3 (ref 0–0.05)
IMM GRANULOCYTES NFR BLD AUTO: 2.5 % (ref 0–0.5)
LYMPHOCYTES # BLD AUTO: 0.54 10*3/MM3 (ref 0.7–3.1)
LYMPHOCYTES NFR BLD AUTO: 10.5 % (ref 19.6–45.3)
MCH RBC QN AUTO: 25.2 PG (ref 26.6–33)
MCHC RBC AUTO-ENTMCNC: 31.4 G/DL (ref 31.5–35.7)
MCV RBC AUTO: 80.4 FL (ref 79–97)
MONOCYTES # BLD AUTO: 0.98 10*3/MM3 (ref 0.1–0.9)
MONOCYTES NFR BLD AUTO: 19.1 % (ref 5–12)
NEUTROPHILS NFR BLD AUTO: 3.27 10*3/MM3 (ref 1.7–7)
NEUTROPHILS NFR BLD AUTO: 63.6 % (ref 42.7–76)
NRBC BLD AUTO-RTO: 0.8 /100 WBC (ref 0–0.2)
PLATELET # BLD AUTO: 230 10*3/MM3 (ref 140–450)
PMV BLD AUTO: 9.6 FL (ref 6–12)
POTASSIUM SERPL-SCNC: 4.1 MMOL/L (ref 3.5–5.2)
RBC # BLD AUTO: 3.21 10*6/MM3 (ref 3.77–5.28)
SODIUM SERPL-SCNC: 133 MMOL/L (ref 136–145)
WBC # BLD AUTO: 5.14 10*3/MM3 (ref 3.4–10.8)

## 2021-07-14 PROCEDURE — 99024 POSTOP FOLLOW-UP VISIT: CPT | Performed by: SURGERY

## 2021-07-14 PROCEDURE — 80048 BASIC METABOLIC PNL TOTAL CA: CPT | Performed by: HOSPITALIST

## 2021-07-14 PROCEDURE — 94799 UNLISTED PULMONARY SVC/PX: CPT

## 2021-07-14 PROCEDURE — 82962 GLUCOSE BLOOD TEST: CPT

## 2021-07-14 PROCEDURE — 85025 COMPLETE CBC W/AUTO DIFF WBC: CPT | Performed by: HOSPITALIST

## 2021-07-14 RX ADMIN — METOPROLOL SUCCINATE 25 MG: 25 TABLET, EXTENDED RELEASE ORAL at 08:07

## 2021-07-14 RX ADMIN — PANTOPRAZOLE SODIUM 40 MG: 40 TABLET, DELAYED RELEASE ORAL at 06:30

## 2021-07-14 RX ADMIN — BUDESONIDE AND FORMOTEROL FUMARATE DIHYDRATE 2 PUFF: 160; 4.5 AEROSOL RESPIRATORY (INHALATION) at 07:48

## 2021-07-14 NOTE — PROGRESS NOTES
"   LOS: 9 days     Chief Complaint/ Reason for encounter: ESRD, dialysis management  Chief Complaint   Patient presents with   • Rectal Bleeding         Subjective    No complaints today, having bowel movements, stable for discharge per surgery  Tolerated w Allises well yesterday, shot worked well after thrombectomy     Medical history reviewed:  Rectal Bleeding        Subjective    History taken from: Patient and chart    Vital Signs  Temp:  [97.3 °F (36.3 °C)-98.4 °F (36.9 °C)] 97.3 °F (36.3 °C)  Heart Rate:  [63-85] 63  Resp:  [16-20] 18  BP: ()/(44-68) 126/65       Wt Readings from Last 1 Encounters:   07/14/21 0442 78.7 kg (173 lb 6.4 oz)   07/13/21 0453 79.4 kg (175 lb 0.7 oz)   07/12/21 0517 78.6 kg (173 lb 4.8 oz)   07/11/21 0500 75 kg (165 lb 5.5 oz)   07/10/21 0500 75 kg (165 lb 5.5 oz)   07/09/21 0513 76.6 kg (168 lb 14.4 oz)   07/08/21 0456 78 kg (171 lb 14.4 oz)   07/07/21 0619 78.2 kg (172 lb 6.4 oz)   07/06/21 0926 76.6 kg (168 lb 14.4 oz)   07/05/21 2011 73.5 kg (162 lb 1.6 oz)   07/05/21 1546 74.5 kg (164 lb 3.9 oz)       Objective:  Vital signs: (most recent): Blood pressure 126/65, pulse 63, temperature 97.3 °F (36.3 °C), temperature source Oral, resp. rate 18, height 152.4 cm (60\"), weight 78.7 kg (173 lb 6.4 oz), SpO2 96 %, not currently breastfeeding.              Objective:  In OR today, not examined      Results Review:    Intake/Output:     Intake/Output Summary (Last 24 hours) at 7/14/2021 1007  Last data filed at 7/14/2021 0854  Gross per 24 hour   Intake 860 ml   Output 4000 ml   Net -3140 ml         DATA:  Radiology and Labs:  The following labs independently reviewed by me. Additional labs ordered for tomorrow a.m.  Interval notes, chart personally reviewed by me.   Old records independently reviewed showing ESRD on dialysis, recent problems with AV fistula cannulation  Discussed with patient    Risk/ complexity of medical care/ medical decision making moderate    Labs:   Recent " Results (from the past 24 hour(s))   POC Glucose Once    Collection Time: 07/13/21  4:36 PM    Specimen: Blood   Result Value Ref Range    Glucose 117 70 - 130 mg/dL   POC Glucose Once    Collection Time: 07/13/21  8:55 PM    Specimen: Blood   Result Value Ref Range    Glucose 58 (L) 70 - 130 mg/dL   POC Glucose Once    Collection Time: 07/13/21  9:45 PM    Specimen: Blood   Result Value Ref Range    Glucose 139 (H) 70 - 130 mg/dL   POC Glucose Once    Collection Time: 07/14/21  6:23 AM    Specimen: Blood   Result Value Ref Range    Glucose 103 70 - 130 mg/dL   Basic Metabolic Panel    Collection Time: 07/14/21  6:40 AM    Specimen: Blood   Result Value Ref Range    Glucose 81 65 - 99 mg/dL    BUN 11 8 - 23 mg/dL    Creatinine 3.86 (H) 0.57 - 1.00 mg/dL    Sodium 133 (L) 136 - 145 mmol/L    Potassium 4.1 3.5 - 5.2 mmol/L    Chloride 95 (L) 98 - 107 mmol/L    CO2 25.3 22.0 - 29.0 mmol/L    Calcium 7.8 (L) 8.6 - 10.5 mg/dL    eGFR  African Amer 14 (L) >60 mL/min/1.73    eGFR Non African Amer      BUN/Creatinine Ratio 2.8 (L) 7.0 - 25.0    Anion Gap 12.7 5.0 - 15.0 mmol/L   CBC Auto Differential    Collection Time: 07/14/21  6:40 AM    Specimen: Blood   Result Value Ref Range    WBC 5.14 3.40 - 10.80 10*3/mm3    RBC 3.21 (L) 3.77 - 5.28 10*6/mm3    Hemoglobin 8.1 (L) 12.0 - 15.9 g/dL    Hematocrit 25.8 (L) 34.0 - 46.6 %    MCV 80.4 79.0 - 97.0 fL    MCH 25.2 (L) 26.6 - 33.0 pg    MCHC 31.4 (L) 31.5 - 35.7 g/dL    RDW 17.2 (H) 12.3 - 15.4 %    RDW-SD 50.3 37.0 - 54.0 fl    MPV 9.6 6.0 - 12.0 fL    Platelets 230 140 - 450 10*3/mm3    Neutrophil % 63.6 42.7 - 76.0 %    Lymphocyte % 10.5 (L) 19.6 - 45.3 %    Monocyte % 19.1 (H) 5.0 - 12.0 %    Eosinophil % 3.5 0.3 - 6.2 %    Basophil % 0.8 0.0 - 1.5 %    Immature Grans % 2.5 (H) 0.0 - 0.5 %    Neutrophils, Absolute 3.27 1.70 - 7.00 10*3/mm3    Lymphocytes, Absolute 0.54 (L) 0.70 - 3.10 10*3/mm3    Monocytes, Absolute 0.98 (H) 0.10 - 0.90 10*3/mm3    Eosinophils, Absolute  0.18 0.00 - 0.40 10*3/mm3    Basophils, Absolute 0.04 0.00 - 0.20 10*3/mm3    Immature Grans, Absolute 0.13 (H) 0.00 - 0.05 10*3/mm3    nRBC 0.8 (H) 0.0 - 0.2 /100 WBC       Radiology:  Imaging Results (Last 24 Hours)     ** No results found for the last 24 hours. **             Medications have been reviewed:  Current Facility-Administered Medications   Medication Dose Route Frequency Provider Last Rate Last Admin   • albuterol (PROVENTIL) nebulizer solution 0.083% 2.5 mg/3mL  2.5 mg Nebulization TID PRN Shahram Gallagher MD       • budesonide-formoterol (SYMBICORT) 160-4.5 MCG/ACT inhaler 2 puff  2 puff Inhalation BID - RT Shahram Gallagher MD   2 puff at 07/14/21 0748   • dextrose (D50W) 25 g/ 50mL Intravenous Solution 25 g  25 g Intravenous Q15 Min PRN Shahram Gallagher MD   25 g at 07/12/21 1311   • dextrose (GLUTOSE) oral gel 15 g  15 g Oral Q15 Min PRN Shahram Gallagher MD       • epoetin polina-epbx (RETACRIT) injection 10,000 Units  10,000 Units Intravenous Once per day on Tue Thu Sat Shahram Gallagher MD   10,000 Units at 07/13/21 1022   • glucagon (human recombinant) (GLUCAGEN DIAGNOSTIC) injection 1 mg  1 mg Subcutaneous Q15 Min PRN Shahram Gallagher MD       • heparin (porcine) injection 4,000 Units  4,000 Units Intracatheter PRN Chester Kang MD   4,000 Units at 07/13/21 1024   • heparin (porcine) injection 4,000 Units  4,000 Units Intravenous Once Chester Kang MD       • insulin lispro (ADMELOG) injection 0-7 Units  0-7 Units Subcutaneous TID AC Shahram Gallagher MD   2 Units at 07/10/21 0918   • metoprolol succinate XL (TOPROL-XL) 24 hr tablet 25 mg  25 mg Oral Daily Shahram Gallagher MD   25 mg at 07/14/21 0807   • montelukast (SINGULAIR) tablet 10 mg  10 mg Oral Nightly Shahram Gallagher MD   10 mg at 07/13/21 2006   • oxyCODONE-acetaminophen (PERCOCET) 5-325 MG per tablet 1 tablet  1 tablet Oral Q4H PRN Shahram Gallagher MD   1  tablet at 07/13/21 2006   • pantoprazole (PROTONIX) EC tablet 40 mg  40 mg Oral QAM AC Shahram Gallagher MD   40 mg at 07/14/21 0630   • sodium chloride 0.9 % flush 10 mL  10 mL Intravenous PRN Shahram Gallagher MD   10 mL at 07/13/21 2006       ASSESSMENT:  ESRD  Cecal mass status post hemicolectomy, benign pathology  Lower GI bleeding from cecal mass, pathology shows tubular adenoma  Tubular adenoma of the colon  Hypertension well controlled  Anemia of CKD, stable on Epogen  COPD  AV fistula dysfunction, 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 resume heparin with dialysis  Status post shuntogram in OR, no issues with her shunt yesterday on HD  2K bath with HD  Epogen with HD for anemia, UF as tolerated with HD  Continue current BP regimen   monitor electrolytes and volume closely but no need for daily labs at this point  Stable for discharge anytime from a renal standpoint.  If she goes home today advised her to follow-up with her outpatient dialysis center in the a.m.       Chester Kang MD   [facility]   Office phone number: [phone]  Answering service phone number: [phone]    07/14/21  10:07 EDT    Dictation performed using Dragon dictation software

## 2021-07-14 NOTE — DISCHARGE INSTRUCTIONS
COLON, GASTRIC OR SMALL BOWEL RESECTION  POST OP RECOMMENDATIONS  Dr. Zbigniew Conner  [phone]  ACTIVITIES:  1. Expect to rest most of the first week after discharge from hospital, but do get up several times daily to reduce the risk of getting a clot in your legs.  2. No strenuous activity or lifting over 10 lbs. for two weeks.  Add 5 lbs. a week to that restriction until 6 weeks out from surgery.  Try to avoid squatting and deep bends for about a week.  3. No driving until seen at your post operative appointment.  You must be off pain meds 24 hours before driving after that visit unless otherwise instructed.  4. You can climb stairs, but minimize this and initially do one step at a time (both feet on one step rather than going up with each step.)  SYMPTOMS:  1. Avoiding constipation is important after this surgery as it is common when taking pain medication.  Over the counter laxatives, such as Miralax, can be used temporarily to avoid this.   2. Fatigue and decreased stamina is not unusual for about a week or so after surgery due to anesthesia.  Try to take walks and some mild activity between resting.  3. Shoulder pain is not unusual from the “gas” used in laparoscopy which will dissipate within 1-3 days.  If it does not, call your physician.  4. It is not unusual for your gastrointestinal system to take 1-2 months to get back to your normal routine and you may have long term changes towards looser bowel movements  WOUND SITE:  1. Dressings can be removed 7-10 days after procedure if desired.  The “tega-derm” may be removed in 3 days if instructed to.  2. Dressings may occasionally have spots of blood on them.  As long as it is dry, these do not need to be changed.  If it is soaked, then the dressing should be removed and a new dressing placed.  3. Skin irritation, redness or itching can prompt removal of the bandage earlier if present.  4. Steri-strips are to be left in place until they fall off on their own in  1-2 weeks.  If they are irritating then they may be removed sooner.  5. No showering if a drain is in place. Once removed, and the skin is covered, you may shower.    6. Showering may occur while the “tega-derm” is in place.  If it is off, then you must wait 3 days after surgery before showering.  MEALS:  1. A soft diet is recommended for the first 1-2 days after discharge from the hospital.  A normal diet may then be started as tolerated after that. Follow dietary guidelines when specified  2. Expect to eat less after this procedure and fill up somewhat faster.   3. Do NOT take pain pills on an empty stomach.  WORK:  1. In general if you have a sedentary job, you can return to work in 3 weeks if done laparoscopically.  If lifting or exertion is required it may be 3-6 weeks depending on your recovery.    2. Use discretion and remember that your stamina will be decreased post operatively.  3. Return to work notes can be provided at the time of your post-operative appointment.  FOLLOW UP:  1. Call and make a post-operative appointment for approximately 10-14 days after the procedure.

## 2021-07-14 NOTE — DISCHARGE SUMMARY
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  COPD  Hypertension  Diabetes mellitus   Chronic anemia  Gastroesophageal reflux disease    Discharge medications    Current Facility-Administered Medications:   •  budesonide-formoterol (SYMBICORT) 160-4.5 MCG/ACT inhaler 2 puff, 2 puff, Inhalation, BID - RT, Shahram Gallagher MD, 2 puff at 07/14/21 0748  •  epoetin polina-epbx (RETACRIT) injection 10,000 Units, 10,000 Units, Intravenous, Once per day on Tue Thu Sat, Shahram Gallagher MD, 10,000 Units at 07/13/21 1022  •  metoprolol succinate XL (TOPROL-XL) 24 hr tablet 25 mg, 25 mg, Oral, Daily, Shahram Gallagher MD, 25 mg at 07/14/21 0807  •  montelukast (SINGULAIR) tablet 10 mg, 10 mg, Oral, Nightly, Shahram Gallagher MD, 10 mg at 07/13/21 2006  •  pantoprazole (PROTONIX) EC tablet 40 mg, 40 mg, Oral, QAM AC, Shahram Gallagher MD, 40 mg at 07/14/21 0630  •  [COMPLETED] Insert peripheral IV, , , Once **AND** sodium chloride 0.9 % flush 10 mL, 10 mL, Intravenous, PRN, Shahram Gallagher MD, 10 mL at 07/13/21 2006     Consults obtained  Nephrology  Vascular surgery  Gastroenterology  General surgery    Procedures  Colonoscopy  Laparoscopic right hemicolectomy  Right axilloaxillary loop dialysis shuntogram  Right axilla axillary loop dialysis shuntogram thrombectomy with intraoperative shuntogram    Hospital course  73-year-old -American female with history of end-stage renal disease on hemodialysis COPD diabetes hypertension and chronic anemia admitted to emergency room with melena.  Patient also have abdominal discomfort.  Patient found to have acute blood loss anemia admit for management.  Patient  admitted and received blood transfusion and after stabilization underwent colonoscopy which revealed cecal mass.  Patient was on Eliquis for AV fistula stenosis which was discontinued.  Patient underwent laparoscopic right hemicolectomy and biopsy came back positive for tubular adenoma.  Patient then underwent right axilla daily dialysis shuntogram with thrombectomy and intraoperative shuntogram.  Patient remain off Eliquis and there is no need to resume Eliquis as her AV fistula stenosis is fixed.  Patient remained on hemodialysis and followed by nephrology.  Patient is feeling better and her hemoglobin 8.1 at the time of discharge.  Patient was taking insulin at home and her blood sugar was low normal and advised not to resume insulin.  Patient clear for discharge from gastroenterology nephrology vascular surgery and general surgery.    Discharge diet regular    Activity as tolerated    Medication as above    Follow-up with primary doctor in 1 week and follow-up with gastroenterology nephrology vascular surgery and general surgery per the instruction and take medication as directed and keep the appointment for hemodialysis 3 times a week    ALLAN MARTIN MD

## 2021-07-14 NOTE — PLAN OF CARE
Goal Outcome Evaluation:  Plan of Care Reviewed With: patient        Progress: improving  Outcome Summary: VSS, no complaints of pain.  Pt tolerating regular consistency meals.  Pt remains on room air.  Incision clean dry and intact.  Plan is for patient to be discharged home today and continue HD as outpatient.

## 2021-07-14 NOTE — DISCHARGE PLACEMENT REQUEST
"Nellie Devries (76 y.o. Female)     Date of Birth Social Security Number Address Home Phone MRN    1945  [address] [facility] [place]   [place] [address] [phone] [id number]    Spiritism Marital Status          Non-Scientologist        Admission Date Admission Type Admitting Provider Attending Provider Department, Room/Bed    7/5/21 Emergency Haja Chowdhury MD Ahmed, Aftab, MD [address], [address]    Discharge Date Discharge Disposition Discharge Destination                       Attending Provider: Haja Chowdhury MD    Allergies: Sulfa Antibiotics, Latex    Isolation: None   Infection: None   Code Status: CPR    Ht: 152.4 cm (60\")   Wt: 78.7 kg (173 lb 6.4 oz)    Admission Cmt: None   Principal Problem: ESRD (end stage renal disease) on dialysis (CMS/[facility]) [N18.6,Z99.2]                 Active Insurance as of 7/5/2021     Primary Coverage     Payor Plan Insurance Group Employer/Plan Group    HUMANA MEDICARE REPLACEMENT HUMANA MEDICARE REPLACEMENT [id number]     Payor Plan Address Payor Plan Phone Number Payor Plan Fax Number Effective Dates    [address] [address] [phone]  7/1/2021 - None Entered    [facility] [id number]       Subscriber Name Subscriber Birth Date Member ID       NELLIE DEVRIES 1945 [id number]           Secondary Coverage     Payor Plan Insurance Group Employer/Plan Group    [place]      Payor Plan Address Payor Plan Phone Number Payor Plan Fax Number Effective Dates    [address] [address]   11/1/2019 - None Entered    [place] [address]       Subscriber Name Subscriber Birth Date Member ID       NELLIE DEVRIES 1945 [id number]                 Emergency Contacts      (Rel.) Home Phone Work Phone Mobile Phone    LOWHENNY (Daughter) [phone] -- --    Heidi Devries (Daughter) -- -- [phone]    KAYCECHRISTINE (Daughter) [phone] -- [phone]              "

## 2021-07-14 NOTE — PLAN OF CARE
Goal Outcome Evaluation:  Plan of Care Reviewed With: patient  Progress: improving  Outcome Summary: All needs met. Rested well. Up ad walt. Regular cc cardiac renal diet. VSS. Mental status at baseline. On room air. NSR on the monitor. Complained of abdominal pain, Percoset given and effective. Bedtime sugar 58, drink and snack given to recover. Blood glucose monitoring. Will CTM. Possible d/c home today.

## 2021-07-14 NOTE — PROGRESS NOTES
"Daily progress note    Chief complaint  Doing better  No new complaints  Wants to go home  Family at bedside    History of present illness  73-year-old -American female with history of end-stage renal disease on hemodialysis COPD diabetes mellitus hypertension and chronic anemia brought to the emergency room with black diarrhea started on Saturday.  Patient has 4 episodes.  Patient also have abdominal discomfort.  Patient has been noticing black stools for several days.  Patient denies any nausea vomiting.  Patient denies any fever cough chest pain shortness of breath.  Patient work-up in ER revealed acute blood loss anemia with lower GI bleed admit for management.      REVIEW OF SYSTEMS  Unremarkable     PHYSICAL EXAM   Blood pressure 126/62, pulse 65, temperature 98.6 °F (37 °C), temperature source Oral, resp. rate 18, height 152.4 cm (60\"), weight 78.7 kg (173 lb 6.4 oz), SpO2 99 %, not currently breastfeeding.    Constitutional: Pt. is oriented to person, place, and time   HENT: Normocephalic and atraumatic. Oropharynx moist/nonerythematous.  Neck: Normal range of motion. Neck supple. No JVD present.   Cardiovascular: Normal rate, regular rhythm and normal heart sounds. Exam reveals no gallop and no friction rub. No murmur heard.  Pulmonary/Chest: Effort normal and breath sounds normal. No stridor. No respiratory distress. No wheezes, no rales.   Abdominal: Soft. Bowel sounds are normal. No distension. There is no tenderness. There is no rebound and no guarding.   Musculoskeletal: Normal range of motion. No edema, tenderness or deformity.   Neurological: Pt. is alert and oriented to person, place, and time.  She has no focal neurologic deficits  Skin: Skin is warm and dry. No rash noted. Pt. is not diaphoretic. No erythema.   Psychiatric: Mood, affect and judgment normal.  She is pleasant and cooperative.    LAB RESULTS  Lab Results (last 24 hours)     Procedure Component Value Units Date/Time    POC " Glucose Once [[id number]]  (Normal) Collected: 07/14/21 1105    Specimen: Blood Updated: 07/14/21 1107     Glucose 80 mg/dL      Comment: Meter: [id number] : [id number] Morgan WADDELL       Basic Metabolic Panel [[id number]]  (Abnormal) Collected: 07/14/21 0640    Specimen: Blood Updated: 07/14/21 0733     Glucose 81 mg/dL      BUN 11 mg/dL      Creatinine 3.86 mg/dL      Sodium 133 mmol/L      Potassium 4.1 mmol/L      Comment: Slight hemolysis detected by analyzer. Results may be affected.        Chloride 95 mmol/L      CO2 25.3 mmol/L      Calcium 7.8 mg/dL      eGFR  African Amer 14 mL/min/1.73      Comment: <15 Indicative of kidney failure.        eGFR Non  Amer --     Comment: <15 Indicative of kidney failure.        BUN/Creatinine Ratio 2.8     Anion Gap 12.7 mmol/L     Narrative:      GFR Normal >60  Chronic Kidney Disease <60  Kidney Failure <15      CBC & Differential [[id number]]  (Abnormal) Collected: 07/14/21 0640    Specimen: Blood Updated: 07/14/21 0725    Narrative:      The following orders were created for panel order CBC & Differential.  Procedure                               Abnormality         Status                     ---------                               -----------         ------                     CBC Auto Differential[[id number]]        Abnormal            Final result                 Please view results for these tests on the individual orders.    CBC Auto Differential [[id number]]  (Abnormal) Collected: 07/14/21 0640    Specimen: Blood Updated: 07/14/21 0725     WBC 5.14 10*3/mm3      RBC 3.21 10*6/mm3      Hemoglobin 8.1 g/dL      Hematocrit 25.8 %      MCV 80.4 fL      MCH 25.2 pg      MCHC 31.4 g/dL      RDW 17.2 %      RDW-SD 50.3 fl      MPV 9.6 fL      Platelets 230 10*3/mm3      Neutrophil % 63.6 %      Lymphocyte % 10.5 %      Monocyte % 19.1 %      Eosinophil % 3.5 %      Basophil % 0.8 %      Immature Grans % 2.5 %      Neutrophils, Absolute 3.27 10*3/mm3       Lymphocytes, Absolute 0.54 10*3/mm3      Monocytes, Absolute 0.98 10*3/mm3      Eosinophils, Absolute 0.18 10*3/mm3      Basophils, Absolute 0.04 10*3/mm3      Immature Grans, Absolute 0.13 10*3/mm3      nRBC 0.8 /100 WBC     POC Glucose Once [[id number]]  (Normal) Collected: 07/14/21 0623    Specimen: Blood Updated: 07/14/21 0625     Glucose 103 mg/dL      Comment: Meter: [id number] : [id number] [employer] NA       POC Glucose Once [[id number]]  (Abnormal) Collected: 07/13/21 2145    Specimen: Blood Updated: 07/13/21 2146     Glucose 139 mg/dL      Comment: Meter: [id number] : [id number] Rochelle Melvin RN       POC Glucose Once [[id number]]  (Abnormal) Collected: 07/13/21 2055    Specimen: Blood Updated: 07/13/21 2056     Glucose 58 mg/dL      Comment: Meter: [id number] : [id number] Solis Madhuri NA           Imaging Results (Last 24 Hours)     ** No results found for the last 24 hours. **        Colonoscopy results noted and discussed with patient and family    Current Facility-Administered Medications:   •  albuterol (PROVENTIL) nebulizer solution 0.083% 2.5 mg/3mL, 2.5 mg, Nebulization, TID PRN, Shahram Gallagher MD  •  budesonide-formoterol (SYMBICORT) 160-4.5 MCG/ACT inhaler 2 puff, 2 puff, Inhalation, BID - RT, Shahram Gallagher MD, 2 puff at 07/14/21 0748  •  dextrose (D50W) 25 g/ 50mL Intravenous Solution 25 g, 25 g, Intravenous, Q15 Min PRN, Shahram Gallagher MD, 25 g at 07/12/21 1311  •  dextrose (GLUTOSE) oral gel 15 g, 15 g, Oral, Q15 Min PRN, Shahram Gallagher MD  •  epoetin polina-epbx (RETACRIT) injection 10,000 Units, 10,000 Units, Intravenous, Once per day on Tue Thu Sat, Shahram Gallagher MD, 10,000 Units at 07/13/21 1022  •  glucagon (human recombinant) (GLUCAGEN DIAGNOSTIC) injection 1 mg, 1 mg, Subcutaneous, Q15 Min PRN, Shahram Gallagher MD  •  heparin (porcine) injection 4,000 Units, 4,000 Units, Intracatheter, PRN, Chester Kang MD, 4,000 Units at  07/13/21 1024  •  heparin (porcine) injection 4,000 Units, 4,000 Units, Intravenous, Once, Chester Kang MD  •  insulin lispro (ADMELOG) injection 0-7 Units, 0-7 Units, Subcutaneous, TID Elliott OROSCO David Anthony, MD, 2 Units at 07/10/21 0918  •  metoprolol succinate XL (TOPROL-XL) 24 hr tablet 25 mg, 25 mg, Oral, Daily, Shahram Gallagher MD, 25 mg at 07/14/21 0807  •  montelukast (SINGULAIR) tablet 10 mg, 10 mg, Oral, Nightly, Shahram Gallagher MD, 10 mg at 07/13/21 2006  •  oxyCODONE-acetaminophen (PERCOCET) 5-325 MG per tablet 1 tablet, 1 tablet, Oral, Q4H PRN, Shahram Gallagher MD, 1 tablet at 07/13/21 2006  •  pantoprazole (PROTONIX) EC tablet 40 mg, 40 mg, Oral, QAM ACElliott David Anthony, MD, 40 mg at 07/14/21 0630  •  [COMPLETED] Insert peripheral IV, , , Once **AND** sodium chloride 0.9 % flush 10 mL, 10 mL, Intravenous, PRN, Shahram Gallagher MD, 10 mL at 07/13/21 2006     ASSESSMENT  Cecal mass consistent with tubular adenoma status post laparoscopic right hemicolectomy  Lower GI bleed  Acute blood loss anemia  End-stage renal disease on hemodialysis  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

## 2021-07-14 NOTE — PROGRESS NOTES
Postoperative day 5 status post laparoscopic right hemicolectomy    S: No issues overnight.  Feeling much better.  Passing gas and having bowel movements    O:   Vitals:    07/14/21 0028 07/14/21 0442 07/14/21 0707 07/14/21 0748   BP: 96/44  126/65    BP Location: Left arm  Left arm    Patient Position: Lying  Sitting    Pulse: 69  74 63   Resp: 18  18    Temp: 97.4 °F (36.3 °C)  97.3 °F (36.3 °C)    TempSrc: Oral  Oral    SpO2: 96%  97% 96%   Weight:  78.7 kg (173 lb 6.4 oz)     Height:         Alert oriented x3, no acute distress  Abdomen soft, nontender nondistended, incisions clean dry and intact,    Hemoglobin 8.1, stable other labs stable    Assessment and plan    Postop day 5 status post laparoscopic right hemicolectomy, doing great, having bowel function, pain well controlled.  Tolerating diet.  Okay to discharge from my standpoint.  Follow-up in my office in 2 weeks  Instructions placed in chart

## 2021-07-14 NOTE — CASE MANAGEMENT/SOCIAL WORK
Continued Stay Note  [facility]     Patient Name: Nellie Vegas  MRN: [id number]  Today's Date: 7/14/2021    Admit Date: 7/5/2021    Discharge Plan     Row Name 07/14/21 0937       Plan    Plan  Return home with family and [facility] following pending acceptance    Patient/Family in Agreement with Plan  yes    Plan Comments  Spoke with patient at bedside.  She is agreeable to using [facility] [place] - spoke with Lynn and they will evaluate.  She states she will contact [facility] [place] for return to service for pickups for HD.  BHumeniuk RN            Expected Discharge Date and Time     Expected Discharge Date Expected Discharge Time    Jul 14, 2021             Becky S. Humeniuk, RN

## 2021-07-15 ENCOUNTER — READMISSION MANAGEMENT (OUTPATIENT)
Dept: CALL CENTER | Facility: HOSPITAL | Age: 76
End: 2021-07-15

## 2021-07-15 ENCOUNTER — HOSPITAL ENCOUNTER (INPATIENT)
Facility: HOSPITAL | Age: 76
LOS: 3 days | Discharge: HOME HEALTH | End: 2021-07-21
Attending: EMERGENCY MEDICINE | Admitting: SURGERY

## 2021-07-15 ENCOUNTER — APPOINTMENT (OUTPATIENT)
Dept: GENERAL RADIOLOGY | Facility: HOSPITAL | Age: 76
End: 2021-07-15

## 2021-07-15 DIAGNOSIS — D63.8 ANEMIA, CHRONIC DISEASE: ICD-10-CM

## 2021-07-15 DIAGNOSIS — Z99.2 ESRD ON HEMODIALYSIS (HCC): ICD-10-CM

## 2021-07-15 DIAGNOSIS — N18.6 ESRD ON HEMODIALYSIS (HCC): ICD-10-CM

## 2021-07-15 DIAGNOSIS — T82.590A DIALYSIS AV FISTULA MALFUNCTION, INITIAL ENCOUNTER (HCC): Primary | ICD-10-CM

## 2021-07-15 LAB
ALBUMIN SERPL-MCNC: 3 G/DL (ref 3.5–5.2)
ALBUMIN/GLOB SERPL: 1.1 G/DL
ALP SERPL-CCNC: 96 U/L (ref 39–117)
ALT SERPL W P-5'-P-CCNC: 5 U/L (ref 1–33)
ANION GAP SERPL CALCULATED.3IONS-SCNC: 17.3 MMOL/L (ref 5–15)
AST SERPL-CCNC: 16 U/L (ref 1–32)
BASOPHILS # BLD AUTO: 0.02 10*3/MM3 (ref 0–0.2)
BASOPHILS NFR BLD AUTO: 0.3 % (ref 0–1.5)
BILIRUB SERPL-MCNC: 0.4 MG/DL (ref 0–1.2)
BUN SERPL-MCNC: 24 MG/DL (ref 8–23)
BUN/CREAT SERPL: 3.9 (ref 7–25)
CALCIUM SPEC-SCNC: 7.6 MG/DL (ref 8.6–10.5)
CHLORIDE SERPL-SCNC: 93 MMOL/L (ref 98–107)
CO2 SERPL-SCNC: 22.7 MMOL/L (ref 22–29)
CREAT SERPL-MCNC: 6.17 MG/DL (ref 0.57–1)
DEPRECATED RDW RBC AUTO: 48.9 FL (ref 37–54)
EOSINOPHIL # BLD AUTO: 0.15 10*3/MM3 (ref 0–0.4)
EOSINOPHIL NFR BLD AUTO: 2.6 % (ref 0.3–6.2)
ERYTHROCYTE [DISTWIDTH] IN BLOOD BY AUTOMATED COUNT: 17.2 % (ref 12.3–15.4)
GFR SERPL CREATININE-BSD FRML MDRD: 8 ML/MIN/1.73
GFR SERPL CREATININE-BSD FRML MDRD: ABNORMAL ML/MIN/{1.73_M2}
GLOBULIN UR ELPH-MCNC: 2.7 GM/DL
GLUCOSE SERPL-MCNC: 83 MG/DL (ref 65–99)
HCT VFR BLD AUTO: 24.2 % (ref 34–46.6)
HGB BLD-MCNC: 7.7 G/DL (ref 12–15.9)
IMM GRANULOCYTES # BLD AUTO: 0.06 10*3/MM3 (ref 0–0.05)
IMM GRANULOCYTES NFR BLD AUTO: 1 % (ref 0–0.5)
LYMPHOCYTES # BLD AUTO: 0.46 10*3/MM3 (ref 0.7–3.1)
LYMPHOCYTES NFR BLD AUTO: 7.9 % (ref 19.6–45.3)
MCH RBC QN AUTO: 25.3 PG (ref 26.6–33)
MCHC RBC AUTO-ENTMCNC: 31.8 G/DL (ref 31.5–35.7)
MCV RBC AUTO: 79.6 FL (ref 79–97)
MONOCYTES # BLD AUTO: 0.98 10*3/MM3 (ref 0.1–0.9)
MONOCYTES NFR BLD AUTO: 16.8 % (ref 5–12)
NEUTROPHILS NFR BLD AUTO: 4.17 10*3/MM3 (ref 1.7–7)
NEUTROPHILS NFR BLD AUTO: 71.4 % (ref 42.7–76)
NRBC BLD AUTO-RTO: 0 /100 WBC (ref 0–0.2)
PLATELET # BLD AUTO: 265 10*3/MM3 (ref 140–450)
PMV BLD AUTO: 10.4 FL (ref 6–12)
POTASSIUM SERPL-SCNC: 4 MMOL/L (ref 3.5–5.2)
PROT SERPL-MCNC: 5.7 G/DL (ref 6–8.5)
RBC # BLD AUTO: 3.04 10*6/MM3 (ref 3.77–5.28)
SODIUM SERPL-SCNC: 133 MMOL/L (ref 136–145)
WBC # BLD AUTO: 5.84 10*3/MM3 (ref 3.4–10.8)

## 2021-07-15 PROCEDURE — 85025 COMPLETE CBC W/AUTO DIFF WBC: CPT | Performed by: EMERGENCY MEDICINE

## 2021-07-15 PROCEDURE — G0378 HOSPITAL OBSERVATION PER HR: HCPCS

## 2021-07-15 PROCEDURE — 99284 EMERGENCY DEPT VISIT MOD MDM: CPT

## 2021-07-15 PROCEDURE — 71045 X-RAY EXAM CHEST 1 VIEW: CPT

## 2021-07-15 PROCEDURE — 80053 COMPREHEN METABOLIC PANEL: CPT | Performed by: EMERGENCY MEDICINE

## 2021-07-15 RX ORDER — SODIUM CHLORIDE 0.9 % (FLUSH) 0.9 %
10 SYRINGE (ML) INJECTION AS NEEDED
Status: DISCONTINUED | OUTPATIENT
Start: 2021-07-15 | End: 2021-07-21 | Stop reason: HOSPADM

## 2021-07-15 RX ORDER — NITROGLYCERIN 0.4 MG/1
0.4 TABLET SUBLINGUAL
Status: DISCONTINUED | OUTPATIENT
Start: 2021-07-15 | End: 2021-07-18

## 2021-07-15 NOTE — ED NOTES
"Pt went to dialysis today. Dialysis catheter is \"clotted off\" so she was unable to get her dialysis. Scheduled Tu-Thurs-Sat.  Pt referred to Zaida Gonzalez, RN  07/15/21 4221    "

## 2021-07-15 NOTE — OUTREACH NOTE
Prep Survey      Responses   [facility] facility patient discharged from?  [place]   Is LACE score < 7 ?  No   Emergency Room discharge w/ pulse ox?  No   Eligibility  Readm Mgmt   Discharge diagnosis  Cecal mass consistent with tubular adenoma status post laparoscopic right hemicolectomy   Does the patient have one of the following disease processes/diagnoses(primary or secondary)?  General Surgery   Does the patient have Home health ordered?  Yes   What is the Home health agency?   [facility]   Is there a DME ordered?  No   Prep survey completed?  Yes          Delmi Hood RN

## 2021-07-15 NOTE — ED NOTES
Patient wearing mask, nurse wearing mask and protective eyewear during care and assessment.  Hand hygiene performed prior to and post care.      Arnoldo Middleton RN  07/15/21 5513

## 2021-07-15 NOTE — CASE MANAGEMENT/SOCIAL WORK
Case Management Discharge Note      Final Note: DC'd home with [facility] . Michelle Osborne, DRE         Selected Continued Care - Discharged on 7/14/2021 Admission date: 7/5/2021 - Discharge disposition: Home or Self Care    Destination    No services have been selected for the patient.              Durable Medical Equipment    No services have been selected for the patient.              Dialysis/Infusion Coordination complete    Service Provider Selected Services Address Phone Fax Patient Preferred    [facility]  Dialysis [address] [address] [phone] --          Home Medical Care Coordination complete    Service Provider Selected Services Address Phone Fax Patient Preferred     [facility]  Home Health Services [address] [facility] [address] [address] [phone] --          Therapy    No services have been selected for the patient.              Community Resources    No services have been selected for the patient.              Community & DME    No services have been selected for the patient.                  Transportation Services  Private: Car    Final Discharge Disposition Code: 06 - home with home health care

## 2021-07-15 NOTE — ED PROVIDER NOTES
EMERGENCY DEPARTMENT ENCOUNTER  Patient was placed in face mask in first look and the following protective measures were taken unless additional measures were taken and documented below in the ED course. Patient was wearing facemask when I entered the room and throughout our encounter. I wore full protective equipment throughout this patient encounter including a face mask, and gloves. Hand hygiene was performed before donning protective equipment and after removal when leaving the room.    Room Number:  E462/1  Date of encounter:  7/15/2021  PCP: Laurent Cortes DO    HPI:  Context: Nellie Vegas is a 76 y.o. female who presents to the ED c/o chief complaint of right arm occluded dialysis catheter.  Patient states that she was released from the hospital yesterday.  She went to dialysis today and they were unable to access her right arm AV fistula.  She was not able to receive dialysis and she was advised to come to the emergency department for further evaluation and treatment.  She denies increase in shortness of air.  She denies fever or chills.  She denies pain in her right arm or chest.  Her nephrologist is Dr. Mitchell.  Her vascular surgeon is Dr. Gallagher.  Currently, she has no complaints outside of the dialysis fistula not working.    MEDICAL HISTORY REVIEW  Reviewed in [facility].  Patient was discharged yesterday after having a laparoscopic right hemicolectomy for cecal mass.  She had lower GI bleed with blood loss anemia.    PAST MEDICAL HISTORY  Active Ambulatory Problems     Diagnosis Date Noted   • ESRD (end stage renal disease) on dialysis (CMS/[facility]) 08/09/2019   • Thrombosis of kidney dialysis arteriovenous graft (CMS/[facility]) 08/09/2019   • Anemia of chronic renal failure, stage 5 (CMS/[facility]) 08/10/2019   • COPD exacerbation (CMS/[facility]) 11/28/2019   • Problem with dialysis access (CMS/[facility]) 12/07/2020   • Lower GI bleeding 07/05/2021   • Colonic mass 07/12/2021     Resolved Ambulatory Problems     Diagnosis Date  Noted   • No Resolved Ambulatory Problems     Past Medical History:   Diagnosis Date   • Anemia    • Anesthesia complication    • Arthritis    • Asthma    • COPD (chronic obstructive pulmonary disease) (CMS/[facility])    • Diabetes mellitus (CMS/HCC)    • Dialysis patient (CMS/[facility])    • Disease of thyroid gland    • GERD (gastroesophageal reflux disease)    • Headache    • History of blood clots    • History of kidney stones    • Hyperlipidemia    • Hypertension    • Knee pain, bilateral    • Renal disease    • Sleep apnea    • SOB (shortness of breath)    • Thrombosis of renal dialysis arteriovenous graft (CMS/[facility])    • Weakness        PAST SURGICAL HISTORY  Past Surgical History:   Procedure Laterality Date   • ARTERIOVENOUS FISTULA Right    • ARTERIOVENOUS FISTULA Left     REMOVED   • CENTRAL VENOUS CATHETER TUNNELED INSERTION DOUBLE LUMEN     •  SECTION     • COLON RESECTION Right 2021    Procedure: RIGHT HEMICOLECTOMY LAPAROSCOPIC;  Surgeon: Zbigniew Conner MD;  Location: [facility] OR;  Service: General;  Laterality: Right;   • COLONOSCOPY     • COLONOSCOPY N/A 2021    Procedure: COLONOSCOPY into cecum with biopsy;  Surgeon: Fabricio [facility] MD;  Location: [facility] ENDOSCOPY;  Service: Gastroenterology;  Laterality: N/A;  cecal mass   • POLYPECTOMY      UTERINE   • SHUNT O GRAM Right 2019    Procedure: RIGHT ARM DIALYSIS GRAFT revision and THROMBECTOMY;  Surgeon: Thierry Hardy MD;  Location: [facility] OR ;  Service: Vascular   • SHUNT O GRAM Right 2019    Procedure: RIGHT ARM DIALYSIS GRAFT THROMBECTOMY WITH STENTING;  Surgeon: Thierry Hardy MD;  Location: [facility] OR ;  Service: Vascular   • SHUNT O GRAM Right 2020    Procedure: RIGHT ARM ARTERIOVENOUS SHUNTOGRAM WITH THROMBECTOMY;  Surgeon: Thierry Hardy MD;  Location: [facility] OR ;  Service: Vascular;  Laterality: Right;   • SHUNT O GRAM Right 2021    Procedure: Right  arm dialysis shuntogram with shunt thrombectomy;  Surgeon: Shahram Gallagher MD;  Location: [facility] OR ;  Service: Vascular;  Laterality: Right;       FAMILY HISTORY  Family History   Problem Relation Age of Onset   • Malig Hyperthermia Neg Hx        SOCIAL HISTORY  Social History     Socioeconomic History   • Marital status:      Spouse name: Not on file   • Number of children: Not on file   • Years of education: Not on file   • Highest education level: Not on file   Tobacco Use   • Smoking status: Former Smoker     Packs/day: 0.00     Years: 4.00     Pack years: 0.00     Types: Cigarettes     Quit date:      Years since quittin.5   • Smokeless tobacco: Never Used   Vaping Use   • Vaping Use: Never used   Substance and Sexual Activity   • Alcohol use: No   • Drug use: No   • Sexual activity: Defer       ALLERGIES  Sulfa antibiotics and Latex    The patient's allergies have been reviewed    REVIEW OF SYSTEMS  All systems reviewed and negative except for those discussed in HPI.     PHYSICAL EXAM  I have reviewed the triage vital signs and nursing notes.  ED Triage Vitals   Temp Heart Rate Resp BP SpO2   07/15/21 1730 07/15/21 1730 07/15/21 1730 07/15/21 1902 07/15/21 1730   98.5 °F (36.9 °C) 73 20 139/79 98 %      Temp src Heart Rate Source Patient Position BP Location FiO2 (%)   07/15/21 1730 07/15/21 190 -- -- --   Tympanic Monitor          General: No acute distress.  HENT: NCAT, PERRL, Nares patent.  Eyes: no scleral icterus.  Neck: trachea midline, no ROM limitations.  CV: regular rhythm, regular rate.  Respiratory: normal effort, CTAB.  Abdomen: soft, nondistended, NTTP, no rebound tenderness, no guarding or rigidity  : deferred.  Musculoskeletal: Right arm AV fistula does not have a thrill or bruit.  It is nontender to palpation.  Neuro: alert, moves all extremities, follows commands.  Skin: warm, dry.    LAB RESULTS  Recent Results (from the past 24 hour(s))   Comprehensive  Metabolic Panel    Collection Time: 07/15/21  7:15 PM    Specimen: Blood   Result Value Ref Range    Glucose 83 65 - 99 mg/dL    BUN 24 (H) 8 - 23 mg/dL    Creatinine 6.17 (H) 0.57 - 1.00 mg/dL    Sodium 133 (L) 136 - 145 mmol/L    Potassium 4.0 3.5 - 5.2 mmol/L    Chloride 93 (L) 98 - 107 mmol/L    CO2 22.7 22.0 - 29.0 mmol/L    Calcium 7.6 (L) 8.6 - 10.5 mg/dL    Total Protein 5.7 (L) 6.0 - 8.5 g/dL    Albumin 3.00 (L) 3.50 - 5.20 g/dL    ALT (SGPT) 5 1 - 33 U/L    AST (SGOT) 16 1 - 32 U/L    Alkaline Phosphatase 96 39 - 117 U/L    Total Bilirubin 0.4 0.0 - 1.2 mg/dL    eGFR Non  Amer      eGFR  African Amer 8 (L) >60 mL/min/1.73    Globulin 2.7 gm/dL    A/G Ratio 1.1 g/dL    BUN/Creatinine Ratio 3.9 (L) 7.0 - 25.0    Anion Gap 17.3 (H) 5.0 - 15.0 mmol/L   CBC Auto Differential    Collection Time: 07/15/21  7:15 PM    Specimen: Blood   Result Value Ref Range    WBC 5.84 3.40 - 10.80 10*3/mm3    RBC 3.04 (L) 3.77 - 5.28 10*6/mm3    Hemoglobin 7.7 (L) 12.0 - 15.9 g/dL    Hematocrit 24.2 (L) 34.0 - 46.6 %    MCV 79.6 79.0 - 97.0 fL    MCH 25.3 (L) 26.6 - 33.0 pg    MCHC 31.8 31.5 - 35.7 g/dL    RDW 17.2 (H) 12.3 - 15.4 %    RDW-SD 48.9 37.0 - 54.0 fl    MPV 10.4 6.0 - 12.0 fL    Platelets 265 140 - 450 10*3/mm3    Neutrophil % 71.4 42.7 - 76.0 %    Lymphocyte % 7.9 (L) 19.6 - 45.3 %    Monocyte % 16.8 (H) 5.0 - 12.0 %    Eosinophil % 2.6 0.3 - 6.2 %    Basophil % 0.3 0.0 - 1.5 %    Immature Grans % 1.0 (H) 0.0 - 0.5 %    Neutrophils, Absolute 4.17 1.70 - 7.00 10*3/mm3    Lymphocytes, Absolute 0.46 (L) 0.70 - 3.10 10*3/mm3    Monocytes, Absolute 0.98 (H) 0.10 - 0.90 10*3/mm3    Eosinophils, Absolute 0.15 0.00 - 0.40 10*3/mm3    Basophils, Absolute 0.02 0.00 - 0.20 10*3/mm3    Immature Grans, Absolute 0.06 (H) 0.00 - 0.05 10*3/mm3    nRBC 0.0 0.0 - 0.2 /100 WBC       I ordered the above labs and reviewed the results.    RADIOLOGY  XR Chest 1 View    Result Date: 7/15/2021  XR CHEST 1 VW-  HISTORY:  Shortness of  air.  COMPARISON:  Chest radiograph 12/7/2020.  FINDINGS:  A single portable view of the chest was obtained. The cardiac silhouette is upper normal in size, not significantly changed. There is calcific aortic atherosclerosis. Hilar contours are not significantly changed. There are small bilateral pleural effusions, left greater than right. There is no focal consolidation. There is a vascular stent graft extending through the proximal right upper extremity and right axilla to the level of the SVC. There is a smaller vascular stent on the left. These are incompletely assessed. There are corresponding surgical staples. There is multilevel degenerative disc disease.  This report was finalized on 7/15/2021 8:28 PM by Dr. Radha Brothers M.D.        I ordered the above noted radiological studies. I reviewed the images and results. I agree with the radiologist interpretation.    PROCEDURES  Procedures    MEDICATIONS GIVEN IN ER  Medications   sodium chloride 0.9 % flush 10 mL (has no administration in time range)   nitroglycerin (NITROSTAT) SL tablet 0.4 mg (has no administration in time range)       PROGRESS, DATA ANALYSIS, CONSULTS, AND MEDICAL DECISION MAKING  A complete history and physical exam have been performed.  All available laboratory and imaging results have been reviewed by myself prior to disposition.    MDM  After the initial H&P, I discussed pertinent information from history and physical exam with patient/family.  Discussed differential diagnosis.  Discussed plan for ED evaluation/work-up/treatment.  All questions answered.  Patient/family is agreeable with plan.  ED Course as of Jul 15 2321   Thu Jul 15, 2021   1925 Patient presents with an occluded right arm dialysis AV fistula.  Will obtain basic blood work and a chest x-ray.  We will then contact her nephrologist and vascular surgeon.    [DE]   2036 Have updated patient and family about the results of the chest x-ray and blood work.  We will call her  nephrologist and vascular surgeon.    [DE]   2106 I discussed the case with Dr. Muller, vascular surgery.  He states that the office did try to get in contact with patient today to place a tunneled catheter.  They were unable to contact patient.  He is uncertain when they would be able to do it as an outpatient tomorrow.  He recommends admission to the hospital so they can obtain access.    [[place]]   2113 I discussed the case with Marilee Persaud.  He is going to bed patient to the hospital for further evaluation and treatment.    [DE]   2120 I discussed the case with Dr. Kang, neurology.  He will see patient in consult.    [DE]      ED Course User Index  [DE] Alban Johnson MD       AS OF 23:21 EDT VITALS:    BP - 179/87  HR - 69  TEMP - 98.5 °F (36.9 °C) (Tympanic)  O2 SATS - 99%    DIAGNOSIS  Final diagnoses:   Dialysis AV fistula malfunction, initial encounter (CMS/[facility])   Anemia, chronic disease   ESRD on hemodialysis (CMS/[facility])         DISPOSITION  ADMISSION    Discussed treatment plan and reason for admission with pt/family and admitting physician.  Pt/family voiced understanding of the plan for admission for further testing/treatment as needed.           Alban Johnson MD  07/15/21 5674

## 2021-07-16 ENCOUNTER — READMISSION MANAGEMENT (OUTPATIENT)
Dept: CALL CENTER | Facility: HOSPITAL | Age: 76
End: 2021-07-16

## 2021-07-16 ENCOUNTER — ANESTHESIA (OUTPATIENT)
Dept: PERIOP | Facility: HOSPITAL | Age: 76
End: 2021-07-16

## 2021-07-16 ENCOUNTER — ANESTHESIA EVENT (OUTPATIENT)
Dept: PERIOP | Facility: HOSPITAL | Age: 76
End: 2021-07-16

## 2021-07-16 ENCOUNTER — APPOINTMENT (OUTPATIENT)
Dept: GENERAL RADIOLOGY | Facility: HOSPITAL | Age: 76
End: 2021-07-16

## 2021-07-16 LAB
ANION GAP SERPL CALCULATED.3IONS-SCNC: 16.4 MMOL/L (ref 5–15)
BUN SERPL-MCNC: 27 MG/DL (ref 8–23)
BUN/CREAT SERPL: 3.8 (ref 7–25)
CALCIUM SPEC-SCNC: 7.6 MG/DL (ref 8.6–10.5)
CHLORIDE SERPL-SCNC: 96 MMOL/L (ref 98–107)
CO2 SERPL-SCNC: 21.6 MMOL/L (ref 22–29)
CREAT SERPL-MCNC: 7.06 MG/DL (ref 0.57–1)
DEPRECATED RDW RBC AUTO: 50.1 FL (ref 37–54)
ERYTHROCYTE [DISTWIDTH] IN BLOOD BY AUTOMATED COUNT: 17.2 % (ref 12.3–15.4)
GFR SERPL CREATININE-BSD FRML MDRD: 7 ML/MIN/1.73
GFR SERPL CREATININE-BSD FRML MDRD: ABNORMAL ML/MIN/{1.73_M2}
GLUCOSE BLDC GLUCOMTR-MCNC: 122 MG/DL (ref 70–130)
GLUCOSE BLDC GLUCOMTR-MCNC: 49 MG/DL (ref 70–130)
GLUCOSE BLDC GLUCOMTR-MCNC: 57 MG/DL (ref 70–130)
GLUCOSE BLDC GLUCOMTR-MCNC: 69 MG/DL (ref 70–130)
GLUCOSE SERPL-MCNC: 101 MG/DL (ref 65–99)
HCT VFR BLD AUTO: 23.8 % (ref 34–46.6)
HGB BLD-MCNC: 7.2 G/DL (ref 12–15.9)
MCH RBC QN AUTO: 24.6 PG (ref 26.6–33)
MCHC RBC AUTO-ENTMCNC: 30.3 G/DL (ref 31.5–35.7)
MCV RBC AUTO: 81.2 FL (ref 79–97)
PLATELET # BLD AUTO: 250 10*3/MM3 (ref 140–450)
PMV BLD AUTO: 10 FL (ref 6–12)
POTASSIUM SERPL-SCNC: 4.6 MMOL/L (ref 3.5–5.2)
RBC # BLD AUTO: 2.93 10*6/MM3 (ref 3.77–5.28)
SARS-COV-2 RNA RESP QL NAA+PROBE: NOT DETECTED
SODIUM SERPL-SCNC: 134 MMOL/L (ref 136–145)
WBC # BLD AUTO: 5.99 10*3/MM3 (ref 3.4–10.8)

## 2021-07-16 PROCEDURE — C1750 CATH, HEMODIALYSIS,LONG-TERM: HCPCS | Performed by: SURGERY

## 2021-07-16 PROCEDURE — 25010000003 LIDOCAINE 1 % SOLUTION 20 ML VIAL: Performed by: SURGERY

## 2021-07-16 PROCEDURE — 94799 UNLISTED PULMONARY SVC/PX: CPT

## 2021-07-16 PROCEDURE — G0378 HOSPITAL OBSERVATION PER HR: HCPCS

## 2021-07-16 PROCEDURE — 25010000002 HEPARIN (PORCINE) PER 1000 UNITS: Performed by: SURGERY

## 2021-07-16 PROCEDURE — 82962 GLUCOSE BLOOD TEST: CPT

## 2021-07-16 PROCEDURE — 0JHL3XZ INSERTION OF TUNNELED VASCULAR ACCESS DEVICE INTO RIGHT UPPER LEG SUBCUTANEOUS TISSUE AND FASCIA, PERCUTANEOUS APPROACH: ICD-10-PCS | Performed by: SURGERY

## 2021-07-16 PROCEDURE — 80048 BASIC METABOLIC PNL TOTAL CA: CPT | Performed by: HOSPITALIST

## 2021-07-16 PROCEDURE — 94640 AIRWAY INHALATION TREATMENT: CPT

## 2021-07-16 PROCEDURE — 85027 COMPLETE CBC AUTOMATED: CPT | Performed by: HOSPITALIST

## 2021-07-16 PROCEDURE — 94760 N-INVAS EAR/PLS OXIMETRY 1: CPT

## 2021-07-16 PROCEDURE — 06H033Z INSERTION OF INFUSION DEVICE INTO INFERIOR VENA CAVA, PERCUTANEOUS APPROACH: ICD-10-PCS | Performed by: SURGERY

## 2021-07-16 PROCEDURE — 25010000003 CEFAZOLIN IN DEXTROSE 2-4 GM/100ML-% SOLUTION: Performed by: SURGERY

## 2021-07-16 PROCEDURE — C1894 INTRO/SHEATH, NON-LASER: HCPCS | Performed by: SURGERY

## 2021-07-16 PROCEDURE — U0003 INFECTIOUS AGENT DETECTION BY NUCLEIC ACID (DNA OR RNA); SEVERE ACUTE RESPIRATORY SYNDROME CORONAVIRUS 2 (SARS-COV-2) (CORONAVIRUS DISEASE [COVID-19]), AMPLIFIED PROBE TECHNIQUE, MAKING USE OF HIGH THROUGHPUT TECHNOLOGIES AS DESCRIBED BY CMS-2020-01-R: HCPCS | Performed by: SURGERY

## 2021-07-16 PROCEDURE — 25010000002 PROPOFOL 10 MG/ML EMULSION: Performed by: NURSE ANESTHETIST, CERTIFIED REGISTERED

## 2021-07-16 RX ORDER — SODIUM CHLORIDE 0.9 % (FLUSH) 0.9 %
3 SYRINGE (ML) INJECTION EVERY 12 HOURS SCHEDULED
Status: DISCONTINUED | OUTPATIENT
Start: 2021-07-16 | End: 2021-07-16 | Stop reason: HOSPADM

## 2021-07-16 RX ORDER — DIPHENHYDRAMINE HCL 25 MG
25 CAPSULE ORAL
Status: DISCONTINUED | OUTPATIENT
Start: 2021-07-16 | End: 2021-07-16 | Stop reason: HOSPADM

## 2021-07-16 RX ORDER — PROPOFOL 10 MG/ML
VIAL (ML) INTRAVENOUS CONTINUOUS PRN
Status: DISCONTINUED | OUTPATIENT
Start: 2021-07-16 | End: 2021-07-16 | Stop reason: SURG

## 2021-07-16 RX ORDER — FAMOTIDINE 10 MG/ML
20 INJECTION, SOLUTION INTRAVENOUS ONCE
Status: COMPLETED | OUTPATIENT
Start: 2021-07-16 | End: 2021-07-16

## 2021-07-16 RX ORDER — METOPROLOL SUCCINATE 25 MG/1
25 TABLET, EXTENDED RELEASE ORAL EVERY MORNING
Status: DISCONTINUED | OUTPATIENT
Start: 2021-07-16 | End: 2021-07-17 | Stop reason: SDUPTHER

## 2021-07-16 RX ORDER — OXYCODONE AND ACETAMINOPHEN 10; 325 MG/1; MG/1
1 TABLET ORAL EVERY 4 HOURS PRN
Status: DISCONTINUED | OUTPATIENT
Start: 2021-07-16 | End: 2021-07-16 | Stop reason: HOSPADM

## 2021-07-16 RX ORDER — HYDRALAZINE HYDROCHLORIDE 20 MG/ML
5 INJECTION INTRAMUSCULAR; INTRAVENOUS
Status: DISCONTINUED | OUTPATIENT
Start: 2021-07-16 | End: 2021-07-16 | Stop reason: HOSPADM

## 2021-07-16 RX ORDER — FLUMAZENIL 0.1 MG/ML
0.2 INJECTION INTRAVENOUS AS NEEDED
Status: DISCONTINUED | OUTPATIENT
Start: 2021-07-16 | End: 2021-07-16 | Stop reason: HOSPADM

## 2021-07-16 RX ORDER — PROMETHAZINE HYDROCHLORIDE 25 MG/1
25 SUPPOSITORY RECTAL ONCE AS NEEDED
Status: DISCONTINUED | OUTPATIENT
Start: 2021-07-16 | End: 2021-07-16 | Stop reason: HOSPADM

## 2021-07-16 RX ORDER — BUDESONIDE AND FORMOTEROL FUMARATE DIHYDRATE 160; 4.5 UG/1; UG/1
2 AEROSOL RESPIRATORY (INHALATION) 2 TIMES DAILY
Status: DISCONTINUED | OUTPATIENT
Start: 2021-07-16 | End: 2021-07-16

## 2021-07-16 RX ORDER — HYDROMORPHONE HYDROCHLORIDE 1 MG/ML
0.5 INJECTION, SOLUTION INTRAMUSCULAR; INTRAVENOUS; SUBCUTANEOUS
Status: DISCONTINUED | OUTPATIENT
Start: 2021-07-16 | End: 2021-07-16 | Stop reason: HOSPADM

## 2021-07-16 RX ORDER — SODIUM CHLORIDE 0.9 % (FLUSH) 0.9 %
3-10 SYRINGE (ML) INJECTION AS NEEDED
Status: DISCONTINUED | OUTPATIENT
Start: 2021-07-16 | End: 2021-07-16 | Stop reason: HOSPADM

## 2021-07-16 RX ORDER — BUDESONIDE AND FORMOTEROL FUMARATE DIHYDRATE 160; 4.5 UG/1; UG/1
2 AEROSOL RESPIRATORY (INHALATION)
Status: DISCONTINUED | OUTPATIENT
Start: 2021-07-16 | End: 2021-07-17

## 2021-07-16 RX ORDER — GUAIFENESIN 600 MG/1
600 TABLET, EXTENDED RELEASE ORAL EVERY 12 HOURS SCHEDULED
Status: DISCONTINUED | OUTPATIENT
Start: 2021-07-16 | End: 2021-07-21 | Stop reason: HOSPADM

## 2021-07-16 RX ORDER — HEPARIN SODIUM 1000 [USP'U]/ML
INJECTION, SOLUTION INTRAVENOUS; SUBCUTANEOUS AS NEEDED
Status: DISCONTINUED | OUTPATIENT
Start: 2021-07-16 | End: 2021-07-16 | Stop reason: HOSPADM

## 2021-07-16 RX ORDER — LABETALOL HYDROCHLORIDE 5 MG/ML
5 INJECTION, SOLUTION INTRAVENOUS
Status: DISCONTINUED | OUTPATIENT
Start: 2021-07-16 | End: 2021-07-16 | Stop reason: HOSPADM

## 2021-07-16 RX ORDER — IBUPROFEN 600 MG/1
600 TABLET ORAL ONCE AS NEEDED
Status: DISCONTINUED | OUTPATIENT
Start: 2021-07-16 | End: 2021-07-16 | Stop reason: HOSPADM

## 2021-07-16 RX ORDER — PANTOPRAZOLE SODIUM 40 MG/1
40 TABLET, DELAYED RELEASE ORAL EVERY MORNING
Status: DISCONTINUED | OUTPATIENT
Start: 2021-07-16 | End: 2021-07-17 | Stop reason: SDUPTHER

## 2021-07-16 RX ORDER — CEFAZOLIN SODIUM 2 G/100ML
2 INJECTION, SOLUTION INTRAVENOUS ONCE
Status: COMPLETED | OUTPATIENT
Start: 2021-07-16 | End: 2021-07-16

## 2021-07-16 RX ORDER — MELATONIN
1000 DAILY
Status: DISCONTINUED | OUTPATIENT
Start: 2021-07-16 | End: 2021-07-21 | Stop reason: HOSPADM

## 2021-07-16 RX ORDER — SODIUM CHLORIDE, SODIUM LACTATE, POTASSIUM CHLORIDE, CALCIUM CHLORIDE 600; 310; 30; 20 MG/100ML; MG/100ML; MG/100ML; MG/100ML
9 INJECTION, SOLUTION INTRAVENOUS CONTINUOUS
Status: DISCONTINUED | OUTPATIENT
Start: 2021-07-16 | End: 2021-07-16

## 2021-07-16 RX ORDER — ONDANSETRON 2 MG/ML
4 INJECTION INTRAMUSCULAR; INTRAVENOUS ONCE AS NEEDED
Status: DISCONTINUED | OUTPATIENT
Start: 2021-07-16 | End: 2021-07-16 | Stop reason: HOSPADM

## 2021-07-16 RX ORDER — DIPHENHYDRAMINE HYDROCHLORIDE 50 MG/ML
12.5 INJECTION INTRAMUSCULAR; INTRAVENOUS
Status: DISCONTINUED | OUTPATIENT
Start: 2021-07-16 | End: 2021-07-16 | Stop reason: HOSPADM

## 2021-07-16 RX ORDER — FENTANYL CITRATE 50 UG/ML
50 INJECTION, SOLUTION INTRAMUSCULAR; INTRAVENOUS
Status: DISCONTINUED | OUTPATIENT
Start: 2021-07-16 | End: 2021-07-16 | Stop reason: HOSPADM

## 2021-07-16 RX ORDER — SODIUM CHLORIDE 9 MG/ML
9 INJECTION, SOLUTION INTRAVENOUS CONTINUOUS
Status: ACTIVE | OUTPATIENT
Start: 2021-07-16 | End: 2021-07-16

## 2021-07-16 RX ORDER — MIDAZOLAM HYDROCHLORIDE 1 MG/ML
0.5 INJECTION INTRAMUSCULAR; INTRAVENOUS
Status: DISCONTINUED | OUTPATIENT
Start: 2021-07-16 | End: 2021-07-16 | Stop reason: HOSPADM

## 2021-07-16 RX ORDER — LIDOCAINE HYDROCHLORIDE 10 MG/ML
0.5 INJECTION, SOLUTION EPIDURAL; INFILTRATION; INTRACAUDAL; PERINEURAL ONCE AS NEEDED
Status: DISCONTINUED | OUTPATIENT
Start: 2021-07-16 | End: 2021-07-16 | Stop reason: HOSPADM

## 2021-07-16 RX ORDER — NALOXONE HCL 0.4 MG/ML
0.2 VIAL (ML) INJECTION AS NEEDED
Status: DISCONTINUED | OUTPATIENT
Start: 2021-07-16 | End: 2021-07-16 | Stop reason: HOSPADM

## 2021-07-16 RX ORDER — PROPOFOL 10 MG/ML
VIAL (ML) INTRAVENOUS AS NEEDED
Status: DISCONTINUED | OUTPATIENT
Start: 2021-07-16 | End: 2021-07-16 | Stop reason: SURG

## 2021-07-16 RX ORDER — LIDOCAINE HYDROCHLORIDE 20 MG/ML
INJECTION, SOLUTION INFILTRATION; PERINEURAL AS NEEDED
Status: DISCONTINUED | OUTPATIENT
Start: 2021-07-16 | End: 2021-07-16 | Stop reason: SURG

## 2021-07-16 RX ORDER — HYDROCODONE BITARTRATE AND ACETAMINOPHEN 7.5; 325 MG/1; MG/1
1 TABLET ORAL ONCE AS NEEDED
Status: DISCONTINUED | OUTPATIENT
Start: 2021-07-16 | End: 2021-07-16 | Stop reason: HOSPADM

## 2021-07-16 RX ORDER — ALBUTEROL SULFATE 2.5 MG/3ML
2.5 SOLUTION RESPIRATORY (INHALATION) 3 TIMES DAILY PRN
Status: DISCONTINUED | OUTPATIENT
Start: 2021-07-16 | End: 2021-07-17 | Stop reason: SDUPTHER

## 2021-07-16 RX ORDER — EPHEDRINE SULFATE 50 MG/ML
5 INJECTION, SOLUTION INTRAVENOUS ONCE AS NEEDED
Status: DISCONTINUED | OUTPATIENT
Start: 2021-07-16 | End: 2021-07-16 | Stop reason: HOSPADM

## 2021-07-16 RX ORDER — MONTELUKAST SODIUM 10 MG/1
10 TABLET ORAL NIGHTLY
Status: DISCONTINUED | OUTPATIENT
Start: 2021-07-16 | End: 2021-07-17 | Stop reason: SDUPTHER

## 2021-07-16 RX ORDER — PROMETHAZINE HYDROCHLORIDE 25 MG/1
25 TABLET ORAL ONCE AS NEEDED
Status: DISCONTINUED | OUTPATIENT
Start: 2021-07-16 | End: 2021-07-16 | Stop reason: HOSPADM

## 2021-07-16 RX ADMIN — PROPOFOL 50 MG: 10 INJECTION, EMULSION INTRAVENOUS at 10:40

## 2021-07-16 RX ADMIN — METOPROLOL SUCCINATE 25 MG: 25 TABLET, EXTENDED RELEASE ORAL at 17:57

## 2021-07-16 RX ADMIN — LIDOCAINE HYDROCHLORIDE 80 MG: 20 INJECTION, SOLUTION INFILTRATION; PERINEURAL at 10:40

## 2021-07-16 RX ADMIN — CEFAZOLIN SODIUM 2 G: 2 INJECTION, SOLUTION INTRAVENOUS at 10:20

## 2021-07-16 RX ADMIN — MONTELUKAST SODIUM 10 MG: 10 TABLET, FILM COATED ORAL at 20:01

## 2021-07-16 RX ADMIN — Medication 1000 UNITS: at 17:57

## 2021-07-16 RX ADMIN — FAMOTIDINE 20 MG: 10 INJECTION INTRAVENOUS at 10:06

## 2021-07-16 RX ADMIN — BUDESONIDE AND FORMOTEROL FUMARATE DIHYDRATE 2 PUFF: 160; 4.5 AEROSOL RESPIRATORY (INHALATION) at 21:04

## 2021-07-16 RX ADMIN — SODIUM CHLORIDE 9 ML: 9 INJECTION, SOLUTION INTRAVENOUS at 10:06

## 2021-07-16 RX ADMIN — PROPOFOL 50 MCG/KG/MIN: 10 INJECTION, EMULSION INTRAVENOUS at 10:40

## 2021-07-16 RX ADMIN — PANTOPRAZOLE SODIUM 40 MG: 40 TABLET, DELAYED RELEASE ORAL at 17:57

## 2021-07-16 RX ADMIN — GUAIFENESIN 600 MG: 600 TABLET, EXTENDED RELEASE ORAL at 20:01

## 2021-07-16 NOTE — ADDENDUM NOTE
Addendum  created 07/16/21 1524 by Neelam Moran MD    Attestation recorded in Intraprocedure, Intraprocedure Attestations filed

## 2021-07-16 NOTE — CASE MANAGEMENT/SOCIAL WORK
Discharge Planning Assessment  [facility]     Patient Name: Nellie Vegas  MRN: [id number]  Today's Date: 7/16/2021    Admit Date: 7/15/2021    Discharge Needs Assessment     Row Name 07/16/21 1515       Living Environment    Lives With  child(paco), adult    Name(s) of Who Lives With Patient  Lily Vegas, daughter, [phone]    Current Living Arrangements  home/apartment/condo    Primary Care Provided by  self;child(paco)    Provides Primary Care For  no one, unable/limited ability to care for self    Family Caregiver if Needed  child(paco), adult    Family Caregiver Names  Lily Vegas, daughter, [phone]    Quality of Family Relationships  unable to assess    Able to Return to Prior Arrangements  yes       Resource/Environmental Concerns    Resource/Environmental Concerns  none       Transition Planning    Patient/Family Anticipates Transition to  home with family    Patient/Family Anticipated Services at Transition  home health care Current with [facility]     Transportation Anticipated  family or friend will provide       Discharge Needs Assessment    Readmission Within the Last 30 Days  no previous admission in last 30 days    Current Outpatient/Agency/Support Group  outpatient hemodialysis    Equipment Currently Used at Home  cpap;cane, straight;rollator    Concerns to be Addressed  care coordination/care conferences;decision-making;discharge planning    Anticipated Changes Related to Illness  none    Equipment Needed After Discharge  none    Outpatient/Agency/Support Group Needs  outpatient hemodialysis    Discharge Facility/Level of Care Needs  home with home health    Provided Post Acute Provider List?  Refused    Provided Post Acute Provider Quality & Resource List?  Refused    Current Discharge Risk  chronically ill        Discharge Plan     Row Name 07/16/21 1526       Plan    Plan  Home with daughter and [facility] . Continue HD and [facility]. Family to transport.     Patient/Family in Agreement with Plan  yes    Plan Comments  Met with pt. at bedside. Explained roll of . Face sheet and pharmacy verified. Pt lives with Lily Vegas, daughter, [phone]. DME equipment includes CPAP, rollator, and cane.  Pt is independent with ADLs. Pt is current with [facility]  and they will see her at D/C. Pt’s PCP is Dr. FREDDY Cortes. Pt enrolled with [facility]. At discharge, family will transport. PT goes to [facility] HD center TT. Pt admitted for recurrent thrombosis of AV fistula. Pt had Tunneled catheter insertion into the Right common femoral vein today. HD ordered. Per Vascular Surgery note, will likely need to stay in the hospital and have another attempted thrombectomy early next week. Explained that CCP would follow to assess for discharge needs.  Rob Manzanares RN-BC        Continued Care and Services - Admitted Since 7/15/2021     Home Medical Care Coordination complete    Service Provider Request Status Selected Services Address Phone Fax Patient Preferred     [facility]   Selected Home Health Services [address] [address] [phone] --            Selected Continued Care - Prior Encounters Includes selections from prior encounters from 4/16/2021 to 7/16/2021    Discharged on 7/14/2021 Admission date: 7/5/2021 - Discharge disposition: Home or Self Care    Dialysis/Infusion     Service Provider Selected Services Address Phone Fax Patient Preferred    [facility]  Dialysis [address] [address] [phone] --          Home Medical Care     Service Provider Selected Services Address Phone Fax Patient Preferred     [facility]  Home Health Services [address] [address] [phone] --                      Demographic Summary     Row Name 07/16/21 1513       General Information    Admission Type  observation     Arrived From  emergency department    Required Notices Provided  Observation Status Notice    Reason for Consult  discharge planning;decision-making;care coordination/care conference    Preferred Language  English     Used During This Interaction  no        Functional Status     Row Name 07/16/21 1515       Functional Status    Usual Activity Tolerance  moderate    Current Activity Tolerance  moderate       Functional Status, IADL    Medications  assistive equipment and person    Meal Preparation  assistive equipment and person    Housekeeping  assistive equipment and person    Laundry  assistive equipment and person    Shopping  assistive equipment and person       Mental Status    General Appearance WDL  WDL       Mental Status Summary    Recent Changes in Mental Status/Cognitive Functioning  no changes                Rob Manzanares, RN

## 2021-07-16 NOTE — OUTREACH NOTE
General Surgery Week 1 Survey      Responses   [facility] patient discharged from?  [place]   Does the patient have one of the following disease processes/diagnoses(primary or secondary)?  General Surgery   Week 1 attempt successful?  No   Revoke  Readmitted          Verónica Lu RN

## 2021-07-16 NOTE — OP NOTE
Date of Admission:  7/15/2021  07/16/21  Mode Bryant MD  [facility]    Preoperative Diagnosis: End stage renal disease    Postoperative Diagnosis: same as above    Procedure Performed:     1)  Tunneled catheter insertion into the Right common femoral vein  2)  Ultrasound guided vascular access  3)  Radiologic supervision of catheter tip placement  4) Inferior venacavagram    CPT 72299, 59695, 62215    Surgeon: Mode Bryant MD    Assistant:  None, Provided critical assistance in exposure, retraction, and suction that overall decrease blood loss and operative time.    Anesthesia: MAC with regional    Estimated Blood Loss:  Minimal    Findings:     PatentRight common femoral vein vein on ultrasound.  Under real time ultrasound visualization needle accessed of the vein was performed.  Ultrasound image of access printed and stored in the medical record.    Both blue and red ports draw and flush without resistance    Successful  placement of a 33 cm Palindrome catheter in the Right common femoral vein.    Implants:    Nothing was implanted during the procedure    Specimen: none    Complications: none    Dispo: to PACU    Indication for procedure: 76 y.o. female with thrombosed right upper extremity axillary artery to axillary vein loop Norris-Sterling graft with proximal Viabahn stents extending into the superior vena cava.  Patient with multiple prior accesses for tunneled catheters both jugular veins and previous right femoral vein tunneled catheters.  Discussed with patient and family.  They are aware if jugular veins not acceptable by ultrasound would need to the femoral vein approach.  Ultrasound performed in operating room prior to prepping patient with jugular veins very small sclerotic and chronically occluded bilaterally.  Right femoral vein was patent.  Plan was right femoral tunneled dialysis catheter.    Description of procedure:     The patient was taken to the operating room. Vein right femoral  was interrogated with an ultrasound which appeared to be adequate for catheter placement. Surgical sites were prepped and draped in the usual sterile fashion. A full surgical timeout was performed.  The skin overlying the vein was infiltrated with local. Under ultrasound guidance, the right femoral vein vein was accessed and wire was placed under fluoroscopic guidance into the cava.  11 bladed scalpel was used to make a half centimeter skin neck at the vascular insertion site.  Local anesthetic was used to anesthetize the tunneling tract of the palindrome catheter.  Half centimeters skin neck was made at the planned exit site of the catheter.  Inferior venacavogram was performed through the microcatheter sheath to confirm patency of the iliac vein and inferior vena cava.  There was no thrombus or stenosis noted.  Serial dilation was performed under fluoroscopic guidance to vascular access site with the supplied vascular dilators.  Peel-away sheath and dilator were placed under fluoroscopic guidance over the guidewire.  Palindrome catheter 33 cm was tunneled from the exit site to the vascular insertion site.  Wire and dilator were removed from the peel-away sheath.  Catheter was placed down into the peel-away sheath into the vena cava.  Catheter was withdrawn until adequate tip placement at the inferior vena caval level above the iliac bifurcation were widely patent on venacavogram under fluoroscopic guidance.  Fluoroscopy of the apex of the catheter was performed to ensure no kinking.  Catheter was secured in place with nylon sutures.  Thre vascular access site was closed using Vicryl suture.  Sterile dressings were applied throughout.    Mode Bryant MD  07/16/21    Active Hospital Problems    Diagnosis  POA   • Dialysis AV fistula malfunction, initial encounter (CMS/[facility]) [W64.658A]  Yes      Resolved Hospital Problems   No resolved problems to display.       .

## 2021-07-16 NOTE — ED NOTES
IV RN at bedside. Unsuccessful x2 by IV RN. DRE Oliva to try to obtain access with US.         Aury White, RN  07/15/21 7719

## 2021-07-16 NOTE — ANESTHESIA PREPROCEDURE EVALUATION
Anesthesia Evaluation     Patient summary reviewed and Nursing notes reviewed   NPO Solid Status: > 8 hours  NPO Liquid Status: > 8 hours           Airway   Mallampati: III  Neck ROM: full  No difficulty expected  Dental    (+) edentulous    Pulmonary     breath sounds clear to auscultation  (+) COPD, asthma,shortness of breath, sleep apnea,   Cardiovascular     Rhythm: regular    (+) hypertension, hyperlipidemia,       Neuro/Psych  (+) headaches, weakness,     GI/Hepatic/Renal/Endo    (+) obesity,  GERD, GI bleeding , renal disease dialysis, diabetes mellitus,     Musculoskeletal     Abdominal   (+) obese,    Substance History      OB/GYN          Other   arthritis,                      Anesthesia Plan    ASA 3     MAC     intravenous induction     Anesthetic plan, all risks, benefits, and alternatives have been provided, discussed and informed consent has been obtained with: patient.

## 2021-07-16 NOTE — CASE MANAGEMENT/SOCIAL WORK
Continued Stay Note  [facility]     Patient Name: Nellie Vegas  MRN: [id number]  Today's Date: 7/16/2021    Admit Date: 7/15/2021    Discharge Plan     Row Name 07/16/21 1525       Plan    Plan  Home with daughter and [facility]. Continue HD and [facility]. Family to transport.    Patient/Family in Agreement with Plan  yes    Plan Comments  Met with pt. at bedside. Explained roll of . Face sheet and pharmacy verified. Pt lives with Lily Vegas, daughter, [phone]. DME equipment includes CPAP, rollator, and cane.  Pt is independent with ADLs. Pt is current with [facility] and they will see her at D/C. Pt’s PCP is Dr. FREDDY Cortes. Pt enrolled with Meds to Bed. At discharge, family will transport. PT goes to [facility] HD center TT. Pt admitted for recurrent thrombosis of AV fistula. Pt had Tunneled catheter insertion into the Right common femoral vein today. HD ordered. Per Vascular Surgery note, will likely need to stay in the hospital and have another attempted thrombectomy early next week. Explained that CCP would follow to assess for discharge needs.  Rob Manzanares RN-BC        Discharge Codes    No documentation.             Rob Manzanares RN

## 2021-07-16 NOTE — ANESTHESIA POSTPROCEDURE EVALUATION
"Patient: Nellie Vegas    Procedure Summary     Date: 07/16/21 Room / Location[facility]    Anesthesia Start: 1032 Anesthesia Stop: 1129    Procedure: VENACAVAGRAM AND HEMODIALYSIS CATHETER INSERTION (Right ) Diagnosis: ESRD on hemodialysis (CMS/[facility])    Surgeons: Karson Muller MD Provider: Neelam Moran MD    Anesthesia Type: MAC ASA Status: 3          Anesthesia Type: MAC    Vitals  Vitals Value Taken Time   /74 07/16/21 1205   Temp 36.6 °C (97.8 °F) 07/16/21 1200   Pulse 59 07/16/21 1209   Resp 14 07/16/21 1200   SpO2 95 % 07/16/21 1207   Vitals shown include unvalidated device data.        Post Anesthesia Care and Evaluation    Patient location during evaluation: bedside  Patient participation: complete - patient participated  Level of consciousness: sleepy but conscious  Pain score: 0  Pain management: adequate  Airway patency: patent  Anesthetic complications: No anesthetic complications    Cardiovascular status: acceptable  Respiratory status: acceptable  Hydration status: acceptable    Comments: BP (!) 142/36 (BP Location: Left arm, Patient Position: Lying)   Pulse 55   Temp 36.6 °C (97.8 °F) (Oral)   Resp 14   Ht 152.4 cm (60\")   Wt 78.5 kg (173 lb)   SpO2 100%   BMI 33.79 kg/m²         "

## 2021-07-16 NOTE — H&P
History and physical    Primary care physician  Dr. Cortes    Chief complaint  AV fistula not working    History of present illness  76-year-old -American female who is well-known to our service with history of end-stage renal disease on hemodialysis COPD hypertension diabetes chronic anemia and gastroesophageal disease who was recently discharged from the hospital after work-up on lower GI bleed which revealed cecal mass underwent laparoscopic right hemicolectomy and biopsy came back positive for tubular adenoma.  Patient also have thrombectomy with shuntogram of the right AV fistula and discharge home in stable condition presented to [facility] emergency room with right arm occluded AV shunt as she went for dialysis and they were unable to assess right arm AV fistula.  Patient has no other complaint.  Patient denies any chest pain shortness of breath abdominal pain nausea vomiting diarrhea.  Patient denies any fever chills night sweats weight loss or weight gain.  Patient admitted for management.    PAST MEDICAL HISTORY  • Anemia     • Anesthesia complication     • Arthritis     • Asthma     • COPD (chronic obstructive pulmonary disease) (CMS/HCC)     • Diabetes mellitus (CMS/HCC)     • Dialysis patient (CMS/HCC)     • Disease of thyroid gland     • GERD (gastroesophageal reflux disease)     • Headache     • History of blood clots     • History of kidney stones     • Hyperlipidemia     • Hypertension     • Knee pain, bilateral     • Renal disease     • Sleep apnea     • SOB (shortness of breath)     • 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)     • Weakness        PAST SURGICAL HISTORY              Procedure Laterality Date   • ARTERIOVENOUS FISTULA Right     • ARTERIOVENOUS FISTULA Left       REMOVED   • CENTRAL VENOUS CATHETER TUNNELED INSERTION DOUBLE LUMEN       •  SECTION       • COLON RESECTION Right 2021     Procedure: RIGHT HEMICOLECTOMY LAPAROSCOPIC;  Surgeon:  Zbigniew Conner MD;  Location: [facility] MAIN OR;  Service: General;  Laterality: Right;   • COLONOSCOPY   2016   • COLONOSCOPY N/A 2021     Procedure: COLONOSCOPY into cecum with biopsy;  Surgeon: Fabricio Monroe MD;  Location: [facility] ENDOSCOPY;  Service: Gastroenterology;  Laterality: N/A;  cecal mass   • POLYPECTOMY         UTERINE   • SHUNT O GRAM Right 2019     Procedure: RIGHT ARM DIALYSIS GRAFT revision and THROMBECTOMY;  Surgeon: Thierry Hardy MD;  Location: [facility] OR ;  Service: Vascular   • SHUNT O GRAM Right 2019     Procedure: RIGHT ARM DIALYSIS GRAFT THROMBECTOMY WITH STENTING;  Surgeon: Thierry Hardy MD;  Location: [facility] OR ;  Service: Vascular   • SHUNT O GRAM Right 2020     Procedure: RIGHT ARM ARTERIOVENOUS SHUNTOGRAM WITH THROMBECTOMY;  Surgeon: Thierry Hardy MD;  Location: [facility] OR ;  Service: Vascular;  Laterality: Right;   • SHUNT O GRAM Right 2021     Procedure: Right arm dialysis shuntogram with shunt thrombectomy;  Surgeon: Shahram Gallagher MD;  Location: [facility] OR ;  Service: Vascular;  Laterality: Right;         FAMILY HISTORY           Problem Relation Age of Onset   • Malig Hyperthermia Neg Hx        SOCIAL HISTORY  Social History               Socioeconomic History   • Marital status:        Spouse name: Not on file   • Number of children: Not on file   • Years of education: Not on file   • Highest education level: Not on file   Tobacco Use   • Smoking status: Former Smoker       Packs/day: 0.00       Years: 4.00       Pack years: 0.00       Types: Cigarettes       Quit date:        Years since quittin.5   • Smokeless tobacco: Never Used   Vaping Use   • Vaping Use: Never used   Substance and Sexual Activity   • Alcohol use: No   • Drug use: No   • Sexual activity: Defer         ALLERGIES  Sulfa antibiotics and Latex  Home medications reviewed     REVIEW OF  "SYSTEMS  All systems reviewed and negative except for those discussed in HPI.      PHYSICAL EXAM  Blood pressure 142/74, pulse 64, temperature 98 °F (36.7 °C), temperature source Oral, resp. rate 18, height 152.4 cm (60\"), weight 78.5 kg (173 lb), SpO2 99 %, not currently breastfeeding.    General: No acute distress.  HENT: NCAT, PERRL, Nares patent.  Eyes: no scleral icterus.  Neck: trachea midline, no ROM limitations.  CV: regular rhythm, regular rate.  Respiratory: normal effort, CTAB.  Abdomen: soft, nondistended, NTTP, no rebound tenderness, no guarding or rigidity  Musculoskeletal: Right arm AV fistula does not have a thrill or bruit.  It is nontender to palpation.  Neuro: alert, moves all extremities, follows commands.  Skin: warm, dry.     LAB RESULTS  Lab Results (last 24 hours)     Procedure Component Value Units Date/Time    COVID PRE-OP / PRE-PROCEDURE SCREENING ORDER (NO ISOLATION) - Swab, Nasopharynx [[id number]]  (Normal) Collected: 07/16/21 0841    Specimen: Swab from Nasopharynx Updated: 07/16/21 1003    Narrative:      The following orders were created for panel order COVID PRE-OP / PRE-PROCEDURE SCREENING ORDER (NO ISOLATION) - Swab, Nasopharynx.  Procedure                               Abnormality         Status                     ---------                               -----------         ------                     COVID-19,[facility] IN-HOUSE...[[id number]]  Normal              Final result                 Please view results for these tests on the individual orders.    COVID-19,[facility] IN-HOUSE CEPHEID/TAB NP SWAB IN TRANSPORT MEDIA 8-12 HR TAT - Swab, Nasopharynx [[id number]]  (Normal) Collected: 07/16/21 0841    Specimen: Swab from Nasopharynx Updated: 07/16/21 1003     COVID19 Not Detected    Narrative:      Fact sheet for providers: https://www.fda.gov/media/525532/download     Fact sheet for patients: https://www.fda.gov/media/705435/download    CBC (No Diff) [[id number]]  (Abnormal) Collected: " 07/16/21 0747    Specimen: Blood Updated: 07/16/21 0821     WBC 5.99 10*3/mm3      RBC 2.93 10*6/mm3      Hemoglobin 7.2 g/dL      Hematocrit 23.8 %      MCV 81.2 fL      MCH 24.6 pg      MCHC 30.3 g/dL      RDW 17.2 %      RDW-SD 50.1 fl      MPV 10.0 fL      Platelets 250 10*3/mm3     Basic Metabolic Panel [[id number]]  (Abnormal) Collected: 07/16/21 0409    Specimen: Blood Updated: 07/16/21 0543     Glucose 101 mg/dL      BUN 27 mg/dL      Creatinine 7.06 mg/dL      Sodium 134 mmol/L      Potassium 4.6 mmol/L      Comment: Slight hemolysis detected by analyzer. Results may be affected.        Chloride 96 mmol/L      CO2 21.6 mmol/L      Calcium 7.6 mg/dL      eGFR  African Amer 7 mL/min/1.73      Comment: <15 Indicative of kidney failure.        eGFR Non  Amer --     Comment: <15 Indicative of kidney failure.        BUN/Creatinine Ratio 3.8     Anion Gap 16.4 mmol/L     Narrative:      GFR Normal >60  Chronic Kidney Disease <60  Kidney Failure <15      Comprehensive Metabolic Panel [[id number]]  (Abnormal) Collected: 07/15/21 1915    Specimen: Blood Updated: 07/15/21 2020     Glucose 83 mg/dL      BUN 24 mg/dL      Creatinine 6.17 mg/dL      Sodium 133 mmol/L      Potassium 4.0 mmol/L      Comment: Slight hemolysis detected by analyzer. Results may be affected.        Chloride 93 mmol/L      CO2 22.7 mmol/L      Calcium 7.6 mg/dL      Total Protein 5.7 g/dL      Albumin 3.00 g/dL      ALT (SGPT) 5 U/L      AST (SGOT) 16 U/L      Alkaline Phosphatase 96 U/L      Total Bilirubin 0.4 mg/dL      eGFR Non  Amer --     Comment: <15 Indicative of kidney failure.        eGFR  African Amer 8 mL/min/1.73      Comment: <15 Indicative of kidney failure.        Globulin 2.7 gm/dL      A/G Ratio 1.1 g/dL      BUN/Creatinine Ratio 3.9     Anion Gap 17.3 mmol/L     Narrative:      GFR Normal >60  Chronic Kidney Disease <60  Kidney Failure <15      CBC & Differential [[id number]]  (Abnormal) Collected: 07/15/21 1915     Specimen: Blood Updated: 07/15/21 1924    Narrative:      The following orders were created for panel order CBC & Differential.  Procedure                               Abnormality         Status                     ---------                               -----------         ------                     CBC Auto Differential[[id number]]        Abnormal            Final result                 Please view results for these tests on the individual orders.    CBC Auto Differential [[id number]]  (Abnormal) Collected: 07/15/21 1915    Specimen: Blood Updated: 07/15/21 1924     WBC 5.84 10*3/mm3      RBC 3.04 10*6/mm3      Hemoglobin 7.7 g/dL      Hematocrit 24.2 %      MCV 79.6 fL      MCH 25.3 pg      MCHC 31.8 g/dL      RDW 17.2 %      RDW-SD 48.9 fl      MPV 10.4 fL      Platelets 265 10*3/mm3      Neutrophil % 71.4 %      Lymphocyte % 7.9 %      Monocyte % 16.8 %      Eosinophil % 2.6 %      Basophil % 0.3 %      Immature Grans % 1.0 %      Neutrophils, Absolute 4.17 10*3/mm3      Lymphocytes, Absolute 0.46 10*3/mm3      Monocytes, Absolute 0.98 10*3/mm3      Eosinophils, Absolute 0.15 10*3/mm3      Basophils, Absolute 0.02 10*3/mm3      Immature Grans, Absolute 0.06 10*3/mm3      nRBC 0.0 /100 WBC         Imaging Results (Last 24 Hours)     Procedure Component Value Units Date/Time    Arteriogram (Autofinalize) [[id number]] Resulted: 07/16/21 1116     Updated: 07/16/21 1116    Narrative:      This procedure was auto-finalized with no dictation required.    XR Chest 1 View [[id number]] Collected: 07/15/21 2026     Updated: 07/15/21 2031    Narrative:      XR CHEST 1 VW-     HISTORY:  Shortness of air.     COMPARISON:  Chest radiograph 12/7/2020.     FINDINGS:    A single portable view of the chest was obtained. The cardiac silhouette  is upper normal in size, not significantly changed. There is calcific  aortic atherosclerosis. Hilar contours are not significantly changed.  There are small bilateral pleural effusions, left  greater than right.  There is no focal consolidation. There is a vascular stent graft  extending through the proximal right upper extremity and right axilla to  the level of the SVC. There is a smaller vascular stent on the left.  These are incompletely assessed. There are corresponding surgical  staples. There is multilevel degenerative disc disease.     This report was finalized on 7/15/2021 8:28 PM by Dr. Radha Brothers M.D.             Current Facility-Administered Medications:   •  albuterol (PROVENTIL) nebulizer solution 0.083% 2.5 mg/3mL, 2.5 mg, Nebulization, TID PRN, Haja Chowdhury MD  •  budesonide-formoterol (SYMBICORT) 160-4.5 MCG/ACT inhaler 2 puff, 2 puff, Inhalation, BID, Haja Chowdhury MD  •  cholecalciferol (VITAMIN D3) tablet 1,000 Units, 1,000 Units, Oral, Daily, Haja Chowdhury MD  •  diphenhydrAMINE (BENADRYL) capsule 25 mg, 25 mg, Oral, Q30 Min PRN, Archana Reed CRNA  •  diphenhydrAMINE (BENADRYL) injection 12.5 mg, 12.5 mg, Intravenous, Q15 Min PRN, Archana Reed CRNA  •  ePHEDrine injection 5 mg, 5 mg, Intravenous, Once PRN, Archana Reed CRNA  •  epoetin polina-epbx (RETACRIT) injection 10,000 Units, 10,000 Units, Intravenous, Once per day on Mon Wed Fri, Chester Kang MD  •  fentaNYL citrate (PF) (SUBLIMAZE) injection 50 mcg, 50 mcg, Intravenous, Q10 Min PRN, Kendall Harvey MD  •  fentaNYL citrate (PF) (SUBLIMAZE) injection 50 mcg, 50 mcg, Intravenous, Q5 Min PRN, Archana Reed CRNA  •  flumazenil (ROMAZICON) injection 0.2 mg, 0.2 mg, Intravenous, PRN, Archana Reed CRNA  •  guaiFENesin (MUCINEX) 12 hr tablet 600 mg, 600 mg, Oral, Q12H, Haja Chowdhury MD  •  hydrALAZINE (APRESOLINE) injection 5 mg, 5 mg, Intravenous, Q10 Min PRN, rAchana Reed CRNA  •  HYDROcodone-acetaminophen (NORCO) 7.5-325 MG per tablet 1 tablet, 1 tablet, Oral, Once PRN, Archana Reed CRNA  •  HYDROmorphone (DILAUDID) injection 0.5 mg, 0.5 mg,  Intravenous, Q5 Min PRN, Archana Reed CRNA  •  ibuprofen (ADVIL,MOTRIN) tablet 600 mg, 600 mg, Oral, Once PRN, Archana Reed CRNA  •  labetalol (NORMODYNE,TRANDATE) injection 5 mg, 5 mg, Intravenous, Q5 Min PRN, Archana Reed CRNA  •  lidocaine PF 1% (XYLOCAINE) injection 0.5 mL, 0.5 mL, Injection, Once PRN, Kendall Harvey MD  •  metoprolol succinate XL (TOPROL-XL) 24 hr tablet 25 mg, 25 mg, Oral, QAM, Haja Chowdhury MD  •  midazolam (VERSED) injection 0.5 mg, 0.5 mg, Intravenous, Q10 Min PRN, Kendall Harvey MD  •  montelukast (SINGULAIR) tablet 10 mg, 10 mg, Oral, Nightly, Haja Chowdhury MD  •  naloxone (NARCAN) injection 0.2 mg, 0.2 mg, Intravenous, PRN, Archana Reed CRNA  •  [MAR Hold] nitroglycerin (NITROSTAT) SL tablet 0.4 mg, 0.4 mg, Sublingual, Q5 Min PRN, Haja Chowdhury MD  •  ondansetron (ZOFRAN) injection 4 mg, 4 mg, Intravenous, Once PRN, Archana Reed CRNA  •  oxyCODONE-acetaminophen (PERCOCET)  MG per tablet 1 tablet, 1 tablet, Oral, Q4H PRN, Archana Reed CRNA  •  pantoprazole (PROTONIX) EC tablet 40 mg, 40 mg, Oral, QAM, Haja Chowdhury MD  •  promethazine (PHENERGAN) suppository 25 mg, 25 mg, Rectal, Once PRN **OR** promethazine (PHENERGAN) tablet 25 mg, 25 mg, Oral, Once PRN, Archana Reed CRNA  •  [COMPLETED] Insert peripheral IV, , , Once **AND** [MAR Hold] sodium chloride 0.9 % flush 10 mL, 10 mL, Intravenous, PRN, Alban Johnson MD  •  sodium chloride 0.9 % flush 3 mL, 3 mL, Intravenous, Q12H, Kendall Harvey MD  •  sodium chloride 0.9 % flush 3-10 mL, 3-10 mL, Intravenous, PRN, Kendall Harvey MD     ASSESSMENT  Malfunctioning right AV fistula  Recurrent thrombosis right AV shunt  End-stage renal disease on hemodialysis  Recent right laparoscopic hemicolectomy secondary to tubular adenoma  Status post lower GI bleed  Status post recent thrombectomy with shuntogram of right AV fistula  COPD  Diabetes  mellitus  Hypertension  Chronic anemia  Gastroesophageal reflux disease    PLAN  Admit  Vascular surgery consult  Nephrology to follow patient  Continue home medications  Stress ulcer DVT prophylaxis  Supportive care  Patient is full code  Discussed with nursing staff  Follow closely further recommendation current hospital course    ALLNA MARTIN MD

## 2021-07-16 NOTE — CONSULTS
[facility]                                                                                             Nephrology Initial Consult Note    Patient Identification:  Name: Nellie Vegas MRN: [id number]  Age: 76 y.o. : 1945  Sex: female  Date:2021    Requesting Physician: As per consult order.  Reason for Consultation: ESRD  Information from:patient/ family/ chart      History of Present Illness: This is a 76 y.o. year old female with end-stage renal disease, well-known to me from the outpatient setting and also from a recent admission for lower GI bleeding, eventually found to have a right colon mass status post hemicolectomy.  After bowel function returned she stabilized and was discharged but her Eliquis was held.  She also had problems with AV fistula cannulation during recent admission and with outpatient dialysis, shuntogram with thrombectomy was performed and the shunt subsequently functioned well.  She showed up at dialysis yesterday and the shunt was found to be occluded and she was sent to the emergency room and did not receive any hemodialysis.  Vascular is planning a tunnel cath today with eventual thrombectomy.  He is not short of breath, O2 sats 99% and electrolytes are stable.  Chest x-ray shows small effusions no pulmonary edema    The following medical history and medications personally reviewed by me:    Problem List:   Patient Active Problem List    Diagnosis    • Dialysis AV fistula malfunction, initial encounter (CMS/[facility]) [T82.590A]    • Colonic mass [K63.89]    • Lower GI bleeding [K92.2]    • Problem with dialysis access (CMS/[facility]) [T82.898A]    • COPD exacerbation (CMS/[facility]) [J44.1]    • Anemia of chronic renal failure, stage 5 (CMS/[facility]) [N18.5, D63.1]    • ESRD (end stage renal disease) on dialysis (CMS/[facility]) [N18.6, Z99.2]    • Thrombosis of kidney dialysis arteriovenous  graft (CMS/[facility]) [T82.868A]        Past Medical History:  Past Medical History:   Diagnosis Date   • Anemia    • Anesthesia complication     mother woke up in combative state   • Arthritis     knees   • Asthma    • COPD (chronic obstructive pulmonary disease) (CMS/[facility])    • Diabetes mellitus (CMS/[facility])     type 2   • Dialysis patient (CMS/[facility])    • Disease of thyroid gland    • GERD (gastroesophageal reflux disease)    • Headache     after dialysis   • History of blood clots     BUE   • History of kidney stones    • Hyperlipidemia    • Hypertension    • Knee pain, bilateral    • Renal disease    • Sleep apnea     CPAP   • SOB (shortness of breath)    • Thrombosis of renal dialysis arteriovenous graft (CMS/[facility])    • Weakness        Past Surgical History:  Past Surgical History:   Procedure Laterality Date   • ARTERIOVENOUS FISTULA Right    • ARTERIOVENOUS FISTULA Left     REMOVED   • CENTRAL VENOUS CATHETER TUNNELED INSERTION DOUBLE LUMEN     •  SECTION     • COLON RESECTION Right 2021    Procedure: RIGHT HEMICOLECTOMY LAPAROSCOPIC;  Surgeon: Zbigniew Conner MD;  Location: [facility] OR;  Service: General;  Laterality: Right;   • COLONOSCOPY     • COLONOSCOPY N/A 2021    Procedure: COLONOSCOPY into cecum with biopsy;  Surgeon: Fabricio Monroe MD;  Location: [facility] ENDOSCOPY;  Service: Gastroenterology;  Laterality: N/A;  cecal mass   • POLYPECTOMY      UTERINE   • SHUNT O GRAM Right 2019    Procedure: RIGHT ARM DIALYSIS GRAFT revision and THROMBECTOMY;  Surgeon: Thierry Hardy MD;  Location: [facility] OR ;  Service: Vascular   • SHUNT O GRAM Right 2019    Procedure: RIGHT ARM DIALYSIS GRAFT THROMBECTOMY WITH STENTING;  Surgeon: Thierry Hardy MD;  Location: [facility] OR ;  Service: Vascular   • SHUNT O GRAM Right 2020    Procedure: RIGHT ARM ARTERIOVENOUS SHUNTOGRAM WITH THROMBECTOMY;  Surgeon: Thierry Hardy MD;  Location: [facility] OR  18/19;  Service: Vascular;  Laterality: Right;   • SHUNT O GRAM Right 7/12/2021    Procedure: Right arm dialysis shuntogram with shunt thrombectomy;  Surgeon: Shahram Gallagher MD;  Location: [facility] 18/19;  Service: Vascular;  Laterality: Right;        Home Meds:   Medications Prior to Admission   Medication Sig Dispense Refill Last Dose   • acetaminophen (TYLENOL) 325 MG tablet Take 650 mg by mouth Every 6 (Six) Hours As Needed for Mild Pain .   7/14/2021 at Unknown time   • albuterol (PROVENTIL) (2.5 MG/3ML) 0.083% nebulizer solution Take 2.5 mg by nebulization 3 (Three) Times a Day As Needed for Wheezing. UNSURE OF DOSAGE   7/14/2021 at Unknown time   • albuterol sulfate  (90 Base) MCG/ACT inhaler INHALE 2 PUFFS INTO THE LUNGS EVERY 4 HOURS AS NEEDED FOR WHEEZING   7/14/2021 at Unknown time   • budesonide-formoterol (SYMBICORT) 160-4.5 MCG/ACT inhaler Inhale 2 puffs 2 (Two) Times a Day.   7/14/2021 at Unknown time   • Cholecalciferol (VITAMIN D3) 5000 units capsule capsule Take 5,000 Units by mouth Every Morning.   7/14/2021 at Unknown time   • guaiFENesin (MUCINEX) 600 MG 12 hr tablet Take 1 tablet by mouth Every 12 (Twelve) Hours.   7/14/2021 at Unknown time   • metoprolol succinate XL (TOPROL-XL) 25 MG 24 hr tablet Take 25 mg by mouth Every Morning.   7/14/2021 at Unknown time   • montelukast (SINGULAIR) 10 MG tablet Take 10 mg by mouth Every Night.   7/14/2021 at Unknown time   • omeprazole (priLOSEC) 40 MG capsule Take 40 mg by mouth Every Morning.   7/14/2021 at Unknown time       Current Meds:   Current Facility-Administered Medications   Medication Dose Route Frequency Provider Last Rate Last Admin   • [MAR Hold] nitroglycerin (NITROSTAT) SL tablet 0.4 mg  0.4 mg Sublingual Q5 Min PRN Haja Chowdhury MD       • [MAR Hold] sodium chloride 0.9 % flush 10 mL  10 mL Intravenous PRN Alban Johnson MD           Allergies:  Allergies   Allergen Reactions   • Sulfa Antibiotics Hives   •  "Latex Rash       Social History:   Social History     Socioeconomic History   • Marital status:      Spouse name: Not on file   • Number of children: Not on file   • Years of education: Not on file   • Highest education level: Not on file   Tobacco Use   • Smoking status: Former Smoker     Packs/day: 0.00     Years: 4.00     Pack years: 0.00     Types: Cigarettes     Quit date:      Years since quittin.5   • Smokeless tobacco: Never Used   Vaping Use   • Vaping Use: Never used   Substance and Sexual Activity   • Alcohol use: No   • Drug use: No   • Sexual activity: Defer        Family History:  Family History   Problem Relation Age of Onset   • Malig Hyperthermia Neg Hx         Review of Systems: as per HPI, in addition:    General:      Denies weakness / fatigue,                       No fevers / chills                       no weight loss  HEENT:       no dysphagia / odynophagia  Neck:           normal range of motion, no swelling  Respiratory: no cough / congestion                      No shortness of air                       No wheezing  CV:              No chest pain                       No palpitations  Abdomen/GI: no nausea / vomiting                      No diarrhea / constipation                      No abdominal pain  :             no dysuria / urinary frequency                       No urgency, normal output  Endocrine:   no polyuria / polydipsia,                      No heat or cold intolerance  Skin:           no rashes or skin breakdown   Vascular:   No edema                     No claudication  Psych:        no depression/ anxiety  Neuro:        no focal weakness, no seizures  Musculoskeletal: no joint pain or deformities      Physical Exam:  Vitals:   Temp (24hrs), Av °F (36.7 °C), Min:97.3 °F (36.3 °C), Max:98.5 °F (36.9 °C)    /74 (BP Location: Left arm, Patient Position: Lying)   Pulse 64   Temp 98 °F (36.7 °C) (Oral)   Resp 18   Ht 152.4 cm (60\")   Wt 78.5 kg " (173 lb)   SpO2 99%   BMI 33.79 kg/m²   Intake/Output:     Intake/Output Summary (Last 24 hours) at 7/16/2021 0950  Last data filed at 7/16/2021 0300  Gross per 24 hour   Intake 0 ml   Output --   Net 0 ml        Wt Readings from Last 1 Encounters:   07/15/21 2156 78.5 kg (173 lb)   07/15/21 1902 79.8 kg (175 lb 14.8 oz)       Exam:    General Appearance:  Awake, alert, oriented x3, no acute distress  Obese, well-appearing   Head and Face:  Normocephalic, atraumatic, mucus membranes moist, oropharynx clear   Eyes:  No icterus, pupils equal round and reactive to light, extraocular movements intact    ENMT: Moist mucosa, tongue symmetric    Neck: Supple  no jugular venous distention  no thyromegaly   Pulmonary:  Respiratory effort: Normal  Auscultation of lungs: Clear bilaterally  No wheezes  No rhonchi  Good air movement, good expansion   Chest wall:  No tenderness or deformity   Cardiovascular:  Auscultation of the heart: Normal rhythm, no murmurs  Trace edema of bilateral lower extremities, occluded left AV shunt with no thrill or bruit   Abdomen:  Abdomen: soft, non-tender, normal bowel sounds all four quadrants, no masses   Liver and spleen: no hepatosplenomegaly   Musculoskeletal: Digits and nails: normal  Normal range of motion  No joint swelling or gross deformities    Skin: Skin inspection: color normal, no visible rashes or lesions  Skin palpation: texture, turgor normal, no palpable lesions   Lymphatic:  no cervical lymphadenopathy    Psychiatric: Judgement and insight: normal  Orientation to person place and time: normal  Mood and affect: normal       DATA:  Radiology and Labs:  The following labs independently reviewed by me, additional AM labs ordered  Old records independently reviewed showing recent hospital records reviewed  The following radiologic studies independently viewed by me, findings x-ray as summarized above  Interval notes, chart personally reviewed by me.  I have reviewed and summarized  old records as detailed above  Plan of care discussed with emergency room provider and holding room RN  New problems include occluded… AV shunt    Dialysis patient access: Occluded left arm AV fistula    Risk/ complexity of medical care/ medical decision making: High complexity, need for surgery today for new access and hemodialysis to follow  Chronic illness with severe exacerbation or progression      Labs:   Recent Results (from the past 24 hour(s))   Comprehensive Metabolic Panel    Collection Time: 07/15/21  7:15 PM    Specimen: Blood   Result Value Ref Range    Glucose 83 65 - 99 mg/dL    BUN 24 (H) 8 - 23 mg/dL    Creatinine 6.17 (H) 0.57 - 1.00 mg/dL    Sodium 133 (L) 136 - 145 mmol/L    Potassium 4.0 3.5 - 5.2 mmol/L    Chloride 93 (L) 98 - 107 mmol/L    CO2 22.7 22.0 - 29.0 mmol/L    Calcium 7.6 (L) 8.6 - 10.5 mg/dL    Total Protein 5.7 (L) 6.0 - 8.5 g/dL    Albumin 3.00 (L) 3.50 - 5.20 g/dL    ALT (SGPT) 5 1 - 33 U/L    AST (SGOT) 16 1 - 32 U/L    Alkaline Phosphatase 96 39 - 117 U/L    Total Bilirubin 0.4 0.0 - 1.2 mg/dL    eGFR Non  Amer      eGFR  African Amer 8 (L) >60 mL/min/1.73    Globulin 2.7 gm/dL    A/G Ratio 1.1 g/dL    BUN/Creatinine Ratio 3.9 (L) 7.0 - 25.0    Anion Gap 17.3 (H) 5.0 - 15.0 mmol/L   CBC Auto Differential    Collection Time: 07/15/21  7:15 PM    Specimen: Blood   Result Value Ref Range    WBC 5.84 3.40 - 10.80 10*3/mm3    RBC 3.04 (L) 3.77 - 5.28 10*6/mm3    Hemoglobin 7.7 (L) 12.0 - 15.9 g/dL    Hematocrit 24.2 (L) 34.0 - 46.6 %    MCV 79.6 79.0 - 97.0 fL    MCH 25.3 (L) 26.6 - 33.0 pg    MCHC 31.8 31.5 - 35.7 g/dL    RDW 17.2 (H) 12.3 - 15.4 %    RDW-SD 48.9 37.0 - 54.0 fl    MPV 10.4 6.0 - 12.0 fL    Platelets 265 140 - 450 10*3/mm3    Neutrophil % 71.4 42.7 - 76.0 %    Lymphocyte % 7.9 (L) 19.6 - 45.3 %    Monocyte % 16.8 (H) 5.0 - 12.0 %    Eosinophil % 2.6 0.3 - 6.2 %    Basophil % 0.3 0.0 - 1.5 %    Immature Grans % 1.0 (H) 0.0 - 0.5 %    Neutrophils, Absolute  4.17 1.70 - 7.00 10*3/mm3    Lymphocytes, Absolute 0.46 (L) 0.70 - 3.10 10*3/mm3    Monocytes, Absolute 0.98 (H) 0.10 - 0.90 10*3/mm3    Eosinophils, Absolute 0.15 0.00 - 0.40 10*3/mm3    Basophils, Absolute 0.02 0.00 - 0.20 10*3/mm3    Immature Grans, Absolute 0.06 (H) 0.00 - 0.05 10*3/mm3    nRBC 0.0 0.0 - 0.2 /100 WBC   Basic Metabolic Panel    Collection Time: 07/16/21  4:09 AM    Specimen: Blood   Result Value Ref Range    Glucose 101 (H) 65 - 99 mg/dL    BUN 27 (H) 8 - 23 mg/dL    Creatinine 7.06 (H) 0.57 - 1.00 mg/dL    Sodium 134 (L) 136 - 145 mmol/L    Potassium 4.6 3.5 - 5.2 mmol/L    Chloride 96 (L) 98 - 107 mmol/L    CO2 21.6 (L) 22.0 - 29.0 mmol/L    Calcium 7.6 (L) 8.6 - 10.5 mg/dL    eGFR  African Amer 7 (L) >60 mL/min/1.73    eGFR Non African Amer      BUN/Creatinine Ratio 3.8 (L) 7.0 - 25.0    Anion Gap 16.4 (H) 5.0 - 15.0 mmol/L   CBC (No Diff)    Collection Time: 07/16/21  7:47 AM    Specimen: Blood   Result Value Ref Range    WBC 5.99 3.40 - 10.80 10*3/mm3    RBC 2.93 (L) 3.77 - 5.28 10*6/mm3    Hemoglobin 7.2 (L) 12.0 - 15.9 g/dL    Hematocrit 23.8 (L) 34.0 - 46.6 %    MCV 81.2 79.0 - 97.0 fL    MCH 24.6 (L) 26.6 - 33.0 pg    MCHC 30.3 (L) 31.5 - 35.7 g/dL    RDW 17.2 (H) 12.3 - 15.4 %    RDW-SD 50.1 37.0 - 54.0 fl    MPV 10.0 6.0 - 12.0 fL    Platelets 250 140 - 450 10*3/mm3       Radiology:  Imaging Results (Last 24 Hours)     Procedure Component Value Units Date/Time    XR Chest 1 View [[id number]] Collected: 07/15/21 2026     Updated: 07/15/21 2031    Narrative:      XR CHEST 1 VW-     HISTORY:  Shortness of air.     COMPARISON:  Chest radiograph 12/7/2020.     FINDINGS:    A single portable view of the chest was obtained. The cardiac silhouette  is upper normal in size, not significantly changed. There is calcific  aortic atherosclerosis. Hilar contours are not significantly changed.  There are small bilateral pleural effusions, left greater than right.  There is no focal consolidation.  There is a vascular stent graft  extending through the proximal right upper extremity and right axilla to  the level of the SVC. There is a smaller vascular stent on the left.  These are incompletely assessed. There are corresponding surgical  staples. There is multilevel degenerative disc disease.     This report was finalized on 7/15/2021 8:28 PM by Dr. Radha Brothers M.D.                  ASSESSMENT:   ESRD, normal HD schedule Tuesday Thursday Saturday, did not have HD yesterday    Dialysis AV fistula malfunction, initial encounter (CMS/[facility])  Cecal mass status post hemicolectomy last week  Recent lower GI bleed  Anemia of CKD  Hypertension  COPD  Recurrent AV fistula dysfunction      PLAN:   Vascular is planning for a tunneled dialysis catheter placement today with HD to follow  We will discuss further with vascular about plans to try to salvage her upper extremity shunt  May need to go back on Eliquis even though she did recently have a hemicolectomy  Continue current BP regimen  Epogen with HD for anemia, binders with meals  UF as tolerated    Continue to monitor electrolytes and volume closely  I appreciate the consult request, please call if any questions      Chester Knag M.D  [facility]  Office phone number: [phone]  Answering service phone number: [phone]        7/16/2021        Dictation via Dragon dictation software

## 2021-07-16 NOTE — DISCHARGE PLACEMENT REQUEST
"Nellie Devries (76 y.o. Female)     Date of Birth Social Security Number Address Home Phone MRN    1945  [id number] [place]   [place] [address] [phone] [id number]    Confucianist Marital Status          Non-Jain        Admission Date Admission Type Admitting Provider Attending Provider Department, Room/Bed    7/15/21 Emergency Haja Chowdhury MD Ahmed, Aftab, MD [address], [address]    Discharge Date Discharge Disposition Discharge Destination                       Attending Provider: Haja Chowdhury MD    Allergies: Sulfa Antibiotics, Latex    Isolation: None   Infection: None   Code Status: CPR    Ht: 152.4 cm (60\")   Wt: 78.5 kg (173 lb)    Admission Cmt: None   Principal Problem: Dialysis AV fistula malfunction, initial encounter (CMS/[facility]) [T82.590A]                 Active Insurance as of 7/15/2021     Primary Coverage     Payor Plan Insurance Group Employer/Plan Group    HUMANA MEDICARE REPLACEMENT HU[employer]NA MEDICARE REPLACEMENT [id number]     Payor Plan Address Payor Plan Phone Number Payor Plan Fax Number Effective Dates    [address] [address] [phone]  7/1/2021 - None Entered    [facility] [phone]       Subscriber Name Subscriber Birth Date Member ID       NELLIE DEVRIES 1945 [id number]           Secondary Coverage     Payor Plan Insurance Group Employer/Plan Group    [place]      Payor Plan Address Payor Plan Phone Number Payor Plan Fax Number Effective Dates    [address] [address]   11/1/2019 - None Entered    [place] [address]       Subscriber Name Subscriber Birth Date Member ID       NELLIE DEVRIES 1945 [id number]                 Emergency Contacts      (Rel.) Home Phone Work Phone Mobile Phone    HENNY KELSEY (Daughter) [phone] -- --    Heidi Devries (Daughter) -- -- [phone]    CHRISTINE DEVRIES (Daughter) [phone] -- [phone]              "

## 2021-07-16 NOTE — CONSULTS
Patient Name: Nellie Vegas Account #: [id number]    MRN: [id number] Admission Date: 7/15/2021      Consulting Service: Vascular Surgery Date of Evaluation: 2021    Requesting Provider: Dr. Haja Chowdhury MD    CHIEF COMPLAINT: Recurrent thrombosis of AV fistula  HPI: Nellie Vegas is a 76 y.o. female is being seen for a consultation and evaluation/management of recurrent thrombosis right AV shunt.  Patient had open thrombectomy and is already refilled in the first several days.  She is overdue for dialysis and placement of tunneled dialysis catheter is planned decision on shunt salvage will be made once she is stabilized from dialysis.  She is aware and agreeable    PAST MEDICAL HISTORY:   Past Medical History:   Diagnosis Date   • Anemia    • Anesthesia complication     mother woke up in combative state   • Arthritis     knees   • Asthma    • COPD (chronic obstructive pulmonary disease) (CMS/HCC)    • Diabetes mellitus (CMS/HCC)     type 2   • Dialysis patient (CMS/HCC)    • Disease of thyroid gland    • GERD (gastroesophageal reflux disease)    • Headache     after dialysis   • History of blood clots     BUE   • History of kidney stones    • Hyperlipidemia    • Hypertension    • Knee pain, bilateral    • Renal disease    • Sleep apnea     CPAP   • SOB (shortness of breath)    • 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)    • Weakness       PAST SURGICAL HISTORY:   Past Surgical History:   Procedure Laterality Date   • ARTERIOVENOUS FISTULA Right    • ARTERIOVENOUS FISTULA Left     REMOVED   • CENTRAL VENOUS CATHETER TUNNELED INSERTION DOUBLE LUMEN     •  SECTION     • COLON RESECTION Right 2021    Procedure: RIGHT HEMICOLECTOMY LAPAROSCOPIC;  Surgeon: Zbigniew Conner MD;  Location: [facility];  Service: General;  Laterality: Right;   • COLONOSCOPY     • COLONOSCOPY N/A 2021    Procedure: COLONOSCOPY into cecum with biopsy;  Surgeon: Fabricio Monroe  MD Shahram;  Location: [facility] ENDOSCOPY;  Service: Gastroenterology;  Laterality: N/A;  cecal mass   • POLYPECTOMY      UTERINE   • SHUNT O GRAM Right 2019    Procedure: RIGHT ARM DIALYSIS GRAFT revision and THROMBECTOMY;  Surgeon: Thierry Hardy MD;  Location: [facility] OR ;  Service: Vascular   • SHUNT O GRAM Right 2019    Procedure: RIGHT ARM DIALYSIS GRAFT THROMBECTOMY WITH STENTING;  Surgeon: Thierry Hardy MD;  Location: [facility] OR ;  Service: Vascular   • SHUNT O GRAM Right 2020    Procedure: RIGHT ARM ARTERIOVENOUS SHUNTOGRAM WITH THROMBECTOMY;  Surgeon: Thierry Hardy MD;  Location: [facility] OR ;  Service: Vascular;  Laterality: Right;   • SHUNT O GRAM Right 2021    Procedure: Right arm dialysis shuntogram with shunt thrombectomy;  Surgeon: Shahram Gallagher MD;  Location: [facility] OR ;  Service: Vascular;  Laterality: Right;      FAMILY HISTORY:   Family History   Problem Relation Age of Onset   • Malig Hyperthermia Neg Hx       SOCIAL HISTORY:   Social History     Tobacco Use   • Smoking status: Former Smoker     Packs/day: 0.00     Years: 4.00     Pack years: 0.00     Types: Cigarettes     Quit date:      Years since quittin.5   • Smokeless tobacco: Never Used   Vaping Use   • Vaping Use: Never used   Substance Use Topics   • Alcohol use: No   • Drug use: No      MEDICATIONS:   No current facility-administered medications on file prior to encounter.     Current Outpatient Medications on File Prior to Encounter   Medication Sig Dispense Refill   • acetaminophen (TYLENOL) 325 MG tablet Take 650 mg by mouth Every 6 (Six) Hours As Needed for Mild Pain .     • albuterol (PROVENTIL) (2.5 MG/3ML) 0.083% nebulizer solution Take 2.5 mg by nebulization 3 (Three) Times a Day As Needed for Wheezing. UNSURE OF DOSAGE     • albuterol sulfate  (90 Base) MCG/ACT inhaler INHALE 2 PUFFS INTO THE LUNGS EVERY 4 HOURS AS NEEDED FOR  "WHEEZING     • budesonide-formoterol (SYMBICORT) 160-4.5 MCG/ACT inhaler Inhale 2 puffs 2 (Two) Times a Day.     • Cholecalciferol (VITAMIN D3) 5000 units capsule capsule Take 5,000 Units by mouth Every Morning.     • guaiFENesin (MUCINEX) 600 MG 12 hr tablet Take 1 tablet by mouth Every 12 (Twelve) Hours.     • metoprolol succinate XL (TOPROL-XL) 25 MG 24 hr tablet Take 25 mg by mouth Every Morning.     • montelukast (SINGULAIR) 10 MG tablet Take 10 mg by mouth Every Night.     • omeprazole (priLOSEC) 40 MG capsule Take 40 mg by mouth Every Morning.               ALLERGIES: Sulfa antibiotics and Latex   COMPLETE REVIEW OF SYSTEMS:     ENT ROS: negative  Cardiovascular ROS: no chest pain or dyspnea on exertion  Respiratory ROS: no cough, shortness of breath, or wheezing  Gastrointestinal ROS: Recent abdominal surgery neurological ROS: no TIA or stroke symptoms  Genito-Urinary ROS: no dysuria, trouble voiding, or hematuria  Musculoskeletal ROS: negative  Dermatological ROS: negative  Psychological ROS: negative      PHYSICAL EXAM:   Patient Vitals for the past 24 hrs:   BP Temp Temp src Pulse Resp SpO2 Height Weight   07/15/21 2321 178/82 97.3 °F (36.3 °C) Oral 82 18 100 % -- --   07/15/21 2156 179/87 -- -- 69 18 99 % -- 78.5 kg (173 lb)   07/15/21 1902 139/79 -- -- 62 20 98 % 152.4 cm (60\") 79.8 kg (175 lb 14.8 oz)   07/15/21 1730 -- 98.5 °F (36.9 °C) Tympanic 73 20 98 % -- --        General appearance: oriented to person, place, and time and chronically ill appearing.  Neurological exam reveals alert, oriented, normal speech, no focal findings or movement disorder noted.  ENT exam reveals - ENT exam normal, no neck nodes or sinus tenderness.  CVS exam: normal rate, regular rhythm, normal S1, S2, no murmurs, rubs, clicks or gallops.  Chest: clear to auscultation, no wheezes, rales or rhonchi, symmetric air entry.  Abdominal exam: soft, nontender, nondistended, no masses or organomegaly.  Examination of the feet " reveals warm, good capillary refill.  Thrombosed AV fistula        LABS:      Results Review:       I reviewed the patient's new clinical results.  Results from last 7 days   Lab Units 07/15/21  1915 07/14/21  0640 07/13/21  0441 07/12/21  0527 07/11/21  0600 07/10/21  0948   WBC 10*3/mm3 5.84 5.14 5.41 5.25 6.28 11.78*   HEMOGLOBIN g/dL 7.7* 8.1* 8.0* 7.9* 7.7* 7.9*   PLATELETS 10*3/mm3 265 230 217 211 189 182     Results from last 7 days   Lab Units 07/16/21  0409 07/15/21  1915 07/14/21  0640 07/13/21  0441 07/12/21  1153 07/12/21  0527 07/11/21  0600   SODIUM mmol/L 134* 133* 133* 132* 131* 131* 134*   POTASSIUM mmol/L 4.6 4.0 4.1 5.0 4.2 3.8 3.7   CHLORIDE mmol/L 96* 93* 95* 91* 99 95* 97*   CO2 mmol/L 21.6* 22.7 25.3 19.7* 20.2* 22.7 26.9   BUN mg/dL 27* 24* 11 21 15 15 9   CREATININE mg/dL 7.06* 6.17* 3.86* 6.37* 5.28* 4.98* 3.63*   GLUCOSE mg/dL 101* 83 81 120* 114* 88 81   Estimated Creatinine Clearance: 6.4 mL/min (A) (by C-G formula based on SCr of 7.06 mg/dL (H)).  Results from last 7 days   Lab Units 07/16/21  0409 07/15/21  1915 07/14/21  0640 07/13/21  0441 07/12/21  1153 07/12/21  0527 07/11/21  0600 07/10/21  0825   CALCIUM mg/dL 7.6* 7.6* 7.8* 7.6* 6.6* 7.5* 7.4* 6.6*   ALBUMIN g/dL  --  3.00*  --   --   --   --   --  3.00*   MAGNESIUM mg/dL  --   --   --  1.9  --  1.8  --   --    PHOSPHORUS mg/dL  --   --   --   --   --   --   --  7.1*           The following radiologic or non-invasive studies have been reviewed by me:prior thrombectomy    Active Hospital Problems    Diagnosis  POA   • Dialysis AV fistula malfunction, initial encounter (CMS/[facility]) [E17.550N]  Yes      Resolved Hospital Problems   No resolved problems to display.         ASSESSMENT/PLAN: 76 y.o. female with recurrent thrombosis right AV shunt.  Unclear if it can be salvaged.  Tunneled dialysis catheter access needed.  Patient understands risk benefits complications of that procedure and agrees to the procedure she understands the  need for dialysis.  She understands that we may or may not be also salvage her left AV shunt over time.  Recurrent thromboses of this in short order usually signifies long-term poor outlook for these shunts.  Patient is aware.    Addendum: Further review of images show stenting that goes into the SVC.  She may not have viable central venous system to have an upper extremity line.  She may need femoral access.  She will likely need to stay in the hospital and have another attempted thrombectomy early next week as well.      I discussed the plan with the patient and she is agreeable to the plan of care at this point. Thank you for this consult.     Karson Muller MD   07/16/21

## 2021-07-17 LAB
ABO GROUP BLD: NORMAL
ALBUMIN SERPL-MCNC: 2.5 G/DL (ref 3.5–5.2)
ANION GAP SERPL CALCULATED.3IONS-SCNC: 15.1 MMOL/L (ref 5–15)
BLD GP AB SCN SERPL QL: NEGATIVE
BUN SERPL-MCNC: 35 MG/DL (ref 8–23)
BUN/CREAT SERPL: 4.4 (ref 7–25)
CALCIUM SPEC-SCNC: 7.2 MG/DL (ref 8.6–10.5)
CHLORIDE SERPL-SCNC: 94 MMOL/L (ref 98–107)
CO2 SERPL-SCNC: 20.9 MMOL/L (ref 22–29)
CREAT SERPL-MCNC: 7.87 MG/DL (ref 0.57–1)
DEPRECATED RDW RBC AUTO: 47.7 FL (ref 37–54)
ERYTHROCYTE [DISTWIDTH] IN BLOOD BY AUTOMATED COUNT: 17 % (ref 12.3–15.4)
GFR SERPL CREATININE-BSD FRML MDRD: 6 ML/MIN/1.73
GFR SERPL CREATININE-BSD FRML MDRD: ABNORMAL ML/MIN/{1.73_M2}
GLUCOSE SERPL-MCNC: 80 MG/DL (ref 65–99)
HCT VFR BLD AUTO: 21.3 % (ref 34–46.6)
HGB BLD-MCNC: 6.5 G/DL (ref 12–15.9)
MCH RBC QN AUTO: 24.3 PG (ref 26.6–33)
MCHC RBC AUTO-ENTMCNC: 30.5 G/DL (ref 31.5–35.7)
MCV RBC AUTO: 79.5 FL (ref 79–97)
PHOSPHATE SERPL-MCNC: 4 MG/DL (ref 2.5–4.5)
PLATELET # BLD AUTO: 227 10*3/MM3 (ref 140–450)
PMV BLD AUTO: 10.1 FL (ref 6–12)
POTASSIUM SERPL-SCNC: 4 MMOL/L (ref 3.5–5.2)
RBC # BLD AUTO: 2.68 10*6/MM3 (ref 3.77–5.28)
RH BLD: POSITIVE
SODIUM SERPL-SCNC: 130 MMOL/L (ref 136–145)
T&S EXPIRATION DATE: NORMAL
WBC # BLD AUTO: 5.37 10*3/MM3 (ref 3.4–10.8)

## 2021-07-17 PROCEDURE — 85027 COMPLETE CBC AUTOMATED: CPT | Performed by: INTERNAL MEDICINE

## 2021-07-17 PROCEDURE — 94799 UNLISTED PULMONARY SVC/PX: CPT

## 2021-07-17 PROCEDURE — 25010000002 HEPARIN (PORCINE) PER 1000 UNITS: Performed by: SURGERY

## 2021-07-17 PROCEDURE — P9016 RBC LEUKOCYTES REDUCED: HCPCS

## 2021-07-17 PROCEDURE — 86850 RBC ANTIBODY SCREEN: CPT | Performed by: HOSPITALIST

## 2021-07-17 PROCEDURE — 80069 RENAL FUNCTION PANEL: CPT | Performed by: INTERNAL MEDICINE

## 2021-07-17 PROCEDURE — 25010000002 HEPARIN (PORCINE) PER 1000 UNITS: Performed by: INTERNAL MEDICINE

## 2021-07-17 PROCEDURE — G0378 HOSPITAL OBSERVATION PER HR: HCPCS

## 2021-07-17 PROCEDURE — 86901 BLOOD TYPING SEROLOGIC RH(D): CPT | Performed by: HOSPITALIST

## 2021-07-17 PROCEDURE — 86900 BLOOD TYPING SEROLOGIC ABO: CPT | Performed by: HOSPITALIST

## 2021-07-17 PROCEDURE — 86900 BLOOD TYPING SEROLOGIC ABO: CPT

## 2021-07-17 PROCEDURE — 94664 DEMO&/EVAL PT USE INHALER: CPT

## 2021-07-17 PROCEDURE — 86923 COMPATIBILITY TEST ELECTRIC: CPT

## 2021-07-17 RX ORDER — HEPARIN SODIUM 1000 [USP'U]/ML
2000 INJECTION, SOLUTION INTRAVENOUS; SUBCUTANEOUS ONCE
Status: COMPLETED | OUTPATIENT
Start: 2021-07-17 | End: 2021-07-17

## 2021-07-17 RX ORDER — METOPROLOL SUCCINATE 25 MG/1
25 TABLET, EXTENDED RELEASE ORAL
Status: DISCONTINUED | OUTPATIENT
Start: 2021-07-17 | End: 2021-07-21 | Stop reason: HOSPADM

## 2021-07-17 RX ORDER — HEPARIN SODIUM 5000 [USP'U]/ML
5000 INJECTION, SOLUTION INTRAVENOUS; SUBCUTANEOUS EVERY 12 HOURS SCHEDULED
Status: DISCONTINUED | OUTPATIENT
Start: 2021-07-17 | End: 2021-07-20

## 2021-07-17 RX ORDER — HEPARIN SODIUM 1000 [USP'U]/ML
3800 INJECTION, SOLUTION INTRAVENOUS; SUBCUTANEOUS AS NEEDED
Status: DISCONTINUED | OUTPATIENT
Start: 2021-07-17 | End: 2021-07-21

## 2021-07-17 RX ORDER — PANTOPRAZOLE SODIUM 40 MG/1
40 TABLET, DELAYED RELEASE ORAL
Status: DISCONTINUED | OUTPATIENT
Start: 2021-07-17 | End: 2021-07-21 | Stop reason: HOSPADM

## 2021-07-17 RX ORDER — MONTELUKAST SODIUM 10 MG/1
10 TABLET ORAL NIGHTLY
Status: DISCONTINUED | OUTPATIENT
Start: 2021-07-17 | End: 2021-07-21 | Stop reason: HOSPADM

## 2021-07-17 RX ORDER — BUDESONIDE AND FORMOTEROL FUMARATE DIHYDRATE 160; 4.5 UG/1; UG/1
2 AEROSOL RESPIRATORY (INHALATION)
Status: DISCONTINUED | OUTPATIENT
Start: 2021-07-17 | End: 2021-07-21 | Stop reason: HOSPADM

## 2021-07-17 RX ORDER — ALBUTEROL SULFATE 2.5 MG/3ML
2.5 SOLUTION RESPIRATORY (INHALATION) EVERY 6 HOURS PRN
Status: DISCONTINUED | OUTPATIENT
Start: 2021-07-17 | End: 2021-07-21

## 2021-07-17 RX ORDER — HYDROCODONE BITARTRATE AND ACETAMINOPHEN 5; 325 MG/1; MG/1
1 TABLET ORAL EVERY 6 HOURS PRN
Status: DISCONTINUED | OUTPATIENT
Start: 2021-07-17 | End: 2021-07-21

## 2021-07-17 RX ADMIN — Medication 1000 UNITS: at 08:44

## 2021-07-17 RX ADMIN — BUDESONIDE AND FORMOTEROL FUMARATE DIHYDRATE 2 PUFF: 160; 4.5 AEROSOL RESPIRATORY (INHALATION) at 19:45

## 2021-07-17 RX ADMIN — HEPARIN SODIUM 3800 UNITS: 1000 INJECTION INTRAVENOUS; SUBCUTANEOUS at 12:47

## 2021-07-17 RX ADMIN — GUAIFENESIN 600 MG: 600 TABLET, EXTENDED RELEASE ORAL at 20:53

## 2021-07-17 RX ADMIN — PANTOPRAZOLE SODIUM 40 MG: 40 TABLET, DELAYED RELEASE ORAL at 06:10

## 2021-07-17 RX ADMIN — HEPARIN SODIUM 5000 UNITS: 5000 INJECTION INTRAVENOUS; SUBCUTANEOUS at 08:45

## 2021-07-17 RX ADMIN — METOPROLOL SUCCINATE 25 MG: 25 TABLET, EXTENDED RELEASE ORAL at 15:24

## 2021-07-17 RX ADMIN — MONTELUKAST SODIUM 10 MG: 10 TABLET, FILM COATED ORAL at 20:53

## 2021-07-17 RX ADMIN — PANTOPRAZOLE SODIUM 40 MG: 40 TABLET, DELAYED RELEASE ORAL at 08:46

## 2021-07-17 RX ADMIN — HEPARIN SODIUM 5000 UNITS: 5000 INJECTION INTRAVENOUS; SUBCUTANEOUS at 20:53

## 2021-07-17 RX ADMIN — BUDESONIDE AND FORMOTEROL FUMARATE DIHYDRATE 2 PUFF: 160; 4.5 AEROSOL RESPIRATORY (INHALATION) at 09:00

## 2021-07-17 RX ADMIN — HEPARIN SODIUM 2000 UNITS: 1000 INJECTION INTRAVENOUS; SUBCUTANEOUS at 10:22

## 2021-07-17 RX ADMIN — GUAIFENESIN 600 MG: 600 TABLET, EXTENDED RELEASE ORAL at 08:44

## 2021-07-17 NOTE — PLAN OF CARE
Goal Outcome Evaluation:  Plan of Care Reviewed With: patient        Progress: no change  Outcome Summary: VSS. Tunnel cath placed in R groin - C/D/I. Midline incision is clean and open to air. Latex precautions in place. No dialysis this shift - possibly this AM. No c/o pain or SOA. No other changes this shift. Will continue to monitor   Morning hgb = 6.5 - MD made aware and 2 units PRBC ordered to be given during HD

## 2021-07-17 NOTE — NURSING NOTE
1415  HD WITHOUT INCIDENT OR C/O. TOLERATED WELL. RECEIVED 2 UNITS PRBC'S AS ORDERED. NO C/O. NO REACTION NOTED.  REMOVED 2 L AS ORDERED. NO MEDS  ORDERED / NO MEDS GIVEN.  SYED CURRENT, CD@I. PT. STABLE WITHOUT UPON COMPLETION AND RETURN TO ROOM.

## 2021-07-17 NOTE — PLAN OF CARE
Goal Outcome Evaluation:  Plan of Care Reviewed With: patient           Outcome Summary: No major issues over shift, pt's Hgb low this am, so 2 units of blood ordered, given during pt's am dialysis session with no issues, pt's VS stable and no complaints of pain, pt's abdominal incision approximated and clean and dry, pt's tunnelled cath site also c/d/i, will continue to monitor

## 2021-07-17 NOTE — PROGRESS NOTES
"Daily progress note    Chief complaint  Doing same  No new complaints  Getting hemodialysis    History of present illness  76-year-old -American female who is well-known to our service with history of end-stage renal disease on hemodialysis COPD hypertension diabetes chronic anemia and gastroesophageal disease who was recently discharged from the hospital after work-up on lower GI bleed which revealed cecal mass underwent laparoscopic right hemicolectomy and biopsy came back positive for tubular adenoma.  Patient also have thrombectomy with shuntogram of the right AV fistula and discharge home in stable condition presented to [facility] emergency room with right arm occluded AV shunt as she went for dialysis and they were unable to assess right arm AV fistula.  Patient has no other complaint.  Patient denies any chest pain shortness of breath abdominal pain nausea vomiting diarrhea.  Patient denies any fever chills night sweats weight loss or weight gain.  Patient admitted for management.     REVIEW OF SYSTEMS  All systems reviewed and negative except for those discussed in HPI.      PHYSICAL EXAM  Blood pressure 169/77, pulse 63, temperature 97.7 °F (36.5 °C), temperature source Temporal, resp. rate 16, height 152.4 cm (60\"), weight 78.5 kg (173 lb), SpO2 97 %, not currently breastfeeding.    General: No acute distress.  HENT: NCAT, PERRL, Nares patent.  Eyes: no scleral icterus.  Neck: trachea midline, no ROM limitations.  CV: regular rhythm, regular rate.  Respiratory: normal effort, CTAB.  Abdomen: soft, nondistended, NTTP, no rebound tenderness, no guarding or rigidity  Musculoskeletal: Right arm AV fistula does not have a thrill or bruit.  It is nontender to palpation.  Neuro: alert, moves all extremities, follows commands.  Skin: warm, dry.     LAB RESULTS  Lab Results (last 24 hours)     Procedure Component Value Units Date/Time    Renal Function Panel [[id number]]  (Abnormal) Collected: " 07/17/21 0504    Specimen: Blood Updated: 07/17/21 0602     Glucose 80 mg/dL      BUN 35 mg/dL      Creatinine 7.87 mg/dL      Sodium 130 mmol/L      Potassium 4.0 mmol/L      Chloride 94 mmol/L      CO2 20.9 mmol/L      Calcium 7.2 mg/dL      Albumin 2.50 g/dL      Phosphorus 4.0 mg/dL      Anion Gap 15.1 mmol/L      BUN/Creatinine Ratio 4.4     eGFR Non  Amer --     Comment: <15 Indicative of kidney failure.        eGFR  African Amer 6 mL/min/1.73      Comment: <15 Indicative of kidney failure.       Narrative:      GFR Normal >60  Chronic Kidney Disease <60  Kidney Failure <15      CBC (No Diff) [[id number]]  (Abnormal) Collected: 07/17/21 0504    Specimen: Blood Updated: 07/17/21 0542     WBC 5.37 10*3/mm3      RBC 2.68 10*6/mm3      Hemoglobin 6.5 g/dL      Hematocrit 21.3 %      MCV 79.5 fL      MCH 24.3 pg      MCHC 30.5 g/dL      RDW 17.0 %      RDW-SD 47.7 fl      MPV 10.1 fL      Platelets 227 10*3/mm3         Imaging Results (Last 24 Hours)     ** No results found for the last 24 hours. **          Current Facility-Administered Medications:   •  albuterol (PROVENTIL) nebulizer solution 0.083% 2.5 mg/3mL, 2.5 mg, Nebulization, Q6H PRN, Mode Bryant MD  •  budesonide-formoterol (SYMBICORT) 160-4.5 MCG/ACT inhaler 2 puff, 2 puff, Inhalation, BID - RT, Mode Bryant MD, 2 puff at 07/17/21 0900  •  cholecalciferol (VITAMIN D3) tablet 1,000 Units, 1,000 Units, Oral, Daily, Mode Bryant MD, 1,000 Units at 07/17/21 0844  •  epoetin polina-epbx (RETACRIT) injection 10,000 Units, 10,000 Units, Intravenous, Once per day on Mon Wed Fri, Mode Bryant MD  •  guaiFENesin (MUCINEX) 12 hr tablet 600 mg, 600 mg, Oral, Q12H, Mode Bryant MD, 600 mg at 07/17/21 0844  •  heparin (porcine) 5000 UNIT/ML injection 5,000 Units, 5,000 Units, Subcutaneous, Q12H, Mode Bryant MD, 5,000 Units at 07/17/21 0845  •  heparin (porcine) injection 3,800 Units, 3,800 Units, Intracatheter,  PRN, Chester Kang MD, 3,800 Units at 07/17/21 1247  •  HYDROcodone-acetaminophen (NORCO) 5-325 MG per tablet 1 tablet, 1 tablet, Oral, Q6H PRN, Mode Bryant MD  •  metoprolol succinate XL (TOPROL-XL) 24 hr tablet 25 mg, 25 mg, Oral, Q24H, Mode Bryant MD  •  montelukast (SINGULAIR) tablet 10 mg, 10 mg, Oral, Nightly, Mode Bryant MD  •  nitroglycerin (NITROSTAT) SL tablet 0.4 mg, 0.4 mg, Sublingual, Q5 Min PRN, Mode Bryant MD  •  pantoprazole (PROTONIX) EC tablet 40 mg, 40 mg, Oral, Q AM, Mode Bryant MD, 40 mg at 07/17/21 0846  •  [COMPLETED] Insert peripheral IV, , , Once **AND** sodium chloride 0.9 % flush 10 mL, 10 mL, Intravenous, PRN, Mode Bryant MD     ASSESSMENT  Malfunctioning right AV fistula  Recurrent thrombosis right AV shunt  S/p tunneled catheter insertion right common femoral vein  End-stage renal disease on hemodialysis  Recent right laparoscopic hemicolectomy secondary to tubular adenoma  Status post lower GI bleed  Status post recent thrombectomy with shuntogram of right AV fistula  COPD  Diabetes mellitus  Hypertension  Chronic anemia with drop in H&H  Gastroesophageal reflux disease    PLAN  CPM  Hemodialysis today  Transfuse 2 units packed RBC  Thrombectomy on Monday  Vascular surgery consult appreciated  Nephrology to follow patient  Continue home medications  Stress ulcer DVT prophylaxis  Supportive care  Discussed with nursing staff  Follow closely further recommendation current hospital course    ALLAN MARTIN MD

## 2021-07-17 NOTE — PROGRESS NOTES
" LOS: 0 days   Patient Care Team:  Laurent Cortes DO as PCP - General (Internal Medicine)  Kvng Guerra MD as Consulting Physician (Pulmonary Disease)    Chief Complaint: ESRD      History of Present Illness  This is a 76 y.o. year old female with end-stage renal disease, well-known to me from the outpatient setting and also from a recent admission for lower GI bleeding, eventually found to have a right colon mass status post hemicolectomy.  After bowel function returned she stabilized and was discharged but her Eliquis was held.  She also had problems with AV fistula cannulation during recent admission and with outpatient dialysis, shuntogram with thrombectomy was performed and the shunt subsequently functioned well.  She showed up at dialysis yesterday and the shunt was found to be occluded and she was sent to the emergency room and did not receive any hemodialysis.  Vascular is planning a tunnel cath today with eventual thrombectomy.  He is not short of breath, O2 sats 99% and electrolytes are stable.  Chest x-ray shows small effusions no pulmonary edema    Subjective  Seen during HD, tolerating well  Had femoral tunneled catheter placed yesterday. Running well  Denies sob or chest pain    History taken from: patient chart    Objective     Vital Sign Min/Max for last 24 hours  Temp  Min: 97.8 °F (36.6 °C)  Max: 98.6 °F (37 °C)   BP  Min: 122/51  Max: 144/57   Pulse  Min: 55  Max: 77   Resp  Min: 14  Max: 18   SpO2  Min: 97 %  Max: 100 %   Flow (L/min)  Min: 3  Max: 4   No data recorded     Flowsheet Rows      First Filed Value   Admission Height  152.4 cm (60\") Documented at 07/15/2021 1902   Admission Weight  79.8 kg (175 lb 14.8 oz) Documented at 07/15/2021 1902          No intake/output data recorded.  I/O last 3 completed shifts:  In: 200 [I.V.:100; IV Piggyback:100]  Out: -     Objective  Physical Examination: General appearance - alert, well appearing, and in no distress  Mental status - alert, " oriented to person, place, and time  Eyes - pupils equal and reactive, extraocular eye movements intact  Chest - clear to auscultation, no wheezes, rales or rhonchi, symmetric air entry  Heart - normal rate, regular rhythm, normal S1, S2, no murmurs, rubs, clicks or gallops  Abdomen - soft, nontender, nondistended, no masses or organomegaly  Neurological - alert, oriented, normal speech, no focal findings or movement disorder noted  Musculoskeletal - no joint tenderness, deformity or swelling  Skin - normal coloration and turgor, no rashes, no suspicious skin lesions noted  Results Review:     I reviewed the patient's new clinical results.    WBC WBC   Date Value Ref Range Status   07/17/2021 5.37 3.40 - 10.80 10*3/mm3 Final   07/16/2021 5.99 3.40 - 10.80 10*3/mm3 Final   07/15/2021 5.84 3.40 - 10.80 10*3/mm3 Final      HGB Hemoglobin   Date Value Ref Range Status   07/17/2021 6.5 (C) 12.0 - 15.9 g/dL Final   07/16/2021 7.2 (L) 12.0 - 15.9 g/dL Final   07/15/2021 7.7 (L) 12.0 - 15.9 g/dL Final      HCT Hematocrit   Date Value Ref Range Status   07/17/2021 21.3 (L) 34.0 - 46.6 % Final   07/16/2021 23.8 (L) 34.0 - 46.6 % Final   07/15/2021 24.2 (L) 34.0 - 46.6 % Final      Platlets No results found for: LABPLAT   MCV MCV   Date Value Ref Range Status   07/17/2021 79.5 79.0 - 97.0 fL Final   07/16/2021 81.2 79.0 - 97.0 fL Final   07/15/2021 79.6 79.0 - 97.0 fL Final          Sodium Sodium   Date Value Ref Range Status   07/17/2021 130 (L) 136 - 145 mmol/L Final   07/16/2021 134 (L) 136 - 145 mmol/L Final   07/15/2021 133 (L) 136 - 145 mmol/L Final      Potassium Potassium   Date Value Ref Range Status   07/17/2021 4.0 3.5 - 5.2 mmol/L Final   07/16/2021 4.6 3.5 - 5.2 mmol/L Final     Comment:     Slight hemolysis detected by analyzer. Results may be affected.   07/15/2021 4.0 3.5 - 5.2 mmol/L Final     Comment:     Slight hemolysis detected by analyzer. Results may be affected.      Chloride Chloride   Date Value Ref  Range Status   07/17/2021 94 (L) 98 - 107 mmol/L Final   07/16/2021 96 (L) 98 - 107 mmol/L Final   07/15/2021 93 (L) 98 - 107 mmol/L Final      CO2 CO2   Date Value Ref Range Status   07/17/2021 20.9 (L) 22.0 - 29.0 mmol/L Final   07/16/2021 21.6 (L) 22.0 - 29.0 mmol/L Final   07/15/2021 22.7 22.0 - 29.0 mmol/L Final      BUN BUN   Date Value Ref Range Status   07/17/2021 35 (H) 8 - 23 mg/dL Final   07/16/2021 27 (H) 8 - 23 mg/dL Final   07/15/2021 24 (H) 8 - 23 mg/dL Final      Creatinine Creatinine   Date Value Ref Range Status   07/17/2021 7.87 (H) 0.57 - 1.00 mg/dL Final   07/16/2021 7.06 (H) 0.57 - 1.00 mg/dL Final   07/15/2021 6.17 (H) 0.57 - 1.00 mg/dL Final      Calcium Calcium   Date Value Ref Range Status   07/17/2021 7.2 (L) 8.6 - 10.5 mg/dL Final   07/16/2021 7.6 (L) 8.6 - 10.5 mg/dL Final   07/15/2021 7.6 (L) 8.6 - 10.5 mg/dL Final      PO4 No results found for: CAPO4   Albumin Albumin   Date Value Ref Range Status   07/17/2021 2.50 (L) 3.50 - 5.20 g/dL Final   07/15/2021 3.00 (L) 3.50 - 5.20 g/dL Final      Magnesium No results found for: MG   Uric Acid No results found for: URICACID     Medication Review:     Current Facility-Administered Medications:   •  albuterol (PROVENTIL) nebulizer solution 0.083% 2.5 mg/3mL, 2.5 mg, Nebulization, Q6H PRN, Mode Bryant MD  •  budesonide-formoterol (SYMBICORT) 160-4.5 MCG/ACT inhaler 2 puff, 2 puff, Inhalation, BID - RT, Mode Bryant MD, 2 puff at 07/17/21 0900  •  cholecalciferol (VITAMIN D3) tablet 1,000 Units, 1,000 Units, Oral, Daily, Mode Bryant MD, 1,000 Units at 07/17/21 0844  •  epoetin polina-epbx (RETACRIT) injection 10,000 Units, 10,000 Units, Intravenous, Once per day on Mon Wed Fri, Mode Bryant MD  •  guaiFENesin (MUCINEX) 12 hr tablet 600 mg, 600 mg, Oral, Q12H, Mode Bryant MD, 600 mg at 07/17/21 0844  •  heparin (porcine) 5000 UNIT/ML injection 5,000 Units, 5,000 Units, Subcutaneous, Q12H, Mode Bryant  MD ROBERTO, 5,000 Units at 07/17/21 0845  •  heparin (porcine) injection 2,000 Units, 2,000 Units, Intravenous, Once, Chester Kang MD  •  heparin (porcine) injection 3,800 Units, 3,800 Units, Intracatheter, PRN, Chester Kang MD  •  HYDROcodone-acetaminophen (NORCO) 5-325 MG per tablet 1 tablet, 1 tablet, Oral, Q6H PRN, Mode Bryant MD  •  metoprolol succinate XL (TOPROL-XL) 24 hr tablet 25 mg, 25 mg, Oral, Q24H, Mode Bryant MD  •  montelukast (SINGULAIR) tablet 10 mg, 10 mg, Oral, Nightly, Mode Bryant MD  •  nitroglycerin (NITROSTAT) SL tablet 0.4 mg, 0.4 mg, Sublingual, Q5 Min PRN, Mode Bryant MD  •  pantoprazole (PROTONIX) EC tablet 40 mg, 40 mg, Oral, Q AM, Mode Bryant MD, 40 mg at 07/17/21 0846  •  [COMPLETED] Insert peripheral IV, , , Once **AND** sodium chloride 0.9 % flush 10 mL, 10 mL, Intravenous, PRN, Mode Bryant MD      Assessment/Plan       Dialysis AV fistula malfunction, initial encounter (CMS/[facility])      Assessment & Plan  1. ESRD  2. Recurrent avf dysfunction   3. Cecal mass s/pp hemicolectomy   4. Anemia of CKD   5. COPD    S/p femoral tunneled HD catheter.   Plans for thrombectomy next week   HD continue TTS  aranesp for anemia   Will follow.     Noa Horan MD  07/17/21  10:10 EDT

## 2021-07-17 NOTE — PROGRESS NOTES
Mode Bryant MD    Location: [facility]     LOS: 0 days   Patient Care Team:  Laurent Cortes DO as PCP - General (Internal Medicine)  Kvng Guerra MD as Consulting Physician (Pulmonary Disease)        Subjective     76 y.o. female doing well after right femoral venous tunneled dialysis catheter placement.  Patient with occluded right upper extremity AV graft and plan for surgery Monday for thrombectomy revision by my associate Dr. Shahram Gallagher.  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    Review of Systems  Review of Systems - Negative except HPI      Objective     Vital Signs  Temp:  [97.7 °F (36.5 °C)-98.6 °F (37 °C)] 97.8 °F (36.6 °C)  Heart Rate:  [55-77] 58  Resp:  [14-18] 16  BP: (122-158)/(36-77) 158/71    Physical Exam  General: No acute distress. Alert and oriented x 4  HEENT: No jugular venous distension, trachea is midline  CV: controlled heart rate  Resp: Clear unlabored breathing  Abd: Abdomen is soft, nontender, nondistended  Extremities: Mild swelling no change, right groin tunneled dialysis catheter site clean    Results Review:       Recent Results (from the past 12 hour(s))   Renal Function Panel    Collection Time: 07/17/21  5:04 AM    Specimen: Blood   Result Value Ref Range    Glucose 80 65 - 99 mg/dL    BUN 35 (H) 8 - 23 mg/dL    Creatinine 7.87 (H) 0.57 - 1.00 mg/dL    Sodium 130 (L) 136 - 145 mmol/L    Potassium 4.0 3.5 - 5.2 mmol/L    Chloride 94 (L) 98 - 107 mmol/L    CO2 20.9 (L) 22.0 - 29.0 mmol/L    Calcium 7.2 (L) 8.6 - 10.5 mg/dL    Albumin 2.50 (L) 3.50 - 5.20 g/dL    Phosphorus 4.0 2.5 - 4.5 mg/dL    Anion Gap 15.1 (H) 5.0 - 15.0 mmol/L    BUN/Creatinine Ratio 4.4 (L) 7.0 - 25.0    eGFR Non  Amer      eGFR  African Amer 6 (L) >60 mL/min/1.73   CBC (No Diff)    Collection Time: 07/17/21  5:04 AM    Specimen: Blood   Result Value Ref Range    WBC 5.37 3.40 - 10.80  10*3/mm3    RBC 2.68 (L) 3.77 - 5.28 10*6/mm3    Hemoglobin 6.5 (C) 12.0 - 15.9 g/dL    Hematocrit 21.3 (L) 34.0 - 46.6 %    MCV 79.5 79.0 - 97.0 fL    MCH 24.3 (L) 26.6 - 33.0 pg    MCHC 30.5 (L) 31.5 - 35.7 g/dL    RDW 17.0 (H) 12.3 - 15.4 %    RDW-SD 47.7 37.0 - 54.0 fl    MPV 10.1 6.0 - 12.0 fL    Platelets 227 140 - 450 10*3/mm3   Type & Screen    Collection Time: 07/17/21  6:58 AM    Specimen: Blood   Result Value Ref Range    ABO Type B     RH type Positive     Antibody Screen Negative     T&S Expiration Date 7/20/2021 11:59:59 PM    Prepare RBC, 2 Units    Collection Time: 07/17/21 10:21 AM   Result Value Ref Range    Product Code P6196V16     Unit Number [id number]     UNIT  ABO B     UNIT  RH POS     Crossmatch Interpretation Compatible     Dispense Status XM     Blood Expiration Date [id number]     Blood Type Barcode 7300     Product Code P1585T71     Unit Number [id number]     UNIT  ABO B     UNIT  RH POS     Crossmatch Interpretation Compatible     Dispense Status IS     Blood Expiration Date [id number]     Blood Type Barcode 7300    ]      Assessment/Plan             Dialysis AV fistula malfunction, initial encounter (CMS/[facility])      Assessment & Plan  76 y.o. female doing well after tunneled dialysis catheter placement and patient seen on hemodialysis with catheter functioning well.  Patient with postoperative blood loss anemia hemoglobin 6.5 and is receiving 2 units of blood with hemodialysis today.      Of note discussed with dialysis nurse and had no issues with use with right arm arteriovenous graft this past Wednesday midweek after thrombectomy revision.     I discussed with Dr. Gallagher the above findings and the fact that the patient did not resume her Eliquis after the previous surgery.     He will proceed with thrombectomy revision on Monday.  Discussed with nurse today as well who is aware of plans for surgery Monday.  Will preop tomorrow.      Mode Bryant,  MD  07/17/21  11:21 EDT

## 2021-07-18 LAB
ALBUMIN SERPL-MCNC: 2.5 G/DL (ref 3.5–5.2)
ANION GAP SERPL CALCULATED.3IONS-SCNC: 13.1 MMOL/L (ref 5–15)
BH BB BLOOD EXPIRATION DATE: NORMAL
BH BB BLOOD EXPIRATION DATE: NORMAL
BH BB BLOOD TYPE BARCODE: 7300
BH BB BLOOD TYPE BARCODE: 7300
BH BB DISPENSE STATUS: NORMAL
BH BB DISPENSE STATUS: NORMAL
BH BB PRODUCT CODE: NORMAL
BH BB PRODUCT CODE: NORMAL
BH BB UNIT NUMBER: NORMAL
BH BB UNIT NUMBER: NORMAL
BUN SERPL-MCNC: 21 MG/DL (ref 8–23)
BUN/CREAT SERPL: 4 (ref 7–25)
CALCIUM SPEC-SCNC: 7.4 MG/DL (ref 8.6–10.5)
CHLORIDE SERPL-SCNC: 99 MMOL/L (ref 98–107)
CO2 SERPL-SCNC: 21.9 MMOL/L (ref 22–29)
CREAT SERPL-MCNC: 5.25 MG/DL (ref 0.57–1)
CROSSMATCH INTERPRETATION: NORMAL
CROSSMATCH INTERPRETATION: NORMAL
DEPRECATED RDW RBC AUTO: 52.2 FL (ref 37–54)
ERYTHROCYTE [DISTWIDTH] IN BLOOD BY AUTOMATED COUNT: 16.9 % (ref 12.3–15.4)
GFR SERPL CREATININE-BSD FRML MDRD: 10 ML/MIN/1.73
GFR SERPL CREATININE-BSD FRML MDRD: ABNORMAL ML/MIN/{1.73_M2}
GLUCOSE SERPL-MCNC: 84 MG/DL (ref 65–99)
HCT VFR BLD AUTO: 30.5 % (ref 34–46.6)
HGB BLD-MCNC: 9.7 G/DL (ref 12–15.9)
MCH RBC QN AUTO: 26.6 PG (ref 26.6–33)
MCHC RBC AUTO-ENTMCNC: 31.8 G/DL (ref 31.5–35.7)
MCV RBC AUTO: 83.6 FL (ref 79–97)
PHOSPHATE SERPL-MCNC: 3.2 MG/DL (ref 2.5–4.5)
PLATELET # BLD AUTO: 220 10*3/MM3 (ref 140–450)
PMV BLD AUTO: 9.9 FL (ref 6–12)
POTASSIUM SERPL-SCNC: 3.7 MMOL/L (ref 3.5–5.2)
RBC # BLD AUTO: 3.65 10*6/MM3 (ref 3.77–5.28)
SODIUM SERPL-SCNC: 134 MMOL/L (ref 136–145)
UNIT  ABO: NORMAL
UNIT  ABO: NORMAL
UNIT  RH: NORMAL
UNIT  RH: NORMAL
WBC # BLD AUTO: 5.02 10*3/MM3 (ref 3.4–10.8)

## 2021-07-18 PROCEDURE — 94799 UNLISTED PULMONARY SVC/PX: CPT

## 2021-07-18 PROCEDURE — 80069 RENAL FUNCTION PANEL: CPT | Performed by: SURGERY

## 2021-07-18 PROCEDURE — 25010000002 HEPARIN (PORCINE) PER 1000 UNITS: Performed by: SURGERY

## 2021-07-18 PROCEDURE — 85027 COMPLETE CBC AUTOMATED: CPT | Performed by: SURGERY

## 2021-07-18 PROCEDURE — 5A1D70Z PERFORMANCE OF URINARY FILTRATION, INTERMITTENT, LESS THAN 6 HOURS PER DAY: ICD-10-PCS | Performed by: INTERNAL MEDICINE

## 2021-07-18 PROCEDURE — U0004 COV-19 TEST NON-CDC HGH THRU: HCPCS | Performed by: HOSPITALIST

## 2021-07-18 RX ORDER — CEFAZOLIN SODIUM 2 G/100ML
2 INJECTION, SOLUTION INTRAVENOUS
Status: COMPLETED | OUTPATIENT
Start: 2021-07-19 | End: 2021-07-19

## 2021-07-18 RX ADMIN — MONTELUKAST SODIUM 10 MG: 10 TABLET, FILM COATED ORAL at 20:15

## 2021-07-18 RX ADMIN — METOPROLOL SUCCINATE 25 MG: 25 TABLET, EXTENDED RELEASE ORAL at 11:54

## 2021-07-18 RX ADMIN — GUAIFENESIN 600 MG: 600 TABLET, EXTENDED RELEASE ORAL at 20:15

## 2021-07-18 RX ADMIN — HEPARIN SODIUM 5000 UNITS: 5000 INJECTION INTRAVENOUS; SUBCUTANEOUS at 20:15

## 2021-07-18 RX ADMIN — HEPARIN SODIUM 5000 UNITS: 5000 INJECTION INTRAVENOUS; SUBCUTANEOUS at 08:49

## 2021-07-18 RX ADMIN — PANTOPRAZOLE SODIUM 40 MG: 40 TABLET, DELAYED RELEASE ORAL at 06:12

## 2021-07-18 RX ADMIN — BUDESONIDE AND FORMOTEROL FUMARATE DIHYDRATE 2 PUFF: 160; 4.5 AEROSOL RESPIRATORY (INHALATION) at 10:19

## 2021-07-18 RX ADMIN — Medication 1000 UNITS: at 08:49

## 2021-07-18 RX ADMIN — GUAIFENESIN 600 MG: 600 TABLET, EXTENDED RELEASE ORAL at 08:49

## 2021-07-18 RX ADMIN — BUDESONIDE AND FORMOTEROL FUMARATE DIHYDRATE 2 PUFF: 160; 4.5 AEROSOL RESPIRATORY (INHALATION) at 19:31

## 2021-07-18 NOTE — PROGRESS NOTES
"Daily progress note    Chief complaint  Doing same  No new complaints    History of present illness  76-year-old -American female who is well-known to our service with history of end-stage renal disease on hemodialysis COPD hypertension diabetes chronic anemia and gastroesophageal disease who was recently discharged from the hospital after work-up on lower GI bleed which revealed cecal mass underwent laparoscopic right hemicolectomy and biopsy came back positive for tubular adenoma.  Patient also have thrombectomy with shuntogram of the right AV fistula and discharge home in stable condition presented to [facility] emergency room with right arm occluded AV shunt as she went for dialysis and they were unable to assess right arm AV fistula.  Patient has no other complaint.  Patient denies any chest pain shortness of breath abdominal pain nausea vomiting diarrhea.  Patient denies any fever chills night sweats weight loss or weight gain.  Patient admitted for management.     REVIEW OF SYSTEMS  Unremarkable except generalized weakness     PHYSICAL EXAM  Blood pressure 153/88, pulse 63, temperature 98.3 °F (36.8 °C), temperature source Oral, resp. rate 18, height 152.4 cm (60\"), weight 78.5 kg (173 lb), SpO2 96 %, not currently breastfeeding.    General: No acute distress.  HENT: NCAT, PERRL, Nares patent.  Eyes: no scleral icterus.  Neck: trachea midline, no ROM limitations.  CV: regular rhythm, regular rate.  Respiratory: normal effort, CTAB.  Abdomen: soft, nondistended, NTTP, no rebound tenderness, no guarding or rigidity  Musculoskeletal: Right arm AV fistula does not have a thrill or bruit.  It is nontender to palpation.  Neuro: alert, moves all extremities, follows commands.  Skin: warm, dry.     LAB RESULTS  Lab Results (last 24 hours)     Procedure Component Value Units Date/Time    Renal Function Panel [[id number]]  (Abnormal) Collected: 07/18/21 0609    Specimen: Blood Updated: 07/18/21 0724    "  Glucose 84 mg/dL      BUN 21 mg/dL      Creatinine 5.25 mg/dL      Sodium 134 mmol/L      Potassium 3.7 mmol/L      Comment: Slight hemolysis detected by analyzer. Results may be affected.        Chloride 99 mmol/L      CO2 21.9 mmol/L      Calcium 7.4 mg/dL      Albumin 2.50 g/dL      Phosphorus 3.2 mg/dL      Anion Gap 13.1 mmol/L      BUN/Creatinine Ratio 4.0     eGFR Non African Amer --     Comment: <15 Indicative of kidney failure.        eGFR  African Amer 10 mL/min/1.73      Comment: <15 Indicative of kidney failure.       Narrative:      GFR Normal >60  Chronic Kidney Disease <60  Kidney Failure <15      CBC (No Diff) [[id number]]  (Abnormal) Collected: 07/18/21 0609    Specimen: Blood Updated: 07/18/21 0703     WBC 5.02 10*3/mm3      RBC 3.65 10*6/mm3      Hemoglobin 9.7 g/dL      Hematocrit 30.5 %      MCV 83.6 fL      MCH 26.6 pg      MCHC 31.8 g/dL      RDW 16.9 %      RDW-SD 52.2 fl      MPV 9.9 fL      Platelets 220 10*3/mm3         Imaging Results (Last 24 Hours)     ** No results found for the last 24 hours. **          Current Facility-Administered Medications:   •  albuterol (PROVENTIL) nebulizer solution 0.083% 2.5 mg/3mL, 2.5 mg, Nebulization, Q6H PRN, Mode Bryant MD  •  budesonide-formoterol (SYMBICORT) 160-4.5 MCG/ACT inhaler 2 puff, 2 puff, Inhalation, BID - RT, Mode Bryant MD, 2 puff at 07/18/21 1019  •  [START ON 7/19/2021] ceFAZolin in dextrose (ANCEF) IVPB solution 2 g, 2 g, Intravenous, 30 Min Pre-Op, Mode Bryant MD  •  cholecalciferol (VITAMIN D3) tablet 1,000 Units, 1,000 Units, Oral, Daily, Mode Bryant MD, 1,000 Units at 07/18/21 0849  •  epoetin polina-epbx (RETACRIT) injection 10,000 Units, 10,000 Units, Intravenous, Once per day on Mon Wed Fri, Mode Bryant MD  •  guaiFENesin (MUCINEX) 12 hr tablet 600 mg, 600 mg, Oral, Q12H, Mode Bryant MD, 600 mg at 07/18/21 0849  •  heparin (porcine) 5000 UNIT/ML injection 5,000 Units, 5,000  Units, Subcutaneous, Q12H, Mode Bryant MD, 5,000 Units at 07/18/21 0849  •  heparin (porcine) injection 3,800 Units, 3,800 Units, Intracatheter, PRN, Chester Kang MD, 3,800 Units at 07/17/21 1247  •  HYDROcodone-acetaminophen (NORCO) 5-325 MG per tablet 1 tablet, 1 tablet, Oral, Q6H PRN, Mode Bryant MD  •  metoprolol succinate XL (TOPROL-XL) 24 hr tablet 25 mg, 25 mg, Oral, Q24H, Mode Bryant MD, 25 mg at 07/18/21 1154  •  montelukast (SINGULAIR) tablet 10 mg, 10 mg, Oral, Nightly, Mode Bryant MD, 10 mg at 07/17/21 2053  •  nitroglycerin (NITROSTAT) SL tablet 0.4 mg, 0.4 mg, Sublingual, Q5 Min PRN, Mode Bryant MD  •  pantoprazole (PROTONIX) EC tablet 40 mg, 40 mg, Oral, Q AM, Mode Bryant MD, 40 mg at 07/18/21 0612  •  [COMPLETED] Insert peripheral IV, , , Once **AND** sodium chloride 0.9 % flush 10 mL, 10 mL, Intravenous, PRN, Mode Bryant MD     ASSESSMENT  Malfunctioning right AV fistula  Recurrent thrombosis right AV shunt  S/p tunneled catheter insertion right common femoral vein  End-stage renal disease on hemodialysis  Recent right laparoscopic hemicolectomy secondary to tubular adenoma  Status post lower GI bleed  Status post recent thrombectomy with shuntogram of right AV fistula  COPD  Diabetes mellitus  Hypertension  Chronic anemia with drop in H&H  Gastroesophageal reflux disease    PLAN  CPM  Hemodialysis TIW  Transfuse PTN  Thrombectomy in a.m.  Vascular surgery consult appreciated  Nephrology to follow patient  Continue home medications  Stress ulcer DVT prophylaxis  Supportive care  Discussed with nursing staff  Follow closely further recommendation current hospital course    ALLAN MARTIN MD

## 2021-07-18 NOTE — PLAN OF CARE
Goal Outcome Evaluation:  Plan of Care Reviewed With: patient   Progress: no change     Progress: no change  Outcome Summary: No major issues over shift, VS stable, pt remains alert and oriented and on room air, pt npo after midnight for thrombectomy revision tomorrow, consent signed, will continue to monitor

## 2021-07-18 NOTE — PROGRESS NOTES
" LOS: 0 days   Patient Care Team:  Laurent Cortes DO as PCP - General (Internal Medicine)  Kvng Guerra MD as Consulting Physician (Pulmonary Disease)    Chief Complaint: ESRD      History of Present Illness  This is a 76 y.o. year old female with end-stage renal disease, well-known to me from the outpatient setting and also from a recent admission for lower GI bleeding, eventually found to have a right colon mass status post hemicolectomy.  After bowel function returned she stabilized and was discharged but her Eliquis was held.  She also had problems with AV fistula cannulation during recent admission and with outpatient dialysis, shuntogram with thrombectomy was performed and the shunt subsequently functioned well.  She showed up at dialysis yesterday and the shunt was found to be occluded and she was sent to the emergency room and did not receive any hemodialysis.  Vascular is planning a tunnel cath today with eventual thrombectomy.  He is not short of breath, O2 sats 99% and electrolytes are stable.  Chest x-ray shows small effusions no pulmonary edema    Subjective  S/p HD yesterday with 2 liters UF  Had blood transfusion during HD.   Tolerated well  Sleeping     History taken from: patient chart    Objective     Vital Sign Min/Max for last 24 hours  Temp  Min: 97.5 °F (36.4 °C)  Max: 98.1 °F (36.7 °C)   BP  Min: 105/79  Max: 169/77   Pulse  Min: 52  Max: 76   Resp  Min: 16  Max: 18   SpO2  Min: 97 %  Max: 98 %   No data recorded   No data recorded     Flowsheet Rows      First Filed Value   Admission Height  152.4 cm (60\") Documented at 07/15/2021 1902   Admission Weight  79.8 kg (175 lb 14.8 oz) Documented at 07/15/2021 1902          I/O this shift:  In: 360 [P.O.:360]  Out: -   I/O last 3 completed shifts:  In: 1320 [P.O.:720; Blood:600]  Out: 2000 [Other:2000]    Objective:  Vital signs: (most recent): Blood pressure 143/61, pulse 55, temperature 98.1 °F (36.7 °C), temperature source Oral, " "resp. rate 18, height 152.4 cm (60\"), weight 78.5 kg (173 lb), SpO2 98 %, not currently breastfeeding.            Physical Examination: General appearance -  no distress, sleeping   Mental status - sleeping   Eyes - p  Chest - clear to auscultation, no wheezes, rales or rhonchi, symmetric air entry  Heart - normal rate, regular rhythm, normal S1, S2, no murmurs, rubs, clicks or gallops  Abdomen - soft, nontender, nondistended, no masses or organomegaly  Neurological -   Musculoskeletal - no joint tenderness, deformity or swelling  Skin - normal coloration and turgor, no rashes, no suspicious skin lesions noted  Results Review:     I reviewed the patient's new clinical results.    WBC WBC   Date Value Ref Range Status   07/18/2021 5.02 3.40 - 10.80 10*3/mm3 Final   07/17/2021 5.37 3.40 - 10.80 10*3/mm3 Final   07/16/2021 5.99 3.40 - 10.80 10*3/mm3 Final   07/15/2021 5.84 3.40 - 10.80 10*3/mm3 Final      HGB Hemoglobin   Date Value Ref Range Status   07/18/2021 9.7 (L) 12.0 - 15.9 g/dL Final   07/17/2021 6.5 (C) 12.0 - 15.9 g/dL Final   07/16/2021 7.2 (L) 12.0 - 15.9 g/dL Final   07/15/2021 7.7 (L) 12.0 - 15.9 g/dL Final      HCT Hematocrit   Date Value Ref Range Status   07/18/2021 30.5 (L) 34.0 - 46.6 % Final   07/17/2021 21.3 (L) 34.0 - 46.6 % Final   07/16/2021 23.8 (L) 34.0 - 46.6 % Final   07/15/2021 24.2 (L) 34.0 - 46.6 % Final      Platlets No results found for: LABPLAT   MCV MCV   Date Value Ref Range Status   07/18/2021 83.6 79.0 - 97.0 fL Final   07/17/2021 79.5 79.0 - 97.0 fL Final   07/16/2021 81.2 79.0 - 97.0 fL Final   07/15/2021 79.6 79.0 - 97.0 fL Final          Sodium Sodium   Date Value Ref Range Status   07/18/2021 134 (L) 136 - 145 mmol/L Final   07/17/2021 130 (L) 136 - 145 mmol/L Final   07/16/2021 134 (L) 136 - 145 mmol/L Final   07/15/2021 133 (L) 136 - 145 mmol/L Final      Potassium Potassium   Date Value Ref Range Status   07/18/2021 3.7 3.5 - 5.2 mmol/L Final     Comment:     Slight " hemolysis detected by analyzer. Results may be affected.   07/17/2021 4.0 3.5 - 5.2 mmol/L Final   07/16/2021 4.6 3.5 - 5.2 mmol/L Final     Comment:     Slight hemolysis detected by analyzer. Results may be affected.   07/15/2021 4.0 3.5 - 5.2 mmol/L Final     Comment:     Slight hemolysis detected by analyzer. Results may be affected.      Chloride Chloride   Date Value Ref Range Status   07/18/2021 99 98 - 107 mmol/L Final   07/17/2021 94 (L) 98 - 107 mmol/L Final   07/16/2021 96 (L) 98 - 107 mmol/L Final   07/15/2021 93 (L) 98 - 107 mmol/L Final      CO2 CO2   Date Value Ref Range Status   07/18/2021 21.9 (L) 22.0 - 29.0 mmol/L Final   07/17/2021 20.9 (L) 22.0 - 29.0 mmol/L Final   07/16/2021 21.6 (L) 22.0 - 29.0 mmol/L Final   07/15/2021 22.7 22.0 - 29.0 mmol/L Final      BUN BUN   Date Value Ref Range Status   07/18/2021 21 8 - 23 mg/dL Final   07/17/2021 35 (H) 8 - 23 mg/dL Final   07/16/2021 27 (H) 8 - 23 mg/dL Final   07/15/2021 24 (H) 8 - 23 mg/dL Final      Creatinine Creatinine   Date Value Ref Range Status   07/18/2021 5.25 (H) 0.57 - 1.00 mg/dL Final   07/17/2021 7.87 (H) 0.57 - 1.00 mg/dL Final   07/16/2021 7.06 (H) 0.57 - 1.00 mg/dL Final   07/15/2021 6.17 (H) 0.57 - 1.00 mg/dL Final      Calcium Calcium   Date Value Ref Range Status   07/18/2021 7.4 (L) 8.6 - 10.5 mg/dL Final   07/17/2021 7.2 (L) 8.6 - 10.5 mg/dL Final   07/16/2021 7.6 (L) 8.6 - 10.5 mg/dL Final   07/15/2021 7.6 (L) 8.6 - 10.5 mg/dL Final      PO4 No results found for: CAPO4   Albumin Albumin   Date Value Ref Range Status   07/18/2021 2.50 (L) 3.50 - 5.20 g/dL Final   07/17/2021 2.50 (L) 3.50 - 5.20 g/dL Final   07/15/2021 3.00 (L) 3.50 - 5.20 g/dL Final      Magnesium No results found for: MG   Uric Acid No results found for: URICACID     Medication Review:     Current Facility-Administered Medications:   •  albuterol (PROVENTIL) nebulizer solution 0.083% 2.5 mg/3mL, 2.5 mg, Nebulization, Q6H PRN, Mode Bryant MD  •   budesonide-formoterol (SYMBICORT) 160-4.5 MCG/ACT inhaler 2 puff, 2 puff, Inhalation, BID - RT, Mode Bryant MD, 2 puff at 07/17/21 1945  •  cholecalciferol (VITAMIN D3) tablet 1,000 Units, 1,000 Units, Oral, Daily, Mode Bryant MD, 1,000 Units at 07/18/21 0849  •  epoetin polina-epbx (RETACRIT) injection 10,000 Units, 10,000 Units, Intravenous, Once per day on Mon Wed Fri, Mode Bryant MD  •  guaiFENesin (MUCINEX) 12 hr tablet 600 mg, 600 mg, Oral, Q12H, Mode Bryant MD, 600 mg at 07/18/21 0849  •  heparin (porcine) 5000 UNIT/ML injection 5,000 Units, 5,000 Units, Subcutaneous, Q12H, Mode Bryant MD, 5,000 Units at 07/18/21 0849  •  heparin (porcine) injection 3,800 Units, 3,800 Units, Intracatheter, PRN, Chester Kang MD, 3,800 Units at 07/17/21 1247  •  HYDROcodone-acetaminophen (NORCO) 5-325 MG per tablet 1 tablet, 1 tablet, Oral, Q6H PRN, Mode Bryant MD  •  metoprolol succinate XL (TOPROL-XL) 24 hr tablet 25 mg, 25 mg, Oral, Q24H, Mode Bryant MD, 25 mg at 07/17/21 1524  •  montelukast (SINGULAIR) tablet 10 mg, 10 mg, Oral, Nightly, Mode Bryant MD, 10 mg at 07/17/21 2053  •  nitroglycerin (NITROSTAT) SL tablet 0.4 mg, 0.4 mg, Sublingual, Q5 Min PRN, Mode Bryant MD  •  pantoprazole (PROTONIX) EC tablet 40 mg, 40 mg, Oral, Q AM, Mode Bryant MD, 40 mg at 07/18/21 0612  •  [COMPLETED] Insert peripheral IV, , , Once **AND** sodium chloride 0.9 % flush 10 mL, 10 mL, Intravenous, PRN, Mode Bryant MD      Assessment/Plan       Dialysis AV fistula malfunction, initial encounter (CMS/[facility])      Assessment & Plan  1. ESRD  2. Recurrent avf dysfunction   3. Cecal mass s/pp hemicolectomy   4. Anemia of CKD   5. COPD    S/p femoral tunneled HD catheter.   Plans for thrombectomy tomorrow by vascular  HD continue TTS  aranesp for anemia, s/p transfusion yesterday during HD. Hemoglobin better.   Will follow.     Noa Horan,  MD  07/18/21  09:14 EDT

## 2021-07-18 NOTE — PROGRESS NOTES
Mode Bryant MD    Location: [facility]     LOS: 0 days   Patient Care Team:  Laurent Cortes DO as PCP - General (Internal Medicine)  Kvng Guerra MD as Consulting Physician (Pulmonary Disease)        Subjective     76 y.o. female did well with right femoral tunneled dialysis catheter hemodialysis yesterday.  Plan revision thrombectomy right upper extremity arteriovenous graft tomorrow and surgery by Dr Shahram Gallagher.  Plans and risks discussed with patient and agrees to proceed.    Review of Systems  Review of Systems - Negative except HPI      Objective     Vital Signs  Temp:  [97.5 °F (36.4 °C)-98.1 °F (36.7 °C)] 98.1 °F (36.7 °C)  Heart Rate:  [52-76] 55  Resp:  [16-18] 18  BP: (105-169)/(54-92) 143/61    Physical Exam  General: No acute distress. Alert and oriented x 4  HEENT: No jugular venous distension, trachea is midline  CV: controlled heart rate  Resp: Clear unlabored breathing  Abd: Abdomen is soft, nontender, nondistended  Extremities: Thrombosed right upper extremity arteriovenous shunt, right femoral tunneled dialysis catheter site clean    Results Review:       Recent Results (from the past 12 hour(s))   Prepare RBC, 2 Units    Collection Time: 07/18/21  6:00 AM   Result Value Ref Range    Product Code R3545J40     Unit Number [id number]     UNIT  ABO B     UNIT  RH POS     Crossmatch Interpretation Compatible     Dispense Status PT     Blood Expiration Date [id number]     Blood Type Barcode 7300     Product Code U4789Z97     Unit Number [id number]     UNIT  ABO B     UNIT  RH POS     Crossmatch Interpretation Compatible     Dispense Status PT     Blood Expiration Date [id number]     Blood Type Barcode 7300    Renal Function Panel    Collection Time: 07/18/21  6:09 AM    Specimen: Blood   Result Value Ref Range    Glucose 84 65 - 99 mg/dL    BUN 21 8 - 23 mg/dL    Creatinine 5.25 (H) 0.57 - 1.00 mg/dL    Sodium 134 (L) 136 - 145 mmol/L    Potassium 3.7 3.5 - 5.2  mmol/L    Chloride 99 98 - 107 mmol/L    CO2 21.9 (L) 22.0 - 29.0 mmol/L    Calcium 7.4 (L) 8.6 - 10.5 mg/dL    Albumin 2.50 (L) 3.50 - 5.20 g/dL    Phosphorus 3.2 2.5 - 4.5 mg/dL    Anion Gap 13.1 5.0 - 15.0 mmol/L    BUN/Creatinine Ratio 4.0 (L) 7.0 - 25.0    eGFR Non  Amer      eGFR  African Amer 10 (L) >60 mL/min/1.73   CBC (No Diff)    Collection Time: 07/18/21  6:09 AM    Specimen: Blood   Result Value Ref Range    WBC 5.02 3.40 - 10.80 10*3/mm3    RBC 3.65 (L) 3.77 - 5.28 10*6/mm3    Hemoglobin 9.7 (L) 12.0 - 15.9 g/dL    Hematocrit 30.5 (L) 34.0 - 46.6 %    MCV 83.6 79.0 - 97.0 fL    MCH 26.6 26.6 - 33.0 pg    MCHC 31.8 31.5 - 35.7 g/dL    RDW 16.9 (H) 12.3 - 15.4 %    RDW-SD 52.2 37.0 - 54.0 fl    MPV 9.9 6.0 - 12.0 fL    Platelets 220 140 - 450 10*3/mm3   ]      Assessment/Plan             Dialysis AV fistula malfunction, initial encounter (CMS/[facility])      Assessment & Plan  76 y.o. female for surgery tomorrow for thrombectomy shuntogram revision right upper extremity arteriovenous graft.        Mode Bryant MD  07/18/21  10:30 EDT

## 2021-07-18 NOTE — PLAN OF CARE
Goal Outcome Evaluation:  Plan of Care Reviewed With: patient        Progress: improving  Outcome Summary: VSS. No c/o pain. Tunnel cath dsg C/D/I. Pt feeling better after dialysis and 2 units of PRBC yesterday. No other changes will continue to monitor.

## 2021-07-19 ENCOUNTER — ANESTHESIA EVENT (OUTPATIENT)
Dept: PERIOP | Facility: HOSPITAL | Age: 76
End: 2021-07-19

## 2021-07-19 ENCOUNTER — APPOINTMENT (OUTPATIENT)
Dept: GENERAL RADIOLOGY | Facility: HOSPITAL | Age: 76
End: 2021-07-19

## 2021-07-19 ENCOUNTER — ANESTHESIA (OUTPATIENT)
Dept: PERIOP | Facility: HOSPITAL | Age: 76
End: 2021-07-19

## 2021-07-19 PROBLEM — T82.868D: Status: ACTIVE | Noted: 2021-07-15

## 2021-07-19 LAB
ANION GAP SERPL CALCULATED.3IONS-SCNC: 19 MMOL/L (ref 5–15)
BASOPHILS # BLD AUTO: 0.03 10*3/MM3 (ref 0–0.2)
BASOPHILS NFR BLD AUTO: 0.5 % (ref 0–1.5)
BUN SERPL-MCNC: 31 MG/DL (ref 8–23)
BUN/CREAT SERPL: 4.7 (ref 7–25)
CALCIUM SPEC-SCNC: 7.6 MG/DL (ref 8.6–10.5)
CHLORIDE SERPL-SCNC: 96 MMOL/L (ref 98–107)
CO2 SERPL-SCNC: 21 MMOL/L (ref 22–29)
CREAT SERPL-MCNC: 6.54 MG/DL (ref 0.57–1)
DEPRECATED RDW RBC AUTO: 51.7 FL (ref 37–54)
EOSINOPHIL # BLD AUTO: 0.18 10*3/MM3 (ref 0–0.4)
EOSINOPHIL NFR BLD AUTO: 3.2 % (ref 0.3–6.2)
ERYTHROCYTE [DISTWIDTH] IN BLOOD BY AUTOMATED COUNT: 17 % (ref 12.3–15.4)
GFR SERPL CREATININE-BSD FRML MDRD: 8 ML/MIN/1.73
GFR SERPL CREATININE-BSD FRML MDRD: ABNORMAL ML/MIN/{1.73_M2}
GLUCOSE BLDC GLUCOMTR-MCNC: 50 MG/DL (ref 70–130)
GLUCOSE BLDC GLUCOMTR-MCNC: 70 MG/DL (ref 70–130)
GLUCOSE BLDC GLUCOMTR-MCNC: 76 MG/DL (ref 70–130)
GLUCOSE BLDC GLUCOMTR-MCNC: 77 MG/DL (ref 70–130)
GLUCOSE BLDC GLUCOMTR-MCNC: 88 MG/DL (ref 70–130)
GLUCOSE SERPL-MCNC: 79 MG/DL (ref 65–99)
HCT VFR BLD AUTO: 32.8 % (ref 34–46.6)
HGB BLD-MCNC: 10.4 G/DL (ref 12–15.9)
IMM GRANULOCYTES # BLD AUTO: 0.05 10*3/MM3 (ref 0–0.05)
IMM GRANULOCYTES NFR BLD AUTO: 0.9 % (ref 0–0.5)
LYMPHOCYTES # BLD AUTO: 0.51 10*3/MM3 (ref 0.7–3.1)
LYMPHOCYTES NFR BLD AUTO: 9.1 % (ref 19.6–45.3)
MCH RBC QN AUTO: 26.2 PG (ref 26.6–33)
MCHC RBC AUTO-ENTMCNC: 31.7 G/DL (ref 31.5–35.7)
MCV RBC AUTO: 82.6 FL (ref 79–97)
MONOCYTES # BLD AUTO: 0.76 10*3/MM3 (ref 0.1–0.9)
MONOCYTES NFR BLD AUTO: 13.5 % (ref 5–12)
NEUTROPHILS NFR BLD AUTO: 4.1 10*3/MM3 (ref 1.7–7)
NEUTROPHILS NFR BLD AUTO: 72.8 % (ref 42.7–76)
NRBC BLD AUTO-RTO: 0 /100 WBC (ref 0–0.2)
PLATELET # BLD AUTO: 251 10*3/MM3 (ref 140–450)
PMV BLD AUTO: 10.7 FL (ref 6–12)
POTASSIUM SERPL-SCNC: 4.5 MMOL/L (ref 3.5–5.2)
RBC # BLD AUTO: 3.97 10*6/MM3 (ref 3.77–5.28)
SARS-COV-2 ORF1AB RESP QL NAA+PROBE: NOT DETECTED
SODIUM SERPL-SCNC: 136 MMOL/L (ref 136–145)
WBC # BLD AUTO: 5.63 10*3/MM3 (ref 3.4–10.8)

## 2021-07-19 PROCEDURE — 25010000002 DIPHENHYDRAMINE PER 50 MG: Performed by: NURSE ANESTHETIST, CERTIFIED REGISTERED

## 2021-07-19 PROCEDURE — 25010000003 CEFAZOLIN IN DEXTROSE 2-4 GM/100ML-% SOLUTION: Performed by: SURGERY

## 2021-07-19 PROCEDURE — 25010000002 HEPARIN (PORCINE) PER 1000 UNITS: Performed by: SURGERY

## 2021-07-19 PROCEDURE — 05C70ZZ EXTIRPATION OF MATTER FROM RIGHT AXILLARY VEIN, OPEN APPROACH: ICD-10-PCS | Performed by: SURGERY

## 2021-07-19 PROCEDURE — 94799 UNLISTED PULMONARY SVC/PX: CPT

## 2021-07-19 PROCEDURE — 3C1ZX8Z IRRIGATION OF INDWELLING DEVICE USING IRRIGATING SUBSTANCE, EXTERNAL APPROACH: ICD-10-PCS | Performed by: SURGERY

## 2021-07-19 PROCEDURE — 80048 BASIC METABOLIC PNL TOTAL CA: CPT | Performed by: HOSPITALIST

## 2021-07-19 PROCEDURE — C1769 GUIDE WIRE: HCPCS | Performed by: SURGERY

## 2021-07-19 PROCEDURE — 25010000003 LIDOCAINE 1 % SOLUTION 20 ML VIAL: Performed by: SURGERY

## 2021-07-19 PROCEDURE — 82962 GLUCOSE BLOOD TEST: CPT

## 2021-07-19 PROCEDURE — C1894 INTRO/SHEATH, NON-LASER: HCPCS | Performed by: SURGERY

## 2021-07-19 PROCEDURE — 25010000002 HEPARIN (PORCINE) PER 1000 UNITS: Performed by: NURSE ANESTHETIST, CERTIFIED REGISTERED

## 2021-07-19 PROCEDURE — 25010000002 PROPOFOL 10 MG/ML EMULSION: Performed by: NURSE ANESTHETIST, CERTIFIED REGISTERED

## 2021-07-19 PROCEDURE — C1757 CATH, THROMBECTOMY/EMBOLECT: HCPCS | Performed by: SURGERY

## 2021-07-19 PROCEDURE — 0 IOPAMIDOL PER 1 ML: Performed by: SURGERY

## 2021-07-19 PROCEDURE — 25010000002 DEXAMETHASONE PER 1 MG: Performed by: NURSE ANESTHETIST, CERTIFIED REGISTERED

## 2021-07-19 PROCEDURE — C1889 IMPLANT/INSERT DEVICE, NOC: HCPCS | Performed by: SURGERY

## 2021-07-19 PROCEDURE — 85025 COMPLETE CBC W/AUTO DIFF WBC: CPT | Performed by: HOSPITALIST

## 2021-07-19 DEVICE — LIGACLIP MCA MULTIPLE CLIP APPLIERS, 20 SMALL CLIPS
Type: IMPLANTABLE DEVICE | Site: ARM | Status: FUNCTIONAL
Brand: LIGACLIP

## 2021-07-19 RX ORDER — DIPHENHYDRAMINE HCL 25 MG
25 CAPSULE ORAL
Status: DISCONTINUED | OUTPATIENT
Start: 2021-07-19 | End: 2021-07-19 | Stop reason: HOSPADM

## 2021-07-19 RX ORDER — PROPOFOL 10 MG/ML
VIAL (ML) INTRAVENOUS AS NEEDED
Status: DISCONTINUED | OUTPATIENT
Start: 2021-07-19 | End: 2021-07-19 | Stop reason: SURG

## 2021-07-19 RX ORDER — HYDROCODONE BITARTRATE AND ACETAMINOPHEN 7.5; 325 MG/1; MG/1
1 TABLET ORAL ONCE AS NEEDED
Status: DISCONTINUED | OUTPATIENT
Start: 2021-07-19 | End: 2021-07-19 | Stop reason: HOSPADM

## 2021-07-19 RX ORDER — IBUPROFEN 600 MG/1
600 TABLET ORAL ONCE AS NEEDED
Status: DISCONTINUED | OUTPATIENT
Start: 2021-07-19 | End: 2021-07-19 | Stop reason: HOSPADM

## 2021-07-19 RX ORDER — PROMETHAZINE HYDROCHLORIDE 25 MG/1
25 SUPPOSITORY RECTAL ONCE AS NEEDED
Status: DISCONTINUED | OUTPATIENT
Start: 2021-07-19 | End: 2021-07-19 | Stop reason: HOSPADM

## 2021-07-19 RX ORDER — NALOXONE HCL 0.4 MG/ML
0.2 VIAL (ML) INJECTION AS NEEDED
Status: DISCONTINUED | OUTPATIENT
Start: 2021-07-19 | End: 2021-07-19 | Stop reason: HOSPADM

## 2021-07-19 RX ORDER — EPHEDRINE SULFATE 50 MG/ML
5 INJECTION, SOLUTION INTRAVENOUS ONCE AS NEEDED
Status: DISCONTINUED | OUTPATIENT
Start: 2021-07-19 | End: 2021-07-19 | Stop reason: HOSPADM

## 2021-07-19 RX ORDER — MAGNESIUM HYDROXIDE 1200 MG/15ML
LIQUID ORAL AS NEEDED
Status: DISCONTINUED | OUTPATIENT
Start: 2021-07-19 | End: 2021-07-19 | Stop reason: HOSPADM

## 2021-07-19 RX ORDER — SODIUM CHLORIDE 0.9 % (FLUSH) 0.9 %
3-10 SYRINGE (ML) INJECTION AS NEEDED
Status: DISCONTINUED | OUTPATIENT
Start: 2021-07-19 | End: 2021-07-19 | Stop reason: HOSPADM

## 2021-07-19 RX ORDER — LIDOCAINE HYDROCHLORIDE 10 MG/ML
0.5 INJECTION, SOLUTION EPIDURAL; INFILTRATION; INTRACAUDAL; PERINEURAL ONCE AS NEEDED
Status: DISCONTINUED | OUTPATIENT
Start: 2021-07-19 | End: 2021-07-19 | Stop reason: HOSPADM

## 2021-07-19 RX ORDER — FENTANYL CITRATE 50 UG/ML
50 INJECTION, SOLUTION INTRAMUSCULAR; INTRAVENOUS
Status: DISCONTINUED | OUTPATIENT
Start: 2021-07-19 | End: 2021-07-19 | Stop reason: HOSPADM

## 2021-07-19 RX ORDER — MIDAZOLAM HYDROCHLORIDE 1 MG/ML
0.5 INJECTION INTRAMUSCULAR; INTRAVENOUS
Status: DISCONTINUED | OUTPATIENT
Start: 2021-07-19 | End: 2021-07-19 | Stop reason: HOSPADM

## 2021-07-19 RX ORDER — PROMETHAZINE HYDROCHLORIDE 25 MG/1
25 TABLET ORAL ONCE AS NEEDED
Status: DISCONTINUED | OUTPATIENT
Start: 2021-07-19 | End: 2021-07-19 | Stop reason: HOSPADM

## 2021-07-19 RX ORDER — FAMOTIDINE 10 MG/ML
20 INJECTION, SOLUTION INTRAVENOUS ONCE
Status: COMPLETED | OUTPATIENT
Start: 2021-07-19 | End: 2021-07-19

## 2021-07-19 RX ORDER — SODIUM CHLORIDE, SODIUM LACTATE, POTASSIUM CHLORIDE, CALCIUM CHLORIDE 600; 310; 30; 20 MG/100ML; MG/100ML; MG/100ML; MG/100ML
9 INJECTION, SOLUTION INTRAVENOUS CONTINUOUS
Status: DISCONTINUED | OUTPATIENT
Start: 2021-07-19 | End: 2021-07-19

## 2021-07-19 RX ORDER — OXYCODONE AND ACETAMINOPHEN 10; 325 MG/1; MG/1
1 TABLET ORAL EVERY 4 HOURS PRN
Status: DISCONTINUED | OUTPATIENT
Start: 2021-07-19 | End: 2021-07-19 | Stop reason: HOSPADM

## 2021-07-19 RX ORDER — DIPHENHYDRAMINE HYDROCHLORIDE 50 MG/ML
12.5 INJECTION INTRAMUSCULAR; INTRAVENOUS
Status: DISCONTINUED | OUTPATIENT
Start: 2021-07-19 | End: 2021-07-19 | Stop reason: HOSPADM

## 2021-07-19 RX ORDER — FLUMAZENIL 0.1 MG/ML
0.2 INJECTION INTRAVENOUS AS NEEDED
Status: DISCONTINUED | OUTPATIENT
Start: 2021-07-19 | End: 2021-07-19 | Stop reason: HOSPADM

## 2021-07-19 RX ORDER — LABETALOL HYDROCHLORIDE 5 MG/ML
5 INJECTION, SOLUTION INTRAVENOUS
Status: DISCONTINUED | OUTPATIENT
Start: 2021-07-19 | End: 2021-07-19 | Stop reason: HOSPADM

## 2021-07-19 RX ORDER — HYDRALAZINE HYDROCHLORIDE 20 MG/ML
5 INJECTION INTRAMUSCULAR; INTRAVENOUS
Status: DISCONTINUED | OUTPATIENT
Start: 2021-07-19 | End: 2021-07-19 | Stop reason: HOSPADM

## 2021-07-19 RX ORDER — HYDROMORPHONE HYDROCHLORIDE 1 MG/ML
0.5 INJECTION, SOLUTION INTRAMUSCULAR; INTRAVENOUS; SUBCUTANEOUS
Status: DISCONTINUED | OUTPATIENT
Start: 2021-07-19 | End: 2021-07-19 | Stop reason: HOSPADM

## 2021-07-19 RX ORDER — SODIUM CHLORIDE 9 MG/ML
9 INJECTION, SOLUTION INTRAVENOUS CONTINUOUS
Status: DISCONTINUED | OUTPATIENT
Start: 2021-07-19 | End: 2021-07-21

## 2021-07-19 RX ORDER — DEXAMETHASONE SODIUM PHOSPHATE 4 MG/ML
INJECTION, SOLUTION INTRA-ARTICULAR; INTRALESIONAL; INTRAMUSCULAR; INTRAVENOUS; SOFT TISSUE AS NEEDED
Status: DISCONTINUED | OUTPATIENT
Start: 2021-07-19 | End: 2021-07-19 | Stop reason: SURG

## 2021-07-19 RX ORDER — HEPARIN SODIUM 1000 [USP'U]/ML
INJECTION, SOLUTION INTRAVENOUS; SUBCUTANEOUS AS NEEDED
Status: DISCONTINUED | OUTPATIENT
Start: 2021-07-19 | End: 2021-07-19 | Stop reason: SURG

## 2021-07-19 RX ORDER — DIPHENHYDRAMINE HYDROCHLORIDE 50 MG/ML
INJECTION INTRAMUSCULAR; INTRAVENOUS AS NEEDED
Status: DISCONTINUED | OUTPATIENT
Start: 2021-07-19 | End: 2021-07-19 | Stop reason: SURG

## 2021-07-19 RX ORDER — ONDANSETRON 2 MG/ML
4 INJECTION INTRAMUSCULAR; INTRAVENOUS ONCE AS NEEDED
Status: DISCONTINUED | OUTPATIENT
Start: 2021-07-19 | End: 2021-07-19 | Stop reason: HOSPADM

## 2021-07-19 RX ORDER — SODIUM CHLORIDE 0.9 % (FLUSH) 0.9 %
3 SYRINGE (ML) INJECTION EVERY 12 HOURS SCHEDULED
Status: DISCONTINUED | OUTPATIENT
Start: 2021-07-19 | End: 2021-07-19 | Stop reason: HOSPADM

## 2021-07-19 RX ADMIN — FAMOTIDINE 20 MG: 10 INJECTION INTRAVENOUS at 14:05

## 2021-07-19 RX ADMIN — MONTELUKAST SODIUM 10 MG: 10 TABLET, FILM COATED ORAL at 21:31

## 2021-07-19 RX ADMIN — BUDESONIDE AND FORMOTEROL FUMARATE DIHYDRATE 2 PUFF: 160; 4.5 AEROSOL RESPIRATORY (INHALATION) at 19:46

## 2021-07-19 RX ADMIN — GUAIFENESIN 600 MG: 600 TABLET, EXTENDED RELEASE ORAL at 21:31

## 2021-07-19 RX ADMIN — BUDESONIDE AND FORMOTEROL FUMARATE DIHYDRATE 2 PUFF: 160; 4.5 AEROSOL RESPIRATORY (INHALATION) at 08:42

## 2021-07-19 RX ADMIN — Medication 1000 UNITS: at 08:30

## 2021-07-19 RX ADMIN — METOPROLOL SUCCINATE 25 MG: 25 TABLET, EXTENDED RELEASE ORAL at 08:30

## 2021-07-19 RX ADMIN — HEPARIN SODIUM 10000 UNITS: 1000 INJECTION INTRAVENOUS; SUBCUTANEOUS at 14:44

## 2021-07-19 RX ADMIN — HEPARIN SODIUM 5000 UNITS: 5000 INJECTION INTRAVENOUS; SUBCUTANEOUS at 21:31

## 2021-07-19 RX ADMIN — DEXAMETHASONE SODIUM PHOSPHATE 10 MG: 4 INJECTION, SOLUTION INTRAMUSCULAR; INTRAVENOUS at 14:41

## 2021-07-19 RX ADMIN — HEPARIN SODIUM 5000 UNITS: 5000 INJECTION INTRAVENOUS; SUBCUTANEOUS at 08:30

## 2021-07-19 RX ADMIN — CEFAZOLIN SODIUM 2 G: 2 INJECTION, SOLUTION INTRAVENOUS at 14:22

## 2021-07-19 RX ADMIN — DIPHENHYDRAMINE HYDROCHLORIDE 25 MG: 50 INJECTION, SOLUTION INTRAMUSCULAR; INTRAVENOUS at 14:40

## 2021-07-19 RX ADMIN — PROPOFOL 30 MG: 10 INJECTION, EMULSION INTRAVENOUS at 14:35

## 2021-07-19 RX ADMIN — SODIUM CHLORIDE 9 ML/HR: 9 INJECTION, SOLUTION INTRAVENOUS at 14:07

## 2021-07-19 RX ADMIN — PROPOFOL 50 MCG/KG/MIN: 10 INJECTION, EMULSION INTRAVENOUS at 14:36

## 2021-07-19 NOTE — CASE MANAGEMENT/SOCIAL WORK
Pre Op Ortho Assessment    [facility]     Patient Name: Nellie Vegas  MRN: [id number]  Today's Date: 7/19/2021        PRE-OPERATIVE ORTHOPEDIC ASSESSMENT     Row Name 07/19/21 1323       Question    What is your age group?  0    Gender?  1    How far on average can you walk? (a block is 200 meters)  0    Which gait aid do you use? (more often than not)  2    Do you use community supports: (home help, meals on wheels, district nursing)  1    Will you live with someone who can care for you after your operation?  3    Your Score (out of 12)  7       Discharge Disposition/Planning:    Discussed the predicted discharge disposition needed based on RAPT Assessment with the patient  Yes    Patient Selected Discharge Disposition:  Home Health    Outpatient Rehabilitation Facility of Choice:  other *see comment no preference    Home Health Services Preferred:  Other *see comment no preference    Post-operative Caregiver Name and Phone Number  Cecilia Mosqueda [phone]       Home Equipment    Does patient have a walker for home use?  No    Does patient have a 3 in 1 commode for home use?  No    Does patient have a shower chair for home use?  No    Does patient have an elevated commode seat for home use?  No    Does patient have a cane for home use?  Yes    Is there any other medical equipment in the home?  No       Pre-Operative Class Attendance    Attended or scheduled to attend the pre-operative class within 1 year of total joint replacement?  Yes       Patient Education    Expected time of discharge discussed  Yes    Encouraged to attend pre-operative class  Yes    Education regarding predicted discharge disposition based on RAPT score  Yes    Patient receptive and voiced understanding of information given  Yes        Plan is to go home with her .  She only has a cane at home.  She has 4 steps to get into her house.  She may benefit from , she has no preference on which company.  If she needs PT or SNF  she would prefer [facility].  She has done the online class.    Shannon Epley, RN

## 2021-07-19 NOTE — PROGRESS NOTES
"Daily progress note    Chief complaint  Doing same  No new complaints  Family at bedside    History of present illness  76-year-old -American female who is well-known to our service with history of end-stage renal disease on hemodialysis COPD hypertension diabetes chronic anemia and gastroesophageal disease who was recently discharged from the hospital after work-up on lower GI bleed which revealed cecal mass underwent laparoscopic right hemicolectomy and biopsy came back positive for tubular adenoma.  Patient also have thrombectomy with shuntogram of the right AV fistula and discharge home in stable condition presented to [facility] emergency room with right arm occluded AV shunt as she went for dialysis and they were unable to assess right arm AV fistula.  Patient has no other complaint.  Patient denies any chest pain shortness of breath abdominal pain nausea vomiting diarrhea.  Patient denies any fever chills night sweats weight loss or weight gain.  Patient admitted for management.     REVIEW OF SYSTEMS  Unremarkable except generalized weakness     PHYSICAL EXAM  Blood pressure 158/68, pulse 60, temperature 98.5 °F (36.9 °C), temperature source Oral, resp. rate 18, height 152.4 cm (60\"), weight 78.5 kg (173 lb), SpO2 94 %, not currently breastfeeding.    General: No acute distress.  HENT: NCAT, PERRL, Nares patent.  Eyes: no scleral icterus.  Neck: trachea midline, no ROM limitations.  CV: regular rhythm, regular rate.  Respiratory: normal effort, CTAB.  Abdomen: soft, nondistended, NTTP, no rebound tenderness, no guarding or rigidity  Musculoskeletal: Right arm AV fistula does not have a thrill or bruit.  It is nontender to palpation.  Neuro: alert, moves all extremities, follows commands.  Skin: warm, dry.     LAB RESULTS  Lab Results (last 24 hours)     Procedure Component Value Units Date/Time    POC Glucose Once [[id number]]  (Normal) Collected: 07/19/21 1212    Specimen: Blood Updated: " 07/19/21 1214     Glucose 88 mg/dL      Comment: Meter: [id number] : [id number] Dwayne WADDELL       COVID PRE-OP / PRE-PROCEDURE SCREENING ORDER (NO ISOLATION) - Swab, Nasopharynx [[id number]]  (Normal) Collected: 07/18/21 2157    Specimen: Swab from Nasopharynx Updated: 07/19/21 1132    Narrative:      The following orders were created for panel order COVID PRE-OP / PRE-PROCEDURE SCREENING ORDER (NO ISOLATION) - Swab, Nasopharynx.  Procedure                               Abnormality         Status                     ---------                               -----------         ------                     COVID-19,APTIMA PANTHER,...[[id number]]  Normal              Final result                 Please view results for these tests on the individual orders.    COVID-19,APTIMA PANTHER,[facility], NP/OP SWAB IN UTM/VTM/SALINE TRANSPORT MEDIA,24 HR TAT - Swab, Nasopharynx [[id number]]  (Normal) Collected: 07/18/21 2157    Specimen: Swab from Nasopharynx Updated: 07/19/21 1132     COVID19 Not Detected    Narrative:      Fact sheet for providers: https://www.fda.gov/media/883132/download     Fact sheet for patients: https://www.fda.gov/media/062054/download    Test performed by RT PCR.    CBC & Differential [[id number]]  (Abnormal) Collected: 07/19/21 0950    Specimen: Blood Updated: 07/19/21 1051    Narrative:      The following orders were created for panel order CBC & Differential.  Procedure                               Abnormality         Status                     ---------                               -----------         ------                     CBC Auto Differential[[id number]]        Abnormal            Final result                 Please view results for these tests on the individual orders.    CBC Auto Differential [[id number]]  (Abnormal) Collected: 07/19/21 0950    Specimen: Blood Updated: 07/19/21 1051     WBC 5.63 10*3/mm3      RBC 3.97 10*6/mm3      Hemoglobin 10.4 g/dL      Hematocrit 32.8 %       MCV 82.6 fL      MCH 26.2 pg      MCHC 31.7 g/dL      RDW 17.0 %      RDW-SD 51.7 fl      MPV 10.7 fL      Platelets 251 10*3/mm3      Neutrophil % 72.8 %      Lymphocyte % 9.1 %      Monocyte % 13.5 %      Eosinophil % 3.2 %      Basophil % 0.5 %      Immature Grans % 0.9 %      Neutrophils, Absolute 4.10 10*3/mm3      Lymphocytes, Absolute 0.51 10*3/mm3      Monocytes, Absolute 0.76 10*3/mm3      Eosinophils, Absolute 0.18 10*3/mm3      Basophils, Absolute 0.03 10*3/mm3      Immature Grans, Absolute 0.05 10*3/mm3      nRBC 0.0 /100 WBC     Basic Metabolic Panel [[id number]]  (Abnormal) Collected: 07/19/21 0616    Specimen: Blood Updated: 07/19/21 0730     Glucose 79 mg/dL      BUN 31 mg/dL      Creatinine 6.54 mg/dL      Sodium 136 mmol/L      Potassium 4.5 mmol/L      Comment: Slight hemolysis detected by analyzer. Results may be affected.        Chloride 96 mmol/L      CO2 21.0 mmol/L      Calcium 7.6 mg/dL      eGFR  African Amer 8 mL/min/1.73      Comment: <15 Indicative of kidney failure.        eGFR Non  Amer --     Comment: <15 Indicative of kidney failure.        BUN/Creatinine Ratio 4.7     Anion Gap 19.0 mmol/L     Narrative:      GFR Normal >60  Chronic Kidney Disease <60  Kidney Failure <15          Imaging Results (Last 24 Hours)     ** No results found for the last 24 hours. **          Current Facility-Administered Medications:   •  albuterol (PROVENTIL) nebulizer solution 0.083% 2.5 mg/3mL, 2.5 mg, Nebulization, Q6H PRN, Mode Bryant MD  •  budesonide-formoterol (SYMBICORT) 160-4.5 MCG/ACT inhaler 2 puff, 2 puff, Inhalation, BID - RT, Mode Bryant MD, 2 puff at 07/19/21 0842  •  ceFAZolin in dextrose (ANCEF) IVPB solution 2 g, 2 g, Intravenous, 30 Min Pre-Op, Mdoe Bryant MD  •  cholecalciferol (VITAMIN D3) tablet 1,000 Units, 1,000 Units, Oral, Daily, Mode Bryant MD, 1,000 Units at 07/19/21 0830  •  [START ON 7/22/2021] epoetin polina-epbx (RETACRIT) injection  10,000 Units, 10,000 Units, Intravenous, Once per day on Tue Thu SatPearl Andrew James, MD  •  guaiFENesin (MUCINEX) 12 hr tablet 600 mg, 600 mg, Oral, Q12H, Mode Bryant MD, 600 mg at 07/18/21 2015  •  heparin (porcine) 5000 UNIT/ML injection 5,000 Units, 5,000 Units, Subcutaneous, Q12H, Mode Bryant MD, 5,000 Units at 07/19/21 0830  •  heparin (porcine) injection 3,800 Units, 3,800 Units, Intracatheter, PRN, Chester Kang MD, 3,800 Units at 07/17/21 1247  •  HYDROcodone-acetaminophen (NORCO) 5-325 MG per tablet 1 tablet, 1 tablet, Oral, Q6H PRN, Mode Bryant MD  •  metoprolol succinate XL (TOPROL-XL) 24 hr tablet 25 mg, 25 mg, Oral, Q24H, Mode Bryant MD, 25 mg at 07/19/21 0830  •  montelukast (SINGULAIR) tablet 10 mg, 10 mg, Oral, Nightly, Mode Bryant MD, 10 mg at 07/18/21 2015  •  pantoprazole (PROTONIX) EC tablet 40 mg, 40 mg, Oral, Q AM, Mode Bryant MD, 40 mg at 07/18/21 0612  •  [COMPLETED] Insert peripheral IV, , , Once **AND** sodium chloride 0.9 % flush 10 mL, 10 mL, Intravenous, PRN, Mode Bryant MD     ASSESSMENT  Malfunctioning right AV fistula  Recurrent thrombosis right AV shunt  S/p tunneled catheter insertion right common femoral vein  End-stage renal disease on hemodialysis  Recent right laparoscopic hemicolectomy secondary to tubular adenoma  Status post lower GI bleed  Status post recent thrombectomy with shuntogram of right AV fistula  COPD  Diabetes mellitus  Hypertension  Chronic anemia with drop in H&H  Gastroesophageal reflux disease    PLAN  CPM  Hemodialysis TIW  Transfuse PTN  Thrombectomy this afternoon  Vascular surgery consult appreciated  Nephrology to follow patient  Continue home medications  Stress ulcer DVT prophylaxis  Supportive care  Discussed with nursing staff  Follow closely further recommendation current hospital course    ALLAN MARTIN MD

## 2021-07-19 NOTE — PROGRESS NOTES
LOS: 1 day     Chief Complaint/ Reason for encounter: ESRD, dialysis management  Chief Complaint   Patient presents with   • Vascular Access Problem         Subjective   She feels well denies any new complaints, n.p.o. for AV fistula declot, has right groin catheter in place for immediate access  No nausea vomiting  No edema      Medical history reviewed:  History of Present Illness    Subjective    History taken from: Patient and chart    Vital Signs  Temp:  [97.8 °F (36.6 °C)-98.5 °F (36.9 °C)] 98.5 °F (36.9 °C)  Heart Rate:  [55-63] 60  Resp:  [18] 18  BP: (138-158)/(61-90) 158/68       Wt Readings from Last 1 Encounters:   07/15/21 2156 78.5 kg (173 lb)   07/15/21 1902 79.8 kg (175 lb 14.8 oz)       Objective    Objective:  General Appearance:  Comfortable, obese-appearing, in no acute distress and not in pain.  Awake, alert, oriented  HEENT: Mucous membranes moist, no injury, oropharynx clear  Lungs:  Normal effort and normal respiratory rate.  Breath sounds clear to auscultation.  No  respiratory distress.  No rales, decreased breath sounds or rhonchi.    Heart: Normal rate.  Regular rhythm.  S1 normal.  No murmur.   Abdomen: Abdomen is soft.  Bowel sounds are normal, no abdominal tenderness.  There is no rebound or guarding  Extremities: Normal range of motion.  Trace edema of bilateral lower extremities, distal pulses intact  Neurological: No focal motor or sensory deficits, pupils reactive  Skin:  Warm and dry.  No rash or cyanosis.   AV fistula with absent thrill and bruit      Results Review:    Intake/Output:     Intake/Output Summary (Last 24 hours) at 7/19/2021 1332  Last data filed at 7/18/2021 2038  Gross per 24 hour   Intake 222 ml   Output 400 ml   Net -178 ml         DATA:  Radiology and Labs:  The following labs independently reviewed by me. Additional labs ordered for tomorrow a.m.  Interval notes, chart personally reviewed by me.   Old records independently reviewed showing recent admission  with fistula thrombectomy now with recurrent thrombosis  Discussed with patient and family at bedside    Risk/ complexity of medical care/ medical decision making moderate, need for surgery/declot and dialysis    Labs:   Recent Results (from the past 24 hour(s))   COVID-19,[facility], NP/OP SWAB IN UTM/VTM/SALINE TRANSPORT MEDIA,24 HR TAT - Swab, Nasopharynx    Collection Time: 07/18/21  9:57 PM    Specimen: Nasopharynx; Swab   Result Value Ref Range    COVID19 Not Detected Not Detected - Ref. Range   Basic Metabolic Panel    Collection Time: 07/19/21  6:16 AM    Specimen: Blood   Result Value Ref Range    Glucose 79 65 - 99 mg/dL    BUN 31 (H) 8 - 23 mg/dL    Creatinine 6.54 (H) 0.57 - 1.00 mg/dL    Sodium 136 136 - 145 mmol/L    Potassium 4.5 3.5 - 5.2 mmol/L    Chloride 96 (L) 98 - 107 mmol/L    CO2 21.0 (L) 22.0 - 29.0 mmol/L    Calcium 7.6 (L) 8.6 - 10.5 mg/dL    eGFR  African Amer 8 (L) >60 mL/min/1.73    eGFR Non African Amer      BUN/Creatinine Ratio 4.7 (L) 7.0 - 25.0    Anion Gap 19.0 (H) 5.0 - 15.0 mmol/L   CBC Auto Differential    Collection Time: 07/19/21  9:50 AM    Specimen: Blood   Result Value Ref Range    WBC 5.63 3.40 - 10.80 10*3/mm3    RBC 3.97 3.77 - 5.28 10*6/mm3    Hemoglobin 10.4 (L) 12.0 - 15.9 g/dL    Hematocrit 32.8 (L) 34.0 - 46.6 %    MCV 82.6 79.0 - 97.0 fL    MCH 26.2 (L) 26.6 - 33.0 pg    MCHC 31.7 31.5 - 35.7 g/dL    RDW 17.0 (H) 12.3 - 15.4 %    RDW-SD 51.7 37.0 - 54.0 fl    MPV 10.7 6.0 - 12.0 fL    Platelets 251 140 - 450 10*3/mm3    Neutrophil % 72.8 42.7 - 76.0 %    Lymphocyte % 9.1 (L) 19.6 - 45.3 %    Monocyte % 13.5 (H) 5.0 - 12.0 %    Eosinophil % 3.2 0.3 - 6.2 %    Basophil % 0.5 0.0 - 1.5 %    Immature Grans % 0.9 (H) 0.0 - 0.5 %    Neutrophils, Absolute 4.10 1.70 - 7.00 10*3/mm3    Lymphocytes, Absolute 0.51 (L) 0.70 - 3.10 10*3/mm3    Monocytes, Absolute 0.76 0.10 - 0.90 10*3/mm3    Eosinophils, Absolute 0.18 0.00 - 0.40 10*3/mm3    Basophils, Absolute  0.03 0.00 - 0.20 10*3/mm3    Immature Grans, Absolute 0.05 0.00 - 0.05 10*3/mm3    nRBC 0.0 0.0 - 0.2 /100 WBC   POC Glucose Once    Collection Time: 07/19/21 12:12 PM    Specimen: Blood   Result Value Ref Range    Glucose 88 70 - 130 mg/dL       Radiology:  Imaging Results (Last 24 Hours)     ** No results found for the last 24 hours. **             Medications have been reviewed:  Current Facility-Administered Medications   Medication Dose Route Frequency Provider Last Rate Last Admin   • [MAR Hold] albuterol (PROVENTIL) nebulizer solution 0.083% 2.5 mg/3mL  2.5 mg Nebulization Q6H PRN Mode Bryant MD       • [MAR Hold] budesonide-formoterol (SYMBICORT) 160-4.5 MCG/ACT inhaler 2 puff  2 puff Inhalation BID - RT Mode Bryant MD   2 puff at 07/19/21 0842   • ceFAZolin in dextrose (ANCEF) IVPB solution 2 g  2 g Intravenous 30 Min Pre-Op Mode Bryant MD       • [MAR Hold] cholecalciferol (VITAMIN D3) tablet 1,000 Units  1,000 Units Oral Daily Mode Bryant MD   1,000 Units at 07/19/21 0830   • [MAR Hold] epoetin polina-epbx (RETACRIT) injection 10,000 Units  10,000 Units Intravenous Once per day on Tue Thu Sat Chester Kang MD       • [MAR Hold] guaiFENesin (MUCINEX) 12 hr tablet 600 mg  600 mg Oral Q12H Mode Bryant MD   600 mg at 07/18/21 2015   • [MAR Hold] heparin (porcine) 5000 UNIT/ML injection 5,000 Units  5,000 Units Subcutaneous Q12H Mode Bryant MD   5,000 Units at 07/19/21 0830   • [MAR Hold] heparin (porcine) injection 3,800 Units  3,800 Units Intracatheter PRN Chester Kang MD   3,800 Units at 07/17/21 1247   • [MAR Hold] HYDROcodone-acetaminophen (NORCO) 5-325 MG per tablet 1 tablet  1 tablet Oral Q6H PRN Mode Bryant MD       • metoprolol succinate XL (TOPROL-XL) 24 hr tablet 25 mg  25 mg Oral Q24H Mode Bryant MD   25 mg at 07/19/21 0830   • [MAR Hold] montelukast (SINGULAIR) tablet 10 mg  10 mg Oral Nightly Mode Bryant  MD ROBERTO   10 mg at 07/18/21 2015   • [MAR Hold] pantoprazole (PROTONIX) EC tablet 40 mg  40 mg Oral Q AM Mode Bryant MD   40 mg at 07/18/21 0612   • [MAR Hold] sodium chloride 0.9 % flush 10 mL  10 mL Intravenous PRN Mode Bryant MD           ASSESSMENT:  ESRD, normal HD schedule Tuesday Thursday Saturday, plan HD tomorrow    Dialysis AV fistula malfunction, declot in OR today  Cecal mass status post recent hemicolectomy   Recent lower GI bleed, Eliquis discontinued  Anemia of CKD  Hypertension  COPD  Recurrent AV fistula dysfunction        PLAN:   AV fistula declot attempt in OR today  Currently with right femoral tunneled dialysis catheter in place  Will use AV fistula tomorrow for dialysis assuming declot is successful if okay with vascular    May need to go back on Eliquis even though she did recently have a hemicolectomy/GI bleed  Continue current BP regimen  Epogen with HD for anemia, binders with meals  UF as tolerated with dialysis     Continue to monitor electrolytes and volume closely    Chester Kang MD   [facility]   Office phone number: [phone]  Answering service phone number: [phone]    07/19/21  13:32 EDT    Dictation performed using Dragon dictation software

## 2021-07-19 NOTE — OP NOTE
Operative Note  Location: [facility]  Date of Admission:  7/15/2021  OR Date: 7/19/2021    Pre-op Diagnosis:  1.  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  End-stage renal disease requiring hemodialysis    Post-op Diagnosis:  1.  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  End-stage renal disease requiring hemodialysis    Procedure:   1.  Redo right axilloaxillary loop dialysis shunt thrombosis  2.  Intraoperative shuntogram    Surgeon: Shahram Gallagher MD    Assistant: Britney Juárez RN, [facility], Provided critical assistance in exposure, retraction, and suction that overall decrease blood loss and operative time.    Anesthesia: Monitored Anesthesia Care    Staff:   Circulator: Diya Calderon RN; Mitul Avelar RN  Radiology Technologist: Renea Myles, DEANNA  Scrub Person: Michelle Lake  Assistant: Britney Juárez    Estimated Blood Loss: 100 mL    Specimen: None    Complications: None    Findings: Arterial and venous limbs widely patent.  Good flow of contrast through most proximal shunt in the inferior vena cava with opacification of the SVC.  No inflow defects.    Implants:   Implant Name Type Inv. Item Serial No.  Lot No. LRB No. Used Action   CLIPAPPLR M/ ENDO LIGACLIP 9 3/8IN SM - [id number] Implant CLIPAPPLR M/ ENDO LIGACLIP 9 3/8IN SM  [employer]  DIV [employer] J [employer] J [id number] Right 1 Implanted     Indications: 76-year-old woman with end-stage renal disease requiring hemodialysis.  Recently experienced failing AV graft which resulted in recurrent AV shunt thrombosis.  Underwent shunt thrombectomy with shuntogram, revealing no abnormalities.  She was dialyzed only once or twice following this episode before experiencing recurrent shunt thrombosis.  Underwent placement of femoral tunneled dialysis catheter in submits now for redo AV shunt thrombectomy.       Procedure:  The patient was given Kefzol IV.  She is brought to the operating room and  positioned supine the operating table.  Her right arm was extended onto an arm board.  Her right upper extremity was washed carefully with Hibiclens.  I remove the pre-existing dermal adhesive and washed the incisions carefully.  The extremity was then prepared with ChloraPrep and draped in a sterile fashion.  She was given Benadryl 25 mg IV. She was given heparin 10,000 units IV.  The previous incision in the distal right arm over the AV graft was opened and the graft was isolated.  There was no incisional hematoma.  The Prolene suture line was taken down.  Balloon catheter thrombectomy of the venous limb was undertaken first.  Multiple passes were made through the stent graft system extending from the venous anastomosis in the axilla through the subclavian stents into the superior vena cava.  I also passed a 4/6 and then later a 6/10 adherent clot catheter.  The graft limb was flushed with heparinized saline and clamped.  Balloon catheter thrombectomy of the arterial limb was undertaken next.  After several catheter passages I was able to get through the arterial anastomosis.  The arterial plug was retrieved.  An additional catheter passages produced no more clot.  The graft was clamped.  Intraoperative shuntogram was obtained by injecting half-strength contrast material through the graft limb using a Adrianna tree.  This demonstrated the venous limb to be widely patent.  The stent graft system through the subclavian vein segment was also patent.  There was good opacification of the superior vena cava.  The graft limb was then flushed with heparinized saline and then clamped.  A retrograde shuntogram was performed of the arterial limb, which demonstrated no endoluminal defects or residual clot.  The arterial limb was widely patent.  The graftotomy was closed with a continuous Prolene suture line.  Following release of clamps the suture line was hemostatic.  It was difficult to feel a thrill in the graft, but  continuous-wave Doppler interrogation demonstrated an excellent velocity profile both during systole and diastole.  The result was accepted.  The heparin was not reversed with protamine.  Wound and suture line hemostasis were seen to be complete.  The wound was closed in layers with Vicryl sutures.  Dermal adhesive was applied.  At its conclusion the patient had tolerated the procedure well, and without apparent complications.  Sponge and needle counts were correct.  The patient was taken to the recovery area in stable condition.    Radiographic findings: The venous limb is patent.  There is luminal irregularity with modest stenosis of the midportion of the venous limb where she is typically cannulated.  There no stenoses or residual thrombus throughout the subclavian stent system.  Contrast flow through the superior vena cava is normal.  There was no stenosis at the distal and of the stent graft, and so I did not feel that extending the stent graft system further centrally would be helpful.  The arterial limb is widely patent.  The axillary artery proximal and distal to the anastomosis appears normal.    Shahram Gallagher MD     Date: 7/19/2021  Time: 15:52 EDT

## 2021-07-19 NOTE — ANESTHESIA POSTPROCEDURE EVALUATION
Patient: Nellie Vegas    Procedure Summary     Date: 07/19/21 Room / Location: [facility] OR [address] [place] [address] MAIN OR    Anesthesia Start: 1424 Anesthesia Stop: 1559    Procedure: RIGHT UPPER EXTREMITY THROMBECTOMY AND SHUNTOGRAM (Right ) Diagnosis:     Surgeons: Shahram Gallagher MD Provider: Laurent Herron MD    Anesthesia Type: MAC ASA Status: 3          Anesthesia Type: MAC    Vitals  Vitals Value Taken Time   /74 07/19/21 1645   Temp 36.6 °C (97.9 °F) 07/19/21 1645   Pulse 55 07/19/21 1650   Resp 16 07/19/21 1645   SpO2 100 % 07/19/21 1651   Vitals shown include unvalidated device data.        Post Anesthesia Care and Evaluation    Patient location during evaluation: PACU  Patient participation: complete - patient participated  Level of consciousness: awake  Pain management: adequate  Airway patency: patent  Anesthetic complications: No anesthetic complications    Cardiovascular status: acceptable  Respiratory status: acceptable  Hydration status: acceptable

## 2021-07-19 NOTE — PLAN OF CARE
Goal Outcome Evaluation:           Progress: no change  Outcome Summary: VSS, no c/o pain, had AV thrombectomy today, incision CDI, +thrill/bruit, will continue to monitor

## 2021-07-19 NOTE — PLAN OF CARE
Goal Outcome Evaluation:  Plan of Care Reviewed With: patient        Progress: no change  Outcome Summary: VSS. No c/o this shift. NPO @ midnight for thrombectomy revision for AV fistula today - consent in chart. No other changes this shift. Will continue to monitor

## 2021-07-20 ENCOUNTER — APPOINTMENT (OUTPATIENT)
Dept: CARDIOLOGY | Facility: HOSPITAL | Age: 76
End: 2021-07-20

## 2021-07-20 LAB
ANION GAP SERPL CALCULATED.3IONS-SCNC: 21.2 MMOL/L (ref 5–15)
BASOPHILS # BLD AUTO: 0.02 10*3/MM3 (ref 0–0.2)
BASOPHILS NFR BLD AUTO: 0.4 % (ref 0–1.5)
BUN SERPL-MCNC: 43 MG/DL (ref 8–23)
BUN/CREAT SERPL: 5.4 (ref 7–25)
BURR CELLS BLD QL SMEAR: NORMAL
CALCIUM SPEC-SCNC: 7.8 MG/DL (ref 8.6–10.5)
CHLORIDE SERPL-SCNC: 98 MMOL/L (ref 98–107)
CLUMPED PLATELETS: PRESENT
CO2 SERPL-SCNC: 15.8 MMOL/L (ref 22–29)
CREAT SERPL-MCNC: 7.98 MG/DL (ref 0.57–1)
DEPRECATED RDW RBC AUTO: 50.5 FL (ref 37–54)
EOSINOPHIL # BLD AUTO: 0 10*3/MM3 (ref 0–0.4)
EOSINOPHIL NFR BLD AUTO: 0 % (ref 0.3–6.2)
ERYTHROCYTE [DISTWIDTH] IN BLOOD BY AUTOMATED COUNT: 17.1 % (ref 12.3–15.4)
GFR SERPL CREATININE-BSD FRML MDRD: 6 ML/MIN/1.73
GFR SERPL CREATININE-BSD FRML MDRD: ABNORMAL ML/MIN/{1.73_M2}
GLUCOSE SERPL-MCNC: 96 MG/DL (ref 65–99)
HCT VFR BLD AUTO: 33.9 % (ref 34–46.6)
HGB BLD-MCNC: 10.8 G/DL (ref 12–15.9)
HYPOCHROMIA BLD QL: NORMAL
IMM GRANULOCYTES # BLD AUTO: 0.09 10*3/MM3 (ref 0–0.05)
IMM GRANULOCYTES NFR BLD AUTO: 1.7 % (ref 0–0.5)
LYMPHOCYTES # BLD AUTO: 0.34 10*3/MM3 (ref 0.7–3.1)
LYMPHOCYTES NFR BLD AUTO: 6.6 % (ref 19.6–45.3)
MCH RBC QN AUTO: 25.9 PG (ref 26.6–33)
MCHC RBC AUTO-ENTMCNC: 31.9 G/DL (ref 31.5–35.7)
MCV RBC AUTO: 81.3 FL (ref 79–97)
MONOCYTES # BLD AUTO: 0.36 10*3/MM3 (ref 0.1–0.9)
MONOCYTES NFR BLD AUTO: 6.9 % (ref 5–12)
NEUTROPHILS NFR BLD AUTO: 4.37 10*3/MM3 (ref 1.7–7)
NEUTROPHILS NFR BLD AUTO: 84.4 % (ref 42.7–76)
NRBC BLD AUTO-RTO: 0 /100 WBC (ref 0–0.2)
PLATELET # BLD AUTO: 232 10*3/MM3 (ref 140–450)
PMV BLD AUTO: 11.2 FL (ref 6–12)
POTASSIUM SERPL-SCNC: 5.8 MMOL/L (ref 3.5–5.2)
RBC # BLD AUTO: 4.17 10*6/MM3 (ref 3.77–5.28)
SODIUM SERPL-SCNC: 135 MMOL/L (ref 136–145)
WBC # BLD AUTO: 5.18 10*3/MM3 (ref 3.4–10.8)
WBC MORPH BLD: NORMAL

## 2021-07-20 PROCEDURE — 25010000002 HEPARIN (PORCINE) PER 1000 UNITS: Performed by: SURGERY

## 2021-07-20 PROCEDURE — 94799 UNLISTED PULMONARY SVC/PX: CPT

## 2021-07-20 PROCEDURE — 85025 COMPLETE CBC W/AUTO DIFF WBC: CPT | Performed by: SURGERY

## 2021-07-20 PROCEDURE — 25010000002 HEPARIN (PORCINE) PER 1000 UNITS: Performed by: INTERNAL MEDICINE

## 2021-07-20 PROCEDURE — 94760 N-INVAS EAR/PLS OXIMETRY 1: CPT

## 2021-07-20 PROCEDURE — 93990 DOPPLER FLOW TESTING: CPT

## 2021-07-20 PROCEDURE — 85007 BL SMEAR W/DIFF WBC COUNT: CPT | Performed by: SURGERY

## 2021-07-20 PROCEDURE — 5A1D70Z PERFORMANCE OF URINARY FILTRATION, INTERMITTENT, LESS THAN 6 HOURS PER DAY: ICD-10-PCS | Performed by: INTERNAL MEDICINE

## 2021-07-20 PROCEDURE — 80048 BASIC METABOLIC PNL TOTAL CA: CPT | Performed by: SURGERY

## 2021-07-20 RX ORDER — HEPARIN SODIUM 1000 [USP'U]/ML
2000 INJECTION, SOLUTION INTRAVENOUS; SUBCUTANEOUS ONCE
Status: COMPLETED | OUTPATIENT
Start: 2021-07-20 | End: 2021-07-20

## 2021-07-20 RX ORDER — MANNITOL 250 MG/ML
12.5 INJECTION, SOLUTION INTRAVENOUS AS NEEDED
Status: DISCONTINUED | OUTPATIENT
Start: 2021-07-20 | End: 2021-07-21

## 2021-07-20 RX ADMIN — Medication 1000 UNITS: at 15:58

## 2021-07-20 RX ADMIN — GUAIFENESIN 600 MG: 600 TABLET, EXTENDED RELEASE ORAL at 20:16

## 2021-07-20 RX ADMIN — GUAIFENESIN 600 MG: 600 TABLET, EXTENDED RELEASE ORAL at 15:58

## 2021-07-20 RX ADMIN — HEPARIN SODIUM 2000 UNITS: 1000 INJECTION INTRAVENOUS; SUBCUTANEOUS at 09:40

## 2021-07-20 RX ADMIN — BUDESONIDE AND FORMOTEROL FUMARATE DIHYDRATE 2 PUFF: 160; 4.5 AEROSOL RESPIRATORY (INHALATION) at 20:40

## 2021-07-20 RX ADMIN — MONTELUKAST SODIUM 10 MG: 10 TABLET, FILM COATED ORAL at 20:16

## 2021-07-20 RX ADMIN — APIXABAN 2.5 MG: 2.5 TABLET, FILM COATED ORAL at 20:15

## 2021-07-20 RX ADMIN — PANTOPRAZOLE SODIUM 40 MG: 40 TABLET, DELAYED RELEASE ORAL at 05:50

## 2021-07-20 RX ADMIN — BUDESONIDE AND FORMOTEROL FUMARATE DIHYDRATE 2 PUFF: 160; 4.5 AEROSOL RESPIRATORY (INHALATION) at 08:12

## 2021-07-20 RX ADMIN — METOPROLOL SUCCINATE 25 MG: 25 TABLET, EXTENDED RELEASE ORAL at 15:58

## 2021-07-20 RX ADMIN — HEPARIN SODIUM 5000 UNITS: 5000 INJECTION INTRAVENOUS; SUBCUTANEOUS at 15:58

## 2021-07-20 NOTE — PROGRESS NOTES
Vascular lab preliminary    Right upper extremity AVG duplex exam is complete      Spoke with  about preliminary findings at 6:50pm    Final report to follow

## 2021-07-20 NOTE — PROGRESS NOTES
Vascular Surgery    Patient received home-based dialysis, with no problems reportede.   Has not yet had AV graft scan.  Agree that apixaban may help promote long-term patency.  Would opt for lower dose.  Anticipate keeping tunneled hemodialysis catheter for at least a short time to determine short-term performance of AV graft.  Home anytime from surgery standpoint.

## 2021-07-20 NOTE — PROGRESS NOTES
"Daily progress note    Chief complaint  S/p right axillaaxillary dialysis shunt thrombectomy  Doing same  No new complaints  Family at bedside    History of present illness  76-year-old -American female who is well-known to our service with history of end-stage renal disease on hemodialysis COPD hypertension diabetes chronic anemia and gastroesophageal disease who was recently discharged from the hospital after work-up on lower GI bleed which revealed cecal mass underwent laparoscopic right hemicolectomy and biopsy came back positive for tubular adenoma.  Patient also have thrombectomy with shuntogram of the right AV fistula and discharge home in stable condition presented to [facility] emergency room with right arm occluded AV shunt as she went for dialysis and they were unable to assess right arm AV fistula.  Patient has no other complaint.  Patient denies any chest pain shortness of breath abdominal pain nausea vomiting diarrhea.  Patient denies any fever chills night sweats weight loss or weight gain.  Patient admitted for management.     REVIEW OF SYSTEMS  Unremarkable except generalized weakness     PHYSICAL EXAM  Blood pressure 122/45, pulse 60, temperature 97.7 °F (36.5 °C), temperature source Temporal, resp. rate 16, height 152.4 cm (60\"), weight 78.5 kg (173 lb), SpO2 98 %, not currently breastfeeding.    General: No acute distress.  HENT: NCAT, PERRL, Nares patent.  Eyes: no scleral icterus.  Neck: trachea midline, no ROM limitations.  CV: regular rhythm, regular rate.  Respiratory: normal effort, CTAB.  Abdomen: soft, nondistended, NTTP, no rebound tenderness, no guarding or rigidity  Musculoskeletal: Right arm AV fistula does not have a thrill or bruit.  It is nontender to palpation.  Neuro: alert, moves all extremities, follows commands.  Skin: warm, dry.     LAB RESULTS  Lab Results (last 24 hours)     Procedure Component Value Units Date/Time    Basic Metabolic Panel [[id number]]  " (Abnormal) Collected: 07/20/21 0530    Specimen: Blood Updated: 07/20/21 0655     Glucose 96 mg/dL      BUN 43 mg/dL      Creatinine 7.98 mg/dL      Sodium 135 mmol/L      Potassium 5.8 mmol/L      Comment: Slight hemolysis detected by analyzer. Results may be affected.        Chloride 98 mmol/L      CO2 15.8 mmol/L      Calcium 7.8 mg/dL      eGFR  African Amer 6 mL/min/1.73      Comment: <15 Indicative of kidney failure.        eGFR Non  Amer --     Comment: <15 Indicative of kidney failure.        BUN/Creatinine Ratio 5.4     Anion Gap 21.2 mmol/L     Narrative:      GFR Normal >60  Chronic Kidney Disease <60  Kidney Failure <15      Scan Slide [[id number]] Collected: 07/20/21 0530    Specimen: Blood Updated: 07/20/21 0651     Crenated RBC's Slight/1+     Hypochromia Mod/2+     WBC Morphology Normal     Clumped Platelets Present    CBC & Differential [[id number]]  (Abnormal) Collected: 07/20/21 0530    Specimen: Blood Updated: 07/20/21 0650    Narrative:      The following orders were created for panel order CBC & Differential.  Procedure                               Abnormality         Status                     ---------                               -----------         ------                     CBC Auto Differential[[id number]]        Abnormal            Final result                 Please view results for these tests on the individual orders.    CBC Auto Differential [[id number]]  (Abnormal) Collected: 07/20/21 0530    Specimen: Blood Updated: 07/20/21 0650     WBC 5.18 10*3/mm3      RBC 4.17 10*6/mm3      Hemoglobin 10.8 g/dL      Hematocrit 33.9 %      MCV 81.3 fL      MCH 25.9 pg      MCHC 31.9 g/dL      RDW 17.1 %      RDW-SD 50.5 fl      MPV 11.2 fL      Platelets 232 10*3/mm3      Neutrophil % 84.4 %      Lymphocyte % 6.6 %      Monocyte % 6.9 %      Eosinophil % 0.0 %      Basophil % 0.4 %      Immature Grans % 1.7 %      Neutrophils, Absolute 4.37 10*3/mm3      Lymphocytes, Absolute 0.34 10*3/mm3       Monocytes, Absolute 0.36 10*3/mm3      Eosinophils, Absolute 0.00 10*3/mm3      Basophils, Absolute 0.02 10*3/mm3      Immature Grans, Absolute 0.09 10*3/mm3      nRBC 0.0 /100 WBC         Imaging Results (Last 24 Hours)     ** No results found for the last 24 hours. **          Current Facility-Administered Medications:   •  albuterol (PROVENTIL) nebulizer solution 0.083% 2.5 mg/3mL, 2.5 mg, Nebulization, Q6H PRN, Shahram Gallagher MD  •  budesonide-formoterol (SYMBICORT) 160-4.5 MCG/ACT inhaler 2 puff, 2 puff, Inhalation, BID - RT, Shahram Gallagher MD, 2 puff at 07/20/21 0812  •  cholecalciferol (VITAMIN D3) tablet 1,000 Units, 1,000 Units, Oral, Daily, Shahram Gallagher MD, 1,000 Units at 07/19/21 0830  •  [START ON 7/22/2021] epoetin polina-epbx (RETACRIT) injection 10,000 Units, 10,000 Units, Intravenous, Once per day on Tue Thu Sat, Shahram Gallagher MD  •  guaiFENesin (MUCINEX) 12 hr tablet 600 mg, 600 mg, Oral, Q12H, Shahram Gallagher MD, 600 mg at 07/19/21 2131  •  heparin (porcine) 5000 UNIT/ML injection 5,000 Units, 5,000 Units, Subcutaneous, Q12H, Shahram Gallagher MD, 5,000 Units at 07/19/21 2131  •  heparin (porcine) injection 3,800 Units, 3,800 Units, Intracatheter, PRN, Shahram Gallagher MD, 3,800 Units at 07/17/21 1247  •  HYDROcodone-acetaminophen (NORCO) 5-325 MG per tablet 1 tablet, 1 tablet, Oral, Q6H PRN, Shahram Gallagher MD  •  mannitol 25% (RENAL) injection, 12.5 g, Intravenous, PRN, Chester Kang MD  •  metoprolol succinate XL (TOPROL-XL) 24 hr tablet 25 mg, 25 mg, Oral, Q24H, Shahram Gallagher MD, 25 mg at 07/19/21 0830  •  montelukast (SINGULAIR) tablet 10 mg, 10 mg, Oral, Nightly, Shahram Gallagher MD, 10 mg at 07/19/21 2131  •  pantoprazole (PROTONIX) EC tablet 40 mg, 40 mg, Oral, Q AM, Shahram Gallagher MD, 40 mg at 07/20/21 0509  •  [COMPLETED] Insert peripheral IV, , , Once **AND** sodium chloride 0.9 % flush 10 mL,  10 mL, Intravenous, PRN, Shahram Gallagher MD  •  sodium chloride 0.9 % infusion, 9 mL/hr, Intravenous, Continuous, Shahram Gallagher MD, Last Rate: 100 mL/hr at 07/19/21 1450, Rate Change at 07/19/21 1450     ASSESSMENT  Malfunctioning right AV fistula s/p thrombectomy  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  S/p tunneled catheter insertion right common femoral vein  End-stage renal disease on hemodialysis  Recent right laparoscopic hemicolectomy secondary to tubular adenoma  Status post lower GI bleed  Status post recent thrombectomy with shuntogram of right AV fistula  COPD  Diabetes mellitus  Hypertension  Chronic anemia with drop in H&H  Gastroesophageal reflux disease    PLAN  CPM  Hemodialysis today  Transfuse PTN  Resume Eliquis if okay with surgery on discharge  Vascular surgery consult appreciated  Nephrology to follow patient  Continue home medications  Stress ulcer DVT prophylaxis  Supportive care  Discussed with nursing staff  Follow closely further recommendation current hospital course    ALLAN MARTIN MD

## 2021-07-20 NOTE — PLAN OF CARE
Problem: Adult Inpatient Plan of Care  Goal: Plan of Care Review  Outcome: Ongoing, Progressing  Flowsheets (Taken 7/20/2021 1831)  Progress: no change  Plan of Care Reviewed With: patient  Outcome Summary: VSS. Had dialysis, 3L off. No c/o pain or discomfort. Eliquis to be restarted tonight. Duplex of hemodialysis access done. Will continue to monitor.

## 2021-07-20 NOTE — PLAN OF CARE
Goal Outcome Evaluation:  Plan of Care Reviewed With: patient            Patient alert and oriented x 4. Able to make all needs known. VSS at this time.  Bruit and Thrill present to right upper arm fistula.  Scheduled for dialysis  on Tue-Thur-Sat.  No s/s of distress noted at this time.  All safety measures in place.  Will continue to monitorl

## 2021-07-20 NOTE — PROGRESS NOTES
LOS: 2 days     Chief Complaint/ Reason for encounter: ESRD, dialysis management  Chief Complaint   Patient presents with   • Vascular Access Problem         Subjective    Patient was seen and examined on dialysis, discussed orders with dialysis RN, Leonila  Seen at the end of her treatment tolerated well  Fistula functioning well today with a maximum blood flow rate of 400  Arterial and venous pressures acceptable on treatment around 200  Dialyzed on a 2K bath, 3 L fluid removed    Medical history reviewed:  History of Present Illness    Subjective    History taken from: Patient and chart    Vital Signs  Temp:  [97.3 °F (36.3 °C)-98.4 °F (36.9 °C)] 97.7 °F (36.5 °C)  Heart Rate:  [54-83] 60  Resp:  [14-21] 16  BP: (122-168)/(45-85) 122/45       Wt Readings from Last 1 Encounters:   07/15/21 2156 78.5 kg (173 lb)   07/15/21 1902 79.8 kg (175 lb 14.8 oz)       Objective    Objective:  General Appearance:  Comfortable, obese-appearing, in no acute distress and not in pain.  Awake, alert, oriented  HEENT: Mucous membranes moist, no injury, oropharynx clear  Lungs:  Normal effort and normal respiratory rate.  Breath sounds clear to auscultation.  No  respiratory distress.  No rales, decreased breath sounds or rhonchi.    Heart: Normal rate.  Regular rhythm.  S1 normal.  No murmur.   Abdomen: Abdomen is soft.  Bowel sounds are normal, no abdominal tenderness.  There is no rebound or guarding  Extremities: Normal range of motion.  Trace edema of bilateral lower extremities, distal pulses intact  Neurological: No focal motor or sensory deficits, pupils reactive  Skin:  Warm and dry.  No rash or cyanosis.   AV fistula with absent thrill and bruit      Results Review:    Intake/Output:     Intake/Output Summary (Last 24 hours) at 7/20/2021 1413  Last data filed at 7/20/2021 1351  Gross per 24 hour   Intake 208 ml   Output 6100 ml   Net -5892 ml         DATA:  Radiology and Labs:  The following labs independently reviewed by  me. Additional labs ordered for tomorrow a.m.  Interval notes, chart personally reviewed by me.   Old records independently reviewed showing recent admission with fistula thrombectomy now with recurrent thrombosis  Discussed with patient and family at bedside    Risk/ complexity of medical care/ medical decision making moderate, need for surgery/declot and dialysis    Labs:   Recent Results (from the past 24 hour(s))   POC Glucose Once    Collection Time: 07/19/21  4:07 PM    Specimen: Blood   Result Value Ref Range    Glucose 50 (L) 70 - 130 mg/dL   POC Glucose Once    Collection Time: 07/19/21  4:15 PM    Specimen: Blood   Result Value Ref Range    Glucose 77 70 - 130 mg/dL   POC Glucose Once    Collection Time: 07/19/21  5:14 PM    Specimen: Blood   Result Value Ref Range    Glucose 70 70 - 130 mg/dL   Basic Metabolic Panel    Collection Time: 07/20/21  5:30 AM    Specimen: Blood   Result Value Ref Range    Glucose 96 65 - 99 mg/dL    BUN 43 (H) 8 - 23 mg/dL    Creatinine 7.98 (H) 0.57 - 1.00 mg/dL    Sodium 135 (L) 136 - 145 mmol/L    Potassium 5.8 (H) 3.5 - 5.2 mmol/L    Chloride 98 98 - 107 mmol/L    CO2 15.8 (L) 22.0 - 29.0 mmol/L    Calcium 7.8 (L) 8.6 - 10.5 mg/dL    eGFR  African Amer 6 (L) >60 mL/min/1.73    eGFR Non African Amer      BUN/Creatinine Ratio 5.4 (L) 7.0 - 25.0    Anion Gap 21.2 (H) 5.0 - 15.0 mmol/L   CBC Auto Differential    Collection Time: 07/20/21  5:30 AM    Specimen: Blood   Result Value Ref Range    WBC 5.18 3.40 - 10.80 10*3/mm3    RBC 4.17 3.77 - 5.28 10*6/mm3    Hemoglobin 10.8 (L) 12.0 - 15.9 g/dL    Hematocrit 33.9 (L) 34.0 - 46.6 %    MCV 81.3 79.0 - 97.0 fL    MCH 25.9 (L) 26.6 - 33.0 pg    MCHC 31.9 31.5 - 35.7 g/dL    RDW 17.1 (H) 12.3 - 15.4 %    RDW-SD 50.5 37.0 - 54.0 fl    MPV 11.2 6.0 - 12.0 fL    Platelets 232 140 - 450 10*3/mm3    Neutrophil % 84.4 (H) 42.7 - 76.0 %    Lymphocyte % 6.6 (L) 19.6 - 45.3 %    Monocyte % 6.9 5.0 - 12.0 %    Eosinophil % 0.0 (L) 0.3 - 6.2 %     Basophil % 0.4 0.0 - 1.5 %    Immature Grans % 1.7 (H) 0.0 - 0.5 %    Neutrophils, Absolute 4.37 1.70 - 7.00 10*3/mm3    Lymphocytes, Absolute 0.34 (L) 0.70 - 3.10 10*3/mm3    Monocytes, Absolute 0.36 0.10 - 0.90 10*3/mm3    Eosinophils, Absolute 0.00 0.00 - 0.40 10*3/mm3    Basophils, Absolute 0.02 0.00 - 0.20 10*3/mm3    Immature Grans, Absolute 0.09 (H) 0.00 - 0.05 10*3/mm3    nRBC 0.0 0.0 - 0.2 /100 WBC   Scan Slide    Collection Time: 07/20/21  5:30 AM    Specimen: Blood   Result Value Ref Range    Crenated RBC's Slight/1+ None Seen    Hypochromia Mod/2+ None Seen    WBC Morphology Normal Normal    Clumped Platelets Present None Seen       Radiology:  Imaging Results (Last 24 Hours)     ** No results found for the last 24 hours. **             Medications have been reviewed:  Current Facility-Administered Medications   Medication Dose Route Frequency Provider Last Rate Last Admin   • albuterol (PROVENTIL) nebulizer solution 0.083% 2.5 mg/3mL  2.5 mg Nebulization Q6H PRN Shahram Gallagher MD       • budesonide-formoterol (SYMBICORT) 160-4.5 MCG/ACT inhaler 2 puff  2 puff Inhalation BID - RT Shahram Gallagher MD   2 puff at 07/20/21 0812   • cholecalciferol (VITAMIN D3) tablet 1,000 Units  1,000 Units Oral Daily Shahram Gallagher MD   1,000 Units at 07/19/21 0830   • [START ON 7/22/2021] epoetin polina-epbx (RETACRIT) injection 10,000 Units  10,000 Units Intravenous Once per day on Tue Thu Sat Shahram Gallagher MD       • guaiFENesin (MUCINEX) 12 hr tablet 600 mg  600 mg Oral Q12H Shahram Gallagher MD   600 mg at 07/19/21 2131   • heparin (porcine) 5000 UNIT/ML injection 5,000 Units  5,000 Units Subcutaneous Q12H Shahram Gallagher MD   5,000 Units at 07/19/21 2131   • heparin (porcine) injection 3,800 Units  3,800 Units Intracatheter PRN Shahram Gallagher MD   3,800 Units at 07/17/21 1247   • HYDROcodone-acetaminophen (NORCO) 5-325 MG per tablet 1 tablet  1 tablet Oral Q6H PRN  Shahram Gallagher MD       • mannitol 25% (RENAL) injection  12.5 g Intravenous PRN Chester Kang MD       • metoprolol succinate XL (TOPROL-XL) 24 hr tablet 25 mg  25 mg Oral Q24H Shahram Gallagher MD   25 mg at 07/19/21 0830   • montelukast (SINGULAIR) tablet 10 mg  10 mg Oral Nightly Shahram Gallagher MD   10 mg at 07/19/21 2131   • pantoprazole (PROTONIX) EC tablet 40 mg  40 mg Oral Q AM Shahram Gallagher MD   40 mg at 07/20/21 0550   • sodium chloride 0.9 % flush 10 mL  10 mL Intravenous PRN Shahram Gallagher MD       • sodium chloride 0.9 % infusion  9 mL/hr Intravenous Continuous Shahram Gallagher  mL/hr at 07/19/21 1450 Rate Change at 07/19/21 1450       ASSESSMENT:  ESRD, normal HD schedule Tuesday Thursday Saturday, plan HD tomorrow    Dialysis AV fistula malfunction, declot in OR today  Cecal mass status post recent hemicolectomy   Recent lower GI bleed, Eliquis discontinued  Anemia of CKD  Hypertension  COPD  Recurrent AV fistula dysfunction        PLAN:    Her fistula functioned well today on hemodialysis, tolerated blood flow rate of 400  Await decision regarding restarting Eliquis, may need to be on this to prevent recurrent shunt thrombosis  Currently with right femoral tunneled dialysis catheter in place  Continue current BP regimen  Epogen with HD for anemia, binders with meals  UF as tolerated with dialysis     Continue to monitor electrolytes and volume closely    Chester Kang MD   [facility]   Office phone number: [phone]  Answering service phone number: [phone]    07/20/21  14:13 EDT    Dictation performed using Dragon dictation software

## 2021-07-21 ENCOUNTER — TRANSCRIBE ORDERS (OUTPATIENT)
Dept: HOME HEALTH SERVICES | Facility: HOME HEALTHCARE | Age: 76
End: 2021-07-21

## 2021-07-21 ENCOUNTER — HOME HEALTH ADMISSION (OUTPATIENT)
Dept: HOME HEALTH SERVICES | Facility: HOME HEALTHCARE | Age: 76
End: 2021-07-21

## 2021-07-21 VITALS
HEART RATE: 75 BPM | OXYGEN SATURATION: 99 % | HEIGHT: 60 IN | DIASTOLIC BLOOD PRESSURE: 41 MMHG | SYSTOLIC BLOOD PRESSURE: 99 MMHG | BODY MASS INDEX: 33.96 KG/M2 | WEIGHT: 173 LBS | TEMPERATURE: 97.8 F | RESPIRATION RATE: 18 BRPM

## 2021-07-21 DIAGNOSIS — Z90.49 S/P RIGHT HEMICOLECTOMY: Primary | ICD-10-CM

## 2021-07-21 DIAGNOSIS — N18.6 ESRD (END STAGE RENAL DISEASE) ON DIALYSIS (HCC): ICD-10-CM

## 2021-07-21 DIAGNOSIS — Z99.2 ESRD (END STAGE RENAL DISEASE) ON DIALYSIS (HCC): ICD-10-CM

## 2021-07-21 LAB
ANION GAP SERPL CALCULATED.3IONS-SCNC: 11.9 MMOL/L (ref 5–15)
BASOPHILS # BLD AUTO: 0.05 10*3/MM3 (ref 0–0.2)
BASOPHILS NFR BLD AUTO: 0.7 % (ref 0–1.5)
BH CV VAS DIALYSIS ARTERIAL ANASTOMOSIS DIAMETER: 0.61 CM
BH CV VAS DIALYSIS ARTERIAL ANASTOMOSIS EDV: 9 CM/SEC
BH CV VAS DIALYSIS ARTERIAL ANASTOMOSIS PSV: 63 CM/SEC
BH CV VAS DIALYSIS CONDUIT DIST EDV: 8 CM/SEC
BH CV VAS DIALYSIS CONDUIT DIST FLOW VOL: 46 ML/MIN
BH CV VAS DIALYSIS CONDUIT DIST PSV: 19 CM/SEC
BH CV VAS DIALYSIS CONDUIT MID DEPTH: 0.54 CM
BH CV VAS DIALYSIS CONDUIT MID DIAMETER: 0.87 CM
BH CV VAS DIALYSIS CONDUIT MID EDV: 23 CM/SEC
BH CV VAS DIALYSIS CONDUIT MID FLOW VOL: 77 ML/MIN
BH CV VAS DIALYSIS CONDUIT MID PSV: 49 CM/SEC
BH CV VAS DIALYSIS CONDUIT MID/DIST DEPTH: 0.18 CM
BH CV VAS DIALYSIS CONDUIT MID/DIST DIAMETER: 0.72 CM
BH CV VAS DIALYSIS CONDUIT MID/DIST EDV: 229 CM/SEC
BH CV VAS DIALYSIS CONDUIT MID/DIST PSV: 406 CM/SEC
BH CV VAS DIALYSIS CONDUIT PROX DEPTH: 0.95 CM
BH CV VAS DIALYSIS CONDUIT PROX DIAMETER: 0.58 CM
BH CV VAS DIALYSIS CONDUIT PROX EDV: 27 CM/SEC
BH CV VAS DIALYSIS CONDUIT PROX PSV: 330 CM/SEC
BH CV VAS DIALYSIS CONDUIT PROX/MID DEPTH: 0.28 CM
BH CV VAS DIALYSIS CONDUIT PROX/MID DIAMETER: 0.67 CM
BH CV VAS DIALYSIS CONDUIT PROX/MID PSV: 16 CM/SEC
BH CV VAS DIALYSIS PRE-INFLOW AXILLARY EDV: 14 CM/SEC
BH CV VAS DIALYSIS PRE-INFLOW AXILLARY FLOW VOL: 42 ML/MIN
BH CV VAS DIALYSIS PRE-INFLOW AXILLARY PSV: 86 CM/SEC
BH CV VAS DIALYSIS PRE-INFLOW BRACHIAL PSV: 123 CM/SEC
BH CV VAS DIALYSIS PRE-INFLOW RADIAL PSV: 162 CM/SEC
BH CV VAS DIALYSIS PRE-INFLOW SUBCLAV PSV: 129 CM/SEC
BH CV VAS DIALYSIS VENOUS ANASTOMOSIS EDV: 8 CM/SEC
BH CV VAS DIALYSIS VENOUS ANASTOMOSIS PSV: 18 CM/SEC
BUN SERPL-MCNC: 19 MG/DL (ref 8–23)
BUN/CREAT SERPL: 4.3 (ref 7–25)
CALCIUM SPEC-SCNC: 8 MG/DL (ref 8.6–10.5)
CHLORIDE SERPL-SCNC: 101 MMOL/L (ref 98–107)
CO2 SERPL-SCNC: 25.1 MMOL/L (ref 22–29)
CREAT SERPL-MCNC: 4.46 MG/DL (ref 0.57–1)
DEPRECATED RDW RBC AUTO: 52.9 FL (ref 37–54)
EOSINOPHIL # BLD AUTO: 0.09 10*3/MM3 (ref 0–0.4)
EOSINOPHIL NFR BLD AUTO: 1.3 % (ref 0.3–6.2)
ERYTHROCYTE [DISTWIDTH] IN BLOOD BY AUTOMATED COUNT: 17 % (ref 12.3–15.4)
GFR SERPL CREATININE-BSD FRML MDRD: 12 ML/MIN/1.73
GFR SERPL CREATININE-BSD FRML MDRD: ABNORMAL ML/MIN/{1.73_M2}
GLUCOSE SERPL-MCNC: 109 MG/DL (ref 65–99)
HCT VFR BLD AUTO: 30.6 % (ref 34–46.6)
HGB BLD-MCNC: 9.4 G/DL (ref 12–15.9)
IMM GRANULOCYTES # BLD AUTO: 0.14 10*3/MM3 (ref 0–0.05)
IMM GRANULOCYTES NFR BLD AUTO: 2.1 % (ref 0–0.5)
LYMPHOCYTES # BLD AUTO: 0.71 10*3/MM3 (ref 0.7–3.1)
LYMPHOCYTES NFR BLD AUTO: 10.5 % (ref 19.6–45.3)
MCH RBC QN AUTO: 25.8 PG (ref 26.6–33)
MCHC RBC AUTO-ENTMCNC: 30.7 G/DL (ref 31.5–35.7)
MCV RBC AUTO: 84.1 FL (ref 79–97)
MONOCYTES # BLD AUTO: 0.97 10*3/MM3 (ref 0.1–0.9)
MONOCYTES NFR BLD AUTO: 14.4 % (ref 5–12)
NEUTROPHILS NFR BLD AUTO: 4.77 10*3/MM3 (ref 1.7–7)
NEUTROPHILS NFR BLD AUTO: 71 % (ref 42.7–76)
NRBC BLD AUTO-RTO: 0.1 /100 WBC (ref 0–0.2)
PLATELET # BLD AUTO: 254 10*3/MM3 (ref 140–450)
PMV BLD AUTO: 10.5 FL (ref 6–12)
POTASSIUM SERPL-SCNC: 4.3 MMOL/L (ref 3.5–5.2)
RBC # BLD AUTO: 3.64 10*6/MM3 (ref 3.77–5.28)
SODIUM SERPL-SCNC: 138 MMOL/L (ref 136–145)
WBC # BLD AUTO: 6.73 10*3/MM3 (ref 3.4–10.8)

## 2021-07-21 PROCEDURE — 85025 COMPLETE CBC W/AUTO DIFF WBC: CPT | Performed by: HOSPITALIST

## 2021-07-21 PROCEDURE — 94799 UNLISTED PULMONARY SVC/PX: CPT

## 2021-07-21 PROCEDURE — 80048 BASIC METABOLIC PNL TOTAL CA: CPT | Performed by: HOSPITALIST

## 2021-07-21 RX ADMIN — METOPROLOL SUCCINATE 25 MG: 25 TABLET, EXTENDED RELEASE ORAL at 08:16

## 2021-07-21 RX ADMIN — BUDESONIDE AND FORMOTEROL FUMARATE DIHYDRATE 2 PUFF: 160; 4.5 AEROSOL RESPIRATORY (INHALATION) at 07:44

## 2021-07-21 RX ADMIN — PANTOPRAZOLE SODIUM 40 MG: 40 TABLET, DELAYED RELEASE ORAL at 05:48

## 2021-07-21 RX ADMIN — Medication 1000 UNITS: at 08:17

## 2021-07-21 RX ADMIN — APIXABAN 2.5 MG: 2.5 TABLET, FILM COATED ORAL at 08:16

## 2021-07-21 RX ADMIN — GUAIFENESIN 600 MG: 600 TABLET, EXTENDED RELEASE ORAL at 08:16

## 2021-07-21 NOTE — PLAN OF CARE
Problem: Adult Inpatient Plan of Care  Goal: Plan of Care Review  Recent Flowsheet Documentation  Taken 7/21/2021 1823 by Samantha Rayo  Progress: improving  Plan of Care Reviewed With: patient  Outcome Summary: VSS. No c/o pain or discomfort. Discharged.

## 2021-07-21 NOTE — PROGRESS NOTES
[facility] will follow. Daughter/patient agreeable to services. Contact information confirmed. Please note: call daughterLily, at [phone] to schedule home health visits if have trouble reaching patient. Thank you.

## 2021-07-21 NOTE — DISCHARGE SUMMARY
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  S/p tunneled catheter insertion right common femoral vein  End-stage renal disease on hemodialysis  Recent right laparoscopic hemicolectomy secondary to tubular adenoma  Status post lower GI bleed  Status post recent thrombectomy with shuntogram of right AV fistula  COPD  Diabetes mellitus  Hypertension  Chronic anemia   Gastroesophageal reflux disease    Discharge medications    Current Facility-Administered Medications:   •  apixaban (ELIQUIS) tablet 2.5 mg, 2.5 mg, Oral, Q12H, Shahram Gallagher MD, 2.5 mg at 07/21/21 0816  •  budesonide-formoterol (SYMBICORT) 160-4.5 MCG/ACT inhaler 2 puff, 2 puff, Inhalation, BID - RT, Shahram Gallagher MD, 2 puff at 07/21/21 0744  •  cholecalciferol (VITAMIN D3) tablet 1,000 Units, 1,000 Units, Oral, Daily, Shahram Gallagher MD, 1,000 Units at 07/21/21 0817  •  [START ON 7/22/2021] epoetin polina-epbx (RETACRIT) injection 10,000 Units, 10,000 Units, Intravenous, Once per day on Tue Thu Sat, Shahram Gallagher MD  •  guaiFENesin (MUCINEX) 12 hr tablet 600 mg, 600 mg, Oral, Q12H, Shahram Gallagher MD, 600 mg at 07/21/21 0816  •  metoprolol succinate XL (TOPROL-XL) 24 hr tablet 25 mg, 25 mg, Oral, Q24H, Shahram Gallagher MD, 25 mg at 07/21/21 0816  •  montelukast (SINGULAIR) tablet 10 mg, 10 mg, Oral, Nightly, Shahram Gallagher MD, 10 mg at 07/20/21 2016  •  pantoprazole (PROTONIX) EC tablet 40 mg, 40 mg, Oral, Q AM, Shahram Gallagher MD, 40 mg at 07/21/21 0548  •  [COMPLETED] Insert peripheral IV, , , Once **AND** sodium chloride 0.9 % flush 10 mL, 10 mL, Intravenous, PRN, Shahram Gallagher MD     Consults obtained  Vascular surgery  Nephrology    Procedures  Redo right AV fistula thrombectomy    Hospital  course  76-year-old -American female with history of end-stage renal disease on hemodialysis who was recently discharged from the hospital after work-up on GI bleed and found to have cecal mass underwent laparoscopic right hemicolectomy and also found to have right AV fistula thrombosis and underwent thrombectomy and shuntogram.  Patient admitted with malfunctioning AV fistula and work-up revealed recurrent thrombosis and required redo right AV fistula thrombectomy.  Patient used right AV fistula after thrombectomy without any problem.  Patient has a tunneled catheter insertion which she will keep and will discharge with tunnel dialysis catheter.  Patient started on low-dose Eliquis to avoid thrombosis.  Patient hemoglobin stable but needs to be watched carefully as she has recent right hemicolectomy secondary to tubular adenoma.  Patient wants to go home and clear for discharge from vascular surgery and nephrology.    Discharge diet regular    Activity as tolerated    Medication as above    Follow-up with primary doctor in 1 week and follow-up with vascular surgery and nephrology per the instruction and keep the appointment for hemodialysis 3 times a week and take medication as directed    ALLAN MARTIN MD

## 2021-07-21 NOTE — PROGRESS NOTES
"   LOS: 3 days     Chief Complaint/ Reason for encounter: ESRD, dialysis management  Chief Complaint   Patient presents with   • Vascular Access Problem         Subjective    Patient is doing well today with no new complaints  Good appetite with no nausea or vomiting  No shortness of breath chest pain or edema  Voiding well with no dysuria  Dialysis went well yesterday, no problems with her shunt        Medical history reviewed:  History of Present Illness    Subjective    History taken from: Patient and chart    Vital Signs  Temp:  [97.7 °F (36.5 °C)-98.4 °F (36.9 °C)] 97.8 °F (36.6 °C)  Heart Rate:  [58-77] 75  Resp:  [16-22] 18  BP: ()/(41-76) 99/41       Wt Readings from Last 1 Encounters:   07/15/21 2156 78.5 kg (173 lb)   07/15/21 1902 79.8 kg (175 lb 14.8 oz)       Objective:  Vital signs: (most recent): Blood pressure 99/41, pulse 75, temperature 97.8 °F (36.6 °C), temperature source Oral, resp. rate 18, height 152.4 cm (60\"), weight 78.5 kg (173 lb), SpO2 99 %, not currently breastfeeding.              Objective:  General Appearance:  Comfortable, obese-appearing, in no acute distress and not in pain.  Awake, alert, oriented  HEENT: Mucous membranes moist, no injury, oropharynx clear  Lungs:  Normal effort and normal respiratory rate.  Breath sounds clear to auscultation.  No  respiratory distress.  No rales, decreased breath sounds or rhonchi.    Heart: Normal rate.  Regular rhythm.  S1 normal.  No murmur.   Abdomen: Abdomen is soft.  Bowel sounds are normal, no abdominal tenderness.  There is no rebound or guarding  Extremities: Normal range of motion.  Trace edema of bilateral lower extremities, distal pulses intact  Neurological: No focal motor or sensory deficits, pupils reactive  Skin:  Warm and dry.  No rash or cyanosis.   AV fistula with absent thrill and bruit      Results Review:    Intake/Output:     Intake/Output Summary (Last 24 hours) at 7/21/2021 1519  Last data filed at 7/21/2021 " 1316  Gross per 24 hour   Intake 810 ml   Output --   Net 810 ml         DATA:  Radiology and Labs:  The following labs independently reviewed by me. Additional labs ordered for tomorrow a.m.  Interval notes, chart personally reviewed by me.   Old records independently reviewed showing recent admission with fistula thrombectomy now with recurrent thrombosis  Discussed with patient and family at bedside    Risk/ complexity of medical care/ medical decision making moderate, need for surgery/declot and dialysis    Labs:   Recent Results (from the past 24 hour(s))   Duplex Hemodialysis Access CAR    Collection Time: 07/20/21  6:22 PM   Result Value Ref Range    PRE-INFLOW SUBCLAV  cm/sec    PRE-INFLOW AXILLARY PSV 86 cm/sec    PRE-INFLOW AXILLARY EDV 14 cm/sec    PRE-INFLOW AXILLARY FLOW VOL 42 mL/min    PRE-INFLOW BRACHIAL  cm/sec    PRE-INFLOW RADIAL  cm/sec    ARTERIAL ANASTOMOSIS PSV 63 cm/sec    ARTERIAL ANASTOMOSIS EDV 9 cm/sec    ARTERIAL ANASTOMOSIS DIAMETER 0.61 cm    CONDUIT PROX  cm/sec    CONDUIT PROX EDV 27 cm/sec    CONDUIT PROX DIAMETER 0.58 cm    CONDUIT PROX DEPTH 0.95 cm    CONDUIT PROX/MID DIAMETER 0.67 cm    CONDUIT PROX/MID DEPTH 0.28 cm    CONDUIT MID PSV 49 cm/sec     CONDUIT MID EDV 23 cm/sec    CONDUIT MID DIAMETER 0.87 cm    CONDUIT MID DEPTH 0.54 cm    CONDUIT MID/DIST  cm/sec    CONDUIT MID/DIST  cm/sec    CONDUIT MID/DIST DIAMETER 0.72 cm    CONDUIT MID/DIST DEPTH 0.18 cm    CONDUIT PROX/MID PSV 16 cm/sec    CONDUIT MID FLOW VOL 77 mL/min    CONDUIT DIST PSV 19 cm/sec    CONDUIT DIST EDV 8 cm/sec    CONDUIT DIST FLOW VOL 46 mL/min    VENOUS ANASTOMOSIS PSV 18 cm/sec    VENOUS ANASTOMOSIS EDV 8 cm/sec   Basic Metabolic Panel    Collection Time: 07/21/21  6:36 AM    Specimen: Blood   Result Value Ref Range    Glucose 109 (H) 65 - 99 mg/dL    BUN 19 8 - 23 mg/dL    Creatinine 4.46 (H) 0.57 - 1.00 mg/dL    Sodium 138 136 - 145 mmol/L    Potassium 4.3 3.5  - 5.2 mmol/L    Chloride 101 98 - 107 mmol/L    CO2 25.1 22.0 - 29.0 mmol/L    Calcium 8.0 (L) 8.6 - 10.5 mg/dL    eGFR  African Amer 12 (L) >60 mL/min/1.73    eGFR Non African Amer      BUN/Creatinine Ratio 4.3 (L) 7.0 - 25.0    Anion Gap 11.9 5.0 - 15.0 mmol/L   CBC Auto Differential    Collection Time: 07/21/21  6:36 AM    Specimen: Blood   Result Value Ref Range    WBC 6.73 3.40 - 10.80 10*3/mm3    RBC 3.64 (L) 3.77 - 5.28 10*6/mm3    Hemoglobin 9.4 (L) 12.0 - 15.9 g/dL    Hematocrit 30.6 (L) 34.0 - 46.6 %    MCV 84.1 79.0 - 97.0 fL    MCH 25.8 (L) 26.6 - 33.0 pg    MCHC 30.7 (L) 31.5 - 35.7 g/dL    RDW 17.0 (H) 12.3 - 15.4 %    RDW-SD 52.9 37.0 - 54.0 fl    MPV 10.5 6.0 - 12.0 fL    Platelets 254 140 - 450 10*3/mm3    Neutrophil % 71.0 42.7 - 76.0 %    Lymphocyte % 10.5 (L) 19.6 - 45.3 %    Monocyte % 14.4 (H) 5.0 - 12.0 %    Eosinophil % 1.3 0.3 - 6.2 %    Basophil % 0.7 0.0 - 1.5 %    Immature Grans % 2.1 (H) 0.0 - 0.5 %    Neutrophils, Absolute 4.77 1.70 - 7.00 10*3/mm3    Lymphocytes, Absolute 0.71 0.70 - 3.10 10*3/mm3    Monocytes, Absolute 0.97 (H) 0.10 - 0.90 10*3/mm3    Eosinophils, Absolute 0.09 0.00 - 0.40 10*3/mm3    Basophils, Absolute 0.05 0.00 - 0.20 10*3/mm3    Immature Grans, Absolute 0.14 (H) 0.00 - 0.05 10*3/mm3    nRBC 0.1 0.0 - 0.2 /100 WBC       Radiology:  Imaging Results (Last 24 Hours)     Procedure Component Value Units Date/Time    Arteriogram (Autofinalize) [[id number]] Resulted: 07/21/21 0839     Updated: 07/21/21 0839             Medications have been reviewed:  Current Facility-Administered Medications   Medication Dose Route Frequency Provider Last Rate Last Admin   • albuterol (PROVENTIL) nebulizer solution 0.083% 2.5 mg/3mL  2.5 mg Nebulization Q6H PRN Shahram Gallagher MD       • apixaban (ELIQUIS) tablet 2.5 mg  2.5 mg Oral Q12H Shahram Gallagher MD   2.5 mg at 07/21/21 0816   • budesonide-formoterol (SYMBICORT) 160-4.5 MCG/ACT inhaler 2 puff  2 puff Inhalation BID -  RT Shahram Gallagher MD   2 puff at 07/21/21 0744   • cholecalciferol (VITAMIN D3) tablet 1,000 Units  1,000 Units Oral Daily Shahram Gallagher MD   1,000 Units at 07/21/21 0817   • [START ON 7/22/2021] epoetin polina-epbx (RETACRIT) injection 10,000 Units  10,000 Units Intravenous Once per day on Tue Thu Sat Shahram Gallagher MD       • guaiFENesin (MUCINEX) 12 hr tablet 600 mg  600 mg Oral Q12H Shahram Gallagher MD   600 mg at 07/21/21 0816   • heparin (porcine) injection 3,800 Units  3,800 Units Intracatheter PRN Shahram Gallagher MD   3,800 Units at 07/17/21 1247   • HYDROcodone-acetaminophen (NORCO) 5-325 MG per tablet 1 tablet  1 tablet Oral Q6H PRN Shahram Gallagher MD       • mannitol 25% (RENAL) injection  12.5 g Intravenous PRN Chester Kang MD       • metoprolol succinate XL (TOPROL-XL) 24 hr tablet 25 mg  25 mg Oral Q24H Shahram Gallagher MD   25 mg at 07/21/21 0816   • montelukast (SINGULAIR) tablet 10 mg  10 mg Oral Nightly Shahram Gallagher MD   10 mg at 07/20/21 2016   • pantoprazole (PROTONIX) EC tablet 40 mg  40 mg Oral Q AM Shahram Gallagher MD   40 mg at 07/21/21 0548   • sodium chloride 0.9 % flush 10 mL  10 mL Intravenous PRN Shahram Gallagher MD           ASSESSMENT:  ESRD, normal HD schedule Tuesday Thursday Saturday, plan HD tomorrow    Dialysis AV fistula malfunction, declot in OR today  Cecal mass status post recent hemicolectomy   Recent lower GI bleed, Eliquis discontinued  Anemia of CKD  Hypertension  COPD  Recurrent AV fistula dysfunction        PLAN:    Her fistula functioned well yesterday on hemodialysis, tolerated blood flow rate of 400  Agree with restarting low-dose apixaban, monitor closely given her recent GI bleed/colon resection  Currently with right femoral tunneled dialysis catheter in place, vascular recommends keeping this in place at discharge but continuing to use her upper extremity shunt  Continue current BP  regimen  Epogen with HD for anemia, binders with meals  UF as tolerated with dialysis, tolerated 3 L off yesterday  Stable for discharge at any time from a renal standpoint      Chester Kang MD   [facility]   Office phone number: [phone]  Answering service phone number: [phone]    07/21/21  15:19 EDT    Dictation performed using Dragon dictation software

## 2021-07-21 NOTE — PLAN OF CARE
Goal Outcome Evaluation:  Plan of Care Reviewed With: patient        Progress: improving    Patient alert and oriented x4 able to make all needs known.  Dialysis on Tues-Thur-Sat.  Fistula in place to ROBERTA. No s/s of distress noted at this time.  All safety measures in place.  Will continue to monitor.

## 2021-07-21 NOTE — PROGRESS NOTES
Name: Nellie Vegas ADMIT: 7/15/2021   : 1945  PCP: Laurent Cortes DO    MRN: [id number] LOS: 3 days   AGE/SEX: 76 y.o. female  ROOM: [address]    [place], [facility] [employer]    76 y.o. female with end-stage renal disease on hemodialysis.  Today is postop day 2 from redo right axilloaxillary loop dialysis shunt thrombectomy.  Dialyzed successful using shunt yesterday with 3 L fluid removed.  Patient comfortable.    Scheduled Medications:   apixaban, 2.5 mg, Oral, Q12H  budesonide-formoterol, 2 puff, Inhalation, BID - RT  cholecalciferol, 1,000 Units, Oral, Daily  [START ON 2021] epoetin polina/polina-epbx, 10,000 Units, Intravenous, Once per day on Tue Thu Sat  guaiFENesin, 600 mg, Oral, Q12H  metoprolol succinate XL, 25 mg, Oral, Q24H  montelukast, 10 mg, Oral, Nightly  pantoprazole, 40 mg, Oral, Q AM    Vital Signs  Vital Signs Patient Vitals for the past 24 hrs:   BP Temp Temp src Pulse Resp SpO2   21 0816 -- -- -- 67 -- --   21 0744 -- -- -- 58 18 98 %   21 0700 118/63 98.2 °F (36.8 °C) Oral 58 22 97 %   21 0537 -- -- -- -- -- 97 %   21 2351 (!) 89/43 98.4 °F (36.9 °C) Oral 58 22 100 %   21 2040 -- -- -- 66 -- 92 %   21 1937 91/58 98.3 °F (36.8 °C) Oral 67 22 91 %   21 1556 142/76 97.7 °F (36.5 °C) Oral 77 16 95 %   21 1325 122/45 97.7 °F (36.5 °C) Temporal 60 16 --   21 0915 157/66 97.3 °F (36.3 °C) Tympanic 63 21 --     Physical Exam: Shunt patent without thrill but with audible bruit throughout cardiac cycle.  No bleeding.    CBC    Results from last 7 days   Lab Units 21  0636 21  0530 21  0950 21  0609 21  0504 21  0747 07/15/21  1915   WBC 10*3/mm3 6.73 5.18 5.63 5.02 5.37 5.99 5.84   HEMOGLOBIN g/dL 9.4* 10.8* 10.4* 9.7* 6.5* 7.2* 7.7*   PLATELETS 10*3/mm3 254 232 251 220 227 250 265     BMP   Results from last 7 days   Lab Units 21  0636 21  0530 21  0616  07/18/21  0609 07/17/21  0504 07/16/21  0409 07/15/21  1915   SODIUM mmol/L 138 135* 136 134* 130* 134* 133*   POTASSIUM mmol/L 4.3 5.8* 4.5 3.7 4.0 4.6 4.0   CHLORIDE mmol/L 101 98 96* 99 94* 96* 93*   CO2 mmol/L 25.1 15.8* 21.0* 21.9* 20.9* 21.6* 22.7   BUN mg/dL 19 43* 31* 21 35* 27* 24*   CREATININE mg/dL 4.46* 7.98* 6.54* 5.25* 7.87* 7.06* 6.17*   GLUCOSE mg/dL 109* 96 79 84 80 101* 83   PHOSPHORUS mg/dL  --   --   --  3.2 4.0  --   --      AV graft scan shows patent AV graft with low volume flows.    Assessment/Plan   Assessment & Plan    AV shunt thrombosis, subsequent encounter    76 y.o. female patient now status post second open shunt thrombectomy with patent graft on physical exam but poor performance based on noninvasive testing.  I performed aggressive shunt thrombectomies using balloon catheters and adherent clot catheters.  She has a stenosis in the mid portion of the shunt which is the area her shunt is accessed, so it does not appear to be stentable.  In addition she has undergone multiple central vein interventions for central vein occlusion, including 1 stent extending into the superior vena cava.  Have started lower dose apixaban to help promote shunt patency.  Recommend keeping tunneled dialysis catheter for at least the time being and continuing to use the shunt for access.  She has undergone access placement in the contralateral extremity, and so a left groin access appears to be the only remaining alternative.  Given her body habitus, this is not an ideal site.    Personal protective equipment used for this patient encounter:  Patient wearing surgical mask []    Provider wearing a surgical mask [x]    Gloves []    Eye protection []    Face Shield []    Gown []    N 95 respirator or CAPR/PAPR []   Duration of interaction about 3 minutes    Shahram Gallagher MD  07/21/21  08:34 EDT    Please call my office with any question: [phone]    Active Hospital Problems    Diagnosis  POA   • **AV  shunt thrombosis, subsequent encounter [T87.945D]  Not Applicable      Resolved Hospital Problems   No resolved problems to display.

## 2021-07-21 NOTE — PROGRESS NOTES
"Daily progress note    Chief complaint  Doing better  No new complaints  Wants to go home  Family at bedside    History of present illness  76-year-old -American female who is well-known to our service with history of end-stage renal disease on hemodialysis COPD hypertension diabetes chronic anemia and gastroesophageal disease who was recently discharged from the hospital after work-up on lower GI bleed which revealed cecal mass underwent laparoscopic right hemicolectomy and biopsy came back positive for tubular adenoma.  Patient also have thrombectomy with shuntogram of the right AV fistula and discharge home in stable condition presented to [facility] emergency room with right arm occluded AV shunt as she went for dialysis and they were unable to assess right arm AV fistula.  Patient has no other complaint.  Patient denies any chest pain shortness of breath abdominal pain nausea vomiting diarrhea.  Patient denies any fever chills night sweats weight loss or weight gain.  Patient admitted for management.     REVIEW OF SYSTEMS  Unremarkable      PHYSICAL EXAM  Blood pressure 99/41, pulse 75, temperature 97.8 °F (36.6 °C), temperature source Oral, resp. rate 18, height 152.4 cm (60\"), weight 78.5 kg (173 lb), SpO2 99 %, not currently breastfeeding.    General: No acute distress.  HENT: NCAT, PERRL, Nares patent.  Eyes: no scleral icterus.  Neck: trachea midline, no ROM limitations.  CV: regular rhythm, regular rate.  Respiratory: normal effort, CTAB.  Abdomen: soft, nondistended, NTTP, no rebound tenderness, no guarding or rigidity  Musculoskeletal: Right arm AV fistula does not have a thrill or bruit.  It is nontender to palpation.  Neuro: alert, moves all extremities, follows commands.  Skin: warm, dry.     LAB RESULTS  Lab Results (last 24 hours)     Procedure Component Value Units Date/Time    Basic Metabolic Panel [[id number]]  (Abnormal) Collected: 07/21/21 0636    Specimen: Blood Updated: " 07/21/21 0741     Glucose 109 mg/dL      BUN 19 mg/dL      Creatinine 4.46 mg/dL      Sodium 138 mmol/L      Potassium 4.3 mmol/L      Comment: Slight hemolysis detected by analyzer. Results may be affected.        Chloride 101 mmol/L      CO2 25.1 mmol/L      Calcium 8.0 mg/dL      eGFR  African Amer 12 mL/min/1.73      Comment: <15 Indicative of kidney failure.        eGFR Non  Amer --     Comment: <15 Indicative of kidney failure.        BUN/Creatinine Ratio 4.3     Anion Gap 11.9 mmol/L     Narrative:      GFR Normal >60  Chronic Kidney Disease <60  Kidney Failure <15      CBC & Differential [[id number]]  (Abnormal) Collected: 07/21/21 0636    Specimen: Blood Updated: 07/21/21 0738    Narrative:      The following orders were created for panel order CBC & Differential.  Procedure                               Abnormality         Status                     ---------                               -----------         ------                     CBC Auto Differential[[id number]]        Abnormal            Final result                 Please view results for these tests on the individual orders.    CBC Auto Differential [[id number]]  (Abnormal) Collected: 07/21/21 0636    Specimen: Blood Updated: 07/21/21 0738     WBC 6.73 10*3/mm3      RBC 3.64 10*6/mm3      Hemoglobin 9.4 g/dL      Hematocrit 30.6 %      MCV 84.1 fL      MCH 25.8 pg      MCHC 30.7 g/dL      RDW 17.0 %      RDW-SD 52.9 fl      MPV 10.5 fL      Platelets 254 10*3/mm3      Neutrophil % 71.0 %      Lymphocyte % 10.5 %      Monocyte % 14.4 %      Eosinophil % 1.3 %      Basophil % 0.7 %      Immature Grans % 2.1 %      Neutrophils, Absolute 4.77 10*3/mm3      Lymphocytes, Absolute 0.71 10*3/mm3      Monocytes, Absolute 0.97 10*3/mm3      Eosinophils, Absolute 0.09 10*3/mm3      Basophils, Absolute 0.05 10*3/mm3      Immature Grans, Absolute 0.14 10*3/mm3      nRBC 0.1 /100 WBC         Imaging Results (Last 24 Hours)     Procedure Component Value  Units Date/Time    Arteriogram (Autofinalize) [[id number]] Resulted: 07/21/21 0839     Updated: 07/21/21 0839          Current Facility-Administered Medications:   •  albuterol (PROVENTIL) nebulizer solution 0.083% 2.5 mg/3mL, 2.5 mg, Nebulization, Q6H PRN, Shahram Gallagher MD  •  apixaban (ELIQUIS) tablet 2.5 mg, 2.5 mg, Oral, Q12H, Shahram Gallagher MD, 2.5 mg at 07/21/21 0816  •  budesonide-formoterol (SYMBICORT) 160-4.5 MCG/ACT inhaler 2 puff, 2 puff, Inhalation, BID - RT, Shahram Gallagher MD, 2 puff at 07/21/21 0744  •  cholecalciferol (VITAMIN D3) tablet 1,000 Units, 1,000 Units, Oral, Daily, Shahram Gallagher MD, 1,000 Units at 07/21/21 0817  •  [START ON 7/22/2021] epoetin polina-epbx (RETACRIT) injection 10,000 Units, 10,000 Units, Intravenous, Once per day on Tue Thu Sat, Shahram Gallagher MD  •  guaiFENesin (MUCINEX) 12 hr tablet 600 mg, 600 mg, Oral, Q12H, Shahram Gallagher MD, 600 mg at 07/21/21 0816  •  heparin (porcine) injection 3,800 Units, 3,800 Units, Intracatheter, PRN, Shahram Gallagher MD, 3,800 Units at 07/17/21 1247  •  HYDROcodone-acetaminophen (NORCO) 5-325 MG per tablet 1 tablet, 1 tablet, Oral, Q6H PRN, Shahram Gallagher MD  •  mannitol 25% (RENAL) injection, 12.5 g, Intravenous, PRN, Chester Kang MD  •  metoprolol succinate XL (TOPROL-XL) 24 hr tablet 25 mg, 25 mg, Oral, Q24H, Shahram Gallagher MD, 25 mg at 07/21/21 0816  •  montelukast (SINGULAIR) tablet 10 mg, 10 mg, Oral, Nightly, Shahram Gallagher MD, 10 mg at 07/20/21 2016  •  pantoprazole (PROTONIX) EC tablet 40 mg, 40 mg, Oral, Q AM, Shahram Gallagher MD, 40 mg at 07/21/21 0548  •  [COMPLETED] Insert peripheral IV, , , Once **AND** sodium chloride 0.9 % flush 10 mL, 10 mL, Intravenous, PRN, Shahram Gallagher MD     ASSESSMENT  Malfunctioning right AV fistula s/p thrombectomy  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

## 2021-07-22 ENCOUNTER — HOME CARE VISIT (OUTPATIENT)
Dept: HOME HEALTH SERVICES | Facility: HOME HEALTHCARE | Age: 76
End: 2021-07-22

## 2021-07-22 ENCOUNTER — READMISSION MANAGEMENT (OUTPATIENT)
Dept: CALL CENTER | Facility: HOSPITAL | Age: 76
End: 2021-07-22

## 2021-07-22 LAB — GLUCOSE BLDC GLUCOMTR-MCNC: 93 MG/DL (ref 70–130)

## 2021-07-22 PROCEDURE — G0299 HHS/HOSPICE OF RN EA 15 MIN: HCPCS

## 2021-07-22 NOTE — OUTREACH NOTE
Prep Survey      Responses   [facility] facility patient discharged from?  [facility]   Is LACE score < 7 ?  No   Emergency Room discharge w/ pulse ox?  No   Eligibility  Readm Mgmt   Discharge diagnosis  Malfunctioning right AV fistula s/p thrombectomy   Does the patient have one of the following disease processes/diagnoses(primary or secondary)?  General Surgery   Does the patient have Home health ordered?  Yes   What is the Home health agency?   [facility]   Is there a DME ordered?  Yes   What DME was ordered?  [employer] - CPAP, rollator and cane   Medication alerts for this patient  Eliquis   Prep survey completed?  Yes          Elana Eng RN

## 2021-07-26 VITALS
BODY MASS INDEX: 33.59 KG/M2 | OXYGEN SATURATION: 92 % | HEIGHT: 60 IN | HEART RATE: 68 BPM | RESPIRATION RATE: 20 BRPM | SYSTOLIC BLOOD PRESSURE: 122 MMHG | DIASTOLIC BLOOD PRESSURE: 68 MMHG | WEIGHT: 171.08 LBS

## 2021-07-27 ENCOUNTER — HOME CARE VISIT (OUTPATIENT)
Dept: HOME HEALTH SERVICES | Facility: HOME HEALTHCARE | Age: 76
End: 2021-07-27

## 2021-07-27 VITALS
TEMPERATURE: 97.1 F | SYSTOLIC BLOOD PRESSURE: 128 MMHG | OXYGEN SATURATION: 98 % | HEART RATE: 58 BPM | RESPIRATION RATE: 16 BRPM | DIASTOLIC BLOOD PRESSURE: 70 MMHG

## 2021-07-27 PROCEDURE — G0493 RN CARE EA 15 MIN HH/HOSPICE: HCPCS

## 2021-07-28 ENCOUNTER — READMISSION MANAGEMENT (OUTPATIENT)
Dept: CALL CENTER | Facility: HOSPITAL | Age: 76
End: 2021-07-28

## 2021-07-28 NOTE — OUTREACH NOTE
General Surgery Week 1 Survey      Responses   [facility] patient discharged from?  [facility]   Does the patient have one of the following disease processes/diagnoses(primary or secondary)?  General Surgery   Week 1 attempt successful?  Yes   Call start time  0843   Call end time  0848   Discharge diagnosis  Malfunctioning right AV fistula s/p thrombectomy, S.P tunnelled catheter insertion right common femoral vein, ESRD on dialysis   Is patient permission given to speak with other caregiver?  Yes   List who call center can speak with  Lily Vegas, daughter   Person spoke with today (if not patient) and relationship  Lily   Medication alerts for this patient  Eliquis   Meds reviewed with patient/caregiver?  Yes   Does the patient have all medications related to this admission filled (includes all antibiotics, pain medications, etc.)  Yes   Is the patient taking all medications as directed (includes completed medication regime)?  Yes   Does the patient have a follow up appointment scheduled with their surgeon?  Yes [Post-Op with Zbigniew Conner MD, today 7/28  1040am]   Has the patient kept scheduled appointments due by today?  Yes   Comments  PCP Dr Cortes appt today 7/28/21  1030am, Dr Kang 2 weeks.    What is the Home health agency?   [facility]   Has home health visited the patient within 72 hours of discharge?  Yes   Home health comments  Daughter states HH came yesterday.    What DME was ordered?  [employer] - CPAP, rollator and cane   Psychosocial issues?  No   Did the patient receive a copy of their discharge instructions?  Yes   Nursing interventions  Reviewed instructions with patient [daughter]   What is the patient's perception of their health status since discharge?  Improving   Is the patient/caregiver able to teach back the hierarchy of who to call/visit for symptoms/problems? PCP, Specialist, Home health nurse, Urgent Care, ED, 911  Yes   Week 1 call completed?  Yes   Wrap up additional  comments  Unable to review further teaching with daughter today.           Breana Beaulieu RN

## 2021-07-30 ENCOUNTER — HOME CARE VISIT (OUTPATIENT)
Dept: HOME HEALTH SERVICES | Facility: HOME HEALTHCARE | Age: 76
End: 2021-07-30

## 2021-07-30 PROCEDURE — G0493 RN CARE EA 15 MIN HH/HOSPICE: HCPCS

## 2021-07-30 NOTE — HOME HEALTH
PLAN FOR NEXT VIST    NEXT SNV WILL BE MISSED VISIT -  PT HAS MD APPTS MON AND TUES 8/2 AND 8/3 (8/3 STAPLE REMOVAL)    NEXT PLANNED SNV 8/6    ASSESS ABD INCISION    DIALYSIS T-TH-SAT

## 2021-07-31 VITALS
RESPIRATION RATE: 18 BRPM | SYSTOLIC BLOOD PRESSURE: 136 MMHG | HEART RATE: 75 BPM | OXYGEN SATURATION: 99 % | DIASTOLIC BLOOD PRESSURE: 64 MMHG | TEMPERATURE: 96.9 F

## 2021-08-03 ENCOUNTER — APPOINTMENT (OUTPATIENT)
Dept: CT IMAGING | Facility: HOSPITAL | Age: 76
End: 2021-08-03

## 2021-08-03 ENCOUNTER — APPOINTMENT (OUTPATIENT)
Dept: GENERAL RADIOLOGY | Facility: HOSPITAL | Age: 76
End: 2021-08-03

## 2021-08-03 ENCOUNTER — ANESTHESIA EVENT (OUTPATIENT)
Dept: PERIOP | Facility: HOSPITAL | Age: 76
End: 2021-08-03

## 2021-08-03 ENCOUNTER — HOSPITAL ENCOUNTER (INPATIENT)
Facility: HOSPITAL | Age: 76
LOS: 7 days | Discharge: HOME HEALTH | End: 2021-08-10
Attending: EMERGENCY MEDICINE | Admitting: HOSPITALIST

## 2021-08-03 ENCOUNTER — HOME CARE VISIT (OUTPATIENT)
Dept: HOME HEALTH SERVICES | Facility: HOME HEALTHCARE | Age: 76
End: 2021-08-03

## 2021-08-03 ENCOUNTER — ANESTHESIA (OUTPATIENT)
Dept: PERIOP | Facility: HOSPITAL | Age: 76
End: 2021-08-03

## 2021-08-03 DIAGNOSIS — R10.9 ABDOMINAL PAIN IN FEMALE: ICD-10-CM

## 2021-08-03 DIAGNOSIS — K91.89 ANASTOMOTIC LEAK OF INTESTINE: Primary | ICD-10-CM

## 2021-08-03 DIAGNOSIS — K65.0 PERIHEPATIC ABSCESS (HCC): ICD-10-CM

## 2021-08-03 LAB
ALBUMIN SERPL-MCNC: 3.5 G/DL (ref 3.5–5.2)
ALBUMIN/GLOB SERPL: 1 G/DL
ALP SERPL-CCNC: 97 U/L (ref 39–117)
ALT SERPL W P-5'-P-CCNC: 12 U/L (ref 1–33)
ANION GAP SERPL CALCULATED.3IONS-SCNC: 19.7 MMOL/L (ref 5–15)
AST SERPL-CCNC: 27 U/L (ref 1–32)
BILIRUB SERPL-MCNC: 0.5 MG/DL (ref 0–1.2)
BUN SERPL-MCNC: 32 MG/DL (ref 8–23)
BUN/CREAT SERPL: 3.4 (ref 7–25)
CALCIUM SPEC-SCNC: 7.9 MG/DL (ref 8.6–10.5)
CHLORIDE SERPL-SCNC: 97 MMOL/L (ref 98–107)
CO2 SERPL-SCNC: 27.3 MMOL/L (ref 22–29)
CREAT SERPL-MCNC: 9.46 MG/DL (ref 0.57–1)
DEPRECATED RDW RBC AUTO: 49.3 FL (ref 37–54)
ERYTHROCYTE [DISTWIDTH] IN BLOOD BY AUTOMATED COUNT: 17.1 % (ref 12.3–15.4)
GFR SERPL CREATININE-BSD FRML MDRD: 5 ML/MIN/1.73
GFR SERPL CREATININE-BSD FRML MDRD: ABNORMAL ML/MIN/{1.73_M2}
GLOBULIN UR ELPH-MCNC: 3.5 GM/DL
GLUCOSE BLDC GLUCOMTR-MCNC: 154 MG/DL (ref 70–130)
GLUCOSE BLDC GLUCOMTR-MCNC: 162 MG/DL (ref 70–130)
GLUCOSE SERPL-MCNC: 158 MG/DL (ref 65–99)
HCT VFR BLD AUTO: 33.1 % (ref 34–46.6)
HGB BLD-MCNC: 10.5 G/DL (ref 12–15.9)
HOLD SPECIMEN: NORMAL
LIPASE SERPL-CCNC: 35 U/L (ref 13–60)
LYMPHOCYTES # BLD MANUAL: 0.2 10*3/MM3 (ref 0.7–3.1)
LYMPHOCYTES NFR BLD MANUAL: 15 % (ref 5–12)
LYMPHOCYTES NFR BLD MANUAL: 2 % (ref 19.6–45.3)
MCH RBC QN AUTO: 25.4 PG (ref 26.6–33)
MCHC RBC AUTO-ENTMCNC: 31.7 G/DL (ref 31.5–35.7)
MCV RBC AUTO: 80 FL (ref 79–97)
MONOCYTES # BLD AUTO: 1.51 10*3/MM3 (ref 0.1–0.9)
NEUTROPHILS # BLD AUTO: 8.37 10*3/MM3 (ref 1.7–7)
NEUTROPHILS NFR BLD MANUAL: 83 % (ref 42.7–76)
NRBC BLD AUTO-RTO: 0 /100 WBC (ref 0–0.2)
PLAT MORPH BLD: NORMAL
PLATELET # BLD AUTO: 283 10*3/MM3 (ref 140–450)
PMV BLD AUTO: 10.5 FL (ref 6–12)
POTASSIUM SERPL-SCNC: 3.6 MMOL/L (ref 3.5–5.2)
PROT SERPL-MCNC: 7 G/DL (ref 6–8.5)
RBC # BLD AUTO: 4.14 10*6/MM3 (ref 3.77–5.28)
RBC MORPH BLD: NORMAL
SARS-COV-2 RNA RESP QL NAA+PROBE: NOT DETECTED
SODIUM SERPL-SCNC: 144 MMOL/L (ref 136–145)
WBC # BLD AUTO: 10.08 10*3/MM3 (ref 3.4–10.8)
WBC MORPH BLD: NORMAL
WHOLE BLOOD HOLD SPECIMEN: NORMAL

## 2021-08-03 PROCEDURE — 83690 ASSAY OF LIPASE: CPT

## 2021-08-03 PROCEDURE — 25010000002 PIPERACILLIN SOD-TAZOBACTAM PER 1 G: Performed by: HOSPITALIST

## 2021-08-03 PROCEDURE — 87205 SMEAR GRAM STAIN: CPT | Performed by: SURGERY

## 2021-08-03 PROCEDURE — 25010000002 PIPERACILLIN SOD-TAZOBACTAM PER 1 G: Performed by: NURSE PRACTITIONER

## 2021-08-03 PROCEDURE — 80053 COMPREHEN METABOLIC PANEL: CPT

## 2021-08-03 PROCEDURE — 87070 CULTURE OTHR SPECIMN AEROBIC: CPT | Performed by: SURGERY

## 2021-08-03 PROCEDURE — 0DBL0ZZ EXCISION OF TRANSVERSE COLON, OPEN APPROACH: ICD-10-PCS | Performed by: SURGERY

## 2021-08-03 PROCEDURE — 25010000002 ONDANSETRON PER 1 MG: Performed by: NURSE PRACTITIONER

## 2021-08-03 PROCEDURE — 87075 CULTR BACTERIA EXCEPT BLOOD: CPT | Performed by: SURGERY

## 2021-08-03 PROCEDURE — 88307 TISSUE EXAM BY PATHOLOGIST: CPT | Performed by: SURGERY

## 2021-08-03 PROCEDURE — 25010000002 HYDROMORPHONE PER 4 MG: Performed by: NURSE PRACTITIONER

## 2021-08-03 PROCEDURE — 82962 GLUCOSE BLOOD TEST: CPT

## 2021-08-03 PROCEDURE — U0003 INFECTIOUS AGENT DETECTION BY NUCLEIC ACID (DNA OR RNA); SEVERE ACUTE RESPIRATORY SYNDROME CORONAVIRUS 2 (SARS-COV-2) (CORONAVIRUS DISEASE [COVID-19]), AMPLIFIED PROBE TECHNIQUE, MAKING USE OF HIGH THROUGHPUT TECHNOLOGIES AS DESCRIBED BY CMS-2020-01-R: HCPCS | Performed by: SURGERY

## 2021-08-03 PROCEDURE — 44160 REMOVAL OF COLON: CPT | Performed by: SURGERY

## 2021-08-03 PROCEDURE — 25010000002 FENTANYL CITRATE (PF) 50 MCG/ML SOLUTION: Performed by: NURSE ANESTHETIST, CERTIFIED REGISTERED

## 2021-08-03 PROCEDURE — C1889 IMPLANT/INSERT DEVICE, NOC: HCPCS | Performed by: SURGERY

## 2021-08-03 PROCEDURE — 74177 CT ABD & PELVIS W/CONTRAST: CPT

## 2021-08-03 PROCEDURE — 85025 COMPLETE CBC W/AUTO DIFF WBC: CPT

## 2021-08-03 PROCEDURE — 25010000002 IOPAMIDOL 61 % SOLUTION: Performed by: NURSE PRACTITIONER

## 2021-08-03 PROCEDURE — 85007 BL SMEAR W/DIFF WBC COUNT: CPT

## 2021-08-03 PROCEDURE — 25010000002 PHENYLEPHRINE PER 1 ML: Performed by: ANESTHESIOLOGY

## 2021-08-03 PROCEDURE — 25010000002 NEOSTIGMINE 5 MG/10ML SOLUTION: Performed by: ANESTHESIOLOGY

## 2021-08-03 PROCEDURE — 0DBB0ZZ EXCISION OF ILEUM, OPEN APPROACH: ICD-10-PCS | Performed by: SURGERY

## 2021-08-03 PROCEDURE — 25010000002 PHENYLEPHRINE PER 1 ML: Performed by: NURSE ANESTHETIST, CERTIFIED REGISTERED

## 2021-08-03 PROCEDURE — C1765 ADHESION BARRIER: HCPCS | Performed by: SURGERY

## 2021-08-03 PROCEDURE — 0D1B0ZL BYPASS ILEUM TO TRANSVERSE COLON, OPEN APPROACH: ICD-10-PCS | Performed by: SURGERY

## 2021-08-03 PROCEDURE — 25010000002 ONDANSETRON PER 1 MG: Performed by: HOSPITALIST

## 2021-08-03 PROCEDURE — 87186 SC STD MICRODIL/AGAR DIL: CPT | Performed by: SURGERY

## 2021-08-03 PROCEDURE — 99285 EMERGENCY DEPT VISIT HI MDM: CPT

## 2021-08-03 PROCEDURE — 25010000003 CEFAZOLIN IN DEXTROSE 2-4 GM/100ML-% SOLUTION: Performed by: SURGERY

## 2021-08-03 PROCEDURE — 25010000002 HYDROMORPHONE PER 4 MG: Performed by: ANESTHESIOLOGY

## 2021-08-03 PROCEDURE — 71045 X-RAY EXAM CHEST 1 VIEW: CPT

## 2021-08-03 PROCEDURE — 25010000002 FENTANYL CITRATE (PF) 50 MCG/ML SOLUTION: Performed by: ANESTHESIOLOGY

## 2021-08-03 PROCEDURE — 25010000002 PROPOFOL 10 MG/ML EMULSION: Performed by: NURSE ANESTHETIST, CERTIFIED REGISTERED

## 2021-08-03 DEVICE — HORIZON TI LG 6 CLIPS/CART
Type: IMPLANTABLE DEVICE | Site: ABDOMEN | Status: FUNCTIONAL
Brand: WECK

## 2021-08-03 DEVICE — ENDOPATH ECHELON ENDOSCOPIC LINEAR CUTTER RELOADS, BLUE, 60MM
Type: IMPLANTABLE DEVICE | Site: ABDOMEN | Status: FUNCTIONAL
Brand: ECHELON ENDOPATH

## 2021-08-03 RX ORDER — ONDANSETRON 2 MG/ML
4 INJECTION INTRAMUSCULAR; INTRAVENOUS EVERY 6 HOURS PRN
Status: DISCONTINUED | OUTPATIENT
Start: 2021-08-03 | End: 2021-08-10 | Stop reason: HOSPADM

## 2021-08-03 RX ORDER — ONDANSETRON 2 MG/ML
4 INJECTION INTRAMUSCULAR; INTRAVENOUS EVERY 6 HOURS PRN
Status: DISCONTINUED | OUTPATIENT
Start: 2021-08-03 | End: 2021-08-03 | Stop reason: SDUPTHER

## 2021-08-03 RX ORDER — ONDANSETRON 2 MG/ML
4 INJECTION INTRAMUSCULAR; INTRAVENOUS EVERY 6 HOURS PRN
Status: CANCELLED | OUTPATIENT
Start: 2021-08-03

## 2021-08-03 RX ORDER — MONTELUKAST SODIUM 10 MG/1
10 TABLET ORAL NIGHTLY
Status: DISCONTINUED | OUTPATIENT
Start: 2021-08-03 | End: 2021-08-10 | Stop reason: HOSPADM

## 2021-08-03 RX ORDER — DIPHENHYDRAMINE HYDROCHLORIDE 50 MG/ML
12.5 INJECTION INTRAMUSCULAR; INTRAVENOUS
Status: CANCELLED | OUTPATIENT
Start: 2021-08-03

## 2021-08-03 RX ORDER — HYDROMORPHONE HYDROCHLORIDE 1 MG/ML
0.5 INJECTION, SOLUTION INTRAMUSCULAR; INTRAVENOUS; SUBCUTANEOUS ONCE
Status: COMPLETED | OUTPATIENT
Start: 2021-08-03 | End: 2021-08-03

## 2021-08-03 RX ORDER — CEFAZOLIN SODIUM 2 G/100ML
2 INJECTION, SOLUTION INTRAVENOUS ONCE
Status: COMPLETED | OUTPATIENT
Start: 2021-08-03 | End: 2021-08-03

## 2021-08-03 RX ORDER — PROMETHAZINE HYDROCHLORIDE 25 MG/1
25 TABLET ORAL ONCE AS NEEDED
Status: DISCONTINUED | OUTPATIENT
Start: 2021-08-03 | End: 2021-08-03 | Stop reason: HOSPADM

## 2021-08-03 RX ORDER — NALOXONE HCL 0.4 MG/ML
0.2 VIAL (ML) INJECTION AS NEEDED
Status: CANCELLED | OUTPATIENT
Start: 2021-08-03

## 2021-08-03 RX ORDER — PROMETHAZINE HYDROCHLORIDE 25 MG/1
25 SUPPOSITORY RECTAL ONCE AS NEEDED
Status: CANCELLED | OUTPATIENT
Start: 2021-08-03

## 2021-08-03 RX ORDER — SODIUM CHLORIDE 0.9 % (FLUSH) 0.9 %
3-10 SYRINGE (ML) INJECTION AS NEEDED
Status: DISCONTINUED | OUTPATIENT
Start: 2021-08-03 | End: 2021-08-03 | Stop reason: HOSPADM

## 2021-08-03 RX ORDER — FENTANYL CITRATE 50 UG/ML
50 INJECTION, SOLUTION INTRAMUSCULAR; INTRAVENOUS
Status: DISCONTINUED | OUTPATIENT
Start: 2021-08-03 | End: 2021-08-03 | Stop reason: HOSPADM

## 2021-08-03 RX ORDER — FENTANYL CITRATE 50 UG/ML
INJECTION, SOLUTION INTRAMUSCULAR; INTRAVENOUS AS NEEDED
Status: DISCONTINUED | OUTPATIENT
Start: 2021-08-03 | End: 2021-08-03 | Stop reason: SURG

## 2021-08-03 RX ORDER — HYDROMORPHONE HYDROCHLORIDE 1 MG/ML
0.5 INJECTION, SOLUTION INTRAMUSCULAR; INTRAVENOUS; SUBCUTANEOUS EVERY 4 HOURS PRN
Status: DISCONTINUED | OUTPATIENT
Start: 2021-08-03 | End: 2021-08-03 | Stop reason: SDUPTHER

## 2021-08-03 RX ORDER — ONDANSETRON 2 MG/ML
4 INJECTION INTRAMUSCULAR; INTRAVENOUS ONCE AS NEEDED
Status: DISCONTINUED | OUTPATIENT
Start: 2021-08-03 | End: 2021-08-03 | Stop reason: HOSPADM

## 2021-08-03 RX ORDER — SODIUM CHLORIDE 9 MG/ML
9 INJECTION, SOLUTION INTRAVENOUS CONTINUOUS PRN
Status: DISCONTINUED | OUTPATIENT
Start: 2021-08-03 | End: 2021-08-10 | Stop reason: HOSPADM

## 2021-08-03 RX ORDER — EPHEDRINE SULFATE 50 MG/ML
INJECTION, SOLUTION INTRAVENOUS AS NEEDED
Status: DISCONTINUED | OUTPATIENT
Start: 2021-08-03 | End: 2021-08-03 | Stop reason: SURG

## 2021-08-03 RX ORDER — OXYCODONE AND ACETAMINOPHEN 10; 325 MG/1; MG/1
1 TABLET ORAL EVERY 4 HOURS PRN
Status: DISCONTINUED | OUTPATIENT
Start: 2021-08-03 | End: 2021-08-03 | Stop reason: HOSPADM

## 2021-08-03 RX ORDER — PROPOFOL 10 MG/ML
VIAL (ML) INTRAVENOUS AS NEEDED
Status: DISCONTINUED | OUTPATIENT
Start: 2021-08-03 | End: 2021-08-03 | Stop reason: SURG

## 2021-08-03 RX ORDER — DROPERIDOL 2.5 MG/ML
0.62 INJECTION, SOLUTION INTRAMUSCULAR; INTRAVENOUS ONCE AS NEEDED
Status: CANCELLED | OUTPATIENT
Start: 2021-08-03

## 2021-08-03 RX ORDER — HYDROMORPHONE HYDROCHLORIDE 1 MG/ML
0.5 INJECTION, SOLUTION INTRAMUSCULAR; INTRAVENOUS; SUBCUTANEOUS
Status: CANCELLED | OUTPATIENT
Start: 2021-08-03

## 2021-08-03 RX ORDER — EPHEDRINE SULFATE 50 MG/ML
5 INJECTION, SOLUTION INTRAVENOUS ONCE AS NEEDED
Status: CANCELLED | OUTPATIENT
Start: 2021-08-03

## 2021-08-03 RX ORDER — HYDRALAZINE HYDROCHLORIDE 20 MG/ML
5 INJECTION INTRAMUSCULAR; INTRAVENOUS
Status: CANCELLED | OUTPATIENT
Start: 2021-08-03

## 2021-08-03 RX ORDER — EPHEDRINE SULFATE 50 MG/ML
5 INJECTION, SOLUTION INTRAVENOUS ONCE AS NEEDED
Status: DISCONTINUED | OUTPATIENT
Start: 2021-08-03 | End: 2021-08-03 | Stop reason: HOSPADM

## 2021-08-03 RX ORDER — PROMETHAZINE HYDROCHLORIDE 25 MG/1
25 TABLET ORAL ONCE AS NEEDED
Status: CANCELLED | OUTPATIENT
Start: 2021-08-03

## 2021-08-03 RX ORDER — HYDROMORPHONE HYDROCHLORIDE 1 MG/ML
0.5 INJECTION, SOLUTION INTRAMUSCULAR; INTRAVENOUS; SUBCUTANEOUS
Status: DISCONTINUED | OUTPATIENT
Start: 2021-08-03 | End: 2021-08-03 | Stop reason: HOSPADM

## 2021-08-03 RX ORDER — HYDROMORPHONE HYDROCHLORIDE 1 MG/ML
0.5 INJECTION, SOLUTION INTRAMUSCULAR; INTRAVENOUS; SUBCUTANEOUS
Status: DISCONTINUED | OUTPATIENT
Start: 2021-08-03 | End: 2021-08-09

## 2021-08-03 RX ORDER — SODIUM CHLORIDE 0.9 % (FLUSH) 0.9 %
3 SYRINGE (ML) INJECTION EVERY 12 HOURS SCHEDULED
Status: DISCONTINUED | OUTPATIENT
Start: 2021-08-03 | End: 2021-08-03

## 2021-08-03 RX ORDER — HYDROCODONE BITARTRATE AND ACETAMINOPHEN 7.5; 325 MG/1; MG/1
1 TABLET ORAL ONCE AS NEEDED
Status: DISCONTINUED | OUTPATIENT
Start: 2021-08-03 | End: 2021-08-03 | Stop reason: HOSPADM

## 2021-08-03 RX ORDER — DIPHENHYDRAMINE HCL 25 MG
25 CAPSULE ORAL
Status: DISCONTINUED | OUTPATIENT
Start: 2021-08-03 | End: 2021-08-03 | Stop reason: HOSPADM

## 2021-08-03 RX ORDER — FENTANYL CITRATE 50 UG/ML
50 INJECTION, SOLUTION INTRAMUSCULAR; INTRAVENOUS
Status: CANCELLED | OUTPATIENT
Start: 2021-08-03

## 2021-08-03 RX ORDER — HYDRALAZINE HYDROCHLORIDE 20 MG/ML
5 INJECTION INTRAMUSCULAR; INTRAVENOUS
Status: DISCONTINUED | OUTPATIENT
Start: 2021-08-03 | End: 2021-08-03 | Stop reason: HOSPADM

## 2021-08-03 RX ORDER — GLYCOPYRROLATE 0.2 MG/ML
INJECTION INTRAMUSCULAR; INTRAVENOUS AS NEEDED
Status: DISCONTINUED | OUTPATIENT
Start: 2021-08-03 | End: 2021-08-03 | Stop reason: SURG

## 2021-08-03 RX ORDER — LABETALOL HYDROCHLORIDE 5 MG/ML
5 INJECTION, SOLUTION INTRAVENOUS
Status: CANCELLED | OUTPATIENT
Start: 2021-08-03

## 2021-08-03 RX ORDER — LIDOCAINE HYDROCHLORIDE 20 MG/ML
INJECTION, SOLUTION INFILTRATION; PERINEURAL AS NEEDED
Status: DISCONTINUED | OUTPATIENT
Start: 2021-08-03 | End: 2021-08-03 | Stop reason: SURG

## 2021-08-03 RX ORDER — LIDOCAINE HYDROCHLORIDE 10 MG/ML
0.5 INJECTION, SOLUTION EPIDURAL; INFILTRATION; INTRACAUDAL; PERINEURAL ONCE AS NEEDED
Status: DISCONTINUED | OUTPATIENT
Start: 2021-08-03 | End: 2021-08-03 | Stop reason: HOSPADM

## 2021-08-03 RX ORDER — MIDAZOLAM HYDROCHLORIDE 1 MG/ML
0.5 INJECTION INTRAMUSCULAR; INTRAVENOUS
Status: DISCONTINUED | OUTPATIENT
Start: 2021-08-03 | End: 2021-08-03 | Stop reason: HOSPADM

## 2021-08-03 RX ORDER — DIPHENHYDRAMINE HCL 25 MG
25 CAPSULE ORAL
Status: CANCELLED | OUTPATIENT
Start: 2021-08-03

## 2021-08-03 RX ORDER — IBUPROFEN 600 MG/1
600 TABLET ORAL ONCE AS NEEDED
Status: DISCONTINUED | OUTPATIENT
Start: 2021-08-03 | End: 2021-08-03 | Stop reason: HOSPADM

## 2021-08-03 RX ORDER — NEOSTIGMINE METHYLSULFATE 0.5 MG/ML
INJECTION, SOLUTION INTRAVENOUS AS NEEDED
Status: DISCONTINUED | OUTPATIENT
Start: 2021-08-03 | End: 2021-08-03 | Stop reason: SURG

## 2021-08-03 RX ORDER — DROPERIDOL 2.5 MG/ML
1.25 INJECTION, SOLUTION INTRAMUSCULAR; INTRAVENOUS ONCE AS NEEDED
Status: CANCELLED | OUTPATIENT
Start: 2021-08-03

## 2021-08-03 RX ORDER — MAGNESIUM HYDROXIDE 1200 MG/15ML
LIQUID ORAL AS NEEDED
Status: DISCONTINUED | OUTPATIENT
Start: 2021-08-03 | End: 2021-08-03 | Stop reason: HOSPADM

## 2021-08-03 RX ORDER — FAMOTIDINE 10 MG/ML
20 INJECTION, SOLUTION INTRAVENOUS EVERY 12 HOURS SCHEDULED
Status: DISCONTINUED | OUTPATIENT
Start: 2021-08-03 | End: 2021-08-05

## 2021-08-03 RX ORDER — ONDANSETRON 2 MG/ML
4 INJECTION INTRAMUSCULAR; INTRAVENOUS ONCE
Status: COMPLETED | OUTPATIENT
Start: 2021-08-03 | End: 2021-08-03

## 2021-08-03 RX ORDER — ALBUTEROL SULFATE 2.5 MG/3ML
2.5 SOLUTION RESPIRATORY (INHALATION) 3 TIMES DAILY PRN
Status: DISCONTINUED | OUTPATIENT
Start: 2021-08-03 | End: 2021-08-10 | Stop reason: HOSPADM

## 2021-08-03 RX ORDER — INSULIN LISPRO 100 [IU]/ML
0-7 INJECTION, SOLUTION INTRAVENOUS; SUBCUTANEOUS
Status: DISCONTINUED | OUTPATIENT
Start: 2021-08-03 | End: 2021-08-09

## 2021-08-03 RX ORDER — CEFAZOLIN SODIUM IN 0.9 % NACL 3 G/100 ML
3 INTRAVENOUS SOLUTION, PIGGYBACK (ML) INTRAVENOUS ONCE
Status: CANCELLED | OUTPATIENT
Start: 2021-08-03 | End: 2021-08-03

## 2021-08-03 RX ORDER — SODIUM CHLORIDE 0.9 % (FLUSH) 0.9 %
10 SYRINGE (ML) INJECTION AS NEEDED
Status: DISCONTINUED | OUTPATIENT
Start: 2021-08-03 | End: 2021-08-03

## 2021-08-03 RX ORDER — ONDANSETRON 2 MG/ML
4 INJECTION INTRAMUSCULAR; INTRAVENOUS ONCE AS NEEDED
Status: CANCELLED | OUTPATIENT
Start: 2021-08-03

## 2021-08-03 RX ORDER — METOPROLOL SUCCINATE 25 MG/1
25 TABLET, EXTENDED RELEASE ORAL EVERY MORNING
Status: DISCONTINUED | OUTPATIENT
Start: 2021-08-04 | End: 2021-08-10 | Stop reason: HOSPADM

## 2021-08-03 RX ORDER — LABETALOL HYDROCHLORIDE 5 MG/ML
5 INJECTION, SOLUTION INTRAVENOUS
Status: DISCONTINUED | OUTPATIENT
Start: 2021-08-03 | End: 2021-08-03 | Stop reason: HOSPADM

## 2021-08-03 RX ORDER — OXYCODONE HYDROCHLORIDE AND ACETAMINOPHEN 5; 325 MG/1; MG/1
1 TABLET ORAL EVERY 6 HOURS PRN
Status: DISCONTINUED | OUTPATIENT
Start: 2021-08-03 | End: 2021-08-09

## 2021-08-03 RX ORDER — FLUMAZENIL 0.1 MG/ML
0.2 INJECTION INTRAVENOUS AS NEEDED
Status: DISCONTINUED | OUTPATIENT
Start: 2021-08-03 | End: 2021-08-03 | Stop reason: HOSPADM

## 2021-08-03 RX ORDER — ROCURONIUM BROMIDE 10 MG/ML
INJECTION, SOLUTION INTRAVENOUS AS NEEDED
Status: DISCONTINUED | OUTPATIENT
Start: 2021-08-03 | End: 2021-08-03 | Stop reason: SURG

## 2021-08-03 RX ORDER — FAMOTIDINE 10 MG/ML
20 INJECTION, SOLUTION INTRAVENOUS ONCE
Status: COMPLETED | OUTPATIENT
Start: 2021-08-03 | End: 2021-08-03

## 2021-08-03 RX ORDER — BUPIVACAINE HYDROCHLORIDE AND EPINEPHRINE 5; 5 MG/ML; UG/ML
INJECTION, SOLUTION EPIDURAL; INTRACAUDAL; PERINEURAL AS NEEDED
Status: DISCONTINUED | OUTPATIENT
Start: 2021-08-03 | End: 2021-08-03 | Stop reason: HOSPADM

## 2021-08-03 RX ORDER — NALOXONE HCL 0.4 MG/ML
0.2 VIAL (ML) INJECTION AS NEEDED
Status: DISCONTINUED | OUTPATIENT
Start: 2021-08-03 | End: 2021-08-03 | Stop reason: HOSPADM

## 2021-08-03 RX ORDER — PROMETHAZINE HYDROCHLORIDE 25 MG/1
25 SUPPOSITORY RECTAL ONCE AS NEEDED
Status: DISCONTINUED | OUTPATIENT
Start: 2021-08-03 | End: 2021-08-03 | Stop reason: HOSPADM

## 2021-08-03 RX ORDER — CEFAZOLIN SODIUM 2 G/100ML
2 INJECTION, SOLUTION INTRAVENOUS ONCE
Status: DISCONTINUED | OUTPATIENT
Start: 2021-08-03 | End: 2021-08-03

## 2021-08-03 RX ORDER — BUDESONIDE AND FORMOTEROL FUMARATE DIHYDRATE 160; 4.5 UG/1; UG/1
2 AEROSOL RESPIRATORY (INHALATION) 2 TIMES DAILY
Status: DISCONTINUED | OUTPATIENT
Start: 2021-08-03 | End: 2021-08-10 | Stop reason: HOSPADM

## 2021-08-03 RX ORDER — DIPHENHYDRAMINE HYDROCHLORIDE 50 MG/ML
12.5 INJECTION INTRAMUSCULAR; INTRAVENOUS
Status: DISCONTINUED | OUTPATIENT
Start: 2021-08-03 | End: 2021-08-03 | Stop reason: HOSPADM

## 2021-08-03 RX ADMIN — SODIUM CHLORIDE: 9 INJECTION, SOLUTION INTRAVENOUS at 16:24

## 2021-08-03 RX ADMIN — ROCURONIUM BROMIDE 50 MG: 50 INJECTION INTRAVENOUS at 16:29

## 2021-08-03 RX ADMIN — FENTANYL CITRATE 50 MCG: 50 INJECTION INTRAMUSCULAR; INTRAVENOUS at 16:29

## 2021-08-03 RX ADMIN — FAMOTIDINE 20 MG: 10 INJECTION INTRAVENOUS at 21:36

## 2021-08-03 RX ADMIN — TAZOBACTAM SODIUM AND PIPERACILLIN SODIUM 3.38 G: 375; 3 INJECTION, SOLUTION INTRAVENOUS at 21:36

## 2021-08-03 RX ADMIN — LIDOCAINE HYDROCHLORIDE 80 MG: 20 INJECTION, SOLUTION INFILTRATION; PERINEURAL at 16:29

## 2021-08-03 RX ADMIN — EPHEDRINE SULFATE 10 MG: 50 INJECTION INTRAVENOUS at 17:09

## 2021-08-03 RX ADMIN — PHENYLEPHRINE HYDROCHLORIDE 100 MCG: 10 INJECTION INTRAVENOUS at 16:43

## 2021-08-03 RX ADMIN — FENTANYL CITRATE 50 MCG: 50 INJECTION, SOLUTION INTRAMUSCULAR; INTRAVENOUS at 19:03

## 2021-08-03 RX ADMIN — ONDANSETRON 4 MG: 2 INJECTION INTRAMUSCULAR; INTRAVENOUS at 08:32

## 2021-08-03 RX ADMIN — ONDANSETRON 4 MG: 2 INJECTION INTRAMUSCULAR; INTRAVENOUS at 17:56

## 2021-08-03 RX ADMIN — GLYCOPYRROLATE 0.4 MG: 0.2 INJECTION INTRAMUSCULAR; INTRAVENOUS at 18:11

## 2021-08-03 RX ADMIN — FAMOTIDINE 20 MG: 10 INJECTION INTRAVENOUS at 14:57

## 2021-08-03 RX ADMIN — TAZOBACTAM SODIUM AND PIPERACILLIN SODIUM 3.38 G: 375; 3 INJECTION, SOLUTION INTRAVENOUS at 10:24

## 2021-08-03 RX ADMIN — PHENYLEPHRINE HYDROCHLORIDE 100 MCG: 10 INJECTION INTRAVENOUS at 16:50

## 2021-08-03 RX ADMIN — IOPAMIDOL 100 ML: 612 INJECTION, SOLUTION INTRAVENOUS at 09:22

## 2021-08-03 RX ADMIN — CEFAZOLIN SODIUM 2 G: 2 INJECTION, SOLUTION INTRAVENOUS at 15:58

## 2021-08-03 RX ADMIN — PHENYLEPHRINE HYDROCHLORIDE 0.5 MCG/KG/MIN: 10 INJECTION, SOLUTION INTRAMUSCULAR; INTRAVENOUS; SUBCUTANEOUS at 17:40

## 2021-08-03 RX ADMIN — HYDROMORPHONE HYDROCHLORIDE 0.5 MG: 1 INJECTION, SOLUTION INTRAMUSCULAR; INTRAVENOUS; SUBCUTANEOUS at 08:32

## 2021-08-03 RX ADMIN — HYDROMORPHONE HYDROCHLORIDE 0.5 MG: 1 INJECTION, SOLUTION INTRAMUSCULAR; INTRAVENOUS; SUBCUTANEOUS at 19:09

## 2021-08-03 RX ADMIN — PROPOFOL 150 MG: 10 INJECTION, EMULSION INTRAVENOUS at 16:29

## 2021-08-03 RX ADMIN — SUGAMMADEX 200 MG: 100 INJECTION, SOLUTION INTRAVENOUS at 18:15

## 2021-08-03 RX ADMIN — PHENYLEPHRINE HYDROCHLORIDE 100 MCG: 10 INJECTION INTRAVENOUS at 16:47

## 2021-08-03 RX ADMIN — NEOSTIGMINE METHYLSULFATE 2.5 MG: 0.5 INJECTION INTRAVENOUS at 18:11

## 2021-08-03 NOTE — BRIEF OP NOTE
LAPAROTOMY EXPLORATORY  Progress Note    Nellie Vegas  8/3/2021    Pre-op Diagnosis:   Anastomotic leak of intestine [K91.89]       Post-Op Diagnosis Codes:     * Anastomotic leak of intestine [K91.89]    Procedure/CPT® Codes:        Procedure(s):  LAPAROTOMY EXPLORATORY, resection of ileocolonic anastomosis with anastomosis    Surgeon(s):  Zbigniew Conner MD    Anesthesia: General with Block    Staff:   Circulator: Petra Shine RN; Padmini Diana RN  Scrub Person: Lidia Tay; Geremias Maher; Emmy Gutierrez  Assistant: Glo Holder CSA  Assistant: Glo Holder CSA      Estimated Blood Loss: 100 ml    Urine Voided: 400 mL    Specimens:                Specimens     ID Source Type Tests Collected By Collected At Frozen?    1 Peritoneum Peritoneal Fluid · ANAEROBIC CULTURE  · BODY FLUID CULTURE   Zbigniew Conner MD 8/3/21 1652     A Small Intestine, Ileum Tissue · TISSUE PATHOLOGY EXAM   Zbigniew Conner MD 8/3/21 1840     Description: ileocolonic anastomosis resection                Drains:   Urethral Catheter Silicone;Non-latex 16 Fr. (Active)       Findings: Anastomotic leak at ileocolonic anastomosis    Complications: None    Assistant: Glo Holder CSA  was responsible for performing the following activities: Retraction, Suction, Irrigation, Suturing, Closing, Placing Dressing  and their skilled assistance was necessary for the success of this case.    Zbigniew Conner MD     Date: 8/3/2021  Time: 18:13 EDT

## 2021-08-03 NOTE — ED PROVIDER NOTES
EMERGENCY DEPARTMENT ENCOUNTER    Room Number:  [facility] Main OR/MAIN OR  Date of encounter:  8/3/2021  PCP: Laurent Cortes DO  Historian: patient   Full history not obtainable due to: none     HPI:  Chief Complaint: Abdominal pain     Context: Nellie Vegas is a 76 y.o. female with a hx of COPD and ESRD on dialysis who presents to the ED c/o abdominal pain onset 1 week ago. Pain is RLQ. Constant. Non radiating. No vomiting or diarrhea. No fever.   Does not make urine. Goes to Dialysis T R and Saturday. Last treatment was Saturday.    Sx hx significant for mass in colon with subsequent removal 1 week ago by Dr Conner.     MEDICAL RECORD REVIEW:    D/c summaries from Dr Chowdhury 7/21/2021:   Hospital course  76-year-old -American female with history of end-stage renal disease on hemodialysis who was recently discharged from the hospital after work-up on GI bleed and found to have cecal mass underwent laparoscopic right hemicolectomy and also found to have right AV fistula thrombosis and underwent thrombectomy and shuntogram.  Patient admitted with malfunctioning AV fistula and work-up revealed recurrent thrombosis and required redo right AV fistula thrombectomy.  Patient used right AV fistula after thrombectomy without any problem.  Patient has a tunneled catheter insertion which she will keep and will discharge with tunnel dialysis catheter.  Patient started on low-dose Eliquis to avoid thrombosis.  Patient hemoglobin stable but needs to be watched carefully as she has recent right hemicolectomy secondary to tubular adenoma.  Patient wants to go home and clear for discharge from vascular surgery and nephrology.     Discharge diet regular    PAST MEDICAL HISTORY    Active Ambulatory Problems     Diagnosis Date Noted   • ESRD (end stage renal disease) on dialysis (CMS/[facility]) 08/09/2019   • Thrombosis of kidney dialysis arteriovenous graft (CMS/[facility]) 08/09/2019   • Anemia of chronic renal failure, stage 5 (CMS/[facility])  08/10/2019   • COPD exacerbation (CMS/[facility]) 2019   • Problem with dialysis access (CMS/[facility]) 2020   • Lower GI bleeding 2021   • Colonic mass 2021   • AV shunt thrombosis, subsequent encounter 07/15/2021     Resolved Ambulatory Problems     Diagnosis Date Noted   • No Resolved Ambulatory Problems     Past Medical History:   Diagnosis Date   • Anemia    • Anesthesia complication    • Arthritis    • Asthma    • COPD (chronic obstructive pulmonary disease) (CMS/[facility])    • Diabetes mellitus (CMS/[facility])    • Dialysis patient (CMS/[facility])    • Disease of thyroid gland    • GERD (gastroesophageal reflux disease)    • Headache    • History of blood clots    • History of kidney stones    • Hyperlipidemia    • Hypertension    • Knee pain, bilateral    • Renal disease    • Sleep apnea    • SOB (shortness of breath)    • Thrombosis of renal dialysis arteriovenous graft (CMS/[facility])    • Weakness          PAST SURGICAL HISTORY  Past Surgical History:   Procedure Laterality Date   • ARTERIOVENOUS FISTULA Right    • ARTERIOVENOUS FISTULA Left     REMOVED   • CENTRAL VENOUS CATHETER TUNNELED INSERTION DOUBLE LUMEN     •  SECTION     • COLON RESECTION Right 2021    Procedure: RIGHT HEMICOLECTOMY LAPAROSCOPIC;  Surgeon: Zbigniew Conner MD;  Location: [facility] MAIN OR;  Service: General;  Laterality: Right;   • COLONOSCOPY  2016   • COLONOSCOPY N/A 2021    Procedure: COLONOSCOPY into cecum with biopsy;  Surgeon: Fabricio Monroe MD;  Location: [facility] ENDOSCOPY;  Service: Gastroenterology;  Laterality: N/A;  cecal mass   • INSERTION HEMODIALYSIS CATHETER Right 2021    Procedure: VENACAVAGRAM AND HEMODIALYSIS CATHETER INSERTION;  Surgeon: Karson Muller MD;  Location: [facility] HYBRID OR ;  Service: Vascular;  Laterality: Right;  Dr Bryant was primary surgeon   • POLYPECTOMY      UTERINE   • SHUNT O GRAM Right 2019    Procedure: RIGHT ARM DIALYSIS GRAFT revision and  THROMBECTOMY;  Surgeon: Thierry Hardy MD;  Location: [facility] OR ;  Service: Vascular   • SHUNT O GRAM Right 2019    Procedure: RIGHT ARM DIALYSIS GRAFT THROMBECTOMY WITH STENTING;  Surgeon: Thierry Hardy MD;  Location: [facility] ;  Service: Vascular   • SHUNT O GRAM Right 2020    Procedure: RIGHT ARM ARTERIOVENOUS SHUNTOGRAM WITH THROMBECTOMY;  Surgeon: Thierry Hardy MD;  Location: [facility] ;  Service: Vascular;  Laterality: Right;   • SHUNT O GRAM Right 2021    Procedure: Right arm dialysis shuntogram with shunt thrombectomy;  Surgeon: Shahram Gallagher MD;  Location: [facility] ;  Service: Vascular;  Laterality: Right;   • SHUNT O GRAM Right 2021    Procedure: RIGHT UPPER EXTREMITY THROMBECTOMY AND SHUNTOGRAM;  Surgeon: Shahram Gallagher MD;  Location: [facility];  Service: Vascular;  Laterality: Right;         FAMILY HISTORY  Family History   Problem Relation Age of Onset   • Malig Hyperthermia Neg Hx          SOCIAL HISTORY  Social History     Socioeconomic History   • Marital status:      Spouse name: Not on file   • Number of children: Not on file   • Years of education: Not on file   • Highest education level: Not on file   Tobacco Use   • Smoking status: Former Smoker     Packs/day: 0.00     Years: 4.00     Pack years: 0.00     Types: Cigarettes     Quit date:      Years since quittin.6   • Smokeless tobacco: Never Used   Vaping Use   • Vaping Use: Never used   Substance and Sexual Activity   • Alcohol use: No   • Drug use: No   • Sexual activity: Defer         ALLERGIES  Sulfa antibiotics and Latex        REVIEW OF SYSTEMS  Review of Systems   All systems reviewed and marked as negative except as listed in HPI       PHYSICAL EXAM    I have reviewed the triage vital signs and nursing notes.    ED Triage Vitals   Temp Heart Rate Resp BP SpO2   21 0332 21 0331 21 03321 033  08/03/21 0331   98 °F (36.7 °C) 90 18 180/80 99 %      Temp src Heart Rate Source Patient Position BP Location FiO2 (%)   08/03/21 0332 -- -- -- --   Tympanic           GENERAL: alert well developed, well nourished in no distress  HENT: NCAT, neck supple, trachea midline  EYES: no scleral icterus, PERRL, normal conjunctivae  CV: regular rhythm, regular rate, no murmur  RESPIRATORY: unlabored effort, CTAB  ABDOMEN: soft, midline vertically oriented abdominal incision with staple closure. No dehiscence noted. No expressible drainage or carol incisional induration.  There is no erythema of the skin. Caorl incisional region is exquisitely tender but theree is no rebound. Nondistended, bowel sounds present  MUSCULOSKELETAL: no gross deformity  NEURO: alert,  sensory and motor function of extremities grossly intact, speech clear, mental status normal/baseline  SKIN: warm, dry, no rash  PSYCH:  Appropriate mood and affect    Vital signs and nursing notes reviewed.          LAB RESULTS  Recent Results (from the past 24 hour(s))   Comprehensive Metabolic Panel    Collection Time: 08/03/21  8:13 AM    Specimen: Blood   Result Value Ref Range    Glucose 158 (H) 65 - 99 mg/dL    BUN 32 (H) 8 - 23 mg/dL    Creatinine 9.46 (H) 0.57 - 1.00 mg/dL    Sodium 144 136 - 145 mmol/L    Potassium 3.6 3.5 - 5.2 mmol/L    Chloride 97 (L) 98 - 107 mmol/L    CO2 27.3 22.0 - 29.0 mmol/L    Calcium 7.9 (L) 8.6 - 10.5 mg/dL    Total Protein 7.0 6.0 - 8.5 g/dL    Albumin 3.50 3.50 - 5.20 g/dL    ALT (SGPT) 12 1 - 33 U/L    AST (SGOT) 27 1 - 32 U/L    Alkaline Phosphatase 97 39 - 117 U/L    Total Bilirubin 0.5 0.0 - 1.2 mg/dL    eGFR Non  Amer      eGFR  African Amer 5 (L) >60 mL/min/1.73    Globulin 3.5 gm/dL    A/G Ratio 1.0 g/dL    BUN/Creatinine Ratio 3.4 (L) 7.0 - 25.0    Anion Gap 19.7 (H) 5.0 - 15.0 mmol/L   Lipase    Collection Time: 08/03/21  8:13 AM    Specimen: Blood   Result Value Ref Range    Lipase 35 13 - 60 U/L   Green Top  (Gel)    Collection Time: 08/03/21  8:13 AM   Result Value Ref Range    Extra Tube Hold for add-ons.    Lavender Top    Collection Time: 08/03/21  8:13 AM   Result Value Ref Range    Extra Tube hold for add-on    CBC Auto Differential    Collection Time: 08/03/21  8:13 AM    Specimen: Blood   Result Value Ref Range    WBC 10.08 3.40 - 10.80 10*3/mm3    RBC 4.14 3.77 - 5.28 10*6/mm3    Hemoglobin 10.5 (L) 12.0 - 15.9 g/dL    Hematocrit 33.1 (L) 34.0 - 46.6 %    MCV 80.0 79.0 - 97.0 fL    MCH 25.4 (L) 26.6 - 33.0 pg    MCHC 31.7 31.5 - 35.7 g/dL    RDW 17.1 (H) 12.3 - 15.4 %    RDW-SD 49.3 37.0 - 54.0 fl    MPV 10.5 6.0 - 12.0 fL    Platelets 283 140 - 450 10*3/mm3    nRBC 0.0 0.0 - 0.2 /100 WBC   Manual Differential    Collection Time: 08/03/21  8:13 AM    Specimen: Blood   Result Value Ref Range    Neutrophil % 83.0 (H) 42.7 - 76.0 %    Lymphocyte % 2.0 (L) 19.6 - 45.3 %    Monocyte % 15.0 (H) 5.0 - 12.0 %    Neutrophils Absolute 8.37 (H) 1.70 - 7.00 10*3/mm3    Lymphocytes Absolute 0.20 (L) 0.70 - 3.10 10*3/mm3    Monocytes Absolute 1.51 (H) 0.10 - 0.90 10*3/mm3    RBC Morphology Normal Normal    WBC Morphology Normal Normal    Platelet Morphology Normal Normal   COVID-19,[facility] IN-HOUSE CEPHEID/TAB NP SWAB IN TRANSPORT MEDIA 8-12 HR TAT - Swab, Nasopharynx    Collection Time: 08/03/21 10:44 AM    Specimen: Nasopharynx; Swab   Result Value Ref Range    COVID19 Not Detected Not Detected - Ref. Range   POC Glucose Once    Collection Time: 08/03/21  2:06 PM    Specimen: Blood   Result Value Ref Range    Glucose 162 (H) 70 - 130 mg/dL       Ordered the above labs and independently reviewed the results.        RADIOLOGY  CT Abdomen Pelvis With Contrast    Result Date: 8/3/2021  CT ABDOMEN PELVIS W CONTRAST-  CLINICAL HISTORY: Right lower quadrant pain. Right hemicolectomy on 07/09/2021 showing benign findings.  TECHNIQUE: Spiral CT images were obtained through the abdomen and pelvis with IV contrast only and were  reconstructed in 3 mm thick slices.  Radiation dose reduction techniques were utilized, including automated exposure control and exposure modulation based on body size.  COMPARISON: CT scan of the abdomen and pelvis dated 07/05/2021.  FINDINGS: There is extensive stranding of the fat in the right side of the abdomen in the region of the ileocolonic anastomosis and there are multiple bubbles of extraluminal gas in this region as well. Additional collections of free air are also evident in the anterior aspect of the right side of the abdomen. The findings are suspicious for an anastomotic leak in this patient who is nearly one month postop. Correlation with clinical findings is recommended. Formed stool is present throughout the remainder of the colon. There are numerous diverticuli within the colon. There is no CT evidence of diverticulitis. There is no bowel distention. The inflammatory changes extend cephalad into the region of the inferior tip of the left lobe of the liver where there is a thin fluid collection surrounding the liver compatible with a perihepatic abscess. This measures approximately 11 mm in thickness. There is no evidence of intrahepatic extension of the abscess. The liver shows normal homogeneous enhancement. The spleen is unremarkable. There is marked dilatation of the main pancreatic duct in the body and tail of the pancreas with abrupt transition to a normal-caliber, nondiscernible duct in the region of the neck of the pancreas. There is slight soft tissue fullness and diminished enhancement in the pancreas at the point of transition between dilated and nondilated duct that is suspicious for a tiny hypoenhancing mass. This is unchanged and is worrisome for obstruction by a small pancreatic adenocarcinoma. Further evaluation with an MRI of the abdomen with MRCP imaging with and without contrast is recommended. Endoscopic ultrasound with FNA may also be warranted, particularly if the MRI study  also supports the presence of a small pancreatic mass. Both kidneys are markedly atrophic and contains several small nonobstructing calculi. There is an approximately 5.5 cm diameter simple cyst in the upper pole of the right kidney. Multiple calcified uterine leiomyomas are noted producing moderate uterine enlargement.      Status post right hemicolectomy. There is extensive edema within the abdominal fat in the region of the ileocolonic anastomosis and also free air in the region of the anastomosis and elsewhere in the right upper quadrant. The findings are highly suspicious for an anastomotic leak. A thin fluid collection surrounds the inferior tip of the left lobe of the liver, and is likely a perihepatic abscess. There is extensive colonic diverticulosis without evidence of diverticulitis. There is marked dilatation of the main pancreatic duct in the body and tail of the pancreas with abrupt transition to a nondilated duct in the region of the neck of the pancreas where there is a suggestion of a small hypoenhancing mass. A small pancreatic adenocarcinoma is suspected. Further evaluation with an MRI of the abdomen with MRCP imaging is recommended. Endoscopic ultrasound with FNA may also be helpful for further evaluation. Both kidneys are diffusely atrophic and there are multiple bilateral nonobstructing renal calculi and also a large upper pole right renal cyst.  These findings were called to ALLI Negron in the emergency room on 08/03/2021 at 10:06 AM  This report was finalized on 8/3/2021 10:12 AM by Dr. Shahram Lan M.D.      XR Chest 1 View    Result Date: 8/3/2021  XR CHEST 1 VW-  HISTORY:  Cough, abdominal pain.  COMPARISON:  Chest radiographs 07/15/2021  FINDINGS:  A single portable view of the chest was obtained. The cardiac silhouette is enlarged, not significantly changed. There is calcific aortic atherosclerosis. Hilar contours are not significantly changed. Lungs and pleural spaces are  clear. There is a vascular stent extending from the right hemithorax beyond the field-of-view in the right upper extremity and a smaller vascular stent in the left axilla, which are incompletely assessed. There is multilevel degenerative disc disease.  This report was finalized on 8/3/2021 8:56 AM by Dr. Radha Brothers M.D.        I ordered the above noted radiological studies. Independently reviewed by me and discussed with radiologist.  See dictation above for official radiology interpretation.      PROCEDURES    Procedures        MEDICATIONS GIVEN IN ER    Medications   sodium chloride 0.9 % flush 10 mL ( Intravenous MAR Hold 8/3/21 1359)   ceFAZolin in dextrose (ANCEF) IVPB solution 2 g (has no administration in time range)   sodium chloride 0.9 % flush 3 mL (has no administration in time range)   sodium chloride 0.9 % flush 3-10 mL (has no administration in time range)   lidocaine PF 1% (XYLOCAINE) injection 0.5 mL (has no administration in time range)   fentaNYL citrate (PF) (SUBLIMAZE) injection 50 mcg (has no administration in time range)   midazolam (VERSED) injection 0.5 mg (has no administration in time range)   sodium chloride 0.9 % infusion (has no administration in time range)   HYDROmorphone (DILAUDID) injection 0.5 mg (0.5 mg Intravenous Given 8/3/21 0832)   ondansetron (ZOFRAN) injection 4 mg (4 mg Intravenous Given 8/3/21 0832)   iopamidol (ISOVUE-300) 61 % injection 100 mL (100 mL Intravenous Given by Other 8/3/21 0922)   piperacillin-tazobactam (ZOSYN) 3.375 g in iso-osmotic dextrose 50 ml (premix) (0 g Intravenous Stopped 8/3/21 1055)   famotidine (PEPCID) injection 20 mg (20 mg Intravenous Given 8/3/21 0507)         PROGRESS, DATA ANALYSIS, CONSULTS, AND MEDICAL DECISION MAKING    All labs have been independently reviewed by me.  All radiology studies have been reviewed by me.   EKG's independently reviewed by me.  Discussion below represents my analysis of pertinent findings related to  patient's condition, differential diagnosis, treatment plan and final disposition.    DIFFERENTIAL DIAGNOSIS INCLUDE BUT NOT LIMITED TO: colonic perforation, post op seroma vs abscess, gastroenteritis, flatus, appendicitis, pancreatitis, renal colic, nephrolithiasis, renal infarct, splenic infarct, mesenteric ischemia, perforated bowel, SBO, diverticulitis, colitis, abdominal wall pain, muscle spasm, IBS, biliary colic, cholecystitis      ED Course as of Aug 03 1525   Tue Aug 03, 2021   0946 ESRD on dialysis. Due for tx today     Creatinine(!): 9.46 [JS]   1009 Discussed pt with Dr Lan regarding ct ab pelvis. Fat stranding and free air anastamotic leak. Fluid Collection anterior lobe of liver, carol inflammatory abscess. There is appears to be a mass on the pancreas suggestive of pancreatic cancer     [JS]   1030 Discussed pt with Dr Conner who reports the pt will require surgery. Requests zosyn and covid testing. Medicine admission     [JS]   1153 Discussed patient with Dr. Chowdhury who is agreeable to meet the patient to the hospital.    [JS]   1525 WBC: 10.08 [JS]      ED Course User Index  [JS] Connie Kaminski, ALLI       AS OF 15:25 EDT VITALS:        BP - 127/68  HR - 84  TEMP - 99.1 °F (37.3 °C) (Oral)  O2 SATS - 97%        DIAGNOSIS  Final diagnoses:   Anastomotic leak of intestine   Perihepatic abscess (CMS/HCC)   Abdominal pain in female         DISPOSITION  Admission     Pt masked in first look. I wore appropriate PPE throughout my encounters with the pt. I performed hand hygiene on entry into the pt room and upon exit.     Dictated utilizing Dragon dictation:  Much of this encounter note is an electronic transcription/translation of spoken language to printed text. The electronic translation of spoken language may permit erroneous, or at times, nonsensical words or phrases to be inadvertently transcribed; Although I have reviewed the note for such errors, some may still exist.     Connie Kaminski  Pranay, APRN  08/03/21 152

## 2021-08-03 NOTE — ANESTHESIA PREPROCEDURE EVALUATION
Anesthesia Evaluation     Patient summary reviewed and Nursing notes reviewed   NPO Solid Status: > 8 hours  NPO Liquid Status: > 8 hours           Airway   Mallampati: III  Neck ROM: full  No difficulty expected  Dental    (+) edentulous    Pulmonary     breath sounds clear to auscultation  (+) COPD, asthma,shortness of breath, sleep apnea,   Cardiovascular     Rhythm: regular    (+) hypertension, hyperlipidemia,       Neuro/Psych  (+) headaches, weakness,     GI/Hepatic/Renal/Endo    (+) obesity,  GERD, GI bleeding , renal disease (last dialysis 6/30/21) dialysis, diabetes mellitus,     Musculoskeletal     Abdominal   (+) obese,    Substance History      OB/GYN          Other   arthritis,                        Anesthesia Plan    ASA 4 - emergent     general   (I discussed with the patient the relevant risks of general anesthesia including, but not limited to, nausea, vomiting, disorientation, post-op delirium, nerve injury, oral/dental injury, awareness, stroke, and death.  I also reviewed any clinically relevant lab and imaging results.)  intravenous induction     Anesthetic plan, all risks, benefits, and alternatives have been provided, discussed and informed consent has been obtained with: patient.    Plan discussed with CRNA.

## 2021-08-03 NOTE — H&P
History and physical    Primary care physician  Dr. 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  Patient will be taken to the OR this afternoon with Dr. Conner.  Patient admitted to our service and will be followed by nephrology for dialysis.    PAST MEDICAL HISTORY  • Anemia     • Anesthesia complication     • Arthritis     • Asthma     • COPD (chronic obstructive pulmonary disease) (CMS/HCC)     • Diabetes mellitus (CMS/HCC)     • Dialysis patient (CMS/HCC)     • Disease of thyroid gland     • GERD (gastroesophageal reflux disease)     • Headache     • History of blood clots     • History of kidney stones     • Hyperlipidemia     • Hypertension     • Knee pain, bilateral     • Renal disease     • Sleep apnea     • SOB (shortness of breath)     • 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)     • Weakness        PAST SURGICAL HISTORY              Procedure Laterality Date   • ARTERIOVENOUS FISTULA Right     • ARTERIOVENOUS FISTULA Left       REMOVED   • CENTRAL VENOUS CATHETER TUNNELED INSERTION DOUBLE LUMEN       •  SECTION       • COLON RESECTION Right 2021     Procedure: RIGHT HEMICOLECTOMY LAPAROSCOPIC;  Surgeon: Zbigniew Conner MD;  Location: [facility];  Service: General;  Laterality: Right;   • COLONOSCOPY    2016   • COLONOSCOPY N/A 7/7/2021     Procedure: COLONOSCOPY into cecum with biopsy;  Surgeon: Fabricio Monroe MD;  Location: [facility] ENDOSCOPY;  Service: Gastroenterology;  Laterality: N/A;  cecal mass   • INSERTION HEMODIALYSIS CATHETER Right 7/16/2021     Procedure: VENACAVAGRAM AND HEMODIALYSIS CATHETER INSERTION;  Surgeon: Karson Muller MD;  Location: [facility] OR 18/19;  Service: Vascular;  Laterality: Right;  Dr Bryant was primary surgeon   • POLYPECTOMY         UTERINE   • SHUNT O GRAM Right 8/9/2019     Procedure: RIGHT ARM DIALYSIS GRAFT revision and THROMBECTOMY;  Surgeon: Thierry Hardy MD;  Location: [facility] OR 18/19;  Service: Vascular   • SHUNT O GRAM Right 8/22/2019     Procedure: RIGHT ARM DIALYSIS GRAFT THROMBECTOMY WITH STENTING;  Surgeon: Thierry Hardy MD;  Location: [facility] OR 18/19;  Service: Vascular   • SHUNT O GRAM Right 12/7/2020     Procedure: RIGHT ARM ARTERIOVENOUS SHUNTOGRAM WITH THROMBECTOMY;  Surgeon: Thierry Hardy MD;  Location: [facility] OR 18/19;  Service: Vascular;  Laterality: Right;   • SHUNT O GRAM Right 7/12/2021     Procedure: Right arm dialysis shuntogram with shunt thrombectomy;  Surgeon: Shahram Gallagher MD;  Location: [facility] OR 18/19;  Service: Vascular;  Laterality: Right;   • SHUNT O GRAM Right 7/19/2021     Procedure: RIGHT UPPER EXTREMITY THROMBECTOMY AND SHUNTOGRAM;  Surgeon: Shahram Gallagher MD;  Location: [facility];  Service: Vascular;  Laterality: Right;         FAMILY HISTORY           Problem Relation Age of Onset   • Malig Hyperthermia Neg Hx        SOCIAL HISTORY                 Socioeconomic History   • Marital status:        Spouse name: Not on file   • Number of children: Not on file   • Years of education: Not on file   • Highest education level: Not on file   Tobacco Use   • Smoking status: Former Smoker       Packs/day: 0.00       Years: 4.00       Pack years: 0.00       Types:  "Cigarettes       Quit date: 1966       Years since quittin.6   • Smokeless tobacco: Never Used   Vaping Use   • Vaping Use: Never used   Substance and Sexual Activity   • Alcohol use: No   • Drug use: No   • Sexual activity: Defer         ALLERGIES  Sulfa antibiotics and Latex  Home medications reviewed     REVIEW OF SYSTEMS  All systems reviewed and marked as negative except as listed in HPI      PHYSICAL EXAM  Blood pressure 127/68, pulse 84, temperature 99.1 °F (37.3 °C), temperature source Oral, resp. rate 16, height 152.4 cm (60\"), weight 76.6 kg (168 lb 14.4 oz), SpO2 97 %, not currently breastfeeding.    GENERAL: alert well developed, well nourished in no distress  HENT: NCAT, neck supple, trachea midline  EYES: no scleral icterus, PERRL, normal conjunctivae  CV: regular rhythm, regular rate, no murmur  RESPIRATORY: unlabored effort, CTAB  ABDOMEN: soft, midline vertically oriented abdominal incision with staple closure. No dehiscence noted. No expressible drainage or carol incisional induration.  There is no erythema of the skin. Carol incisional region is exquisitely tender but theree is no rebound. Nondistended, bowel sounds present  MUSCULOSKELETAL: no gross deformity  NEURO: alert,  sensory and motor function of extremities grossly intact, speech clear, mental status normal/baseline  SKIN: warm, dry, no rash  PSYCH:  Appropriate mood and affect     LAB RESULTS  Lab Results (last 24 hours)     Procedure Component Value Units Date/Time    Anaerobic Culture - Peritoneal Fluid, Peritoneum [[id number]] Collected: 21    Specimen: Peritoneal Fluid from Peritoneum Updated: 21    Body Fluid Culture - Peritoneal Fluid, Peritoneum [[id number]] Collected: 21    Specimen: Peritoneal Fluid from Peritoneum Updated: 21    POC Glucose Once [[id number]]  (Abnormal) Collected: 21 140    Specimen: Blood Updated: 21     Glucose 162 mg/dL      Comment: Meter: " [id number] : [id number] Kurt WADDELL       COVID PRE-OP / PRE-PROCEDURE SCREENING ORDER (NO ISOLATION) - Swab, Nasopharynx [[id number]]  (Normal) Collected: 08/03/21 1044    Specimen: Swab from Nasopharynx Updated: 08/03/21 1149    Narrative:      The following orders were created for panel order COVID PRE-OP / PRE-PROCEDURE SCREENING ORDER (NO ISOLATION) - Swab, Nasopharynx.  Procedure                               Abnormality         Status                     ---------                               -----------         ------                     COVID-19,[facility]HOU[facility]...[[id number]]  Normal              Final result                 Please view results for these tests on the individual orders.    COVID-19,[facility] CEPHEID/TAB NP SWAB IN TRANSPORT MEDIA 8-12 HR TAT - Swab, Nasopharynx [[id number]]  (Normal) Collected: 08/03/21 1044    Specimen: Swab from Nasopharynx Updated: 08/03/21 1149     COVID19 Not Detected    Narrative:      Fact sheet for providers: https://www.fda.gov/media/474061/download     Fact sheet for patients: https://www.fda.gov/media/162444/download    [place] Draw [[id number]] Collected: 08/03/21 0813    Specimen: Blood Updated: 08/03/21 0916    Narrative:      The following orders were created for panel order [place] Draw.  Procedure                               Abnormality         Status                     ---------                               -----------         ------                     Green Top (Gel)[[id number]]                                  Final result               Lavender Top[[id number]]                                     Final result               Gold Top - SST[[id number]]                                                                Please view results for these tests on the individual orders.    Green Top (Gel) [[id number]] Collected: 08/03/21 0813    Specimen: Blood Updated: 08/03/21 0916     Extra Tube Hold for add-ons.     Comment: Auto resulted.        Lavender Top [[id number]] Collected: 08/03/21 0813    Specimen: Blood Updated: 08/03/21 0916     Extra Tube hold for add-on     Comment: Auto resulted       Manual Differential [[id number]]  (Abnormal) Collected: 08/03/21 0813    Specimen: Blood Updated: 08/03/21 0909     Neutrophil % 83.0 %      Lymphocyte % 2.0 %      Monocyte % 15.0 %      Neutrophils Absolute 8.37 10*3/mm3      Lymphocytes Absolute 0.20 10*3/mm3      Monocytes Absolute 1.51 10*3/mm3      RBC Morphology Normal     WBC Morphology Normal     Platelet Morphology Normal    CBC & Differential [[id number]]  (Abnormal) Collected: 08/03/21 0813    Specimen: Blood Updated: 08/03/21 0909    Narrative:      The following orders were created for panel order CBC & Differential.  Procedure                               Abnormality         Status                     ---------                               -----------         ------                     CBC Auto Differential[[id number]]        Abnormal            Final result                 Please view results for these tests on the individual orders.    CBC Auto Differential [[id number]]  (Abnormal) Collected: 08/03/21 0813    Specimen: Blood Updated: 08/03/21 0909     WBC 10.08 10*3/mm3      RBC 4.14 10*6/mm3      Hemoglobin 10.5 g/dL      Hematocrit 33.1 %      MCV 80.0 fL      MCH 25.4 pg      MCHC 31.7 g/dL      RDW 17.1 %      RDW-SD 49.3 fl      MPV 10.5 fL      Platelets 283 10*3/mm3      nRBC 0.0 /100 WBC     Comprehensive Metabolic Panel [[id number]]  (Abnormal) Collected: 08/03/21 0813    Specimen: Blood Updated: 08/03/21 0847     Glucose 158 mg/dL      BUN 32 mg/dL      Creatinine 9.46 mg/dL      Sodium 144 mmol/L      Potassium 3.6 mmol/L      Chloride 97 mmol/L      CO2 27.3 mmol/L      Calcium 7.9 mg/dL      Total Protein 7.0 g/dL      Albumin 3.50 g/dL      ALT (SGPT) 12 U/L      AST (SGOT) 27 U/L      Alkaline Phosphatase 97 U/L      Total Bilirubin 0.5 mg/dL      eGFR Non  Amer --     Comment:  <15 Indicative of kidney failure.        eGFR  African Amer 5 mL/min/1.73      Comment: <15 Indicative of kidney failure.        Globulin 3.5 gm/dL      A/G Ratio 1.0 g/dL      BUN/Creatinine Ratio 3.4     Anion Gap 19.7 mmol/L     Narrative:      GFR Normal >60  Chronic Kidney Disease <60  Kidney Failure <15      Lipase [[id number]]  (Normal) Collected: 08/03/21 0813    Specimen: Blood Updated: 08/03/21 0847     Lipase 35 U/L         Imaging Results (Last 24 Hours)     Procedure Component Value Units Date/Time    CT Abdomen Pelvis With Contrast [[id number]] Collected: 08/03/21 0942     Updated: 08/03/21 1015    Narrative:      CT ABDOMEN PELVIS W CONTRAST-     CLINICAL HISTORY: Right lower quadrant pain. Right hemicolectomy on  07/09/2021 showing benign findings.     TECHNIQUE: Spiral CT images were obtained through the abdomen and pelvis  with IV contrast only and were reconstructed in 3 mm thick slices.     Radiation dose reduction techniques were utilized, including automated  exposure control and exposure modulation based on body size.     COMPARISON: CT scan of the abdomen and pelvis dated 07/05/2021.     FINDINGS: There is extensive stranding of the fat in the right side of  the abdomen in the region of the ileocolonic anastomosis and there are  multiple bubbles of extraluminal gas in this region as well. Additional  collections of free air are also evident in the anterior aspect of the  right side of the abdomen. The findings are suspicious for an  anastomotic leak in this patient who is nearly one month postop.  Correlation with clinical findings is recommended. Formed stool is  present throughout the remainder of the colon. There are numerous  diverticuli within the colon. There is no CT evidence of diverticulitis.  There is no bowel distention. The inflammatory changes extend cephalad  into the region of the inferior tip of the left lobe of the liver where  there is a thin fluid collection surrounding the  liver compatible with a  perihepatic abscess. This measures approximately 11 mm in thickness.  There is no evidence of intrahepatic extension of the abscess. The liver  shows normal homogeneous enhancement. The spleen is unremarkable. There  is marked dilatation of the main pancreatic duct in the body and tail of  the pancreas with abrupt transition to a normal-caliber, nondiscernible  duct in the region of the neck of the pancreas. There is slight soft  tissue fullness and diminished enhancement in the pancreas at the point  of transition between dilated and nondilated duct that is suspicious for  a tiny hypoenhancing mass. This is unchanged and is worrisome for  obstruction by a small pancreatic adenocarcinoma. Further evaluation  with an MRI of the abdomen with MRCP imaging with and without contrast  is recommended. Endoscopic ultrasound with FNA may also be warranted,  particularly if the MRI study also supports the presence of a small  pancreatic mass. Both kidneys are markedly atrophic and contains several  small nonobstructing calculi. There is an approximately 5.5 cm diameter  simple cyst in the upper pole of the right kidney. Multiple calcified  uterine leiomyomas are noted producing moderate uterine enlargement.       Impression:      Status post right hemicolectomy. There is extensive edema  within the abdominal fat in the region of the ileocolonic anastomosis  and also free air in the region of the anastomosis and elsewhere in the  right upper quadrant. The findings are highly suspicious for an  anastomotic leak. A thin fluid collection surrounds the inferior tip of  the left lobe of the liver, and is likely a perihepatic abscess. There  is extensive colonic diverticulosis without evidence of diverticulitis.  There is marked dilatation of the main pancreatic duct in the body and  tail of the pancreas with abrupt transition to a nondilated duct in the  region of the neck of the pancreas where there is a  suggestion of a  small hypoenhancing mass. A small pancreatic adenocarcinoma is  suspected. Further evaluation with an MRI of the abdomen with MRCP  imaging is recommended. Endoscopic ultrasound with FNA may also be  helpful for further evaluation. Both kidneys are diffusely atrophic and  there are multiple bilateral nonobstructing renal calculi and also a  large upper pole right renal cyst.     These findings were called to ALLI Negron in the emergency room  on 08/03/2021 at 10:06 AM     This report was finalized on 8/3/2021 10:12 AM by Dr. Shahram Lan M.D.       XR Chest 1 View [[id number]] Collected: 08/03/21 0854     Updated: 08/03/21 0859    Narrative:      XR CHEST 1 VW-     HISTORY:  Cough, abdominal pain.     COMPARISON:  Chest radiographs 07/15/2021     FINDINGS:    A single portable view of the chest was obtained. The cardiac silhouette  is enlarged, not significantly changed. There is calcific aortic  atherosclerosis. Hilar contours are not significantly changed. Lungs and  pleural spaces are clear. There is a vascular stent extending from the  right hemithorax beyond the field-of-view in the right upper extremity  and a smaller vascular stent in the left axilla, which are incompletely  assessed. There is multilevel degenerative disc disease.     This report was finalized on 8/3/2021 8:56 AM by Dr. Radha Brothers M.D.             Current Facility-Administered Medications:   •  bupivacaine-EPINEPHrine PF (MARCAINE w/EPI) 0.5% -1:[id number] injection, , , PRN, Zbigniew Conner MD, 30 mL at 08/03/21 1712  •  fentaNYL citrate (PF) (SUBLIMAZE) injection 50 mcg, 50 mcg, Intravenous, Q10 Min PRN, Freddie Qiu MD  •  lidocaine PF 1% (XYLOCAINE) injection 0.5 mL, 0.5 mL, Injection, Once PRN, Freddie Qiu MD  •  midazolam (VERSED) injection 0.5 mg, 0.5 mg, Intravenous, Q10 Min PRN, Freddie Qiu MD  •  sodium chloride (NS) irrigation solution, , , PRN, Zbigniew Conner  MD Song, 1,000 mL at 08/03/21 1712  •  [MAR Hold] sodium chloride 0.9 % flush 10 mL, 10 mL, Intravenous, PRN, Emergency, Triage Protocol, MD  •  sodium chloride 0.9 % flush 3 mL, 3 mL, Intravenous, Q12H, Freddie Qiu MD  •  sodium chloride 0.9 % flush 3-10 mL, 3-10 mL, Intravenous, PRN, Freddie Qiu MD  •  sodium chloride 0.9 % infusion, 9 mL/hr, Intravenous, Continuous PRN, Freddie Qiu MD, New Bag at 08/03/21 1624    Facility-Administered Medications Ordered in Other Encounters:   •  ePHEDrine injection, , Intravenous, PRN, Em, Heidi G, CRNA, 10 mg at 08/03/21 1709  •  fentaNYL citrate (PF) (SUBLIMAZE) injection, , Intravenous, PRN, Berkey, Heidi G, CRNA, 50 mcg at 08/03/21 1629  •  lidocaine (XYLOCAINE) 2% injection, , Injection, PRN, Em, Heidi G, CRNA, 80 mg at 08/03/21 1629  •  phenylephrine (REGINO-SYNEPHRINE) injection, , Intravenous, PRN, Em, Heidi G, CRNA, 100 mcg at 08/03/21 1650  •  Propofol (DIPRIVAN) injection, , Intravenous, PRN, Em, Heidi G, CRNA, 150 mg at 08/03/21 1629  •  rocuronium (ZEMURON) injection, , Intravenous, PRN, Berkey, Heidi G, CRNA, 50 mg at 08/03/21 1629     ASSESSMENT  Anastomotic leak  Recent right laparoscopic hemicolectomy secondary to tubular adenoma  Status post recent thrombectomy with recurrent thrombosis right AV shunt  End-stage renal disease on hemodialysis  COPD  Diabetes mellitus  Hypertension  Chronic anemia  Gastroesophageal reflux disease    PLAN  Admit  Exploratory laparotomy this afternoon with small bowel resection possible ileostomy  Empiric antibiotics  NPO  Supportive care  Stress ulcer DVT prophylaxis  Nephrology to follow patient  Patient is full code  Discussed with nursing staff  Follow closely and further recommendation according to hospital course    ALLAN MARTIN MD

## 2021-08-03 NOTE — ANESTHESIA POSTPROCEDURE EVALUATION
"Patient: Nellie Vegas    Procedure Summary     Date: 08/03/21 Room / Location: [facility] OR  / [facility] MAIN OR    Anesthesia Start: 1622 Anesthesia Stop: 1840    Procedure: LAPAROTOMY EXPLORATORY, resection of ileocolonic anastomosis with anastomosis (N/A Abdomen) Diagnosis:       Anastomotic leak of intestine      (Anastomotic leak of intestine [K91.89])    Surgeons: Zbigniew Conner MD Provider: Marc Pritchard MD    Anesthesia Type: general ASA Status: 4 - Emergent          Anesthesia Type: general    Vitals  Vitals Value Taken Time   /63 08/03/21 1930   Temp 36.9 °C (98.5 °F) 08/03/21 1831   Pulse 76 08/03/21 1935   Resp 22 08/03/21 1915   SpO2 100 % 08/03/21 1935   Vitals shown include unvalidated device data.        Post Anesthesia Care and Evaluation    Patient location during evaluation: PACU  Patient participation: complete - patient participated  Level of consciousness: awake  Pain management: adequate  Airway patency: patent  Anesthetic complications: No anesthetic complications    Cardiovascular status: acceptable  Respiratory status: acceptable  Hydration status: acceptable    Comments: /46   Pulse 71   Temp 36.9 °C (98.5 °F) (Oral)   Resp 22   Ht 152.4 cm (60\")   Wt 76.6 kg (168 lb 14.4 oz)   SpO2 100%   BMI 32.99 kg/m²       "

## 2021-08-03 NOTE — ED PROVIDER NOTES
Pt presents to the ED c/o  abdominal pain onset 1 week ago.  Is a dialysis patient going every Tuesdays, Thursdays, Saturdays.  Last meal was on Saturday.  Denies any vomiting or diarrhea.  No fevers.  Pain seems to be upper the right side of the abdomen.     On exam,   General: Awake, alert, no acute distress  HEENT: Mucous membranes moist, atraumatic, EOMI  Neck: Full ROM  Pulm: Symmetric chest rise, nonlabored, lungs CTAB  Cardiovascular: Regular rate and rhythm, intact distal pulses, right upper extremity fistula site  GI: Right upper quadrant tenderness with mild rebound in that area but no specific guarding to palpation with a well-appearing mid upper midline abdominal surgical scar with staples in place without erythema or drainage without significant tenderness over the incisional site at this time, mild abdominal distention  MSK: Full ROM, no deformity  Skin: Warm, dry  Neuro: Alert and oriented x 3, GCS 15, moving all extremities, no focal deficits  Psych: Calm, cooperative      Surgical mask, protective eye goggles, and gloves used during this encounter. Patient in surgical mask.    Chart Review:    Op note reviewed from July 9, 2021 with Dr. Conner for removal of a cecal mass with a laparoscopic right hemicolectomy      Plan:   ED Course as of Aug 03 1522   Tue Aug 03, 2021   0946 ESRD on dialysis. Due for tx today     Creatinine(!): 9.46 [JS]   1009 Discussed pt with Dr Lan regarding ct ab pelvis. Fat stranding and free air anastamotic leak. Fluid Collection anterior lobe of liver, carol inflammatory abscess. There is appears to be a mass on the pancreas suggestive of pancreatic cancer     [JS]   1030 Discussed pt with Dr Conner who reports the pt will require surgery. Requests zosyn and covid testing. Medicine admission     [JS]   1153 Discussed patient with Dr. Chowdhury who is agreeable to meet the patient to the hospital.    [JS]      ED Course User Index  [JS] Connie Kaminski, ALLI     CT  findings as noted, Dr. Conner has been consulted, plan for hospitalization as she will likely need procedure to drain the abscess and potential surgery for anastomotic repair.  She is well-appearing at this time with no hemodynamic instability.     Attestation:  The INGRID and I have discussed this patient's history, physical exam, and treatment plan.  I have reviewed the documentation and personally had a face to face interaction with the patient. I affirm the documentation and agree with the treatment and plan.  The attached note describes my personal findings.          Alin Johnson MD  08/03/21 1520

## 2021-08-03 NOTE — ANESTHESIA PROCEDURE NOTES
Airway  Urgency: elective    Date/Time: 8/3/2021 4:31 PM  Airway not difficult    General Information and Staff    Patient location during procedure: OR  CRNA: Heidi Strickland CRNA    Indications and Patient Condition  Indications for airway management: airway protection    Preoxygenated: yes  Mask difficulty assessment: 1 - vent by mask    Final Airway Details  Final airway type: endotracheal airway      Successful airway: ETT  Cuffed: yes   Successful intubation technique: direct laryngoscopy  Endotracheal tube insertion site: oral  Blade: Tony  Blade size: 3  ETT size (mm): 7.0  Cormack-Lehane Classification: grade I - full view of glottis  Placement verified by: chest auscultation and capnometry   Measured from: lips  ETT/EBT  to lips (cm): 22  Number of attempts at approach: 1  Assessment: lips, teeth, and gum same as pre-op and atraumatic intubation    Additional Comments   ett cuff up at [facility]

## 2021-08-03 NOTE — ED TRIAGE NOTES
Pt to ed from home with complaints of RLQ pain that started yesterday morning, N/V started on the way here. Per ems pt is a week post op colon resection.

## 2021-08-03 NOTE — CONSULTS
CC: Free intra-abdominal air, abdominal pain    HPI: A 76-year-old female that presented today to the hospital with abdominal pain for the past several days.  The patient has end-stage renal disease on hemodialysis.  Was found to be anemic during admission to the hospital.  Was found to have a large polyp at the cecum.  She underwent laparoscopic right hemicolectomy on 2021.  She has been doing fine until Monday when she started having worsening abdominal pain.  This morning she have sharp pain located in the right side of the abdomen and some chills.  She has been tolerating diet but not eating much.  She has been having bowel movements.  CT scan abdomen pelvis was performed in the emergency room that show evidence of free intra-abdominal air with inflammation around the anastomosis consistent with anastomotic leak.    Past Medical History:   Diagnosis Date   • Anemia    • Anesthesia complication     mother woke up in combative state   • Arthritis     knees   • Asthma    • COPD (chronic obstructive pulmonary disease) (CMS/[facility])    • Diabetes mellitus (CMS/[facility])     type 2   • Dialysis patient (CMS/[facility])    • Disease of thyroid gland    • GERD (gastroesophageal reflux disease)    • Headache     after dialysis   • History of blood clots     BUE   • History of kidney stones    • Hyperlipidemia    • Hypertension    • Knee pain, bilateral    • Renal disease    • Sleep apnea     CPAP   • SOB (shortness of breath)    • 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)    • Weakness      Past Surgical History:   Procedure Laterality Date   • ARTERIOVENOUS FISTULA Right    • ARTERIOVENOUS FISTULA Left     REMOVED   • CENTRAL VENOUS CATHETER TUNNELED INSERTION DOUBLE LUMEN     •  SECTION     • COLON RESECTION Right 2021    Procedure: RIGHT HEMICOLECTOMY LAPAROSCOPIC;  Surgeon: Zbigniew Conner MD;  Location: [facility];  Service: General;  Laterality: Right;   • COLONOSCOPY     •  COLONOSCOPY N/A 7/7/2021    Procedure: COLONOSCOPY into cecum with biopsy;  Surgeon: Fabricio Monroe MD;  Location: [facility] ENDOSCOPY;  Service: Gastroenterology;  Laterality: N/A;  cecal mass   • INSERTION HEMODIALYSIS CATHETER Right 7/16/2021    Procedure: VENACAVAGRAM AND HEMODIALYSIS CATHETER INSERTION;  Surgeon: Karson Muller MD;  Location: [facility] OR 18/19;  Service: Vascular;  Laterality: Right;  Dr Bryant was primary surgeon   • POLYPECTOMY      UTERINE   • SHUNT O GRAM Right 8/9/2019    Procedure: RIGHT ARM DIALYSIS GRAFT revision and THROMBECTOMY;  Surgeon: Thierry Hardy MD;  Location: [facility] OR 18/19;  Service: Vascular   • SHUNT O GRAM Right 8/22/2019    Procedure: RIGHT ARM DIALYSIS GRAFT THROMBECTOMY WITH STENTING;  Surgeon: Thierry Hardy MD;  Location: [facility] OR 18/19;  Service: Vascular   • SHUNT O GRAM Right 12/7/2020    Procedure: RIGHT ARM ARTERIOVENOUS SHUNTOGRAM WITH THROMBECTOMY;  Surgeon: Thierry Hardy MD;  Location: [facility] OR 18/19;  Service: Vascular;  Laterality: Right;   • SHUNT O GRAM Right 7/12/2021    Procedure: Right arm dialysis shuntogram with shunt thrombectomy;  Surgeon: Shahram Gallagher MD;  Location: [facility] OR 18/19;  Service: Vascular;  Laterality: Right;   • SHUNT O GRAM Right 7/19/2021    Procedure: RIGHT UPPER EXTREMITY THROMBECTOMY AND SHUNTOGRAM;  Surgeon: Shahram Gallagher MD;  Location: [facility];  Service: Vascular;  Laterality: Right;     Meds and allergies: Reviewed, patient takes Eliquis, last time 5 PM yesterday    Social history: Denies smoking drinking or taking any drugs  Family history: No family history of colon cancer    ROS:   Constitutional: denies any weight changes, fatigue or weakness.   Reports chills   Eyes: : denies blurred or double vision  Cardiovascular: denies chest pain, palpitations, edemas.  Respiratory: denies cough, sputum, SOB.  Gastrointestinal: Reports N&V, reports abd  pain, diarrhea, constipation.  Genitourinary: denies dysuria, frequency.  Endocrine: denies cold intolerance, lethargy and flushing.  Hem: denies excessive bruising and postop bleeding.  Musculoskeletal: denies weakness, joint swelling, pain or stiffness.  Neuro: denies seizures, CVA, paresthesia, or peripheral neuropathy.   Skin: denies change in nevi, rashes, masses.    Physical exam:   Vitals:    08/03/21 0805 08/03/21 1005 08/03/21 1006 08/03/21 1122   BP: 168/86 129/63  147/74   Pulse: 105  88 93   Resp:       Temp:       TempSrc:       SpO2: 97%  93% 98%   Weight:       Height:       Alert, no acute distress  Breathing comfortable, regular rate rhythm  Abdomen soft, tender to palpation in the right lower quadrant, positive rebound tenderness and peritoneal signs over the right side.  Well-healed midline incision  No lower extremity edema    White blood cell count 10, hemoglobin 10.5  Creatinine 9.4  BUN 32  Blood glucose 158    Diagnostic studies:   CT scan abdomen pelvis was performed today.  Show evidence of free air in the abdomen.  There is thickening and inflammation around the ileorectal anastomosis.  There is free fluid.  Small perihepatic abscess.  Small mass at the neck of the pancreas.    Assessment and plan    76-year-old female with findings concerning for anastomotic leak at the ileocolic anastomosis almost a month after surgery, extremely rare.  Discussed with her about further step.  I think that because of her pain and the findings on CT scan I would recommend that she undergoes exploratory laparotomy with small bowel resection possible ileostomy.  Risk and benefits discussed with the patient including bleeding, infection, perforation.  She verbalized understanding and agree with the plan.    Discussed also with her about the need to work-up her pancreas to rule out pancreatic mass.  This will happen whenever her acute issues are resolved.    N.p.o., IV antibiotics  Plan for ex lap  today    Zbigniew Conner MD, FACS  General, Minimally Invasive and Endoscopic Surgery  [facility]    [address], [address]  [place], [place], [address]  P: [phone]  F: [phone]

## 2021-08-03 NOTE — OP NOTE
Date of procedure: 8/3/2021    Primary surgeon: Dr. Zbigniew Conner    Assistant: Glo Holder CSA  was responsible for performing the following activities: Retraction, Suction, Irrigation, Suturing, Closing, Placing Dressing  and their skilled assistance was necessary for the success of this case.    Preoperative diagnosis: Ileocolonic anastomotic leak    Postoperative diagnosis: Same    Procedure performed:   -Exploratory laparotomy with ileocolonic anastomosis resection and redo ileocolonic anastomosis    Anesthesia: General    EBL: 100 cc    Complications: None    Findings: Anastomotic leak at the ileocolonic anastomosis, localized purulent fluid in the right upper quadrant    Condition of patient: Critically ill, to recovery    Indications: 76-year-old female with history of laparoscopic right hemicolectomy for endoscopically unresectable colon polyp.  Her postoperative course was uneventful.  She was home without any major issues until 24 hours ago when she started having abdominal pain.  She was brought to the emergency room where she was found to have free air on CT scan as well as inflammation of the prior ileocolonic anastomosis.  I saw the patient and discussed with her about findings.  I recommend that she undergoes exploratory laparotomy with bowel resection possible ostomy.  Risk and benefits of procedure including bleeding, infection, anastomotic leaks were discussed in detail with the patient that verbalized understanding and agree with the plan    Description: Patient was taken to operative room and she was placed in the OR table in supine position.  Preoperative antibiotics were given and SCDs were placed.  Patient underwent general endotracheal anesthesia.  Patient abdomen was then prepped and draped in usual sterile fashion.  Midline incision was performed through the prior midline incision extended superiorly and inferiorly.  Dissection was carried onto the muscular fascia.  Abdominal cavity  was entered.  There was evidence of inflammation in the right upper quadrant with the omentum stuck to the abdominal wall.  This was mobilized.  There was small amount of purulent fluid in the right upper quadrant and this was culture and sent for analysis.  I dissected the anastomosis from the anterior wall of the stomach and omental fat.  It was evident that there was a leak at the ileocolonic anastomosis.  I proceeded to dissect the omentum from the transverse colon the mid transverse colon was viable and healthy and there was no evidence of inflammation.  I decided to transect the transverse colon with Albee blue load stapler.  I then transected the ileum proximally to the area of the anastomosis and inflammation.  I then transected the mesentery of the ileocolonic anastomosis and the specimen was sent for pathological analysis.  I irrigated abdominal cavity.  The small bowel as well as the colon looked healthy and viable.  Vascular supply of the small bowel and colon were tested with Doppler.  There was great flow.  I decided to perform a side-to-side staple anastomosis by performing an enterotomy on the small bowel and the transverse colon.  Albee blue load stapler was used to perform the anastomosis.  I closed the common channel with running 3-0 chromic and performed another layer of interrupted 2-0 silk Lembert sutures.  The crotch of the anastomosis was reinforced.  The mesenteric defect was closed with interrupted figure of eight 2-0 silk sutures.  19 [place] drain was introduced through the right lower abdomen and placed at the area of the prior anastomosis below the liver.  Was secured in place with 2-0 nylon.  Irrigation was performed.  There was no evidence of purulent fluid or any intra-abdominal problems.  The bowel was run up to the ligament of Treitz.  The omentum was sutured over the anastomosis.  Seprafilm was placed over the small bowel to prevent adhesions.  Bilateral subcutaneous  flaps were created.  Part of the fascia was inflamed was transected.  The abdominal wall was closed with running #1 PDS suture from the superior inferior portion of the wound without any tension.  Treatment sponge count were correct.  Betadine Kerlix was placed over the wound.   dressings were applied.  The patient tolerated procedure well and was sent to recovery area in critical stable condition    Zbigniew Conner MD, FACS  General, Minimally Invasive and Endoscopic Surgery  [facility]    [address] [place] [address], [address]  [place], [place], [facility]  P: [phone]  F: [phone]

## 2021-08-04 ENCOUNTER — READMISSION MANAGEMENT (OUTPATIENT)
Dept: CALL CENTER | Facility: HOSPITAL | Age: 76
End: 2021-08-04

## 2021-08-04 LAB
ALBUMIN SERPL-MCNC: 2.8 G/DL (ref 3.5–5.2)
ALBUMIN/GLOB SERPL: 0.9 G/DL
ALP SERPL-CCNC: 70 U/L (ref 39–117)
ALT SERPL W P-5'-P-CCNC: 6 U/L (ref 1–33)
ANION GAP SERPL CALCULATED.3IONS-SCNC: 19.3 MMOL/L (ref 5–15)
AST SERPL-CCNC: 25 U/L (ref 1–32)
BILIRUB SERPL-MCNC: 0.4 MG/DL (ref 0–1.2)
BUN SERPL-MCNC: 45 MG/DL (ref 8–23)
BUN/CREAT SERPL: 4.5 (ref 7–25)
CALCIUM SPEC-SCNC: 7.6 MG/DL (ref 8.6–10.5)
CHLORIDE SERPL-SCNC: 100 MMOL/L (ref 98–107)
CO2 SERPL-SCNC: 20.7 MMOL/L (ref 22–29)
CREAT SERPL-MCNC: 10.1 MG/DL (ref 0.57–1)
DACRYOCYTES BLD QL SMEAR: ABNORMAL
DEPRECATED RDW RBC AUTO: 50 FL (ref 37–54)
ERYTHROCYTE [DISTWIDTH] IN BLOOD BY AUTOMATED COUNT: 17.3 % (ref 12.3–15.4)
GFR SERPL CREATININE-BSD FRML MDRD: 5 ML/MIN/1.73
GFR SERPL CREATININE-BSD FRML MDRD: ABNORMAL ML/MIN/{1.73_M2}
GLOBULIN UR ELPH-MCNC: 3.2 GM/DL
GLUCOSE BLDC GLUCOMTR-MCNC: 103 MG/DL (ref 70–130)
GLUCOSE BLDC GLUCOMTR-MCNC: 113 MG/DL (ref 70–130)
GLUCOSE BLDC GLUCOMTR-MCNC: 116 MG/DL (ref 70–130)
GLUCOSE BLDC GLUCOMTR-MCNC: 121 MG/DL (ref 70–130)
GLUCOSE SERPL-MCNC: 97 MG/DL (ref 65–99)
HCT VFR BLD AUTO: 29 % (ref 34–46.6)
HGB BLD-MCNC: 9.3 G/DL (ref 12–15.9)
HYPOCHROMIA BLD QL: ABNORMAL
LYMPHOCYTES # BLD MANUAL: 0.74 10*3/MM3 (ref 0.7–3.1)
LYMPHOCYTES NFR BLD MANUAL: 11 % (ref 5–12)
LYMPHOCYTES NFR BLD MANUAL: 8 % (ref 19.6–45.3)
MCH RBC QN AUTO: 25.9 PG (ref 26.6–33)
MCHC RBC AUTO-ENTMCNC: 32.1 G/DL (ref 31.5–35.7)
MCV RBC AUTO: 80.8 FL (ref 79–97)
MONOCYTES # BLD AUTO: 1.02 10*3/MM3 (ref 0.1–0.9)
NEUTROPHILS # BLD AUTO: 7.52 10*3/MM3 (ref 1.7–7)
NEUTROPHILS NFR BLD MANUAL: 81 % (ref 42.7–76)
NRBC BLD AUTO-RTO: 0 /100 WBC (ref 0–0.2)
PLAT MORPH BLD: NORMAL
PLATELET # BLD AUTO: 314 10*3/MM3 (ref 140–450)
PMV BLD AUTO: 10.5 FL (ref 6–12)
POTASSIUM SERPL-SCNC: 4.8 MMOL/L (ref 3.5–5.2)
PROT SERPL-MCNC: 6 G/DL (ref 6–8.5)
RBC # BLD AUTO: 3.59 10*6/MM3 (ref 3.77–5.28)
SODIUM SERPL-SCNC: 140 MMOL/L (ref 136–145)
WBC # BLD AUTO: 9.28 10*3/MM3 (ref 3.4–10.8)
WBC MORPH BLD: NORMAL

## 2021-08-04 PROCEDURE — 94664 DEMO&/EVAL PT USE INHALER: CPT

## 2021-08-04 PROCEDURE — 94799 UNLISTED PULMONARY SVC/PX: CPT

## 2021-08-04 PROCEDURE — 25010000002 PIPERACILLIN SOD-TAZOBACTAM PER 1 G: Performed by: HOSPITALIST

## 2021-08-04 PROCEDURE — 94640 AIRWAY INHALATION TREATMENT: CPT

## 2021-08-04 PROCEDURE — 85025 COMPLETE CBC W/AUTO DIFF WBC: CPT | Performed by: SURGERY

## 2021-08-04 PROCEDURE — 25010000002 HYDROMORPHONE PER 4 MG: Performed by: SURGERY

## 2021-08-04 PROCEDURE — 99024 POSTOP FOLLOW-UP VISIT: CPT | Performed by: SURGERY

## 2021-08-04 PROCEDURE — 80053 COMPREHEN METABOLIC PANEL: CPT | Performed by: SURGERY

## 2021-08-04 PROCEDURE — 85007 BL SMEAR W/DIFF WBC COUNT: CPT | Performed by: SURGERY

## 2021-08-04 PROCEDURE — 82962 GLUCOSE BLOOD TEST: CPT

## 2021-08-04 RX ORDER — ALBUMIN (HUMAN) 12.5 G/50ML
12.5 SOLUTION INTRAVENOUS AS NEEDED
Status: ACTIVE | OUTPATIENT
Start: 2021-08-04 | End: 2021-08-05

## 2021-08-04 RX ADMIN — FAMOTIDINE 20 MG: 10 INJECTION INTRAVENOUS at 20:12

## 2021-08-04 RX ADMIN — MONTELUKAST SODIUM 10 MG: 10 TABLET, FILM COATED ORAL at 20:11

## 2021-08-04 RX ADMIN — HYDROMORPHONE HYDROCHLORIDE 0.5 MG: 1 INJECTION, SOLUTION INTRAMUSCULAR; INTRAVENOUS; SUBCUTANEOUS at 18:32

## 2021-08-04 RX ADMIN — HYDROMORPHONE HYDROCHLORIDE 0.5 MG: 1 INJECTION, SOLUTION INTRAMUSCULAR; INTRAVENOUS; SUBCUTANEOUS at 01:04

## 2021-08-04 RX ADMIN — HYDROMORPHONE HYDROCHLORIDE 0.5 MG: 1 INJECTION, SOLUTION INTRAMUSCULAR; INTRAVENOUS; SUBCUTANEOUS at 11:38

## 2021-08-04 RX ADMIN — BUDESONIDE AND FORMOTEROL FUMARATE DIHYDRATE 2 PUFF: 160; 4.5 AEROSOL RESPIRATORY (INHALATION) at 08:57

## 2021-08-04 RX ADMIN — METOPROLOL SUCCINATE 25 MG: 25 TABLET, EXTENDED RELEASE ORAL at 09:45

## 2021-08-04 RX ADMIN — OXYCODONE HYDROCHLORIDE AND ACETAMINOPHEN 1 TABLET: 5; 325 TABLET ORAL at 09:45

## 2021-08-04 RX ADMIN — FAMOTIDINE 20 MG: 10 INJECTION INTRAVENOUS at 09:45

## 2021-08-04 RX ADMIN — TAZOBACTAM SODIUM AND PIPERACILLIN SODIUM 3.38 G: 375; 3 INJECTION, SOLUTION INTRAVENOUS at 23:06

## 2021-08-04 RX ADMIN — TAZOBACTAM SODIUM AND PIPERACILLIN SODIUM 3.38 G: 375; 3 INJECTION, SOLUTION INTRAVENOUS at 09:45

## 2021-08-04 RX ADMIN — BUDESONIDE AND FORMOTEROL FUMARATE DIHYDRATE 2 PUFF: 160; 4.5 AEROSOL RESPIRATORY (INHALATION) at 20:48

## 2021-08-04 NOTE — CONSULTS
[facility]   Consult Note    Patient Name: Nellie Vegas  : 1945  MRN: [id number]  Primary Care Physician:  Laurent Cortes DO  Referring Physician: Haja Chowdhury MD  Date of admission: 8/3/2021    Consults  Subjective   Subjective     Reason for Consult/ Chief Complaint: ESRD    History of Present Illness  Nellie Vegas is a 76 y.o. female with ESRD on hd t//s admitted after bowel resection. Currently awake alert. No complaints.     Review of Systems     Personal History     Past Medical History:   Diagnosis Date   • Anemia    • Anesthesia complication     mother woke up in combative state   • Arthritis     knees   • Asthma    • COPD (chronic obstructive pulmonary disease) (CMS/[facility])    • Diabetes mellitus (CMS/HCC)     type 2   • Dialysis patient (CMS/[facility])    • Disease of thyroid gland    • GERD (gastroesophageal reflux disease)    • Headache     after dialysis   • History of blood clots     BUE   • History of kidney stones    • Hyperlipidemia    • Hypertension    • Knee pain, bilateral    • Renal disease    • Sleep apnea     CPAP   • SOB (shortness of breath)    • 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)    • Weakness        Past Surgical History:   Procedure Laterality Date   • ARTERIOVENOUS FISTULA Right    • ARTERIOVENOUS FISTULA Left     REMOVED   • CENTRAL VENOUS CATHETER TUNNELED INSERTION DOUBLE LUMEN     •  SECTION     • COLON RESECTION Right 2021    Procedure: RIGHT HEMICOLECTOMY LAPAROSCOPIC;  Surgeon: Zbigniew Conner MD;  Location: [facility] MAIN OR;  Service: General;  Laterality: Right;   • COLONOSCOPY     • COLONOSCOPY N/A 2021    Procedure: COLONOSCOPY into cecum with biopsy;  Surgeon: Fabricio Monroe MD;  Location: [facility] ENDOSCOPY;  Service: Gastroenterology;  Laterality: N/A;  cecal mass   • INSERTION HEMODIALYSIS CATHETER Right 2021    Procedure: VENACAVAGRAM AND HEMODIALYSIS CATHETER INSERTION;  Surgeon: Karson Muller,  MD;  Location: [facility] OR 18/19;  Service: Vascular;  Laterality: Right;  Dr Bryant was primary surgeon   • POLYPECTOMY      UTERINE   • SHUNT O GRAM Right 8/9/2019    Procedure: RIGHT ARM DIALYSIS GRAFT revision and THROMBECTOMY;  Surgeon: Thierry Hardy MD;  Location: [facility] OR 18/19;  Service: Vascular   • SHUNT O GRAM Right 8/22/2019    Procedure: RIGHT ARM DIALYSIS GRAFT THROMBECTOMY WITH STENTING;  Surgeon: Thierry Hardy MD;  Location: [facility] OR 18/19;  Service: Vascular   • SHUNT O GRAM Right 12/7/2020    Procedure: RIGHT ARM ARTERIOVENOUS SHUNTOGRAM WITH THROMBECTOMY;  Surgeon: Thierry Hardy MD;  Location: [facility] OR 18/19;  Service: Vascular;  Laterality: Right;   • SHUNT O GRAM Right 7/12/2021    Procedure: Right arm dialysis shuntogram with shunt thrombectomy;  Surgeon: Shahram Gallagher MD;  Location: [facility] 18/19;  Service: Vascular;  Laterality: Right;   • SHUNT O GRAM Right 7/19/2021    Procedure: RIGHT UPPER EXTREMITY THROMBECTOMY AND SHUNTOGRAM;  Surgeon: Shahram Gallagher MD;  Location: [facility];  Service: Vascular;  Laterality: Right;       Family History: family history is not on file. Otherwise pertinent FHx was reviewed and not pertinent to current issue.    Social History:  reports that she quit smoking about 55 years ago. Her smoking use included cigarettes. She smoked 0.00 packs per day for 4.00 years. She has never used smokeless tobacco. She reports that she does not drink alcohol and does not use drugs.    Home Medications:   acetaminophen, albuterol, albuterol sulfate HFA, apixaban, budesonide-formoterol, guaiFENesin, metoprolol succinate XL, montelukast, omeprazole, and vitamin D3    Allergies:  Allergies   Allergen Reactions   • Sulfa Antibiotics Hives   • Latex Rash       Objective    Objective     Vitals:  Temp:  [98 °F (36.7 °C)-99.2 °F (37.3 °C)] 98.9 °F (37.2 °C)  Heart Rate:  [68-93] 91  Resp:  [16-22] 16  BP:  (110-153)/(46-78) 153/70  Flow (L/min):  [2] 2    Physical Exam  General Appearance:  In no acute distress.    HEENT: Normal HEENT exam.     Extremities: .  There is no deformity, effusion or dependent edema.    Neurological: Patient is alert    Skin:  Warm and dry.  No rash.       Result Review    Result Review:  I have personally reviewed the results from the time of this admission to 8/4/2021 09:16 EDT and agree with these findings:  []  Laboratory  []  Microbiology  []  Radiology  []  EKG/Telemetry   []  Cardiology/Vascular   []  Pathology  []  Old records  []  Other:      Assessment/Plan   Assessment / Plan     Brief Patient Summary:  Nellie Vegas is a 76 y.o. female who has ESRD on hd t/th/s    Active Hospital Problems:  There are no active hospital problems to display for this patient.    Plan:   1. ESRD  2. S/p bowel resection  3. T2DM  4. Hypertension    Patient seen and examined. Labs reviewed. S/p bowel resection. On hd t/th/s at [facility].  Minimal intake for past 48 hours. Last hd on Saturday. No reason for hd today. Will continue hd t/th/s    Electronically signed by Rupinder Yanez MD, 08/04/21, 9:16 AM EDT.

## 2021-08-04 NOTE — PROGRESS NOTES
Postoperative day 1 status post ex lap resection of ileocolonic anastomosis and reanastomosis    S: No events overnight.  Pain is well controlled.  No nausea no vomiting.  Is hungry.  No bowel function yet    O:   Vitals:    08/03/21 2305 08/04/21 0725 08/04/21 0857 08/04/21 1234   BP: 119/53 153/70  152/80   BP Location: Left arm Left arm  Left arm   Patient Position: Lying Lying  Lying   Pulse: 80 91  91   Resp: 16 16 16 16   Temp: 99.2 °F (37.3 °C) 98.9 °F (37.2 °C)  99.1 °F (37.3 °C)   TempSrc: Oral Oral  Oral   SpO2: 95% 95%  94%   Weight:       Height:         Alert, no acute distress  Abdomen soft, tender to palpation right lower quadrant, nondistended, incisions open with clean tissue at the base.  Parish drain with minimal serosanguineous output    White blood cell count 9.2, hemoglobin 9.3, platelet 314  Creatinine 10, BUN 45, all other labs stable    Assessment and plan    Postop day 1 status post ex lap with resection of prior ileocolonic anastomosis and ileocolonic anastomosis.  She is doing okay, no issues, feels hungry and wants to eat    -Start clear liquid diet  -P.o. pain meds  -SCDs, Lovenox  -Nephrology consultation for hemodialysis  -We will start wound VAC dressing changes every 3 days  -Keep drain in place for now

## 2021-08-04 NOTE — PROGRESS NOTES
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  Patient will be taken to the OR this afternoon with Dr. Conner.  Patient admitted to our service and will be followed by nephrology for dialysis.     REVIEW OF SYSTEMS  All systems reviewed and marked as negative except as listed in HPI      PHYSICAL EXAM  Blood pressure 152/80, pulse 91, temperature 99.1 °F (37.3 °C), temperature source Oral, resp. rate 16, height 152.4 cm (60\"), weight 76.6 kg (168 lb 14.4 oz), SpO2 94 %, not currently breastfeeding.    GENERAL: alert well developed, well nourished in no distress  HENT: NCAT, neck supple, trachea midline  EYES: no scleral icterus, PERRL, normal conjunctivae  CV: regular rhythm, regular rate, no murmur  RESPIRATORY: unlabored effort, CTAB  ABDOMEN: soft, midline vertically oriented abdominal incision with staple closure. No dehiscence noted. No expressible drainage or carol incisional induration.  There is no erythema of the skin. Carol incisional region is exquisitely tender but theree is no rebound. Nondistended, bowel sounds present  MUSCULOSKELETAL: no gross deformity  NEURO: alert,  sensory and motor function of extremities grossly intact, speech clear, " mental status normal/baseline  SKIN: warm, dry, no rash  PSYCH:  Appropriate mood and affect     LAB RESULTS  Lab Results (last 24 hours)     Procedure Component Value Units Date/Time    POC Glucose Once [[id number]]  (Normal) Collected: 08/04/21 1127    Specimen: Blood Updated: 08/04/21 1129     Glucose 121 mg/dL      Comment: Meter: [id number] : [id number] Jerry WADDELL       Comprehensive Metabolic Panel [[id number]]  (Abnormal) Collected: 08/04/21 0951    Specimen: Blood Updated: 08/04/21 1110     Glucose 97 mg/dL      BUN 45 mg/dL      Creatinine 10.10 mg/dL      Sodium 140 mmol/L      Potassium 4.8 mmol/L      Comment: Slight hemolysis detected by analyzer. Results may be affected.        Chloride 100 mmol/L      CO2 20.7 mmol/L      Calcium 7.6 mg/dL      Total Protein 6.0 g/dL      Albumin 2.80 g/dL      ALT (SGPT) 6 U/L      AST (SGOT) 25 U/L      Alkaline Phosphatase 70 U/L      Total Bilirubin 0.4 mg/dL      eGFR Non  Amer --     Comment: <15 Indicative of kidney failure.        eGFR  African Amer 5 mL/min/1.73      Comment: <15 Indicative of kidney failure.        Globulin 3.2 gm/dL      A/G Ratio 0.9 g/dL      BUN/Creatinine Ratio 4.5     Anion Gap 19.3 mmol/L     Narrative:      GFR Normal >60  Chronic Kidney Disease <60  Kidney Failure <15      Manual Differential [[id number]]  (Abnormal) Collected: 08/04/21 0951    Specimen: Blood Updated: 08/04/21 1056     Neutrophil % 81.0 %      Lymphocyte % 8.0 %      Monocyte % 11.0 %      Neutrophils Absolute 7.52 10*3/mm3      Lymphocytes Absolute 0.74 10*3/mm3      Monocytes Absolute 1.02 10*3/mm3      Dacrocytes Slight/1+     Hypochromia Mod/2+     WBC Morphology Normal     Platelet Morphology Normal    CBC & Differential [[id number]]  (Abnormal) Collected: 08/04/21 0951    Specimen: Blood Updated: 08/04/21 1043    Narrative:      The following orders were created for panel order CBC & Differential.  Procedure                                Abnormality         Status                     ---------                               -----------         ------                     CBC Auto Differential[[id number]]        Abnormal            Final result                 Please view results for these tests on the individual orders.    CBC Auto Differential [[id number]]  (Abnormal) Collected: 08/04/21 0951    Specimen: Blood Updated: 08/04/21 1043     WBC 9.28 10*3/mm3      RBC 3.59 10*6/mm3      Hemoglobin 9.3 g/dL      Hematocrit 29.0 %      MCV 80.8 fL      MCH 25.9 pg      MCHC 32.1 g/dL      RDW 17.3 %      RDW-SD 50.0 fl      MPV 10.5 fL      Platelets 314 10*3/mm3      nRBC 0.0 /100 WBC     POC Glucose Once [[id number]]  (Normal) Collected: 08/04/21 0851    Specimen: Blood Updated: 08/04/21 0852     Glucose 116 mg/dL      Comment: Meter: [id number] : [id number] Jerry WADDELL       Body Fluid Culture - Peritoneal Fluid, Peritoneum [[id number]]  (Abnormal) Collected: 08/03/21 1652    Specimen: Peritoneal Fluid from Peritoneum Updated: 08/04/21 0728     Body Fluid Culture Scant growth (1+) Gram Negative Bacilli      Rare Gram Negative Bacilli     Gram Stain Few (2+) WBCs seen      Occasional Gram negative bacilli    Tissue Pathology Exam [[id number]] Collected: 08/03/21 1840    Specimen: Tissue from Small Intestine, Ileum Updated: 08/03/21 2112    POC Glucose Once [[id number]]  (Abnormal) Collected: 08/03/21 2045    Specimen: Blood Updated: 08/03/21 2047     Glucose 154 mg/dL      Comment: Meter: [id number] : [id number] Hamlet WADDELL           Imaging Results (Last 24 Hours)     ** No results found for the last 24 hours. **          Current Facility-Administered Medications:   •  albumin human 25 % IV SOLN 12.5 g, 12.5 g, Intravenous, PRN, Rupinder Yanez MD  •  albuterol (PROVENTIL) nebulizer solution 0.083% 2.5 mg/3mL, 2.5 mg, Nebulization, TID PRN, Zbigniew Conner MD  •  budesonide-formoterol (SYMBICORT) 160-4.5 MCG/ACT inhaler 2 puff, 2  puff, Inhalation, BID, Zbigniew Conner MD, 2 puff at 08/04/21 0857  •  famotidine (PEPCID) injection 20 mg, 20 mg, Intravenous, Q12H, Zbigniew Conner MD, 20 mg at 08/04/21 0945  •  HYDROmorphone (DILAUDID) injection 0.5 mg, 0.5 mg, Intravenous, Q2H PRN, Zbigniew Conner MD, 0.5 mg at 08/04/21 1138  •  insulin lispro (ADMELOG) injection 0-7 Units, 0-7 Units, Subcutaneous, TID AC, Zbigniew Conner MD  •  metoprolol succinate XL (TOPROL-XL) 24 hr tablet 25 mg, 25 mg, Oral, QAM, Zbigniew Conner MD, 25 mg at 08/04/21 0945  •  montelukast (SINGULAIR) tablet 10 mg, 10 mg, Oral, Nightly, Zbigniew Conner MD  •  ondansetron (ZOFRAN) injection 4 mg, 4 mg, Intravenous, Q6H PRN, Zbigniew Conner MD, 4 mg at 08/03/21 1756  •  oxyCODONE-acetaminophen (PERCOCET) 5-325 MG per tablet 1 tablet, 1 tablet, Oral, Q6H PRN, Zbigniew Conner MD, 1 tablet at 08/04/21 0945  •  piperacillin-tazobactam (ZOSYN) 3.375 g in iso-osmotic dextrose 50 ml (premix), 3.375 g, Intravenous, Q12H, Allan Chowdhury MD, 3.375 g at 08/04/21 0945  •  sodium chloride 0.9 % infusion, 9 mL/hr, Intravenous, Continuous PRN, Zbigniew Conner MD, New Bag at 08/03/21 1624     ASSESSMENT  Anastomotic leak s/p exploratory laparotomy with ileocolonic anastomosis resection and redo ileocolonic anastomosis  Recent right laparoscopic hemicolectomy secondary to tubular adenoma  Status post recent thrombectomy with recurrent thrombosis right AV shunt  End-stage renal disease on hemodialysis  COPD  Diabetes mellitus  Hypertension  Chronic anemia  Gastroesophageal reflux disease    PLAN  CPM  Postop care  Antibiotics  NPO  Supportive care  Stress ulcer DVT prophylaxis  Nephrology to follow patient  PT/OT  Discussed with nursing staff family and general surgery  Follow closely and further recommendation according to hospital course    ALLAN CHOWDHURY MD

## 2021-08-04 NOTE — OUTREACH NOTE
General Surgery Week 2 Survey      Responses   [facility] patient discharged from?  [place]   Does the patient have one of the following disease processes/diagnoses(primary or secondary)?  General Surgery   Week 2 attempt successful?  No   Revoke  Readmitted          Nirmala Jacobo RN

## 2021-08-04 NOTE — PROGRESS NOTES
Discharge Planning Assessment  [facility]     Patient Name: Nellie Vegas  MRN: [id number]  Today's Date: 8/4/2021    Admit Date: 8/3/2021    Discharge Needs Assessment     Row Name 08/04/21 1538       Living Environment    Lives With  child(paco), adult    Current Living Arrangements  home/apartment/condo    Primary Care Provided by  self;spouse/significant other    Provides Primary Care For  no one    Family Caregiver if Needed  child(paco), adult    Family Caregiver Names  daughterLily [phone]    Quality of Family Relationships  helpful    Able to Return to Prior Arrangements  yes       Resource/Environmental Concerns    Resource/Environmental Concerns  none    Transportation Concerns  car, none       Transition Planning    Patient/Family Anticipates Transition to  home with family    Patient/Family Anticipated Services at Transition  home health care    Transportation Anticipated  family or friend will provide       Discharge Needs Assessment    Readmission Within the Last 30 Days  no previous admission in last 30 days    Equipment Currently Used at Home  rollator    Concerns to be Addressed  denies needs/concerns at this time;discharge planning    Anticipated Changes Related to Illness  inability to care for self    Equipment Needed After Discharge  wound care supplies wound vac therapy    Discharge Facility/Level of Care Needs  home with home health    Provided Post Acute Provider List?  Yes    Post Acute Provider List  Home Health area list    Provided Post Acute Provider Quality & Resource List?  Refused    Delivered To  Patient    Method of Delivery  In person    Patient's Choice of Community Agency(s)  Patient prefers to discuss with her daughter Lily and will provide a HH choice for referral    Current Discharge Risk  chronically ill        Discharge Plan     Row Name 08/04/21 2522       Plan    Plan  Home with family and HH for wound vac care, [facility]     Plan Comments  Met with patient at bedside.  She verified her facesheet information and PCP and her pharmacy.  Her IMM letter is documented.  Patient lives in a two story home with her her dtr, Lily Vegas [phone].  She state there is no entrance steps and she stays on the first floor.  She uses [employer] for transportation to HD at [facility] on [facility].  She uses a rollator for mobility and states she is IADLs with a rollator.  She refuses skilled rehab at [place].  She prefers home with her daughter and would like to use HH and she will discuss with her daughte and provide choices for referrals.  She states her daughter will transport at [place].  Following her therapy notes and for any other dc needs.....................Petrona Leon RN        Continued Care and Services - Admitted Since 8/3/2021     Durable Medical Equipment     Service Provider Request Status Selected Services Address Phone Fax Patient Preferred    [facility]  Pending - Request Sent N/A [id number] [address] [phone] --       Internal Comment last updated by Petrona Leon RN 8/4/2021 1536    Wound vac therapy                       Selected Continued Care - Prior Encounters Includes selections from prior encounters from 5/5/2021 to 8/4/2021    Discharged on 7/21/2021 Admission date: 7/15/2021 - Discharge disposition: Home-Health Care Svc    Dialysis/Infusion     Service Provider Selected Services Address Phone Fax Patient Preferred    [facility]  Dialysis [address] [facility] [address] [phone] --          Home Medical Care     Service Provider Selected Services Address Phone Fax Patient Preferred    [facility]  [facility] [address] [address] [phone] --                Discharged on 7/14/2021 Admission date: 7/5/2021 - Discharge disposition: Home or Self Care    Dialysis/Infusion      Service Provider Selected Services Address Phone Fax Patient Preferred    [facility] [address]  Dialysis [address] [facility] [address] [phone] --          Home Medical Care     Service Provider Selected Services Address Phone Fax Patient Preferred    [facility]  Home Health Services [address] [address] [phone] --                      Demographic Summary     Row Name 08/04/21 1536       General Information    Admission Type  inpatient    Arrived From  home    Required Notices Provided  Important Message from Medicare    Referral Source  admission list    Preferred Language  English       Contact Information    Permission Granted to Share Info With  ;family/designee    Contact Information Comments  daughterLily [phone]        Functional Status     Row Name 08/04/21 1538       Functional Status    Usual Activity Tolerance  moderate    Current Activity Tolerance  moderate       Functional Status, IADL    Medications  assistive person    Meal Preparation  assistive equipment and person    Housekeeping  assistive equipment and person    Laundry  assistive person    Shopping  assistive equipment and person       Mental Status    General Appearance WDL  WDL       Mental Status Summary    Recent Changes in Mental Status/Cognitive Functioning  no changes       Employment/    Employment Status  retired        Psychosocial    No documentation.       Abuse/Neglect    No documentation.       Legal    No documentation.       Substance Abuse    No documentation.       Patient Forms    No documentation.           Petrona Leon, RN

## 2021-08-04 NOTE — DISCHARGE PLACEMENT REQUEST
"Nellie Devries (76 y.o. Female)     Date of Birth Social Security Number Address Home Phone MRN    1945  [address]   [place] [address] [phone] [id number]    Nondenominational Marital Status          Non-Holiness        Admission Date Admission Type Admitting Provider Attending Provider Department, Room/Bed    8/3/21 Emergency Haja Chowdhury MD Ahmed, Aftab, MD [address], [address]    Discharge Date Discharge Disposition Discharge Destination                       Attending Provider: Haja Chowdhury MD    Allergies: Sulfa Antibiotics, Latex    Isolation: None   Infection: None   Code Status: CPR    Ht: 152.4 cm (60\")   Wt: 76.6 kg (168 lb 14.4 oz)    Admission Cmt: None   Principal Problem: Anastomotic leak of intestine [K91.89] More...                 Active Insurance as of 8/3/2021     Primary Coverage     Payor Plan Insurance Group Employer/Plan Group    HUMANA MEDICARE REPLACEMENT HUMANA MEDICARE REPLACEMENT [id number]     Payor Plan Address Payor Plan Phone Number Payor Plan Fax Number Effective Dates    [address] [address] [phone]  7/1/2021 - None Entered    [facility] [address]       Subscriber Name Subscriber Birth Date Member ID       NELLIE DEVRIES 1945 [id number]           Secondary Coverage     Payor Plan Insurance Group Employer/Plan Group    [place]      Payor Plan Address Payor Plan Phone Number Payor Plan Fax Number Effective Dates    [address] [address]   11/1/2019 - None Entered    [place] [address]       Subscriber Name Subscriber Birth Date Member ID       NELLIE DEVRIES 1945 [id number]                 Emergency Contacts      (Rel.) Home Phone Work Phone Mobile Phone    HENNY KELSEY (Daughter) [phone] -- [phone]    Kayce Heidi (Daughter) [phone] -- [phone]    KAYCE CHRISTINE (Daughter) [phone] -- [phone]              "

## 2021-08-04 NOTE — PLAN OF CARE
Goal Outcome Evaluation:  Plan of Care Reviewed With: patient        Progress: no change  Outcome Summary: Pt admitted to the floor post small bowel resection. A&Ox4. Dressing in place, clean, dry and intact. small amoun tof drainage noted. DEVAN drain in place. Wise in place. IV abx. VSS. Will closely monitor.

## 2021-08-04 NOTE — NURSING NOTE
CWOCN- consult for VAC to midline abdominal wound. Patient tolerated dressing change well with pain medication. Daughter present. Discussed the wound vac dressing changes and care plan. Dr Conner present. Will plan to change M-W-F.      08/04/21 1110   Wound 08/03/21 1648 midline abdomen Incision   Placement Date/Time: 08/03/21 1648   Present on Hospital Admission: No  Orientation: midline  Location: abdomen  Primary Wound Type: Incision   Dressing Appearance moist drainage   Base pink;red;subcutaneous   Periwound intact   Periwound Temperature warm   Periwound Skin Turgor soft   Edges open   Wound Length (cm) 14 cm   Wound Width (cm) 6 cm   Wound Depth (cm) 2.5 cm   Drainage Characteristics/Odor serosanguineous   Drainage Amount scant   Care, Wound cleansed with;sterile normal saline;negative pressure wound therapy   Dressing Care dressing changed   Periwound Care barrier film applied;dry periwound area maintained   NPWT (Negative Pressure Wound Therapy) 08/04/21 1101 abdomen   Placement Date/Time: 08/04/21 1101   Location: abdomen   Therapy Setting continuous therapy   Dressing foam, black;transparent dressing   Pressure Setting 125 mmHg   Sponges Inserted 1

## 2021-08-04 NOTE — PLAN OF CARE
Goal Outcome Evaluation:  Plan of Care Reviewed With: patient        Progress: no change  Outcome Summary: no significant changes, wound vac placed and wound care to change m/w/f, dialysis planned for tomorrow, intermittent pain, not moving around much, clear liquid diet, nsr, a&o x4, q2 turn when patient allows, RA, av fistula SIXTO, DEVAN drain, raygoza cath, HD cath right femoral, vss, will continue to monitor

## 2021-08-05 LAB
ANION GAP SERPL CALCULATED.3IONS-SCNC: 22.4 MMOL/L (ref 5–15)
BASOPHILS # BLD AUTO: 0.03 10*3/MM3 (ref 0–0.2)
BASOPHILS NFR BLD AUTO: 0.3 % (ref 0–1.5)
BUN SERPL-MCNC: 53 MG/DL (ref 8–23)
BUN/CREAT SERPL: 5.2 (ref 7–25)
CALCIUM SPEC-SCNC: 7.9 MG/DL (ref 8.6–10.5)
CHLORIDE SERPL-SCNC: 97 MMOL/L (ref 98–107)
CO2 SERPL-SCNC: 19.6 MMOL/L (ref 22–29)
CREAT SERPL-MCNC: 10.27 MG/DL (ref 0.57–1)
DACRYOCYTES BLD QL SMEAR: NORMAL
DEPRECATED RDW RBC AUTO: 51.6 FL (ref 37–54)
EOSINOPHIL # BLD AUTO: 0.01 10*3/MM3 (ref 0–0.4)
EOSINOPHIL NFR BLD AUTO: 0.1 % (ref 0.3–6.2)
ERYTHROCYTE [DISTWIDTH] IN BLOOD BY AUTOMATED COUNT: 17.6 % (ref 12.3–15.4)
GFR SERPL CREATININE-BSD FRML MDRD: 4 ML/MIN/1.73
GFR SERPL CREATININE-BSD FRML MDRD: ABNORMAL ML/MIN/{1.73_M2}
GLUCOSE BLDC GLUCOMTR-MCNC: 116 MG/DL (ref 70–130)
GLUCOSE BLDC GLUCOMTR-MCNC: 154 MG/DL (ref 70–130)
GLUCOSE BLDC GLUCOMTR-MCNC: 19 MG/DL (ref 70–130)
GLUCOSE BLDC GLUCOMTR-MCNC: 21 MG/DL (ref 70–130)
GLUCOSE BLDC GLUCOMTR-MCNC: 37 MG/DL (ref 70–130)
GLUCOSE BLDC GLUCOMTR-MCNC: 41 MG/DL (ref 70–130)
GLUCOSE BLDC GLUCOMTR-MCNC: 44 MG/DL (ref 70–130)
GLUCOSE BLDC GLUCOMTR-MCNC: 50 MG/DL (ref 70–130)
GLUCOSE BLDC GLUCOMTR-MCNC: 99 MG/DL (ref 70–130)
GLUCOSE SERPL-MCNC: 80 MG/DL (ref 65–99)
HCT VFR BLD AUTO: 27.2 % (ref 34–46.6)
HGB BLD-MCNC: 8.6 G/DL (ref 12–15.9)
HYPOCHROMIA BLD QL: NORMAL
LYMPHOCYTES # BLD AUTO: 0.38 10*3/MM3 (ref 0.7–3.1)
LYMPHOCYTES NFR BLD AUTO: 4.2 % (ref 19.6–45.3)
MCH RBC QN AUTO: 25.4 PG (ref 26.6–33)
MCHC RBC AUTO-ENTMCNC: 31.6 G/DL (ref 31.5–35.7)
MCV RBC AUTO: 80.2 FL (ref 79–97)
MONOCYTES # BLD AUTO: 0.76 10*3/MM3 (ref 0.1–0.9)
MONOCYTES NFR BLD AUTO: 8.4 % (ref 5–12)
NEUTROPHILS NFR BLD AUTO: 7.57 10*3/MM3 (ref 1.7–7)
NEUTROPHILS NFR BLD AUTO: 83.4 % (ref 42.7–76)
OVALOCYTES BLD QL SMEAR: NORMAL
PLAT MORPH BLD: NORMAL
PLATELET # BLD AUTO: 334 10*3/MM3 (ref 140–450)
PMV BLD AUTO: 10.6 FL (ref 6–12)
POTASSIUM SERPL-SCNC: 5.3 MMOL/L (ref 3.5–5.2)
RBC # BLD AUTO: 3.39 10*6/MM3 (ref 3.77–5.28)
SODIUM SERPL-SCNC: 139 MMOL/L (ref 136–145)
WBC # BLD AUTO: 9.08 10*3/MM3 (ref 3.4–10.8)
WBC MORPH BLD: NORMAL

## 2021-08-05 PROCEDURE — 25010000002 HYDROMORPHONE PER 4 MG: Performed by: SURGERY

## 2021-08-05 PROCEDURE — 82962 GLUCOSE BLOOD TEST: CPT

## 2021-08-05 PROCEDURE — 97535 SELF CARE MNGMENT TRAINING: CPT

## 2021-08-05 PROCEDURE — 94799 UNLISTED PULMONARY SVC/PX: CPT

## 2021-08-05 PROCEDURE — 97165 OT EVAL LOW COMPLEX 30 MIN: CPT

## 2021-08-05 PROCEDURE — 85007 BL SMEAR W/DIFF WBC COUNT: CPT | Performed by: HOSPITALIST

## 2021-08-05 PROCEDURE — 85025 COMPLETE CBC W/AUTO DIFF WBC: CPT | Performed by: HOSPITALIST

## 2021-08-05 PROCEDURE — 5A1D70Z PERFORMANCE OF URINARY FILTRATION, INTERMITTENT, LESS THAN 6 HOURS PER DAY: ICD-10-PCS | Performed by: INTERNAL MEDICINE

## 2021-08-05 PROCEDURE — 99024 POSTOP FOLLOW-UP VISIT: CPT | Performed by: SURGERY

## 2021-08-05 PROCEDURE — 25010000002 PIPERACILLIN SOD-TAZOBACTAM PER 1 G: Performed by: HOSPITALIST

## 2021-08-05 PROCEDURE — 80048 BASIC METABOLIC PNL TOTAL CA: CPT | Performed by: INTERNAL MEDICINE

## 2021-08-05 RX ORDER — NICOTINE POLACRILEX 4 MG
15 LOZENGE BUCCAL
Status: DISCONTINUED | OUTPATIENT
Start: 2021-08-05 | End: 2021-08-06

## 2021-08-05 RX ORDER — DEXTROSE MONOHYDRATE 25 G/50ML
25 INJECTION, SOLUTION INTRAVENOUS
Status: DISCONTINUED | OUTPATIENT
Start: 2021-08-05 | End: 2021-08-06

## 2021-08-05 RX ORDER — DEXTROSE MONOHYDRATE 25 G/50ML
INJECTION, SOLUTION INTRAVENOUS
Status: COMPLETED
Start: 2021-08-05 | End: 2021-08-05

## 2021-08-05 RX ORDER — FAMOTIDINE 20 MG/1
20 TABLET, FILM COATED ORAL 2 TIMES DAILY
Status: DISCONTINUED | OUTPATIENT
Start: 2021-08-05 | End: 2021-08-06

## 2021-08-05 RX ADMIN — METOPROLOL SUCCINATE 25 MG: 25 TABLET, EXTENDED RELEASE ORAL at 06:11

## 2021-08-05 RX ADMIN — BUDESONIDE AND FORMOTEROL FUMARATE DIHYDRATE 2 PUFF: 160; 4.5 AEROSOL RESPIRATORY (INHALATION) at 19:26

## 2021-08-05 RX ADMIN — FAMOTIDINE 20 MG: 20 TABLET, FILM COATED ORAL at 12:38

## 2021-08-05 RX ADMIN — DEXTROSE MONOHYDRATE 50 ML: 500 INJECTION PARENTERAL at 13:28

## 2021-08-05 RX ADMIN — TAZOBACTAM SODIUM AND PIPERACILLIN SODIUM 3.38 G: 375; 3 INJECTION, SOLUTION INTRAVENOUS at 12:38

## 2021-08-05 RX ADMIN — MONTELUKAST SODIUM 10 MG: 10 TABLET, FILM COATED ORAL at 21:14

## 2021-08-05 RX ADMIN — FAMOTIDINE 20 MG: 20 TABLET, FILM COATED ORAL at 23:34

## 2021-08-05 RX ADMIN — TAZOBACTAM SODIUM AND PIPERACILLIN SODIUM 3.38 G: 375; 3 INJECTION, SOLUTION INTRAVENOUS at 23:34

## 2021-08-05 RX ADMIN — DEXTROSE MONOHYDRATE 25 G: 500 INJECTION PARENTERAL at 15:52

## 2021-08-05 RX ADMIN — HYDROMORPHONE HYDROCHLORIDE 0.5 MG: 1 INJECTION, SOLUTION INTRAMUSCULAR; INTRAVENOUS; SUBCUTANEOUS at 12:38

## 2021-08-05 RX ADMIN — OXYCODONE HYDROCHLORIDE AND ACETAMINOPHEN 1 TABLET: 5; 325 TABLET ORAL at 05:08

## 2021-08-05 RX ADMIN — BUDESONIDE AND FORMOTEROL FUMARATE DIHYDRATE 2 PUFF: 160; 4.5 AEROSOL RESPIRATORY (INHALATION) at 07:51

## 2021-08-05 NOTE — PLAN OF CARE
Goal Outcome Evaluation:  Plan of Care Reviewed With: patient        Progress: no change  Outcome Summary: No acute changes. wound vac in place. Continues on IV abx. Wise and DEVAN in place. due to get dialysis today. Up to the chair for a few hours tonight. VSS. Will closely monitor.

## 2021-08-05 NOTE — NURSING NOTE
1200  HD WITHOUT INCIDENT OR C/O. TOLERATED WELL. REMOVED 1 L AS ORDERED.  AVF NEEDLES REMOVED X 2. HEMOSTASIS ACHIEVED. PT. STABLE /NO C/O. UPON COMPLETION AND RETURN TO ROOM.

## 2021-08-05 NOTE — PLAN OF CARE
Goal Outcome Evaluation:  Plan of Care Reviewed With: patient        Progress: no change  Outcome Summary: tolerated dialysis, 1L removed, had issues with low BG but corrected, mostly sleeping, pain is minimal, vss, will continue to monitor

## 2021-08-05 NOTE — PROGRESS NOTES
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  Patient will be taken to the OR this afternoon with Dr. Conner.  Patient admitted to our service and will be followed by nephrology for dialysis.     REVIEW OF SYSTEMS  Unremarkable except abdominal pain     PHYSICAL EXAM  Blood pressure 101/53, pulse 74, temperature 98 °F (36.7 °C), temperature source Temporal, resp. rate 18, height 152.4 cm (60\"), weight 76.6 kg (168 lb 14.4 oz), SpO2 96 %, not currently breastfeeding.    GENERAL: alert well developed, well nourished in no distress  HENT: NCAT, neck supple, trachea midline  EYES: no scleral icterus, PERRL, normal conjunctivae  CV: regular rhythm, regular rate, no murmur  RESPIRATORY: unlabored effort, CTAB  ABDOMEN: soft, midline vertically oriented abdominal incision with staple closure. No dehiscence noted. No expressible drainage or carol incisional induration.  There is no erythema of the skin. Carol incisional region is exquisitely tender but theree is no rebound. Nondistended, bowel sounds present  MUSCULOSKELETAL: no gross deformity  NEURO: alert,  sensory and motor function of extremities grossly intact, speech clear, mental status " normal/baseline  SKIN: warm, dry, no rash  PSYCH:  Appropriate mood and affect     LAB RESULTS  Lab Results (last 24 hours)     Procedure Component Value Units Date/Time    Basic Metabolic Panel [[id number]]  (Abnormal) Collected: 08/05/21 0550    Specimen: Blood Updated: 08/05/21 0823     Glucose 80 mg/dL      BUN 53 mg/dL      Creatinine 10.27 mg/dL      Sodium 139 mmol/L      Potassium 5.3 mmol/L      Comment: Slight hemolysis detected by analyzer. Results may be affected.        Chloride 97 mmol/L      CO2 19.6 mmol/L      Calcium 7.9 mg/dL      eGFR  African Amer 4 mL/min/1.73      Comment: <15 Indicative of kidney failure.        eGFR Non  Amer --     Comment: <15 Indicative of kidney failure.        BUN/Creatinine Ratio 5.2     Anion Gap 22.4 mmol/L     Narrative:      GFR Normal >60  Chronic Kidney Disease <60  Kidney Failure <15      Body Fluid Culture - Peritoneal Fluid, Peritoneum [[id number]]  (Abnormal) Collected: 08/03/21 1652    Specimen: Peritoneal Fluid from Peritoneum Updated: 08/05/21 0718     Body Fluid Culture Scant growth (1+) Gram Negative Bacilli      Rare Gram Negative Bacilli     Gram Stain Few (2+) WBCs seen      Occasional Gram negative bacilli    Scan Slide [[id number]] Collected: 08/05/21 0550    Specimen: Blood Updated: 08/05/21 0715     Dacrocytes Slight/1+     Hypochromia Mod/2+     Ovalocytes Slight/1+     WBC Morphology Normal     Platelet Morphology Normal    CBC & Differential [[id number]]  (Abnormal) Collected: 08/05/21 0550    Specimen: Blood Updated: 08/05/21 0640    Narrative:      The following orders were created for panel order CBC & Differential.  Procedure                               Abnormality         Status                     ---------                               -----------         ------                     CBC Auto Differential[[id number]]        Abnormal            Final result                 Please view results for these tests on the individual orders.     CBC Auto Differential [[id number]]  (Abnormal) Collected: 08/05/21 0550    Specimen: Blood Updated: 08/05/21 0640     WBC 9.08 10*3/mm3      RBC 3.39 10*6/mm3      Hemoglobin 8.6 g/dL      Hematocrit 27.2 %      MCV 80.2 fL      MCH 25.4 pg      MCHC 31.6 g/dL      RDW 17.6 %      RDW-SD 51.6 fl      MPV 10.6 fL      Platelets 334 10*3/mm3      Neutrophil % 83.4 %      Lymphocyte % 4.2 %      Monocyte % 8.4 %      Eosinophil % 0.1 %      Basophil % 0.3 %      Neutrophils, Absolute 7.57 10*3/mm3      Lymphocytes, Absolute 0.38 10*3/mm3      Monocytes, Absolute 0.76 10*3/mm3      Eosinophils, Absolute 0.01 10*3/mm3      Basophils, Absolute 0.03 10*3/mm3     POC Glucose Once [[id number]]  (Normal) Collected: 08/05/21 0554    Specimen: Blood Updated: 08/05/21 0556     Glucose 99 mg/dL      Comment: Meter: [id number] : [id number] Chris WADDELL       POC Glucose Once [[id number]]  (Normal) Collected: 08/04/21 2044    Specimen: Blood Updated: 08/04/21 2045     Glucose 103 mg/dL      Comment: Meter: [id number] : [id number] Yehuda Cabral PCA           Imaging Results (Last 24 Hours)     ** No results found for the last 24 hours. **          Current Facility-Administered Medications:   •  albumin human 25 % IV SOLN 12.5 g, 12.5 g, Intravenous, PRN, Rupinder Yanez MD  •  albuterol (PROVENTIL) nebulizer solution 0.083% 2.5 mg/3mL, 2.5 mg, Nebulization, TID PRN, Zbigniew Conner MD  •  budesonide-formoterol (SYMBICORT) 160-4.5 MCG/ACT inhaler 2 puff, 2 puff, Inhalation, BID, Zbigniew Conner MD, 2 puff at 08/05/21 0751  •  famotidine (PEPCID) injection 20 mg, 20 mg, Intravenous, Q12H, Zbigniew Conner MD, 20 mg at 08/04/21 2012  •  HYDROmorphone (DILAUDID) injection 0.5 mg, 0.5 mg, Intravenous, Q2H PRN, Zbigniew Conner MD, 0.5 mg at 08/04/21 1832  •  insulin lispro (ADMELOG) injection 0-7 Units, 0-7 Units, Subcutaneous, TID AC, Zibgniew Conner MD  •  metoprolol succinate XL  (TOPROL-XL) 24 hr tablet 25 mg, 25 mg, Oral, QAM, Zbigniew Conner MD, 25 mg at 08/05/21 0611  •  montelukast (SINGULAIR) tablet 10 mg, 10 mg, Oral, Nightly, Zbigniew Conner MD, 10 mg at 08/04/21 2011  •  ondansetron (ZOFRAN) injection 4 mg, 4 mg, Intravenous, Q6H PRN, Zbigniew Conner MD, 4 mg at 08/03/21 1756  •  oxyCODONE-acetaminophen (PERCOCET) 5-325 MG per tablet 1 tablet, 1 tablet, Oral, Q6H PRN, Zbigniew Conner MD, 1 tablet at 08/05/21 0508  •  piperacillin-tazobactam (ZOSYN) 3.375 g in iso-osmotic dextrose 50 ml (premix), 3.375 g, Intravenous, Q12H, Allan Chowdhury MD, 3.375 g at 08/04/21 2306  •  sodium chloride 0.9 % infusion, 9 mL/hr, Intravenous, Continuous PRN, Zbigniew Conner MD, New Bag at 08/03/21 1624     ASSESSMENT  Anastomotic leak s/p exploratory laparotomy with ileocolonic anastomosis resection and redo ileocolonic anastomosis  Recent right laparoscopic hemicolectomy secondary to tubular adenoma  Status post recent thrombectomy with recurrent thrombosis right AV shunt  End-stage renal disease on hemodialysis  COPD  Diabetes mellitus  Hypertension  Chronic anemia  Gastroesophageal reflux disease    PLAN  CPM  Postop care  Antibiotics  Clear liquid diet  Hemodialysis today  Supportive care  Stress ulcer DVT prophylaxis  General surgery to follow patient  PT/OT  Discussed with nursing staff family and general surgery  Follow closely and further recommendation according to hospital course    ALLAN CHOWDHURY MD

## 2021-08-05 NOTE — PROGRESS NOTES
[facility]     Progress Note    Patient Name: Nellie Vegas  : 1945  MRN: [id number]  Primary Care Physician:  Laurent Cortes DO  Date of admission: 8/3/2021    Subjective   Subjective     Chief Complaint: ESRD    History of Present Illness  Patient on hd t//s s/p bowel resection    Review of Systems    Objective   Objective     Vitals:   Temp:  [97.8 °F (36.6 °C)-98.6 °F (37 °C)] 98.3 °F (36.8 °C)  Heart Rate:  [74-82] 74  Resp:  [16-18] 18  BP: (101-126)/(46-84) 111/57    Physical Exam   General Appearance:  In no acute distress.    HEENT: Normal HEENT exam.     Extremities: .  There is no deformity, effusion or dependent edema.    Neurological: Patient is alert and oriented to person, place and time.    Skin:  Warm and dry.  No rash.       Result Review    Result Review:  I have personally reviewed the results from the time of this admission to 2021 14:15 EDT and agree with these findings:  []  Laboratory  []  Microbiology  []  Radiology  []  EKG/Telemetry   []  Cardiology/Vascular   []  Pathology  []  Old records  []  Other:      Assessment/Plan   Assessment / Plan     Brief Patient Summary:  Nellie Vegas is a 76 y.o. female who has ESRD on hd t/    Active Hospital Problems:  There are no active hospital problems to display for this patient.    Plan:   1. ESRD  2. S/p bowel resection  3. T2DM  4. Hypertension    .patient seen and examined. Labs reviewed. S/p hd today. Doing well. Will continue hd //s    Electronically signed by Rupinder Yanez MD, 21, 2:15 PM EDT.

## 2021-08-05 NOTE — THERAPY EVALUATION
Patient Name: Nellie Vegas  : 1945    MRN: [id number]                              Today's Date: 2021       Admit Date: 8/3/2021    Visit Dx:     ICD-10-CM ICD-9-CM   1. Anastomotic leak of intestine  K91.89 997.49   2. Perihepatic abscess (CMS/[facility])  K65.0 567.22   3. Abdominal pain in female  R10.9 789.00     Patient Active Problem List   Diagnosis   • ESRD (end stage renal disease) on dialysis (CMS/[facility])   • Thrombosis of kidney dialysis arteriovenous graft (CMS/[facility])   • Anemia of chronic renal failure, stage 5 (CMS/[facility])   • COPD exacerbation (CMS/[facility])   • Problem with dialysis access (CMS/[facility])   • Lower GI bleeding   • Colonic mass   • AV shunt thrombosis, subsequent encounter     Past Medical History:   Diagnosis Date   • Anemia    • Anesthesia complication     mother woke up in combative state   • Arthritis     knees   • Asthma    • COPD (chronic obstructive pulmonary disease) (CMS/[facility])    • Diabetes mellitus (CMS/[facility])     type 2   • Dialysis patient (CMS/[facility])    • Disease of thyroid gland    • GERD (gastroesophageal reflux disease)    • Headache     after dialysis   • History of blood clots     BUE   • History of kidney stones    • Hyperlipidemia    • Hypertension    • Knee pain, bilateral    • Renal disease    • Sleep apnea     CPAP   • SOB (shortness of breath)    • Thrombosis of renal dialysis arteriovenous graft (CMS/[facility])    • Weakness      Past Surgical History:   Procedure Laterality Date   • ARTERIOVENOUS FISTULA Right    • ARTERIOVENOUS FISTULA Left     REMOVED   • CENTRAL VENOUS CATHETER TUNNELED INSERTION DOUBLE LUMEN     •  SECTION     • COLON RESECTION Right 2021    Procedure: RIGHT HEMICOLECTOMY LAPAROSCOPIC;  Surgeon: Zbigniew Conner MD;  Location: [facility] OR;  Service: General;  Laterality: Right;   • COLONOSCOPY     • COLONOSCOPY N/A 2021    Procedure: COLONOSCOPY into cecum with biopsy;  Surgeon: Fabricio Monroe MD;  Location: [facility]  ENDOSCOPY;  Service: Gastroenterology;  Laterality: N/A;  cecal mass   • EXPLORATORY LAPAROTOMY N/A 8/3/2021    Procedure: LAPAROTOMY EXPLORATORY, resection of ileocolonic anastomosis with anastomosis;  Surgeon: Zbigniew Conner MD;  [facility];  Service: General;  Laterality: N/A;   • INSERTION HEMODIALYSIS CATHETER Right 7/16/2021    Procedure: VENACAVAGRAM AND HEMODIALYSIS CATHETER INSERTION;  Surgeon: Karson uMller MD;  Location: [facility] 18/19;  Service: Vascular;  Laterality: Right;  Dr Bryant was primary surgeon   • POLYPECTOMY      UTERINE   • SHUNT O GRAM Right 8/9/2019    Procedure: RIGHT ARM DIALYSIS GRAFT revision and THROMBECTOMY;  Surgeon: Thierry Hardy MD;  Location: [facility] 18/19;  Service: Vascular   • SHUNT O GRAM Right 8/22/2019    Procedure: RIGHT ARM DIALYSIS GRAFT THROMBECTOMY WITH STENTING;  Surgeon: Thierry Hardy MD;  Location: [facility] 18/19;  Service: Vascular   • SHUNT O GRAM Right 12/7/2020    Procedure: RIGHT ARM ARTERIOVENOUS SHUNTOGRAM WITH THROMBECTOMY;  Surgeon: Thierry Hardy MD;  Location: [facility] OR 18/19;  Service: Vascular;  Laterality: Right;   • SHUNT O GRAM Right 7/12/2021    Procedure: Right arm dialysis shuntogram with shunt thrombectomy;  Surgeon: Shahram Gallagher MD;  Location: [facility] 18/19;  Service: Vascular;  Laterality: Right;   • SHUNT O GRAM Right 7/19/2021    Procedure: RIGHT UPPER EXTREMITY THROMBECTOMY AND SHUNTOGRAM;  Surgeon: Shahram Gallagher MD;  Location: [facility];  Service: Vascular;  Laterality: Right;     General Information     Row Name 08/05/21 1543          OT Time and Intention    Document Type  evaluation  -BT     Mode of Treatment  individual therapy;occupational therapy  -BT     Row Name 08/05/21 1549          General Information    Patient Profile Reviewed  yes  -BT     Prior Level of Function  independent:;ADL's;transfer with cane  -BT     Existing  Precautions/Restrictions  fall  -BT     Row Name 08/05/21 1543          Living Environment    Lives With  child(paco), adult  -BT     Row Name 08/05/21 1543          Cognition    Orientation Status (Cognition)  oriented x 3  -BT     Row Name 08/05/21 1543          Safety Issues, Functional Mobility    Safety Issues Affecting Function (Mobility)  insight into deficits/self-awareness;awareness of need for assistance  -BT     Impairments Affecting Function (Mobility)  balance;endurance/activity tolerance;strength  -BT       User Key  (r) = Recorded By, (t) = Taken By, (c) = Cosigned By    Initials Name Provider Type    BT Sanaz England OT Occupational Therapist          Mobility/ADL's     Row Name 08/05/21 1543          Bed Mobility    Bed Mobility  supine-sit;sit-supine  -BT     Supine-Sit [place] (Bed Mobility)  moderate assist (50% patient effort);verbal cues  -BT     Sit-Supine [place] (Bed Mobility)  moderate assist (50% patient effort);verbal cues  -BT     Assistive Device (Bed Mobility)  bed rails;head of bed elevated  -BT     Row Name 08/05/21 1543          Transfers    Transfers  sit-stand transfer  -BT     Sit-Stand [place] (Transfers)  contact guard;minimum assist (75% patient effort);verbal cues  -BT     Row Name 08/05/21 1543          Sit-Stand Transfer    Assistive Device (Sit-Stand Transfers)  walker, front-wheeled  -BT     Row Name 08/05/21 1543          Activities of Daily Living    BADL Assessment/Intervention  lower body dressing  -BT     Row Name 08/05/21 1543          Lower Body Dressing Assessment/Training    [place] Level (Lower Body Dressing)  lower body dressing skills;doff;don;socks;standby assist increased time to complete  -BT     Position (Lower Body Dressing)  edge of bed sitting  -BT       User Key  (r) = Recorded By, (t) = Taken By, (c) = Cosigned By    Initials Name Provider Type    BT Sanaz England OT Occupational Therapist        Obj/Interventions     Row Name  08/05/21 1544          Range of Motion Comprehensive    General Range of Motion  no range of motion deficits identified  -BT     Row Name 08/05/21 1544          Strength Comprehensive (MMT)    Comment, General Manual Muscle Testing (MMT) Assessment  generalized weakness  -BT     Row Name 08/05/21 1544          Balance    Balance Assessment  sitting static balance;sitting dynamic balance;standing static balance;standing dynamic balance  -BT     Static Sitting Balance  WFL  -BT     Dynamic Sitting Balance  WFL  -BT     Static Standing Balance  WFL  -BT     Dynamic Standing Balance  mild impairment  -BT     Balance Interventions  sitting;standing;static;dynamic;occupation based/functional task  -BT       User Key  (r) = Recorded By, (t) = Taken By, (c) = Cosigned By    Initials Name Provider Type    BT Sanaz England OT Occupational Therapist        Goals/Plan     [facility] Name 08/05/21 1551          Transfer Goal 1 (OT)    Activity/Assistive Device (Transfer Goal 1, OT)  transfers, all  -BT     [place] Level/Cues Needed (Transfer Goal 1, OT)  supervision required  -BT     Time Frame (Transfer Goal 1, OT)  short term goal (STG);2 weeks  -BT     Progress/Outcome (Transfer Goal 1, OT)  goal ongoing  -BT     [facility] Name 08/05/21 1551          Toileting Goal 1 (OT)    Activity/Device (Toileting Goal 1, OT)  toileting skills, all  -BT     [place] Level/Cues Needed (Toileting Goal 1, OT)  supervision required  -BT     Time Frame (Toileting Goal 1, OT)  short term goal (STG);2 weeks  -BT     Progress/Outcome (Toileting Goal 1, OT)  goal ongoing  -BT     [facility] Name 08/05/21 1551          Strength Goal 1 (OT)    Strength Goal 1 (OT)  Pt to be independent with BUE HEP.  -BT     Time Frame (Strength Goal 1, OT)  by discharge  -BT     Progress/Outcome (Strength Goal 1, OT)  goal ongoing  -BT     Row Name 08/05/21 1551          Therapy Assessment/Plan (OT)    Planned Therapy Interventions (OT)  activity tolerance training;BADL  retraining;occupation/activity based interventions;transfer/mobility retraining;strengthening exercise  -BT       User Key  (r) = Recorded By, (t) = Taken By, (c) = Cosigned By    Initials Name Provider Type    BT Sanaz England OT Occupational Therapist        Clinical Impression     Row Name 08/05/21 0166          Pain Assessment    Additional Documentation  Pain Scale: Numbers Pre/Post-Treatment (Group)  -BT     Row Name 08/05/21 2641          Pain Scale: Numbers Pre/Post-Treatment    Pretreatment Pain Rating  0/10 - no pain  -BT     Posttreatment Pain Rating  0/10 - no pain  -BT     Row Name 08/05/21 0265          Plan of Care Review    Plan of Care Reviewed With  patient  -BT     Outcome Summary  Pt is a 75 yo female admitted with abdominal pain. Pt reports living at home with her daughter and grandaughter and being independent with adls and functional transfers with use of a cane. On this date, pt completed adls with SBA and increased time to complete, and CGA/min for functional transfers with vc's using rwx. Pt demo's decreased strength, balance, and activity tolerance and would benefit from skilled OT services to address these deficits. SNF vs HH upon dc.  -BT     Row Name 08/05/21 7678          Therapy Assessment/Plan (OT)    Rehab Potential (OT)  good, to achieve stated therapy goals  -BT     Criteria for Skilled Therapeutic Interventions Met (OT)  yes  -BT     Therapy Frequency (OT)  3 times/wk  -BT     Row Name 08/05/21 9950          Positioning and Restraints    Pre-Treatment Position  in bed  -BT     Post Treatment Position  bed  -BT     In Bed  supine;call light within reach;encouraged to call for assist;exit alarm on  -BT       User Key  (r) = Recorded By, (t) = Taken By, (c) = Cosigned By    Initials Name Provider Type    BT Sanaz England OT Occupational Therapist        Outcome Measures     Row Name 08/05/21 4465          How much help from another is currently needed...    Putting on and taking  off regular lower body clothing?  2  -BT     Bathing (including washing, rinsing, and drying)  2  -BT     Toileting (which includes using toilet bed pan or urinal)  2  -BT     Putting on and taking off regular upper body clothing  3  -BT     Taking care of personal grooming (such as brushing teeth)  3  -BT     Eating meals  4  -BT     AM-PAC 6 Clicks Score (OT)  16  -BT     Row Name 08/05/21 1552          Functional Assessment    Outcome Measure Options  AM-PAC 6 Clicks Daily Activity (OT)  -BT       User Key  (r) = Recorded By, (t) = Taken By, (c) = Cosigned By    Initials Name Provider Type    BT Sanaz England OT Occupational Therapist          Occupational Therapy Education                 Title: PT OT SLP Therapies (Done)     Topic: Occupational Therapy (Done)     Point: ADL training (Done)     Description:   Instruct learner(s) on proper safety adaptation and remediation techniques during self care or transfers.   Instruct in proper use of assistive devices.              Learning Progress Summary           Patient Acceptance, E, VU by BT at 8/5/2021 1552    Comment: purpose of OT, adl retraining, functional transfer training                   Point: Home exercise program (Done)     Description:   Instruct learner(s) on appropriate technique for monitoring, assisting and/or progressing therapeutic exercises/activities.              Learning Progress Summary           Patient Acceptance, E, VU by BT at 8/5/2021 1552    Comment: purpose of OT, adl retraining, functional transfer training                   Point: Precautions (Done)     Description:   Instruct learner(s) on prescribed precautions during self-care and functional transfers.              Learning Progress Summary           Patient Acceptance, E, VU by BT at 8/5/2021 1552    Comment: purpose of OT, adl retraining, functional transfer training                   Point: Body mechanics (Done)     Description:   Instruct learner(s) on proper positioning and  spine alignment during self-care, functional mobility activities and/or exercises.              Learning Progress Summary           Patient Acceptance, E, VU by BT at 8/5/2021 1552    Comment: purpose of OT, adl retraining, functional transfer training                               User Key     Initials Effective Dates Name Provider Type Discipline    BT 11/16/20 -  Sanaz England OT Occupational Therapist OT              OT Recommendation and Plan  Planned Therapy Interventions (OT): activity tolerance training, BADL retraining, occupation/activity based interventions, transfer/mobility retraining, strengthening exercise  Therapy Frequency (OT): 3 times/wk  Plan of Care Review  Plan of Care Reviewed With: patient  Outcome Summary: Pt is a 77 yo female admitted with abdominal pain. Pt reports living at home with her daughter and grandaughter and being independent with adls and functional transfers with use of a cane. On this date, pt completed adls with SBA and increased time to complete, and CGA/min for functional transfers with vc's using rwx. Pt demo's decreased strength, balance, and activity tolerance and would benefit from skilled OT services to address these deficits. SNF vs HH upon dc.     Time Calculation:   Time Calculation- OT     Row Name 08/05/21 1553             Time Calculation- OT    OT Start Time  1413  -BT      OT Stop Time  1437  -BT      OT Time Calculation (min)  24 min  -BT      Total Timed Code Minutes- OT  19 minute(s)  -BT      OT Received On  08/05/21  -BT      OT Goal Re-Cert Due Date  08/19/21  -BT         Timed Charges    52739 - OT Self Care/Mgmt Minutes  19  -BT         Untimed Charges    OT Eval/Re-eval Minutes  5  -BT         Total Minutes    Timed Charges Total Minutes  19  -BT      Untimed Charges Total Minutes  5  -BT       Total Minutes  24  -BT        User Key  (r) = Recorded By, (t) = Taken By, (c) = Cosigned By    Initials Name Provider Type    BT Sanaz England OT  Occupational Therapist        Therapy Charges for Today     Code Description Service Date Service Provider Modifiers Qty    [id number]  OT SELF CARE/MGMT/TRAIN EA 15 MIN 8/5/2021 Sanaz England OT GO 1    [id number]  OT EVAL LOW COMPLEXITY 2 8/5/2021 Sanaz England OT GO 1               Sanaz England OT  8/5/2021

## 2021-08-05 NOTE — SIGNIFICANT NOTE
08/05/21 0934   OTHER   Discipline physical therapist   Rehab Time/Intention   Session Not Performed patient unavailable for evaluation  (pt off the floor to dialysis, PT will follow up later if time allows)

## 2021-08-05 NOTE — PROGRESS NOTES
Postoperative day 2 status post ex lap resection of ileocolonic anastomosis and reanastomosis    S: No events overnight.  Feeling little bit better with less pain.  Complaining of nausea but no vomiting.  Tolerating clear liquid diet    O:   Vitals:    08/05/21 0751 08/05/21 0820 08/05/21 1215 08/05/21 1315   BP:  120/66 101/53 111/57   BP Location:  Right arm Right arm Left arm   Patient Position:  Lying Lying Lying   Pulse:  75 74    Resp: 18 18 18 18   Temp:  97.8 °F (36.6 °C) 98 °F (36.7 °C) 98.3 °F (36.8 °C)   TempSrc:  Temporal Temporal Oral   SpO2:       Weight:       Height:         Parish drain 200 cc serosanguineous  Alert, no acute distress  Breathing comfortable, regular rate rhythm  Abdomen soft, appropriate tender, nondistended, incisions with wound VAC in place    Blood glucose 50 from 41 from 21 from 18 White blood cell count 9, hemoglobin 8.6    Postop day 2 status post ex lap with resection of ileocolonic anastomosis and ileocolonic anastomosis.  She is doing fine.  Tolerating clear liquid diet.  Having intermittent episodes of nausea.  Abdominal pain is improving.    Labs are okay.  Plan for advance to full liquid diet, increase ambulation, continue hemodialysis

## 2021-08-05 NOTE — PLAN OF CARE
Goal Outcome Evaluation:  Plan of Care Reviewed With: patient           Outcome Summary: Pt is a 75 yo female admitted with abdominal pain. Pt reports living at home with her daughter and grandaughter and being independent with adls and functional transfers with use of a cane. On this date, pt completed adls with SBA and increased time to complete, and CGA/min for functional transfers with vc's using rwx. Pt demo's decreased strength, balance, and activity tolerance and would benefit from skilled OT services to address these deficits. SNF vs HH upon dc.    Pt wore face mask. OT wore all PPE and hand hygiene completed before and after.

## 2021-08-06 ENCOUNTER — HOME CARE VISIT (OUTPATIENT)
Dept: HOME HEALTH SERVICES | Facility: HOME HEALTHCARE | Age: 76
End: 2021-08-06

## 2021-08-06 LAB
ANION GAP SERPL CALCULATED.3IONS-SCNC: 20.2 MMOL/L (ref 5–15)
BACTERIA FLD CULT: ABNORMAL
BACTERIA FLD CULT: ABNORMAL
BASOPHILS # BLD AUTO: 0.03 10*3/MM3 (ref 0–0.2)
BASOPHILS NFR BLD AUTO: 0.3 % (ref 0–1.5)
BUN SERPL-MCNC: 20 MG/DL (ref 8–23)
BUN/CREAT SERPL: 3.8 (ref 7–25)
CALCIUM SPEC-SCNC: 8.3 MG/DL (ref 8.6–10.5)
CHLORIDE SERPL-SCNC: 99 MMOL/L (ref 98–107)
CO2 SERPL-SCNC: 21.8 MMOL/L (ref 22–29)
CREAT SERPL-MCNC: 5.3 MG/DL (ref 0.57–1)
DEPRECATED RDW RBC AUTO: 49.9 FL (ref 37–54)
EOSINOPHIL # BLD AUTO: 0.03 10*3/MM3 (ref 0–0.4)
EOSINOPHIL NFR BLD AUTO: 0.3 % (ref 0.3–6.2)
ERYTHROCYTE [DISTWIDTH] IN BLOOD BY AUTOMATED COUNT: 17.7 % (ref 12.3–15.4)
GFR SERPL CREATININE-BSD FRML MDRD: 10 ML/MIN/1.73
GFR SERPL CREATININE-BSD FRML MDRD: ABNORMAL ML/MIN/{1.73_M2}
GLUCOSE BLDC GLUCOMTR-MCNC: 119 MG/DL (ref 70–130)
GLUCOSE BLDC GLUCOMTR-MCNC: 120 MG/DL (ref 70–130)
GLUCOSE BLDC GLUCOMTR-MCNC: 145 MG/DL (ref 70–130)
GLUCOSE BLDC GLUCOMTR-MCNC: 79 MG/DL (ref 70–130)
GLUCOSE BLDC GLUCOMTR-MCNC: 94 MG/DL (ref 70–130)
GLUCOSE SERPL-MCNC: 121 MG/DL (ref 65–99)
GRAM STN SPEC: ABNORMAL
GRAM STN SPEC: ABNORMAL
HCT VFR BLD AUTO: 26.2 % (ref 34–46.6)
HGB BLD-MCNC: 8.5 G/DL (ref 12–15.9)
IMM GRANULOCYTES # BLD AUTO: 0.41 10*3/MM3 (ref 0–0.05)
IMM GRANULOCYTES NFR BLD AUTO: 4.3 % (ref 0–0.5)
LAB AP CASE REPORT: NORMAL
LYMPHOCYTES # BLD AUTO: 0.4 10*3/MM3 (ref 0.7–3.1)
LYMPHOCYTES NFR BLD AUTO: 4.2 % (ref 19.6–45.3)
MCH RBC QN AUTO: 25.4 PG (ref 26.6–33)
MCHC RBC AUTO-ENTMCNC: 32.4 G/DL (ref 31.5–35.7)
MCV RBC AUTO: 78.2 FL (ref 79–97)
MONOCYTES # BLD AUTO: 0.82 10*3/MM3 (ref 0.1–0.9)
MONOCYTES NFR BLD AUTO: 8.6 % (ref 5–12)
NEUTROPHILS NFR BLD AUTO: 7.85 10*3/MM3 (ref 1.7–7)
NEUTROPHILS NFR BLD AUTO: 82.3 % (ref 42.7–76)
NRBC BLD AUTO-RTO: 0.2 /100 WBC (ref 0–0.2)
PATH REPORT.FINAL DX SPEC: NORMAL
PATH REPORT.GROSS SPEC: NORMAL
PLATELET # BLD AUTO: 368 10*3/MM3 (ref 140–450)
PMV BLD AUTO: 10.1 FL (ref 6–12)
POTASSIUM SERPL-SCNC: 4.7 MMOL/L (ref 3.5–5.2)
RBC # BLD AUTO: 3.35 10*6/MM3 (ref 3.77–5.28)
SODIUM SERPL-SCNC: 141 MMOL/L (ref 136–145)
WBC # BLD AUTO: 9.54 10*3/MM3 (ref 3.4–10.8)

## 2021-08-06 PROCEDURE — 25010000002 HYDROMORPHONE PER 4 MG: Performed by: SURGERY

## 2021-08-06 PROCEDURE — 94799 UNLISTED PULMONARY SVC/PX: CPT

## 2021-08-06 PROCEDURE — 25010000002 ONDANSETRON PER 1 MG: Performed by: SURGERY

## 2021-08-06 PROCEDURE — 94760 N-INVAS EAR/PLS OXIMETRY 1: CPT

## 2021-08-06 PROCEDURE — 85025 COMPLETE CBC W/AUTO DIFF WBC: CPT | Performed by: HOSPITALIST

## 2021-08-06 PROCEDURE — 25010000002 PIPERACILLIN SOD-TAZOBACTAM PER 1 G: Performed by: HOSPITALIST

## 2021-08-06 PROCEDURE — 97162 PT EVAL MOD COMPLEX 30 MIN: CPT

## 2021-08-06 PROCEDURE — 80048 BASIC METABOLIC PNL TOTAL CA: CPT | Performed by: HOSPITALIST

## 2021-08-06 PROCEDURE — 97530 THERAPEUTIC ACTIVITIES: CPT

## 2021-08-06 PROCEDURE — 82962 GLUCOSE BLOOD TEST: CPT

## 2021-08-06 PROCEDURE — 99024 POSTOP FOLLOW-UP VISIT: CPT | Performed by: SURGERY

## 2021-08-06 RX ORDER — PANTOPRAZOLE SODIUM 40 MG/1
40 TABLET, DELAYED RELEASE ORAL
Status: DISCONTINUED | OUTPATIENT
Start: 2021-08-07 | End: 2021-08-08

## 2021-08-06 RX ORDER — ALBUMIN (HUMAN) 12.5 G/50ML
12.5 SOLUTION INTRAVENOUS AS NEEDED
Status: ACTIVE | OUTPATIENT
Start: 2021-08-06 | End: 2021-08-07

## 2021-08-06 RX ADMIN — ONDANSETRON 4 MG: 2 INJECTION INTRAMUSCULAR; INTRAVENOUS at 01:51

## 2021-08-06 RX ADMIN — BUDESONIDE AND FORMOTEROL FUMARATE DIHYDRATE 2 PUFF: 160; 4.5 AEROSOL RESPIRATORY (INHALATION) at 20:55

## 2021-08-06 RX ADMIN — HYDROMORPHONE HYDROCHLORIDE 0.5 MG: 1 INJECTION, SOLUTION INTRAMUSCULAR; INTRAVENOUS; SUBCUTANEOUS at 01:42

## 2021-08-06 RX ADMIN — FAMOTIDINE 20 MG: 20 TABLET, FILM COATED ORAL at 09:11

## 2021-08-06 RX ADMIN — TAZOBACTAM SODIUM AND PIPERACILLIN SODIUM 3.38 G: 375; 3 INJECTION, SOLUTION INTRAVENOUS at 09:53

## 2021-08-06 RX ADMIN — MONTELUKAST SODIUM 10 MG: 10 TABLET, FILM COATED ORAL at 21:06

## 2021-08-06 RX ADMIN — METOPROLOL SUCCINATE 25 MG: 25 TABLET, EXTENDED RELEASE ORAL at 09:11

## 2021-08-06 RX ADMIN — TAZOBACTAM SODIUM AND PIPERACILLIN SODIUM 3.38 G: 375; 3 INJECTION, SOLUTION INTRAVENOUS at 21:06

## 2021-08-06 RX ADMIN — BUDESONIDE AND FORMOTEROL FUMARATE DIHYDRATE 2 PUFF: 160; 4.5 AEROSOL RESPIRATORY (INHALATION) at 10:46

## 2021-08-06 RX ADMIN — HYDROMORPHONE HYDROCHLORIDE 0.5 MG: 1 INJECTION, SOLUTION INTRAMUSCULAR; INTRAVENOUS; SUBCUTANEOUS at 09:53

## 2021-08-06 NOTE — PROGRESS NOTES
Patient is current with [facility]; will follow for post hospital home care needs.Thank you.

## 2021-08-06 NOTE — THERAPY EVALUATION
Patient Name: Nellie Vegas  : 1945    MRN: [id number]                              Today's Date: 2021       Admit Date: 8/3/2021    Visit Dx:     ICD-10-CM ICD-9-CM   1. Anastomotic leak of intestine  K91.89 997.49   2. Perihepatic abscess (CMS/[facility])  K65.0 567.22   3. Abdominal pain in female  R10.9 789.00     Patient Active Problem List   Diagnosis   • ESRD (end stage renal disease) on dialysis (CMS/[facility])   • Thrombosis of kidney dialysis arteriovenous graft (CMS/[facility])   • Anemia of chronic renal failure, stage 5 (CMS/[facility])   • COPD exacerbation (CMS/[facility])   • Problem with dialysis access (CMS/[facility])   • Lower GI bleeding   • Colonic mass   • AV shunt thrombosis, subsequent encounter     Past Medical History:   Diagnosis Date   • Anemia    • Anesthesia complication     mother woke up in combative state   • Arthritis     knees   • Asthma    • COPD (chronic obstructive pulmonary disease) (CMS/[facility])    • Diabetes mellitus (CMS/[facility])     type 2   • Dialysis patient (CMS/[facility])    • Disease of thyroid gland    • GERD (gastroesophageal reflux disease)    • Headache     after dialysis   • History of blood clots     BUE   • History of kidney stones    • Hyperlipidemia    • Hypertension    • Knee pain, bilateral    • Renal disease    • Sleep apnea     CPAP   • SOB (shortness of breath)    • Thrombosis of renal dialysis arteriovenous graft (CMS/[facility])    • Weakness      Past Surgical History:   Procedure Laterality Date   • ARTERIOVENOUS FISTULA Right    • ARTERIOVENOUS FISTULA Left     REMOVED   • CENTRAL VENOUS CATHETER TUNNELED INSERTION DOUBLE LUMEN     •  SECTION     • COLON RESECTION Right 2021    Procedure: RIGHT HEMICOLECTOMY LAPAROSCOPIC;  Surgeon: Zbigniew Conner MD;  Location: [facility] OR;  Service: General;  Laterality: Right;   • COLONOSCOPY     • COLONOSCOPY N/A 2021    Procedure: COLONOSCOPY into cecum with biopsy;  Surgeon: Fabricio Monroe MD;  Location: [facility]  ENDOSCOPY;  Service: Gastroenterology;  Laterality: N/A;  cecal mass   • EXPLORATORY LAPAROTOMY N/A 8/3/2021    Procedure: LAPAROTOMY EXPLORATORY, resection of ileocolonic anastomosis with anastomosis;  Surgeon: Zbigniew Conner MD;  Location: [facility];  Service: General;  Laterality: N/A;   • INSERTION HEMODIALYSIS CATHETER Right 7/16/2021    Procedure: VENACAVAGRAM AND HEMODIALYSIS CATHETER INSERTION;  Surgeon: Karson Muller MD;  Location: [facility] 18/19;  Service: Vascular;  Laterality: Right;  Dr Bryant was primary surgeon   • POLYPECTOMY      UTERINE   • SHUNT O GRAM Right 8/9/2019    Procedure: RIGHT ARM DIALYSIS GRAFT revision and THROMBECTOMY;  Surgeon: Thierry Hardy MD;  Location: [facility] 18/19;  Service: Vascular   • SHUNT O GRAM Right 8/22/2019    Procedure: RIGHT ARM DIALYSIS GRAFT THROMBECTOMY WITH STENTING;  Surgeon: Thierry Hardy MD;  Location: [facility] 18/19;  Service: Vascular   • SHUNT O GRAM Right 12/7/2020    Procedure: RIGHT ARM ARTERIOVENOUS SHUNTOGRAM WITH THROMBECTOMY;  Surgeon: Thierry Hardy MD;  [facility];  Service: Vascular;  Laterality: Right;   • SHUNT O GRAM Right 7/12/2021    Procedure: Right arm dialysis shuntogram with shunt thrombectomy;  Surgeon: Shahram Gallagher MD;  Location: [facility] 18/19;  Service: Vascular;  Laterality: Right;   • SHUNT O GRAM Right 7/19/2021    Procedure: RIGHT UPPER EXTREMITY THROMBECTOMY AND SHUNTOGRAM;  Surgeon: Shahram Gallagher MD;  Location: [facility];  Service: Vascular;  Laterality: Right;     General Information     Row Name 08/06/21 1612          Physical Therapy Time and Intention    Document Type  evaluation  -AE     Mode of Treatment  individual therapy;physical therapy  -AE     Row Name 08/06/21 1612          General Information    Patient Profile Reviewed  yes  -AE     Prior Level of Function  independent:;ADL's;transfer  -AE     Existing  Precautions/Restrictions  fall  -AE     Barriers to Rehab  previous functional deficit;cognitive status;hearing deficit  -AE     Row Name 08/06/21 1612          Living Environment    Lives With  child(paco), adult  -AE     Row Name 08/06/21 1612          Cognition    Orientation Status (Cognition)  oriented to;person  -AE     Row Name 08/06/21 1612          Safety Issues, Functional Mobility    Impairments Affecting Function (Mobility)  balance;cognition;endurance/activity tolerance;pain;strength  -AE       User Key  (r) = Recorded By, (t) = Taken By, (c) = Cosigned By    Initials Name Provider Type    AE Beronica Leslie PT Physical Therapist        Mobility     Row Name 08/06/21 1613          Bed Mobility    Bed Mobility  supine-sit  -AE     Supine-Sit [place] (Bed Mobility)  minimum assist (75% patient effort);moderate assist (50% patient effort);1 person assist  -AE     Sit-Supine [place] (Bed Mobility)  minimum assist (75% patient effort);1 person assist  -AE     Row Name 08/06/21 1613          Transfers    Comment (Transfers)  [place] sit<>stand with FWW, [place] x 2 for weight shifting side steps along EOB  -AE     Row Name 08/06/21 1613          Bed-Chair Transfer    Bed-Chair [place] (Transfers)  minimum assist (75% patient effort);2 person assist  -AE     Assistive Device (Bed-Chair Transfers)  walker, front-wheeled  -AE     Row Name 08/06/21 1613          Sit-Stand Transfer    Sit-Stand [place] (Transfers)  minimum assist (75% patient effort);1 person assist  -AE     Assistive Device (Sit-Stand Transfers)  walker, front-wheeled  -AE     Row Name 08/06/21 1613          Gait/Stairs (Locomotion)    [place] Level (Gait)  minimum assist (75% patient effort);2 person assist  -AE     Assistive Device (Gait)  walker, front-wheeled  -AE     Distance in Feet (Gait)  4-5 steps along EOB  -AE       User Key  (r) = Recorded By, (t) = Taken By, (c) = Cosigned By    Initials Name Provider Type    AE  Beronica Leslie PT Physical Therapist        Obj/Interventions     Row Name 08/06/21 1618          Range of Motion Comprehensive    Comment, General Range of Motion  BLE WFL  -AE     Row Name 08/06/21 1618          Strength Comprehensive (MMT)    Comment, General Manual Muscle Testing (MMT) Assessment  generalized weakness  -AE       User Key  (r) = Recorded By, (t) = Taken By, (c) = Cosigned By    Initials Name Provider Type    AE Beronica Leslie PT Physical Therapist        Goals/Plan     Row Name 08/06/21 1646          Bed Mobility Goal 1 (PT)    Activity/Assistive Device (Bed Mobility Goal 1, PT)  bed mobility activities, all  -AE     [place] Level/Cues Needed (Bed Mobility Goal 1, PT)  contact guard assist;1 person assist  -AE     Time Frame (Bed Mobility Goal 1, PT)  2 weeks  -AE     Progress/Outcomes (Bed Mobility Goal 1, PT)  continuing progress toward goal  -AE     Row Name 08/06/21 1646          Transfer Goal 1 (PT)    Activity/Assistive Device (Transfer Goal 1, PT)  transfers, all  -AE     [place] Level/Cues Needed (Transfer Goal 1, PT)  standby assist  -AE     Time Frame (Transfer Goal 1, PT)  2 weeks  -AE     Progress/Outcome (Transfer Goal 1, PT)  continuing progress toward goal  -AE     Row Name 08/06/21 1646          Gait Training Goal 1 (PT)    Activity/Assistive Device (Gait Training Goal 1, PT)  gait (walking locomotion);assistive device use  -AE     [place] Level (Gait Training Goal 1, PT)  standby assist  -AE     Distance (Gait Training Goal 1, PT)  100ft  -AE     Time Frame (Gait Training Goal 1, PT)  2 weeks  -AE     Progress/Outcome (Gait Training Goal 1, PT)  continuing progress toward goal  -AE       User Key  (r) = Recorded By, (t) = Taken By, (c) = Cosigned By    Initials Name Provider Type    AE Beronica Leslie PT Physical Therapist        Clinical Impression     Row Name 08/06/21 1618          Pain    Additional Documentation  Pain Scale: Numbers  Pre/Post-Treatment (Group)  -AE     Row Name 08/06/21 1618          Pain Scale: Numbers Pre/Post-Treatment    Pretreatment Pain Rating  5/10  -AE     Posttreatment Pain Rating  5/10  -AE     Pain Location - Orientation  incisional  -AE     Pain Location  abdomen  -AE     Pain Intervention(s)  Repositioned;Ambulation/increased activity;Rest  -AE     Row Name 08/06/21 1618          Plan of Care Review    Plan of Care Reviewed With  patient  -AE     Row Name 08/06/21 1618          Therapy Assessment/Plan (PT)    Patient/Family Therapy Goals Statement (PT)  wants to be as independent as possible  -AE     Rehab Potential (PT)  good, to achieve stated therapy goals  -AE     Criteria for Skilled Interventions Met (PT)  yes;meets criteria  -AE     Row Name 08/06/21 1618          Positioning and Restraints    Pre-Treatment Position  in bed  -AE     Post Treatment Position  bed  -AE     In Bed  supine;call light within reach;encouraged to call for assist;exit alarm on  -AE       User Key  (r) = Recorded By, (t) = Taken By, (c) = Cosigned By    Initials Name Provider Type    AE Beronica Leslie, PT Physical Therapist        Outcome Measures     Row Name 08/06/21 1619          How much help from another person do you currently need...    Turning from your back to your side while in flat bed without using bedrails?  3  -AE     Moving from lying on back to sitting on the side of a flat bed without bedrails?  2  -AE     Moving to and from a bed to a chair (including a wheelchair)?  3  -AE     Standing up from a chair using your arms (e.g., wheelchair, bedside chair)?  3  -AE     Climbing 3-5 steps with a railing?  1  -AE     To walk in hospital room?  2  -AE     AM-PAC 6 Clicks Score (PT)  14  -AE     Row Name 08/06/21 1619          Functional Assessment    Outcome Measure Options  AM-PAC 6 Clicks Basic Mobility (PT)  -AE       User Key  (r) = Recorded By, (t) = Taken By, (c) = Cosigned By    Initials Name Provider Type    AE  Beronica Leslie PT Physical Therapist                       Physical Therapy Education                 Title: PT OT SLP Therapies (Done)     Topic: Physical Therapy (Done)     Point: Mobility training (Done)     Learning Progress Summary           Patient Acceptance, E,TB, VU,NR by AE at 8/6/2021 1619                   Point: Home exercise program (Done)     Learning Progress Summary           Patient Acceptance, E,TB, VU,NR by AE at 8/6/2021 1619                   Point: Body mechanics (Done)     Learning Progress Summary           Patient Acceptance, E,TB, VU,NR by AE at 8/6/2021 1619                   Point: Precautions (Done)     Learning Progress Summary           Patient Acceptance, E,TB, VU,NR by AE at 8/6/2021 1619                               User Key     Initials Effective Dates Name Provider Type Discipline    AE 06/16/21 -  Beronica Leslie PT Physical Therapist PT              PT Recommendation and Plan  Planned Therapy Interventions (PT): balance training, bed mobility training, gait training, home exercise program, motor coordination training, neuromuscular re-education, patient/family education, postural re-education, ROM (range of motion), stair training, strengthening, stretching, transfer training  Plan of Care Reviewed With: patient     Time Calculation:   PT Charges     Row Name 08/06/21 1645 08/06/21 1218          Time Calculation    Start Time  1130  -AE  --     Stop Time  1155  -AE  --     Time Calculation (min)  25 min  -AE  --     PT Received On  --  08/06/21  -AE     PT - Next Appointment  --  08/07/21  -AE     PT Goal Re-Cert Due Date  08/20/21  -AE  --        Time Calculation- PT    Total Timed Code Minutes- PT  20 minute(s)  -AE  --       User Key  (r) = Recorded By, (t) = Taken By, (c) = Cosigned By    Initials Name Provider Type    AE Beronica Leslie PT Physical Therapist        Therapy Charges for Today     Code Description Service Date Service Provider Modifiers Qty     90998446926 HC PT EVAL MOD COMPLEXITY 2 8/6/2021 Beronica Leslie, PT GP 1    44793665303 HC PT THERAPEUTIC ACT EA 15 MIN 8/6/2021 Beronica Leslie, PT GP 1    63667179910 HC PT THER SUPP EA 15 MIN 8/6/2021 Beronica Leslie, PT GP 1          PT G-Codes  Outcome Measure Options: AM-PAC 6 Clicks Basic Mobility (PT)  AM-PAC 6 Clicks Score (PT): 14  AM-PAC 6 Clicks Score (OT): 16    Beronica Leslie PT  8/6/2021

## 2021-08-06 NOTE — PLAN OF CARE
Problem: Adult Inpatient Plan of Care  Goal: Plan of Care Review  Outcome: Ongoing, Progressing  Flowsheets (Taken 8/6/2021 1818)  Progress: improving  Plan of Care Reviewed With: patient  Goal: Patient-Specific Goal (Individualized)  Outcome: Ongoing, Progressing  Goal: Absence of Hospital-Acquired Illness or Injury  Outcome: Ongoing, Progressing  Intervention: Identify and Manage Fall Risk  Recent Flowsheet Documentation  Taken 8/6/2021 1457 by Radha Franklin RN  Safety Promotion/Fall Prevention: safety round/check completed  Taken 8/6/2021 0830 by Radha Franklin RN  Safety Promotion/Fall Prevention: safety round/check completed  Intervention: Prevent Skin Injury  Recent Flowsheet Documentation  Taken 8/6/2021 1457 by Radha Franklin RN  Body Position: position maintained  Taken 8/6/2021 0830 by Radha Franklin RN  Body Position: side-lying, left  Skin Protection: adhesive use limited  Intervention: Prevent and Manage VTE (venous thromboembolism) Risk  Recent Flowsheet Documentation  Taken 8/6/2021 0830 by Radha Franklin RN  VTE Prevention/Management:   bilateral   dorsiflexion/plantar flexion performed   sequential compression devices on  Intervention: Prevent Infection  Recent Flowsheet Documentation  Taken 8/6/2021 0830 by Radha Franklin RN  Infection Prevention: hand hygiene promoted  Goal: Optimal Comfort and Wellbeing  Outcome: Ongoing, Progressing  Goal: Readiness for Transition of Care  Outcome: Ongoing, Progressing   Goal Outcome Evaluation:  Plan of Care Reviewed With: patient        Progress: improving

## 2021-08-06 NOTE — PROGRESS NOTES
Postoperative day 3 status post ex lap resection of ileocolonic anastomosis and reanastomosis    S: No events overnight.  Feeling a bit better.  Continues to have abdominal tenderness that has been improving slowly.  Has been tolerating full liquid diet.  Had 1 bowel movement    O:   Vitals:    08/06/21 0100 08/06/21 0725 08/06/21 1046 08/06/21 1312   BP:  124/69  124/66   BP Location:  Left arm  Left arm   Patient Position:  Lying  Lying   Pulse:  95 86 87   Resp:  18 18 18   Temp:  98.7 °F (37.1 °C)  98.3 °F (36.8 °C)   TempSrc:  Oral  Oral   SpO2: 97% 94% 95% 97%   Weight:       Height:         Parish drain with minimal output  Alert oriented x3, no acute distress  Abdomen soft, appropriate tender, nondistended, tissue with wound VAC in place    White blood cell count 9.5, hemoglobin 8.5, platelets 368  All other labs stable    Final Diagnosis   1. Ileocolic Anastomosis, Resection:               A. Anastomosis with mesenteric abscess formation, suture granuloma, fat necrosis, and serositis.               B. Serositis extends to margins of resection     Assessment and plan    76-year-old female with anastomotic leak after right hemicolectomy.  She is doing much better.  Tolerating full liquid diet, having bowel function.  She has been afebrile.  Abdominal pain has significantly improved.  Minimal output from drain    -Plan to advance her diet to renal diet  -Remove [place] drain  -SCDs  -Complete 5 days of antibiotics  -Switch to Protonix 40 mg daily instead of famotidine    Zbigniew Conner MD, FACS  General, Minimally Invasive and Endoscopic Surgery  [facility]    [address] [place] [address], [address]  [place], [place], [address]  P: [phone]  F: [phone]

## 2021-08-06 NOTE — PROGRESS NOTES
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  Patient will be taken to the OR this afternoon with Dr. Conner.  Patient admitted to our service and will be followed by nephrology for dialysis.     REVIEW OF SYSTEMS  Unremarkable except abdominal pain     PHYSICAL EXAM  Blood pressure 124/69, pulse 86, temperature 98.7 °F (37.1 °C), temperature source Oral, resp. rate 18, height 152.4 cm (60\"), weight 76.6 kg (168 lb 14.4 oz), SpO2 95 %, not currently breastfeeding.    GENERAL: alert well developed, well nourished in no distress  HENT: NCAT, neck supple, trachea midline  EYES: no scleral icterus, PERRL, normal conjunctivae  CV: regular rhythm, regular rate, no murmur  RESPIRATORY: unlabored effort, CTAB  ABDOMEN: soft, midline vertically oriented abdominal incision with staple closure. No dehiscence noted. No expressible drainage or carol incisional induration.  There is no erythema of the skin. Carol incisional region is exquisitely tender but theree is no rebound. Nondistended, bowel sounds present  MUSCULOSKELETAL: no gross deformity  NEURO: alert,  sensory and motor function of extremities grossly intact, speech clear, mental status " normal/baseline  SKIN: warm, dry, no rash  PSYCH:  Appropriate mood and affect     LAB RESULTS  Lab Results (last 24 hours)     Procedure Component Value Units Date/Time    Anaerobic Culture - Peritoneal Fluid, Peritoneum [[id number]] Collected: 08/03/21 1652    Specimen: Peritoneal Fluid from Peritoneum Updated: 08/06/21 0954     Anaerobic Culture No anaerobes isolated at 3 days    Body Fluid Culture - Peritoneal Fluid, Peritoneum [[id number]]  (Abnormal)  (Susceptibility) Collected: 08/03/21 1652    Specimen: Peritoneal Fluid from Peritoneum Updated: 08/06/21 0649     Body Fluid Culture Scant growth (1+) Escherichia coli      Rare Klebsiella pneumoniae ssp pneumoniae     Gram Stain Few (2+) WBCs seen      Occasional Gram negative bacilli    Susceptibility      Escherichia coli Klebsiella pneumoniae ssp pneumoniae      HARDEEP HARDEEP      Ampicillin Susceptible Resistant      Ampicillin + Sulbactam Susceptible Susceptible      Cefepime Susceptible Susceptible      Ceftazidime Susceptible Susceptible      Ceftriaxone Susceptible Susceptible      Gentamicin Susceptible Susceptible      Levofloxacin Susceptible Susceptible      Piperacillin + Tazobactam Susceptible Susceptible      Trimethoprim + Sulfamethoxazole Susceptible Susceptible                 Linear View               Susceptibility Comments     Escherichia coli    Cefazolin sensitivity will not be reported for Enterobacteriaceae in non-urine isolates. If cefazolin is preferred, please call the microbiology lab to request an E-test.  With the exception of urinary-sourced infections, aminoglycosides should not be used as monotherapy.    Klebsiella pneumoniae ssp pneumoniae    Cefazolin sensitivity will not be reported for Enterobacteriaceae in non-urine isolates. If cefazolin is preferred, please call the microbiology lab to request an E-test.  With the exception of urinary-sourced infections, aminoglycosides should not be used as monotherapy.             POC Glucose  Once [[id number]]  (Abnormal) Collected: 08/06/21 0638    Specimen: Blood Updated: 08/06/21 0640     Glucose 145 mg/dL      Comment: Meter: [id number] : [id number] Yehuda SCHWARTZ       Basic Metabolic Panel [[id number]]  (Abnormal) Collected: 08/06/21 0346    Specimen: Blood Updated: 08/06/21 0533     Glucose 121 mg/dL      BUN 20 mg/dL      Creatinine 5.30 mg/dL      Sodium 141 mmol/L      Potassium 4.7 mmol/L      Comment: Slight hemolysis detected by analyzer. Results may be affected.        Chloride 99 mmol/L      CO2 21.8 mmol/L      Calcium 8.3 mg/dL      eGFR  African Amer 10 mL/min/1.73      Comment: <15 Indicative of kidney failure.        eGFR Non  Amer --     Comment: <15 Indicative of kidney failure.        BUN/Creatinine Ratio 3.8     Anion Gap 20.2 mmol/L     Narrative:      GFR Normal >60  Chronic Kidney Disease <60  Kidney Failure <15      CBC & Differential [[id number]]  (Abnormal) Collected: 08/06/21 0346    Specimen: Blood Updated: 08/06/21 0413    Narrative:      The following orders were created for panel order CBC & Differential.  Procedure                               Abnormality         Status                     ---------                               -----------         ------                     CBC Auto Differential[[id number]]        Abnormal            Final result                 Please view results for these tests on the individual orders.    CBC Auto Differential [[id number]]  (Abnormal) Collected: 08/06/21 0346    Specimen: Blood Updated: 08/06/21 0413     WBC 9.54 10*3/mm3      RBC 3.35 10*6/mm3      Hemoglobin 8.5 g/dL      Hematocrit 26.2 %      MCV 78.2 fL      MCH 25.4 pg      MCHC 32.4 g/dL      RDW 17.7 %      RDW-SD 49.9 fl      MPV 10.1 fL      Platelets 368 10*3/mm3      Neutrophil % 82.3 %      Lymphocyte % 4.2 %      Monocyte % 8.6 %      Eosinophil % 0.3 %      Basophil % 0.3 %      Immature Grans % 4.3 %      Neutrophils, Absolute 7.85 10*3/mm3       Lymphocytes, Absolute 0.40 10*3/mm3      Monocytes, Absolute 0.82 10*3/mm3      Eosinophils, Absolute 0.03 10*3/mm3      Basophils, Absolute 0.03 10*3/mm3      Immature Grans, Absolute 0.41 10*3/mm3      nRBC 0.2 /100 WBC     POC Glucose Once [[id number]]  (Normal) Collected: 08/05/21 2100    Specimen: Blood Updated: 08/05/21 2101     Glucose 116 mg/dL      Comment: Meter: [id number] : [id number] Yehuda SCHWARTZ       POC Glucose Once [[id number]]  (Abnormal) Collected: 08/05/21 1714    Specimen: Blood Updated: 08/05/21 1715     Glucose 154 mg/dL      Comment: Meter: [id number] : [id number] Ling Garcia RN       POC Glucose Once [[id number]]  (Abnormal) Collected: 08/05/21 1537    Specimen: Blood Updated: 08/05/21 1540     Glucose 37 mg/dL      Comment: Result Not Confirmed Meter: [id number] : [id number] Gordy Jimenez CNA       POC Glucose Once [[id number]]  (Abnormal) Collected: 08/05/21 1450    Specimen: Blood Updated: 08/05/21 1501     Glucose 44 mg/dL      Comment: RN Notified R and V Meter: [id number] : [id number] Ferdinand Pacheco RN           Imaging Results (Last 24 Hours)     ** No results found for the last 24 hours. **          Current Facility-Administered Medications:   •  albuterol (PROVENTIL) nebulizer solution 0.083% 2.5 mg/3mL, 2.5 mg, Nebulization, TID PRN, Zbigniew Conner MD  •  budesonide-formoterol (SYMBICORT) 160-4.5 MCG/ACT inhaler 2 puff, 2 puff, Inhalation, BID, Zbigniew Conner MD, 2 puff at 08/06/21 1046  •  dextrose (D50W) 25 g/ 50mL Intravenous Solution 25 g, 25 g, Intravenous, Q15 Min PRN, Haja Chowdhury MD, 25 g at 08/05/21 1552  •  dextrose (GLUTOSE) oral gel 15 g, 15 g, Oral, Q15 Min PRN, Haja Chowdhury MD  •  famotidine (PEPCID) tablet 20 mg, 20 mg, Oral, BID, Haja Chowdhury MD, 20 mg at 08/06/21 0911  •  glucagon (human recombinant) (GLUCAGEN DIAGNOSTIC) injection 1 mg, 1 mg, Subcutaneous, Q15 Min PRN, Haja Chowdhury MD  •  HYDROmorphone (DILAUDID)  injection 0.5 mg, 0.5 mg, Intravenous, Q2H PRN, Zbigniew Conner MD, 0.5 mg at 08/06/21 0953  •  insulin lispro (ADMELOG) injection 0-7 Units, 0-7 Units, Subcutaneous, TID AC, Zbigniew Conner MD  •  metoprolol succinate XL (TOPROL-XL) 24 hr tablet 25 mg, 25 mg, Oral, QAM, Zbigniew Conner MD, 25 mg at 08/06/21 0911  •  montelukast (SINGULAIR) tablet 10 mg, 10 mg, Oral, Nightly, Zbigniew Conner MD, 10 mg at 08/05/21 2114  •  ondansetron (ZOFRAN) injection 4 mg, 4 mg, Intravenous, Q6H PRN, Zbigniew Conner MD, 4 mg at 08/06/21 0151  •  oxyCODONE-acetaminophen (PERCOCET) 5-325 MG per tablet 1 tablet, 1 tablet, Oral, Q6H PRN, Zbigniew Conner MD, 1 tablet at 08/05/21 0508  •  piperacillin-tazobactam (ZOSYN) 3.375 g in iso-osmotic dextrose 50 ml (premix), 3.375 g, Intravenous, Q12H, Allan Chowdhury MD, 3.375 g at 08/06/21 0953  •  sodium chloride 0.9 % infusion, 9 mL/hr, Intravenous, Continuous PRN, Zbigniew Conner MD, New Bag at 08/03/21 1624     ASSESSMENT  Anastomotic leak s/p exploratory laparotomy with ileocolonic anastomosis resection and redo ileocolonic anastomosis  Recent right laparoscopic hemicolectomy secondary to tubular adenoma  Status post recent thrombectomy with recurrent thrombosis right AV shunt  End-stage renal disease on hemodialysis  COPD  Diabetes mellitus  Hypertension  Chronic anemia  Gastroesophageal reflux disease    PLAN  CPM  Postop care  Continue antibiotics  Clear liquid diet and advance as tolerated  Hemodialysis TIW  Supportive care  Stress ulcer DVT prophylaxis  General surgery to follow patient  PT/OT  Discussed with nursing staff family and general surgery  Follow closely and further recommendation according to hospital course    ALLAN CHOWDHURY MD

## 2021-08-06 NOTE — PLAN OF CARE
Pt is a 76 y.o. female with ESRD on HD T/R/Sa admitted after bowel resection. Pt recently admitted and underwent laparoscopic right hemicolectomy on 7/9/2021. Doing well at home however complaining of worsening pain, found to have anastomosis consistent with anastomotic leak, now POD3 status post ex lap resection of ileocolonic anastomosis and reanastomosis. Pt intermittently confused during evaluation, oriented to self only but easily redirected. Pt states she lives with daughter in [facility] with no steps to enter. Owns a walker and elevated commode (not sure as to accuracy of this). Pt presently with wound vac and catheter. Slow for all mobility. Required modA to sit EOB. Dax for transfers. Only able to ambulate 4-5 shuffled steps along EOB due to weakness and fatigue. Skilled PT needed to address above impairments. DC recs [facility] however patient wanting home with family to assist. Would be safe for DC home if has 24hr supervision/assist. Will continue to progress as tolerated.      Problem: Adult Inpatient Plan of Care  Goal: Plan of Care Review  Outcome: Ongoing, Progressing  Flowsheets (Taken 8/6/2021 1618)  Plan of Care Reviewed With: patient   Goal Outcome Evaluation:  Plan of Care Reviewed With: patient

## 2021-08-06 NOTE — NURSING NOTE
08/06/21 0920   Wound 08/03/21 1648 midline abdomen Incision   Placement Date/Time: 08/03/21 1648   Present on Hospital Admission: No  Orientation: midline  Location: abdomen  Primary Wound Type: Incision   Base dry;pink;yellow   Periwound intact;dry   Periwound Temperature warm   Periwound Skin Turgor soft   Edges open   Wound Length (cm) 12 cm   Wound Width (cm) 6.5 cm   Wound Depth (cm) 2.5 cm   Drainage Amount none   Care, Wound cleansed with;sterile normal saline;negative pressure wound therapy   Dressing Care dressing changed   Periwound Care barrier film applied   NPWT (Negative Pressure Wound Therapy) 08/04/21 1101 abdomen   Placement Date/Time: 08/04/21 1101   Location: abdomen   Therapy Setting continuous therapy   Dressing foam, black;transparent dressing   Contact Layer silicone   Pressure Setting 125 mmHg   Sponges Inserted 1   Sponges Removed 1   Finger sweep complete Yes     CWON note: pt seen for follow up NPWT dressing change to abdominal wound. Dressing was difficult to remove, requiring large amount of NS to moistened the foam. 1 suture was visible in wound base, so I opted to use a contact layer of Versatel to make dressing changes easier and protect the exposed suture. Pt tolerated dressing change well with use of IV dilaudid prior to dressing. Continue dressing changes M-W-F

## 2021-08-06 NOTE — PROGRESS NOTES
Continued Stay Note  [facility]     Patient Name: Nellie Vegas  MRN: [id number]  Today's Date: 8/6/2021    Admit Date: 8/3/2021    Discharge Plan     Row Name 08/06/21 1419       Plan    Plan  Home with family and [facility] HH with [facility] for wound vac and will continue her HD at [facility]    Plan Comments  Faxed requested information and order to Marley with [employer] for wound vac therapy, fax # [phone].  Spoke with Marley at [phone] and notified that Dr Conner plans DC over the weekend.  ..............................Petrona Leon RN    Row Name 08/06/21 1328       Plan    Plan  Home with family and [facility] HH with wound vac care referral for [employer] and continue [facility]    Plan Comments  Spoke with Tianna with [facility] and she is following and accepts for HH.  Have signed orders for [employer] for wound vac therapy and VM for Marley Lawson covering for [employer] today.  Dr Conner states possible dc over the weekend......................Petrona Leon RN        Discharge Codes    No documentation.             Petrona Leon RN

## 2021-08-06 NOTE — DISCHARGE PLACEMENT REQUEST
"Nellie Devries (76 y.o. Female)     Date of Birth Social Security Number Address Home Phone MRN    1945  [address] [place]   [place] [address] [phone] [id number]    Yarsani Marital Status          Non-Latter-day        Admission Date Admission Type Admitting Provider Attending Provider Department, Room/Bed    8/3/21 Emergency Haja Chowdhury MD Ahmed, Aftab, MD [address], [address]    Discharge Date Discharge Disposition Discharge Destination                       Attending Provider: Haja Chowdhury MD    Allergies: Sulfa Antibiotics, Latex    Isolation: None   Infection: None   Code Status: CPR    Ht: 152.4 cm (60\")   Wt: 76.6 kg (168 lb 14.4 oz)    Admission Cmt: None   Principal Problem: Anastomotic leak of intestine [K91.89] More...                 Active Insurance as of 8/3/2021     Primary Coverage     Payor Plan Insurance Group Employer/Plan Group    HUMANA MEDICARE REPLACEMENT HUMANA MEDICARE REPLACEMENT [id number]     Payor Plan Address Payor Plan Phone Number Payor Plan Fax Number Effective Dates    [address] [address] [phone]  7/1/2021 - None Entered    [facility] [id number]       Subscriber Name Subscriber Birth Date Member ID       NELLIE DEVRIES 1945 [id number]           Secondary Coverage     Payor Plan Insurance Group Employer/Plan Group    [place]      Payor Plan Address Payor Plan Phone Number Payor Plan Fax Number Effective Dates    [address] [address]   11/1/2019 - None Entered    [place] [address]       Subscriber Name Subscriber Birth Date Member ID       NELLIE DEVRIES 1945 [id number]                 Emergency Contacts      (Rel.) Home Phone Work Phone Mobile Phone    KAYCE CHRISTINE (Daughter) [phone] -- [phone]    HENNY KELSEY (Daughter) [phone] -- [phone]    KayceHeidi (Daughter) [phone] -- [phone]            "

## 2021-08-06 NOTE — PROGRESS NOTES
Continued Stay Note  [facility]     Patient Name: Nellie Vegas  MRN: [id number]  Today's Date: 8/6/2021    Admit Date: 8/3/2021    Discharge Plan     Row Name 08/06/21 1320       Plan    Plan  Home with family and [facility] with wound vac care referral for [facility] and continue [facility]    Plan Comments  Spoke with Tianna with [facility] and she is following and accepts for HH.  Have signed orders for [employer] for wound vac therapy and  for Marleyfletcher Alonsoes covering for [employer] today.  Dr Conner states possible dc over the weekend......................Petrona Leon RN        Discharge Codes    No documentation.             Petrona Leon, RN

## 2021-08-06 NOTE — PLAN OF CARE
Goal Outcome Evaluation:      VSS on room air, moving self in bed, blood sugar stable, prn pain med given only x1 per pt request, abt, wound vac patent, belching but no flatulence.

## 2021-08-07 LAB
ANION GAP SERPL CALCULATED.3IONS-SCNC: 17.7 MMOL/L (ref 5–15)
ANISOCYTOSIS BLD QL: ABNORMAL
BUN SERPL-MCNC: 32 MG/DL (ref 8–23)
BUN/CREAT SERPL: 4.7 (ref 7–25)
BURR CELLS BLD QL SMEAR: ABNORMAL
CALCIUM SPEC-SCNC: 8.2 MG/DL (ref 8.6–10.5)
CHLORIDE SERPL-SCNC: 99 MMOL/L (ref 98–107)
CO2 SERPL-SCNC: 22.3 MMOL/L (ref 22–29)
CREAT SERPL-MCNC: 6.81 MG/DL (ref 0.57–1)
DEPRECATED RDW RBC AUTO: 51.5 FL (ref 37–54)
ELLIPTOCYTES BLD QL SMEAR: ABNORMAL
ERYTHROCYTE [DISTWIDTH] IN BLOOD BY AUTOMATED COUNT: 17.8 % (ref 12.3–15.4)
GFR SERPL CREATININE-BSD FRML MDRD: 7 ML/MIN/1.73
GFR SERPL CREATININE-BSD FRML MDRD: ABNORMAL ML/MIN/{1.73_M2}
GLUCOSE BLDC GLUCOMTR-MCNC: 100 MG/DL (ref 70–130)
GLUCOSE BLDC GLUCOMTR-MCNC: 110 MG/DL (ref 70–130)
GLUCOSE BLDC GLUCOMTR-MCNC: 114 MG/DL (ref 70–130)
GLUCOSE BLDC GLUCOMTR-MCNC: 96 MG/DL (ref 70–130)
GLUCOSE SERPL-MCNC: 94 MG/DL (ref 65–99)
HCT VFR BLD AUTO: 27.8 % (ref 34–46.6)
HGB BLD-MCNC: 8.8 G/DL (ref 12–15.9)
LYMPHOCYTES # BLD MANUAL: 0.31 10*3/MM3 (ref 0.7–3.1)
LYMPHOCYTES NFR BLD MANUAL: 12.5 % (ref 5–12)
LYMPHOCYTES NFR BLD MANUAL: 4.2 % (ref 19.6–45.3)
MACROCYTES BLD QL SMEAR: ABNORMAL
MCH RBC QN AUTO: 25.3 PG (ref 26.6–33)
MCHC RBC AUTO-ENTMCNC: 31.7 G/DL (ref 31.5–35.7)
MCV RBC AUTO: 79.9 FL (ref 79–97)
MICROCYTES BLD QL: ABNORMAL
MONOCYTES # BLD AUTO: 0.92 10*3/MM3 (ref 0.1–0.9)
NEUTROPHILS # BLD AUTO: 6.12 10*3/MM3 (ref 1.7–7)
NEUTROPHILS NFR BLD MANUAL: 83.3 % (ref 42.7–76)
NRBC BLD AUTO-RTO: 0.5 /100 WBC (ref 0–0.2)
NRBC SPEC MANUAL: 1 /100 WBC (ref 0–0.2)
PLAT MORPH BLD: NORMAL
PLATELET # BLD AUTO: 416 10*3/MM3 (ref 140–450)
PMV BLD AUTO: 10 FL (ref 6–12)
POIKILOCYTOSIS BLD QL SMEAR: ABNORMAL
POLYCHROMASIA BLD QL SMEAR: ABNORMAL
POTASSIUM SERPL-SCNC: 4.1 MMOL/L (ref 3.5–5.2)
RBC # BLD AUTO: 3.48 10*6/MM3 (ref 3.77–5.28)
SCHISTOCYTES BLD QL SMEAR: ABNORMAL
SODIUM SERPL-SCNC: 139 MMOL/L (ref 136–145)
WBC # BLD AUTO: 7.35 10*3/MM3 (ref 3.4–10.8)
WBC MORPH BLD: NORMAL

## 2021-08-07 PROCEDURE — 85025 COMPLETE CBC W/AUTO DIFF WBC: CPT | Performed by: HOSPITALIST

## 2021-08-07 PROCEDURE — 82962 GLUCOSE BLOOD TEST: CPT

## 2021-08-07 PROCEDURE — 25010000002 ONDANSETRON PER 1 MG: Performed by: SURGERY

## 2021-08-07 PROCEDURE — 25010000002 PIPERACILLIN SOD-TAZOBACTAM PER 1 G: Performed by: HOSPITALIST

## 2021-08-07 PROCEDURE — 94760 N-INVAS EAR/PLS OXIMETRY 1: CPT

## 2021-08-07 PROCEDURE — 94799 UNLISTED PULMONARY SVC/PX: CPT

## 2021-08-07 PROCEDURE — 85007 BL SMEAR W/DIFF WBC COUNT: CPT | Performed by: HOSPITALIST

## 2021-08-07 PROCEDURE — 80048 BASIC METABOLIC PNL TOTAL CA: CPT | Performed by: HOSPITALIST

## 2021-08-07 RX ADMIN — METOPROLOL SUCCINATE 25 MG: 25 TABLET, EXTENDED RELEASE ORAL at 06:18

## 2021-08-07 RX ADMIN — BUDESONIDE AND FORMOTEROL FUMARATE DIHYDRATE 2 PUFF: 160; 4.5 AEROSOL RESPIRATORY (INHALATION) at 19:33

## 2021-08-07 RX ADMIN — PANTOPRAZOLE SODIUM 40 MG: 40 TABLET, DELAYED RELEASE ORAL at 06:18

## 2021-08-07 RX ADMIN — MONTELUKAST SODIUM 10 MG: 10 TABLET, FILM COATED ORAL at 20:23

## 2021-08-07 RX ADMIN — TAZOBACTAM SODIUM AND PIPERACILLIN SODIUM 3.38 G: 375; 3 INJECTION, SOLUTION INTRAVENOUS at 14:12

## 2021-08-07 RX ADMIN — ONDANSETRON 4 MG: 2 INJECTION INTRAMUSCULAR; INTRAVENOUS at 14:12

## 2021-08-07 RX ADMIN — OXYCODONE HYDROCHLORIDE AND ACETAMINOPHEN 1 TABLET: 5; 325 TABLET ORAL at 10:52

## 2021-08-07 RX ADMIN — BUDESONIDE AND FORMOTEROL FUMARATE DIHYDRATE 2 PUFF: 160; 4.5 AEROSOL RESPIRATORY (INHALATION) at 07:52

## 2021-08-07 NOTE — CASE COMMUNICATION
Visit from [facility] was cancelled on 8/6/21 due to pt admitted to hospital.     For your records only.  As per home health protocol, MD must be notified of missed/cancelled visits; therefore the prescribed frequency was not met.

## 2021-08-07 NOTE — PROGRESS NOTES
[facility]     Progress Note    Patient Name: Nellie Vegas  : 1945  MRN: [id number]  Primary Care Physician:  Laurent Cortes DO  Date of admission: 8/3/2021    Subjective   Subjective     Chief Complaint: ESRD    History of Present Illness    Patient on hd t/th/s s/p bowel resection    Review of Systems      Objective   Objective     Vitals:   Temp:  [98.2 °F (36.8 °C)-99.3 °F (37.4 °C)] 98.2 °F (36.8 °C)  Heart Rate:  [78-88] 81  Resp:  [16-20] 16  BP: (124-152)/(63-81) 134/63    Physical Exam     General Appearance:  In no acute distress.    HEENT: Normal HEENT exam.     Extremities: .  There is no deformity, effusion or dependent edema.    Neurological: Patient is alert         Result Review    Result Review:  I have personally reviewed the results from the time of this admission to 2021 10:31 EDT and agree with these findings:  []  Laboratory  []  Microbiology  []  Radiology  []  EKG/Telemetry   []  Cardiology/Vascular   []  Pathology  []  Old records  []  Other:      Assessment/Plan   Assessment / Plan     Brief Patient Summary:  Nellie Vegas is a 76 y.o. female who has ESRD on hd t//s    Active Hospital Problems:  There are no active hospital problems to display for this patient.    Plan:   1. ESRD  2. S/p bowel resection  3. T2DM  4. Hypertension    Patient seen and examined. Labs reviewed. S/p hd today. Will continue hd t//s    Electronically signed by Rupinder Yanez MD, 21, 2:15 PM EDT.

## 2021-08-07 NOTE — PROGRESS NOTES
"DAILY PROGRESS NOTE  [facility]    Patient Identification:  Name: Nellie Vegas  Age: 76 y.o.  Sex: female  :  1945  MRN: [id number]         Primary Care Physician: Laurent Cortes DO    Subjective: patient is resting; feels a little better  Interval History: follow up for hepatic abscess,  Esrd, diabetes, hypertension    Objective:    Scheduled Meds:budesonide-formoterol, 2 puff, Inhalation, BID  insulin lispro, 0-7 Units, Subcutaneous, TID AC  metoprolol succinate XL, 25 mg, Oral, QAM  montelukast, 10 mg, Oral, Nightly  pantoprazole, 40 mg, Oral, Q AM  piperacillin-tazobactam, 3.375 g, Intravenous, Q12H      Continuous Infusions:sodium chloride, 9 mL/hr        Vital signs in last 24 hours:  Temp:  [98 °F (36.7 °C)-99.3 °F (37.4 °C)] 98.6 °F (37 °C)  Heart Rate:  [73-96] 96  Resp:  [16-20] 18  BP: (129-153)/(63-81) 153/75    Intake/Output:    Intake/Output Summary (Last 24 hours) at 2021 1753  Last data filed at 2021 1237  Gross per 24 hour   Intake --   Output 2000 ml   Net -2000 ml       Exam:  /75 (BP Location: Left arm, Patient Position: Lying)   Pulse 96   Temp 98.6 °F (37 °C) (Oral)   Resp 18   Ht 152.4 cm (60\")   Wt 76.6 kg (168 lb 14.4 oz)   SpO2 96%   BMI 32.99 kg/m²     General Appearance:    Alert, cooperative, no distress, AAOx3; chronically ill appearing                          Head:    Normocephalic, without obvious abnormality, atraumatic                           Eyes:    PERRL, conjunctivae/corneas clear, EOM's intact, both eyes                         Throat:   Lips, tongue, gums normal; oral mucosa pink and moist                           Neck:   Supple, symmetrical, trachea midline, no JVD                         Lungs:    Decreased breath sounds bilaterally, respirations unlabored                 Chest Wall:    No tenderness or deformity                          Heart:    Regular rate and rhythm, S1 and S2 normal, no murmur,no  rub or gallop         "          Abdomen:     Soft, nontender, bowel sounds active, no masses, no organomegaly                  Extremities:   Extremities normal, atraumatic, no cyanosis or edema                        Pulses:   Pulses palpable in all extremities                            Skin:   Skin is warm and dry,  no rashes or palpable lesions                  Neurologic:   CNII-XII intact, motor strength grossly intact, sensation grossly intact to light touch, no focal deficits noted       Data Review:  Labs in chart were reviewed.  WBC   Date Value Ref Range Status   08/07/2021 7.35 3.40 - 10.80 10*3/mm3 Final     Hemoglobin   Date Value Ref Range Status   08/07/2021 8.8 (L) 12.0 - 15.9 g/dL Final     Hematocrit   Date Value Ref Range Status   08/07/2021 27.8 (L) 34.0 - 46.6 % Final     Platelets   Date Value Ref Range Status   08/07/2021 416 140 - 450 10*3/mm3 Final     Sodium   Date Value Ref Range Status   08/07/2021 139 136 - 145 mmol/L Final     Potassium   Date Value Ref Range Status   08/07/2021 4.1 3.5 - 5.2 mmol/L Final     Chloride   Date Value Ref Range Status   08/07/2021 99 98 - 107 mmol/L Final     CO2   Date Value Ref Range Status   08/07/2021 22.3 22.0 - 29.0 mmol/L Final     BUN   Date Value Ref Range Status   08/07/2021 32 (H) 8 - 23 mg/dL Final     Creatinine   Date Value Ref Range Status   08/07/2021 6.81 (H) 0.57 - 1.00 mg/dL Final     Glucose   Date Value Ref Range Status   08/07/2021 94 65 - 99 mg/dL Final     Calcium   Date Value Ref Range Status   08/07/2021 8.2 (L) 8.6 - 10.5 mg/dL Final     No results found for: AST, ALT, ALKPHOS  No results found for: APTT, INR  Patient Active Problem List   Diagnosis Code   • ESRD (end stage renal disease) on dialysis (CMS/HCC) N18.6, Z99.2   • Thrombosis of kidney dialysis arteriovenous graft (CMS/HCC) T82.868A   • Anemia of chronic renal failure, stage 5 (CMS/[facility]) N18.5, D63.1   • COPD exacerbation (CMS/HCC) J44.1   • Problem with dialysis access (CMS/HCC) T82.898A    • Lower GI bleeding K92.2   • Colonic mass K63.89   • AV shunt thrombosis, subsequent encounter T82.868D       Assessment:  Hepatic abscess  esrd   Copd  Diabetes  hypertension  Anemia    Plan:  Continue antibiotics  S/p surgery and recovering  Dialysis per nephrology  Trend labs  Plan is for home once ready for discharge  Medium risk    Nery Melendez MD  8/7/2021  17:53 EDT

## 2021-08-07 NOTE — NURSING NOTE
Dialysis completed as ordered, removed 2 liters with this treatment and no complications noted.  Vital signs are recorded in epic.

## 2021-08-07 NOTE — PLAN OF CARE
Goal Outcome Evaluation:           Progress: improving  Outcome Summary: plan for HD this am. pt had episodes of incontinence. Denies pain/needs. VSS

## 2021-08-07 NOTE — PLAN OF CARE
Goal Outcome Evaluation:  Plan of Care Reviewed With: patient        Progress: improving  Outcome Summary: VSS, No complaints of pain. HD has done today. 2L off from HD. will continue to monitor.

## 2021-08-07 NOTE — THERAPY TREATMENT NOTE
Acute Care - Physical Therapy Treatment Note Attempt  [facility]     Patient Name: Nellie Vegas  : 1945  MRN: [id number]  Today's Date: 2021      Visit Dx:     ICD-10-CM ICD-9-CM   1. Anastomotic leak of intestine  K91.89 997.49   2. Perihepatic abscess (CMS/HCC)  K65.0 567.22   3. Abdominal pain in female  R10.9 789.00     Patient Active Problem List   Diagnosis   • ESRD (end stage renal disease) on dialysis (CMS/[facility])   • Thrombosis of kidney dialysis arteriovenous graft (CMS/HCC)   • Anemia of chronic renal failure, stage 5 (CMS/HCC)   • COPD exacerbation (CMS/HCC)   • Problem with dialysis access (CMS/[facility])   • Lower GI bleeding   • Colonic mass   • AV shunt thrombosis, subsequent encounter     Past Medical History:   Diagnosis Date   • Anemia    • Anesthesia complication     mother woke up in combative state   • Arthritis     knees   • Asthma    • COPD (chronic obstructive pulmonary disease) (CMS/[facility])    • Diabetes mellitus (CMS/[facility])     type 2   • Dialysis patient (CMS/[facility])    • Disease of thyroid gland    • GERD (gastroesophageal reflux disease)    • Headache     after dialysis   • History of blood clots     BUE   • History of kidney stones    • Hyperlipidemia    • Hypertension    • Knee pain, bilateral    • Renal disease    • Sleep apnea     CPAP   • SOB (shortness of breath)    • Thrombosis of renal dialysis arteriovenous graft (CMS/[facility])    • Weakness      Past Surgical History:   Procedure Laterality Date   • ARTERIOVENOUS FISTULA Right    • ARTERIOVENOUS FISTULA Left     REMOVED   • CENTRAL VENOUS CATHETER TUNNELED INSERTION DOUBLE LUMEN     •  SECTION     • COLON RESECTION Right 2021    Procedure: RIGHT HEMICOLECTOMY LAPAROSCOPIC;  Surgeon: Zbigniew Conner MD;  Location: [facility];  Service: General;  Laterality: Right;   • COLONOSCOPY     • COLONOSCOPY N/A 2021    Procedure: COLONOSCOPY into cecum with biopsy;  Surgeon: Fabricio Monroe MD;   Location: [facility] ENDOSCOPY;  Service: Gastroenterology;  Laterality: N/A;  cecal mass   • EXPLORATORY LAPAROTOMY N/A 8/3/2021    Procedure: LAPAROTOMY EXPLORATORY, resection of ileocolonic anastomosis with anastomosis;  Surgeon: Zbigniew Conner MD;  Location: [facility];  Service: General;  Laterality: N/A;   • INSERTION HEMODIALYSIS CATHETER Right 7/16/2021    Procedure: VENACAVAGRAM AND HEMODIALYSIS CATHETER INSERTION;  Surgeon: Karson Muller MD;  Location: [facility] 18/19;  Service: Vascular;  Laterality: Right;  Dr Bryant was primary surgeon   • POLYPECTOMY      UTERINE   • SHUNT O GRAM Right 8/9/2019    Procedure: RIGHT ARM DIALYSIS GRAFT revision and THROMBECTOMY;  Surgeon: Thierry Hardy MD;  Location: [facility] 18/19;  Service: Vascular   • SHUNT O GRAM Right 8/22/2019    Procedure: RIGHT ARM DIALYSIS GRAFT THROMBECTOMY WITH STENTING;  Surgeon: Thierry Hardy MD;  Location: [facility] 18/19;  Service: Vascular   • SHUNT O GRAM Right 12/7/2020    Procedure: RIGHT ARM ARTERIOVENOUS SHUNTOGRAM WITH THROMBECTOMY;  Surgeon: Thierry Hardy MD;  Location: [facility] OR 18/19;  Service: Vascular;  Laterality: Right;   • SHUNT O GRAM Right 7/12/2021    Procedure: Right arm dialysis shuntogram with shunt thrombectomy;  Surgeon: Shahram Gallagher MD;  Location: [facility] 18/19;  Service: Vascular;  Laterality: Right;   • SHUNT O GRAM Right 7/19/2021    Procedure: RIGHT UPPER EXTREMITY THROMBECTOMY AND SHUNTOGRAM;  Surgeon: Shahram Gallagher MD;  Location: [facility];  Service: Vascular;  Laterality: Right;        PT Assessment (last 12 hours)      PT Evaluation and Treatment     Row Name 08/07/21 0926          Physical Therapy Time and Intention    Document Type  therapy note (daily note)  -KH     Mode of Treatment  physical therapy  -KH     Session Not Performed  patient unavailable for treatment  -KH     Comment, Session Not Performed  pt off  floor for dialysis, will follow up later this afternoon if time allows.  -KH     Row Name             Wound 07/19/21 1450 Right upper arm Incision    Wound - Properties Group Placement Date: 07/19/21  -AR Placement Time: 1450  -AR Present on Hospital Admission: Y  -AR Side: Right  -AR Orientation: upper  -AR Location: arm  -AR Primary Wound Type: Incision  -AR    Retired Wound - Properties Group Date first assessed: 07/19/21  -AR Time first assessed: 1450  -AR Present on Hospital Admission: Y  -AR Side: Right  -AR Location: arm  -AR Primary Wound Type: Incision  -AR    Row Name             Wound 08/03/21 1648 midline abdomen Incision    Wound - Properties Group Placement Date: 08/03/21  -MN Placement Time: 1648  -MN Present on Hospital Admission: N  -MN Orientation: midline  -MN Location: abdomen  -MN Primary Wound Type: Incision  -MN    Retired Wound - Properties Group Date first assessed: 08/03/21  -MN Time first assessed: 1648  -MN Present on Hospital Admission: N  -MN Location: abdomen  -MN Primary Wound Type: Incision  -MN    Row Name             NPWT (Negative Pressure Wound Therapy) 08/04/21 1101 abdomen    NPWT (Negative Pressure Wound Therapy) - Properties Group Placement Date: 08/04/21  -DA Placement Time: 1101  -DA Location: abdomen  -DA    Retired NPWT (Negative Pressure Wound Therapy) - Properties Group Placement Date: 08/04/21  -DA Placement Time: 1101  -DA Location: abdomen  -DA      User Key  (r) = Recorded By, (t) = Taken By, (c) = Cosigned By    Initials Name Provider Type    Brandy Foote RN, CWOCN Registered Nurse    Kendra Maldonado, PT Physical Therapist    Padmini Corona, RN Registered Nurse    Diya Cantu, RN Registered Nurse        Physical Therapy Education                 Title: PT OT SLP Therapies (Done)     Topic: Physical Therapy (Done)     Point: Mobility training (Done)     Learning Progress Summary           Patient Acceptance, E,TB, VU,NR by AE at 8/6/2021 1702                    Point: Home exercise program (Done)     Learning Progress Summary           Patient Acceptance, E,TB, VU,NR by AE at 8/6/2021 1619                   Point: Body mechanics (Done)     Learning Progress Summary           Patient Acceptance, E,TB, VU,NR by AE at 8/6/2021 1619                   Point: Precautions (Done)     Learning Progress Summary           Patient Acceptance, E,TB, VU,NR by AE at 8/6/2021 1619                               User Key     Initials Effective Dates Name Provider Type Discipline    AE 06/16/21 -  Beronica Leslie, PT Physical Therapist PT              PT Recommendation and Plan             Time Calculation:         PT G-Codes  Outcome Measure Options: AM-PAC 6 Clicks Basic Mobility (PT)  AM-PAC 6 Clicks Score (PT): 14  AM-PAC 6 Clicks Score (OT): 16    Kendra Toribio, DELLA  8/7/2021

## 2021-08-07 NOTE — PROGRESS NOTES
"General Surgery Progress Note    Summary: Mrs. Nellie Vegas is a 76 y.o. year old lady postoperative day 3 after ileocolic resection.  Some nausea, continue diet as tolerated.  Complete 5 days of antibiotics postoperatively.  SCDs for DVT prophylaxis.    Interval Events: No acute events overnight.  In dialysis this morning.  Having small bowel movements.  Still with some nausea.  Afebrile, white blood cell count 7.     Vitals: /63 (BP Location: Left arm, Patient Position: Lying)   Pulse 81   Temp 98.2 °F (36.8 °C) (Tympanic)   Resp 16   Ht 152.4 cm (60\")   Wt 76.6 kg (168 lb 14.4 oz)   SpO2 96%   BMI 32.99 kg/m²     GENERAL: alert, well appearing, and in no distress  HEENT: normocephalic, atraumatic, moist mucous membranes, clear sclerae   CHEST: On room air, no increased work of breathing, symmetric air entry  CARDIAC: regular rate and rhythm    ABDOMEN: Soft, nondistended, appropriately tender to palpation, incision clean and dry  EXTREMITIES: no cyanosis, clubbing, or edema   SKIN: Warm and moist, no rashes    Labs:  Results from last 7 days   Lab Units 08/07/21  0410 08/06/21  0346 08/05/21  0550 08/04/21  0951 08/04/21  0951 08/03/21  0813 08/03/21  0813   WBC 10*3/mm3 7.35 9.54 9.08   < > 9.28   < > 10.08   HEMOGLOBIN g/dL 8.8* 8.5* 8.6*   < > 9.3*   < > 10.5*   HEMATOCRIT % 27.8* 26.2* 27.2*   < > 29.0*   < > 33.1*   PLATELETS 10*3/mm3 416 368 334   < > 314   < > 283   MONOCYTES % % 12.5*  --   --   --  11.0  --  15.0*    < > = values in this interval not displayed.     Results from last 7 days   Lab Units 08/07/21  0410 08/06/21  0346 08/05/21  0550 08/04/21  0951 08/04/21  0951 08/03/21  0813 08/03/21  0813   SODIUM mmol/L 139 141 139   < > 140   < > 144   POTASSIUM mmol/L 4.1 4.7 5.3*   < > 4.8   < > 3.6   CHLORIDE mmol/L 99 99 97*   < > 100   < > 97*   CO2 mmol/L 22.3 21.8* 19.6*   < > 20.7*   < > 27.3   BUN mg/dL 32* 20 53*   < > 45*   < > 32*   CREATININE mg/dL 6.81* 5.30* 10.27*   < > " 10.10*   < > 9.46*   CALCIUM mg/dL 8.2* 8.3* 7.9*   < > 7.6*   < > 7.9*   BILIRUBIN mg/dL  --   --   --   --  0.4  --  0.5   ALK PHOS U/L  --   --   --   --  70  --  97   ALT (SGPT) U/L  --   --   --   --  6  --  12   AST (SGOT) U/L  --   --   --   --  25  --  27   GLUCOSE mg/dL 94 121* 80   < > 97   < > 158*    < > = values in this interval not displayed.           JAKY BUENO MD  General and Endoscopic Surgery  [facility]    [address], [address]  [place], [place], [address]  P: [phone]  F: [phone]

## 2021-08-08 LAB
BACTERIA SPEC ANAEROBE CULT: NORMAL
GLUCOSE BLDC GLUCOMTR-MCNC: 115 MG/DL (ref 70–130)
GLUCOSE BLDC GLUCOMTR-MCNC: 79 MG/DL (ref 70–130)
GLUCOSE BLDC GLUCOMTR-MCNC: 95 MG/DL (ref 70–130)

## 2021-08-08 PROCEDURE — 94799 UNLISTED PULMONARY SVC/PX: CPT

## 2021-08-08 PROCEDURE — 97110 THERAPEUTIC EXERCISES: CPT

## 2021-08-08 PROCEDURE — 82962 GLUCOSE BLOOD TEST: CPT

## 2021-08-08 PROCEDURE — 25010000002 PIPERACILLIN SOD-TAZOBACTAM PER 1 G: Performed by: HOSPITALIST

## 2021-08-08 RX ORDER — CALCIUM CARBONATE 200(500)MG
1 TABLET,CHEWABLE ORAL 3 TIMES DAILY PRN
Status: DISCONTINUED | OUTPATIENT
Start: 2021-08-08 | End: 2021-08-10 | Stop reason: HOSPADM

## 2021-08-08 RX ORDER — PANTOPRAZOLE SODIUM 40 MG/1
40 TABLET, DELAYED RELEASE ORAL
Status: DISCONTINUED | OUTPATIENT
Start: 2021-08-08 | End: 2021-08-10 | Stop reason: HOSPADM

## 2021-08-08 RX ADMIN — BUDESONIDE AND FORMOTEROL FUMARATE DIHYDRATE 2 PUFF: 160; 4.5 AEROSOL RESPIRATORY (INHALATION) at 07:35

## 2021-08-08 RX ADMIN — PANTOPRAZOLE SODIUM 40 MG: 40 TABLET, DELAYED RELEASE ORAL at 18:09

## 2021-08-08 RX ADMIN — TAZOBACTAM SODIUM AND PIPERACILLIN SODIUM 3.38 G: 375; 3 INJECTION, SOLUTION INTRAVENOUS at 14:31

## 2021-08-08 RX ADMIN — ANTACID TABLETS 1 TABLET: 500 TABLET, CHEWABLE ORAL at 14:31

## 2021-08-08 RX ADMIN — BUDESONIDE AND FORMOTEROL FUMARATE DIHYDRATE 2 PUFF: 160; 4.5 AEROSOL RESPIRATORY (INHALATION) at 21:04

## 2021-08-08 RX ADMIN — MONTELUKAST SODIUM 10 MG: 10 TABLET, FILM COATED ORAL at 20:24

## 2021-08-08 RX ADMIN — TAZOBACTAM SODIUM AND PIPERACILLIN SODIUM 3.38 G: 375; 3 INJECTION, SOLUTION INTRAVENOUS at 01:58

## 2021-08-08 RX ADMIN — METOPROLOL SUCCINATE 25 MG: 25 TABLET, EXTENDED RELEASE ORAL at 06:18

## 2021-08-08 RX ADMIN — PANTOPRAZOLE SODIUM 40 MG: 40 TABLET, DELAYED RELEASE ORAL at 06:18

## 2021-08-08 NOTE — PLAN OF CARE
Goal Outcome Evaluation:  Plan of Care Reviewed With: patient     VSS, no c/o pain. POD 4 ileocolic resection. Pt is very pleasant. Abdominal wound vac inplace and is due to be changed by the WOCN on Monday. Attempted to reach Dr. Melendez/Luciana regarding the temp dialysis cath with no success. CTM

## 2021-08-08 NOTE — THERAPY TREATMENT NOTE
Patient Name: Nellie Vegas  : 1945    MRN: [id number]                              Today's Date: 2021       Admit Date: 8/3/2021    Visit Dx:     ICD-10-CM ICD-9-CM   1. Anastomotic leak of intestine  K91.89 997.49   2. Perihepatic abscess (CMS/[facility])  K65.0 567.22   3. Abdominal pain in female  R10.9 789.00     Patient Active Problem List   Diagnosis   • ESRD (end stage renal disease) on dialysis (CMS/[facility])   • Thrombosis of kidney dialysis arteriovenous graft (CMS/[facility])   • Anemia of chronic renal failure, stage 5 (CMS/[facility])   • COPD exacerbation (CMS/[facility])   • Problem with dialysis access (CMS/[facility])   • Lower GI bleeding   • Colonic mass   • AV shunt thrombosis, subsequent encounter     Past Medical History:   Diagnosis Date   • Anemia    • Anesthesia complication     mother woke up in combative state   • Arthritis     knees   • Asthma    • COPD (chronic obstructive pulmonary disease) (CMS/[facility])    • Diabetes mellitus (CMS/[facility])     type 2   • Dialysis patient (CMS/[facility])    • Disease of thyroid gland    • GERD (gastroesophageal reflux disease)    • Headache     after dialysis   • History of blood clots     BUE   • History of kidney stones    • Hyperlipidemia    • Hypertension    • Knee pain, bilateral    • Renal disease    • Sleep apnea     CPAP   • SOB (shortness of breath)    • Thrombosis of renal dialysis arteriovenous graft (CMS/[facility])    • Weakness      Past Surgical History:   Procedure Laterality Date   • ARTERIOVENOUS FISTULA Right    • ARTERIOVENOUS FISTULA Left     REMOVED   • CENTRAL VENOUS CATHETER TUNNELED INSERTION DOUBLE LUMEN     •  SECTION     • COLON RESECTION Right 2021    Procedure: RIGHT HEMICOLECTOMY LAPAROSCOPIC;  Surgeon: Zbigniew Conner MD;  Location: [facility] OR;  Service: General;  Laterality: Right;   • COLONOSCOPY     • COLONOSCOPY N/A 2021    Procedure: COLONOSCOPY into cecum with biopsy;  Surgeon: Fabricio Monroe MD;  Location: [facility]  ENDOSCOPY;  Service: Gastroenterology;  Laterality: N/A;  cecal mass   • EXPLORATORY LAPAROTOMY N/A 8/3/2021    Procedure: LAPAROTOMY EXPLORATORY, resection of ileocolonic anastomosis with anastomosis;  Surgeon: Zbigniew Conner MD;  Location: [facility];  Service: General;  Laterality: N/A;   • INSERTION HEMODIALYSIS CATHETER Right 7/16/2021    Procedure: VENACAVAGRAM AND HEMODIALYSIS CATHETER INSERTION;  Surgeon: Karson Muller MD;  Location: [facility] 18/19;  Service: Vascular;  Laterality: Right;  Dr Bryant was primary surgeon   • POLYPECTOMY      UTERINE   • SHUNT O GRAM Right 8/9/2019    Procedure: RIGHT ARM DIALYSIS GRAFT revision and THROMBECTOMY;  Surgeon: Thierry Hardy MD;  Location: [facility] 18/19;  Service: Vascular   • SHUNT O GRAM Right 8/22/2019    Procedure: RIGHT ARM DIALYSIS GRAFT THROMBECTOMY WITH STENTING;  Surgeon: Thierry Hardy MD;  Location: [facility] 18/19;  Service: Vascular   • SHUNT O GRAM Right 12/7/2020    Procedure: RIGHT ARM ARTERIOVENOUS SHUNTOGRAM WITH THROMBECTOMY;  Surgeon: Thierry Hardy MD;  Location: [facility] OR 18/19;  Service: Vascular;  Laterality: Right;   • SHUNT O GRAM Right 7/12/2021    Procedure: Right arm dialysis shuntogram with shunt thrombectomy;  Surgeon: Shahram Gallagher MD;  Location: [facility] 18/19;  Service: Vascular;  Laterality: Right;   • SHUNT O GRAM Right 7/19/2021    Procedure: RIGHT UPPER EXTREMITY THROMBECTOMY AND SHUNTOGRAM;  Surgeon: Shahram Gallagher MD;  Location: [facility];  Service: Vascular;  Laterality: Right;     General Information     Row Name 08/08/21 1118          Physical Therapy Time and Intention    Document Type  therapy note (daily note)  -AR     Mode of Treatment  physical therapy  -AR     Row Name 08/08/21 1118          General Information    Patient Profile Reviewed  yes  -AR     Existing Precautions/Restrictions  fall  -AR     Row Name 08/08/21 1116           Cognition    Orientation Status (Cognition)  oriented to;person;place  -AR     Row Name 08/08/21 1118          Safety Issues, Functional Mobility    Impairments Affecting Function (Mobility)  balance;cognition;endurance/activity tolerance;pain;strength  -AR       User Key  (r) = Recorded By, (t) = Taken By, (c) = Cosigned By    Initials Name Provider Type    Indigo Rojas, PT Physical Therapist        Mobility     Row Name 08/08/21 1118          Bed Mobility    Bed Mobility  supine-sit  -AR     Supine-Sit [place] (Bed Mobility)  minimum assist (75% patient effort)  -AR     Sit-Supine [place] (Bed Mobility)  minimum assist (75% patient effort)  -AR     Assistive Device (Bed Mobility)  bed rails;head of bed elevated  -AR     Row Name 08/08/21 1118          Sit-Stand Transfer    Sit-Stand [place] (Transfers)  contact guard  -AR     Assistive Device (Sit-Stand Transfers)  walker, front-wheeled  -AR     Row Name 08/08/21 1118          Gait/Stairs (Locomotion)    [place] Level (Gait)  contact guard  -AR     Assistive Device (Gait)  walker, front-wheeled  -AR     Distance in Feet (Gait)  few shuffling steps to  HOB; declined all other activity  -AR     Deviations/Abnormal Patterns (Gait)  kalyan decreased;festinating/shuffling  -AR     Bilateral Gait Deviations  heel strike decreased;forward flexed posture  -AR       User Key  (r) = Recorded By, (t) = Taken By, (c) = Cosigned By    Initials Name Provider Type    Indigo Rojas, PT Physical Therapist        Obj/Interventions     Row Name 08/08/21 1119          Motor Skills    Therapeutic Exercise  -- B AP, LAQ 10x  -AR     Row Name 08/08/21 1119          Balance    Balance Assessment  sitting static balance  -AR     Static Sitting Balance  WNL  -AR     Static Standing Balance  mild impairment;supported  -AR       User Key  (r) = Recorded By, (t) = Taken By, (c) = Cosigned By    Initials Name Provider Type    Indigo Rojas, PT  Physical Therapist        Goals/Plan    No documentation.       Clinical Impression     Row Name 08/08/21 1119          Pain    Additional Documentation  Pain Scale: Numbers Pre/Post-Treatment (Group)  -AR     Row Name 08/08/21 1119          Pain Scale: Numbers Pre/Post-Treatment    Pretreatment Pain Rating  3/10  -AR     Posttreatment Pain Rating  4/10  -AR     Pain Location  abdomen  -AR     Pain Intervention(s)  Repositioned  -AR     Row Name 08/08/21 1119          Plan of Care Review    Plan of Care Reviewed With  patient  -AR     Outcome Summary  Limited progress towards goals during PT. Required motivation/education for pt to participate.  Able to take few shuffling steps to HOB w/ contact guard assist using RW; pt declined all other activity due to feeling worn out.  Currently recommend DC to SNU.  -AR     Row Name 08/08/21 1119          Therapy Assessment/Plan (PT)    Rehab Potential (PT)  good, to achieve stated therapy goals  -AR     Criteria for Skilled Interventions Met (PT)  yes  -AR     Row Name 08/08/21 1119          Vital Signs    O2 Delivery Pre Treatment  room air  -AR     Row Name 08/08/21 1119          Positioning and Restraints    Pre-Treatment Position  in bed  -AR     Post Treatment Position  bed pt declined getting to a chair, reports she was angel chair yesterday.  attempted education, continued to decline  -AR     In Bed  supine;call light within reach;encouraged to call for assist;exit alarm on  -AR       User Key  (r) = Recorded By, (t) = Taken By, (c) = Cosigned By    Initials Name Provider Type    Indigo Rojas, PT Physical Therapist        Outcome Measures     Row Name 08/08/21 1121          How much help from another person do you currently need...    Turning from your back to your side while in flat bed without using bedrails?  3  -AR     Moving from lying on back to sitting on the side of a flat bed without bedrails?  3  -AR     Moving to and from a bed to a chair (including a  wheelchair)?  3  -AR     Standing up from a chair using your arms (e.g., wheelchair, bedside chair)?  3  -AR     Climbing 3-5 steps with a railing?  2  -AR     To walk in hospital room?  2  -AR     AM-PAC 6 Clicks Score (PT)  16  -AR     Row Name 08/08/21 1121          Functional Assessment    Outcome Measure Options  AM-PAC 6 Clicks Basic Mobility (PT)  -AR       User Key  (r) = Recorded By, (t) = Taken By, (c) = Cosigned By    Initials Name Provider Type    AR Indigo Price, PT Physical Therapist                       Physical Therapy Education                 Title: PT OT SLP Therapies (In Progress)     Topic: Physical Therapy (In Progress)     Point: Mobility training (In Progress)     Learning Progress Summary           Patient Acceptance, E, NR by AR at 8/8/2021 1121    Acceptance, E,TB, VU,NR by AE at 8/6/2021 1619                   Point: Home exercise program (In Progress)     Learning Progress Summary           Patient Acceptance, E, NR by AR at 8/8/2021 1121    Acceptance, E,TB, VU,NR by AE at 8/6/2021 1619                   Point: Body mechanics (In Progress)     Learning Progress Summary           Patient Acceptance, E, NR by AR at 8/8/2021 1121    Acceptance, E,TB, VU,NR by AE at 8/6/2021 1619                   Point: Precautions (In Progress)     Learning Progress Summary           Patient Acceptance, E, NR by AR at 8/8/2021 1121    Acceptance, E,TB, VU,NR by AE at 8/6/2021 1619                               User Key     Initials Effective Dates Name Provider Type Discipline    AR 06/16/21 -  Indigo Price, PT Physical Therapist PT    AE 06/16/21 -  Beronica Leslie PT Physical Therapist PT              PT Recommendation and Plan     Plan of Care Reviewed With: patient  Outcome Summary: Limited progress towards goals during PT. Required motivation/education for pt to participate.  Able to take few shuffling steps to HOB w/ contact guard assist using RW; pt declined all other activity due to  feeling worn out.  Currently recommend DC to SNU.     Time Calculation:   PT Charges     Row Name 08/08/21 1118             Time Calculation    Start Time  0928  -AR      Stop Time  0948  -AR      Time Calculation (min)  20 min  -AR      PT Received On  08/08/21  -AR      PT - Next Appointment  08/09/21  -AR        User Key  (r) = Recorded By, (t) = Taken By, (c) = Cosigned By    Initials Name Provider Type    AR Indigo Price, PT Physical Therapist        Therapy Charges for Today     Code Description Service Date Service Provider Modifiers Qty    10113293097 HC PT THER PROC EA 15 MIN 8/8/2021 Indigo Price, PT GP 1          PT G-Codes  Outcome Measure Options: AM-PAC 6 Clicks Basic Mobility (PT)  AM-PAC 6 Clicks Score (PT): 16  AM-PAC 6 Clicks Score (OT): 16    Indigo Price PT  8/8/2021

## 2021-08-08 NOTE — PROGRESS NOTES
[facility]     Progress Note    Patient Name: Nellie Vegas  : 1945  MRN: [id number]  Primary Care Physician:  Laurent Cortes DO  Date of admission: 8/3/2021    Subjective   Subjective     Chief Complaint: ESRD    History of Present Illness    Patient on hd t/th/s s/p bowel resection    Review of Systems      Objective   Objective     Vitals:   Temp:  [98 °F (36.7 °C)-98.7 °F (37.1 °C)] 98.4 °F (36.9 °C)  Heart Rate:  [73-96] 84  Resp:  [16-20] 16  BP: (125-153)/(58-75) 135/59    Physical Exam     General Appearance:  In no acute distress.    HEENT: Normal HEENT exam.     Extremities: .  There is no deformity, effusion or dependent edema.    Neurological: Patient is alert         Result Review    Result Review:  I have personally reviewed the results from the time of this admission to 2021 08:33 EDT and agree with these findings:  []  Laboratory  []  Microbiology  []  Radiology  []  EKG/Telemetry   []  Cardiology/Vascular   []  Pathology  []  Old records  []  Other:      Assessment/Plan   Assessment / Plan     Brief Patient Summary:  Nellie Vegas is a 76 y.o. female who has ESRD on hd t//s    Active Hospital Problems:  There are no active hospital problems to display for this patient.    Plan:   1. ESRD  2. S/p bowel resection  3. T2DM  4. Hypertension    Patient seen and examined. Labs reviewed. S/p hd yesterday. Will continue hd t//s    Electronically signed by Rupinder Yanez MD, 21, 2:15 PM EDT.

## 2021-08-08 NOTE — PLAN OF CARE
Goal Outcome Evaluation:  Plan of Care Reviewed With: patient           Outcome Summary: Limited progress towards goals during PT. Required motivation/education for pt to participate.  Able to take few shuffling steps to HOB w/ contact guard assist using RW; pt declined all other activity due to feeling worn out.  Currently recommend DC to SNU.      ..Patient was intermittently wearing a face mask during this therapy encounter. Therapist used appropriate personal protective equipment including eye protection, mask, and gloves.  Mask used was standard procedure mask. Appropriate PPE was worn during the entire therapy session. Hand hygiene was completed before and after therapy session. Patient is not in enhanced droplet precautions.

## 2021-08-08 NOTE — PROGRESS NOTES
"DAILY PROGRESS NOTE  [facility]    Patient Identification:  Name: Nellie Vegas  Age: 76 y.o.  Sex: female  :  1945  MRN: [id number]         Primary Care Physician: Laurent Cortes DO    Subjective: patient is complaining of indigestion  Interval History: follow up for esrd, hepatic abscess, diabetes     Objective:    Scheduled Meds:budesonide-formoterol, 2 puff, Inhalation, BID  insulin lispro, 0-7 Units, Subcutaneous, TID AC  metoprolol succinate XL, 25 mg, Oral, QAM  montelukast, 10 mg, Oral, Nightly  pantoprazole, 40 mg, Oral, BID AC  piperacillin-tazobactam, 3.375 g, Intravenous, Q12H      Continuous Infusions:sodium chloride, 9 mL/hr        Vital signs in last 24 hours:  Temp:  [98.2 °F (36.8 °C)-98.7 °F (37.1 °C)] 98.4 °F (36.9 °C)  Heart Rate:  [77-96] 84  Resp:  [16-20] 16  BP: (125-153)/(58-75) 135/59    Intake/Output:    Intake/Output Summary (Last 24 hours) at 2021 1249  Last data filed at 2021  Gross per 24 hour   Intake 118 ml   Output --   Net 118 ml       Exam:  /59 (BP Location: Left arm, Patient Position: Sitting)   Pulse 84   Temp 98.4 °F (36.9 °C) (Oral)   Resp 16   Ht 152.4 cm (60\")   Wt 76.6 kg (168 lb 14.4 oz)   SpO2 100%   BMI 32.99 kg/m²     General Appearance:    Alert, cooperative, no distress, AAOx3; chronically ill appearing                          Head:    Normocephalic, without obvious abnormality, atraumatic                           Eyes:    PERRL, conjunctivae/corneas clear, EOM's intact, both eyes                         Throat:   Lips, tongue, gums normal; oral mucosa pink and moist                           Neck:   Supple, symmetrical, trachea midline, no JVD                         Lungs:    Decreased breath sounds bilaterally, respirations unlabored                 Chest Wall:    No tenderness or deformity                          Heart:    Regular rate and rhythm, S1 and S2 normal, no murmur,no  rub or gallop                  " Abdomen:     Soft, nontender, bowel sounds active, no masses, no organomegaly                  Extremities:   Extremities normal, atraumatic, no cyanosis or edema                        Pulses:   Pulses palpable in all extremities                            Skin:   Skin is warm and dry,  no rashes or palpable lesions                  Neurologic:   CNII-XII intact, motor strength grossly intact, sensation grossly intact to light touch, no focal deficits noted       Data Review:  Labs in chart were reviewed.  WBC   Date Value Ref Range Status   08/07/2021 7.35 3.40 - 10.80 10*3/mm3 Final     Hemoglobin   Date Value Ref Range Status   08/07/2021 8.8 (L) 12.0 - 15.9 g/dL Final     Hematocrit   Date Value Ref Range Status   08/07/2021 27.8 (L) 34.0 - 46.6 % Final     Platelets   Date Value Ref Range Status   08/07/2021 416 140 - 450 10*3/mm3 Final     Sodium   Date Value Ref Range Status   08/07/2021 139 136 - 145 mmol/L Final     Potassium   Date Value Ref Range Status   08/07/2021 4.1 3.5 - 5.2 mmol/L Final     Chloride   Date Value Ref Range Status   08/07/2021 99 98 - 107 mmol/L Final     CO2   Date Value Ref Range Status   08/07/2021 22.3 22.0 - 29.0 mmol/L Final     BUN   Date Value Ref Range Status   08/07/2021 32 (H) 8 - 23 mg/dL Final     Creatinine   Date Value Ref Range Status   08/07/2021 6.81 (H) 0.57 - 1.00 mg/dL Final     Glucose   Date Value Ref Range Status   08/07/2021 94 65 - 99 mg/dL Final     Calcium   Date Value Ref Range Status   08/07/2021 8.2 (L) 8.6 - 10.5 mg/dL Final     No results found for: AST, ALT, ALKPHOS  Patient Active Problem List   Diagnosis Code   • ESRD (end stage renal disease) on dialysis (CMS/HCC) N18.6, Z99.2   • Thrombosis of kidney dialysis arteriovenous graft (CMS/HCC) T82.868A   • Anemia of chronic renal failure, stage 5 (CMS/Formerly [facility]) N18.5, D63.1   • COPD exacerbation (CMS/HCC) J44.1   • Problem with dialysis access (CMS/Formerly [facility]) T82.898A   • Lower GI bleeding K92.2   • Colonic mass  K63.89   • AV shunt thrombosis, subsequent encounter T82.868D       Assessment:  Hepatic abscess  S/p ileocolic resection  Anemia  esrd on hd  copd  Obesity  dyspepsia    Plan:  Will add tums for indigestion  Continue ppi  Antibiotics to be completed tomorrow  Notes and labs reviewed  Medium risk    Nery Melendez MD  8/8/2021  12:49 EDT

## 2021-08-08 NOTE — PROGRESS NOTES
"General Surgery Progress Note    Summary: Mrs. Nellie Vegas is a 76 y.o. year old lady postoperative day 4 after ileocolic resection.  Some nausea, continue diet as tolerated, having bowel function.  Complete 5 days of antibiotics postoperatively, stop tomorrow.  SCDs for DVT prophylaxis.    Interval Events: No acute events overnight.  States she is having some heartburn but otherwise is doing okay.  Tolerating a diet and having bowel function.  Got up yesterday.    Vitals: /59 (BP Location: Left arm, Patient Position: Sitting)   Pulse 84   Temp 98.4 °F (36.9 °C) (Oral)   Resp 16   Ht 152.4 cm (60\")   Wt 76.6 kg (168 lb 14.4 oz)   SpO2 100%   BMI 32.99 kg/m²     GENERAL: alert, well appearing, and in no distress  HEENT: normocephalic, atraumatic, moist mucous membranes, clear sclerae   CHEST: On room air, no increased work of breathing, symmetric air entry  CARDIAC: regular rate and rhythm    ABDOMEN: Soft, nondistended, appropriately tender to palpation, incision clean and dry  EXTREMITIES: no cyanosis, clubbing, or edema   SKIN: Warm and moist, no rashes    Labs:  Results from last 7 days   Lab Units 08/07/21  0410 08/06/21  0346 08/05/21  0550 08/04/21  0951 08/04/21  0951 08/03/21  0813 08/03/21  0813   WBC 10*3/mm3 7.35 9.54 9.08   < > 9.28   < > 10.08   HEMOGLOBIN g/dL 8.8* 8.5* 8.6*   < > 9.3*   < > 10.5*   HEMATOCRIT % 27.8* 26.2* 27.2*   < > 29.0*   < > 33.1*   PLATELETS 10*3/mm3 416 368 334   < > 314   < > 283   MONOCYTES % % 12.5*  --   --   --  11.0  --  15.0*    < > = values in this interval not displayed.     Results from last 7 days   Lab Units 08/07/21  0410 08/06/21  0346 08/05/21  0550 08/04/21  0951 08/04/21  0951 08/03/21  0813 08/03/21  0813   SODIUM mmol/L 139 141 139   < > 140   < > 144   POTASSIUM mmol/L 4.1 4.7 5.3*   < > 4.8   < > 3.6   CHLORIDE mmol/L 99 99 97*   < > 100   < > 97*   CO2 mmol/L 22.3 21.8* 19.6*   < > 20.7*   < > 27.3   BUN mg/dL 32* 20 53*   < > 45*   < > " 32*   CREATININE mg/dL 6.81* 5.30* 10.27*   < > 10.10*   < > 9.46*   CALCIUM mg/dL 8.2* 8.3* 7.9*   < > 7.6*   < > 7.9*   BILIRUBIN mg/dL  --   --   --   --  0.4  --  0.5   ALK PHOS U/L  --   --   --   --  70  --  97   ALT (SGPT) U/L  --   --   --   --  6  --  12   AST (SGOT) U/L  --   --   --   --  25  --  27   GLUCOSE mg/dL 94 121* 80   < > 97   < > 158*    < > = values in this interval not displayed.           JAKY BUENO MD  General and Endoscopic Surgery  [facility]    [address] [place] [address], [address]  [place], [place], [address]  P: [phone]  F: [phone]

## 2021-08-09 ENCOUNTER — TRANSCRIBE ORDERS (OUTPATIENT)
Dept: HOME HEALTH SERVICES | Facility: HOME HEALTHCARE | Age: 76
End: 2021-08-09

## 2021-08-09 DIAGNOSIS — Z46.89 ENCOUNTER FOR MANAGEMENT OF VACUUM-ASSISTED CLOSURE (VAC) OF WOUND: Primary | ICD-10-CM

## 2021-08-09 LAB
GLUCOSE BLDC GLUCOMTR-MCNC: 113 MG/DL (ref 70–130)
GLUCOSE BLDC GLUCOMTR-MCNC: 119 MG/DL (ref 70–130)
GLUCOSE BLDC GLUCOMTR-MCNC: 137 MG/DL (ref 70–130)
GLUCOSE BLDC GLUCOMTR-MCNC: 92 MG/DL (ref 70–130)

## 2021-08-09 PROCEDURE — 82962 GLUCOSE BLOOD TEST: CPT

## 2021-08-09 PROCEDURE — 97110 THERAPEUTIC EXERCISES: CPT

## 2021-08-09 PROCEDURE — 0JPTXXZ REMOVAL OF TUNNELED VASCULAR ACCESS DEVICE FROM TRUNK SUBCUTANEOUS TISSUE AND FASCIA, EXTERNAL APPROACH: ICD-10-PCS | Performed by: SURGERY

## 2021-08-09 PROCEDURE — 94799 UNLISTED PULMONARY SVC/PX: CPT

## 2021-08-09 PROCEDURE — 99024 POSTOP FOLLOW-UP VISIT: CPT | Performed by: SURGERY

## 2021-08-09 PROCEDURE — 25010000003 LIDOCAINE 1 % SOLUTION: Performed by: SURGERY

## 2021-08-09 RX ORDER — LIDOCAINE HYDROCHLORIDE 10 MG/ML
10 INJECTION, SOLUTION INFILTRATION; PERINEURAL ONCE
Status: DISCONTINUED | OUTPATIENT
Start: 2021-08-09 | End: 2021-08-09

## 2021-08-09 RX ORDER — LIDOCAINE HYDROCHLORIDE 10 MG/ML
20 INJECTION, SOLUTION INFILTRATION; PERINEURAL ONCE
Status: COMPLETED | OUTPATIENT
Start: 2021-08-09 | End: 2021-08-09

## 2021-08-09 RX ADMIN — MONTELUKAST SODIUM 10 MG: 10 TABLET, FILM COATED ORAL at 22:34

## 2021-08-09 RX ADMIN — BUDESONIDE AND FORMOTEROL FUMARATE DIHYDRATE 2 PUFF: 160; 4.5 AEROSOL RESPIRATORY (INHALATION) at 09:23

## 2021-08-09 RX ADMIN — PANTOPRAZOLE SODIUM 40 MG: 40 TABLET, DELAYED RELEASE ORAL at 16:30

## 2021-08-09 RX ADMIN — PANTOPRAZOLE SODIUM 40 MG: 40 TABLET, DELAYED RELEASE ORAL at 06:43

## 2021-08-09 RX ADMIN — BUDESONIDE AND FORMOTEROL FUMARATE DIHYDRATE 2 PUFF: 160; 4.5 AEROSOL RESPIRATORY (INHALATION) at 19:17

## 2021-08-09 RX ADMIN — LIDOCAINE HYDROCHLORIDE 20 ML: 10 INJECTION, SOLUTION INFILTRATION; PERINEURAL at 15:11

## 2021-08-09 RX ADMIN — APIXABAN 2.5 MG: 2.5 TABLET, FILM COATED ORAL at 22:34

## 2021-08-09 RX ADMIN — METOPROLOL SUCCINATE 25 MG: 25 TABLET, EXTENDED RELEASE ORAL at 06:43

## 2021-08-09 NOTE — PLAN OF CARE
Goal Outcome Evaluation:  Plan of Care Reviewed With: patient        Progress: no change  Outcome Summary: VSS. groin tunneled catheter removed by Dr. Tello. dialysis tomorrow. plan to d/c. will ctm. D/c tomorrow once wound vac home health is set up

## 2021-08-09 NOTE — CONSULTS
The patient is a 76-year-old female with a history of end-stage renal disease on hemodialysis.  She has had multiple access procedures.  Currently she has a right axilloaxillary AV loop graft.  She had thrombosis of this and underwent a right femoral vein tunneled dialysis catheter placement on July 16, 2021.  She went back to the operating room on July 19, 2021 and had shunt thrombectomy with shuntogram.  The graft appeared to be widely patent and without stenosis.    Her shunt has been working well with no flow issues.  She no longer needs her tunneled catheter.  We were asked to remove this.                                             Procedure note    Preoperative diagnosis: End-stage renal disease with functioning right arm AV graft, tunneled dialysis catheter no longer needed    Postoperative diagnosis: Same    Procedure: Removal of right femoral vein tunneled dialysis catheter    Surgeon: Katie Tello MD    Anesthesia: Local    Estimated blood loss: Less than 3 cc    Complications: None    Description procedure: Patient was placed in the supine position in her bed.  The right groin was prepped using ChloraPrep and draped in usual sterile fashion.  The stay sutures were removed and local anesthetic was infiltrated into the skin and subcutaneous tissue at the catheter exit site and cephalad to this.  Using both sharp and blunt dissection the cuff of the catheter was dissected from the surrounding subcutaneous tissue.  The catheter was then removed in total.  Manual pressure was held in the groin for 5 minutes.  Hemostasis was checked and noted be satisfactory.  Sterile dressings were applied.  The patient tolerated procedure satisfactorily.

## 2021-08-09 NOTE — PROGRESS NOTES
Postoperative day 5 status post resection of ileocolonic anastomosis for leak    S: Patient is doing really well.  Denies any nausea or vomiting.  Has been tolerating diet and having bowel function.  Abdominal pain has significantly improved    O:   Vitals:    08/09/21 0643 08/09/21 0719 08/09/21 0923 08/09/21 0924   BP: 142/58 155/72     BP Location:  Left arm     Patient Position:  Sitting     Pulse: 72 82 71 75   Resp:  18 20 20   Temp:  97.8 °F (36.6 °C)     TempSrc:  Oral     SpO2:   96%    Weight:       Height:         Alert, no acute distress  Abdomen soft, mildly tender to palpation throughout, nondistended, midline incision with wound VAC in place    Assessment and plan    Postop day 5 status post resection of ileocolonic anastomosis for leak.  Patient doing really well.  Tolerating diet and having bowel function.  Wound VAC in place for open wound.  Discussed with the patient about further step.  I think that if she is cleared medically I will be okay for her to go home.  She will need to have wound VAC dressing changes every 3 days.  Patient to follow-up in my office in 1 week.    She understood  Instructions placed in chart

## 2021-08-09 NOTE — PROGRESS NOTES
[facility]     Progress Note    Patient Name: Nellie Vegas  : 1945  MRN: [id number]  Primary Care Physician:  Laurent Cortes DO  Date of admission: 8/3/2021    Subjective   Subjective     Chief Complaint: ESRD    History of Present Illness  Patient on hd t//s s/p bowel resection    Feels well today  Patient is doing well today with no new complaints  Good appetite with no nausea or vomiting  No shortness of breath chest pain or edema  Voiding well with no dysuria        Review of Systems   Gastrointestinal: Positive for abdominal pain.       Objective   Objective     Vitals:   Temp:  [97.7 °F (36.5 °C)-97.9 °F (36.6 °C)] 97.7 °F (36.5 °C)  Heart Rate:  [71-82] 73  Resp:  [18-20] 18  BP: (136-155)/(58-72) 136/64    Physical Exam   General Appearance:  In no acute distress.  Obese  HEENT: Normal HEENT exam.     Chest clear to all station bilaterally  CV RRR no murmurs  Extremities: .  There is no deformity, effusion or dependent edema.    Upper extremity AV fistula with good thrill and bruit  Neurological: Patient is alert         Result Review    Result Review:  Old records, chart and labs reviewed  Basic Metabolic Panel    Sodium Sodium   Date Value Ref Range Status   2021 139 136 - 145 mmol/L Final      Potassium Potassium   Date Value Ref Range Status   2021 4.1 3.5 - 5.2 mmol/L Final      Chloride Chloride   Date Value Ref Range Status   2021 99 98 - 107 mmol/L Final      Bicarbonate No results found for: PLASMABICARB   BUN BUN   Date Value Ref Range Status   2021 32 (H) 8 - 23 mg/dL Final      Creatinine Creatinine   Date Value Ref Range Status   2021 6.81 (H) 0.57 - 1.00 mg/dL Final      Calcium Calcium   Date Value Ref Range Status   2021 8.2 (L) 8.6 - 10.5 mg/dL Final      Glucose      No components found for: GLUCOSE.*           Assessment/Plan   Assessment / Plan     Brief Patient Summary:  Nellie Vegas is a 76 y.o. female who has ESRD on hd  t/th/s    Assessment:  1. ESRD, HD TTS  2. S/p bowel resection with anastomotic leak status post repair  3. T2DM  4. Hypertension  5.  Recent AV fistula dysfunction, still has tunnel catheter in place but AV fistula seems to be functioning well with dialysis  6.  Anemia of CKD      Plan:  Continue dialysis Tuesday Thursday Saturday, UF as tolerated  Consult vascular for groin tunnel catheter removal, had recent AV fistula declot but her shunt seems to be working well.  ?  Resume Eliquis for anticoagulation to help maintain shunt patency  Recheck chemistries in a.m.  Epogen with HD for anemia  Discharge planning      Chester Kang MD  [facility]  [phone]

## 2021-08-09 NOTE — NURSING NOTE
08/09/21 1112   Wound 08/03/21 1648 midline abdomen Incision   Placement Date/Time: 08/03/21 1648   Present on Hospital Admission: No  Orientation: midline  Location: abdomen  Primary Wound Type: Incision   Base non-granulating;pink;yellow   Periwound intact;dry   Periwound Temperature warm   Periwound Skin Turgor soft   Edges open   Drainage Amount none   Care, Wound cleansed with;sterile normal saline;negative pressure wound therapy   Dressing Care dressing changed   Periwound Care barrier film applied   NPWT (Negative Pressure Wound Therapy) 08/04/21 1101 abdomen   Placement Date/Time: 08/04/21 1101   Location: abdomen   Therapy Setting continuous therapy   Dressing foam, black;transparent dressing   Contact Layer silicone   Pressure Setting 125 mmHg   Sponges Inserted 1   Sponges Removed 1   Finger sweep complete Yes     CWON note: pt seen for follow up NPWT dressing change to abdominal wound.  1 suture remains visible in wound base, but use of contact layer of Versatel made dressing change easier for the foam removal. Pt tolerated dressing change well. Continue dressing changes M-W-F

## 2021-08-09 NOTE — PLAN OF CARE
Goal Outcome Evaluation:  Plan of Care Reviewed With: patient           Outcome Summary: AOx4. RA. VSS. Wound vac remains in place. Wound care to change today. Completed IV abx. Awaiting am labs. Temp shiley in groin site still in place, waiting to see if it will be removed while inpatient or as outpatient. No further changes.

## 2021-08-09 NOTE — PROGRESS NOTES
Continued Stay Note  [facility]     Patient Name: Nellie Vegas  MRN: [id number]  Today's Date: 8/9/2021    Admit Date: 8/3/2021    Discharge Plan     Row Name 08/09/21 1646       Plan    Plan  Home w/ [facility], HD at [facility] TTS (wound vac pending w/ [facility]).    Plan Comments  Spoke w/ Marley, preferred DME co for [employer] [facility] is [facility], [facility] cannot accept for wound vac needs. Epic referral to [facility]. [facility] accepted and following, HD TTS at [facility]/[place]. [place] pending wound vac set-up.        Discharge Codes    No documentation.       Expected Discharge Date and Time     Expected Discharge Date Expected Discharge Time    Aug 9, 2021             Olya Brooks RN

## 2021-08-09 NOTE — DISCHARGE PLACEMENT REQUEST
"Nellie Devries (76 y.o. Female)     Date of Birth Social Security Number Address Home Phone MRN    1945  [id number]   [place] [address] [phone] [id number]    Orthodox Marital Status          Non-Hinduism        Admission Date Admission Type Admitting Provider Attending Provider Department, Room/Bed    8/3/21 Emergency Haja Chowdhury MD Ahmed, Aftab, MD [address], [address]    Discharge Date Discharge Disposition Discharge Destination         Home or Self Care              Attending Provider: Haja Chowdhury MD    Allergies: Sulfa Antibiotics, Latex    Isolation: None   Infection: None   Code Status: CPR    Ht: 152.4 cm (60\")   Wt: 76.6 kg (168 lb 14.4 oz)    Admission Cmt: None   Principal Problem: Anastomotic leak of intestine [K91.89] More...                 Active Insurance as of 8/3/2021     Primary Coverage     Payor Plan Insurance Group Employer/Plan Group    HUMANA MEDICARE REPLACEMENT HUMANA MEDICARE REPLACEMENT [id number]     Payor Plan Address Payor Plan Phone Number Payor Plan Fax Number Effective Dates    [address] [address] [phone]  7/1/2021 - None Entered    [facility] [address]       Subscriber Name Subscriber Birth Date Member ID       NELLIE DEVRIES 1945 [id number]           Secondary Coverage     Payor Plan Insurance Group Employer/Plan Group    [place]      Payor Plan Address Payor Plan Phone Number Payor Plan Fax Number Effective Dates    [address] [address]   11/1/2019 - None Entered    [place] [address]       Subscriber Name Subscriber Birth Date Member ID       NELLIE DEVRIES 1945 [id number]                 Emergency Contacts      (Rel.) Home Phone Work Phone Mobile Phone    CHRISTINE DEVRIES (Daughter) [phone] -- [phone]    LOWKENNETHSHEILA (Daughter) [phone] -- [phone]    AscencionHeidi (Daughter) [phone] -- [phone]    "

## 2021-08-09 NOTE — THERAPY TREATMENT NOTE
Patient Name: Nellie Vegas  : 1945    MRN: [id number]                              Today's Date: 2021       Admit Date: 8/3/2021    Visit Dx:     ICD-10-CM ICD-9-CM   1. Anastomotic leak of intestine  K91.89 997.49   2. Perihepatic abscess (CMS/[facility])  K65.0 567.22   3. Abdominal pain in female  R10.9 789.00     Patient Active Problem List   Diagnosis   • ESRD (end stage renal disease) on dialysis (CMS/[facility])   • Thrombosis of kidney dialysis arteriovenous graft (CMS/[facility])   • Anemia of chronic renal failure, stage 5 (CMS/[facility])   • COPD exacerbation (CMS/[facility])   • Problem with dialysis access (CMS/[facility])   • Lower GI bleeding   • Colonic mass   • AV shunt thrombosis, subsequent encounter     Past Medical History:   Diagnosis Date   • Anemia    • Anesthesia complication     mother woke up in combative state   • Arthritis     knees   • Asthma    • COPD (chronic obstructive pulmonary disease) (CMS/[facility])    • Diabetes mellitus (CMS/[facility])     type 2   • Dialysis patient (CMS/[facility])    • Disease of thyroid gland    • GERD (gastroesophageal reflux disease)    • Headache     after dialysis   • History of blood clots     BUE   • History of kidney stones    • Hyperlipidemia    • Hypertension    • Knee pain, bilateral    • Renal disease    • Sleep apnea     CPAP   • SOB (shortness of breath)    • Thrombosis of renal dialysis arteriovenous graft (CMS/[facility])    • Weakness      Past Surgical History:   Procedure Laterality Date   • ARTERIOVENOUS FISTULA Right    • ARTERIOVENOUS FISTULA Left     REMOVED   • CENTRAL VENOUS CATHETER TUNNELED INSERTION DOUBLE LUMEN     •  SECTION     • COLON RESECTION Right 2021    Procedure: RIGHT HEMICOLECTOMY LAPAROSCOPIC;  Surgeon: Zbigniew Conner MD;  Location: [facility] OR;  Service: General;  Laterality: Right;   • COLONOSCOPY     • COLONOSCOPY N/A 2021    Procedure: COLONOSCOPY into cecum with biopsy;  Surgeon: Fabricio Monroe MD;  Location: [facility]  ENDOSCOPY;  Service: Gastroenterology;  Laterality: N/A;  cecal mass   • EXPLORATORY LAPAROTOMY N/A 8/3/2021    Procedure: LAPAROTOMY EXPLORATORY, resection of ileocolonic anastomosis with anastomosis;  Surgeon: Zbigniew Conner MD;  Location: [facility];  Service: General;  Laterality: N/A;   • INSERTION HEMODIALYSIS CATHETER Right 7/16/2021    Procedure: VENACAVAGRAM AND HEMODIALYSIS CATHETER INSERTION;  Surgeon: Karson Muller MD;  Location: [facility] 18/19;  Service: Vascular;  Laterality: Right;  Dr Bryant was primary surgeon   • POLYPECTOMY      UTERINE   • SHUNT O GRAM Right 8/9/2019    Procedure: RIGHT ARM DIALYSIS GRAFT revision and THROMBECTOMY;  Surgeon: Thierry Hardy MD;  Location: [facility] 18/19;  Service: Vascular   • SHUNT O GRAM Right 8/22/2019    Procedure: RIGHT ARM DIALYSIS GRAFT THROMBECTOMY WITH STENTING;  Surgeon: Thierry Hardy MD;  Location: [facility] 18/19;  Service: Vascular   • SHUNT O GRAM Right 12/7/2020    Procedure: RIGHT ARM ARTERIOVENOUS SHUNTOGRAM WITH THROMBECTOMY;  Surgeon: Thierry Hardy MD;  Location: [facility] 18/19;  Service: Vascular;  Laterality: Right;   • SHUNT O GRAM Right 7/12/2021    Procedure: Right arm dialysis shuntogram with shunt thrombectomy;  Surgeon: Shahram Gallagher MD;  Location: [facility] 18/19;  Service: Vascular;  Laterality: Right;   • SHUNT O GRAM Right 7/19/2021    Procedure: RIGHT UPPER EXTREMITY THROMBECTOMY AND SHUNTOGRAM;  Surgeon: Shahram Gallagher MD;  Location: [facility];  Service: Vascular;  Laterality: Right;     General Information     Row Name 08/09/21 1240          OT Time and Intention    Document Type  therapy note (daily note)  -BL     Mode of Treatment  occupational therapy;individual therapy  -BL     Row Name 08/09/21 1240          General Information    Patient Profile Reviewed  yes  -BL     Existing Precautions/Restrictions  fall  -BL     Row Name 08/09/21  1240          Cognition    Orientation Status (Cognition)  oriented to;person;place  -[facility] Name 08/09/21 1240          Safety Issues, Functional Mobility    Safety Issues Affecting Function (Mobility)  insight into deficits/self-awareness;awareness of need for assistance  -     Impairments Affecting Function (Mobility)  endurance/activity tolerance;strength  -       User Key  (r) = Recorded By, (t) = Taken By, (c) = Cosigned By    Initials Name Provider Type     Antoni Holder OT Occupational Therapist          Mobility/ADL's     Row Name 08/09/21 1241          Bed Mobility    Comment (Bed Mobility)  Pt declined bed mobility this date stating she is tired and already has been OOB prior to OT arrival  -     Row Name 08/09/21 1241          Activities of Daily Living    BADL Assessment/Intervention  -- Pt reports that she completed toileting task and grooming task prior to OT arrival  -       User Key  (r) = Recorded By, (t) = Taken By, (c) = Cosigned By    Initials Name Provider Type     Antoni Holder OT Occupational Therapist        Obj/Interventions     [facility] Name 08/09/21 1242          Shoulder (Therapeutic Exercise)    Shoulder (Therapeutic Exercise)  AROM (active range of motion)  -     Shoulder AROM (Therapeutic Exercise)  bilateral;flexion;extension;aBduction;aDduction;supine x15 reps  -[facility] Name 08/09/21 1242          Elbow/Forearm (Therapeutic Exercise)    Elbow/Forearm (Therapeutic Exercise)  AROM (active range of motion)  -     Elbow/Forearm AROM (Therapeutic Exercise)  bilateral;flexion;extension;supination;pronation;supine x15 reps  -[facility] Name 08/09/21 1242          Hand (Therapeutic Exercise)    Hand (Therapeutic Exercise)  AROM (active range of motion)  -     Hand AROM/AAROM (Therapeutic Exercise)  bilateral;AROM (active range of motion);finger flexion;finger extension;finger aBduction;finger aDduction x15 reps  -     Row Name 08/09/21 1242          Therapeutic  Exercise    Therapeutic Exercise  shoulder;elbow/forearm;hand  -BL       User Key  (r) = Recorded By, (t) = Taken By, (c) = Cosigned By    Initials Name Provider Type    Antoni Hudson OT Occupational Therapist        Goals/Plan    No documentation.       Clinical Impression     Row Name 08/09/21 1242          Pain Assessment    Additional Documentation  Pain Scale: Numbers Pre/Post-Treatment (Group)  -BL     Row Name 08/09/21 1242          Pain Scale: Numbers Pre/Post-Treatment    Pretreatment Pain Rating  0/10 - no pain  -BL     Posttreatment Pain Rating  0/10 - no pain  -BL     Row Name 08/09/21 1242          Plan of Care Review    Plan of Care Reviewed With  patient  -BL     Progress  no change  -BL     Outcome Summary  Pt seen by OT this date for treatment. Upon arrival pt lying supine in bed, no c/o pain, A&O x2. Pt very lethargic this date but able to participate in session. Pt declined all OOB activity this date stating she has already been out of bed this date. Pt agreeable to BUE AROM x15 reps in supine. Pt declined ADL task stating she has been up to the toilet and did not need to complete any further ADLs. Pt left supine in bed, needs in reach, alarm on. Pt to continue with skilled OT services to address goals and deficits and increase activity tolerance. OT wore mask, gloves, glasses, hand hygiene performed.  -BL     Row Name 08/09/21 1242          Vital Signs    Pre Patient Position  Supine  -BL     Intra Patient Position  Supine  -BL     Post Patient Position  Supine  -BL     Row Name 08/09/21 1242          Positioning and Restraints    Pre-Treatment Position  in bed  -BL     Post Treatment Position  bed  -BL     In Bed  notified nsg;supine;call light within reach;encouraged to call for assist;exit alarm on  -BL       User Key  (r) = Recorded By, (t) = Taken By, (c) = Cosigned By    Initials Name Provider Type    Antoni Hudson OT Occupational Therapist        Outcome Measures    No  documentation.         Occupational Therapy Education                 Title: PT OT SLP Therapies (In Progress)     Topic: Occupational Therapy (Done)     Point: ADL training (Done)     Description:   Instruct learner(s) on proper safety adaptation and remediation techniques during self care or transfers.   Instruct in proper use of assistive devices.              Learning Progress Summary           Patient Acceptance, E, VU by BT at 8/5/2021 1552    Comment: purpose of OT, adl retraining, functional transfer training                   Point: Home exercise program (Done)     Description:   Instruct learner(s) on appropriate technique for monitoring, assisting and/or progressing therapeutic exercises/activities.              Learning Progress Summary           Patient Acceptance, E, VU by BT at 8/5/2021 1552    Comment: purpose of OT, adl retraining, functional transfer training                   Point: Precautions (Done)     Description:   Instruct learner(s) on prescribed precautions during self-care and functional transfers.              Learning Progress Summary           Patient Acceptance, E, VU by BT at 8/5/2021 1552    Comment: purpose of OT, adl retraining, functional transfer training                   Point: Body mechanics (Done)     Description:   Instruct learner(s) on proper positioning and spine alignment during self-care, functional mobility activities and/or exercises.              Learning Progress Summary           Patient Acceptance, E, VU by BT at 8/5/2021 1552    Comment: purpose of OT, adl retraining, functional transfer training                               User Key     Initials Effective Dates Name Provider Type Discipline     11/16/20 -  Sanaz England OT Occupational Therapist OT              OT Recommendation and Plan     Plan of Care Review  Plan of Care Reviewed With: patient  Progress: no change  Outcome Summary: Pt seen by OT this date for treatment. Upon arrival pt lying supine in  bed, no c/o pain, A&O x2. Pt very lethargic this date but able to participate in session. Pt declined all OOB activity this date stating she has already been out of bed this date. Pt agreeable to BUE AROM x15 reps in supine. Pt declined ADL task stating she has been up to the toilet and did not need to complete any further ADLs. Pt left supine in bed, needs in reach, alarm on. Pt to continue with skilled OT services to address goals and deficits and increase activity tolerance. OT wore mask, gloves, glasses, hand hygiene performed.     Time Calculation:   Time Calculation- OT     Row Name 08/09/21 1245             Time Calculation- OT    OT Start Time  0958  -BL      OT Stop Time  1011  -BL      OT Time Calculation (min)  13 min  -BL      Total Timed Code Minutes- OT  13 minute(s)  -BL      OT Received On  08/09/21  -BL      OT - Next Appointment  08/11/21  -BL         Timed Charges    17653 - OT Therapeutic Exercise Minutes  13  -BL         Total Minutes    Timed Charges Total Minutes  13  -BL       Total Minutes  13  -BL        User Key  (r) = Recorded By, (t) = Taken By, (c) = Cosigned By    Initials Name Provider Type     Antoni Holder OT Occupational Therapist        Therapy Charges for Today     Code Description Service Date Service Provider Modifiers Qty    [id number]  OT THER PROC EA 15 MIN 8/9/2021 Antoni Holder OT GO 1               Antoni Holder OT  8/9/2021

## 2021-08-09 NOTE — DISCHARGE SUMMARY
Discharge summary    Date of admission 8/3/2021  Date of discharge 8/10/2021    Final diagnosis  Anastomotic leak s/p exploratory laparotomy with ileocolonic anastomosis resection and redo ileocolonic anastomosis  Recent right laparoscopic hemicolectomy secondary to tubular adenoma  Status post recent thrombectomy with recurrent thrombosis right AV shunt  End-stage renal disease on hemodialysis  COPD  Diabetes mellitus  Hypertension  Chronic anemia  Gastroesophageal reflux disease    Discharge medications    Current Facility-Administered Medications:   •  albuterol (PROVENTIL) nebulizer solution 0.083% 2.5 mg/3mL, 2.5 mg, Nebulization, TID PRN, Zbigniew Conner MD  •  budesonide-formoterol (SYMBICORT) 160-4.5 MCG/ACT inhaler 2 puff, 2 puff, Inhalation, BID, Zbigniew Conner MD, 2 puff at 08/09/21 0923  •  calcium carbonate (TUMS) chewable tablet 500 mg (200 mg elemental), 1 tablet, Oral, TID PRN, StinglNery MD, 1 tablet at 08/08/21 1431  •  metoprolol succinate XL (TOPROL-XL) 24 hr tablet 25 mg, 25 mg, Oral, QAM, Zbigniew Conner MD, 25 mg at 08/09/21 0643  •  montelukast (SINGULAIR) tablet 10 mg, 10 mg, Oral, Nightly, Zbigniew Conner MD, 10 mg at 08/08/21 2024  •  ondansetron (ZOFRAN) injection 4 mg, 4 mg, Intravenous, Q6H PRN, Zbigniew Conner MD, 4 mg at 08/07/21 1412  •  pantoprazole (PROTONIX) EC tablet 40 mg, 40 mg, Oral, BID AC, Nova Liriano MD, 40 mg at 08/09/21 0643  •  sodium chloride 0.9 % infusion, 9 mL/hr, Intravenous, Continuous PRN, Zbigniew Conner MD, New Bag at 08/03/21 1624     Consults obtained  General surgery  Vascular surgery  Nephrology    Procedures  Exploratory laparotomy with ileocolonic anastomosis resection and redo ileocolonic anastomosis    Hospital course  76-year-old -American female with history of end-stage renal disease on hemodialysis who recently underwent right laparoscopic hemicolectomy secondary to  tubular adenoma followed by right AV shunt thrombectomy admit to emergency room with abdominal pain and work-up revealed anastomotic leak.  Patient admitted and underwent exploratory laparotomy with ileocolonic anastomosis resection and redo ileocolonic anastomosis.  Postoperatively patient doing well remain on antibiotics until today and on hemodialysis followed by nephrology.  Patient does have right groin tunneled catheter in place which will be removed as her AV fistula is working and will be discharged on home medications.  Patient cleared from general surgery nephrology.  Patient tolerating diet and has had BM and her Eliquis will be removed to keep her shunt patency.    Discharge diet regular    Activity as tolerated    Medication as above    Follow with primary doctor in 1 week and follow with general surgery per the instruction keep the appointment for hemodialysis and take medication as directed    ALLAN MARTIN MD

## 2021-08-09 NOTE — DISCHARGE INSTRUCTIONS
COLON, GASTRIC OR SMALL BOWEL RESECTION  POST OP RECOMMENDATIONS  Dr. Zbigniew Conner  [phone]  ACTIVITIES:  1. Expect to rest most of the first week after discharge from hospital, but do get up several times daily to reduce the risk of getting a clot in your legs.  2. No strenuous activity or lifting over 10 lbs. for two weeks.  Add 5 lbs. a week to that restriction until 6 weeks out from surgery.  Try to avoid squatting and deep bends for about a week.  3. No driving until seen at your post operative appointment.  You must be off pain meds 24 hours before driving after that visit unless otherwise instructed.  4. You can climb stairs, but minimize this and initially do one step at a time (both feet on one step rather than going up with each step.)  SYMPTOMS:  1. Avoiding constipation is important after this surgery as it is common when taking pain medication.  Over the counter laxatives, such as Miralax, can be used temporarily to avoid this.   2. Fatigue and decreased stamina is not unusual for about a week or so after surgery due to anesthesia.  Try to take walks and some mild activity between resting.  3. Shoulder pain is not unusual from the “gas” used in laparoscopy which will dissipate within 1-3 days.  If it does not, call your physician.  4. It is not unusual for your gastrointestinal system to take 1-2 months to get back to your normal routine and you may have long term changes towards looser bowel movements  WOUND SITE:  1. Dressings can be removed 7-10 days after procedure if desired.  The “tega-derm” may be removed in 3 days if instructed to.  2. Dressings may occasionally have spots of blood on them.  As long as it is dry, these do not need to be changed.  If it is soaked, then the dressing should be removed and a new dressing placed.  3. Skin irritation, redness or itching can prompt removal of the bandage earlier if present.  4. Steri-strips are to be left in place until they fall off on their own in  1-2 weeks.  If they are irritating then they may be removed sooner.  5. No showering if a drain is in place. Once removed, and the skin is covered, you may shower.    6. Showering may occur while the “tega-derm” is in place.  If it is off, then you must wait 3 days after surgery before showering.  MEALS:  1. A soft diet is recommended for the first 1-2 days after discharge from the hospital.  A normal diet may then be started as tolerated after that. Follow dietary guidelines when specified  2. Expect to eat less after this procedure and fill up somewhat faster.   3. Do NOT take pain pills on an empty stomach.  WORK:  1. In general if you have a sedentary job, you can return to work in 3 weeks if done laparoscopically.  If lifting or exertion is required it may be 3-6 weeks depending on your recovery.    2. Use discretion and remember that your stamina will be decreased post operatively.  3. Return to work notes can be provided at the time of your post-operative appointment.  FOLLOW UP:  1. Call and make a post-operative appointment for approximately 10-14 days after the procedure.

## 2021-08-09 NOTE — PLAN OF CARE
Goal Outcome Evaluation:  Plan of Care Reviewed With: patient        Progress: no change  Outcome Summary: Pt seen by OT this date for treatment. Upon arrival pt lying supine in bed, no c/o pain, A&O x2. Pt very lethargic this date but able to participate in session. Pt declined all OOB activity this date stating she has already been out of bed this date. Pt agreeable to BUE AROM x15 reps in supine. Pt declined ADL task stating she has been up to the toilet and did not need to complete any further ADLs. Pt left supine in bed, needs in reach, alarm on. Pt to continue with skilled OT services to address goals and deficits and increase activity tolerance. OT wore mask, gloves, glasses, hand hygiene performed.

## 2021-08-09 NOTE — PROGRESS NOTES
"Daily progress note    Chief complaint  Doing better  No new complaints  Wants to go home  Denies any nausea vomiting diarrhea  Family at bedside    History of present illness  76-year-old -American female who is well-known to our service with history of end-stage renal disease on hemodialysis COPD diabetes mellitus hypertension chronic anemia and gastroesophageal reflux disease who has had multiple admission in the recent past with lower GI bleed and work-up revealed tubular adenoma and underwent right laparoscopic hemicolectomy followed by recurrent thrombosis of the right AV shunt underwent thrombectomy now presented with lower abdominal pain constant nonradiating no nausea vomiting diarrhea and work-up in ER revealed anastomotic leak of intestine with perihepatic abscess admit for management.  Patient will be taken to the OR this afternoon with Dr. Conner.  Patient admitted to our service and will be followed by nephrology for dialysis.     REVIEW OF SYSTEMS  Unremarkable      PHYSICAL EXAM  Blood pressure 136/64, pulse 73, temperature 97.7 °F (36.5 °C), temperature source Oral, resp. rate 18, height 152.4 cm (60\"), weight 76.6 kg (168 lb 14.4 oz), SpO2 97 %, not currently breastfeeding.    GENERAL: alert well developed, well nourished in no distress  HENT: NCAT, neck supple, trachea midline  EYES: no scleral icterus, PERRL, normal conjunctivae  CV: regular rhythm, regular rate, no murmur  RESPIRATORY: unlabored effort, CTAB  ABDOMEN: soft, midline vertically oriented abdominal incision with staple closure. No dehiscence noted. No expressible drainage or carol incisional induration.  There is no erythema of the skin. Carol incisional region is exquisitely tender but theree is no rebound. Nondistended, bowel sounds present  MUSCULOSKELETAL: no gross deformity  NEURO: alert,  sensory and motor function of extremities grossly intact, speech clear, mental status normal/baseline  SKIN: warm, dry, no rash  PSYCH:  " Appropriate mood and affect     LAB RESULTS  Lab Results (last 24 hours)     Procedure Component Value Units Date/Time    POC Glucose Once [[id number]]  (Normal) Collected: 08/09/21 1057    Specimen: Blood Updated: 08/09/21 1059     Glucose 119 mg/dL      Comment: Meter: [id number] : [id number] Geremias Toussaint (Yajaira)       POC Glucose Once [[id number]]  (Normal) Collected: 08/09/21 0623    Specimen: Blood Updated: 08/09/21 0625     Glucose 92 mg/dL      Comment: Meter: [id number] : [id number] Héctor Linsscorky WADDELL       POC Glucose Once [[id number]]  (Normal) Collected: 08/08/21 2033    Specimen: Blood Updated: 08/08/21 2034     Glucose 115 mg/dL      Comment: Meter: [id number] : [id number] Héctor WADDELL           Imaging Results (Last 24 Hours)     ** No results found for the last 24 hours. **          Current Facility-Administered Medications:   •  albuterol (PROVENTIL) nebulizer solution 0.083% 2.5 mg/3mL, 2.5 mg, Nebulization, TID PRN, Zbigniew Conner MD  •  budesonide-formoterol (SYMBICORT) 160-4.5 MCG/ACT inhaler 2 puff, 2 puff, Inhalation, BID, Zbigniew Conner MD, 2 puff at 08/09/21 0923  •  calcium carbonate (TUMS) chewable tablet 500 mg (200 mg elemental), 1 tablet, Oral, TID PRN, StingNery masterson MD, 1 tablet at 08/08/21 1431  •  HYDROmorphone (DILAUDID) injection 0.5 mg, 0.5 mg, Intravenous, Q2H PRN, Zbigniew Conner MD, 0.5 mg at 08/06/21 0953  •  insulin lispro (ADMELOG) injection 0-7 Units, 0-7 Units, Subcutaneous, TID AC, Zbigniew Conner MD  •  metoprolol succinate XL (TOPROL-XL) 24 hr tablet 25 mg, 25 mg, Oral, QAM, Zbigniew Conner MD, 25 mg at 08/09/21 0643  •  montelukast (SINGULAIR) tablet 10 mg, 10 mg, Oral, Nightly, Zbigniew Conner MD, 10 mg at 08/08/21 2024  •  ondansetron (ZOFRAN) injection 4 mg, 4 mg, Intravenous, Q6H PRN, Zbigniew Conner MD, 4 mg at 08/07/21 1412  •  oxyCODONE-acetaminophen (PERCOCET) 5-325 MG per  tablet 1 tablet, 1 tablet, Oral, Q6H PRN, Zbigniew Conner MD, 1 tablet at 08/07/21 1052  •  pantoprazole (PROTONIX) EC tablet 40 mg, 40 mg, Oral, BID ACHeri Lindsay, MD, 40 mg at 08/09/21 0643  •  sodium chloride 0.9 % infusion, 9 mL/hr, Intravenous, Continuous PRN, Zbigniew Conner MD, New Bag at 08/03/21 1624     ASSESSMENT  Anastomotic leak s/p exploratory laparotomy with ileocolonic anastomosis resection and redo ileocolonic anastomosis  Recent right laparoscopic hemicolectomy secondary to tubular adenoma  Status post recent thrombectomy with recurrent thrombosis right AV shunt  End-stage renal disease on hemodialysis  COPD  Diabetes mellitus  Hypertension  Chronic anemia  Gastroesophageal reflux disease    PLAN  Discharge home if okay with all  Discharge summary dictated    ALLAN MARTIN MD

## 2021-08-10 VITALS
TEMPERATURE: 97.7 F | SYSTOLIC BLOOD PRESSURE: 102 MMHG | WEIGHT: 168.9 LBS | OXYGEN SATURATION: 99 % | DIASTOLIC BLOOD PRESSURE: 42 MMHG | HEART RATE: 75 BPM | HEIGHT: 60 IN | RESPIRATION RATE: 18 BRPM | BODY MASS INDEX: 33.16 KG/M2

## 2021-08-10 LAB
ALBUMIN SERPL-MCNC: 2.8 G/DL (ref 3.5–5.2)
ANION GAP SERPL CALCULATED.3IONS-SCNC: 20.8 MMOL/L (ref 5–15)
BUN SERPL-MCNC: 40 MG/DL (ref 8–23)
BUN/CREAT SERPL: 5.6 (ref 7–25)
CALCIUM SPEC-SCNC: 8.9 MG/DL (ref 8.6–10.5)
CHLORIDE SERPL-SCNC: 91 MMOL/L (ref 98–107)
CO2 SERPL-SCNC: 18.2 MMOL/L (ref 22–29)
CREAT SERPL-MCNC: 7.17 MG/DL (ref 0.57–1)
GFR SERPL CREATININE-BSD FRML MDRD: 7 ML/MIN/1.73
GFR SERPL CREATININE-BSD FRML MDRD: ABNORMAL ML/MIN/{1.73_M2}
GLUCOSE BLDC GLUCOMTR-MCNC: 85 MG/DL (ref 70–130)
GLUCOSE BLDC GLUCOMTR-MCNC: 87 MG/DL (ref 70–130)
GLUCOSE BLDC GLUCOMTR-MCNC: 99 MG/DL (ref 70–130)
GLUCOSE SERPL-MCNC: 93 MG/DL (ref 65–99)
PHOSPHATE SERPL-MCNC: 4.3 MG/DL (ref 2.5–4.5)
POTASSIUM SERPL-SCNC: 3.7 MMOL/L (ref 3.5–5.2)
SODIUM SERPL-SCNC: 130 MMOL/L (ref 136–145)

## 2021-08-10 PROCEDURE — 82962 GLUCOSE BLOOD TEST: CPT

## 2021-08-10 PROCEDURE — 99024 POSTOP FOLLOW-UP VISIT: CPT | Performed by: SURGERY

## 2021-08-10 PROCEDURE — 94799 UNLISTED PULMONARY SVC/PX: CPT

## 2021-08-10 PROCEDURE — 80069 RENAL FUNCTION PANEL: CPT | Performed by: INTERNAL MEDICINE

## 2021-08-10 RX ADMIN — BUDESONIDE AND FORMOTEROL FUMARATE DIHYDRATE 2 PUFF: 160; 4.5 AEROSOL RESPIRATORY (INHALATION) at 08:26

## 2021-08-10 RX ADMIN — PANTOPRAZOLE SODIUM 40 MG: 40 TABLET, DELAYED RELEASE ORAL at 06:51

## 2021-08-10 RX ADMIN — APIXABAN 2.5 MG: 2.5 TABLET, FILM COATED ORAL at 13:55

## 2021-08-10 RX ADMIN — METOPROLOL SUCCINATE 25 MG: 25 TABLET, EXTENDED RELEASE ORAL at 06:51

## 2021-08-10 RX ADMIN — PANTOPRAZOLE SODIUM 40 MG: 40 TABLET, DELAYED RELEASE ORAL at 16:57

## 2021-08-10 NOTE — NURSING NOTE
08/10/21 1440   NPWT (Negative Pressure Wound Therapy) 08/04/21 1101 abdomen   Placement Date/Time: 08/04/21 1101   Location: abdomen   Therapy Setting continuous therapy   Dressing   (switched to patient's [employer] vac for home)   Pressure Setting 125 mmHg     Wound/ostomy - patient is to d/c home today, [employer] vac in room. Wound vac dressing was changed yesterday. Left dressing in  Place and switched out track pads only and connected to home [employer] vac. Instructed patient on how to charge machine and on how to see that machine was on and what battery life was. Patient has blue [employer] bag in room to take home with her.

## 2021-08-10 NOTE — PROGRESS NOTES
[facility]     Progress Note    Patient Name: Nellie Vegas  : 1945  MRN: [id number]  Primary Care Physician:  Laurent Cortes DO  Date of admission: 8/3/2021    Subjective   Subjective     Chief Complaint: ESRD    Abdominal Pain      Patient on hd t//s s/p bowel resection    Feels well today  Patient is doing well today with no new complaints  Good appetite with no nausea or vomiting  No shortness of breath chest pain or edema  Voiding well with no dysuria  Had tunneled HD catheter removed yesterday.  Seen during HD and tolerating well. . RN thinks low BFR is due to position/proximty of needles.        Review of Systems   Gastrointestinal: Positive for abdominal pain.       Objective   Objective     Vitals:   Temp:  [97.3 °F (36.3 °C)-97.7 °F (36.5 °C)] 97.5 °F (36.4 °C)  Heart Rate:  [71-84] 75  Resp:  [17-24] 24  BP: (123-148)/(57-71) 148/70    Physical Exam   General Appearance:  In no acute distress.  Obese  HEENT: Normal HEENT exam.     Chest clear to all station bilaterally  CV RRR no murmurs  Extremities: .  There is no deformity, effusion or dependent edema.    Upper extremity AV fistula with good thrill and bruit  Neurological: Patient is alert         Result Review    Result Review:  Old records, chart and labs reviewed  Basic Metabolic Panel    Sodium Sodium   Date Value Ref Range Status   08/10/2021 130 (L) 136 - 145 mmol/L Final      Potassium Potassium   Date Value Ref Range Status   08/10/2021 3.7 3.5 - 5.2 mmol/L Final      Chloride Chloride   Date Value Ref Range Status   08/10/2021 91 (L) 98 - 107 mmol/L Final      Bicarbonate No results found for: PLASMABICARB   BUN BUN   Date Value Ref Range Status   08/10/2021 40 (H) 8 - 23 mg/dL Final      Creatinine Creatinine   Date Value Ref Range Status   08/10/2021 7.17 (H) 0.57 - 1.00 mg/dL Final      Calcium Calcium   Date Value Ref Range Status   08/10/2021 8.9 8.6 - 10.5 mg/dL Final      Glucose      No components found for:  GLUCOSE.*           Assessment/Plan   Assessment / Plan     Brief Patient Summary:  Nellie Vegas is a 76 y.o. female who has ESRD on hd t/th/s    Assessment:  1. ESRD, HD TTS  2. S/p bowel resection with anastomotic leak status post repair  3. T2DM  4. Hypertension  5.  Recent AV fistula dysfunction, s/p thrombectomy.   6.  Anemia of CKD      Plan:  Continue dialysis Tuesday Thursday Saturday, UF as tolerated. BFR last HD was recorded as 450 using this graft.   ?  Resume Eliquis for anticoagulation to help maintain shunt patency  Recheck chemistries in a.m.  Epogen with HD for anemia  Discharge planning      Noa Horan MD  [facility]  [phone]

## 2021-08-10 NOTE — PLAN OF CARE
Goal Outcome Evaluation:  Plan of Care Reviewed With: patient        Progress: improving  Outcome Summary: No acute changes overnight. VSS, A/Ox4. C/o slight discomfort in abdomen that's not new for pt. Wound vac in place. Plan for possible discharge today. Will continue to montior closely.

## 2021-08-10 NOTE — PROGRESS NOTES
Continued Stay Note  [facility]     Patient Name: Nellie Vegas  MRN: [id number]  Today's Date: 8/10/2021    Admit Date: 8/3/2021    Discharge Plan     Row Name 08/10/21 1226       Plan    Plan  Home w/ [facility], HD @ [facility] TTS, wound vac to be delivered to bedside today per [facility].    Plan Comments  Spoke w/ Niya [phone], [facility] to arrange wound vac delivery to the bedside. RUDOLPH/Tianna accepted and following. Pt to resume regular HD w/ [facility]. Pt to dc home w/ daughter, left message for daughter, Kamla to confirm dc plan and transportation home; awaiting call back.        Discharge Codes    No documentation.       Expected Discharge Date and Time     Expected Discharge Date Expected Discharge Time    Aug 9, 2021             Olya Brooks RN

## 2021-08-10 NOTE — PROGRESS NOTES
Continued Stay Note  [facility]     Patient Name: Nellie Vegas  MRN: [id number]  Today's Date: 8/10/2021    Admit Date: 8/3/2021    Discharge Plan     Row Name 08/10/21 1809       Plan    Final Discharge Disposition Code  06 - home with home health care    Final Note  Home with [facility] and [facility] for wound vac therapy via family transport    Row Name 08/10/21 9932       Plan    Plan  Home with [facility] and [employer] for wound vac, family will transport    Plan Comments  [employer] has delivered wound vac and nursing has applied the machine for home.  Tianna with [facility] is following dc.  Patient states she prefers home and refuses skilled rehab and her daughter will pick her up this afternoon and transport her home.....................Petrona Leon RN        Discharge Codes    No documentation.       Expected Discharge Date and Time     Expected Discharge Date Expected Discharge Time    Aug 9, 2021             Petrona Leon RN

## 2021-08-10 NOTE — PLAN OF CARE
Goal Outcome Evaluation:  Plan of Care Reviewed With: patient        Progress: improving  Outcome Summary: VSS. A/Ox4. Pt recieved dialysis today, 2L taken off. Wound vac education was completed by the wound nurse. plan for d/c once daughter gets off work. will ctm

## 2021-08-10 NOTE — PROGRESS NOTES
Postoperative day 6 status post resection of ileocolonic anastomosis for leak    S: Patient is doing really well.    O:   Vitals:    08/10/21 0732 08/10/21 0826 08/10/21 0830 08/10/21 0925   BP: 148/70   114/53   BP Location: Left arm   Right arm   Patient Position: Sitting      Pulse: 84 84 75 71   Resp: 18 24 24 16   Temp: 97.5 °F (36.4 °C)   97.1 °F (36.2 °C)   TempSrc: Oral   Temporal   SpO2: 100% 99%     Weight:       Height:         Alert, no acute distress  Abdomen soft, mildly tender to palpation  Wound VAC in place, clear fluid    Sodium 130, potassium 3.7, chloride 91, creatinine 7.17    Assessment and plan    Postop day 6 status post resection of ileocolonic anastomosis for leak.  She is doing much better.  Tolerating diet.  Having bowel function.    -Home anytime from my standpoint  -Follow-up in my office in a week  -Wound VAC to be changed every 3 days

## 2021-08-10 NOTE — PROGRESS NOTES
Continued Stay Note  [facility]     Patient Name: Nellie Vegas  MRN: [id number]  Today's Date: 8/10/2021    Admit Date: 8/3/2021    Discharge Plan     Row Name 08/10/21 1552       Plan    Plan  Home with [facility] and [facility] for wound vac, family will transport    Plan Comments  [employer] has delivered wound vac and nursing has applied the machine for home.  Tianna with [facility] is following dc.  Patient states she prefers home and refuses skilled rehab and her daughter will pick her up this afternoon and transport her home.....................Petrona Leon RN        Discharge Codes    No documentation.       Expected Discharge Date and Time     Expected Discharge Date Expected Discharge Time    Aug 9, 2021             Petrona Leon RN

## 2021-08-10 NOTE — PROGRESS NOTES
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  Patient will be taken to the OR this afternoon with Dr. Conner.  Patient admitted to our service and will be followed by nephrology for dialysis.     REVIEW OF SYSTEMS  Unremarkable      PHYSICAL EXAM  Blood pressure 102/42, pulse 75, temperature 97.7 °F (36.5 °C), temperature source Temporal, resp. rate 18, height 152.4 cm (60\"), weight 76.6 kg (168 lb 14.4 oz), SpO2 99 %, not currently breastfeeding.    GENERAL: alert well developed, well nourished in no distress  HENT: NCAT, neck supple, trachea midline  EYES: no scleral icterus, PERRL, normal conjunctivae  CV: regular rhythm, regular rate, no murmur  RESPIRATORY: unlabored effort, CTAB  ABDOMEN: soft, midline vertically oriented abdominal incision with staple closure. No dehiscence noted. No expressible drainage or carol incisional induration.  There is no erythema of the skin. Carol incisional region is exquisitely tender but theree is no rebound. Nondistended, bowel sounds present  MUSCULOSKELETAL: no gross deformity  NEURO: alert,  sensory and motor function of extremities grossly intact, speech clear, mental status normal/baseline  SKIN: warm, dry, no rash  PSYCH: "  Appropriate mood and affect     LAB RESULTS  Lab Results (last 24 hours)     Procedure Component Value Units Date/Time    Renal Function Panel [[id number]]  (Abnormal) Collected: 08/10/21 0527    Specimen: Blood Updated: 08/10/21 0711     Glucose 93 mg/dL      BUN 40 mg/dL      Creatinine 7.17 mg/dL      Sodium 130 mmol/L      Potassium 3.7 mmol/L      Chloride 91 mmol/L      CO2 18.2 mmol/L      Calcium 8.9 mg/dL      Albumin 2.80 g/dL      Phosphorus 4.3 mg/dL      Anion Gap 20.8 mmol/L      BUN/Creatinine Ratio 5.6     eGFR Non African Amer --     Comment: <15 Indicative of kidney failure.        eGFR   Amer 7 mL/min/1.73      Comment: <15 Indicative of kidney failure.       Narrative:      GFR Normal >60  Chronic Kidney Disease <60  Kidney Failure <15      POC Glucose Once [[id number]]  (Normal) Collected: 08/10/21 0623    Specimen: Blood Updated: 08/10/21 0624     Glucose 99 mg/dL      Comment: Meter: [id number] : [id number] Leon WADDELL       POC Glucose Once [[id number]]  (Abnormal) Collected: 08/09/21 2147    Specimen: Blood Updated: 08/09/21 2149     Glucose 137 mg/dL      Comment: Meter: [id number] : [id number] Paco WADDELL           Imaging Results (Last 24 Hours)     ** No results found for the last 24 hours. **          Current Facility-Administered Medications:   •  albuterol (PROVENTIL) nebulizer solution 0.083% 2.5 mg/3mL, 2.5 mg, Nebulization, TID PRN, Zbigniew Conner MD  •  apixaban (ELIQUIS) tablet 2.5 mg, 2.5 mg, Oral, Q12H, Haja Chowdhury MD, 2.5 mg at 08/10/21 1355  •  budesonide-formoterol (SYMBICORT) 160-4.5 MCG/ACT inhaler 2 puff, 2 puff, Inhalation, BID, Zbigniew Conner MD, 2 puff at 08/10/21 0826  •  calcium carbonate (TUMS) chewable tablet 500 mg (200 mg elemental), 1 tablet, Oral, TID PRN, StingNery masterson MD, 1 tablet at 08/08/21 1431  •  epoetin polina-epbx (RETACRIT) injection 10,000 Units, 10,000 Units, Intravenous, Once per day on Tue  Pearl Saldana Andrew James, MD  •  metoprolol succinate XL (TOPROL-XL) 24 hr tablet 25 mg, 25 mg, Oral, QAM, Zbigniew Conner MD, 25 mg at 08/10/21 0651  •  montelukast (SINGULAIR) tablet 10 mg, 10 mg, Oral, Nightly, Zbigniew Conner MD, 10 mg at 08/09/21 2234  •  ondansetron (ZOFRAN) injection 4 mg, 4 mg, Intravenous, Q6H PRN, Zbigniew Conner MD, 4 mg at 08/07/21 1412  •  pantoprazole (PROTONIX) EC tablet 40 mg, 40 mg, Oral, BID AC, Nova Liriano MD, 40 mg at 08/10/21 0651  •  sodium chloride 0.9 % infusion, 9 mL/hr, Intravenous, Continuous PRN, Zbigniew Conner MD, New Bag at 08/03/21 1624     ASSESSMENT  Anastomotic leak s/p exploratory laparotomy with ileocolonic anastomosis resection and redo ileocolonic anastomosis  Recent right laparoscopic hemicolectomy secondary to tubular adenoma  Status post recent thrombectomy with recurrent thrombosis right AV shunt  End-stage renal disease on hemodialysis  COPD  Diabetes mellitus  Hypertension  Chronic anemia  Gastroesophageal reflux disease    PLAN  Discharge home with family support and home health with wound VAC  Discharge summary dictated    ALLAN MARTIN MD

## 2021-08-11 ENCOUNTER — READMISSION MANAGEMENT (OUTPATIENT)
Dept: CALL CENTER | Facility: HOSPITAL | Age: 76
End: 2021-08-11

## 2021-08-11 ENCOUNTER — HOME CARE VISIT (OUTPATIENT)
Dept: HOME HEALTH SERVICES | Facility: HOME HEALTHCARE | Age: 76
End: 2021-08-11

## 2021-08-11 PROCEDURE — G0299 HHS/HOSPICE OF RN EA 15 MIN: HCPCS

## 2021-08-11 NOTE — OUTREACH NOTE
Prep Survey      Responses   [facility] facility patient discharged from?  [place]   Is LACE score < 7 ?  No   Emergency Room discharge w/ pulse ox?  No   Eligibility  Readm Mgmt   Discharge diagnosis  Anastomotic leak s/p exploratory laparotomy with ileocolonic anastomosis resection and redo ileocolonic anastomosis   Does the patient have one of the following disease processes/diagnoses(primary or secondary)?  General Surgery   Does the patient have Home health ordered?  Yes   What is the Home health agency?   [facility]    Is there a DME ordered?  Yes   What DME was ordered?  [facility]-wound vac   Prep survey completed?  Yes          Suzanne Ward RN

## 2021-08-12 ENCOUNTER — TELEPHONE (OUTPATIENT)
Dept: SURGERY | Facility: CLINIC | Age: 76
End: 2021-08-12

## 2021-08-12 ENCOUNTER — HOME CARE VISIT (OUTPATIENT)
Dept: HOME HEALTH SERVICES | Facility: HOME HEALTHCARE | Age: 76
End: 2021-08-12

## 2021-08-12 DIAGNOSIS — R10.84 GENERALIZED ABDOMINAL PAIN: Primary | ICD-10-CM

## 2021-08-12 RX ORDER — OXYCODONE HYDROCHLORIDE AND ACETAMINOPHEN 5; 325 MG/1; MG/1
1 TABLET ORAL EVERY 6 HOURS PRN
Qty: 20 TABLET | Refills: 0 | Status: ON HOLD | OUTPATIENT
Start: 2021-08-12 | End: 2021-08-21

## 2021-08-12 NOTE — TELEPHONE ENCOUNTER
Patient's daughter called in requesting pain medication for her mom. S/P Exploratory laparotomy with ileocolonic anastomosis resection and redo ileocolonic anastomosis on 08/03/21. Pharmacy is correct in chart.

## 2021-08-13 ENCOUNTER — HOME CARE VISIT (OUTPATIENT)
Dept: HOME HEALTH SERVICES | Facility: HOME HEALTHCARE | Age: 76
End: 2021-08-13

## 2021-08-13 VITALS
SYSTOLIC BLOOD PRESSURE: 90 MMHG | OXYGEN SATURATION: 93 % | TEMPERATURE: 95 F | HEART RATE: 91 BPM | RESPIRATION RATE: 16 BRPM | DIASTOLIC BLOOD PRESSURE: 50 MMHG

## 2021-08-13 VITALS — HEART RATE: 72 BPM | TEMPERATURE: 93.5 F

## 2021-08-13 PROCEDURE — G0299 HHS/HOSPICE OF RN EA 15 MIN: HCPCS

## 2021-08-13 PROCEDURE — G0151 HHCP-SERV OF PT,EA 15 MIN: HCPCS

## 2021-08-13 NOTE — HOME HEALTH
REASON FOR REFERRAL:  decreased ability to ambulate in and out of the home following recent hospital stay s/p exploratory laparotomy with ileocolonic anastomosis resection and redo ileocolonic anastomosis      MEDICAL HISTORY: Patient states she went to dialysis and it was found that her shunt was blocked. States she was then sent to the hospital. Patient was hospitalized from 8/3/21 to 8/10/21 secondary to anastomotic leak s/p exploratory laparotomy with ileocolonic anastomosis resection and redo ileocolonic anastomosis, recent right laparoscopic hemicolectomy secondary to tubular adenoma, status post recent thrombectomy with recurrent thrombosis right AV shunt      SKILLED PHYSICAL THERAPY IS MEDICALLY NECESSARY FOR: remediation of decreased strength, decreased ambulatory ability, increased risk of falls, decreased transfers

## 2021-08-14 ENCOUNTER — HOME CARE VISIT (OUTPATIENT)
Dept: HOME HEALTH SERVICES | Facility: HOME HEALTHCARE | Age: 76
End: 2021-08-14

## 2021-08-14 VITALS
OXYGEN SATURATION: 96 % | SYSTOLIC BLOOD PRESSURE: 92 MMHG | TEMPERATURE: 97.1 F | DIASTOLIC BLOOD PRESSURE: 50 MMHG | HEART RATE: 96 BPM | RESPIRATION RATE: 16 BRPM

## 2021-08-14 VITALS
OXYGEN SATURATION: 93 % | TEMPERATURE: 97.3 F | HEART RATE: 82 BPM | DIASTOLIC BLOOD PRESSURE: 78 MMHG | SYSTOLIC BLOOD PRESSURE: 132 MMHG | RESPIRATION RATE: 20 BRPM

## 2021-08-14 PROCEDURE — G0300 HHS/HOSPICE OF LPN EA 15 MIN: HCPCS

## 2021-08-16 ENCOUNTER — HOME CARE VISIT (OUTPATIENT)
Dept: HOME HEALTH SERVICES | Facility: HOME HEALTHCARE | Age: 76
End: 2021-08-16

## 2021-08-16 VITALS
TEMPERATURE: 96.9 F | SYSTOLIC BLOOD PRESSURE: 100 MMHG | OXYGEN SATURATION: 100 % | HEART RATE: 84 BPM | DIASTOLIC BLOOD PRESSURE: 80 MMHG

## 2021-08-16 PROCEDURE — G0151 HHCP-SERV OF PT,EA 15 MIN: HCPCS

## 2021-08-16 PROCEDURE — G0299 HHS/HOSPICE OF RN EA 15 MIN: HCPCS

## 2021-08-16 NOTE — HOME HEALTH
Patient reports doing better. Wanting to know about order for wheelchair and BSC. Patient reports she does not feel strong enough to try to stand or walk today. She has not been doing her exercises.    Phoned MD office and requested order for transport wheelchair and BSC be faxed to [facility]

## 2021-08-17 VITALS
RESPIRATION RATE: 16 BRPM | OXYGEN SATURATION: 98 % | TEMPERATURE: 97.5 F | DIASTOLIC BLOOD PRESSURE: 60 MMHG | SYSTOLIC BLOOD PRESSURE: 118 MMHG | HEART RATE: 86 BPM

## 2021-08-18 ENCOUNTER — HOME CARE VISIT (OUTPATIENT)
Dept: HOME HEALTH SERVICES | Facility: HOME HEALTHCARE | Age: 76
End: 2021-08-18

## 2021-08-18 PROCEDURE — G0299 HHS/HOSPICE OF RN EA 15 MIN: HCPCS

## 2021-08-19 VITALS
HEART RATE: 88 BPM | DIASTOLIC BLOOD PRESSURE: 70 MMHG | TEMPERATURE: 44.4 F | OXYGEN SATURATION: 6 % | SYSTOLIC BLOOD PRESSURE: 120 MMHG | RESPIRATION RATE: 16 BRPM

## 2021-08-20 ENCOUNTER — READMISSION MANAGEMENT (OUTPATIENT)
Dept: CALL CENTER | Facility: HOSPITAL | Age: 76
End: 2021-08-20

## 2021-08-20 NOTE — OUTREACH NOTE
General Surgery Week 2 Survey      Responses   [facility] patient discharged from?  [place]   Does the patient have one of the following disease processes/diagnoses(primary or secondary)?  General Surgery   Week 2 attempt successful?  No   Unsuccessful attempts  Attempt 1          Kristi Dutton RN

## 2021-08-21 ENCOUNTER — APPOINTMENT (OUTPATIENT)
Dept: CT IMAGING | Facility: HOSPITAL | Age: 76
End: 2021-08-21

## 2021-08-21 ENCOUNTER — HOSPITAL ENCOUNTER (INPATIENT)
Facility: HOSPITAL | Age: 76
LOS: 8 days | Discharge: NURSING, REHAB OR LONG-TERM CARE | End: 2021-08-31
Attending: EMERGENCY MEDICINE | Admitting: HOSPITALIST

## 2021-08-21 DIAGNOSIS — R62.7 FAILURE TO THRIVE IN ADULT: Primary | ICD-10-CM

## 2021-08-21 DIAGNOSIS — R53.1 GENERALIZED WEAKNESS: ICD-10-CM

## 2021-08-21 DIAGNOSIS — N18.6 END STAGE RENAL DISEASE (HCC): ICD-10-CM

## 2021-08-21 LAB
ALBUMIN SERPL-MCNC: 3.3 G/DL (ref 3.5–5.2)
ALBUMIN/GLOB SERPL: 0.9 G/DL
ALP SERPL-CCNC: 114 U/L (ref 39–117)
ALT SERPL W P-5'-P-CCNC: 7 U/L (ref 1–33)
ANION GAP SERPL CALCULATED.3IONS-SCNC: 19.2 MMOL/L (ref 5–15)
AST SERPL-CCNC: 10 U/L (ref 1–32)
BASOPHILS # BLD AUTO: 0.07 10*3/MM3 (ref 0–0.2)
BASOPHILS NFR BLD AUTO: 0.8 % (ref 0–1.5)
BILIRUB SERPL-MCNC: 0.2 MG/DL (ref 0–1.2)
BUN SERPL-MCNC: 30 MG/DL (ref 8–23)
BUN/CREAT SERPL: 3.9 (ref 7–25)
CALCIUM SPEC-SCNC: 8.2 MG/DL (ref 8.6–10.5)
CHLORIDE SERPL-SCNC: 97 MMOL/L (ref 98–107)
CO2 SERPL-SCNC: 23.8 MMOL/L (ref 22–29)
CREAT SERPL-MCNC: 7.74 MG/DL (ref 0.57–1)
D-LACTATE SERPL-SCNC: 1.7 MMOL/L (ref 0.5–2)
DEPRECATED RDW RBC AUTO: 57.9 FL (ref 37–54)
EOSINOPHIL # BLD AUTO: 0.13 10*3/MM3 (ref 0–0.4)
EOSINOPHIL NFR BLD AUTO: 1.5 % (ref 0.3–6.2)
ERYTHROCYTE [DISTWIDTH] IN BLOOD BY AUTOMATED COUNT: 19.6 % (ref 12.3–15.4)
GFR SERPL CREATININE-BSD FRML MDRD: 6 ML/MIN/1.73
GFR SERPL CREATININE-BSD FRML MDRD: ABNORMAL ML/MIN/{1.73_M2}
GLOBULIN UR ELPH-MCNC: 3.8 GM/DL
GLUCOSE SERPL-MCNC: 129 MG/DL (ref 65–99)
HCT VFR BLD AUTO: 29.1 % (ref 34–46.6)
HGB BLD-MCNC: 9 G/DL (ref 12–15.9)
IMM GRANULOCYTES # BLD AUTO: 0.06 10*3/MM3 (ref 0–0.05)
IMM GRANULOCYTES NFR BLD AUTO: 0.7 % (ref 0–0.5)
LIPASE SERPL-CCNC: 66 U/L (ref 13–60)
LYMPHOCYTES # BLD AUTO: 0.51 10*3/MM3 (ref 0.7–3.1)
LYMPHOCYTES NFR BLD AUTO: 5.9 % (ref 19.6–45.3)
MCH RBC QN AUTO: 25.9 PG (ref 26.6–33)
MCHC RBC AUTO-ENTMCNC: 30.9 G/DL (ref 31.5–35.7)
MCV RBC AUTO: 83.9 FL (ref 79–97)
MONOCYTES # BLD AUTO: 1.13 10*3/MM3 (ref 0.1–0.9)
MONOCYTES NFR BLD AUTO: 13 % (ref 5–12)
NEUTROPHILS NFR BLD AUTO: 6.8 10*3/MM3 (ref 1.7–7)
NEUTROPHILS NFR BLD AUTO: 78.1 % (ref 42.7–76)
NRBC BLD AUTO-RTO: 0.1 /100 WBC (ref 0–0.2)
PLATELET # BLD AUTO: 307 10*3/MM3 (ref 140–450)
PMV BLD AUTO: 9.7 FL (ref 6–12)
POTASSIUM SERPL-SCNC: 3.1 MMOL/L (ref 3.5–5.2)
PROT SERPL-MCNC: 7.1 G/DL (ref 6–8.5)
RBC # BLD AUTO: 3.47 10*6/MM3 (ref 3.77–5.28)
SARS-COV-2 ORF1AB RESP QL NAA+PROBE: NOT DETECTED
SODIUM SERPL-SCNC: 140 MMOL/L (ref 136–145)
WBC # BLD AUTO: 8.7 10*3/MM3 (ref 3.4–10.8)

## 2021-08-21 PROCEDURE — 80053 COMPREHEN METABOLIC PANEL: CPT | Performed by: EMERGENCY MEDICINE

## 2021-08-21 PROCEDURE — 85025 COMPLETE CBC W/AUTO DIFF WBC: CPT | Performed by: EMERGENCY MEDICINE

## 2021-08-21 PROCEDURE — 99024 POSTOP FOLLOW-UP VISIT: CPT | Performed by: SURGERY

## 2021-08-21 PROCEDURE — G0378 HOSPITAL OBSERVATION PER HR: HCPCS

## 2021-08-21 PROCEDURE — 87040 BLOOD CULTURE FOR BACTERIA: CPT | Performed by: EMERGENCY MEDICINE

## 2021-08-21 PROCEDURE — 99285 EMERGENCY DEPT VISIT HI MDM: CPT

## 2021-08-21 PROCEDURE — 0 IOPAMIDOL PER 1 ML: Performed by: EMERGENCY MEDICINE

## 2021-08-21 PROCEDURE — 83690 ASSAY OF LIPASE: CPT | Performed by: EMERGENCY MEDICINE

## 2021-08-21 PROCEDURE — 25010000002 ONDANSETRON PER 1 MG: Performed by: HOSPITALIST

## 2021-08-21 PROCEDURE — 74177 CT ABD & PELVIS W/CONTRAST: CPT

## 2021-08-21 PROCEDURE — 83605 ASSAY OF LACTIC ACID: CPT | Performed by: EMERGENCY MEDICINE

## 2021-08-21 PROCEDURE — U0004 COV-19 TEST NON-CDC HGH THRU: HCPCS | Performed by: EMERGENCY MEDICINE

## 2021-08-21 RX ORDER — SODIUM CHLORIDE 0.9 % (FLUSH) 0.9 %
10 SYRINGE (ML) INJECTION AS NEEDED
Status: DISCONTINUED | OUTPATIENT
Start: 2021-08-21 | End: 2021-08-31 | Stop reason: HOSPADM

## 2021-08-21 RX ORDER — ONDANSETRON 2 MG/ML
4 INJECTION INTRAMUSCULAR; INTRAVENOUS EVERY 6 HOURS PRN
Status: DISCONTINUED | OUTPATIENT
Start: 2021-08-21 | End: 2021-08-26

## 2021-08-21 RX ORDER — HYDROMORPHONE HYDROCHLORIDE 1 MG/ML
0.5 INJECTION, SOLUTION INTRAMUSCULAR; INTRAVENOUS; SUBCUTANEOUS EVERY 4 HOURS PRN
Status: DISCONTINUED | OUTPATIENT
Start: 2021-08-21 | End: 2021-08-23

## 2021-08-21 RX ORDER — POTASSIUM CHLORIDE 750 MG/1
40 TABLET, FILM COATED, EXTENDED RELEASE ORAL ONCE
Status: COMPLETED | OUTPATIENT
Start: 2021-08-21 | End: 2021-08-21

## 2021-08-21 RX ORDER — LOPERAMIDE HYDROCHLORIDE 2 MG/1
4 CAPSULE ORAL 3 TIMES DAILY PRN
Status: DISCONTINUED | OUTPATIENT
Start: 2021-08-21 | End: 2021-08-26

## 2021-08-21 RX ORDER — POTASSIUM CHLORIDE 750 MG/1
40 TABLET, FILM COATED, EXTENDED RELEASE ORAL ONCE
Status: CANCELLED | OUTPATIENT
Start: 2021-08-21

## 2021-08-21 RX ADMIN — IOPAMIDOL 85 ML: 755 INJECTION, SOLUTION INTRAVENOUS at 15:31

## 2021-08-21 RX ADMIN — ONDANSETRON 4 MG: 2 INJECTION INTRAMUSCULAR; INTRAVENOUS at 21:51

## 2021-08-21 RX ADMIN — POTASSIUM CHLORIDE 40 MEQ: 750 TABLET, EXTENDED RELEASE ORAL at 21:13

## 2021-08-22 ENCOUNTER — READMISSION MANAGEMENT (OUTPATIENT)
Dept: CALL CENTER | Facility: HOSPITAL | Age: 76
End: 2021-08-22

## 2021-08-22 PROBLEM — E43 SEVERE MALNUTRITION: Status: ACTIVE | Noted: 2021-08-22

## 2021-08-22 LAB
ALBUMIN SERPL-MCNC: 2.9 G/DL (ref 3.5–5.2)
ALBUMIN/GLOB SERPL: 0.8 G/DL
ALP SERPL-CCNC: 107 U/L (ref 39–117)
ALT SERPL W P-5'-P-CCNC: 6 U/L (ref 1–33)
ANION GAP SERPL CALCULATED.3IONS-SCNC: 20.9 MMOL/L (ref 5–15)
AST SERPL-CCNC: 11 U/L (ref 1–32)
BASOPHILS # BLD AUTO: 0.07 10*3/MM3 (ref 0–0.2)
BASOPHILS NFR BLD AUTO: 0.9 % (ref 0–1.5)
BILIRUB SERPL-MCNC: 0.2 MG/DL (ref 0–1.2)
BUN SERPL-MCNC: 36 MG/DL (ref 8–23)
BUN/CREAT SERPL: 4 (ref 7–25)
CALCIUM SPEC-SCNC: 8.2 MG/DL (ref 8.6–10.5)
CHLORIDE SERPL-SCNC: 100 MMOL/L (ref 98–107)
CO2 SERPL-SCNC: 17.1 MMOL/L (ref 22–29)
CREAT SERPL-MCNC: 8.92 MG/DL (ref 0.57–1)
DEPRECATED RDW RBC AUTO: 57.5 FL (ref 37–54)
EOSINOPHIL # BLD AUTO: 0.16 10*3/MM3 (ref 0–0.4)
EOSINOPHIL NFR BLD AUTO: 2 % (ref 0.3–6.2)
ERYTHROCYTE [DISTWIDTH] IN BLOOD BY AUTOMATED COUNT: 19.1 % (ref 12.3–15.4)
GFR SERPL CREATININE-BSD FRML MDRD: 5 ML/MIN/1.73
GFR SERPL CREATININE-BSD FRML MDRD: ABNORMAL ML/MIN/{1.73_M2}
GLOBULIN UR ELPH-MCNC: 3.6 GM/DL
GLUCOSE SERPL-MCNC: 97 MG/DL (ref 65–99)
HCT VFR BLD AUTO: 27.8 % (ref 34–46.6)
HGB BLD-MCNC: 8.6 G/DL (ref 12–15.9)
IMM GRANULOCYTES # BLD AUTO: 0.05 10*3/MM3 (ref 0–0.05)
IMM GRANULOCYTES NFR BLD AUTO: 0.6 % (ref 0–0.5)
LIPASE SERPL-CCNC: 85 U/L (ref 13–60)
LYMPHOCYTES # BLD AUTO: 0.65 10*3/MM3 (ref 0.7–3.1)
LYMPHOCYTES NFR BLD AUTO: 8.1 % (ref 19.6–45.3)
MCH RBC QN AUTO: 26.1 PG (ref 26.6–33)
MCHC RBC AUTO-ENTMCNC: 30.9 G/DL (ref 31.5–35.7)
MCV RBC AUTO: 84.2 FL (ref 79–97)
MONOCYTES # BLD AUTO: 1.12 10*3/MM3 (ref 0.1–0.9)
MONOCYTES NFR BLD AUTO: 14 % (ref 5–12)
NEUTROPHILS NFR BLD AUTO: 5.93 10*3/MM3 (ref 1.7–7)
NEUTROPHILS NFR BLD AUTO: 74.4 % (ref 42.7–76)
NRBC BLD AUTO-RTO: 0 /100 WBC (ref 0–0.2)
PLATELET # BLD AUTO: 293 10*3/MM3 (ref 140–450)
PMV BLD AUTO: 9.9 FL (ref 6–12)
POTASSIUM SERPL-SCNC: 3.8 MMOL/L (ref 3.5–5.2)
PROT SERPL-MCNC: 6.5 G/DL (ref 6–8.5)
RBC # BLD AUTO: 3.3 10*6/MM3 (ref 3.77–5.28)
SODIUM SERPL-SCNC: 138 MMOL/L (ref 136–145)
WBC # BLD AUTO: 7.98 10*3/MM3 (ref 3.4–10.8)

## 2021-08-22 PROCEDURE — 85025 COMPLETE CBC W/AUTO DIFF WBC: CPT | Performed by: HOSPITALIST

## 2021-08-22 PROCEDURE — 94799 UNLISTED PULMONARY SVC/PX: CPT

## 2021-08-22 PROCEDURE — G0378 HOSPITAL OBSERVATION PER HR: HCPCS

## 2021-08-22 PROCEDURE — 99024 POSTOP FOLLOW-UP VISIT: CPT | Performed by: SURGERY

## 2021-08-22 PROCEDURE — 80053 COMPREHEN METABOLIC PANEL: CPT | Performed by: HOSPITALIST

## 2021-08-22 PROCEDURE — 83690 ASSAY OF LIPASE: CPT | Performed by: HOSPITALIST

## 2021-08-22 PROCEDURE — 25010000002 ONDANSETRON PER 1 MG: Performed by: HOSPITALIST

## 2021-08-22 PROCEDURE — 94664 DEMO&/EVAL PT USE INHALER: CPT

## 2021-08-22 RX ORDER — FAMOTIDINE 20 MG/1
20 TABLET, FILM COATED ORAL 2 TIMES DAILY
Status: DISCONTINUED | OUTPATIENT
Start: 2021-08-22 | End: 2021-08-22

## 2021-08-22 RX ORDER — ALBUMIN (HUMAN) 12.5 G/50ML
12.5 SOLUTION INTRAVENOUS AS NEEDED
Status: CANCELLED | OUTPATIENT
Start: 2021-08-22 | End: 2021-08-23

## 2021-08-22 RX ORDER — UREA 10 %
3 LOTION (ML) TOPICAL NIGHTLY PRN
Status: DISCONTINUED | OUTPATIENT
Start: 2021-08-22 | End: 2021-08-26

## 2021-08-22 RX ORDER — ALBUTEROL SULFATE 2.5 MG/3ML
2.5 SOLUTION RESPIRATORY (INHALATION) 3 TIMES DAILY PRN
Status: DISCONTINUED | OUTPATIENT
Start: 2021-08-22 | End: 2021-08-26

## 2021-08-22 RX ORDER — FAMOTIDINE 20 MG/1
20 TABLET, FILM COATED ORAL NIGHTLY
Status: DISCONTINUED | OUTPATIENT
Start: 2021-08-22 | End: 2021-08-23

## 2021-08-22 RX ORDER — METOPROLOL SUCCINATE 25 MG/1
25 TABLET, EXTENDED RELEASE ORAL EVERY MORNING
Status: DISCONTINUED | OUTPATIENT
Start: 2021-08-23 | End: 2021-08-24

## 2021-08-22 RX ORDER — BUDESONIDE AND FORMOTEROL FUMARATE DIHYDRATE 160; 4.5 UG/1; UG/1
2 AEROSOL RESPIRATORY (INHALATION)
Status: DISCONTINUED | OUTPATIENT
Start: 2021-08-22 | End: 2021-08-31 | Stop reason: HOSPADM

## 2021-08-22 RX ORDER — POTASSIUM CHLORIDE 750 MG/1
40 TABLET, FILM COATED, EXTENDED RELEASE ORAL ONCE
Status: DISCONTINUED | OUTPATIENT
Start: 2021-08-22 | End: 2021-08-22

## 2021-08-22 RX ORDER — MELATONIN
1000 DAILY
Status: DISCONTINUED | OUTPATIENT
Start: 2021-08-22 | End: 2021-08-28

## 2021-08-22 RX ORDER — MONTELUKAST SODIUM 10 MG/1
10 TABLET ORAL NIGHTLY
Status: DISCONTINUED | OUTPATIENT
Start: 2021-08-22 | End: 2021-08-31 | Stop reason: HOSPADM

## 2021-08-22 RX ADMIN — Medication 1000 UNITS: at 14:20

## 2021-08-22 RX ADMIN — BUDESONIDE AND FORMOTEROL FUMARATE DIHYDRATE 2 PUFF: 160; 4.5 AEROSOL RESPIRATORY (INHALATION) at 21:05

## 2021-08-22 RX ADMIN — APIXABAN 2.5 MG: 2.5 TABLET, FILM COATED ORAL at 20:59

## 2021-08-22 RX ADMIN — ONDANSETRON 4 MG: 2 INJECTION INTRAMUSCULAR; INTRAVENOUS at 20:59

## 2021-08-22 RX ADMIN — APIXABAN 2.5 MG: 2.5 TABLET, FILM COATED ORAL at 14:20

## 2021-08-22 RX ADMIN — MONTELUKAST SODIUM 10 MG: 10 TABLET, FILM COATED ORAL at 20:59

## 2021-08-22 RX ADMIN — FAMOTIDINE 20 MG: 20 TABLET, FILM COATED ORAL at 20:59

## 2021-08-22 RX ADMIN — Medication 3 MG: at 21:56

## 2021-08-22 RX ADMIN — LOPERAMIDE HYDROCHLORIDE 4 MG: 2 CAPSULE ORAL at 04:36

## 2021-08-22 RX ADMIN — LOPERAMIDE HYDROCHLORIDE 4 MG: 2 CAPSULE ORAL at 14:20

## 2021-08-22 NOTE — OUTREACH NOTE
General Surgery Week 2 Survey      Responses   [facility] patient discharged from?  [place]   Does the patient have one of the following disease processes/diagnoses(primary or secondary)?  General Surgery   Week 2 attempt successful?  No   Revoke  Readmitted          Suzanne Ward RN

## 2021-08-23 LAB
ALBUMIN SERPL-MCNC: 2.6 G/DL (ref 3.5–5.2)
ALBUMIN/GLOB SERPL: 0.6 G/DL
ALP SERPL-CCNC: 103 U/L (ref 39–117)
ALT SERPL W P-5'-P-CCNC: 7 U/L (ref 1–33)
AMYLASE SERPL-CCNC: 106 U/L (ref 28–100)
ANION GAP SERPL CALCULATED.3IONS-SCNC: 20.9 MMOL/L (ref 5–15)
AST SERPL-CCNC: 12 U/L (ref 1–32)
BASOPHILS # BLD AUTO: 0.08 10*3/MM3 (ref 0–0.2)
BASOPHILS NFR BLD AUTO: 0.9 % (ref 0–1.5)
BILIRUB SERPL-MCNC: 0.2 MG/DL (ref 0–1.2)
BUN SERPL-MCNC: 42 MG/DL (ref 8–23)
BUN/CREAT SERPL: 4 (ref 7–25)
CALCIUM SPEC-SCNC: 8.6 MG/DL (ref 8.6–10.5)
CHLORIDE SERPL-SCNC: 101 MMOL/L (ref 98–107)
CO2 SERPL-SCNC: 14.1 MMOL/L (ref 22–29)
CREAT SERPL-MCNC: 10.48 MG/DL (ref 0.57–1)
DEPRECATED RDW RBC AUTO: 57.2 FL (ref 37–54)
EOSINOPHIL # BLD AUTO: 0.25 10*3/MM3 (ref 0–0.4)
EOSINOPHIL NFR BLD AUTO: 2.9 % (ref 0.3–6.2)
ERYTHROCYTE [DISTWIDTH] IN BLOOD BY AUTOMATED COUNT: 18.9 % (ref 12.3–15.4)
GFR SERPL CREATININE-BSD FRML MDRD: 4 ML/MIN/1.73
GFR SERPL CREATININE-BSD FRML MDRD: ABNORMAL ML/MIN/{1.73_M2}
GLOBULIN UR ELPH-MCNC: 4.1 GM/DL
GLUCOSE SERPL-MCNC: 72 MG/DL (ref 65–99)
HCT VFR BLD AUTO: 32.1 % (ref 34–46.6)
HGB BLD-MCNC: 9.4 G/DL (ref 12–15.9)
IMM GRANULOCYTES # BLD AUTO: 0.07 10*3/MM3 (ref 0–0.05)
IMM GRANULOCYTES NFR BLD AUTO: 0.8 % (ref 0–0.5)
LIPASE SERPL-CCNC: 61 U/L (ref 13–60)
LYMPHOCYTES # BLD AUTO: 0.91 10*3/MM3 (ref 0.7–3.1)
LYMPHOCYTES NFR BLD AUTO: 10.6 % (ref 19.6–45.3)
MCH RBC QN AUTO: 25.1 PG (ref 26.6–33)
MCHC RBC AUTO-ENTMCNC: 29.3 G/DL (ref 31.5–35.7)
MCV RBC AUTO: 85.6 FL (ref 79–97)
MONOCYTES # BLD AUTO: 1.04 10*3/MM3 (ref 0.1–0.9)
MONOCYTES NFR BLD AUTO: 12.2 % (ref 5–12)
NEUTROPHILS NFR BLD AUTO: 6.2 10*3/MM3 (ref 1.7–7)
NEUTROPHILS NFR BLD AUTO: 72.6 % (ref 42.7–76)
NRBC BLD AUTO-RTO: 0 /100 WBC (ref 0–0.2)
PLATELET # BLD AUTO: 308 10*3/MM3 (ref 140–450)
PMV BLD AUTO: 10.3 FL (ref 6–12)
POTASSIUM SERPL-SCNC: 4.7 MMOL/L (ref 3.5–5.2)
PROT SERPL-MCNC: 6.7 G/DL (ref 6–8.5)
RBC # BLD AUTO: 3.75 10*6/MM3 (ref 3.77–5.28)
SODIUM SERPL-SCNC: 136 MMOL/L (ref 136–145)
WBC # BLD AUTO: 8.55 10*3/MM3 (ref 3.4–10.8)

## 2021-08-23 PROCEDURE — 94799 UNLISTED PULMONARY SVC/PX: CPT

## 2021-08-23 PROCEDURE — 85025 COMPLETE CBC W/AUTO DIFF WBC: CPT | Performed by: HOSPITALIST

## 2021-08-23 PROCEDURE — 80053 COMPREHEN METABOLIC PANEL: CPT | Performed by: HOSPITALIST

## 2021-08-23 PROCEDURE — 5A1D70Z PERFORMANCE OF URINARY FILTRATION, INTERMITTENT, LESS THAN 6 HOURS PER DAY: ICD-10-PCS | Performed by: INTERNAL MEDICINE

## 2021-08-23 PROCEDURE — 25010000002 EPOETIN ALFA-EPBX 3000 UNIT/ML SOLUTION: Performed by: INTERNAL MEDICINE

## 2021-08-23 PROCEDURE — 83690 ASSAY OF LIPASE: CPT | Performed by: HOSPITALIST

## 2021-08-23 PROCEDURE — 99024 POSTOP FOLLOW-UP VISIT: CPT | Performed by: SURGERY

## 2021-08-23 PROCEDURE — 82150 ASSAY OF AMYLASE: CPT | Performed by: HOSPITALIST

## 2021-08-23 RX ORDER — FAMOTIDINE 20 MG/1
20 TABLET, FILM COATED ORAL
Status: DISCONTINUED | OUTPATIENT
Start: 2021-08-23 | End: 2021-08-31 | Stop reason: HOSPADM

## 2021-08-23 RX ORDER — SODIUM HYPOCHLORITE 1.25 MG/ML
SOLUTION TOPICAL DAILY
Status: DISCONTINUED | OUTPATIENT
Start: 2021-08-23 | End: 2021-08-25

## 2021-08-23 RX ORDER — HYDROCODONE BITARTRATE AND ACETAMINOPHEN 10; 325 MG/1; MG/1
1 TABLET ORAL EVERY 6 HOURS PRN
Status: DISCONTINUED | OUTPATIENT
Start: 2021-08-23 | End: 2021-08-26

## 2021-08-23 RX ADMIN — Medication 1000 UNITS: at 14:27

## 2021-08-23 RX ADMIN — BUDESONIDE AND FORMOTEROL FUMARATE DIHYDRATE 2 PUFF: 160; 4.5 AEROSOL RESPIRATORY (INHALATION) at 07:08

## 2021-08-23 RX ADMIN — MONTELUKAST SODIUM 10 MG: 10 TABLET, FILM COATED ORAL at 20:22

## 2021-08-23 RX ADMIN — APIXABAN 2.5 MG: 2.5 TABLET, FILM COATED ORAL at 14:27

## 2021-08-23 RX ADMIN — HYDROCODONE BITARTRATE AND ACETAMINOPHEN 1 TABLET: 10; 325 TABLET ORAL at 20:22

## 2021-08-23 RX ADMIN — EPOETIN ALFA-EPBX 5000 UNITS: 3000 INJECTION, SOLUTION INTRAVENOUS; SUBCUTANEOUS at 14:27

## 2021-08-23 RX ADMIN — APIXABAN 2.5 MG: 2.5 TABLET, FILM COATED ORAL at 20:21

## 2021-08-23 RX ADMIN — METOPROLOL SUCCINATE 25 MG: 25 TABLET, EXTENDED RELEASE ORAL at 06:12

## 2021-08-23 RX ADMIN — FAMOTIDINE 20 MG: 20 TABLET, FILM COATED ORAL at 17:28

## 2021-08-23 RX ADMIN — BUDESONIDE AND FORMOTEROL FUMARATE DIHYDRATE 2 PUFF: 160; 4.5 AEROSOL RESPIRATORY (INHALATION) at 19:43

## 2021-08-23 RX ADMIN — LOPERAMIDE HYDROCHLORIDE 4 MG: 2 CAPSULE ORAL at 02:46

## 2021-08-24 ENCOUNTER — HOME HEALTH ADMISSION (OUTPATIENT)
Dept: HOME HEALTH SERVICES | Facility: HOME HEALTHCARE | Age: 76
End: 2021-08-24

## 2021-08-24 LAB
AMYLASE SERPL-CCNC: 124 U/L (ref 28–100)
LIPASE SERPL-CCNC: 38 U/L (ref 13–60)

## 2021-08-24 PROCEDURE — 94799 UNLISTED PULMONARY SVC/PX: CPT

## 2021-08-24 PROCEDURE — 82150 ASSAY OF AMYLASE: CPT | Performed by: HOSPITALIST

## 2021-08-24 PROCEDURE — 97530 THERAPEUTIC ACTIVITIES: CPT

## 2021-08-24 PROCEDURE — 83690 ASSAY OF LIPASE: CPT | Performed by: HOSPITALIST

## 2021-08-24 PROCEDURE — 97166 OT EVAL MOD COMPLEX 45 MIN: CPT

## 2021-08-24 PROCEDURE — 97535 SELF CARE MNGMENT TRAINING: CPT

## 2021-08-24 PROCEDURE — 97161 PT EVAL LOW COMPLEX 20 MIN: CPT

## 2021-08-24 PROCEDURE — 99024 POSTOP FOLLOW-UP VISIT: CPT | Performed by: SURGERY

## 2021-08-24 RX ORDER — METOPROLOL SUCCINATE 25 MG/1
12.5 TABLET, EXTENDED RELEASE ORAL EVERY MORNING
Status: DISCONTINUED | OUTPATIENT
Start: 2021-08-24 | End: 2021-08-26

## 2021-08-24 RX ORDER — SODIUM CHLORIDE 9 MG/ML
75 INJECTION, SOLUTION INTRAVENOUS CONTINUOUS
Status: DISCONTINUED | OUTPATIENT
Start: 2021-08-24 | End: 2021-08-25

## 2021-08-24 RX ADMIN — BUDESONIDE AND FORMOTEROL FUMARATE DIHYDRATE 2 PUFF: 160; 4.5 AEROSOL RESPIRATORY (INHALATION) at 20:49

## 2021-08-24 RX ADMIN — APIXABAN 2.5 MG: 2.5 TABLET, FILM COATED ORAL at 20:58

## 2021-08-24 RX ADMIN — SODIUM CHLORIDE 75 ML/HR: 9 INJECTION, SOLUTION INTRAVENOUS at 12:36

## 2021-08-24 RX ADMIN — APIXABAN 2.5 MG: 2.5 TABLET, FILM COATED ORAL at 09:00

## 2021-08-24 RX ADMIN — SODIUM HYPOCHLORITE: 1.25 SOLUTION TOPICAL at 15:40

## 2021-08-24 RX ADMIN — BUDESONIDE AND FORMOTEROL FUMARATE DIHYDRATE 2 PUFF: 160; 4.5 AEROSOL RESPIRATORY (INHALATION) at 08:52

## 2021-08-24 RX ADMIN — Medication 1000 UNITS: at 09:00

## 2021-08-24 RX ADMIN — SODIUM CHLORIDE 250 ML: 9 INJECTION, SOLUTION INTRAVENOUS at 01:22

## 2021-08-24 RX ADMIN — FAMOTIDINE 20 MG: 20 TABLET, FILM COATED ORAL at 09:00

## 2021-08-24 RX ADMIN — SODIUM CHLORIDE 250 ML: 9 INJECTION, SOLUTION INTRAVENOUS at 05:25

## 2021-08-24 RX ADMIN — MONTELUKAST SODIUM 10 MG: 10 TABLET, FILM COATED ORAL at 20:58

## 2021-08-24 RX ADMIN — LOPERAMIDE HYDROCHLORIDE 4 MG: 2 CAPSULE ORAL at 19:31

## 2021-08-24 RX ADMIN — FAMOTIDINE 20 MG: 20 TABLET, FILM COATED ORAL at 17:58

## 2021-08-25 LAB
ANION GAP SERPL CALCULATED.3IONS-SCNC: 13.7 MMOL/L (ref 5–15)
BUN SERPL-MCNC: 24 MG/DL (ref 8–23)
BUN/CREAT SERPL: 4.3 (ref 7–25)
CALCIUM SPEC-SCNC: 7.9 MG/DL (ref 8.6–10.5)
CHLORIDE SERPL-SCNC: 106 MMOL/L (ref 98–107)
CO2 SERPL-SCNC: 24.3 MMOL/L (ref 22–29)
CREAT SERPL-MCNC: 5.56 MG/DL (ref 0.57–1)
DEPRECATED RDW RBC AUTO: 55.4 FL (ref 37–54)
ERYTHROCYTE [DISTWIDTH] IN BLOOD BY AUTOMATED COUNT: 18.3 % (ref 12.3–15.4)
GFR SERPL CREATININE-BSD FRML MDRD: 9 ML/MIN/1.73
GFR SERPL CREATININE-BSD FRML MDRD: ABNORMAL ML/MIN/{1.73_M2}
GLUCOSE SERPL-MCNC: 76 MG/DL (ref 65–99)
HCT VFR BLD AUTO: 28.4 % (ref 34–46.6)
HGB BLD-MCNC: 8.7 G/DL (ref 12–15.9)
LYMPHOCYTES # BLD MANUAL: 1.2 10*3/MM3 (ref 0.7–3.1)
LYMPHOCYTES NFR BLD MANUAL: 15 % (ref 19.6–45.3)
LYMPHOCYTES NFR BLD MANUAL: 6 % (ref 5–12)
MCH RBC QN AUTO: 25.8 PG (ref 26.6–33)
MCHC RBC AUTO-ENTMCNC: 30.6 G/DL (ref 31.5–35.7)
MCV RBC AUTO: 84.3 FL (ref 79–97)
MONOCYTES # BLD AUTO: 0.48 10*3/MM3 (ref 0.1–0.9)
NEUTROPHILS # BLD AUTO: 6.31 10*3/MM3 (ref 1.7–7)
NEUTROPHILS NFR BLD MANUAL: 79 % (ref 42.7–76)
NRBC BLD AUTO-RTO: 0 /100 WBC (ref 0–0.2)
PLAT MORPH BLD: NORMAL
PLATELET # BLD AUTO: 235 10*3/MM3 (ref 140–450)
PMV BLD AUTO: 10.5 FL (ref 6–12)
POTASSIUM SERPL-SCNC: 3.5 MMOL/L (ref 3.5–5.2)
RBC # BLD AUTO: 3.37 10*6/MM3 (ref 3.77–5.28)
RBC MORPH BLD: NORMAL
SODIUM SERPL-SCNC: 144 MMOL/L (ref 136–145)
WBC # BLD AUTO: 7.99 10*3/MM3 (ref 3.4–10.8)
WBC MORPH BLD: NORMAL

## 2021-08-25 PROCEDURE — 25010000002 EPOETIN ALFA-EPBX 3000 UNIT/ML SOLUTION: Performed by: INTERNAL MEDICINE

## 2021-08-25 PROCEDURE — 94799 UNLISTED PULMONARY SVC/PX: CPT

## 2021-08-25 PROCEDURE — 99024 POSTOP FOLLOW-UP VISIT: CPT | Performed by: SURGERY

## 2021-08-25 PROCEDURE — 80048 BASIC METABOLIC PNL TOTAL CA: CPT | Performed by: HOSPITALIST

## 2021-08-25 PROCEDURE — 85025 COMPLETE CBC W/AUTO DIFF WBC: CPT | Performed by: HOSPITALIST

## 2021-08-25 PROCEDURE — 85007 BL SMEAR W/DIFF WBC COUNT: CPT | Performed by: HOSPITALIST

## 2021-08-25 RX ORDER — SODIUM HYPOCHLORITE 1.25 MG/ML
SOLUTION TOPICAL 3 TIMES DAILY
Status: DISCONTINUED | OUTPATIENT
Start: 2021-08-25 | End: 2021-08-31 | Stop reason: HOSPADM

## 2021-08-25 RX ORDER — SODIUM HYPOCHLORITE 1.25 MG/ML
SOLUTION TOPICAL 2 TIMES DAILY
Status: DISCONTINUED | OUTPATIENT
Start: 2021-08-25 | End: 2021-08-25

## 2021-08-25 RX ORDER — MIDODRINE HYDROCHLORIDE 5 MG/1
5 TABLET ORAL
Status: DISCONTINUED | OUTPATIENT
Start: 2021-08-25 | End: 2021-08-31 | Stop reason: HOSPADM

## 2021-08-25 RX ADMIN — SODIUM HYPOCHLORITE: 1.25 SOLUTION TOPICAL at 13:30

## 2021-08-25 RX ADMIN — Medication 1000 UNITS: at 13:16

## 2021-08-25 RX ADMIN — BUDESONIDE AND FORMOTEROL FUMARATE DIHYDRATE 2 PUFF: 160; 4.5 AEROSOL RESPIRATORY (INHALATION) at 21:17

## 2021-08-25 RX ADMIN — APIXABAN 2.5 MG: 2.5 TABLET, FILM COATED ORAL at 13:16

## 2021-08-25 RX ADMIN — FAMOTIDINE 20 MG: 20 TABLET, FILM COATED ORAL at 16:55

## 2021-08-25 RX ADMIN — MIDODRINE HYDROCHLORIDE 5 MG: 5 TABLET ORAL at 16:55

## 2021-08-25 RX ADMIN — SODIUM HYPOCHLORITE: 1.25 SOLUTION TOPICAL at 21:40

## 2021-08-25 RX ADMIN — FAMOTIDINE 20 MG: 20 TABLET, FILM COATED ORAL at 13:16

## 2021-08-25 RX ADMIN — APIXABAN 2.5 MG: 2.5 TABLET, FILM COATED ORAL at 20:25

## 2021-08-25 RX ADMIN — METOPROLOL SUCCINATE 12.5 MG: 25 TABLET, EXTENDED RELEASE ORAL at 13:16

## 2021-08-25 RX ADMIN — EPOETIN ALFA-EPBX 5000 UNITS: 3000 INJECTION, SOLUTION INTRAVENOUS; SUBCUTANEOUS at 16:55

## 2021-08-25 RX ADMIN — MONTELUKAST SODIUM 10 MG: 10 TABLET, FILM COATED ORAL at 20:25

## 2021-08-25 RX ADMIN — BUDESONIDE AND FORMOTEROL FUMARATE DIHYDRATE 2 PUFF: 160; 4.5 AEROSOL RESPIRATORY (INHALATION) at 08:08

## 2021-08-26 LAB
ANION GAP SERPL CALCULATED.3IONS-SCNC: 11.2 MMOL/L (ref 5–15)
BACTERIA SPEC AEROBE CULT: NORMAL
BACTERIA SPEC AEROBE CULT: NORMAL
BASOPHILS # BLD AUTO: 0.03 10*3/MM3 (ref 0–0.2)
BASOPHILS NFR BLD AUTO: 0.5 % (ref 0–1.5)
BUN SERPL-MCNC: 12 MG/DL (ref 8–23)
BUN/CREAT SERPL: 3.6 (ref 7–25)
CALCIUM SPEC-SCNC: 8.3 MG/DL (ref 8.6–10.5)
CHLORIDE SERPL-SCNC: 103 MMOL/L (ref 98–107)
CO2 SERPL-SCNC: 28.8 MMOL/L (ref 22–29)
CREAT SERPL-MCNC: 3.33 MG/DL (ref 0.57–1)
DEPRECATED RDW RBC AUTO: 55.6 FL (ref 37–54)
EOSINOPHIL # BLD AUTO: 0.27 10*3/MM3 (ref 0–0.4)
EOSINOPHIL NFR BLD AUTO: 4.4 % (ref 0.3–6.2)
ERYTHROCYTE [DISTWIDTH] IN BLOOD BY AUTOMATED COUNT: 18.5 % (ref 12.3–15.4)
GFR SERPL CREATININE-BSD FRML MDRD: 16 ML/MIN/1.73
GLUCOSE SERPL-MCNC: 86 MG/DL (ref 65–99)
HCT VFR BLD AUTO: 26.4 % (ref 34–46.6)
HGB BLD-MCNC: 8 G/DL (ref 12–15.9)
IMM GRANULOCYTES # BLD AUTO: 0.06 10*3/MM3 (ref 0–0.05)
IMM GRANULOCYTES NFR BLD AUTO: 1 % (ref 0–0.5)
LYMPHOCYTES # BLD AUTO: 0.51 10*3/MM3 (ref 0.7–3.1)
LYMPHOCYTES NFR BLD AUTO: 8.3 % (ref 19.6–45.3)
MCH RBC QN AUTO: 25.6 PG (ref 26.6–33)
MCHC RBC AUTO-ENTMCNC: 30.3 G/DL (ref 31.5–35.7)
MCV RBC AUTO: 84.3 FL (ref 79–97)
MONOCYTES # BLD AUTO: 0.82 10*3/MM3 (ref 0.1–0.9)
MONOCYTES NFR BLD AUTO: 13.3 % (ref 5–12)
NEUTROPHILS NFR BLD AUTO: 4.49 10*3/MM3 (ref 1.7–7)
NEUTROPHILS NFR BLD AUTO: 72.5 % (ref 42.7–76)
NRBC BLD AUTO-RTO: 0 /100 WBC (ref 0–0.2)
PLATELET # BLD AUTO: 249 10*3/MM3 (ref 140–450)
PMV BLD AUTO: 9.7 FL (ref 6–12)
POTASSIUM SERPL-SCNC: 3.4 MMOL/L (ref 3.5–5.2)
RBC # BLD AUTO: 3.13 10*6/MM3 (ref 3.77–5.28)
SODIUM SERPL-SCNC: 143 MMOL/L (ref 136–145)
WBC # BLD AUTO: 6.18 10*3/MM3 (ref 3.4–10.8)

## 2021-08-26 PROCEDURE — 99024 POSTOP FOLLOW-UP VISIT: CPT | Performed by: SURGERY

## 2021-08-26 PROCEDURE — 85025 COMPLETE CBC W/AUTO DIFF WBC: CPT | Performed by: HOSPITALIST

## 2021-08-26 PROCEDURE — 94799 UNLISTED PULMONARY SVC/PX: CPT

## 2021-08-26 PROCEDURE — 80048 BASIC METABOLIC PNL TOTAL CA: CPT | Performed by: HOSPITALIST

## 2021-08-26 RX ORDER — METOPROLOL SUCCINATE 25 MG/1
12.5 TABLET, EXTENDED RELEASE ORAL EVERY MORNING
Qty: 15 TABLET | Refills: 0 | Status: SHIPPED | OUTPATIENT
Start: 2021-08-27 | End: 2021-11-04

## 2021-08-26 RX ORDER — MIDODRINE HYDROCHLORIDE 5 MG/1
5 TABLET ORAL
Qty: 90 TABLET | Refills: 0 | Status: SHIPPED | OUTPATIENT
Start: 2021-08-26 | End: 2022-01-14 | Stop reason: HOSPADM

## 2021-08-26 RX ORDER — METOPROLOL SUCCINATE 25 MG/1
12.5 TABLET, EXTENDED RELEASE ORAL EVERY MORNING
Status: DISCONTINUED | OUTPATIENT
Start: 2021-08-26 | End: 2021-08-30

## 2021-08-26 RX ADMIN — MIDODRINE HYDROCHLORIDE 5 MG: 5 TABLET ORAL at 09:47

## 2021-08-26 RX ADMIN — MIDODRINE HYDROCHLORIDE 5 MG: 5 TABLET ORAL at 17:10

## 2021-08-26 RX ADMIN — APIXABAN 2.5 MG: 2.5 TABLET, FILM COATED ORAL at 09:47

## 2021-08-26 RX ADMIN — Medication 1000 UNITS: at 09:47

## 2021-08-26 RX ADMIN — APIXABAN 2.5 MG: 2.5 TABLET, FILM COATED ORAL at 20:57

## 2021-08-26 RX ADMIN — BUDESONIDE AND FORMOTEROL FUMARATE DIHYDRATE 2 PUFF: 160; 4.5 AEROSOL RESPIRATORY (INHALATION) at 21:14

## 2021-08-26 RX ADMIN — SODIUM HYPOCHLORITE: 1.25 SOLUTION TOPICAL at 09:48

## 2021-08-26 RX ADMIN — BUDESONIDE AND FORMOTEROL FUMARATE DIHYDRATE 2 PUFF: 160; 4.5 AEROSOL RESPIRATORY (INHALATION) at 08:09

## 2021-08-26 RX ADMIN — FAMOTIDINE 20 MG: 20 TABLET, FILM COATED ORAL at 09:47

## 2021-08-26 RX ADMIN — MIDODRINE HYDROCHLORIDE 5 MG: 5 TABLET ORAL at 12:51

## 2021-08-26 RX ADMIN — SODIUM HYPOCHLORITE: 1.25 SOLUTION TOPICAL at 20:58

## 2021-08-26 RX ADMIN — METOPROLOL SUCCINATE 12.5 MG: 25 TABLET, EXTENDED RELEASE ORAL at 09:47

## 2021-08-26 RX ADMIN — MONTELUKAST SODIUM 10 MG: 10 TABLET, FILM COATED ORAL at 20:57

## 2021-08-26 RX ADMIN — FAMOTIDINE 20 MG: 20 TABLET, FILM COATED ORAL at 17:10

## 2021-08-27 LAB — HBV SURFACE AG SERPL QL IA: NORMAL

## 2021-08-27 PROCEDURE — 94799 UNLISTED PULMONARY SVC/PX: CPT

## 2021-08-27 PROCEDURE — 25010000002 EPOETIN ALFA-EPBX 3000 UNIT/ML SOLUTION: Performed by: INTERNAL MEDICINE

## 2021-08-27 PROCEDURE — 94640 AIRWAY INHALATION TREATMENT: CPT

## 2021-08-27 PROCEDURE — 87340 HEPATITIS B SURFACE AG IA: CPT | Performed by: INTERNAL MEDICINE

## 2021-08-27 RX ADMIN — APIXABAN 2.5 MG: 2.5 TABLET, FILM COATED ORAL at 20:21

## 2021-08-27 RX ADMIN — EPOETIN ALFA-EPBX 5000 UNITS: 3000 INJECTION, SOLUTION INTRAVENOUS; SUBCUTANEOUS at 16:38

## 2021-08-27 RX ADMIN — SODIUM HYPOCHLORITE: 1.25 SOLUTION TOPICAL at 16:40

## 2021-08-27 RX ADMIN — BUDESONIDE AND FORMOTEROL FUMARATE DIHYDRATE 2 PUFF: 160; 4.5 AEROSOL RESPIRATORY (INHALATION) at 07:31

## 2021-08-27 RX ADMIN — Medication 1000 UNITS: at 16:39

## 2021-08-27 RX ADMIN — SODIUM HYPOCHLORITE: 1.25 SOLUTION TOPICAL at 21:04

## 2021-08-27 RX ADMIN — MIDODRINE HYDROCHLORIDE 5 MG: 5 TABLET ORAL at 16:48

## 2021-08-27 RX ADMIN — APIXABAN 2.5 MG: 2.5 TABLET, FILM COATED ORAL at 08:16

## 2021-08-27 RX ADMIN — MONTELUKAST SODIUM 10 MG: 10 TABLET, FILM COATED ORAL at 20:21

## 2021-08-27 RX ADMIN — FAMOTIDINE 20 MG: 20 TABLET, FILM COATED ORAL at 16:39

## 2021-08-27 RX ADMIN — MIDODRINE HYDROCHLORIDE 5 MG: 5 TABLET ORAL at 08:16

## 2021-08-27 RX ADMIN — FAMOTIDINE 20 MG: 20 TABLET, FILM COATED ORAL at 08:16

## 2021-08-27 RX ADMIN — BUDESONIDE AND FORMOTEROL FUMARATE DIHYDRATE 2 PUFF: 160; 4.5 AEROSOL RESPIRATORY (INHALATION) at 21:05

## 2021-08-28 LAB
ANION GAP SERPL CALCULATED.3IONS-SCNC: 8.2 MMOL/L (ref 5–15)
BASOPHILS # BLD AUTO: 0.05 10*3/MM3 (ref 0–0.2)
BASOPHILS NFR BLD AUTO: 1.1 % (ref 0–1.5)
BUN SERPL-MCNC: 10 MG/DL (ref 8–23)
BUN/CREAT SERPL: 3.2 (ref 7–25)
CALCIUM SPEC-SCNC: 8.7 MG/DL (ref 8.6–10.5)
CHLORIDE SERPL-SCNC: 98 MMOL/L (ref 98–107)
CO2 SERPL-SCNC: 27.8 MMOL/L (ref 22–29)
CREAT SERPL-MCNC: 3.15 MG/DL (ref 0.57–1)
DEPRECATED RDW RBC AUTO: 57.1 FL (ref 37–54)
EOSINOPHIL # BLD AUTO: 0.29 10*3/MM3 (ref 0–0.4)
EOSINOPHIL NFR BLD AUTO: 6.4 % (ref 0.3–6.2)
ERYTHROCYTE [DISTWIDTH] IN BLOOD BY AUTOMATED COUNT: 18.2 % (ref 12.3–15.4)
GFR SERPL CREATININE-BSD FRML MDRD: 17 ML/MIN/1.73
GLUCOSE SERPL-MCNC: 102 MG/DL (ref 65–99)
HCT VFR BLD AUTO: 28.7 % (ref 34–46.6)
HGB BLD-MCNC: 8.5 G/DL (ref 12–15.9)
IMM GRANULOCYTES # BLD AUTO: 0.05 10*3/MM3 (ref 0–0.05)
IMM GRANULOCYTES NFR BLD AUTO: 1.1 % (ref 0–0.5)
LYMPHOCYTES # BLD AUTO: 0.51 10*3/MM3 (ref 0.7–3.1)
LYMPHOCYTES NFR BLD AUTO: 11.2 % (ref 19.6–45.3)
MCH RBC QN AUTO: 25.7 PG (ref 26.6–33)
MCHC RBC AUTO-ENTMCNC: 29.6 G/DL (ref 31.5–35.7)
MCV RBC AUTO: 86.7 FL (ref 79–97)
MONOCYTES # BLD AUTO: 0.82 10*3/MM3 (ref 0.1–0.9)
MONOCYTES NFR BLD AUTO: 18 % (ref 5–12)
NEUTROPHILS NFR BLD AUTO: 2.84 10*3/MM3 (ref 1.7–7)
NEUTROPHILS NFR BLD AUTO: 62.2 % (ref 42.7–76)
NRBC BLD AUTO-RTO: 0 /100 WBC (ref 0–0.2)
PLATELET # BLD AUTO: 277 10*3/MM3 (ref 140–450)
PMV BLD AUTO: 10 FL (ref 6–12)
POTASSIUM SERPL-SCNC: 3.8 MMOL/L (ref 3.5–5.2)
RBC # BLD AUTO: 3.31 10*6/MM3 (ref 3.77–5.28)
SODIUM SERPL-SCNC: 134 MMOL/L (ref 136–145)
WBC # BLD AUTO: 4.56 10*3/MM3 (ref 3.4–10.8)

## 2021-08-28 PROCEDURE — 97530 THERAPEUTIC ACTIVITIES: CPT

## 2021-08-28 PROCEDURE — 80048 BASIC METABOLIC PNL TOTAL CA: CPT | Performed by: HOSPITALIST

## 2021-08-28 PROCEDURE — 94799 UNLISTED PULMONARY SVC/PX: CPT

## 2021-08-28 PROCEDURE — 85025 COMPLETE CBC W/AUTO DIFF WBC: CPT | Performed by: HOSPITALIST

## 2021-08-28 RX ORDER — LOPERAMIDE HYDROCHLORIDE 2 MG/1
2 CAPSULE ORAL 4 TIMES DAILY PRN
Status: DISCONTINUED | OUTPATIENT
Start: 2021-08-28 | End: 2021-08-31 | Stop reason: HOSPADM

## 2021-08-28 RX ADMIN — FAMOTIDINE 20 MG: 20 TABLET, FILM COATED ORAL at 16:28

## 2021-08-28 RX ADMIN — LOPERAMIDE HYDROCHLORIDE 2 MG: 2 CAPSULE ORAL at 21:00

## 2021-08-28 RX ADMIN — MIDODRINE HYDROCHLORIDE 5 MG: 5 TABLET ORAL at 09:33

## 2021-08-28 RX ADMIN — APIXABAN 2.5 MG: 2.5 TABLET, FILM COATED ORAL at 21:00

## 2021-08-28 RX ADMIN — LOPERAMIDE HYDROCHLORIDE 2 MG: 2 CAPSULE ORAL at 15:26

## 2021-08-28 RX ADMIN — FAMOTIDINE 20 MG: 20 TABLET, FILM COATED ORAL at 09:33

## 2021-08-28 RX ADMIN — MONTELUKAST SODIUM 10 MG: 10 TABLET, FILM COATED ORAL at 21:00

## 2021-08-28 RX ADMIN — MIDODRINE HYDROCHLORIDE 5 MG: 5 TABLET ORAL at 12:37

## 2021-08-28 RX ADMIN — BUDESONIDE AND FORMOTEROL FUMARATE DIHYDRATE 2 PUFF: 160; 4.5 AEROSOL RESPIRATORY (INHALATION) at 20:34

## 2021-08-28 RX ADMIN — APIXABAN 2.5 MG: 2.5 TABLET, FILM COATED ORAL at 09:32

## 2021-08-28 RX ADMIN — BUDESONIDE AND FORMOTEROL FUMARATE DIHYDRATE 2 PUFF: 160; 4.5 AEROSOL RESPIRATORY (INHALATION) at 07:53

## 2021-08-28 RX ADMIN — SODIUM HYPOCHLORITE: 1.25 SOLUTION TOPICAL at 21:01

## 2021-08-28 RX ADMIN — MIDODRINE HYDROCHLORIDE 5 MG: 5 TABLET ORAL at 16:29

## 2021-08-28 RX ADMIN — METOPROLOL SUCCINATE 12.5 MG: 25 TABLET, EXTENDED RELEASE ORAL at 06:53

## 2021-08-28 RX ADMIN — Medication 1000 UNITS: at 09:32

## 2021-08-28 RX ADMIN — SODIUM HYPOCHLORITE: 1.25 SOLUTION TOPICAL at 09:33

## 2021-08-29 PROCEDURE — 94799 UNLISTED PULMONARY SVC/PX: CPT

## 2021-08-29 RX ADMIN — MIDODRINE HYDROCHLORIDE 5 MG: 5 TABLET ORAL at 10:49

## 2021-08-29 RX ADMIN — BUDESONIDE AND FORMOTEROL FUMARATE DIHYDRATE 2 PUFF: 160; 4.5 AEROSOL RESPIRATORY (INHALATION) at 20:16

## 2021-08-29 RX ADMIN — SODIUM HYPOCHLORITE: 1.25 SOLUTION TOPICAL at 10:49

## 2021-08-29 RX ADMIN — APIXABAN 2.5 MG: 2.5 TABLET, FILM COATED ORAL at 20:50

## 2021-08-29 RX ADMIN — FAMOTIDINE 20 MG: 20 TABLET, FILM COATED ORAL at 10:49

## 2021-08-29 RX ADMIN — MIDODRINE HYDROCHLORIDE 5 MG: 5 TABLET ORAL at 17:45

## 2021-08-29 RX ADMIN — APIXABAN 2.5 MG: 2.5 TABLET, FILM COATED ORAL at 10:49

## 2021-08-29 RX ADMIN — MONTELUKAST SODIUM 10 MG: 10 TABLET, FILM COATED ORAL at 20:50

## 2021-08-29 RX ADMIN — MIDODRINE HYDROCHLORIDE 5 MG: 5 TABLET ORAL at 15:15

## 2021-08-29 RX ADMIN — LOPERAMIDE HYDROCHLORIDE 2 MG: 2 CAPSULE ORAL at 10:49

## 2021-08-29 RX ADMIN — BUDESONIDE AND FORMOTEROL FUMARATE DIHYDRATE 2 PUFF: 160; 4.5 AEROSOL RESPIRATORY (INHALATION) at 10:55

## 2021-08-29 RX ADMIN — LOPERAMIDE HYDROCHLORIDE 2 MG: 2 CAPSULE ORAL at 15:18

## 2021-08-29 RX ADMIN — METOPROLOL SUCCINATE 12.5 MG: 25 TABLET, EXTENDED RELEASE ORAL at 05:10

## 2021-08-30 PROCEDURE — 97110 THERAPEUTIC EXERCISES: CPT

## 2021-08-30 PROCEDURE — 97530 THERAPEUTIC ACTIVITIES: CPT

## 2021-08-30 PROCEDURE — 94799 UNLISTED PULMONARY SVC/PX: CPT

## 2021-08-30 RX ORDER — ACETAMINOPHEN 325 MG/1
650 TABLET ORAL EVERY 4 HOURS PRN
Status: DISCONTINUED | OUTPATIENT
Start: 2021-08-30 | End: 2021-08-31 | Stop reason: HOSPADM

## 2021-08-30 RX ORDER — METOPROLOL SUCCINATE 25 MG/1
12.5 TABLET, EXTENDED RELEASE ORAL EVERY MORNING
Status: DISCONTINUED | OUTPATIENT
Start: 2021-08-30 | End: 2021-08-31 | Stop reason: HOSPADM

## 2021-08-30 RX ADMIN — BUDESONIDE AND FORMOTEROL FUMARATE DIHYDRATE 2 PUFF: 160; 4.5 AEROSOL RESPIRATORY (INHALATION) at 08:02

## 2021-08-30 RX ADMIN — SODIUM HYPOCHLORITE: 1.25 SOLUTION TOPICAL at 22:10

## 2021-08-30 RX ADMIN — APIXABAN 2.5 MG: 2.5 TABLET, FILM COATED ORAL at 22:09

## 2021-08-30 RX ADMIN — ACETAMINOPHEN 650 MG: 325 TABLET, FILM COATED ORAL at 22:44

## 2021-08-30 RX ADMIN — LOPERAMIDE HYDROCHLORIDE 2 MG: 2 CAPSULE ORAL at 14:32

## 2021-08-30 RX ADMIN — MIDODRINE HYDROCHLORIDE 5 MG: 5 TABLET ORAL at 17:03

## 2021-08-30 RX ADMIN — BUDESONIDE AND FORMOTEROL FUMARATE DIHYDRATE 2 PUFF: 160; 4.5 AEROSOL RESPIRATORY (INHALATION) at 20:38

## 2021-08-30 RX ADMIN — APIXABAN 2.5 MG: 2.5 TABLET, FILM COATED ORAL at 08:09

## 2021-08-30 RX ADMIN — MIDODRINE HYDROCHLORIDE 5 MG: 5 TABLET ORAL at 08:09

## 2021-08-30 RX ADMIN — SODIUM HYPOCHLORITE: 1.25 SOLUTION TOPICAL at 08:11

## 2021-08-30 RX ADMIN — FAMOTIDINE 20 MG: 20 TABLET, FILM COATED ORAL at 17:03

## 2021-08-30 RX ADMIN — SODIUM HYPOCHLORITE: 1.25 SOLUTION TOPICAL at 03:02

## 2021-08-30 RX ADMIN — MONTELUKAST SODIUM 10 MG: 10 TABLET, FILM COATED ORAL at 22:10

## 2021-08-30 RX ADMIN — FAMOTIDINE 20 MG: 20 TABLET, FILM COATED ORAL at 22:09

## 2021-08-31 VITALS
HEART RATE: 66 BPM | RESPIRATION RATE: 18 BRPM | TEMPERATURE: 97.9 F | BODY MASS INDEX: 28.91 KG/M2 | WEIGHT: 147.27 LBS | HEIGHT: 60 IN | DIASTOLIC BLOOD PRESSURE: 45 MMHG | SYSTOLIC BLOOD PRESSURE: 96 MMHG | OXYGEN SATURATION: 99 %

## 2021-08-31 LAB — SARS-COV-2 RNA RESP QL NAA+PROBE: NOT DETECTED

## 2021-08-31 PROCEDURE — 94799 UNLISTED PULMONARY SVC/PX: CPT

## 2021-08-31 PROCEDURE — 97110 THERAPEUTIC EXERCISES: CPT | Performed by: OCCUPATIONAL THERAPIST

## 2021-08-31 PROCEDURE — U0003 INFECTIOUS AGENT DETECTION BY NUCLEIC ACID (DNA OR RNA); SEVERE ACUTE RESPIRATORY SYNDROME CORONAVIRUS 2 (SARS-COV-2) (CORONAVIRUS DISEASE [COVID-19]), AMPLIFIED PROBE TECHNIQUE, MAKING USE OF HIGH THROUGHPUT TECHNOLOGIES AS DESCRIBED BY CMS-2020-01-R: HCPCS | Performed by: HOSPITALIST

## 2021-08-31 RX ORDER — LOPERAMIDE HYDROCHLORIDE 2 MG/1
2 CAPSULE ORAL 4 TIMES DAILY PRN
Qty: 30 CAPSULE | Refills: 0 | Status: SHIPPED | OUTPATIENT
Start: 2021-08-31 | End: 2021-09-30

## 2021-08-31 RX ADMIN — METOPROLOL SUCCINATE 12.5 MG: 25 TABLET, EXTENDED RELEASE ORAL at 06:03

## 2021-08-31 RX ADMIN — LOPERAMIDE HYDROCHLORIDE 2 MG: 2 CAPSULE ORAL at 09:16

## 2021-08-31 RX ADMIN — MIDODRINE HYDROCHLORIDE 5 MG: 5 TABLET ORAL at 06:04

## 2021-08-31 RX ADMIN — MIDODRINE HYDROCHLORIDE 5 MG: 5 TABLET ORAL at 12:24

## 2021-08-31 RX ADMIN — BUDESONIDE AND FORMOTEROL FUMARATE DIHYDRATE 2 PUFF: 160; 4.5 AEROSOL RESPIRATORY (INHALATION) at 07:53

## 2021-08-31 RX ADMIN — APIXABAN 2.5 MG: 2.5 TABLET, FILM COATED ORAL at 09:16

## 2021-09-02 NOTE — CASE MANAGEMENT/SOCIAL WORK
Case Management Discharge Note      Final Note: Pt discharged to [facility] for rehab.  Transported by [facility] w/cesario Mcdonald RN         Selected Continued Care - Discharged on 8/31/2021 Admission date: 8/21/2021 - Discharge disposition: Skilled Nursing Facility (DC - External)    Destination Coordination complete.    Service Provider Selected Services Address Phone Fax Patient Preferred    [facility]  Skilled Nursing [address] [facility] [address] [phone] --          Durable Medical Equipment    No services have been selected for the patient.              Dialysis/Infusion    No services have been selected for the patient.              Home Medical Care    No services have been selected for the patient.              Therapy    No services have been selected for the patient.              Community Resources    No services have been selected for the patient.              Community & DME    No services have been selected for the patient.                Selected Continued Care - Prior Encounters Includes selections from prior encounters from 5/23/2021 to 8/31/2021    Discharged on 8/10/2021 Admission date: 8/3/2021 - Discharge disposition: Home-Health Care Svc    Durable Medical Equipment     Service Provider Selected Services Address Phone Fax Patient Preferred    [facility]  Durable Medical Equipment [address] [facility] [address] [phone] --          Home Medical Care     Service Provider Selected Services Address Phone Fax Patient Preferred    [facility]  Home Health Services [address] [id number] [phone] --                Discharged on 7/21/2021 Admission date: 7/15/2021 - Discharge disposition: Home-Health Care Svc    Dialysis/Infusion     Service Provider Selected Services Address Phone Fax Patient Preferred    [facility] [address]  D[address]  [facility] [address] [phone] --          Home Medical Care     Service Provider Selected Services Address Phone Fax Patient Preferred     [facility]  [facility] [address] [place], [place] [id number] [phone] --                Discharged on 7/14/2021 Admission date: 7/5/2021 - Discharge disposition: Home or Self Care    Dialysis/Infusion     Service Provider Selected Services Address Phone Fax Patient Preferred    [facility]  Dialysis [address] [facility] [address] [phone] --          Home Medical Care     Service Provider Selected Services Address Phone Fax Patient Preferred    [facility]  [facility] [address] [place], [place] [address] [phone] --                    Transportation Services  Ambulance: [facility]    Final Discharge Disposition Code: 03 - skilled nursing facility (SNF)

## 2021-09-17 ENCOUNTER — OFFICE VISIT (OUTPATIENT)
Dept: SURGERY | Facility: CLINIC | Age: 76
End: 2021-09-17

## 2021-09-17 VITALS — BODY MASS INDEX: 27.61 KG/M2 | WEIGHT: 140.6 LBS | HEIGHT: 60 IN

## 2021-09-17 DIAGNOSIS — Z09 POSTOP CHECK: Primary | ICD-10-CM

## 2021-09-17 DIAGNOSIS — Z51.89 VISIT FOR WOUND CHECK: ICD-10-CM

## 2021-09-17 PROCEDURE — 99024 POSTOP FOLLOW-UP VISIT: CPT | Performed by: SURGERY

## 2021-09-17 NOTE — PROGRESS NOTES
Postoperative follow-up    S: Patient is here today for postoperative follow-up after undergoing expiratory laparotomy with ileocolonic anastomosis resection and redo ileocolonic anastomosis on 8/30/2021.  Patient was admitted to the hospital again because of diarrhea and dehydration.  Patient was started on loperamide and has been doing much better.  She has been able to tolerate diet.  She has not have wound VAC dressing change for 5 days.  Denies any problems with the wound    O: Alert, no acute distress  Abdomen soft, nontender nondistended,  Midline incision that is open and superficial that is approximately 4 x 3 cm and less than 1 cm deep.  There is granulation tissue at the base    Assessment and plan    76-year-old female s/p laparoscopic right hemicolectomy was complicated by anastomotic leak s/p ex lap and redo of the anastomosis.  She has been doing much better.  Her midline wound is clean and healing with good granulation tissue at the base.  I discussed with her about the need to perform wet-to-dry dressing changes with saline and gauze twice a day, no more need for wound VAC dressing changes.  Discussed with her about the need to take loperamide after every loose bowel movement but I discussed with her about the need to avoid overdoing it to prevent constipation.    She is to follow-up in my office in 1 week

## 2021-09-24 ENCOUNTER — OFFICE VISIT (OUTPATIENT)
Dept: SURGERY | Facility: CLINIC | Age: 76
End: 2021-09-24

## 2021-09-24 VITALS — HEIGHT: 60 IN | WEIGHT: 144.6 LBS | BODY MASS INDEX: 28.39 KG/M2

## 2021-09-24 DIAGNOSIS — Z51.89 VISIT FOR WOUND CHECK: ICD-10-CM

## 2021-09-24 DIAGNOSIS — Z09 POSTOP CHECK: Primary | ICD-10-CM

## 2021-09-24 PROCEDURE — 99024 POSTOP FOLLOW-UP VISIT: CPT | Performed by: SURGERY

## 2021-09-24 NOTE — PROGRESS NOTES
Postoperative follow-up     S: Patient is here today for postoperative follow-up after undergoing exploratory laparotomy with ileocolonic anastomosis resection and redo ileocolonic anastomosis on 8/30/2021.   Patient has been applying dressings over the wound.  Denies any problems.  Has been having regular bowel movement.  Has been tolerating diet without any problem.  Has gained some weight    O: Alert oriented, no acute distress  Abdomen soft, nontender nondistended, midline incision is almost completely healed.  There is a small area of granulation tissue at the midline that is approximately 4 by half centimeters.  There is no drainage or signs of infection    Assessment and plan    76-year-old female s/p ex lap with redo ileocolonic anastomosis for leak.  She has recovered exceptionally well and the wound is almost completely healed  Having good bowel function, tolerating diet and gaining weight    Apply antibiotic ointment to midline wound  Loperamide as needed for diarrhea  Follow-up in my office in 1 month

## 2021-11-04 ENCOUNTER — APPOINTMENT (OUTPATIENT)
Dept: GENERAL RADIOLOGY | Facility: HOSPITAL | Age: 76
End: 2021-11-04

## 2021-11-04 ENCOUNTER — HOSPITAL ENCOUNTER (OUTPATIENT)
Facility: HOSPITAL | Age: 76
Discharge: HOME | End: 2021-11-06
Attending: EMERGENCY MEDICINE | Admitting: HOSPITALIST

## 2021-11-04 ENCOUNTER — APPOINTMENT (OUTPATIENT)
Dept: CARDIOLOGY | Facility: HOSPITAL | Age: 76
End: 2021-11-04

## 2021-11-04 DIAGNOSIS — T82.868D: ICD-10-CM

## 2021-11-04 DIAGNOSIS — Z99.2 DIALYSIS PATIENT (HCC): ICD-10-CM

## 2021-11-04 DIAGNOSIS — S40.021A HEMATOMA OF ARM, RIGHT, INITIAL ENCOUNTER: Primary | ICD-10-CM

## 2021-11-04 LAB
ALBUMIN SERPL-MCNC: 3.3 G/DL (ref 3.5–5.2)
ALBUMIN/GLOB SERPL: 1 G/DL
ALP SERPL-CCNC: 110 U/L (ref 39–117)
ALT SERPL W P-5'-P-CCNC: 7 U/L (ref 1–33)
ANION GAP SERPL CALCULATED.3IONS-SCNC: 17.7 MMOL/L (ref 5–15)
AST SERPL-CCNC: 9 U/L (ref 1–32)
BASOPHILS # BLD AUTO: 0.05 10*3/MM3 (ref 0–0.2)
BASOPHILS NFR BLD AUTO: 1 % (ref 0–1.5)
BH CV UPPER VENOUS LEFT INTERNAL JUGULAR AUGMENT: NORMAL
BH CV UPPER VENOUS LEFT INTERNAL JUGULAR COMPETENT: NORMAL
BH CV UPPER VENOUS LEFT INTERNAL JUGULAR COMPRESS: NORMAL
BH CV UPPER VENOUS LEFT INTERNAL JUGULAR PHASIC: NORMAL
BH CV UPPER VENOUS LEFT INTERNAL JUGULAR SPONT: NORMAL
BH CV UPPER VENOUS LEFT SUBCLAVIAN AUGMENT: NORMAL
BH CV UPPER VENOUS LEFT SUBCLAVIAN COMPETENT: NORMAL
BH CV UPPER VENOUS LEFT SUBCLAVIAN COMPRESS: NORMAL
BH CV UPPER VENOUS LEFT SUBCLAVIAN PHASIC: NORMAL
BH CV UPPER VENOUS LEFT SUBCLAVIAN SPONT: NORMAL
BH CV UPPER VENOUS RIGHT AXILLARY AUGMENT: NORMAL
BH CV UPPER VENOUS RIGHT AXILLARY COMPETENT: NORMAL
BH CV UPPER VENOUS RIGHT AXILLARY COMPRESS: NORMAL
BH CV UPPER VENOUS RIGHT AXILLARY PHASIC: NORMAL
BH CV UPPER VENOUS RIGHT AXILLARY SPONT: NORMAL
BH CV UPPER VENOUS RIGHT BASILIC FOREARM COMPRESS: NORMAL
BH CV UPPER VENOUS RIGHT BASILIC UPPER COMPRESS: NORMAL
BH CV UPPER VENOUS RIGHT BRACHIAL COMPRESS: NORMAL
BH CV UPPER VENOUS RIGHT CEPHALIC FOREARM COMPRESS: NORMAL
BH CV UPPER VENOUS RIGHT CEPHALIC UPPER COMPRESS: NORMAL
BH CV UPPER VENOUS RIGHT INTERNAL JUGULAR AUGMENT: NORMAL
BH CV UPPER VENOUS RIGHT INTERNAL JUGULAR COMPETENT: NORMAL
BH CV UPPER VENOUS RIGHT INTERNAL JUGULAR COMPRESS: NORMAL
BH CV UPPER VENOUS RIGHT INTERNAL JUGULAR PHASIC: NORMAL
BH CV UPPER VENOUS RIGHT INTERNAL JUGULAR SPONT: NORMAL
BH CV UPPER VENOUS RIGHT RADIAL COMPRESS: NORMAL
BH CV UPPER VENOUS RIGHT SUBCLAVIAN AUGMENT: NORMAL
BH CV UPPER VENOUS RIGHT SUBCLAVIAN COMPETENT: NORMAL
BH CV UPPER VENOUS RIGHT SUBCLAVIAN COMPRESS: NORMAL
BH CV UPPER VENOUS RIGHT SUBCLAVIAN PHASIC: NORMAL
BH CV UPPER VENOUS RIGHT SUBCLAVIAN SPONT: NORMAL
BH CV UPPER VENOUS RIGHT ULNAR COMPRESS: NORMAL
BILIRUB SERPL-MCNC: 0.3 MG/DL (ref 0–1.2)
BUN SERPL-MCNC: 55 MG/DL (ref 8–23)
BUN/CREAT SERPL: 4.7 (ref 7–25)
CALCIUM SPEC-SCNC: 7.4 MG/DL (ref 8.6–10.5)
CHLORIDE SERPL-SCNC: 114 MMOL/L (ref 98–107)
CO2 SERPL-SCNC: 17.3 MMOL/L (ref 22–29)
CREAT SERPL-MCNC: 11.82 MG/DL (ref 0.57–1)
DEPRECATED RDW RBC AUTO: 56.3 FL (ref 37–54)
EOSINOPHIL # BLD AUTO: 0.15 10*3/MM3 (ref 0–0.4)
EOSINOPHIL NFR BLD AUTO: 3.1 % (ref 0.3–6.2)
ERYTHROCYTE [DISTWIDTH] IN BLOOD BY AUTOMATED COUNT: 17.5 % (ref 12.3–15.4)
GFR SERPL CREATININE-BSD FRML MDRD: 4 ML/MIN/1.73
GFR SERPL CREATININE-BSD FRML MDRD: ABNORMAL ML/MIN/{1.73_M2}
GLOBULIN UR ELPH-MCNC: 3.2 GM/DL
GLUCOSE SERPL-MCNC: 92 MG/DL (ref 65–99)
HCT VFR BLD AUTO: 31 % (ref 34–46.6)
HGB BLD-MCNC: 8.8 G/DL (ref 12–15.9)
IMM GRANULOCYTES # BLD AUTO: 0.02 10*3/MM3 (ref 0–0.05)
IMM GRANULOCYTES NFR BLD AUTO: 0.4 % (ref 0–0.5)
LYMPHOCYTES # BLD AUTO: 0.39 10*3/MM3 (ref 0.7–3.1)
LYMPHOCYTES NFR BLD AUTO: 8.2 % (ref 19.6–45.3)
MAXIMAL PREDICTED HEART RATE: 145 BPM
MCH RBC QN AUTO: 24.8 PG (ref 26.6–33)
MCHC RBC AUTO-ENTMCNC: 28.4 G/DL (ref 31.5–35.7)
MCV RBC AUTO: 87.3 FL (ref 79–97)
MONOCYTES # BLD AUTO: 0.53 10*3/MM3 (ref 0.1–0.9)
MONOCYTES NFR BLD AUTO: 11.1 % (ref 5–12)
NEUTROPHILS NFR BLD AUTO: 3.64 10*3/MM3 (ref 1.7–7)
NEUTROPHILS NFR BLD AUTO: 76.2 % (ref 42.7–76)
NRBC BLD AUTO-RTO: 0 /100 WBC (ref 0–0.2)
PLATELET # BLD AUTO: 232 10*3/MM3 (ref 140–450)
PMV BLD AUTO: 10 FL (ref 6–12)
POTASSIUM SERPL-SCNC: 5.2 MMOL/L (ref 3.5–5.2)
PROT SERPL-MCNC: 6.5 G/DL (ref 6–8.5)
RBC # BLD AUTO: 3.55 10*6/MM3 (ref 3.77–5.28)
SARS-COV-2 RNA RESP QL NAA+PROBE: NOT DETECTED
SODIUM SERPL-SCNC: 149 MMOL/L (ref 136–145)
STRESS TARGET HR: 123 BPM
WBC # BLD AUTO: 4.78 10*3/MM3 (ref 3.4–10.8)

## 2021-11-04 PROCEDURE — G0378 HOSPITAL OBSERVATION PER HR: HCPCS

## 2021-11-04 PROCEDURE — 93971 EXTREMITY STUDY: CPT

## 2021-11-04 PROCEDURE — 85025 COMPLETE CBC W/AUTO DIFF WBC: CPT | Performed by: PHYSICIAN ASSISTANT

## 2021-11-04 PROCEDURE — 80053 COMPREHEN METABOLIC PANEL: CPT | Performed by: PHYSICIAN ASSISTANT

## 2021-11-04 PROCEDURE — C9803 HOPD COVID-19 SPEC COLLECT: HCPCS

## 2021-11-04 PROCEDURE — 71045 X-RAY EXAM CHEST 1 VIEW: CPT

## 2021-11-04 PROCEDURE — 36415 COLL VENOUS BLD VENIPUNCTURE: CPT | Performed by: PHYSICIAN ASSISTANT

## 2021-11-04 PROCEDURE — U0003 INFECTIOUS AGENT DETECTION BY NUCLEIC ACID (DNA OR RNA); SEVERE ACUTE RESPIRATORY SYNDROME CORONAVIRUS 2 (SARS-COV-2) (CORONAVIRUS DISEASE [COVID-19]), AMPLIFIED PROBE TECHNIQUE, MAKING USE OF HIGH THROUGHPUT TECHNOLOGIES AS DESCRIBED BY CMS-2020-01-R: HCPCS | Performed by: PHYSICIAN ASSISTANT

## 2021-11-04 PROCEDURE — 99284 EMERGENCY DEPT VISIT MOD MDM: CPT

## 2021-11-04 RX ORDER — FLUTICASONE PROPIONATE 50 MCG
2 SPRAY, SUSPENSION (ML) NASAL DAILY
COMMUNITY
Start: 2021-10-15

## 2021-11-04 RX ORDER — BLOOD SUGAR DIAGNOSTIC
1 STRIP MISCELLANEOUS 3 TIMES DAILY
COMMUNITY
Start: 2021-10-20

## 2021-11-04 RX ORDER — HYDRALAZINE HYDROCHLORIDE 20 MG/ML
10 INJECTION INTRAMUSCULAR; INTRAVENOUS EVERY 4 HOURS PRN
Status: DISCONTINUED | OUTPATIENT
Start: 2021-11-04 | End: 2021-11-05

## 2021-11-04 RX ORDER — ALBUMIN (HUMAN) 12.5 G/50ML
12.5 SOLUTION INTRAVENOUS AS NEEDED
Status: DISCONTINUED | OUTPATIENT
Start: 2021-11-04 | End: 2021-11-05 | Stop reason: HOSPADM

## 2021-11-04 RX ORDER — LABETALOL HYDROCHLORIDE 5 MG/ML
20 INJECTION, SOLUTION INTRAVENOUS ONCE
Status: DISCONTINUED | OUTPATIENT
Start: 2021-11-04 | End: 2021-11-04

## 2021-11-04 NOTE — ED PROVIDER NOTES
EMERGENCY DEPARTMENT ENCOUNTER    Room Number:  06/06  Date of encounter:  11/4/2021  PCP: Laurent Cortes DO  Historian: Patient      HPI:  Chief Complaint: Right arm swelling  A complete HPI/ROS/PMH/PSH/SH/FH are unobtainable due to: Nothing    Context: Nellie Vegas is a 76 y.o. female who presents to the ED c/o right arm swelling.  Patient states she is a dialysis patient and her last dialysis was on Saturday.  She informs me that at that particular dialysis visit they had trouble accessing her fistula in the process it was infiltrated.  She was unable to get dialysis on Tuesday and Thursday due to arm swelling.  She was told to report to the emergency department for further evaluation.  Patient is on Eliquis.    Reviewing her chart it appears Dr. Viera performed the fistula surgery.  Dr. Kang is her nephrologist.      PAST MEDICAL HISTORY  Active Ambulatory Problems     Diagnosis Date Noted   • ESRD (end stage renal disease) on dialysis ([facility]) 08/09/2019   • Thrombosis of kidney dialysis arteriovenous graft ([facility]) 08/09/2019   • Anemia of chronic renal failure, stage 5 ([facility]) 08/10/2019   • COPD exacerbation ([facility]) 11/28/2019   • Problem with dialysis access ([facility]) 12/07/2020   • Lower GI bleeding 07/05/2021   • Colonic mass 07/12/2021   • AV shunt thrombosis, subsequent encounter 07/15/2021   • Failure to thrive in adult 08/21/2021   • Severe malnutrition ([facility]) 08/22/2021     Resolved Ambulatory Problems     Diagnosis Date Noted   • Anastomotic leak of intestine 08/03/2021     Past Medical History:   Diagnosis Date   • Anemia    • Anesthesia complication    • Arthritis    • Asthma    • COPD (chronic obstructive pulmonary disease) ([facility])    • Diabetes mellitus ([facility])    • Dialysis patient ([facility])    • Disease of thyroid gland    • GERD (gastroesophageal reflux disease)    • Headache    • History of blood clots    • History of kidney stones    • Hyperlipidemia    • Hypertension    • Knee pain, bilateral    • Renal  disease    • Sleep apnea    • SOB (shortness of breath)    • 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)    • Weakness          PAST SURGICAL HISTORY  Past Surgical History:   Procedure Laterality Date   • ARTERIOVENOUS FISTULA Right    • ARTERIOVENOUS FISTULA Left     REMOVED   • CENTRAL VENOUS CATHETER TUNNELED INSERTION DOUBLE LUMEN     •  SECTION     • COLON RESECTION Right 2021    Procedure: RIGHT HEMICOLECTOMY LAPAROSCOPIC;  Surgeon: Zbigniew Conner MD;  Location: [facility] MAIN OR;  Service: General;  Laterality: Right;   • COLONOSCOPY  2016   • COLONOSCOPY N/A 2021    Procedure: COLONOSCOPY into cecum with biopsy;  Surgeon: Fabricio Monroe MD;  Location: [facility] ENDOSCOPY;  Service: Gastroenterology;  Laterality: N/A;  cecal mass   • EXPLORATORY LAPAROTOMY N/A 8/3/2021    Procedure: LAPAROTOMY EXPLORATORY, resection of ileocolonic anastomosis with anastomosis;  Surgeon: Zbigniew Conner MD;  Location: [facility] OR;  Service: General;  Laterality: N/A;   • INSERTION HEMODIALYSIS CATHETER Right 2021    Procedure: VENACAVAGRAM AND HEMODIALYSIS CATHETER INSERTION;  Surgeon: Karson Muller MD;  Location: [facility] OR ;  Service: Vascular;  Laterality: Right;  Dr Bryant was primary surgeon   • POLYPECTOMY      UTERINE   • SHUNT O GRAM Right 2019    Procedure: RIGHT ARM DIALYSIS GRAFT revision and THROMBECTOMY;  Surgeon: Thierry Hardy MD;  Location: [facility] OR ;  Service: Vascular   • SHUNT O GRAM Right 2019    Procedure: RIGHT ARM DIALYSIS GRAFT THROMBECTOMY WITH STENTING;  Surgeon: Thierry Hardy MD;  Location: [facility] OR ;  Service: Vascular   • SHUNT O GRAM Right 2020    Procedure: RIGHT ARM ARTERIOVENOUS SHUNTOGRAM WITH THROMBECTOMY;  Surgeon: Thierry Hardy MD;  Location: [facility] OR ;  Service: Vascular;  Laterality: Right;   • SHUNT O GRAM Right 2021    Procedure: Right arm  dialysis shuntogram with shunt thrombectomy;  Surgeon: Shahram Gallagher MD;  Location: [facility] OR ;  Service: Vascular;  Laterality: Right;   • SHUNT O GRAM Right 2021    Procedure: RIGHT UPPER EXTREMITY THROMBECTOMY AND SHUNTOGRAM;  Surgeon: Shahram Gallagher MD;  Location: [facility] MAIN OR;  Service: Vascular;  Laterality: Right;         FAMILY HISTORY  Family History   Problem Relation Age of Onset   • Malig Hyperthermia Neg Hx          SOCIAL HISTORY  Social History     Socioeconomic History   • Marital status:    Tobacco Use   • Smoking status: Former Smoker     Packs/day: 0.00     Years: 4.00     Pack years: 0.00     Types: Cigarettes     Quit date:      Years since quittin.8   • Smokeless tobacco: Never Used   Vaping Use   • Vaping Use: Never used   Substance and Sexual Activity   • Alcohol use: No   • Drug use: No   • Sexual activity: Defer         ALLERGIES  Sulfa antibiotics, Adhesive tape, and Latex        REVIEW OF SYSTEMS  Review of Systems   Constitutional: Negative for chills and fever.   HENT: Negative.    Eyes: Negative.    Respiratory: Negative for cough and shortness of breath.    Cardiovascular: Negative for chest pain and palpitations.   Gastrointestinal: Negative.    Genitourinary: Negative.    Musculoskeletal: Negative.    Skin: Negative.    Neurological: Negative.         All systems reviewed and negative except for those discussed in HPI.       PHYSICAL EXAM    I have reviewed the triage vital signs and nursing notes.    ED Triage Vitals   Temp Heart Rate Resp BP SpO2   21 1449 21 1449 21 1449 21 1523 21 1449   97.7 °F (36.5 °C) 94 18 113/79 97 %      Temp src Heart Rate Source Patient Position BP Location FiO2 (%)   21 1449 -- 21 1523 21 1523 --   Temporal  Sitting Left arm        Physical Exam  GENERAL: Chronically ill, nontoxic  HENT: nares patent  EYES: no scleral icterus  CV: regular rhythm, regular  rate, fistula right upper extremity has a good thrill.  There is surrounding soft tissue swelling.  RESPIRATORY: normal effort, lungs CTA B  ABDOMEN: soft nontender  MUSCULOSKELETAL: no deformity  NEURO: alert and oriented x4, moves all extremities, follows commands  SKIN: warm, dry, no obvious cellulitis        LAB RESULTS  Recent Results (from the past 24 hour(s))   Comprehensive Metabolic Panel    Collection Time: 11/04/21  3:53 PM    Specimen: Blood   Result Value Ref Range    Glucose 92 65 - 99 mg/dL    BUN 55 (H) 8 - 23 mg/dL    Creatinine 11.82 (H) 0.57 - 1.00 mg/dL    Sodium 149 (H) 136 - 145 mmol/L    Potassium 5.2 3.5 - 5.2 mmol/L    Chloride 114 (H) 98 - 107 mmol/L    CO2 17.3 (L) 22.0 - 29.0 mmol/L    Calcium 7.4 (L) 8.6 - 10.5 mg/dL    Total Protein 6.5 6.0 - 8.5 g/dL    Albumin 3.30 (L) 3.50 - 5.20 g/dL    ALT (SGPT) 7 1 - 33 U/L    AST (SGOT) 9 1 - 32 U/L    Alkaline Phosphatase 110 39 - 117 U/L    Total Bilirubin 0.3 0.0 - 1.2 mg/dL    eGFR Non  Amer      eGFR  African Amer 4 (L) >60 mL/min/1.73    Globulin 3.2 gm/dL    A/G Ratio 1.0 g/dL    BUN/Creatinine Ratio 4.7 (L) 7.0 - 25.0    Anion Gap 17.7 (H) 5.0 - 15.0 mmol/L   CBC Auto Differential    Collection Time: 11/04/21  3:53 PM    Specimen: Blood   Result Value Ref Range    WBC 4.78 3.40 - 10.80 10*3/mm3    RBC 3.55 (L) 3.77 - 5.28 10*6/mm3    Hemoglobin 8.8 (L) 12.0 - 15.9 g/dL    Hematocrit 31.0 (L) 34.0 - 46.6 %    MCV 87.3 79.0 - 97.0 fL    MCH 24.8 (L) 26.6 - 33.0 pg    MCHC 28.4 (L) 31.5 - 35.7 g/dL    RDW 17.5 (H) 12.3 - 15.4 %    RDW-SD 56.3 (H) 37.0 - 54.0 fl    MPV 10.0 6.0 - 12.0 fL    Platelets 232 140 - 450 10*3/mm3    Neutrophil % 76.2 (H) 42.7 - 76.0 %    Lymphocyte % 8.2 (L) 19.6 - 45.3 %    Monocyte % 11.1 5.0 - 12.0 %    Eosinophil % 3.1 0.3 - 6.2 %    Basophil % 1.0 0.0 - 1.5 %    Immature Grans % 0.4 0.0 - 0.5 %    Neutrophils, Absolute 3.64 1.70 - 7.00 10*3/mm3    Lymphocytes, Absolute 0.39 (L) 0.70 - 3.10 10*3/mm3     Monocytes, Absolute 0.53 0.10 - 0.90 10*3/mm3    Eosinophils, Absolute 0.15 0.00 - 0.40 10*3/mm3    Basophils, Absolute 0.05 0.00 - 0.20 10*3/mm3    Immature Grans, Absolute 0.02 0.00 - 0.05 10*3/mm3    nRBC 0.0 0.0 - 0.2 /100 WBC   Duplex Venous Upper Extremity - Right CAR    Collection Time: 11/04/21  5:53 PM   Result Value Ref Range    Target HR (85%) 123 bpm    Max. Pred. HR (100%) 145 bpm       Ordered the above labs and independently reviewed the results.        RADIOLOGY  XR Chest 1 View    Result Date: 11/4/2021  XR CHEST 1 VW-  HISTORY: Female who is 76 years-old,  missed dialysis  TECHNIQUE: Frontal views of the chest  COMPARISON: 08/03/2021  FINDINGS: The heart size is mildly enlarged. Pulmonary vasculature is unremarkable. Borderline fullness of the right hilum appears stable. Vascular stents are noted in the bilateral axillary regions. No focal pulmonary consolidation, pleural effusion, or pneumothorax. No acute osseous process.      No focal pulmonary consolidation. Mild cardiomegaly.  This report was finalized on 11/4/2021 4:34 PM by Dr. Omer Flores M.D.        I ordered the above noted radiological studies. Reviewed by me and discussed with radiologist.  See dictation for official radiology interpretation.      PROCEDURES    Procedures      MEDICATIONS GIVEN IN ER    Medications - No data to display      PROGRESS, DATA ANALYSIS, CONSULTS, AND MEDICAL DECISION MAKING    All labs have been independently reviewed by me.  All radiology studies have been reviewed by me and discussed with radiologist dictating the report.   EKG's independently viewed and interpreted by me.  Discussion below represents my analysis of pertinent findings related to patient's condition, differential diagnosis, treatment plan and final disposition.    DDx includes but is not limited to: Hematoma, soft tissue infiltration, DVT.  Given that the patient is missed dialysis for the past 5 going on 6 days, will obtain CBC,  CMP, chest x-ray.  Will obtain a venous Doppler of the right upper extremity to better evaluate.  Please see below for MDM and timeline of events    ED Course as of 11/04/21 1801   u Nov 04, 2021   1643 Glucose: 92 [RC]   1643 BUN(!): 55 [RC]   1643 Creatinine(!): 11.82 [RC]   1643 Sodium(!): 149 [RC]   1643 Potassium: 5.2 [RC]   1643 CO2(!): 17.3 [RC]   1643 Anion Gap(!): 17.7 [RC]   1643 WBC: 4.78 [RC]   1643 RBC(!): 3.55 [RC]   1643 Hemoglobin(!): 8.8 [RC]   1643 Hematocrit(!): 31.0 [RC]   1643 Platelets: 232 [RC]   1644 Patient's potassium is noted to be 5.2.  CBC is relatively baseline.  Venous Doppler of the upper extremity is pending.  Patient is on Eliquis and I doubt DVT.  Suspect the swelling is all secondary to infiltration.  Given that patient has not had dialysis since Saturday, will call vascular surgery and nephrology to discuss further course of care.  Anticipate admission with vascular can evaluate the swollen right upper extremity and the patient can be dialyzed. [RC]   1647 BUN(!): 55 [RC]   1647 Creatinine(!): 11.82 [RC]   1647 Chest x-ray shows mild cardiomegaly but no other acute process [RC]   1647 Return calls from vascular and nephrology pending [RC]   1737 Discussed patient's case with Dr. Price nephrology.  He recommends admission so the patient can be further evaluated and dialyzed. [RC]   1739 Still awaiting to have vascular.  Regardless, the patient is going to need dialysis.  We will place a call to Dr. Luke to discuss admission. [RC]   1743 Discussed with Dr. Muller.  Vascular will see in consult. [RC]   1752 Discussed patient's case with Dr. Chowdhury.  To admit patient to telemetry bed under his care. [RC]   1801 Venous Doppler shows infiltration/hematoma.  No DVT [RC]      ED Course User Index  [RC] Albert Ruiz III, PA           PPE: The patient wore a surgical mask throughout the entire patient encounter. I wore an N95.    AS OF 18:01 EDT VITALS:    BP -  HR - 80  TEMP -  97.7 °F (36.5 °C) (Temporal)  O2 SATS - 95%        DIAGNOSIS  Final diagnoses:   Hematoma of arm, right, initial encounter   Dialysis patient (HCC)         DISPOSITION  ADMISSION    Discussed treatment plan and reason for admission with pt/family and admitting physician.  Pt/family voiced understanding of the plan for admission for further testing/treatment as needed.              Albert Ruiz III, PA  11/04/21 [id number]

## 2021-11-04 NOTE — ED NOTES
"Nursing report ED to floor  Nellie Vegas  76 y.o.  female    HPI :   Chief Complaint   Patient presents with   • Arm Swelling       Admitting doctor:   Haja Chowdhury MD    Admitting diagnosis:   The primary encounter diagnosis was Hematoma of arm, right, initial encounter. A diagnosis of Dialysis patient (HCC) was also pertinent to this visit.    Code status:   Current Code Status     Date Active Code Status Order ID Comments User Context       Prior    Advance Care Planning Activity          Allergies:   Sulfa antibiotics, Adhesive tape, and Latex    Intake and Output  No intake or output data in the 24 hours ending 11/04/21 1758    Weight:       11/04/21  1523   Weight: 65.8 kg (145 lb 1 oz)       Most recent vitals:   Vitals:    11/04/21 1449 11/04/21 1523 11/04/21 1626   BP:  113/79 (!) 200/97   BP Location:  Left arm    Patient Position:  Sitting    Pulse: 94  80   Resp: 18     Temp: 97.7 °F (36.5 °C)     TempSrc: Temporal     SpO2: 97%  95%   Weight:  65.8 kg (145 lb 1 oz)    Height:  152.4 cm (60\")        Active LDAs/IV Access:   Lines, Drains & Airways     Active LDAs     None                Labs (abnormal labs have a star):   Labs Reviewed   COMPREHENSIVE METABOLIC PANEL - Abnormal; Notable for the following components:       Result Value    BUN 55 (*)     Creatinine 11.82 (*)     Sodium 149 (*)     Chloride 114 (*)     CO2 17.3 (*)     Calcium 7.4 (*)     Albumin 3.30 (*)     eGFR   Amer 4 (*)     BUN/Creatinine Ratio 4.7 (*)     Anion Gap 17.7 (*)     All other components within normal limits    Narrative:     GFR Normal >60  Chronic Kidney Disease <60  Kidney Failure <15     CBC WITH AUTO DIFFERENTIAL - Abnormal; Notable for the following components:    RBC 3.55 (*)     Hemoglobin 8.8 (*)     Hematocrit 31.0 (*)     MCH 24.8 (*)     MCHC 28.4 (*)     RDW 17.5 (*)     RDW-SD 56.3 (*)     Neutrophil % 76.2 (*)     Lymphocyte % 8.2 (*)     Lymphocytes, Absolute 0.39 (*)     All other components " within normal limits   COVID PRE-OP / PRE-PROCEDURE SCREENING ORDER (NO ISOLATION)    Narrative:     The following orders were created for panel order COVID PRE-OP / PRE-PROCEDURE SCREENING ORDER (NO ISOLATION) - Swab, Nasopharynx.  Procedure                               Abnormality         Status                     ---------                               -----------         ------                     COVID-19, [facility] IN-HOUSE...[[id number]]                                                   Please view results for these tests on the individual orders.   COVID-19, [facility] IN-HOUSE CEPHEID/TAB, NP SWAB IN TRANSPORT MEDIA 8-12 HR TAT   CBC AND DIFFERENTIAL    Narrative:     The following orders were created for panel order CBC & Differential.  Procedure                               Abnormality         Status                     ---------                               -----------         ------                     CBC Auto Differential[[id number]]        Abnormal            Final result                 Please view results for these tests on the individual orders.       EKG:   No orders to display       Meds given in ED:   Medications - No data to display    Imaging results:  XR Chest 1 View    Result Date: 2021  No focal pulmonary consolidation. Mild cardiomegaly.  This report was finalized on 2021 4:34 PM by Dr. Omer Flores M.D.        Ambulatory status:   - stand by assist    Social issues:   Social History     Socioeconomic History   • Marital status:    Tobacco Use   • Smoking status: Former Smoker     Packs/day: 0.00     Years: 4.00     Pack years: 0.00     Types: Cigarettes     Quit date: 1966     Years since quittin.8   • Smokeless tobacco: Never Used   Vaping Use   • Vaping Use: Never used   Substance and Sexual Activity   • Alcohol use: No   • Drug use: No   • Sexual activity: Defer        Nursing report ED to floor:     Judy Jeffery RN  21 [id number]

## 2021-11-04 NOTE — CONSULTS
Patient Name: Nellie Vegas Account #: [id number]    MRN: [id number] Admission Date: 2021      Consulting Service: Vascular Surgery Date of Evaluation: 2021    Requesting Provider: Tj CARSON    CHIEF COMPLAINT: Right arm AV access infiltration  HPI: Nellie Vegas is a 76 y.o. female is being seen for a consultation and evaluation/management of severe infiltration of the right arm AV access after hemodialysis.  Patient has been without dialysis for 2 days his need of long-term access until the hematoma infiltration as subsided.  Patient seems to have good flow in the fistula based on exam.  Duplex is negative for DVT.    PAST MEDICAL HISTORY:   Past Medical History:   Diagnosis Date   • Anemia    • Anesthesia complication     mother woke up in combative state   • Arthritis     knees   • Asthma    • COPD (chronic obstructive pulmonary disease) (HCC)    • Diabetes mellitus (HCC)     type 2   • Dialysis patient (HCC)    • Disease of thyroid gland    • GERD (gastroesophageal reflux disease)    • Headache     after dialysis   • History of blood clots     BUE   • History of kidney stones    • Hyperlipidemia    • Hypertension    • Knee pain, bilateral    • Renal disease    • Sleep apnea     CPAP   • SOB (shortness of breath)    • 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)    • Weakness       PAST SURGICAL HISTORY:   Past Surgical History:   Procedure Laterality Date   • ARTERIOVENOUS FISTULA Right    • ARTERIOVENOUS FISTULA Left     REMOVED   • CENTRAL VENOUS CATHETER TUNNELED INSERTION DOUBLE LUMEN     •  SECTION     • COLON RESECTION Right 2021    Procedure: RIGHT HEMICOLECTOMY LAPAROSCOPIC;  Surgeon: Zbigniew Conner MD;  Location: [facility] OR;  Service: General;  Laterality: Right;   • COLONOSCOPY     • COLONOSCOPY N/A 2021    Procedure: COLONOSCOPY into cecum with biopsy;  Surgeon: Fabricio Monroe MD;  Location: [facility] ENDOSCOPY;   Service: Gastroenterology;  Laterality: N/A;  cecal mass   • EXPLORATORY LAPAROTOMY N/A 8/3/2021    Procedure: LAPAROTOMY EXPLORATORY, resection of ileocolonic anastomosis with anastomosis;  Surgeon: Zbigniew Conner MD;  Location: [facility];  Service: General;  Laterality: N/A;   • INSERTION HEMODIALYSIS CATHETER Right 2021    Procedure: VENACAVAGRAM AND HEMODIALYSIS CATHETER INSERTION;  Surgeon: Karson Muller MD;  Location: [facility] ;  Service: Vascular;  Laterality: Right;  Dr Bryant was primary surgeon   • POLYPECTOMY      UTERINE   • SHUNT O GRAM Right 2019    Procedure: RIGHT ARM DIALYSIS GRAFT revision and THROMBECTOMY;  Surgeon: Thierry Hardy MD;  Location: [facility] ;  Service: Vascular   • SHUNT O GRAM Right 2019    Procedure: RIGHT ARM DIALYSIS GRAFT THROMBECTOMY WITH STENTING;  Surgeon: Thierry Hardy MD;  Location: [facility] ;  Service: Vascular   • SHUNT O GRAM Right 2020    Procedure: RIGHT ARM ARTERIOVENOUS SHUNTOGRAM WITH THROMBECTOMY;  Surgeon: Thierry Hardy MD;  Location: [facility] ;  Service: Vascular;  Laterality: Right;   • SHUNT O GRAM Right 2021    Procedure: Right arm dialysis shuntogram with shunt thrombectomy;  Surgeon: Shahram Gallagher MD;  Location: [facility] ;  Service: Vascular;  Laterality: Right;   • SHUNT O GRAM Right 2021    Procedure: RIGHT UPPER EXTREMITY THROMBECTOMY AND SHUNTOGRAM;  Surgeon: Shahram Gallagher MD;  Location: [facility];  Service: Vascular;  Laterality: Right;      FAMILY HISTORY:   Family History   Problem Relation Age of Onset   • Malig Hyperthermia Neg Hx       SOCIAL HISTORY:   Social History     Tobacco Use   • Smoking status: Former Smoker     Packs/day: 0.00     Years: 4.00     Pack years: 0.00     Types: Cigarettes     Quit date: 1966     Years since quittin.8   • Smokeless tobacco: Never Used   Vaping Use   • Vaping  Use: Never used   Substance Use Topics   • Alcohol use: No   • Drug use: No      MEDICATIONS:   No current facility-administered medications on file prior to encounter.     Current Outpatient Medications on File Prior to Encounter   Medication Sig Dispense Refill   • albuterol (PROVENTIL) (2.5 MG/3ML) 0.083% nebulizer solution Take 2.5 mg by nebulization 3 (Three) Times a Day As Needed for Wheezing. UNSURE OF DOSAGE     • albuterol sulfate  (90 Base) MCG/ACT inhaler INHALE 2 PUFFS INTO THE LUNGS EVERY 4 HOURS AS NEEDED FOR WHEEZING     • apixaban (ELIQUIS) 2.5 MG tablet tablet Take 2.5 mg by mouth 2 (Two) Times a Day.     • Breo Ellipta 100-25 MCG/INH inhaler      • budesonide-formoterol (SYMBICORT) 160-4.5 MCG/ACT inhaler Inhale 2 puffs 2 (Two) Times a Day.     • Cholecalciferol (VITAMIN D3) 5000 units capsule capsule Take 5,000 Units by mouth Every Morning.     • fluticasone (FLONASE) 50 MCG/ACT nasal spray 2 sprays into the nostril(s) as directed by provider Daily.     • glucose blood (Accu-Chek Amber Plus) test strip 1 strip by Other route 3 (Three) Times a Day.     • metoprolol succinate XL (TOPROL-XL) 25 MG 24 hr tablet Take 0.5 tablets by mouth Every Morning for 30 days. 15 tablet 0   • midodrine (PROAMATINE) 5 MG tablet Take 1 tablet by mouth 3 (Three) Times a Day Before Meals for 30 days. 90 tablet 0   • montelukast (SINGULAIR) 10 MG tablet Take 10 mg by mouth Every Night.     • omeprazole (priLOSEC) 40 MG capsule Take 40 mg by mouth Every Morning.               ALLERGIES: Sulfa antibiotics, Adhesive tape, and Latex   COMPLETE REVIEW OF SYSTEMS:     ENT ROS: negative  Cardiovascular ROS: no chest pain or dyspnea on exertion  Respiratory ROS: no cough, shortness of breath, or wheezing  Gastrointestinal ROS: no abdominal pain, change in bowel habits, or black or bloody stools  Neurological ROS: no TIA or stroke symptoms  Genito-Urinary ROS: no dysuria, trouble voiding, or hematuria  Musculoskeletal  "ROS: positive for -pain and swelling in the right arm  Dermatological ROS: negative  Psychological ROS: negative      PHYSICAL EXAM:   Patient Vitals for the past 24 hrs:   BP Temp Temp src Pulse Resp SpO2 Height Weight   11/04/21 1626 (!) 200/97 -- -- 80 -- 95 % -- --   11/04/21 1523 113/79 -- -- -- -- -- 152.4 cm (60\") 65.8 kg (145 lb 1 oz)   11/04/21 1449 -- 97.7 °F (36.5 °C) Temporal 94 18 97 % -- --        General appearance: alert, well appearing, and in no distress.  Neurological exam reveals alert, oriented, normal speech, no focal findings or movement disorder noted.  ENT exam reveals - ENT exam normal, no neck nodes or sinus tenderness.  CVS exam: normal rate, regular rhythm, normal S1, S2, no murmurs, rubs, clicks or gallops.  Chest: clear to auscultation, no wheezes, rales or rhonchi, symmetric air entry.  Abdominal exam: soft, nontender, nondistended, no masses or organomegaly.  Examination of the feet reveals warm, good capillary refill.  Right AV access with a large hematoma precluding use.  No extravasation seen pants and no pulsatility        LABS:      Results Review:       I reviewed the patient's new clinical results.  Results from last 7 days   Lab Units 11/04/21  1553   WBC 10*3/mm3 4.78   HEMOGLOBIN g/dL 8.8*   PLATELETS 10*3/mm3 232     Results from last 7 days   Lab Units 11/04/21  1553   SODIUM mmol/L 149*   POTASSIUM mmol/L 5.2   CHLORIDE mmol/L 114*   CO2 mmol/L 17.3*   BUN mg/dL 55*   CREATININE mg/dL 11.82*   GLUCOSE mg/dL 92   Estimated Creatinine Clearance: 3.5 mL/min (A) (by C-G formula based on SCr of 11.82 mg/dL (H)).  Results from last 7 days   Lab Units 11/04/21  1553   CALCIUM mg/dL 7.4*   ALBUMIN g/dL 3.30*           The following radiologic or non-invasive studies have been reviewed by me: Venous duplex reviewed with images reviewed    Active Hospital Problems    Diagnosis  POA   • Hematoma of arm, right, initial encounter [S40.021A]  Yes      Resolved Hospital Problems   No " resolved problems to display.         ASSESSMENT/PLAN: 76 y.o. female with infiltration of her right AV access.  We will plan tunneled dialysis catheter placement to rest of access with imaging of the access prior to resumption in 3 to 4 weeks.  Patient is aware and agreeable with this plan.      I discussed the plan with the patient and she is agreeable to the plan of care at this point. Thank you for this consult.     Karson Muller MD   11/04/21

## 2021-11-04 NOTE — ED TRIAGE NOTES
Patient to er from home with c/o right arm swelling. Patient reported she is a dialysis patient an her last dialysis was on Saturday. Patient reported she can not get her dialysis because the her shunt site is swollen. Patient has mask on in triage along with staff.

## 2021-11-04 NOTE — ED NOTES
Patient was wearing a face mask throughout our encounter.  I wore appropriate PPE throughout the encounter.  Hand hygiene was performed before and after patient encounter.        Judy Jeffery RN  11/04/21 7057

## 2021-11-04 NOTE — ED PROVIDER NOTES
Pt presents to the ED c/o  dialysis fistula not working.  Patient states she receives dialysis on Tuesday, Thursday and Saturday.  Her last hemodialysis was on Saturday.  She states her dialysis nurse tried to start dialysis on her right arm fistula 2 days ago and was unable to do so.  Patient has not had dialysis since.  Patient complains of mild shortness of air.  She is also noted increasing swelling to her right upper extremity.  She states her vascular surgeon is Dr. Viera and her nephrologist is Dr. Mitchell.     On exam,   Her heart is regular rate and rhythm with a 2 out of 6 ejection murmur.  Lungs are diminished at the bases but otherwise clear.  Abdomen is NABS, soft, nontender nondistended.  Her right upper extremity reveals a dialysis AV fistula.  He has a positive thrill and bruit.  It is mildly tender to palpation.     Plan: I agree with plan of checking baseline blood work, chest x-ray and consulting her vascular surgeon and nephrologist.      Patient was placed in face mask in first look. Patient was wearing facemask when I entered the room and throughout our encounter. I wore full protective equipment throughout this patient encounter including a face mask, eye shield and gloves. Hand hygiene was performed before donning protective equipment and after removal when leaving the room.       Attestation:  The INGRID and I have discussed this patient's history, physical exam, and treatment plan.  I have reviewed the documentation and personally had a face to face interaction with the patient. I affirm the documentation and agree with the treatment and plan.  The attached note describes my personal findings.            Alban Jonhson MD  11/04/21 9916

## 2021-11-05 ENCOUNTER — ANESTHESIA EVENT (OUTPATIENT)
Dept: PERIOP | Facility: HOSPITAL | Age: 76
End: 2021-11-05

## 2021-11-05 ENCOUNTER — ANESTHESIA (OUTPATIENT)
Dept: PERIOP | Facility: HOSPITAL | Age: 76
End: 2021-11-05

## 2021-11-05 ENCOUNTER — APPOINTMENT (OUTPATIENT)
Dept: GENERAL RADIOLOGY | Facility: HOSPITAL | Age: 76
End: 2021-11-05

## 2021-11-05 LAB
GLUCOSE BLDC GLUCOMTR-MCNC: 60 MG/DL (ref 70–130)
GLUCOSE BLDC GLUCOMTR-MCNC: 60 MG/DL (ref 70–130)
GLUCOSE BLDC GLUCOMTR-MCNC: 79 MG/DL (ref 70–130)
GLUCOSE BLDC GLUCOMTR-MCNC: 79 MG/DL (ref 70–130)

## 2021-11-05 PROCEDURE — C1750 CATH, HEMODIALYSIS,LONG-TERM: HCPCS | Performed by: SURGERY

## 2021-11-05 PROCEDURE — 63710000001 FLUTICASONE 50 MCG/ACT SUSPENSION 16 G BOTTLE: Performed by: HOSPITALIST

## 2021-11-05 PROCEDURE — 63710000001 PANTOPRAZOLE 40 MG TABLET DELAYED-RELEASE: Performed by: HOSPITALIST

## 2021-11-05 PROCEDURE — P9047 ALBUMIN (HUMAN), 25%, 50ML: HCPCS | Performed by: INTERNAL MEDICINE

## 2021-11-05 PROCEDURE — 82962 GLUCOSE BLOOD TEST: CPT

## 2021-11-05 PROCEDURE — 94664 DEMO&/EVAL PT USE INHALER: CPT

## 2021-11-05 PROCEDURE — A9270 NON-COVERED ITEM OR SERVICE: HCPCS | Performed by: HOSPITALIST

## 2021-11-05 PROCEDURE — 25010000002 PROPOFOL 10 MG/ML EMULSION: Performed by: NURSE ANESTHETIST, CERTIFIED REGISTERED

## 2021-11-05 PROCEDURE — C1894 INTRO/SHEATH, NON-LASER: HCPCS | Performed by: SURGERY

## 2021-11-05 PROCEDURE — 63710000001 BUDESONIDE-FORMOTEROL 80-4.5 MCG/ACT AEROSOL 6.9 G INHALER: Performed by: HOSPITALIST

## 2021-11-05 PROCEDURE — 63710000001 HYDROCODONE-ACETAMINOPHEN 5-325 MG TABLET: Performed by: SURGERY

## 2021-11-05 PROCEDURE — 25010000002 ALBUMIN HUMAN 25% PER 50 ML: Performed by: INTERNAL MEDICINE

## 2021-11-05 PROCEDURE — 94640 AIRWAY INHALATION TREATMENT: CPT

## 2021-11-05 PROCEDURE — A9270 NON-COVERED ITEM OR SERVICE: HCPCS | Performed by: SURGERY

## 2021-11-05 PROCEDURE — 76000 FLUOROSCOPY <1 HR PHYS/QHP: CPT

## 2021-11-05 PROCEDURE — G0257 UNSCHED DIALYSIS ESRD PT HOS: HCPCS

## 2021-11-05 PROCEDURE — G0378 HOSPITAL OBSERVATION PER HR: HCPCS

## 2021-11-05 PROCEDURE — 25010000002 PHENYLEPHRINE 10 MG/ML SOLUTION: Performed by: NURSE ANESTHETIST, CERTIFIED REGISTERED

## 2021-11-05 PROCEDURE — 25010000002 HEPARIN (PORCINE) PER 1000 UNITS: Performed by: INTERNAL MEDICINE

## 2021-11-05 PROCEDURE — 63710000001 APIXABAN 2.5 MG TABLET: Performed by: HOSPITALIST

## 2021-11-05 PROCEDURE — 25010000002 HYDROMORPHONE PER 4 MG: Performed by: SURGERY

## 2021-11-05 PROCEDURE — 25010000002 HEPARIN (PORCINE) PER 1000 UNITS: Performed by: SURGERY

## 2021-11-05 PROCEDURE — 0 CEFAZOLIN IN DEXTROSE 2-4 GM/100ML-% SOLUTION: Performed by: SURGERY

## 2021-11-05 PROCEDURE — 0 LIDOCAINE 1 % SOLUTION 20 ML VIAL: Performed by: SURGERY

## 2021-11-05 DEVICE — KT CATH TAL PALINDROM 2LUM WSLOT 14.5F23: Type: IMPLANTABLE DEVICE | Site: ARTERY FEMORAL | Status: FUNCTIONAL

## 2021-11-05 RX ORDER — SODIUM CHLORIDE 9 MG/ML
INJECTION, SOLUTION INTRAVENOUS CONTINUOUS PRN
Status: DISCONTINUED | OUTPATIENT
Start: 2021-11-05 | End: 2021-11-05 | Stop reason: SURG

## 2021-11-05 RX ORDER — DIPHENHYDRAMINE HYDROCHLORIDE 50 MG/ML
12.5 INJECTION INTRAMUSCULAR; INTRAVENOUS
Status: DISCONTINUED | OUTPATIENT
Start: 2021-11-05 | End: 2021-11-05 | Stop reason: HOSPADM

## 2021-11-05 RX ORDER — OXYCODONE AND ACETAMINOPHEN 10; 325 MG/1; MG/1
1 TABLET ORAL EVERY 4 HOURS PRN
Status: DISCONTINUED | OUTPATIENT
Start: 2021-11-05 | End: 2021-11-05 | Stop reason: HOSPADM

## 2021-11-05 RX ORDER — LIDOCAINE HYDROCHLORIDE 20 MG/ML
INJECTION, SOLUTION INFILTRATION; PERINEURAL AS NEEDED
Status: DISCONTINUED | OUTPATIENT
Start: 2021-11-05 | End: 2021-11-05 | Stop reason: SURG

## 2021-11-05 RX ORDER — HEPARIN SODIUM 1000 [USP'U]/ML
INJECTION, SOLUTION INTRAVENOUS; SUBCUTANEOUS AS NEEDED
Status: DISCONTINUED | OUTPATIENT
Start: 2021-11-05 | End: 2021-11-05 | Stop reason: HOSPADM

## 2021-11-05 RX ORDER — HYDRALAZINE HYDROCHLORIDE 20 MG/ML
5 INJECTION INTRAMUSCULAR; INTRAVENOUS
Status: DISCONTINUED | OUTPATIENT
Start: 2021-11-05 | End: 2021-11-05 | Stop reason: HOSPADM

## 2021-11-05 RX ORDER — FAMOTIDINE 10 MG/ML
20 INJECTION, SOLUTION INTRAVENOUS
Status: COMPLETED | OUTPATIENT
Start: 2021-11-05 | End: 2021-11-05

## 2021-11-05 RX ORDER — HYDROCODONE BITARTRATE AND ACETAMINOPHEN 5; 325 MG/1; MG/1
1 TABLET ORAL EVERY 4 HOURS PRN
Status: DISCONTINUED | OUTPATIENT
Start: 2021-11-05 | End: 2021-11-06

## 2021-11-05 RX ORDER — ONDANSETRON 2 MG/ML
4 INJECTION INTRAMUSCULAR; INTRAVENOUS ONCE AS NEEDED
Status: DISCONTINUED | OUTPATIENT
Start: 2021-11-05 | End: 2021-11-05 | Stop reason: HOSPADM

## 2021-11-05 RX ORDER — ALBUMIN (HUMAN) 12.5 G/50ML
12.5 SOLUTION INTRAVENOUS AS NEEDED
Status: DISCONTINUED | OUTPATIENT
Start: 2021-11-05 | End: 2021-11-06

## 2021-11-05 RX ORDER — FLUMAZENIL 0.1 MG/ML
0.2 INJECTION INTRAVENOUS AS NEEDED
Status: DISCONTINUED | OUTPATIENT
Start: 2021-11-05 | End: 2021-11-05 | Stop reason: HOSPADM

## 2021-11-05 RX ORDER — IBUPROFEN 600 MG/1
600 TABLET ORAL ONCE AS NEEDED
Status: DISCONTINUED | OUTPATIENT
Start: 2021-11-05 | End: 2021-11-05 | Stop reason: HOSPADM

## 2021-11-05 RX ORDER — SODIUM CHLORIDE 9 MG/ML
9 INJECTION, SOLUTION INTRAVENOUS CONTINUOUS PRN
Status: DISCONTINUED | OUTPATIENT
Start: 2021-11-05 | End: 2021-11-06

## 2021-11-05 RX ORDER — SODIUM CHLORIDE 0.9 % (FLUSH) 0.9 %
10 SYRINGE (ML) INJECTION EVERY 12 HOURS SCHEDULED
Status: DISCONTINUED | OUTPATIENT
Start: 2021-11-05 | End: 2021-11-05 | Stop reason: HOSPADM

## 2021-11-05 RX ORDER — ALBUTEROL SULFATE 90 UG/1
2 AEROSOL, METERED RESPIRATORY (INHALATION) EVERY 4 HOURS PRN
Status: DISCONTINUED | OUTPATIENT
Start: 2021-11-05 | End: 2021-11-05

## 2021-11-05 RX ORDER — PROPOFOL 10 MG/ML
VIAL (ML) INTRAVENOUS CONTINUOUS PRN
Status: DISCONTINUED | OUTPATIENT
Start: 2021-11-05 | End: 2021-11-05 | Stop reason: SURG

## 2021-11-05 RX ORDER — HYDROCODONE BITARTRATE AND ACETAMINOPHEN 7.5; 325 MG/1; MG/1
1 TABLET ORAL ONCE AS NEEDED
Status: DISCONTINUED | OUTPATIENT
Start: 2021-11-05 | End: 2021-11-05 | Stop reason: HOSPADM

## 2021-11-05 RX ORDER — BUDESONIDE AND FORMOTEROL FUMARATE DIHYDRATE 80; 4.5 UG/1; UG/1
2 AEROSOL RESPIRATORY (INHALATION)
Status: DISCONTINUED | OUTPATIENT
Start: 2021-11-05 | End: 2021-11-06 | Stop reason: HOSPADM

## 2021-11-05 RX ORDER — PROPOFOL 10 MG/ML
VIAL (ML) INTRAVENOUS AS NEEDED
Status: DISCONTINUED | OUTPATIENT
Start: 2021-11-05 | End: 2021-11-05 | Stop reason: SURG

## 2021-11-05 RX ORDER — FLUTICASONE PROPIONATE 50 MCG
2 SPRAY, SUSPENSION (ML) NASAL DAILY
Status: DISCONTINUED | OUTPATIENT
Start: 2021-11-05 | End: 2021-11-06 | Stop reason: HOSPADM

## 2021-11-05 RX ORDER — ALBUTEROL SULFATE 2.5 MG/3ML
2.5 SOLUTION RESPIRATORY (INHALATION) 3 TIMES DAILY PRN
Status: DISCONTINUED | OUTPATIENT
Start: 2021-11-05 | End: 2021-11-06

## 2021-11-05 RX ORDER — PHENYLEPHRINE HYDROCHLORIDE 10 MG/ML
INJECTION INTRAVENOUS AS NEEDED
Status: DISCONTINUED | OUTPATIENT
Start: 2021-11-05 | End: 2021-11-05 | Stop reason: SURG

## 2021-11-05 RX ORDER — DIPHENHYDRAMINE HCL 25 MG
25 CAPSULE ORAL
Status: DISCONTINUED | OUTPATIENT
Start: 2021-11-05 | End: 2021-11-05 | Stop reason: HOSPADM

## 2021-11-05 RX ORDER — NALOXONE HCL 0.4 MG/ML
0.2 VIAL (ML) INJECTION AS NEEDED
Status: DISCONTINUED | OUTPATIENT
Start: 2021-11-05 | End: 2021-11-05 | Stop reason: HOSPADM

## 2021-11-05 RX ORDER — CEFAZOLIN SODIUM 2 G/100ML
2 INJECTION, SOLUTION INTRAVENOUS ONCE
Status: COMPLETED | OUTPATIENT
Start: 2021-11-05 | End: 2021-11-05

## 2021-11-05 RX ORDER — HYDROMORPHONE HYDROCHLORIDE 1 MG/ML
0.5 INJECTION, SOLUTION INTRAMUSCULAR; INTRAVENOUS; SUBCUTANEOUS
Status: DISCONTINUED | OUTPATIENT
Start: 2021-11-05 | End: 2021-11-06

## 2021-11-05 RX ORDER — FENTANYL CITRATE 50 UG/ML
50 INJECTION, SOLUTION INTRAMUSCULAR; INTRAVENOUS
Status: DISCONTINUED | OUTPATIENT
Start: 2021-11-05 | End: 2021-11-05 | Stop reason: HOSPADM

## 2021-11-05 RX ORDER — PROMETHAZINE HYDROCHLORIDE 25 MG/1
25 SUPPOSITORY RECTAL ONCE AS NEEDED
Status: DISCONTINUED | OUTPATIENT
Start: 2021-11-05 | End: 2021-11-05 | Stop reason: HOSPADM

## 2021-11-05 RX ORDER — PROMETHAZINE HYDROCHLORIDE 25 MG/1
25 TABLET ORAL ONCE AS NEEDED
Status: DISCONTINUED | OUTPATIENT
Start: 2021-11-05 | End: 2021-11-05 | Stop reason: HOSPADM

## 2021-11-05 RX ORDER — NALOXONE HCL 0.4 MG/ML
0.4 VIAL (ML) INJECTION
Status: DISCONTINUED | OUTPATIENT
Start: 2021-11-05 | End: 2021-11-06

## 2021-11-05 RX ORDER — MONTELUKAST SODIUM 10 MG/1
10 TABLET ORAL NIGHTLY
Status: DISCONTINUED | OUTPATIENT
Start: 2021-11-05 | End: 2021-11-06 | Stop reason: HOSPADM

## 2021-11-05 RX ORDER — LABETALOL HYDROCHLORIDE 5 MG/ML
5 INJECTION, SOLUTION INTRAVENOUS
Status: DISCONTINUED | OUTPATIENT
Start: 2021-11-05 | End: 2021-11-05 | Stop reason: HOSPADM

## 2021-11-05 RX ORDER — EPHEDRINE SULFATE 50 MG/ML
5 INJECTION, SOLUTION INTRAVENOUS ONCE AS NEEDED
Status: DISCONTINUED | OUTPATIENT
Start: 2021-11-05 | End: 2021-11-05 | Stop reason: HOSPADM

## 2021-11-05 RX ORDER — PANTOPRAZOLE SODIUM 40 MG/1
40 TABLET, DELAYED RELEASE ORAL EVERY MORNING
Status: DISCONTINUED | OUTPATIENT
Start: 2021-11-05 | End: 2021-11-06 | Stop reason: HOSPADM

## 2021-11-05 RX ORDER — HYDROMORPHONE HYDROCHLORIDE 1 MG/ML
0.5 INJECTION, SOLUTION INTRAMUSCULAR; INTRAVENOUS; SUBCUTANEOUS
Status: DISCONTINUED | OUTPATIENT
Start: 2021-11-05 | End: 2021-11-05 | Stop reason: HOSPADM

## 2021-11-05 RX ORDER — MAGNESIUM HYDROXIDE 1200 MG/15ML
LIQUID ORAL AS NEEDED
Status: DISCONTINUED | OUTPATIENT
Start: 2021-11-05 | End: 2021-11-05 | Stop reason: HOSPADM

## 2021-11-05 RX ORDER — HEPARIN SODIUM 1000 [USP'U]/ML
4600 INJECTION, SOLUTION INTRAVENOUS; SUBCUTANEOUS AS NEEDED
Status: DISCONTINUED | OUTPATIENT
Start: 2021-11-05 | End: 2021-11-06

## 2021-11-05 RX ORDER — SODIUM CHLORIDE 0.9 % (FLUSH) 0.9 %
10 SYRINGE (ML) INJECTION AS NEEDED
Status: DISCONTINUED | OUTPATIENT
Start: 2021-11-05 | End: 2021-11-05 | Stop reason: HOSPADM

## 2021-11-05 RX ADMIN — HYDROCODONE BITARTRATE AND ACETAMINOPHEN 1 TABLET: 5; 325 TABLET ORAL at 13:51

## 2021-11-05 RX ADMIN — ALBUMIN HUMAN 12.5 G: 0.25 SOLUTION INTRAVENOUS at 18:16

## 2021-11-05 RX ADMIN — PHENYLEPHRINE HYDROCHLORIDE 200 MCG: 10 INJECTION, SOLUTION INTRAVENOUS at 10:16

## 2021-11-05 RX ADMIN — PHENYLEPHRINE HYDROCHLORIDE 200 MCG: 10 INJECTION, SOLUTION INTRAVENOUS at 10:32

## 2021-11-05 RX ADMIN — PHENYLEPHRINE HYDROCHLORIDE 300 MCG: 10 INJECTION, SOLUTION INTRAVENOUS at 10:25

## 2021-11-05 RX ADMIN — BUDESONIDE AND FORMOTEROL FUMARATE DIHYDRATE 2 PUFF: 80; 4.5 AEROSOL RESPIRATORY (INHALATION) at 20:00

## 2021-11-05 RX ADMIN — PHENYLEPHRINE HYDROCHLORIDE 200 MCG: 10 INJECTION, SOLUTION INTRAVENOUS at 10:47

## 2021-11-05 RX ADMIN — HEPARIN SODIUM 4600 UNITS: 1000 INJECTION INTRAVENOUS; SUBCUTANEOUS at 19:04

## 2021-11-05 RX ADMIN — FLUTICASONE PROPIONATE 2 SPRAY: 50 SPRAY, METERED NASAL at 14:53

## 2021-11-05 RX ADMIN — LIDOCAINE HYDROCHLORIDE 60 MG: 20 INJECTION, SOLUTION INFILTRATION; PERINEURAL at 10:00

## 2021-11-05 RX ADMIN — Medication 70 MG: at 10:00

## 2021-11-05 RX ADMIN — CEFAZOLIN SODIUM 2 G: 2 INJECTION, SOLUTION INTRAVENOUS at 09:46

## 2021-11-05 RX ADMIN — FAMOTIDINE 20 MG: 10 INJECTION INTRAVENOUS at 09:46

## 2021-11-05 RX ADMIN — SODIUM CHLORIDE: 9 INJECTION, SOLUTION INTRAVENOUS at 09:51

## 2021-11-05 RX ADMIN — HYDROMORPHONE HYDROCHLORIDE 0.5 MG: 1 INJECTION, SOLUTION INTRAMUSCULAR; INTRAVENOUS; SUBCUTANEOUS at 15:48

## 2021-11-05 RX ADMIN — APIXABAN 2.5 MG: 2.5 TABLET, FILM COATED ORAL at 23:00

## 2021-11-05 RX ADMIN — PHENYLEPHRINE HYDROCHLORIDE 300 MCG: 10 INJECTION, SOLUTION INTRAVENOUS at 10:38

## 2021-11-05 RX ADMIN — PROPOFOL 100 MCG/KG/MIN: 10 INJECTION, EMULSION INTRAVENOUS at 10:00

## 2021-11-05 RX ADMIN — PANTOPRAZOLE SODIUM 40 MG: 40 TABLET, DELAYED RELEASE ORAL at 14:53

## 2021-11-05 RX ADMIN — Medication 100 MG: at 10:08

## 2021-11-05 NOTE — PAYOR COMM NOTE
"Nellie Devries (76 y.o. Female)     PLEASE SEE ATTACHED FOR OBSERVATION AUTH.     REF#[id number]    PLEASE CALL   OR  [phone]    THANK YOU    ROD ALBERTS LPN CCP            Date of Birth Social Security Number Address Home Phone MRN    [id number]  [id number] [facility] [phone] [id number]    Rastafari Marital Status             Non-Protestant        Admission Date Admission Type Admitting Provider Attending Provider Department, Room/Bed    11/4/21 Emergency Haja Chowdhury MD Ahmed, Aftab, MD [address], [address]    Discharge Date Discharge Disposition Discharge Destination                         Attending Provider: Haja Chowdhury MD    Allergies: Sulfa Antibiotics, Adhesive Tape, Latex    Isolation: None   Infection: None   Code Status: CPR   Advance Care Planning Activity    Ht: 152.4 cm (60\")   Wt: 70.8 kg (156 lb)    Admission Cmt: None   Principal Problem: AV shunt thrombosis, subsequent encounter [T82.868D]                 Active Insurance as of 11/4/2021     Primary Coverage     Payor Plan Insurance Group Employer/Plan Group    HUMANA MEDICARE REPLACEMENT HU[employer]NA [facility] MEDICARE REPLACEMENT NON PAR [id number]     Payor Plan Address Payor Plan Phone Number Payor Plan Fax Number Effective Dates       7/1/2021 - None Entered    Subscriber Name Subscriber Birth Date Member ID       NELLIE DEVRIES 1945 [id number]           Secondary Coverage     Payor Plan Insurance Group Employer/Plan Group    [place]      Payor Plan Address Payor Plan Phone Number Payor Plan Fax Number Effective Dates    [address] [address]   11/1/2019 - None Entered    [place] [address]       Subscriber Name Subscriber Birth Date Member ID       NELLIE DEVRIES 1945 [id number]                 Emergency Contacts      (Rel.) Home Phone Work Phone Mobile Phone    CHRISTINE DEVRIES (Daughter) " [phone] -- [phone]    HENNY KELSEY (Daughter) [phone] -- [phone]    Heidi Vegas (Daughter) [phone] -- [phone]               History & Physical      Haja Chowdhury MD at 21 0914          History and physical    Primary care physician  Dr. Cortes    Chief complaint  Right arm swelling    History of present illness  76-year-old -American female who has very complex past medical history and well-known to our service including end-stage renal disease on hemodialysis COPD chronic anemia gastroesophageal reflux disease and chronic hypotension who also has had right laparoscopic hemicolectomy secondary to tubular adenoma presented to [facility] emergency room with right arm swelling.  Patient has missed dialysis for almost 1 week.  Patient was told that her AV fistula is not functioning.  Patient work-up in ER revealed hematoma of the right arm with infiltration of her AV access with thrombosis admit for management.  Patient has no fever chills cough chest pain abdominal pain nausea vomiting diarrhea which is slightly increased shortness of breath and swelling.    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)     • Weakness        PAST SURGICAL HISTORY              Procedure Laterality Date   • ARTERIOVENOUS FISTULA Right     • ARTERIOVENOUS FISTULA Left       REMOVED   • CENTRAL VENOUS CATHETER TUNNELED INSERTION DOUBLE LUMEN       •  SECTION       • COLON  RESECTION Right 7/9/2021     Procedure: RIGHT HEMICOLECTOMY LAPAROSCOPIC;  Surgeon: Zbigniew Conner MD;  Location: [facility] MAIN OR;  Service: General;  Laterality: Right;   • COLONOSCOPY   2016   • COLONOSCOPY N/A 7/7/2021     Procedure: COLONOSCOPY into cecum with biopsy;  Surgeon: Fabricio Monroe MD;  Location: [facility] ENDOSCOPY;  Service: Gastroenterology;  Laterality: N/A;  cecal mass   • EXPLORATORY LAPAROTOMY N/A 8/3/2021     Procedure: LAPAROTOMY EXPLORATORY, resection of ileocolonic anastomosis with anastomosis;  Surgeon: Zbigniew Conner MD;  Location: [facility] MAIN OR;  Service: General;  Laterality: N/A;   • INSERTION HEMODIALYSIS CATHETER Right 7/16/2021     Procedure: VENACAVAGRAM AND HEMODIALYSIS CATHETER INSERTION;  Surgeon: Karson Muller MD;  Location: [facility] OR 18/19;  Service: Vascular;  Laterality: Right;  Dr Bryant was primary surgeon   • POLYPECTOMY         UTERINE   • SHUNT O GRAM Right 8/9/2019     Procedure: RIGHT ARM DIALYSIS GRAFT revision and THROMBECTOMY;  Surgeon: Thierry Hardy MD;  Location: [facility] OR 18/19;  Service: Vascular   • SHUNT O GRAM Right 8/22/2019     Procedure: RIGHT ARM DIALYSIS GRAFT THROMBECTOMY WITH STENTING;  Surgeon: Thierry Hardy MD;  Location: [facility] OR 18/19;  Service: Vascular   • SHUNT O GRAM Right 12/7/2020     Procedure: RIGHT ARM ARTERIOVENOUS SHUNTOGRAM WITH THROMBECTOMY;  Surgeon: Thierry Hardy MD;  Location: [facility] OR 18/19;  Service: Vascular;  Laterality: Right;   • SHUNT O GRAM Right 7/12/2021     Procedure: Right arm dialysis shuntogram with shunt thrombectomy;  Surgeon: Shahram Gallagher MD;  Location: [facility] OR 18/19;  Service: Vascular;  Laterality: Right;   • SHUNT O GRAM Right 7/19/2021     Procedure: RIGHT UPPER EXTREMITY THROMBECTOMY AND SHUNTOGRAM;  Surgeon: Shahram Gallagher MD;  Location: [facility] MAIN OR;  Service: Vascular;  Laterality: Right;         FAMILY  "HISTORY           Problem Relation Age of Onset   • Malig Hyperthermia Neg Hx       SOCIAL HISTORY                 Socioeconomic History   • Marital status:    Tobacco Use   • Smoking status: Former Smoker       Packs/day: 0.00       Years: 4.00       Pack years: 0.00       Types: Cigarettes       Quit date:        Years since quittin.8   • Smokeless tobacco: Never Used   Vaping Use   • Vaping Use: Never used   Substance and Sexual Activity   • Alcohol use: No   • Drug use: No   • Sexual activity: Defer         ALLERGIES  Sulfa antibiotics, Adhesive tape, and Latex  Home medications reviewed     REVIEW OF SYSTEMS  Constitutional: Negative for chills and fever.   HENT: Negative.    Eyes: Negative.    Respiratory: Negative for cough and shortness of breath.    Cardiovascular: Negative for chest pain and palpitations.   Gastrointestinal: Negative.    Genitourinary: Negative.    Musculoskeletal: Negative.    Skin: Negative.    Neurological: Negative.       PHYSICAL EXAM  Blood pressure 155/82, pulse 69, temperature 98 °F (36.7 °C), temperature source Oral, resp. rate 16, height 152.4 cm (60\"), weight 70.8 kg (156 lb), SpO2 100 %, not currently breastfeeding.    GENERAL: Chronically ill, nontoxic  HEENT:  Unremarkable  CV:  S1-S2  RESPIRATORY: normal effort, lungs CTA B  ABDOMEN: soft nontender nondistended bowel sounds positive  MUSCULOSKELETAL: no deformity  NEURO: alert and oriented x4, moves all extremities, follows commands  SKIN: warm, dry, no obvious cellulitis     LAB RESULTS  Lab Results (last 24 hours)     Procedure Component Value Units Date/Time    POC Glucose Once [[id number]]  (Normal) Collected: 21 1200    Specimen: Blood Updated: 21 1202     Glucose 79 mg/dL      Comment: Meter: [id number] : [id number] Caroline WADDELL       POC Glucose Once [[id number]]  (Normal) Collected: 21 1129    Specimen: Blood Updated: 21 1132     Glucose 79 mg/dL      Comment: Meter: " [id number] : [id number] Orlando Keen RN       POC Glucose Once [[id number]]  (Abnormal) Collected: 11/05/21 1104    Specimen: Blood Updated: 11/05/21 1105     Glucose 60 mg/dL      Comment: Meter: [id number] : [id number] Rachael HAMILTON RN       POC Glucose Once [[id number]]  (Abnormal) Collected: 11/05/21 0928    Specimen: Blood Updated: 11/05/21 0935     Glucose 60 mg/dL      Comment: Meter: [id number] : [id number] Ashanti Chow RN       COVID PRE-OP / PRE-PROCEDURE SCREENING ORDER (NO ISOLATION) - Swab, Nasopharynx [[id number]]  (Normal) Collected: 11/04/21 1903    Specimen: Swab from Nasopharynx Updated: 11/04/21 2012    Narrative:      The following orders were created for panel order COVID PRE-OP / PRE-PROCEDURE SCREENING ORDER (NO ISOLATION) - Swab, Nasopharynx.  Procedure                               Abnormality         Status                     ---------                               -----------         ------                     COVID-19,[facility]HOUSE...[[id number]]  Normal              Final result                 Please view results for these tests on the individual orders.    COVID-19,[facility]HOU[facility] CEPHEID/TAB NP SWAB IN TRANSPORT MEDIA 8-12 HR TAT - Swab, Nasopharynx [[id number]]  (Normal) Collected: 11/04/21 1903    Specimen: Swab from Nasopharynx Updated: 11/04/21 2012     COVID19 Not Detected    Narrative:      Fact sheet for providers: https://www.fda.gov/media/163489/download     Fact sheet for patients: https://www.fda.gov/media/101751/download    Comprehensive Metabolic Panel [[id number]]  (Abnormal) Collected: 11/04/21 1553    Specimen: Blood Updated: 11/04/21 1619     Glucose 92 mg/dL      BUN 55 mg/dL      Creatinine 11.82 mg/dL      Sodium 149 mmol/L      Potassium 5.2 mmol/L      Chloride 114 mmol/L      CO2 17.3 mmol/L      Calcium 7.4 mg/dL      Total Protein 6.5 g/dL      Albumin 3.30 g/dL      ALT (SGPT) 7 U/L      AST (SGOT) 9 U/L      Alkaline Phosphatase 110 U/L      Total  Bilirubin 0.3 mg/dL      eGFR Non  Amer --     Comment: <15 Indicative of kidney failure.        eGFR  African Amer 4 mL/min/1.73      Comment: <15 Indicative of kidney failure.        Globulin 3.2 gm/dL      A/G Ratio 1.0 g/dL      BUN/Creatinine Ratio 4.7     Anion Gap 17.7 mmol/L     Narrative:      GFR Normal >60  Chronic Kidney Disease <60  Kidney Failure <15      CBC & Differential [[id number]]  (Abnormal) Collected: 11/04/21 1553    Specimen: Blood Updated: 11/04/21 1614    Narrative:      The following orders were created for panel order CBC & Differential.  Procedure                               Abnormality         Status                     ---------                               -----------         ------                     CBC Auto Differential[[id number]]        Abnormal            Final result                 Please view results for these tests on the individual orders.    CBC Auto Differential [[id number]]  (Abnormal) Collected: 11/04/21 1553    Specimen: Blood Updated: 11/04/21 1614     WBC 4.78 10*3/mm3      RBC 3.55 10*6/mm3      Hemoglobin 8.8 g/dL      Hematocrit 31.0 %      MCV 87.3 fL      MCH 24.8 pg      MCHC 28.4 g/dL      RDW 17.5 %      RDW-SD 56.3 fl      MPV 10.0 fL      Platelets 232 10*3/mm3      Neutrophil % 76.2 %      Lymphocyte % 8.2 %      Monocyte % 11.1 %      Eosinophil % 3.1 %      Basophil % 1.0 %      Immature Grans % 0.4 %      Neutrophils, Absolute 3.64 10*3/mm3      Lymphocytes, Absolute 0.39 10*3/mm3      Monocytes, Absolute 0.53 10*3/mm3      Eosinophils, Absolute 0.15 10*3/mm3      Basophils, Absolute 0.05 10*3/mm3      Immature Grans, Absolute 0.02 10*3/mm3      nRBC 0.0 /100 WBC         Imaging Results (Last 24 Hours)     Procedure Component Value Units Date/Time    IR PICC W Fluoro Surgery Only [[id number]] Resulted: 11/05/21 1109     Updated: 11/05/21 1109    Narrative:      This procedure was auto-finalized with no dictation required.    XR Chest 1 View  [[id number]] Collected: 11/04/21 1629     Updated: 11/04/21 1637    Narrative:      XR CHEST 1 VW-     HISTORY: Female who is 76 years-old,  missed dialysis     TECHNIQUE: Frontal views of the chest     COMPARISON: 08/03/2021     FINDINGS: The heart size is mildly enlarged. Pulmonary vasculature is  unremarkable. Borderline fullness of the right hilum appears stable.  Vascular stents are noted in the bilateral axillary regions. No focal  pulmonary consolidation, pleural effusion, or pneumothorax. No acute  osseous process.       Impression:      No focal pulmonary consolidation. Mild cardiomegaly.     This report was finalized on 11/4/2021 4:34 PM by Dr. Omer Flores M.D.             Current Facility-Administered Medications:   •  [MAR Hold] albumin human 25 % IV SOLN 12.5 g, 12.5 g, Intravenous, PRN, Jesús Price MD  •  albuterol (PROVENTIL) nebulizer solution 0.083% 2.5 mg/3mL, 2.5 mg, Nebulization, TID PRN, Haja Chowdhury MD  •  apixaban (ELIQUIS) tablet 2.5 mg, 2.5 mg, Oral, Q12H, Haja Chowdhury MD  •  budesonide-formoterol (SYMBICORT) 80-4.5 MCG/ACT inhaler 2 puff, 2 puff, Inhalation, BID - RT, Haja Chowdhury MD  •  fluticasone (FLONASE) 50 MCG/ACT nasal spray 2 spray, 2 spray, Nasal, Daily, Haja Chowdhury MD  •  hydrALAZINE (APRESOLINE) injection 10 mg, 10 mg, Intravenous, Q4H PRN, Haja Chowdhury MD  •  montelukast (SINGULAIR) tablet 10 mg, 10 mg, Oral, Nightly, Haja Chowdhury MD  •  pantoprazole (PROTONIX) EC tablet 40 mg, 40 mg, Oral, QAM, Haja Chowdhury MD  •  sodium chloride 0.9 % infusion, 9 mL/hr, Intravenous, Continuous PRN, Mukudn Buchanan MD     ASSESSMENT  Right AV access infiltration with shunt thrombosis  End-stage renal disease with noncompliance with hemodialysis  COPD  Chronic anemia  Chronic hypotension  Right hemicolectomy secondary to tubular adenoma  Gastroesophageal reflux disease    PLAN  Admit  Vascular surgery consult to get tunneled dialysis catheter insertion  Nephrology to follow  patient for hemodialysis  Continue home medications  Stress ulcer DVT prophylaxis  Supportive care  Patient is full code  Discussed with nursing staff  Follow closely further recommendation current hospital course    ALLAN CHOWDHURY MD        Electronically signed by Allan Chowdhury MD at 11/05/21 1223          Emergency Department Notes      Alexis Weaver, RN at 11/04/21 1448        Patient to er from home with c/o right arm swelling. Patient reported she is a dialysis patient an her last dialysis was on Saturday. Patient reported she can not get her dialysis because the her shunt site is swollen. Patient has mask on in triage along with staff.     Electronically signed by Alexis Weaver RN at 11/04/21 1449     Judy Jeffery RN at 11/04/21 1531        Patient was wearing a face mask throughout our encounter.  I wore appropriate PPE throughout the encounter.  Hand hygiene was performed before and after patient encounter.        Judy Jeffery RN  11/04/21 1531      Electronically signed by Judy Jeffery RN at 11/04/21 1531     Alban Johnson MD at 11/04/21 1633        Pt presents to the ED c/o  dialysis fistula not working.  Patient states she receives dialysis on Tuesday, Thursday and Saturday.  Her last hemodialysis was on Saturday.  She states her dialysis nurse tried to start dialysis on her right arm fistula 2 days ago and was unable to do so.  Patient has not had dialysis since.  Patient complains of mild shortness of air.  She is also noted increasing swelling to her right upper extremity.  She states her vascular surgeon is Dr. Viera and her nephrologist is Dr. Mitchell.     On exam,   Her heart is regular rate and rhythm with a 2 out of 6 ejection murmur.  Lungs are diminished at the bases but otherwise clear.  Abdomen is NABS, soft, nontender nondistended.  Her right upper extremity reveals a dialysis AV fistula.  He has a positive thrill and bruit.  It is mildly tender to  palpation.     Plan: I agree with plan of checking baseline blood work, chest x-ray and consulting her vascular surgeon and nephrologist.      Patient was placed in face mask in first look. Patient was wearing facemask when I entered the room and throughout our encounter. I wore full protective equipment throughout this patient encounter including a face mask, eye shield and gloves. Hand hygiene was performed before donning protective equipment and after removal when leaving the room.       Attestation:  The INGRID and I have discussed this patient's history, physical exam, and treatment plan.  I have reviewed the documentation and personally had a face to face interaction with the patient. I affirm the documentation and agree with the treatment and plan.  The attached note describes my personal findings.            Alban Johnson MD  11/04/21 1645      Electronically signed by Alban Johnson MD at 11/04/21 1645     Albert Ruiz III, PA at 11/04/21 1641     Attestation signed by Alban Johnson MD at 11/04/21 1849        MD ATTESTATION NOTE    The INGRID and I have discussed this patient's history, physical exam, and treatment plan.  I have reviewed the documentation and personally had a face to face interaction with the patient. I affirm the documentation and agree with the treatment and plan.  The attached note describes my personal findings.  Alban Johnson MD 11/4/2021 18:49 EDT                   EMERGENCY DEPARTMENT ENCOUNTER    Room Number:  06/06  Date of encounter:  11/4/2021  PCP: Laurent Cortes DO  Historian: Patient      HPI:  Chief Complaint: Right arm swelling  A complete HPI/ROS/PMH/PSH/SH/FH are unobtainable due to: Nothing    Context: Nellie Vegas is a 76 y.o. female who presents to the ED c/o right arm swelling.  Patient states she is a dialysis patient and her last dialysis was on Saturday.  She informs me that at that particular dialysis visit they had trouble accessing her  fistula in the process it was infiltrated.  She was unable to get dialysis on Tuesday and Thursday due to arm swelling.  She was told to report to the emergency department for further evaluation.  Patient is on Eliquis.    Reviewing her chart it appears Dr. Viera performed the fistula surgery.  Dr. Kang is her nephrologist.      PAST MEDICAL HISTORY  Active Ambulatory Problems     Diagnosis Date Noted   • ESRD (end stage renal disease) on dialysis ([facility]) 2019   • Thrombosis of kidney dialysis arteriovenous graft ([facility]) 2019   • Anemia of chronic renal failure, stage 5 ([facility]) 08/10/2019   • COPD exacerbation ([facility]) 2019   • Problem with dialysis access ([facility]) 2020   • Lower GI bleeding 2021   • Colonic mass 2021   • AV shunt thrombosis, subsequent encounter 07/15/2021   • Failure to thrive in adult 2021   • Severe malnutrition ([facility]) 2021     Resolved Ambulatory Problems     Diagnosis Date Noted   • Anastomotic leak of intestine 2021     Past Medical History:   Diagnosis Date   • Anemia    • Anesthesia complication    • Arthritis    • Asthma    • COPD (chronic obstructive pulmonary disease) ([facility])    • Diabetes mellitus ([facility])    • Dialysis patient ([facility])    • Disease of thyroid gland    • GERD (gastroesophageal reflux disease)    • Headache    • History of blood clots    • History of kidney stones    • Hyperlipidemia    • Hypertension    • Knee pain, bilateral    • Renal disease    • Sleep apnea    • SOB (shortness of breath)    • Thrombosis of renal dialysis arteriovenous graft ([facility])    • Weakness          PAST SURGICAL HISTORY  Past Surgical History:   Procedure Laterality Date   • ARTERIOVENOUS FISTULA Right    • ARTERIOVENOUS FISTULA Left     REMOVED   • CENTRAL VENOUS CATHETER TUNNELED INSERTION DOUBLE LUMEN     •  SECTION     • COLON RESECTION Right 2021    Procedure: RIGHT HEMICOLECTOMY LAPAROSCOPIC;  Surgeon: Zbigniew Conner MD;  Location:  [facility] MAIN OR;  Service: General;  Laterality: Right;   • COLONOSCOPY  2016   • COLONOSCOPY N/A 7/7/2021    Procedure: COLONOSCOPY into cecum with biopsy;  Surgeon: Fabricio Monroe MD;  Location: [facility] ENDOSCOPY;  Service: Gastroenterology;  Laterality: N/A;  cecal mass   • EXPLORATORY LAPAROTOMY N/A 8/3/2021    Procedure: LAPAROTOMY EXPLORATORY, resection of ileocolonic anastomosis with anastomosis;  Surgeon: Zbigniew Conner MD;  Location: [facility] MAIN OR;  Service: General;  Laterality: N/A;   • INSERTION HEMODIALYSIS CATHETER Right 7/16/2021    Procedure: VENACAVAGRAM AND HEMODIALYSIS CATHETER INSERTION;  Surgeon: Karson Muller MD;  Location: [facility] OR 18/19;  Service: Vascular;  Laterality: Right;  Dr Bryant was primary surgeon   • POLYPECTOMY      UTERINE   • SHUNT O GRAM Right 8/9/2019    Procedure: RIGHT ARM DIALYSIS GRAFT revision and THROMBECTOMY;  Surgeon: Thierry Hardy MD;  Location: [facility] OR 18/19;  Service: Vascular   • SHUNT O GRAM Right 8/22/2019    Procedure: RIGHT ARM DIALYSIS GRAFT THROMBECTOMY WITH STENTING;  Surgeon: Thierry Hardy MD;  Location: [facility] OR 18/19;  Service: Vascular   • SHUNT O GRAM Right 12/7/2020    Procedure: RIGHT ARM ARTERIOVENOUS SHUNTOGRAM WITH THROMBECTOMY;  Surgeon: Thierry Hardy MD;  Location: [facility] OR 18/19;  Service: Vascular;  Laterality: Right;   • SHUNT O GRAM Right 7/12/2021    Procedure: Right arm dialysis shuntogram with shunt thrombectomy;  Surgeon: Shahram Gallagher MD;  Location: [facility] OR 18/19;  Service: Vascular;  Laterality: Right;   • SHUNT O GRAM Right 7/19/2021    Procedure: RIGHT UPPER EXTREMITY THROMBECTOMY AND SHUNTOGRAM;  Surgeon: Shahram Gallagher MD;  Location: [facility] OR;  Service: Vascular;  Laterality: Right;         FAMILY HISTORY  Family History   Problem Relation Age of Onset   • Malig Hyperthermia Neg Hx          SOCIAL HISTORY  Social History      Socioeconomic History   • Marital status:    Tobacco Use   • Smoking status: Former Smoker     Packs/day: 0.00     Years: 4.00     Pack years: 0.00     Types: Cigarettes     Quit date: 1966     Years since quittin.8   • Smokeless tobacco: Never Used   Vaping Use   • Vaping Use: Never used   Substance and Sexual Activity   • Alcohol use: No   • Drug use: No   • Sexual activity: Defer         ALLERGIES  Sulfa antibiotics, Adhesive tape, and Latex        REVIEW OF SYSTEMS  Review of Systems   Constitutional: Negative for chills and fever.   HENT: Negative.    Eyes: Negative.    Respiratory: Negative for cough and shortness of breath.    Cardiovascular: Negative for chest pain and palpitations.   Gastrointestinal: Negative.    Genitourinary: Negative.    Musculoskeletal: Negative.    Skin: Negative.    Neurological: Negative.         All systems reviewed and negative except for those discussed in HPI.       PHYSICAL EXAM    I have reviewed the triage vital signs and nursing notes.    ED Triage Vitals   Temp Heart Rate Resp BP SpO2   21 1449 21 1449 21 1449 21 1523 21 1449   97.7 °F (36.5 °C) 94 18 113/79 97 %      Temp src Heart Rate Source Patient Position BP Location FiO2 (%)   21 1449 -- 21 1523 21 1523 --   Temporal  Sitting Left arm        Physical Exam  GENERAL: Chronically ill, nontoxic  HENT: nares patent  EYES: no scleral icterus  CV: regular rhythm, regular rate, fistula right upper extremity has a good thrill.  There is surrounding soft tissue swelling.  RESPIRATORY: normal effort, lungs CTA B  ABDOMEN: soft nontender  MUSCULOSKELETAL: no deformity  NEURO: alert and oriented x4, moves all extremities, follows commands  SKIN: warm, dry, no obvious cellulitis        LAB RESULTS  Recent Results (from the past 24 hour(s))   Comprehensive Metabolic Panel    Collection Time: 21  3:53 PM    Specimen: Blood   Result Value Ref Range    Glucose 92 65  - 99 mg/dL    BUN 55 (H) 8 - 23 mg/dL    Creatinine 11.82 (H) 0.57 - 1.00 mg/dL    Sodium 149 (H) 136 - 145 mmol/L    Potassium 5.2 3.5 - 5.2 mmol/L    Chloride 114 (H) 98 - 107 mmol/L    CO2 17.3 (L) 22.0 - 29.0 mmol/L    Calcium 7.4 (L) 8.6 - 10.5 mg/dL    Total Protein 6.5 6.0 - 8.5 g/dL    Albumin 3.30 (L) 3.50 - 5.20 g/dL    ALT (SGPT) 7 1 - 33 U/L    AST (SGOT) 9 1 - 32 U/L    Alkaline Phosphatase 110 39 - 117 U/L    Total Bilirubin 0.3 0.0 - 1.2 mg/dL    eGFR Non  Amer      eGFR  African Amer 4 (L) >60 mL/min/1.73    Globulin 3.2 gm/dL    A/G Ratio 1.0 g/dL    BUN/Creatinine Ratio 4.7 (L) 7.0 - 25.0    Anion Gap 17.7 (H) 5.0 - 15.0 mmol/L   CBC Auto Differential    Collection Time: 11/04/21  3:53 PM    Specimen: Blood   Result Value Ref Range    WBC 4.78 3.40 - 10.80 10*3/mm3    RBC 3.55 (L) 3.77 - 5.28 10*6/mm3    Hemoglobin 8.8 (L) 12.0 - 15.9 g/dL    Hematocrit 31.0 (L) 34.0 - 46.6 %    MCV 87.3 79.0 - 97.0 fL    MCH 24.8 (L) 26.6 - 33.0 pg    MCHC 28.4 (L) 31.5 - 35.7 g/dL    RDW 17.5 (H) 12.3 - 15.4 %    RDW-SD 56.3 (H) 37.0 - 54.0 fl    MPV 10.0 6.0 - 12.0 fL    Platelets 232 140 - 450 10*3/mm3    Neutrophil % 76.2 (H) 42.7 - 76.0 %    Lymphocyte % 8.2 (L) 19.6 - 45.3 %    Monocyte % 11.1 5.0 - 12.0 %    Eosinophil % 3.1 0.3 - 6.2 %    Basophil % 1.0 0.0 - 1.5 %    Immature Grans % 0.4 0.0 - 0.5 %    Neutrophils, Absolute 3.64 1.70 - 7.00 10*3/mm3    Lymphocytes, Absolute 0.39 (L) 0.70 - 3.10 10*3/mm3    Monocytes, Absolute 0.53 0.10 - 0.90 10*3/mm3    Eosinophils, Absolute 0.15 0.00 - 0.40 10*3/mm3    Basophils, Absolute 0.05 0.00 - 0.20 10*3/mm3    Immature Grans, Absolute 0.02 0.00 - 0.05 10*3/mm3    nRBC 0.0 0.0 - 0.2 /100 WBC   Duplex Venous Upper Extremity - Right CAR    Collection Time: 11/04/21  5:53 PM   Result Value Ref Range    Target HR (85%) 123 bpm    Max. Pred. HR (100%) 145 bpm       Ordered the above labs and independently reviewed the results.        RADIOLOGY  XR Chest 1  View    Result Date: 11/4/2021  XR CHEST 1 VW-  HISTORY: Female who is 76 years-old,  missed dialysis  TECHNIQUE: Frontal views of the chest  COMPARISON: 08/03/2021  FINDINGS: The heart size is mildly enlarged. Pulmonary vasculature is unremarkable. Borderline fullness of the right hilum appears stable. Vascular stents are noted in the bilateral axillary regions. No focal pulmonary consolidation, pleural effusion, or pneumothorax. No acute osseous process.      No focal pulmonary consolidation. Mild cardiomegaly.  This report was finalized on 11/4/2021 4:34 PM by Dr. Omer Flores M.D.        I ordered the above noted radiological studies. Reviewed by me and discussed with radiologist.  See dictation for official radiology interpretation.      PROCEDURES    Procedures      MEDICATIONS GIVEN IN ER    Medications - No data to display      PROGRESS, DATA ANALYSIS, CONSULTS, AND MEDICAL DECISION MAKING    All labs have been independently reviewed by me.  All radiology studies have been reviewed by me and discussed with radiologist dictating the report.   EKG's independently viewed and interpreted by me.  Discussion below represents my analysis of pertinent findings related to patient's condition, differential diagnosis, treatment plan and final disposition.    DDx includes but is not limited to: Hematoma, soft tissue infiltration, DVT.  Given that the patient is missed dialysis for the past 5 going on 6 days, will obtain CBC, CMP, chest x-ray.  Will obtain a venous Doppler of the right upper extremity to better evaluate.  Please see below for MDM and timeline of events    ED Course as of 11/04/21 1801   u Nov 04, 2021   1643 Glucose: 92 [RC]   1643 BUN(!): 55 [RC]   1643 Creatinine(!): 11.82 [RC]   1643 Sodium(!): 149 [RC]   1643 Potassium: 5.2 [RC]   1643 CO2(!): 17.3 [RC]   1643 Anion Gap(!): 17.7 [RC]   1643 WBC: 4.78 [RC]   1643 RBC(!): 3.55 [RC]   1643 Hemoglobin(!): 8.8 [RC]   1643 Hematocrit(!): 31.0 [RC]    1643 Platelets: 232 [RC]   1644 Patient's potassium is noted to be 5.2.  CBC is relatively baseline.  Venous Doppler of the upper extremity is pending.  Patient is on Eliquis and I doubt DVT.  Suspect the swelling is all secondary to infiltration.  Given that patient has not had dialysis since Saturday, will call vascular surgery and nephrology to discuss further course of care.  Anticipate admission with vascular can evaluate the swollen right upper extremity and the patient can be dialyzed. [RC]   1647 BUN(!): 55 [RC]   1647 Creatinine(!): 11.82 [RC]   1647 Chest x-ray shows mild cardiomegaly but no other acute process [RC]   1647 Return calls from vascular and nephrology pending [RC]   1737 Discussed patient's case with Dr. Price nephrology.  He recommends admission so the patient can be further evaluated and dialyzed. [RC]   1739 Still awaiting to have vascular.  Regardless, the patient is going to need dialysis.  We will place a call to Dr. Luke to discuss admission. [RC]   1743 Discussed with Dr. Muller.  Vascular will see in consult. [RC]   1752 Discussed patient's case with Dr. Chowdhury.  To admit patient to telemetry bed under his care. [RC]   1801 Venous Doppler shows infiltration/hematoma.  No DVT [RC]      ED Course User Index  [RC] Albert Ruiz III, PA           PPE: The patient wore a surgical mask throughout the entire patient encounter. I wore an N95.    AS OF 18:01 EDT VITALS:    BP -  HR - 80  TEMP - 97.7 °F (36.5 °C) (Temporal)  O2 SATS - 95%        DIAGNOSIS  Final diagnoses:   Hematoma of arm, right, initial encounter   Dialysis patient (HCC)         DISPOSITION  ADMISSION    Discussed treatment plan and reason for admission with pt/family and admitting physician.  Pt/family voiced understanding of the plan for admission for further testing/treatment as needed.              Albert Ruiz III, PA  11/04/21 1802      Electronically signed by Alban Johnson MD at 11/04/21  "1849     Judy Jeffery, RN at 11/04/21 1758          Nursing report ED to floor  Nellie Vegas  76 y.o.  female    HPI :   Chief Complaint   Patient presents with   • Arm Swelling       Admitting doctor:   Haja Chowdhury MD    Admitting diagnosis:   The primary encounter diagnosis was Hematoma of arm, right, initial encounter. A diagnosis of Dialysis patient (HCC) was also pertinent to this visit.    Code status:   Current Code Status     Date Active Code Status Order ID Comments User Context       Prior    Advance Care Planning Activity          Allergies:   Sulfa antibiotics, Adhesive tape, and Latex    Intake and Output  No intake or output data in the 24 hours ending 11/04/21 1758    Weight:       11/04/21  1523   Weight: 65.8 kg (145 lb 1 oz)       Most recent vitals:   Vitals:    11/04/21 1449 11/04/21 1523 11/04/21 1626   BP:  113/79 (!) 200/97   BP Location:  Left arm    Patient Position:  Sitting    Pulse: 94  80   Resp: 18     Temp: 97.7 °F (36.5 °C)     TempSrc: Temporal     SpO2: 97%  95%   Weight:  65.8 kg (145 lb 1 oz)    Height:  152.4 cm (60\")        Active LDAs/IV Access:   Lines, Drains & Airways     Active LDAs     None                Labs (abnormal labs have a star):   Labs Reviewed   COMPREHENSIVE METABOLIC PANEL - Abnormal; Notable for the following components:       Result Value    BUN 55 (*)     Creatinine 11.82 (*)     Sodium 149 (*)     Chloride 114 (*)     CO2 17.3 (*)     Calcium 7.4 (*)     Albumin 3.30 (*)     eGFR   Amer 4 (*)     BUN/Creatinine Ratio 4.7 (*)     Anion Gap 17.7 (*)     All other components within normal limits    Narrative:     GFR Normal >60  Chronic Kidney Disease <60  Kidney Failure <15     CBC WITH AUTO DIFFERENTIAL - Abnormal; Notable for the following components:    RBC 3.55 (*)     Hemoglobin 8.8 (*)     Hematocrit 31.0 (*)     MCH 24.8 (*)     MCHC 28.4 (*)     RDW 17.5 (*)     RDW-SD 56.3 (*)     Neutrophil % 76.2 (*)     Lymphocyte % 8.2 (*)  "    Lymphocytes, Absolute 0.39 (*)     All other components within normal limits   COVID PRE-OP / PRE-PROCEDURE SCREENING ORDER (NO ISOLATION)    Narrative:     The following orders were created for panel order COVID PRE-OP / PRE-PROCEDURE SCREENING ORDER (NO ISOLATION) - Swab, Nasopharynx.  Procedure                               Abnormality         Status                     ---------                               -----------         ------                     COVID-19, [facility] IN-HOUSE...[[id number]]                                                   Please view results for these tests on the individual orders.   COVID-19, [facility] IN-HOUSE CEPHEID/TAB, NP SWAB IN TRANSPORT MEDIA 8-12 HR TAT   CBC AND DIFFERENTIAL    Narrative:     The following orders were created for panel order CBC & Differential.  Procedure                               Abnormality         Status                     ---------                               -----------         ------                     CBC Auto Differential[[id number]]        Abnormal            Final result                 Please view results for these tests on the individual orders.       EKG:   No orders to display       Meds given in ED:   Medications - No data to display    Imaging results:  XR Chest 1 View    Result Date: 2021  No focal pulmonary consolidation. Mild cardiomegaly.  This report was finalized on 2021 4:34 PM by Dr. Omer Flores M.D.        Ambulatory status:   - stand by assist    Social issues:   Social History     Socioeconomic History   • Marital status:    Tobacco Use   • Smoking status: Former Smoker     Packs/day: 0.00     Years: 4.00     Pack years: 0.00     Types: Cigarettes     Quit date: 1966     Years since quittin.8   • Smokeless tobacco: Never Used   Vaping Use   • Vaping Use: Never used   Substance and Sexual Activity   • Alcohol use: No   • Drug use: No   • Sexual activity: Defer        Nursing report ED to floor:      Judy Jeffery RN  11/04/21 1758      Electronically signed by Judy Jeffery RN at 11/04/21 1758       Oxygen Therapy (since admission)     Date/Time SpO2 Device (Oxygen Therapy) Flow (L/min) Oxygen Concentration (%) ETCO2 (mmHg)    11/05/21 1158 100 room air -- -- --    11/05/21 1130 96 room air -- -- --    11/05/21 1110 95 nasal cannula 2 -- --    11/05/21 1105 100 nasal cannula 2 -- --    11/05/21 1058 100 nasal cannula 4 -- --    11/05/21 0924 100 room air -- -- --    11/05/21 0800 -- room air -- -- --    11/05/21 0706 97 room air -- -- --    11/05/21 0236 -- room air -- -- --    11/05/21 0000 97 room air -- -- --    11/04/21 2200 -- room air -- -- --    11/04/21 2006 97 room air -- -- --    11/04/21 1918 99 -- -- -- --    11/04/21 18:52:22 94 room air -- -- --    11/04/21 1626 95 -- -- -- --    11/04/21 1449 97 room air -- -- --        Intake & Output (last 2 days)       11/03 0701 11/04 0700 11/04 0701 11/05 0700 11/05 0701 11/06 0700    P.O.   0    I.V. (mL/kg)   200 (2.8)    Total Intake(mL/kg)   200 (2.8)    Net   +200           Stool Unmeasured Occurrence  1 x 1 x          Medication Administration Report for Nellie Vegas TOMASA as of 11/05/21 1248   Legend:    Given Hold Not Given Due Canceled Entry Other Actions    Time Time (Time) Time  Time-Action       Discontinued     Completed     Future     MAR Hold     Linked           Medications 11/03/21 11/04/21 11/05/21    albumin human 25 % IV SOLN 12.5 g  Dose: 12.5 g  Freq: As Needed Route: IV  PRN Comment: Hypotension During Dialysis  Indications of Use: HEMODIALYSIS PROCEDURE  Start: 11/04/21 2249   End: 11/05/21 1232   Admin Instructions:   Maintain SBP Greater Than 90 During Dialysis. May repeat x 3 doses (4 doses total)         0914-MAR Hold     1232-MAR Unhold            albuterol (PROVENTIL) nebulizer solution 0.083% 2.5 mg/3mL  Dose: 2.5 mg  Freq: 3 Times Daily PRN Route: NEBULIZATION  PRN Reason: Wheezing  Start: 11/05/21 1050           budesonide-formoterol (SYMBICORT) 80-4.5 MCG/ACT inhaler 2 puff  Dose: 2 puff  Freq: 2 Times Daily - RT Route: IN  Start: 11/05/21 1053   Admin Instructions:    Shake well.  Rinse mouth after use, do not swallow water.  Send aerosols to pharmacy in ziplock bag for proper disposal.         1053     4530            diphenhydrAMINE (BENADRYL) capsule 25 mg  Dose: 25 mg  Freq: Every 30 Minutes PRN Route: PO  PRN Reason: Itching  PRN Comment: May repeat x 1  Indications of Use: EXTRAPYRAMIDAL REACTION,PRURITUS  Start: 11/05/21 1055   End: 11/05/21 1211   Admin Instructions:   Caution: Look alike/sound alike drug alert. This med may be ordered in other forms and routes. Before giving verify the last time the drug was given by any route/form.            diphenhydrAMINE (BENADRYL) injection 12.5 mg  Dose: 12.5 mg  Freq: Every 15 Minutes PRN Route: IV  PRN Reason: Itching  PRN Comment: May repeat x 1  Start: 11/05/21 1055   End: 11/05/21 1211   Admin Instructions:   Caution: Look alike/sound alike drug alert. This med may be ordered in other forms and routes. Before giving verify the last time the drug was given by any route/form.            ePHEDrine injection 5 mg  Dose: 5 mg  Freq: Once As Needed Route: IV  PRN Comment: symptomatic hypotension - Notify attending anesthesiologist if this needs to be given  Start: 11/05/21 1055   End: 11/05/21 1211   Admin Instructions:   Caution: Look alike/sound alike drug alert   Dilute with NS to 5-10 mg/mL.  Central line preferred, if unavailable use large bore IV access with frequent nurse monitoring of IV site.          fentaNYL citrate (PF) (SUBLIMAZE) injection 50 mcg  Dose: 50 mcg  Freq: Every 5 Minutes PRN Route: IV  PRN Reasons: Moderate Pain ,Severe Pain   Start: 11/05/21 1055   End: 11/05/21 1211   Admin Instructions:   May alternate fentanyl with hydromorphone using fentanyl first.    Maximum total dose of fentanyl is 200 mcg.  If given for pain, use the following  pain scale:  Mild Pain = Pain Score of 1-3, CPOT 1-2  Moderate Pain = Pain Score of 4-6, CPOT 3-4  Severe Pain = Pain Score of 7-10, CPOT 5-8          flumazenil (ROMAZICON) injection 0.2 mg  Dose: 0.2 mg  Freq: As Needed Route: IV  PRN Comment: for benzodiazepine induced unresponsiveness or sedation  Indications of Use: BENZODIAZEPINE-INDUCED SEDATION  Start: 11/05/21 1055   End: 11/05/21 1211   Admin Instructions:   Notify Anesthesia if given  ** give IV over 15-30 seconds **          fluticasone (FLONASE) 50 MCG/ACT nasal spray 2 spray  Dose: 2 spray  Freq: Daily Route: NA  Start: 11/05/21 1400         1400             hydrALAZINE (APRESOLINE) injection 10 mg  Dose: 10 mg  Freq: Every 4 Hours PRN Route: IV  PRN Reason: High Blood Pressure  Start: 11/04/21 2119   End: 11/05/21 1224   Admin Instructions:   As needed for SBP greater than 180  Caution: Look alike/sound alike drug alert        (2250)-Not Given              hydrALAZINE (APRESOLINE) injection 5 mg  Dose: 5 mg  Freq: Every 10 Minutes PRN Route: IV  PRN Reason: High Blood Pressure  PRN Comment: for systolic blood pressure greater than 180 mmHg or diastolic blood pressure greater than 105 mmHg  Start: 11/05/21 1055   End: 11/05/21 1211   Admin Instructions:   Up to 20 mg.  Caution: Look alike/sound alike drug alert          HYDROcodone-acetaminophen (NORCO) 5-325 MG per tablet 1 tablet  Dose: 1 tablet  Freq: Every 4 Hours PRN Route: PO  PRN Reason: Moderate Pain   Start: 11/05/21 1232   End: 11/12/21 1231   Admin Instructions:   [WILFREDO]    Do not exceed 4 grams of acetaminophen in a 24 hr period. Max dose of 2gm for AST/ALT greater than 120 units/L        If given for pain, use the following pain scale:   Mild Pain = Pain Score of 1-3, CPOT 1-2  Moderate Pain = Pain Score of 4-6, CPOT 3-4  Severe Pain = Pain Score of 7-10, CPOT 5-8          HYDROcodone-acetaminophen (NORCO) 7.5-325 MG per tablet 1 tablet  Dose: 1 tablet  Freq: Once As Needed Route: PO  PRN  Reason: Moderate Pain   Start: 11/05/21 1055   End: 11/05/21 1211   Admin Instructions:   [WILFREDO]    Do not exceed 4 grams of acetaminophen in a 24 hr period. Max dose of 2gm for AST/ALT greater than 120 units/L        If given for pain, use the following pain scale:   Mild Pain = Pain Score of 1-3, CPOT 1-2  Moderate Pain = Pain Score of 4-6, CPOT 3-4  Severe Pain = Pain Score of 7-10, CPOT 5-8          HYDROmorphone (DILAUDID) injection 0.5 mg  Dose: 0.5 mg  Freq: Every 2 Hours PRN Route: IV  PRN Reason: Severe Pain   Start: 11/05/21 1232   End: 11/12/21 1231   Admin Instructions:   If given for pain, use the following pain scale:  Mild Pain = Pain Score of 1-3, CPOT 1-2  Moderate Pain = Pain Score of 4-6, CPOT 3-4  Severe Pain = Pain Score of 7-10, CPOT 5-8         And  naloxone (NARCAN) injection 0.4 mg  Dose: 0.4 mg  Freq: Every 5 Minutes PRN Route: IV  PRN Reason: Respiratory Depression  Start: 11/05/21 1232   Admin Instructions:   If respiratory rate is less than 8 breaths/minute or patient is difficult to arouse stop any narcotics and contact physician.   Administer slow IV push. Repeat as ordered until patient's respiratory rate is greater than 12 breaths/minute.          HYDROmorphone (DILAUDID) injection 0.5 mg  Dose: 0.5 mg  Freq: Every 5 Minutes PRN Route: IV  PRN Reasons: Moderate Pain ,Severe Pain   Start: 11/05/21 1055   End: 11/05/21 1211   Admin Instructions:   May alternate fentanyl with hydromorphone using fentanyl first.    Maximum total dose of hydromorphone is 2 mg.  If given for pain, use the following pain scale:  Mild Pain = Pain Score of 1-3, CPOT 1-2  Moderate Pain = Pain Score of 4-6, CPOT 3-4  Severe Pain = Pain Score of 7-10, CPOT 5-8          ibuprofen (ADVIL,MOTRIN) tablet 600 mg  Dose: 600 mg  Freq: Once As Needed Route: PO  PRN Reason: Mild Pain   Start: 11/05/21 1055   End: 11/05/21 1211   Admin Instructions:   If given for pain, use the following pain scale:  Mild Pain = Pain Score  of 1-3, CPOT 1-2  Moderate Pain = Pain Score of 4-6, CPOT 3-4  Severe Pain = Pain Score of 7-10, CPOT 5-8          labetalol (NORMODYNE,TRANDATE) injection 5 mg  Dose: 5 mg  Freq: Every 5 Minutes PRN Route: IV  PRN Reason: High Blood Pressure  PRN Comment: for systolic blood pressure greater than 180 mmHg or diastolic blood pressure greater than 105 mmHg  Start: 11/05/21 1055   End: 11/05/21 1211   Admin Instructions:   Hold for heart rate less than 60.  Give by slow IV Push each 20mg (or less) over 2 minutes          naloxone (NARCAN) injection 0.2 mg  Dose: 0.2 mg  Freq: As Needed Route: IV  PRN Reasons: Opioid Reversal,Respiratory Depression  PRN Comment: unresponsiveness, decrease oxygen saturation  Indications of Use: ACUTE RESPIRATORY FAILURE,OPIOID-INDUCED RESPIRATORY DEPRESSION  Start: 11/05/21 1055   End: 11/05/21 1211   Admin Instructions:   Notify Anesthesia if given          ondansetron (ZOFRAN) injection 4 mg  Dose: 4 mg  Freq: Once As Needed Route: IV  PRN Reasons: Nausea,Vomiting  Indications of Use: POSTOPERATIVE NAUSEA AND VOMITING  Start: 11/05/21 1055   End: 11/05/21 1211   Admin Instructions:   If BOTH ondansetron (ZOFRAN) and promethazine (PHENERGAN) are ordered use ondansetron first and THEN promethazine IF ondansetron is ineffective.          oxyCODONE-acetaminophen (PERCOCET)  MG per tablet 1 tablet  Dose: 1 tablet  Freq: Every 4 Hours PRN Route: PO  PRN Reason: Severe Pain   Start: 11/05/21 1055   End: 11/05/21 1211   Admin Instructions:   [WILFREDO]    Do not exceed 4 grams of acetaminophen in a 24 hr period. Max dose of 2gm for AST/ALT greater than 120 units/L        If given for pain, use the following pain scale:   Mild Pain = Pain Score of 1-3, CPOT 1-2  Moderate Pain = Pain Score of 4-6, CPOT 3-4  Severe Pain = Pain Score of 7-10, CPOT 5-8          pantoprazole (PROTONIX) EC tablet 40 mg  Dose: 40 mg  Freq: Every Morning Route: PO  Start: 11/05/21 1400   Admin Instructions:   Swallow  whole; do not crush, split, or chew.         1400             promethazine (PHENERGAN) suppository 25 mg  Dose: 25 mg  Freq: Once As Needed Route: RE  PRN Reasons: Nausea,Vomiting  Start: 11/05/21 1055   End: 11/05/21 1211   Admin Instructions:   If BOTH ondansetron (ZOFRAN) and promethazine (PHENERGAN) are ordered use ondansetron first and THEN promethazine IF ondansetron is ineffective.         Or  promethazine (PHENERGAN) tablet 25 mg  Dose: 25 mg  Freq: Once As Needed Route: PO  PRN Reasons: Nausea,Vomiting  Start: 11/05/21 1055   End: 11/05/21 1211   Admin Instructions:   If BOTH ondansetron (ZOFRAN) and promethazine (PHENERGAN) are ordered use ondansetron first and THEN promethazine IF ondansetron is ineffective.            sodium chloride 0.9 % infusion  Rate: 9 mL/hr Dose: 9 mL/hr  Freq: Continuous PRN Route: IV  PRN Comment: Start prior to surgery  Start: 11/05/21 0944         Future Medications  Medications 11/03/21 11/04/21 11/05/21       apixaban (ELIQUIS) tablet 2.5 mg  Dose: 2.5 mg  Freq: Every 12 Hours Scheduled Route: PO  Indications of Use: ATRIAL FIBRILLATION  Start: 11/05/21 2100   Admin Instructions:   Tablet may be crushed and suspended in 60 mL of water or D5W and immediately delivered via NG tube.  Avoid grapefruit juice.         2100             montelukast (SINGULAIR) tablet 10 mg  Dose: 10 mg  Freq: Nightly Route: PO  Start: 11/05/21 2100 2100            Completed Medications  Medications 11/03/21 11/04/21 11/05/21       ceFAZolin in dextrose (ANCEF) IVPB solution 2 g  Dose: 2 g  Freq: Once Route: IV  Indications of Use: PERIOPERATIVE PHARMACOPROPHYLAXIS  Start: 11/05/21 0922   End: 11/05/21 1016   Admin Instructions:   Administer within 1 hour of surgical incision. Redose 4 hours from pre-op dose if procedure ongoing or >1.5 L blood loss.  Caution: Look alike/sound alike drug alert         0946-New Bag             famotidine (PEPCID) injection 20 mg  Dose: 20 mg  Freq: 60 Minutes  Pre-Op Route: IV  Start: 11/05/21 0944   End: 11/05/21 0946   Admin Instructions:   Dilute to 10 mL total volume and give IV push over 2 minutes.         0946-Given            Discontinued Medications  Medications 11/03/21 11/04/21 11/05/21       albuterol sulfate HFA (PROVENTIL HFA;VENTOLIN HFA;PROAIR HFA) inhaler 2 puff  Dose: 2 puff  Freq: Every 4 Hours PRN Route: IN  PRN Reason: Wheezing  Start: 11/05/21 1050   End: 11/05/21 1051   Admin Instructions:   sheyla well.          bupivacaine (PF) 0.25 % 30 mL, lidocaine 1 % 30 mL mixture  Freq: As Needed  Start: 11/05/21 1021   End: 11/05/21 1055         1021-Given             labetalol (NORMODYNE,TRANDATE) injection 20 mg  Dose: 20 mg  Freq: Once Route: IV  Start: 11/04/21 1743   End: 11/04/21 1741   Admin Instructions:   As ordered  Give by slow IV Push each 20mg (or less) over 2 minutes          sodium chloride 0.9 % flush 10 mL  Dose: 10 mL  Freq: As Needed Route: IV  PRN Reason: Line Care  Start: 11/05/21 0944   End: 11/05/21 1104          sodium chloride 0.9 % flush 10 mL  Dose: 10 mL  Freq: Every 12 Hours Scheduled Route: IV  Start: 11/05/21 0946   End: 11/05/21 1104         0946                       Operative/Procedure Notes (all)      Karson Muller MD at 11/05/21 1018  Version 1 of 1         HEMODIALYSIS CATHETER INSERTION  Progress Note    Nellie Vegas  11/5/2021    Pre-op Diagnosis:   AV shunt thrombosis, subsequent encounter [T82.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]       Post-Op Diagnosis Codes:     * AV shunt thrombosis, 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 (Removed)       [REMOVED] Urethral Catheter Silicone;Non-latex 16 Fr. (Removed)       Findings: Unable to pass wires through multiple veins in the neck    Complications: None    Assistant: Britney Juárez  was responsible for performing the following activities: Retraction, Suction, Irrigation, Suturing and Closing and their skilled assistance was necessary for the success of this case.    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 Item Serial No.  Lot No. LRB No. Used Action   KT CATH YOON PALINDROM 2LUM [facility] [id number] - [id number] Implant KT CATH YOON PALINDROM 2LUM [employer] [id number]  VIRGILIO [id number] N/A 1 Implanted       Indications:    The patient is an 76 y.o. female referred for tunneled dialysis catheter placement.  The risks, benefits, and alternatives have been discussed with the patient and/or family and include but are not limited to injury to artery, vein, lung, bleeding, and  infection.    Procedure:  The patient was taken to the operating room and placed supine on the operating room table. After the induction of general anesthetic with LMA, the neck and chest were prepped and draped with ChloraPrep. The patient was placed in Trendelenburg position. Timeout was observed.  Both jugular veins were scarred with multiple collaterals in the area.  Because of the nature of her prior upper extremity venous access we attempted both sides but could not pass wires easily through any of the collaterals.  Therefore the upper extremity access was aborted and the patient was prepped and draped at the femoral level.  The right femoral vein was found to be somewhat scarred but easily compressible and visible.  We entered it without difficulty.  Inferior venacavogram was performed to confirm the positioning of our catheter for final placement prior to placing anything other than the micropuncture sheath.  At this point in time. The vessel was entered under direct ultrasound guidance and photographic documentation was recorded in the chart for the record. An angled wire was then advanced down into the superior vena cava under fluoroscopy without any difficulty. Exit site was chosen on the anterior thigh.. A 33 cm catheter was then flushed and brought up onto the field. It was tunneled in an antegrade fashion up to the femoral insertion site. Dilator and peel-away sheath were then advanced over the wire under fluoroscopy without any significant difficulty. Dilator and wire were removed leaving the peel-away sheath in place. The distal tip of the catheter was then placed into the peel-away sheath and the peel-away sheath was removed. Under fluoroscopy, the distal tip of the catheter was positioned into the the distal inferior vena cava intrahepatically at the level of the junction to the right atrium.. There was no kinking at the catheter insertion site. The catheter flushed and aspirated easily. The lung  fields were examined.  The catheter flushed and aspirated quite easily. It was locked with concentrated heparin. The catheter was then anchored to the chest with nylon sutures. A stitch and dermal glue were used to close the neck site as well. Sterile dressings were applied. The patient tolerated the procedure well. There were no immediately apparent complications.    Radiographic findings: Findings from inferior venacavogram shows a small but patent inferior vena cava up to the confluence of the renal veins infrahepatic vena cava is of adequate size and the confluence of the right atrium is visualized.  Placement of the catheter to this level is performed with post placement injection confirming placement at the junction of the right atrium.       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      Karson Muller MD     Date: 11/5/2021  Time: 10:59 EDT                Electronically signed by Karson Muller MD at 11/05/21 1104       Physician Progress Notes (all)    No notes of this type exist for this encounter.            Consult Notes (all)      Shahram Milner MD at 11/05/21 0918      Consult Orders    1. Inpatient Nephrology Consult [[id number]] ordered by Haja Chowdhury MD at 11/04/21 6510               RENAL/[facility]:    Referring Provider: Dr. Chowdhury  Reason for Consultation: ESRD    Subjective     Chief complaint: Swollen access arm    History of present illness:  Patient is a 77 yo AAF with h/o ESRD on TTS HD who presents to the ER with continued swelling in access arm s/p infiltration.  Her last HD was on Saturday.  She denies any CP or SOA.  She has been seen by Vascular with plans for permcath placement and resting her arm for now.    History  Past Medical History:   Diagnosis Date   • Anemia    •  Anesthesia complication     mother woke up in combative state   • Arthritis     knees   • Asthma    • COPD (chronic obstructive pulmonary disease) (HCC)    • Diabetes mellitus (HCC)     type 2   • Dialysis patient (HCC)    • Disease of thyroid gland    • GERD (gastroesophageal reflux disease)    • Headache     after dialysis   • History of blood clots     BUE   • History of kidney stones    • Hyperlipidemia    • Hypertension    • Knee pain, bilateral    • Renal disease    • Sleep apnea     CPAP   • SOB (shortness of breath)    • 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)    • Weakness    ,   Past Surgical History:   Procedure Laterality Date   • ARTERIOVENOUS FISTULA Right    • ARTERIOVENOUS FISTULA Left     REMOVED   • CENTRAL VENOUS CATHETER TUNNELED INSERTION DOUBLE LUMEN     •  SECTION     • COLON RESECTION Right 2021    Procedure: RIGHT HEMICOLECTOMY LAPAROSCOPIC;  Surgeon: Zbigniew Conner MD;  Location: [facility] OR;  Service: General;  Laterality: Right;   • COLONOSCOPY     • COLONOSCOPY N/A 2021    Procedure: COLONOSCOPY into cecum with biopsy;  Surgeon: Fabricio Monroe MD;  Location: [facility] ENDOSCOPY;  Service: Gastroenterology;  Laterality: N/A;  cecal mass   • EXPLORATORY LAPAROTOMY N/A 8/3/2021    Procedure: LAPAROTOMY EXPLORATORY, resection of ileocolonic anastomosis with anastomosis;  Surgeon: Zbigniew Conner MD;  Location: [facility] MAIN OR;  Service: General;  Laterality: N/A;   • INSERTION HEMODIALYSIS CATHETER Right 2021    Procedure: VENACAVAGRAM AND HEMODIALYSIS CATHETER INSERTION;  Surgeon: Karson Muller MD;  Location: [facility] OR ;  Service: Vascular;  Laterality: Right;  Dr Bryant was primary surgeon   • POLYPECTOMY      UTERINE   • SHUNT O GRAM Right 2019    Procedure: RIGHT ARM DIALYSIS GRAFT revision and THROMBECTOMY;  Surgeon: Thierry Hardy MD;  Location: [facility] OR ;  Service: Vascular   •  SHUNT O GRAM Right 2019    Procedure: RIGHT ARM DIALYSIS GRAFT THROMBECTOMY WITH STENTING;  Surgeon: Thierry Hardy MD;  Location: [facility] OR ;  Service: Vascular   • SHUNT O GRAM Right 2020    Procedure: RIGHT ARM ARTERIOVENOUS SHUNTOGRAM WITH THROMBECTOMY;  Surgeon: Thierry Hardy MD;  Location: [facility] OR ;  Service: Vascular;  Laterality: Right;   • SHUNT O GRAM Right 2021    Procedure: Right arm dialysis shuntogram with shunt thrombectomy;  Surgeon: Shahram Gallagher MD;  Location: [facility] OR ;  Service: Vascular;  Laterality: Right;   • SHUNT O GRAM Right 2021    Procedure: RIGHT UPPER EXTREMITY THROMBECTOMY AND SHUNTOGRAM;  Surgeon: Shahram Gallagher MD;  Location: [facility];  Service: Vascular;  Laterality: Right;   ,   Family History   Problem Relation Age of Onset   • Malig Hyperthermia Neg Hx    ,   Social History     Socioeconomic History   • Marital status:    Tobacco Use   • Smoking status: Former Smoker     Packs/day: 0.00     Years: 4.00     Pack years: 0.00     Types: Cigarettes     Quit date:      Years since quittin.8   • Smokeless tobacco: Never Used   Vaping Use   • Vaping Use: Never used   Substance and Sexual Activity   • Alcohol use: No   • Drug use: No   • Sexual activity: Defer     E-cigarette/Vaping   • E-cigarette/Vaping Use Never User    • Passive Exposure No    • Counseling Given No      E-cigarette/Vaping Substances   • Nicotine No    • THC No    • CBD No    • Flavoring No      E-cigarette/Vaping Devices   • Disposable No    • Pre-filled or Refillable Cartridge No    • Refillable Tank No    • Pre-filled Pod No        ,   Medications Prior to Admission   Medication Sig Dispense Refill Last Dose   • albuterol (PROVENTIL) (2.5 MG/3ML) 0.083% nebulizer solution Take 2.5 mg by nebulization 3 (Three) Times a Day As Needed for Wheezing. UNSURE OF DOSAGE   2021 at Unknown time   • albuterol sulfate   (90 Base) MCG/ACT inhaler INHALE 2 PUFFS INTO THE LUNGS EVERY 4 HOURS AS NEEDED FOR WHEEZING   11/4/2021 at Unknown time   • apixaban (ELIQUIS) 2.5 MG tablet tablet Take 2.5 mg by mouth 2 (Two) Times a Day.   11/4/2021 at Unknown time   • Breo Ellipta 100-25 MCG/INH inhaler       • budesonide-formoterol (SYMBICORT) 160-4.5 MCG/ACT inhaler Inhale 2 puffs 2 (Two) Times a Day.   11/4/2021 at Unknown time   • Cholecalciferol (VITAMIN D3) 5000 units capsule capsule Take 5,000 Units by mouth Every Morning.   11/3/2021 at Unknown time   • fluticasone (FLONASE) 50 MCG/ACT nasal spray 2 sprays into the nostril(s) as directed by provider Daily.   11/3/2021 at Unknown time   • glucose blood (Accu-Chek Amber Plus) test strip 1 strip by Other route 3 (Three) Times a Day.      • metoprolol succinate XL (TOPROL-XL) 25 MG 24 hr tablet Take 0.5 tablets by mouth Every Morning for 30 days. 15 tablet 0 11/4/2021 at Unknown time   • midodrine (PROAMATINE) 5 MG tablet Take 1 tablet by mouth 3 (Three) Times a Day Before Meals for 30 days. 90 tablet 0 11/4/2021 at Unknown time   • montelukast (SINGULAIR) 10 MG tablet Take 10 mg by mouth Every Night.   11/3/2021 at Unknown time   • omeprazole (priLOSEC) 40 MG capsule Take 40 mg by mouth Every Morning.   11/4/2021 at Unknown time   , Scheduled Meds:  ceFAZolin, 2 g, Intravenous, Once  sodium chloride, 10 mL, Intravenous, Q12H    , Continuous Infusions:  sodium chloride, 9 mL/hr    , PRN Meds:  •  [MAR Hold] albumin human  •  hydrALAZINE  •  sodium chloride  •  sodium chloride and Allergies:  Sulfa antibiotics, Adhesive tape, and Latex    Review of Systems  Pertinent items are noted in HPI    Objective     Vital Signs  Temp:  [97.5 °F (36.4 °C)-98.1 °F (36.7 °C)] 98 °F (36.7 °C)  Heart Rate:  [] 77  Resp:  [16-18] 16  BP: (113-200)/(64-99) 163/79    No intake/output data recorded.  No intake/output data recorded.    Physical Exam:  GEN: Awake, NAD  ENT: PERRL, EOMI, MMM  NECK:  Supple, no JVD  CHEST: CTAB, no W/R/C  CV: RRR, no M/G/R  ABD: Soft, NT, +BS  SKIN: Warm and Dry  NEURO: CN's intact      Results Review:   I reviewed the patient's new clinical results.    WBC WBC   Date Value Ref Range Status   11/04/2021 4.78 3.40 - 10.80 10*3/mm3 Final      HGB Hemoglobin   Date Value Ref Range Status   11/04/2021 8.8 (L) 12.0 - 15.9 g/dL Final      HCT Hematocrit   Date Value Ref Range Status   11/04/2021 31.0 (L) 34.0 - 46.6 % Final      Platlets No results found for: LABPLAT   MCV MCV   Date Value Ref Range Status   11/04/2021 87.3 79.0 - 97.0 fL Final          Sodium Sodium   Date Value Ref Range Status   11/04/2021 149 (H) 136 - 145 mmol/L Final      Potassium Potassium   Date Value Ref Range Status   11/04/2021 5.2 3.5 - 5.2 mmol/L Final      Chloride Chloride   Date Value Ref Range Status   11/04/2021 114 (H) 98 - 107 mmol/L Final      CO2 CO2   Date Value Ref Range Status   11/04/2021 17.3 (L) 22.0 - 29.0 mmol/L Final      BUN BUN   Date Value Ref Range Status   11/04/2021 55 (H) 8 - 23 mg/dL Final      Creatinine Creatinine   Date Value Ref Range Status   11/04/2021 11.82 (H) 0.57 - 1.00 mg/dL Final      Calcium Calcium   Date Value Ref Range Status   11/04/2021 7.4 (L) 8.6 - 10.5 mg/dL Final      PO4 No results found for: CAPO4   Albumin Albumin   Date Value Ref Range Status   11/04/2021 3.30 (L) 3.50 - 5.20 g/dL Final      Magnesium No results found for: MG   Uric Acid No results found for: URICACID         ceFAZolin, 2 g, Intravenous, Once  sodium chloride, 10 mL, Intravenous, Q12H      sodium chloride, 9 mL/hr        Assessment/Plan       AV shunt thrombosis, subsequent encounter    Hematoma of arm, right, initial encounter  ESRD  HTN  Fluid overload  Hyperkalemia    PLAN: HD today after permcath placement.  Rest arm and follow-up with Vascular outpatient.  Will follow.    Shahram Milner MD   [facility]  11/05/21  @NOW      Electronically signed by Annia,  Shahram Montgomery MD at 21 1000     Karson Muller MD at 21 1843      Consult Orders    1. IP General Consult (Use specialty-specific consult if known) [[id number]] ordered by Albert Ruiz III, PA at 21 1641               Patient Name: Nellie Vegas Account #: [id number]    MRN: [id number] Admission Date: 2021      Consulting Service: Vascular Surgery Date of Evaluation: 2021    Requesting Provider: Tj CRASON    CHIEF COMPLAINT: Right arm AV access infiltration  HPI: Nellie Vegas is a 76 y.o. female is being seen for a consultation and evaluation/management of severe infiltration of the right arm AV access after hemodialysis.  Patient has been without dialysis for 2 days his need of long-term access until the hematoma infiltration as subsided.  Patient seems to have good flow in the fistula based on exam.  Duplex is negative for DVT.    PAST MEDICAL HISTORY:   Past Medical History:   Diagnosis Date   • Anemia    • Anesthesia complication     mother woke up in combative state   • Arthritis     knees   • Asthma    • COPD (chronic obstructive pulmonary disease) (HCC)    • Diabetes mellitus (HCC)     type 2   • Dialysis patient ([facility])    • Disease of thyroid gland    • GERD (gastroesophageal reflux disease)    • Headache     after dialysis   • History of blood clots     BUE   • History of kidney stones    • Hyperlipidemia    • Hypertension    • Knee pain, bilateral    • Renal disease    • Sleep apnea     CPAP   • SOB (shortness of breath)    • 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)    • Weakness       PAST SURGICAL HISTORY:   Past Surgical History:   Procedure Laterality Date   • ARTERIOVENOUS FISTULA Right    • ARTERIOVENOUS FISTULA Left     REMOVED   • CENTRAL VENOUS CATHETER TUNNELED INSERTION DOUBLE LUMEN     •  SECTION     • COLON RESECTION Right 2021    Procedure: RIGHT HEMICOLECTOMY LAPAROSCOPIC;  Surgeon: Zbigniew Conner  MD Song;  Location: [facility] MAIN OR;  Service: General;  Laterality: Right;   • COLONOSCOPY  2016   • COLONOSCOPY N/A 7/7/2021    Procedure: COLONOSCOPY into cecum with biopsy;  Surgeon: Fabricio Monroe MD;  Location: [facility] ENDOSCOPY;  Service: Gastroenterology;  Laterality: N/A;  cecal mass   • EXPLORATORY LAPAROTOMY N/A 8/3/2021    Procedure: LAPAROTOMY EXPLORATORY, resection of ileocolonic anastomosis with anastomosis;  Surgeon: Zbigniew Conner MD;  Location: [facility] OR;  Service: General;  Laterality: N/A;   • INSERTION HEMODIALYSIS CATHETER Right 7/16/2021    Procedure: VENACAVAGRAM AND HEMODIALYSIS CATHETER INSERTION;  Surgeon: Karson Muller MD;  Location: [facility] 18/19;  Service: Vascular;  Laterality: Right;  Dr Bryant was primary surgeon   • POLYPECTOMY      UTERINE   • SHUNT O GRAM Right 8/9/2019    Procedure: RIGHT ARM DIALYSIS GRAFT revision and THROMBECTOMY;  Surgeon: Thierry Hardy MD;  Location: [facility] OR 18/19;  Service: Vascular   • SHUNT O GRAM Right 8/22/2019    Procedure: RIGHT ARM DIALYSIS GRAFT THROMBECTOMY WITH STENTING;  Surgeon: Thierry Hardy MD;  Location: [facility] OR 18/19;  Service: Vascular   • SHUNT O GRAM Right 12/7/2020    Procedure: RIGHT ARM ARTERIOVENOUS SHUNTOGRAM WITH THROMBECTOMY;  Surgeon: Thierry Hardy MD;  Location: [facility] OR 18/19;  Service: Vascular;  Laterality: Right;   • SHUNT O GRAM Right 7/12/2021    Procedure: Right arm dialysis shuntogram with shunt thrombectomy;  Surgeon: Shahram Gallagher MD;  Location: [facility] OR 18/19;  Service: Vascular;  Laterality: Right;   • SHUNT O GRAM Right 7/19/2021    Procedure: RIGHT UPPER EXTREMITY THROMBECTOMY AND SHUNTOGRAM;  Surgeon: Shahram Gallagher MD;  Location: [facility];  Service: Vascular;  Laterality: Right;      FAMILY HISTORY:   Family History   Problem Relation Age of Onset   • Malig Hyperthermia Neg Hx       SOCIAL HISTORY:    Social History     Tobacco Use   • Smoking status: Former Smoker     Packs/day: 0.00     Years: 4.00     Pack years: 0.00     Types: Cigarettes     Quit date:      Years since quittin.8   • Smokeless tobacco: Never Used   Vaping Use   • Vaping Use: Never used   Substance Use Topics   • Alcohol use: No   • Drug use: No      MEDICATIONS:   No current facility-administered medications on file prior to encounter.     Current Outpatient Medications on File Prior to Encounter   Medication Sig Dispense Refill   • albuterol (PROVENTIL) (2.5 MG/3ML) 0.083% nebulizer solution Take 2.5 mg by nebulization 3 (Three) Times a Day As Needed for Wheezing. UNSURE OF DOSAGE     • albuterol sulfate  (90 Base) MCG/ACT inhaler INHALE 2 PUFFS INTO THE LUNGS EVERY 4 HOURS AS NEEDED FOR WHEEZING     • apixaban (ELIQUIS) 2.5 MG tablet tablet Take 2.5 mg by mouth 2 (Two) Times a Day.     • Breo Ellipta 100-25 MCG/INH inhaler      • budesonide-formoterol (SYMBICORT) 160-4.5 MCG/ACT inhaler Inhale 2 puffs 2 (Two) Times a Day.     • Cholecalciferol (VITAMIN D3) 5000 units capsule capsule Take 5,000 Units by mouth Every Morning.     • fluticasone (FLONASE) 50 MCG/ACT nasal spray 2 sprays into the nostril(s) as directed by provider Daily.     • glucose blood (Accu-Chek Amber Plus) test strip 1 strip by Other route 3 (Three) Times a Day.     • metoprolol succinate XL (TOPROL-XL) 25 MG 24 hr tablet Take 0.5 tablets by mouth Every Morning for 30 days. 15 tablet 0   • midodrine (PROAMATINE) 5 MG tablet Take 1 tablet by mouth 3 (Three) Times a Day Before Meals for 30 days. 90 tablet 0   • montelukast (SINGULAIR) 10 MG tablet Take 10 mg by mouth Every Night.     • omeprazole (priLOSEC) 40 MG capsule Take 40 mg by mouth Every Morning.               ALLERGIES: Sulfa antibiotics, Adhesive tape, and Latex   COMPLETE REVIEW OF SYSTEMS:     ENT ROS: negative  Cardiovascular ROS: no chest pain or dyspnea on exertion  Respiratory ROS: no  "cough, shortness of breath, or wheezing  Gastrointestinal ROS: no abdominal pain, change in bowel habits, or black or bloody stools  Neurological ROS: no TIA or stroke symptoms  Genito-Urinary ROS: no dysuria, trouble voiding, or hematuria  Musculoskeletal ROS: positive for -pain and swelling in the right arm  Dermatological ROS: negative  Psychological ROS: negative      PHYSICAL EXAM:   Patient Vitals for the past 24 hrs:   BP Temp Temp src Pulse Resp SpO2 Height Weight   11/04/21 1626 (!) 200/97 -- -- 80 -- 95 % -- --   11/04/21 1523 113/79 -- -- -- -- -- 152.4 cm (60\") 65.8 kg (145 lb 1 oz)   11/04/21 1449 -- 97.7 °F (36.5 °C) Temporal 94 18 97 % -- --        General appearance: alert, well appearing, and in no distress.  Neurological exam reveals alert, oriented, normal speech, no focal findings or movement disorder noted.  ENT exam reveals - ENT exam normal, no neck nodes or sinus tenderness.  CVS exam: normal rate, regular rhythm, normal S1, S2, no murmurs, rubs, clicks or gallops.  Chest: clear to auscultation, no wheezes, rales or rhonchi, symmetric air entry.  Abdominal exam: soft, nontender, nondistended, no masses or organomegaly.  Examination of the feet reveals warm, good capillary refill.  Right AV access with a large hematoma precluding use.  No extravasation seen pants and no pulsatility        LABS:      Results Review:       I reviewed the patient's new clinical results.  Results from last 7 days   Lab Units 11/04/21  1553   WBC 10*3/mm3 4.78   HEMOGLOBIN g/dL 8.8*   PLATELETS 10*3/mm3 232     Results from last 7 days   Lab Units 11/04/21  1553   SODIUM mmol/L 149*   POTASSIUM mmol/L 5.2   CHLORIDE mmol/L 114*   CO2 mmol/L 17.3*   BUN mg/dL 55*   CREATININE mg/dL 11.82*   GLUCOSE mg/dL 92   Estimated Creatinine Clearance: 3.5 mL/min (A) (by C-G formula based on SCr of 11.82 mg/dL (H)).  Results from last 7 days   Lab Units 11/04/21  1553   CALCIUM mg/dL 7.4*   ALBUMIN g/dL 3.30*           The " following radiologic or non-invasive studies have been reviewed by me: Venous duplex reviewed with images reviewed    Active Hospital Problems    Diagnosis  POA   • Hematoma of arm, right, initial encounter [S40.021A]  Yes      Resolved Hospital Problems   No resolved problems to display.         ASSESSMENT/PLAN: 76 y.o. female with infiltration of her right AV access.  We will plan tunneled dialysis catheter placement to rest of access with imaging of the access prior to resumption in 3 to 4 weeks.  Patient is aware and agreeable with this plan.      I discussed the plan with the patient and she is agreeable to the plan of care at this point. Thank you for this consult.     Karson Muller MD   11/04/21      Electronically signed by Karson Muller MD at 11/04/21 0939

## 2021-11-05 NOTE — ANESTHESIA POSTPROCEDURE EVALUATION
"Patient: Nellie Vegas    Procedure Summary     Date: 11/05/21 Room / Location: [facility] OR [address] [place] [address] MAIN OR    Anesthesia Start: 0951 Anesthesia Stop: 1059    Procedure: HEMODIALYSIS CATHETER INSERTION (N/A ) Diagnosis:       AV shunt thrombosis, subsequent encounter      (AV shunt thrombosis, subsequent encounter [T82.868D])    Surgeons: Karson Muller MD Provider: Gagan Berg MD    Anesthesia Type: MAC ASA Status: 3          Anesthesia Type: MAC    Vitals  Vitals Value Taken Time   /76 11/05/21 1131   Temp 36.6 °C (97.8 °F) 11/05/21 1130   Pulse 64 11/05/21 1145   Resp 16 11/05/21 1130   SpO2 100 % 11/05/21 1146   Vitals shown include unvalidated device data.        Post Anesthesia Care and Evaluation    Patient location during evaluation: bedside  Patient participation: complete - patient participated  Level of consciousness: awake and alert  Pain management: adequate  Airway patency: patent  Anesthetic complications: No anesthetic complications    Cardiovascular status: acceptable  Respiratory status: acceptable  Hydration status: acceptable    Comments: /82 (BP Location: Left arm, Patient Position: Lying)   Pulse 69   Temp 36.7 °C (98 °F) (Oral)   Resp 16   Ht 152.4 cm (60\")   Wt 70.8 kg (156 lb)   SpO2 100%   BMI 30.47 kg/m²       "

## 2021-11-05 NOTE — PLAN OF CARE
Goal Outcome Evaluation:               VSS on RA. NSR. A&O. Pt c/o pain in groin, medicated per MAR. Pt is currently in HD. LENCHO

## 2021-11-05 NOTE — OP NOTE
Operative Note  Location: [facility]  Date of Admission:  11/4/2021  OR Date: 11/5/2021    Pre-op Diagnosis:   AV shunt thrombosis, subsequent encounter [T82.868D]    Post-op Diagnosis:     Same    Procedure:  1) Tunneled dialysis catheter insertion (29559)  2) Ultrasound guided vascular access (50289)  3) Radiologic supervision of catheter tip placement (43508)  4) inferior venacavogram    Surgeon Karson Muller MD    Assistant: Britney Juárez RN    Anesthesia: Monitored Anesthesia Care    Estimated Blood Loss: minimal    Staff:   Circulator: Muriel Peña RN  Scrub Person: Padmini Foss  Assistant: Britney Juárez    Complications: None    Specimen: None    Findings:  Tip in SVC/Atrial     Implants:   Implant Name Type Inv. Item Serial No.  Lot No. LRB No. Used Action   KT CATH YOON PALINDROM 2LUM WSLOT 14.5F23 - [id number] Implant KT CATH YOON PALINDROM 2LUM WSLOT 14.5F23  VIRGILIO [id number] N/A 1 Implanted       Indications:    The patient is an 76 y.o. female referred for tunneled dialysis catheter placement.  The risks, benefits, and alternatives have been discussed with the patient and/or family and include but are not limited to injury to artery, vein, lung, bleeding, and infection.    Procedure:  The patient was taken to the operating room and placed supine on the operating room table. After the induction of general anesthetic with LMA, the neck and chest were prepped and draped with ChloraPrep. The patient was placed in Trendelenburg position. Timeout was observed.  Both jugular veins were scarred with multiple collaterals in the area.  Because of the nature of her prior upper extremity venous access we attempted both sides but could not pass wires easily through any of the collaterals.  Therefore the upper extremity access was aborted and the patient was prepped and draped at the femoral level.  The right femoral vein was found to be somewhat scarred but easily compressible and visible.   We entered it without difficulty.  Inferior venacavogram was performed to confirm the positioning of our catheter for final placement prior to placing anything other than the micropuncture sheath.  At this point in time. The vessel was entered under direct ultrasound guidance and photographic documentation was recorded in the chart for the record. An angled wire was then advanced down into the superior vena cava under fluoroscopy without any difficulty. Exit site was chosen on the anterior thigh.. A 33 cm catheter was then flushed and brought up onto the field. It was tunneled in an antegrade fashion up to the femoral insertion site. Dilator and peel-away sheath were then advanced over the wire under fluoroscopy without any significant difficulty. Dilator and wire were removed leaving the peel-away sheath in place. The distal tip of the catheter was then placed into the peel-away sheath and the peel-away sheath was removed. Under fluoroscopy, the distal tip of the catheter was positioned into the the distal inferior vena cava intrahepatically at the level of the junction to the right atrium.. There was no kinking at the catheter insertion site. The catheter flushed and aspirated easily. The lung fields were examined.  The catheter flushed and aspirated quite easily. It was locked with concentrated heparin. The catheter was then anchored to the chest with nylon sutures. A stitch and dermal glue were used to close the neck site as well. Sterile dressings were applied. The patient tolerated the procedure well. There were no immediately apparent complications.    Radiographic findings: Findings from inferior venacavogram shows a small but patent inferior vena cava up to the confluence of the renal veins infrahepatic vena cava is of adequate size and the confluence of the right atrium is visualized.  Placement of the catheter to this level is performed with post placement injection confirming placement at the junction of  the right atrium.       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      Karson Muller MD     Date: 11/5/2021  Time: 10:59 EDT

## 2021-11-05 NOTE — BRIEF OP NOTE
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 (Removed)       [REMOVED] Urethral Catheter Silicone;Non-latex 16 Fr. (Removed)       Findings: Unable to pass wires through multiple veins in the neck    Complications: None    Assistant: Britney Juárez  was responsible for performing the following activities: Retraction, Suction, Irrigation, Suturing and Closing and their skilled assistance was necessary for the success of this case.    Karson Muller MD     Date: 11/5/2021  Time: 10:48 EDT

## 2021-11-05 NOTE — PERIOPERATIVE NURSING NOTE
Dr Buchanan at [facility]---aware of glucose 60 and Metoprolol last does 11/3/2021

## 2021-11-05 NOTE — H&P
History and physical    Primary care physician  Dr. Cortes    Chief complaint  Right arm swelling    History of present illness  76-year-old -American female who has very complex past medical history and well-known to our service including end-stage renal disease on hemodialysis COPD chronic anemia gastroesophageal reflux disease and chronic hypotension who also has had right laparoscopic hemicolectomy secondary to tubular adenoma presented to [facility] emergency room with right arm swelling.  Patient has missed dialysis for almost 1 week.  Patient was told that her AV fistula is not functioning.  Patient work-up in ER revealed hematoma of the right arm with infiltration of her AV access with thrombosis admit for management.  Patient has no fever chills cough chest pain abdominal pain nausea vomiting diarrhea which is slightly increased shortness of breath and swelling.    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)     • Weakness        PAST SURGICAL HISTORY              Procedure Laterality Date   • ARTERIOVENOUS FISTULA Right     • ARTERIOVENOUS FISTULA Left       REMOVED   • CENTRAL VENOUS CATHETER TUNNELED INSERTION DOUBLE LUMEN       •  SECTION       • COLON RESECTION Right 2021     Procedure: RIGHT HEMICOLECTOMY LAPAROSCOPIC;  Surgeon: Zbigniew Conner MD;  Location: [facility];  Service: General;  Laterality: Right;   • COLONOSCOPY      • COLONOSCOPY N/A  7/7/2021     Procedure: COLONOSCOPY into cecum with biopsy;  Surgeon: Fabricio Monroe MD;  Location: [facility] ENDOSCOPY;  Service: Gastroenterology;  Laterality: N/A;  cecal mass   • EXPLORATORY LAPAROTOMY N/A 8/3/2021     Procedure: LAPAROTOMY EXPLORATORY, resection of ileocolonic anastomosis with anastomosis;  Surgeon: Zbigniew Conner MD;  [facility];  Service: General;  Laterality: N/A;   • INSERTION HEMODIALYSIS CATHETER Right 7/16/2021     Procedure: VENACAVAGRAM AND HEMODIALYSIS CATHETER INSERTION;  Surgeon: Karson Mluler MD;  Location: [facility] OR 18/19;  Service: Vascular;  Laterality: Right;  Dr Bryant was primary surgeon   • POLYPECTOMY         UTERINE   • SHUNT O GRAM Right 8/9/2019     Procedure: RIGHT ARM DIALYSIS GRAFT revision and THROMBECTOMY;  Surgeon: Thierry Hardy MD;  Location: [facility] OR 18/19;  Service: Vascular   • SHUNT O GRAM Right 8/22/2019     Procedure: RIGHT ARM DIALYSIS GRAFT THROMBECTOMY WITH STENTING;  Surgeon: Thierry Hardy MD;  Location: [facility] OR 18/19;  Service: Vascular   • SHUNT O GRAM Right 12/7/2020     Procedure: RIGHT ARM ARTERIOVENOUS SHUNTOGRAM WITH THROMBECTOMY;  Surgeon: Thierry Hardy MD;  Location: [facility] OR 18/19;  Service: Vascular;  Laterality: Right;   • SHUNT O GRAM Right 7/12/2021     Procedure: Right arm dialysis shuntogram with shunt thrombectomy;  Surgeon: Shahram Gallagher MD;  Location: [facility] OR 18/19;  Service: Vascular;  Laterality: Right;   • SHUNT O GRAM Right 7/19/2021     Procedure: RIGHT UPPER EXTREMITY THROMBECTOMY AND SHUNTOGRAM;  Surgeon: Shahram Gallagher MD;  Location: [facility]  Service: Vascular;  Laterality: Right;         FAMILY HISTORY           Problem Relation Age of Onset   • Malig Hyperthermia Neg Hx       SOCIAL HISTORY                 Socioeconomic History   • Marital status:    Tobacco Use   • Smoking status: Former Smoker        "Packs/day: 0.00       Years: 4.00       Pack years: 0.00       Types: Cigarettes       Quit date: 1966       Years since quittin.8   • Smokeless tobacco: Never Used   Vaping Use   • Vaping Use: Never used   Substance and Sexual Activity   • Alcohol use: No   • Drug use: No   • Sexual activity: Defer         ALLERGIES  Sulfa antibiotics, Adhesive tape, and Latex  Home medications reviewed     REVIEW OF SYSTEMS  Constitutional: Negative for chills and fever.   HENT: Negative.    Eyes: Negative.    Respiratory: Negative for cough and shortness of breath.    Cardiovascular: Negative for chest pain and palpitations.   Gastrointestinal: Negative.    Genitourinary: Negative.    Musculoskeletal: Negative.    Skin: Negative.    Neurological: Negative.       PHYSICAL EXAM  Blood pressure 155/82, pulse 69, temperature 98 °F (36.7 °C), temperature source Oral, resp. rate 16, height 152.4 cm (60\"), weight 70.8 kg (156 lb), SpO2 100 %, not currently breastfeeding.    GENERAL: Chronically ill, nontoxic  HEENT:  Unremarkable  CV:  S1-S2  RESPIRATORY: normal effort, lungs CTA B  ABDOMEN: soft nontender nondistended bowel sounds positive  MUSCULOSKELETAL: no deformity  NEURO: alert and oriented x4, moves all extremities, follows commands  SKIN: warm, dry, no obvious cellulitis     LAB RESULTS  Lab Results (last 24 hours)     Procedure Component Value Units Date/Time    POC Glucose Once [[id number]]  (Normal) Collected: 21 1200    Specimen: Blood Updated: 21 1202     Glucose 79 mg/dL      Comment: Meter: [id number] : [id number] Caroline WADDELL       POC Glucose Once [[id number]]  (Normal) Collected: 21 1129    Specimen: Blood Updated: 21 1132     Glucose 79 mg/dL      Comment: Meter: [id number] : [id number] Orlando Keen RN       POC Glucose Once [[id number]]  (Abnormal) Collected: 21 1104    Specimen: Blood Updated: 21 1105     Glucose 60 mg/dL      Comment: Meter: [id number] : [id number] " Rachael HAMILTON RN       POC Glucose Once [[id number]]  (Abnormal) Collected: 11/05/21 0928    Specimen: Blood Updated: 11/05/21 0935     Glucose 60 mg/dL      Comment: Meter: [id number] : [id number] Ashanti Chow RN       COVID PRE-OP / PRE-PROCEDURE SCREENING ORDER (NO ISOLATION) - Swab, Nasopharynx [[id number]]  (Normal) Collected: 11/04/21 1903    Specimen: Swab from Nasopharynx Updated: 11/04/21 2012    Narrative:      The following orders were created for panel order COVID PRE-OP / PRE-PROCEDURE SCREENING ORDER (NO ISOLATION) - Swab, Nasopharynx.  Procedure                               Abnormality         Status                     ---------                               -----------         ------                     COVID-19,[facility] IN-HOUSE...[[id number]]  Normal              Final result                 Please view results for these tests on the individual orders.    COVID-19,[facility] IN-HOUSE CEPHEID/TAB NP SWAB IN TRANSPORT MEDIA 8-12 HR TAT - Swab, Nasopharynx [[id number]]  (Normal) Collected: 11/04/21 1903    Specimen: Swab from Nasopharynx Updated: 11/04/21 2012     COVID19 Not Detected    Narrative:      Fact sheet for providers: https://www.fda.gov/media/568503/download     Fact sheet for patients: https://www.fda.gov/media/186576/download    Comprehensive Metabolic Panel [[id number]]  (Abnormal) Collected: 11/04/21 1553    Specimen: Blood Updated: 11/04/21 1619     Glucose 92 mg/dL      BUN 55 mg/dL      Creatinine 11.82 mg/dL      Sodium 149 mmol/L      Potassium 5.2 mmol/L      Chloride 114 mmol/L      CO2 17.3 mmol/L      Calcium 7.4 mg/dL      Total Protein 6.5 g/dL      Albumin 3.30 g/dL      ALT (SGPT) 7 U/L      AST (SGOT) 9 U/L      Alkaline Phosphatase 110 U/L      Total Bilirubin 0.3 mg/dL      eGFR Non  Amer --     Comment: <15 Indicative of kidney failure.        eGFR  African Amer 4 mL/min/1.73      Comment: <15 Indicative of kidney failure.        Globulin 3.2 gm/dL      A/G  Ratio 1.0 g/dL      BUN/Creatinine Ratio 4.7     Anion Gap 17.7 mmol/L     Narrative:      GFR Normal >60  Chronic Kidney Disease <60  Kidney Failure <15      CBC & Differential [[id number]]  (Abnormal) Collected: 11/04/21 1553    Specimen: Blood Updated: 11/04/21 1614    Narrative:      The following orders were created for panel order CBC & Differential.  Procedure                               Abnormality         Status                     ---------                               -----------         ------                     CBC Auto Differential[[id number]]        Abnormal            Final result                 Please view results for these tests on the individual orders.    CBC Auto Differential [[id number]]  (Abnormal) Collected: 11/04/21 1553    Specimen: Blood Updated: 11/04/21 1614     WBC 4.78 10*3/mm3      RBC 3.55 10*6/mm3      Hemoglobin 8.8 g/dL      Hematocrit 31.0 %      MCV 87.3 fL      MCH 24.8 pg      MCHC 28.4 g/dL      RDW 17.5 %      RDW-SD 56.3 fl      MPV 10.0 fL      Platelets 232 10*3/mm3      Neutrophil % 76.2 %      Lymphocyte % 8.2 %      Monocyte % 11.1 %      Eosinophil % 3.1 %      Basophil % 1.0 %      Immature Grans % 0.4 %      Neutrophils, Absolute 3.64 10*3/mm3      Lymphocytes, Absolute 0.39 10*3/mm3      Monocytes, Absolute 0.53 10*3/mm3      Eosinophils, Absolute 0.15 10*3/mm3      Basophils, Absolute 0.05 10*3/mm3      Immature Grans, Absolute 0.02 10*3/mm3      nRBC 0.0 /100 WBC         Imaging Results (Last 24 Hours)     Procedure Component Value Units Date/Time    IR PICC W Fluoro Surgery Only [[id number]] Resulted: 11/05/21 1109     Updated: 11/05/21 1109    Narrative:      This procedure was auto-finalized with no dictation required.    XR Chest 1 View [[id number]] Collected: 11/04/21 1629     Updated: 11/04/21 1637    Narrative:      XR CHEST 1 VW-     HISTORY: Female who is 76 years-old,  missed dialysis     TECHNIQUE: Frontal views of the chest     COMPARISON:  08/03/2021     FINDINGS: The heart size is mildly enlarged. Pulmonary vasculature is  unremarkable. Borderline fullness of the right hilum appears stable.  Vascular stents are noted in the bilateral axillary regions. No focal  pulmonary consolidation, pleural effusion, or pneumothorax. No acute  osseous process.       Impression:      No focal pulmonary consolidation. Mild cardiomegaly.     This report was finalized on 11/4/2021 4:34 PM by Dr. Omer Flores M.D.             Current Facility-Administered Medications:   •  [MAR Hold] albumin human 25 % IV SOLN 12.5 g, 12.5 g, Intravenous, PRN, Jesús Price MD  •  albuterol (PROVENTIL) nebulizer solution 0.083% 2.5 mg/3mL, 2.5 mg, Nebulization, TID PRN, Haja Chowdhury MD  •  apixaban (ELIQUIS) tablet 2.5 mg, 2.5 mg, Oral, Q12H, Haja Chowdhury MD  •  budesonide-formoterol (SYMBICORT) 80-4.5 MCG/ACT inhaler 2 puff, 2 puff, Inhalation, BID - RT, Haja Chowdhury MD  •  fluticasone (FLONASE) 50 MCG/ACT nasal spray 2 spray, 2 spray, Nasal, Daily, Haja Chowdhury MD  •  hydrALAZINE (APRESOLINE) injection 10 mg, 10 mg, Intravenous, Q4H PRN, Haja Chowdhury MD  •  montelukast (SINGULAIR) tablet 10 mg, 10 mg, Oral, Nightly, Haja Chowdhury MD  •  pantoprazole (PROTONIX) EC tablet 40 mg, 40 mg, Oral, QAM, Haja Chowdhury MD  •  sodium chloride 0.9 % infusion, 9 mL/hr, Intravenous, Continuous PRN, Mukund Buchanan MD     ASSESSMENT  Right AV access infiltration with shunt thrombosis  End-stage renal disease with noncompliance with hemodialysis  COPD  Chronic anemia  Chronic hypotension  Right hemicolectomy secondary to tubular adenoma  Gastroesophageal reflux disease    PLAN  Admit  Vascular surgery consult to get tunneled dialysis catheter insertion  Nephrology to follow patient for hemodialysis  Continue home medications  Stress ulcer DVT prophylaxis  Supportive care  Patient is full code  Discussed with nursing staff  Follow closely further recommendation current hospital  justen MARTIN MD

## 2021-11-05 NOTE — ANESTHESIA PREPROCEDURE EVALUATION
Anesthesia Evaluation     Patient summary reviewed                Airway   Mallampati: III  Neck ROM: limited  No difficulty expected  Dental    (+) edentulous    Pulmonary    (+) COPD, asthma,sleep apnea,   Cardiovascular     ECG reviewed  Rhythm: regular    (+) hypertension,       Neuro/Psych  GI/Hepatic/Renal/Endo    (+)  GERD,  renal disease ESRD, diabetes mellitus,     Musculoskeletal     Abdominal    Substance History      OB/GYN          Other          Other Comment: Hb 8.8                  Anesthesia Plan    ASA 3     MAC       Anesthetic plan, all risks, benefits, and alternatives have been provided, discussed and informed consent has been obtained with: patient.  Use of blood products discussed with patient .

## 2021-11-06 ENCOUNTER — READMISSION MANAGEMENT (OUTPATIENT)
Dept: CALL CENTER | Facility: HOSPITAL | Age: 76
End: 2021-11-06

## 2021-11-06 VITALS
OXYGEN SATURATION: 98 % | HEART RATE: 84 BPM | HEIGHT: 60 IN | WEIGHT: 156 LBS | RESPIRATION RATE: 18 BRPM | TEMPERATURE: 98 F | BODY MASS INDEX: 30.63 KG/M2 | DIASTOLIC BLOOD PRESSURE: 53 MMHG | SYSTOLIC BLOOD PRESSURE: 100 MMHG

## 2021-11-06 LAB
ANION GAP SERPL CALCULATED.3IONS-SCNC: 15 MMOL/L (ref 5–15)
BASOPHILS # BLD AUTO: 0.04 10*3/MM3 (ref 0–0.2)
BASOPHILS NFR BLD AUTO: 0.8 % (ref 0–1.5)
BUN SERPL-MCNC: 26 MG/DL (ref 8–23)
BUN/CREAT SERPL: 3.9 (ref 7–25)
CALCIUM SPEC-SCNC: 7.5 MG/DL (ref 8.6–10.5)
CHLORIDE SERPL-SCNC: 105 MMOL/L (ref 98–107)
CO2 SERPL-SCNC: 17 MMOL/L (ref 22–29)
CREAT SERPL-MCNC: 6.67 MG/DL (ref 0.57–1)
DEPRECATED RDW RBC AUTO: 49.9 FL (ref 37–54)
EOSINOPHIL # BLD AUTO: 0.07 10*3/MM3 (ref 0–0.4)
EOSINOPHIL NFR BLD AUTO: 1.4 % (ref 0.3–6.2)
ERYTHROCYTE [DISTWIDTH] IN BLOOD BY AUTOMATED COUNT: 17.3 % (ref 12.3–15.4)
FERRITIN SERPL-MCNC: 818 NG/ML (ref 13–150)
GFR SERPL CREATININE-BSD FRML MDRD: 7 ML/MIN/1.73
GFR SERPL CREATININE-BSD FRML MDRD: ABNORMAL ML/MIN/{1.73_M2}
GLUCOSE SERPL-MCNC: 81 MG/DL (ref 65–99)
HBV SURFACE AB SER RIA-ACNC: REACTIVE
HBV SURFACE AG SERPL QL IA: NORMAL
HCT VFR BLD AUTO: 22.7 % (ref 34–46.6)
HGB BLD-MCNC: 7 G/DL (ref 12–15.9)
IMM GRANULOCYTES # BLD AUTO: 0.03 10*3/MM3 (ref 0–0.05)
IMM GRANULOCYTES NFR BLD AUTO: 0.6 % (ref 0–0.5)
IRON 24H UR-MRATE: 42 MCG/DL (ref 37–145)
IRON SATN MFR SERPL: 21 % (ref 20–50)
LYMPHOCYTES # BLD AUTO: 0.42 10*3/MM3 (ref 0.7–3.1)
LYMPHOCYTES NFR BLD AUTO: 8.7 % (ref 19.6–45.3)
MCH RBC QN AUTO: 24.9 PG (ref 26.6–33)
MCHC RBC AUTO-ENTMCNC: 30.8 G/DL (ref 31.5–35.7)
MCV RBC AUTO: 80.8 FL (ref 79–97)
MONOCYTES # BLD AUTO: 0.66 10*3/MM3 (ref 0.1–0.9)
MONOCYTES NFR BLD AUTO: 13.6 % (ref 5–12)
NEUTROPHILS NFR BLD AUTO: 3.62 10*3/MM3 (ref 1.7–7)
NEUTROPHILS NFR BLD AUTO: 74.9 % (ref 42.7–76)
NRBC BLD AUTO-RTO: 0 /100 WBC (ref 0–0.2)
PLATELET # BLD AUTO: 150 10*3/MM3 (ref 140–450)
PMV BLD AUTO: 10.1 FL (ref 6–12)
POTASSIUM SERPL-SCNC: 4.1 MMOL/L (ref 3.5–5.2)
RBC # BLD AUTO: 2.81 10*6/MM3 (ref 3.77–5.28)
SODIUM SERPL-SCNC: 137 MMOL/L (ref 136–145)
TIBC SERPL-MCNC: 201 MCG/DL (ref 298–536)
TRANSFERRIN SERPL-MCNC: 135 MG/DL (ref 200–360)
WBC # BLD AUTO: 4.84 10*3/MM3 (ref 3.4–10.8)

## 2021-11-06 PROCEDURE — 25010000002 EPOETIN ALFA-EPBX 10000 UNIT/ML SOLUTION: Performed by: INTERNAL MEDICINE

## 2021-11-06 PROCEDURE — 63710000001 APIXABAN 2.5 MG TABLET: Performed by: HOSPITALIST

## 2021-11-06 PROCEDURE — 94799 UNLISTED PULMONARY SVC/PX: CPT

## 2021-11-06 PROCEDURE — 80048 BASIC METABOLIC PNL TOTAL CA: CPT | Performed by: SURGERY

## 2021-11-06 PROCEDURE — A9270 NON-COVERED ITEM OR SERVICE: HCPCS | Performed by: HOSPITALIST

## 2021-11-06 PROCEDURE — 82728 ASSAY OF FERRITIN: CPT | Performed by: INTERNAL MEDICINE

## 2021-11-06 PROCEDURE — 86706 HEP B SURFACE ANTIBODY: CPT | Performed by: HOSPITALIST

## 2021-11-06 PROCEDURE — 84466 ASSAY OF TRANSFERRIN: CPT | Performed by: INTERNAL MEDICINE

## 2021-11-06 PROCEDURE — 63710000001 SODIUM BICARBONATE 650 MG TABLET: Performed by: INTERNAL MEDICINE

## 2021-11-06 PROCEDURE — 85025 COMPLETE CBC W/AUTO DIFF WBC: CPT | Performed by: SURGERY

## 2021-11-06 PROCEDURE — 83540 ASSAY OF IRON: CPT | Performed by: INTERNAL MEDICINE

## 2021-11-06 PROCEDURE — 87340 HEPATITIS B SURFACE AG IA: CPT | Performed by: HOSPITALIST

## 2021-11-06 PROCEDURE — 63710000001 PANTOPRAZOLE 40 MG TABLET DELAYED-RELEASE: Performed by: HOSPITALIST

## 2021-11-06 PROCEDURE — A9270 NON-COVERED ITEM OR SERVICE: HCPCS | Performed by: INTERNAL MEDICINE

## 2021-11-06 PROCEDURE — G0378 HOSPITAL OBSERVATION PER HR: HCPCS

## 2021-11-06 RX ORDER — SODIUM BICARBONATE 650 MG/1
650 TABLET ORAL 3 TIMES DAILY
Status: DISCONTINUED | OUTPATIENT
Start: 2021-11-06 | End: 2021-11-06 | Stop reason: HOSPADM

## 2021-11-06 RX ORDER — SODIUM BICARBONATE 650 MG/1
650 TABLET ORAL 3 TIMES DAILY
Qty: 90 TABLET | Refills: 0 | Status: SHIPPED | OUTPATIENT
Start: 2021-11-06 | End: 2021-12-06

## 2021-11-06 RX ADMIN — EPOETIN ALFA-EPBX 20000 UNITS: 10000 INJECTION, SOLUTION INTRAVENOUS; SUBCUTANEOUS at 14:23

## 2021-11-06 RX ADMIN — BUDESONIDE AND FORMOTEROL FUMARATE DIHYDRATE 2 PUFF: 80; 4.5 AEROSOL RESPIRATORY (INHALATION) at 08:39

## 2021-11-06 RX ADMIN — PANTOPRAZOLE SODIUM 40 MG: 40 TABLET, DELAYED RELEASE ORAL at 06:41

## 2021-11-06 RX ADMIN — SODIUM BICARBONATE 650 MG: 650 TABLET ORAL at 16:28

## 2021-11-06 RX ADMIN — SODIUM BICARBONATE 650 MG: 650 TABLET ORAL at 10:24

## 2021-11-06 RX ADMIN — APIXABAN 2.5 MG: 2.5 TABLET, FILM COATED ORAL at 08:21

## 2021-11-06 RX ADMIN — FLUTICASONE PROPIONATE 2 SPRAY: 50 SPRAY, METERED NASAL at 08:21

## 2021-11-06 NOTE — PROGRESS NOTES
"RENAL/KCC:     LOS: 0 days    Patient Care Team:  Laurent Cortes DO as PCP - General (Internal Medicine)  Kvng Guerra MD as Consulting Physician (Pulmonary Disease)    Chief Complaint:  Swollen access arm    Subjective     Interval History:   S/P permcath and HD yesterday.  Feeling well.    Objective     Vital Sign Min/Max for last 24 hours  Temp  Min: 97.4 °F (36.3 °C)  Max: 98.6 °F (37 °C)   BP  Min: 101/49  Max: 155/82   Pulse  Min: 67  Max: 93   Resp  Min: 14  Max: 18   SpO2  Min: 95 %  Max: 100 %   Flow (L/min)  Min: 2  Max: 4   No data recorded     Flowsheet Rows      First Filed Value   Admission Height 152.4 cm (60\") Documented at 11/04/2021 1523   Admission Weight 65.8 kg (145 lb 1 oz) Documented at 11/04/2021 1523          No intake/output data recorded.  I/O last 3 completed shifts:  In: 440 [P.O.:240; I.V.:200]  Out: 2000 [Other:2000]    Physical Exam:  GEN: Awake, NAD  ENT: PERRL, EOMI, MMM  NECK: Supple, no JVD  CHEST: CTAB, no W/R/C  CV: RRR, no M/G/R  ABD: Soft, NT, +BS  SKIN: Warm and Dry  NEURO: CN's intact      WBC WBC   Date Value Ref Range Status   11/06/2021 4.84 3.40 - 10.80 10*3/mm3 Final   11/04/2021 4.78 3.40 - 10.80 10*3/mm3 Final      HGB Hemoglobin   Date Value Ref Range Status   11/06/2021 7.0 (L) 12.0 - 15.9 g/dL Final   11/04/2021 8.8 (L) 12.0 - 15.9 g/dL Final      HCT Hematocrit   Date Value Ref Range Status   11/06/2021 22.7 (L) 34.0 - 46.6 % Final   11/04/2021 31.0 (L) 34.0 - 46.6 % Final      Platlets No results found for: LABPLAT   MCV MCV   Date Value Ref Range Status   11/06/2021 80.8 79.0 - 97.0 fL Final   11/04/2021 87.3 79.0 - 97.0 fL Final          Sodium Sodium   Date Value Ref Range Status   11/06/2021 137 136 - 145 mmol/L Final   11/04/2021 149 (H) 136 - 145 mmol/L Final      Potassium Potassium   Date Value Ref Range Status   11/06/2021 4.1 3.5 - 5.2 mmol/L Final   11/04/2021 5.2 3.5 - 5.2 mmol/L Final      Chloride Chloride   Date Value Ref Range Status "   11/06/2021 105 98 - 107 mmol/L Final   11/04/2021 114 (H) 98 - 107 mmol/L Final      CO2 CO2   Date Value Ref Range Status   11/06/2021 17.0 (L) 22.0 - 29.0 mmol/L Final   11/04/2021 17.3 (L) 22.0 - 29.0 mmol/L Final      BUN BUN   Date Value Ref Range Status   11/06/2021 26 (H) 8 - 23 mg/dL Final   11/04/2021 55 (H) 8 - 23 mg/dL Final      Creatinine Creatinine   Date Value Ref Range Status   11/06/2021 6.67 (H) 0.57 - 1.00 mg/dL Final   11/04/2021 11.82 (H) 0.57 - 1.00 mg/dL Final      Calcium Calcium   Date Value Ref Range Status   11/06/2021 7.5 (L) 8.6 - 10.5 mg/dL Final   11/04/2021 7.4 (L) 8.6 - 10.5 mg/dL Final      PO4 No results found for: CAPO4   Albumin Albumin   Date Value Ref Range Status   11/04/2021 3.30 (L) 3.50 - 5.20 g/dL Final      Magnesium No results found for: MG   Uric Acid No results found for: URICACID        Results Review:     I reviewed the patient's new clinical results.    apixaban, 2.5 mg, Oral, Q12H  budesonide-formoterol, 2 puff, Inhalation, BID - RT  epoetin polina/polina-epbx, 20,000 Units, Subcutaneous, Once  fluticasone, 2 spray, Nasal, Daily  montelukast, 10 mg, Oral, Nightly  pantoprazole, 40 mg, Oral, QAM  sodium bicarbonate, 650 mg, Oral, TID      sodium chloride, 9 mL/hr, Last Rate: 9 mL/hr (11/05/21 1415)        Medication Review: Reviewed    Assessment/Plan     ESRD  HTN  Fluid overload  Hyperkalemia  Swollen access arm  Anemia of CKD    Plan: S/P permcath and HD.  Resting access arm for now.  Follow-up with Vascular outpatient.  Iron OK - will give Epo 20,000 units SQ today.  OK for D/C from a renal standpoint.    Shahram Milner MD   [facility]  11/06/21  10:31 EDT

## 2021-11-06 NOTE — PLAN OF CARE
Goal Outcome Evaluation:   D/c today. S/W amy Arroyo to have dialysis Tuesday 11/9 as next treatment date.

## 2021-11-06 NOTE — DISCHARGE SUMMARY
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  S/p tunneled dialysis catheter insertion  End-stage renal disease with noncompliance with hemodialysis  COPD  Chronic anemia  Chronic hypotension  Right hemicolectomy secondary to tubular adenoma  Gastroesophageal reflux disease    Discharge medications    Current Facility-Administered Medications:   •  apixaban (ELIQUIS) tablet 2.5 mg, 2.5 mg, Oral, Q12H, Haja Chowdhury MD, 2.5 mg at 11/06/21 0821  •  budesonide-formoterol (SYMBICORT) 80-4.5 MCG/ACT inhaler 2 puff, 2 puff, Inhalation, BID - RT, Haja Chowdhury MD, 2 puff at 11/06/21 0839  •  epoetin polina-epbx (RETACRIT) injection 20,000 Units, 20,000 Units, Subcutaneous, Once, Shahram Milner MD  •  fluticasone (FLONASE) 50 MCG/ACT nasal spray 2 spray, 2 spray, Nasal, Daily, Haja Chowdhury MD, 2 spray at 11/06/21 0821  •  montelukast (SINGULAIR) tablet 10 mg, 10 mg, Oral, Nightly, Haja Chowdhury MD  •  pantoprazole (PROTONIX) EC tablet 40 mg, 40 mg, Oral, QAM, Haja Chowdhury MD, 40 mg at 11/06/21 0641  •  sodium bicarbonate tablet 650 mg, 650 mg, Oral, TID, Shahram Milner MD, 650 mg at 11/06/21 1024     Consults obtained  Vascular surgery  Nephrology    Procedures  Right femoral vein hemodialysis catheter insertion    Hospital course  76-year-old -American female with history of end-stage renal disease on hemodialysis and well-known to our service admitted to emergency room with right arm swelling.  Patient found to have right IV access infiltration with shunt thrombosis.  Patient has missed several hemodialysis and emergent right femoral vein hemodialysis catheter inserted and received hemodialysis.  Patient followed by nephrology and vascular surgery and recommend she can go home and they will follow as an outpatient for right IV access infiltration.  Patient is feeling fine and has low  hemoglobin with chronic anemia received Epogen and cleared for discharge    Discharge diet regular    Activity as tolerated    Medication as above    Follow-up with primary doctor in 1 week and follow with vascular surgery nephrology per the instruction and take medication as directed and keep the appointment for hemodialysis 3 times a week.    ALLAN MARTIN MD

## 2021-11-06 NOTE — PROGRESS NOTES
"Daily progress note    Chief complaint  Doing same  Still with right arm swelling  No chest pain shortness of breath palpitation  Had hemodialysis yesterday    History of present illness  76-year-old -American female who has very complex past medical history and well-known to our service including end-stage renal disease on hemodialysis COPD chronic anemia gastroesophageal reflux disease and chronic hypotension who also has had right laparoscopic hemicolectomy secondary to tubular adenoma presented to [facility] emergency room with right arm swelling.  Patient has missed dialysis for almost 1 week.  Patient was told that her AV fistula is not functioning.  Patient work-up in ER revealed hematoma of the right arm with infiltration of her AV access with thrombosis admit for management.  Patient has no fever chills cough chest pain abdominal pain nausea vomiting diarrhea which is slightly increased shortness of breath and swelling.     REVIEW OF SYSTEMS  Constitutional: Negative for chills and fever.   HENT: Negative.    Eyes: Negative.    Respiratory: Negative for cough and shortness of breath.    Cardiovascular: Negative for chest pain and palpitations.   Gastrointestinal: Negative.    Genitourinary: Negative.    Musculoskeletal: Negative.    Skin: Negative.    Neurological: Negative.       PHYSICAL EXAM  Blood pressure 100/53, pulse 84, temperature 98 °F (36.7 °C), temperature source Oral, resp. rate 18, height 152.4 cm (60\"), weight 70.8 kg (156 lb), SpO2 98 %, not currently breastfeeding.    GENERAL: Chronically ill, nontoxic  HEENT:  Unremarkable  CV:  S1-S2  RESPIRATORY: normal effort, lungs CTA B  ABDOMEN: soft nontender nondistended bowel sounds positive  MUSCULOSKELETAL: no deformity  NEURO: alert and oriented x4, moves all extremities, follows commands  SKIN: warm, dry, no obvious cellulitis     LAB RESULTS  Lab Results (last 24 hours)     Procedure Component Value Units Date/Time    " Ferritin [[id number]]  (Abnormal) Collected: 11/06/21 0455    Specimen: Blood Updated: 11/06/21 1003     Ferritin 818.00 ng/mL     Narrative:      Results may be falsely decreased if patient taking Biotin.      Iron Profile [[id number]]  (Abnormal) Collected: 11/06/21 0455    Specimen: Blood Updated: 11/06/21 0957     Iron 42 mcg/dL      Iron Saturation 21 %      Transferrin 135 mg/dL      TIBC 201 mcg/dL     Hepatitis B Surface Antigen [[id number]]  (Normal) Collected: 11/06/21 0455    Specimen: Blood Updated: 11/06/21 0618     Hepatitis B Surface Ag Non-Reactive    Basic Metabolic Panel [[id number]]  (Abnormal) Collected: 11/06/21 0455    Specimen: Blood Updated: 11/06/21 0616     Glucose 81 mg/dL      BUN 26 mg/dL      Creatinine 6.67 mg/dL      Sodium 137 mmol/L      Potassium 4.1 mmol/L      Chloride 105 mmol/L      CO2 17.0 mmol/L      Calcium 7.5 mg/dL      eGFR  African Amer 7 mL/min/1.73      Comment: <15 Indicative of kidney failure.        eGFR Non  Amer --     Comment: <15 Indicative of kidney failure.        BUN/Creatinine Ratio 3.9     Anion Gap 15.0 mmol/L     Narrative:      GFR Normal >60  Chronic Kidney Disease <60  Kidney Failure <15      Hepatitis B Surface Antibody [[id number]]  (Abnormal) Collected: 11/06/21 0455    Specimen: Blood Updated: 11/06/21 0616     Hep B S Ab Reactive    Narrative:      Results may be falsely decreased if patient taking Biotin.      CBC & Differential [[id number]]  (Abnormal) Collected: 11/06/21 0455    Specimen: Blood Updated: 11/06/21 0544    Narrative:      The following orders were created for panel order CBC & Differential.  Procedure                               Abnormality         Status                     ---------                               -----------         ------                     CBC Auto Differential[[id number]]        Abnormal            Final result                 Please view results for these tests on the individual orders.    CBC Auto  Differential [[id number]]  (Abnormal) Collected: 11/06/21 0455    Specimen: Blood Updated: 11/06/21 0544     WBC 4.84 10*3/mm3      RBC 2.81 10*6/mm3      Hemoglobin 7.0 g/dL      Hematocrit 22.7 %      MCV 80.8 fL      MCH 24.9 pg      MCHC 30.8 g/dL      RDW 17.3 %      RDW-SD 49.9 fl      MPV 10.1 fL      Platelets 150 10*3/mm3      Neutrophil % 74.9 %      Lymphocyte % 8.7 %      Monocyte % 13.6 %      Eosinophil % 1.4 %      Basophil % 0.8 %      Immature Grans % 0.6 %      Neutrophils, Absolute 3.62 10*3/mm3      Lymphocytes, Absolute 0.42 10*3/mm3      Monocytes, Absolute 0.66 10*3/mm3      Eosinophils, Absolute 0.07 10*3/mm3      Basophils, Absolute 0.04 10*3/mm3      Immature Grans, Absolute 0.03 10*3/mm3      nRBC 0.0 /100 WBC         Imaging Results (Last 24 Hours)     ** No results found for the last 24 hours. **          Current Facility-Administered Medications:   •  albumin human 25 % IV SOLN 12.5 g, 12.5 g, Intravenous, PRN, Chester Kang MD, 12.5 g at 11/05/21 1816  •  albuterol (PROVENTIL) nebulizer solution 0.083% 2.5 mg/3mL, 2.5 mg, Nebulization, TID PRN, Haja Chowdhury MD  •  apixaban (ELIQUIS) tablet 2.5 mg, 2.5 mg, Oral, Q12H, Haja Chowdhury MD, 2.5 mg at 11/06/21 0821  •  budesonide-formoterol (SYMBICORT) 80-4.5 MCG/ACT inhaler 2 puff, 2 puff, Inhalation, BID - RT, Haja Chowdhury MD, 2 puff at 11/06/21 0839  •  epoetin polina-epbx (RETACRIT) injection 20,000 Units, 20,000 Units, Subcutaneous, Once, Shahram Milner MD  •  fluticasone (FLONASE) 50 MCG/ACT nasal spray 2 spray, 2 spray, Nasal, Daily, Haja Chowdhury MD, 2 spray at 11/06/21 0821  •  heparin (porcine) injection 4,600 Units, 4,600 Units, Intracatheter, PRN, Chester Kang MD, 4,600 Units at 11/05/21 1904  •  HYDROcodone-acetaminophen (NORCO) 5-325 MG per tablet 1 tablet, 1 tablet, Oral, Q4H PRN, Karson Muller MD, 1 tablet at 11/05/21 1351  •  HYDROmorphone (DILAUDID) injection 0.5 mg, 0.5 mg, Intravenous, Q2H  PRN, 0.5 mg at 11/05/21 1548 **AND** naloxone (NARCAN) injection 0.4 mg, 0.4 mg, Intravenous, Q5 Min PRN, Karson Muller MD  •  montelukast (SINGULAIR) tablet 10 mg, 10 mg, Oral, Nightly, Allan Martin MD  •  pantoprazole (PROTONIX) EC tablet 40 mg, 40 mg, Oral, QAM, Allan Martin MD, 40 mg at 11/06/21 0641  •  sodium bicarbonate tablet 650 mg, 650 mg, Oral, TID, Shahram Milner MD, 650 mg at 11/06/21 1024  •  sodium chloride 0.9 % infusion, 9 mL/hr, Intravenous, Continuous PRN, Karson Muller MD, Last Rate: 9 mL/hr at 11/05/21 1415, 9 mL/hr at 11/05/21 1415     ASSESSMENT  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

## 2021-11-07 NOTE — OUTREACH NOTE
Prep Survey      Responses   [facility] facility patient discharged from? [place]   Is LACE score < 7 ? No   Emergency Room discharge w/ pulse ox? 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 Other   Does the patient have Home health ordered? No   Is there a DME ordered? No   Prep survey completed? Yes          Suzanne Ward RN

## 2021-11-09 ENCOUNTER — READMISSION MANAGEMENT (OUTPATIENT)
Dept: CALL CENTER | Facility: HOSPITAL | Age: 76
End: 2021-11-09

## 2021-11-09 NOTE — OUTREACH NOTE
Medical Week 1 Survey      Responses   [facility] patient discharged from? [place]   Does the patient have one of the following disease processes/diagnoses(primary or secondary)? Other   Week 1 attempt successful? No   Unsuccessful attempts Attempt 1          Barbra Broussard RN

## 2021-11-12 ENCOUNTER — READMISSION MANAGEMENT (OUTPATIENT)
Dept: CALL CENTER | Facility: HOSPITAL | Age: 76
End: 2021-11-12

## 2021-11-12 NOTE — OUTREACH NOTE
Medical Week 1 Survey      Responses   [facility] patient discharged from? [place]   Does the patient have one of the following disease processes/diagnoses(primary or secondary)? Other   Week 1 attempt successful? 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

## 2021-11-17 ENCOUNTER — READMISSION MANAGEMENT (OUTPATIENT)
Dept: CALL CENTER | Facility: HOSPITAL | Age: 76
End: 2021-11-17

## 2021-11-17 NOTE — OUTREACH NOTE
Medical Week 1 Survey      Responses   [facility] patient discharged from? [place]   Does the patient have one of the following disease processes/diagnoses(primary or secondary)? Other   Week 1 attempt successful? Yes   Call start time 1040   Call end time 1044   General alerts for this patient Spoke with DTR Joss, states her sister Lily works second shift, so it would be best to call Joss's number.    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 No permission to speak with Joss who states her mother may not answer if she does not know the number.           Breana Hutton RN

## 2022-01-13 ENCOUNTER — APPOINTMENT (OUTPATIENT)
Dept: CARDIOLOGY | Facility: HOSPITAL | Age: 77
End: 2022-01-13

## 2022-01-13 ENCOUNTER — HOSPITAL ENCOUNTER (OUTPATIENT)
Facility: HOSPITAL | Age: 77
Discharge: HOME | End: 2022-01-14
Attending: EMERGENCY MEDICINE | Admitting: SURGERY

## 2022-01-13 ENCOUNTER — APPOINTMENT (OUTPATIENT)
Dept: GENERAL RADIOLOGY | Facility: HOSPITAL | Age: 77
End: 2022-01-13

## 2022-01-13 DIAGNOSIS — T82.42XA DISPLACEMENT OF VASCULAR DIALYSIS CATHETER, INITIAL ENCOUNTER: Primary | ICD-10-CM

## 2022-01-13 DIAGNOSIS — N18.6 ESRD ON DIALYSIS: ICD-10-CM

## 2022-01-13 DIAGNOSIS — Z99.2 ESRD ON DIALYSIS: ICD-10-CM

## 2022-01-13 LAB
ALBUMIN SERPL-MCNC: 3.3 G/DL (ref 3.5–5.2)
ALBUMIN/GLOB SERPL: 1.3 G/DL
ALP SERPL-CCNC: 91 U/L (ref 39–117)
ALT SERPL W P-5'-P-CCNC: 8 U/L (ref 1–33)
ANION GAP SERPL CALCULATED.3IONS-SCNC: 12.1 MMOL/L (ref 5–15)
AST SERPL-CCNC: 25 U/L (ref 1–32)
BASOPHILS # BLD AUTO: 0.01 10*3/MM3 (ref 0–0.2)
BASOPHILS NFR BLD AUTO: 0.3 % (ref 0–1.5)
BILIRUB SERPL-MCNC: 0.2 MG/DL (ref 0–1.2)
BUN SERPL-MCNC: 15 MG/DL (ref 8–23)
BUN/CREAT SERPL: 3.5 (ref 7–25)
CALCIUM SPEC-SCNC: 7.2 MG/DL (ref 8.6–10.5)
CHLORIDE SERPL-SCNC: 98 MMOL/L (ref 98–107)
CO2 SERPL-SCNC: 23.9 MMOL/L (ref 22–29)
CREAT SERPL-MCNC: 4.34 MG/DL (ref 0.57–1)
DEPRECATED RDW RBC AUTO: 55.5 FL (ref 37–54)
EOSINOPHIL # BLD AUTO: 0 10*3/MM3 (ref 0–0.4)
EOSINOPHIL NFR BLD AUTO: 0 % (ref 0.3–6.2)
ERYTHROCYTE [DISTWIDTH] IN BLOOD BY AUTOMATED COUNT: 19 % (ref 12.3–15.4)
GFR SERPL CREATININE-BSD FRML MDRD: 12 ML/MIN/1.73
GFR SERPL CREATININE-BSD FRML MDRD: ABNORMAL ML/MIN/{1.73_M2}
GLOBULIN UR ELPH-MCNC: 2.6 GM/DL
GLUCOSE SERPL-MCNC: 71 MG/DL (ref 65–99)
HCT VFR BLD AUTO: 30.7 % (ref 34–46.6)
HGB BLD-MCNC: 9.7 G/DL (ref 12–15.9)
IMM GRANULOCYTES # BLD AUTO: 0.02 10*3/MM3 (ref 0–0.05)
IMM GRANULOCYTES NFR BLD AUTO: 0.6 % (ref 0–0.5)
LYMPHOCYTES # BLD AUTO: 0.46 10*3/MM3 (ref 0.7–3.1)
LYMPHOCYTES NFR BLD AUTO: 13 % (ref 19.6–45.3)
MCH RBC QN AUTO: 26.1 PG (ref 26.6–33)
MCHC RBC AUTO-ENTMCNC: 31.6 G/DL (ref 31.5–35.7)
MCV RBC AUTO: 82.5 FL (ref 79–97)
MONOCYTES # BLD AUTO: 0.54 10*3/MM3 (ref 0.1–0.9)
MONOCYTES NFR BLD AUTO: 15.3 % (ref 5–12)
NEUTROPHILS NFR BLD AUTO: 2.51 10*3/MM3 (ref 1.7–7)
NEUTROPHILS NFR BLD AUTO: 70.8 % (ref 42.7–76)
NRBC BLD AUTO-RTO: 0 /100 WBC (ref 0–0.2)
PLATELET # BLD AUTO: 114 10*3/MM3 (ref 140–450)
PMV BLD AUTO: 9.8 FL (ref 6–12)
POTASSIUM SERPL-SCNC: 3.5 MMOL/L (ref 3.5–5.2)
PROT SERPL-MCNC: 5.9 G/DL (ref 6–8.5)
RBC # BLD AUTO: 3.72 10*6/MM3 (ref 3.77–5.28)
SARS-COV-2 RNA PNL SPEC NAA+PROBE: DETECTED
SODIUM SERPL-SCNC: 134 MMOL/L (ref 136–145)
WBC NRBC COR # BLD: 3.54 10*3/MM3 (ref 3.4–10.8)

## 2022-01-13 PROCEDURE — 87635 SARS-COV-2 COVID-19 AMP PRB: CPT | Performed by: PHYSICIAN ASSISTANT

## 2022-01-13 PROCEDURE — G0378 HOSPITAL OBSERVATION PER HR: HCPCS

## 2022-01-13 PROCEDURE — C9803 HOPD COVID-19 SPEC COLLECT: HCPCS

## 2022-01-13 PROCEDURE — 94799 UNLISTED PULMONARY SVC/PX: CPT

## 2022-01-13 PROCEDURE — 71045 X-RAY EXAM CHEST 1 VIEW: CPT

## 2022-01-13 PROCEDURE — 99284 EMERGENCY DEPT VISIT MOD MDM: CPT

## 2022-01-13 PROCEDURE — 93990 DOPPLER FLOW TESTING: CPT

## 2022-01-13 PROCEDURE — 85025 COMPLETE CBC W/AUTO DIFF WBC: CPT | Performed by: PHYSICIAN ASSISTANT

## 2022-01-13 PROCEDURE — 94664 DEMO&/EVAL PT USE INHALER: CPT

## 2022-01-13 PROCEDURE — 80053 COMPREHEN METABOLIC PANEL: CPT | Performed by: PHYSICIAN ASSISTANT

## 2022-01-13 RX ORDER — ASCORBIC ACID 500 MG
500 TABLET ORAL DAILY
Status: DISCONTINUED | OUTPATIENT
Start: 2022-01-13 | End: 2022-01-14 | Stop reason: HOSPADM

## 2022-01-13 RX ORDER — MIDODRINE HYDROCHLORIDE 5 MG/1
5 TABLET ORAL
Status: DISCONTINUED | OUTPATIENT
Start: 2022-01-13 | End: 2022-01-13

## 2022-01-13 RX ORDER — ACETAMINOPHEN 325 MG/1
650 TABLET ORAL EVERY 4 HOURS PRN
Status: DISCONTINUED | OUTPATIENT
Start: 2022-01-13 | End: 2022-01-14 | Stop reason: HOSPADM

## 2022-01-13 RX ORDER — MELATONIN
1000 DAILY
Status: DISCONTINUED | OUTPATIENT
Start: 2022-01-13 | End: 2022-01-14 | Stop reason: HOSPADM

## 2022-01-13 RX ORDER — MONTELUKAST SODIUM 10 MG/1
10 TABLET ORAL NIGHTLY
Status: DISCONTINUED | OUTPATIENT
Start: 2022-01-13 | End: 2022-01-14 | Stop reason: HOSPADM

## 2022-01-13 RX ORDER — CEFAZOLIN SODIUM 2 G/100ML
2 INJECTION, SOLUTION INTRAVENOUS ONCE
Status: COMPLETED | OUTPATIENT
Start: 2022-01-14 | End: 2022-01-14

## 2022-01-13 RX ORDER — PANTOPRAZOLE SODIUM 40 MG/1
40 TABLET, DELAYED RELEASE ORAL EVERY MORNING
Status: DISCONTINUED | OUTPATIENT
Start: 2022-01-14 | End: 2022-01-13

## 2022-01-13 RX ORDER — BUDESONIDE AND FORMOTEROL FUMARATE DIHYDRATE 80; 4.5 UG/1; UG/1
2 AEROSOL RESPIRATORY (INHALATION)
Status: DISCONTINUED | OUTPATIENT
Start: 2022-01-13 | End: 2022-01-14 | Stop reason: HOSPADM

## 2022-01-13 RX ORDER — ALBUTEROL SULFATE 2.5 MG/3ML
2.5 SOLUTION RESPIRATORY (INHALATION) 3 TIMES DAILY PRN
Status: DISCONTINUED | OUTPATIENT
Start: 2022-01-13 | End: 2022-01-13

## 2022-01-13 RX ORDER — ONDANSETRON 2 MG/ML
4 INJECTION INTRAMUSCULAR; INTRAVENOUS EVERY 4 HOURS PRN
Status: DISCONTINUED | OUTPATIENT
Start: 2022-01-13 | End: 2022-01-14 | Stop reason: HOSPADM

## 2022-01-13 RX ORDER — FAMOTIDINE 20 MG/1
20 TABLET, FILM COATED ORAL 2 TIMES DAILY
Status: DISCONTINUED | OUTPATIENT
Start: 2022-01-13 | End: 2022-01-14 | Stop reason: HOSPADM

## 2022-01-13 RX ORDER — CETIRIZINE HYDROCHLORIDE 10 MG/1
5 TABLET ORAL DAILY
Status: DISCONTINUED | OUTPATIENT
Start: 2022-01-13 | End: 2022-01-14 | Stop reason: HOSPADM

## 2022-01-13 RX ORDER — BUDESONIDE AND FORMOTEROL FUMARATE DIHYDRATE 160; 4.5 UG/1; UG/1
2 AEROSOL RESPIRATORY (INHALATION) 2 TIMES DAILY
Status: DISCONTINUED | OUTPATIENT
Start: 2022-01-13 | End: 2022-01-13

## 2022-01-13 RX ORDER — ALBUTEROL SULFATE 90 UG/1
2 AEROSOL, METERED RESPIRATORY (INHALATION) EVERY 4 HOURS PRN
Status: DISCONTINUED | OUTPATIENT
Start: 2022-01-13 | End: 2022-01-14 | Stop reason: HOSPADM

## 2022-01-13 RX ORDER — METOPROLOL SUCCINATE 25 MG/1
12.5 TABLET, EXTENDED RELEASE ORAL EVERY MORNING
Status: DISCONTINUED | OUTPATIENT
Start: 2022-01-14 | End: 2022-01-14 | Stop reason: HOSPADM

## 2022-01-13 RX ORDER — SODIUM CHLORIDE 0.9 % (FLUSH) 0.9 %
10 SYRINGE (ML) INJECTION AS NEEDED
Status: DISCONTINUED | OUTPATIENT
Start: 2022-01-13 | End: 2022-01-14 | Stop reason: HOSPADM

## 2022-01-13 RX ORDER — ZINC SULFATE 50(220)MG
220 CAPSULE ORAL DAILY
Status: DISCONTINUED | OUTPATIENT
Start: 2022-01-13 | End: 2022-01-14 | Stop reason: HOSPADM

## 2022-01-13 RX ORDER — CEFAZOLIN SODIUM 2 G/100ML
2 INJECTION, SOLUTION INTRAVENOUS ONCE
Status: CANCELLED | OUTPATIENT
Start: 2022-01-13 | End: 2022-01-13

## 2022-01-13 RX ORDER — GUAIFENESIN 600 MG/1
600 TABLET, EXTENDED RELEASE ORAL EVERY 12 HOURS SCHEDULED
Status: DISCONTINUED | OUTPATIENT
Start: 2022-01-13 | End: 2022-01-14 | Stop reason: HOSPADM

## 2022-01-13 RX ADMIN — BUDESONIDE AND FORMOTEROL FUMARATE DIHYDRATE 2 PUFF: 80; 4.5 AEROSOL RESPIRATORY (INHALATION) at 20:26

## 2022-01-13 RX ADMIN — GUAIFENESIN 600 MG: 600 TABLET, EXTENDED RELEASE ORAL at 21:41

## 2022-01-13 RX ADMIN — MONTELUKAST SODIUM 10 MG: 10 TABLET, FILM COATED ORAL at 21:41

## 2022-01-13 RX ADMIN — CETIRIZINE HYDROCHLORIDE 5 MG: 10 TABLET ORAL at 18:37

## 2022-01-13 RX ADMIN — Medication 1000 UNITS: at 18:37

## 2022-01-13 RX ADMIN — OXYCODONE HYDROCHLORIDE AND ACETAMINOPHEN 500 MG: 500 TABLET ORAL at 18:37

## 2022-01-13 RX ADMIN — FAMOTIDINE 20 MG: 20 TABLET, FILM COATED ORAL at 21:41

## 2022-01-13 NOTE — CONSULTS
[facility]                                                                                             Nephrology Initial Consult Note    Patient Identification:  Name: Nellie Vegas MRN: [id number]  Age: 76 y.o. : 1945  Sex: female  Date:2022    Requesting Physician: As per consult order.  Reason for Consultation: ESRD management  Information from:patient/ family/ chart      History of Present Illness: This is a 76 y.o. year old female with end-stage renal disease on dialysis .  She was receiving her dialysis today at her unit, no issues at the start of her dialysis.  She got up to use the restroom with about 1 hour to go and accidentally got her catheter stuck on her closed, catheter was pulled out exposing the cuff and she was sent to the ER.  She completed all but 1 hour of her treatment denies any complaints.  No fevers chills shortness of breath chest pain or edema.  Vascular has been consulted for catheter replacement.  She received 2 doses of the COVID-vaccine, has not received the booster, did test positive for COVID-19 in the ER but has no respiratory symptoms.  She denies any new complaints at this time    The following medical history and medications personally reviewed by me:    Problem List:   Patient Active Problem List    Diagnosis    • Displacement of vascular dialysis catheter ([facility]) [T82.42XA]    • Hematoma of arm, right, initial encounter [S40.021A]    • Severe malnutrition ([facility]) [E43]    • Failure to thrive in adult [R62.7]    • AV shunt thrombosis, subsequent encounter [T82.868D]    • Colonic mass [K63.89]    • Lower GI bleeding [K92.2]    • Problem with dialysis access ([facility]) [T82.898A]    • COPD exacerbation ([facility]) [J44.1]    • Anemia of chronic renal failure, stage 5 ([facility]) [N18.5, D63.1]    • ESRD (end stage renal disease) on dialysis ([facility]) [N18.6,  Z99.2]    • Thrombosis of kidney dialysis arteriovenous graft (HCC) [T82.868A]        Past Medical History:  Past Medical History:   Diagnosis Date   • Anemia    • Anesthesia complication     mother woke up in combative state   • Arthritis     knees   • Asthma    • COPD (chronic obstructive pulmonary disease) (HCC)    • Diabetes mellitus (HCC)     type 2   • Dialysis patient (HCC)    • Disease of thyroid gland    • GERD (gastroesophageal reflux disease)    • Headache     after dialysis   • History of blood clots     BUE   • History of kidney stones    • Hyperlipidemia    • Hypertension    • Knee pain, bilateral    • Renal disease    • Sleep apnea     CPAP   • SOB (shortness of breath)    • 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)    • Weakness        Past Surgical History:  Past Surgical History:   Procedure Laterality Date   • ARTERIOVENOUS FISTULA Right    • ARTERIOVENOUS FISTULA Left     REMOVED   • CENTRAL VENOUS CATHETER TUNNELED INSERTION DOUBLE LUMEN     •  SECTION     • COLON RESECTION Right 2021    Procedure: RIGHT HEMICOLECTOMY LAPAROSCOPIC;  Surgeon: Zbigniew Conner MD;  Location: [facility];  Service: General;  Laterality: Right;   • COLONOSCOPY     • COLONOSCOPY N/A 2021    Procedure: COLONOSCOPY into cecum with biopsy;  Surgeon: Fabricio Monroe MD;  Location: [facility] ENDOSCOPY;  Service: Gastroenterology;  Laterality: N/A;  cecal mass   • EXPLORATORY LAPAROTOMY N/A 8/3/2021    Procedure: LAPAROTOMY EXPLORATORY, resection of ileocolonic anastomosis with anastomosis;  Surgeon: Zbigniew Conner MD;  Location: [facility] OR;  Service: General;  Laterality: N/A;   • INSERTION HEMODIALYSIS CATHETER Right 2021    Procedure: VENACAVAGRAM AND HEMODIALYSIS CATHETER INSERTION;  Surgeon: Karson Muller MD;  [facility] ;  Service: Vascular;  Laterality: Right;  Dr Bryant was primary surgeon   • INSERTION HEMODIALYSIS  CATHETER N/A 11/5/2021    Procedure: HEMODIALYSIS CATHETER INSERTION;  Surgeon: Karson Muller MD;  Location: [facility];  Service: Vascular;  Laterality: N/A;   • POLYPECTOMY      UTERINE   • SHUNT O GRAM Right 8/9/2019    Procedure: RIGHT ARM DIALYSIS GRAFT revision and THROMBECTOMY;  Surgeon: Thierry Hadry MD;  Location: [facility] OR 18/19;  Service: Vascular   • SHUNT O GRAM Right 8/22/2019    Procedure: RIGHT ARM DIALYSIS GRAFT THROMBECTOMY WITH STENTING;  Surgeon: Thierry Hardy MD;  Location: [facility] OR 18/19;  Service: Vascular   • SHUNT O GRAM Right 12/7/2020    Procedure: RIGHT ARM ARTERIOVENOUS SHUNTOGRAM WITH THROMBECTOMY;  Surgeon: Thierry Hardy MD;  Location: [facility] OR 18/19;  Service: Vascular;  Laterality: Right;   • SHUNT O GRAM Right 7/12/2021    Procedure: Right arm dialysis shuntogram with shunt thrombectomy;  Surgeon: Shahram Gallagher MD;  Location: [facility] OR 18/19;  Service: Vascular;  Laterality: Right;   • SHUNT O GRAM Right 7/19/2021    Procedure: RIGHT UPPER EXTREMITY THROMBECTOMY AND SHUNTOGRAM;  Surgeon: Shahram Gallagher MD;  Location: [facility];  Service: Vascular;  Laterality: Right;        Home Meds:   Medications Prior to Admission   Medication Sig Dispense Refill Last Dose   • albuterol (PROVENTIL) (2.5 MG/3ML) 0.083% nebulizer solution Take 2.5 mg by nebulization 3 (Three) Times a Day As Needed for Wheezing. UNSURE OF DOSAGE      • albuterol sulfate  (90 Base) MCG/ACT inhaler INHALE 2 PUFFS INTO THE LUNGS EVERY 4 HOURS AS NEEDED FOR WHEEZING      • apixaban (ELIQUIS) 2.5 MG tablet tablet Take 2.5 mg by mouth 2 (Two) Times a Day.      • Breo Ellipta 100-25 MCG/INH inhaler       • budesonide-formoterol (SYMBICORT) 160-4.5 MCG/ACT inhaler Inhale 2 puffs 2 (Two) Times a Day.      • Cholecalciferol (VITAMIN D3) 5000 units capsule capsule Take 5,000 Units by mouth Every Morning.      • fluticasone (FLONASE) 50 MCG/ACT nasal  spray 2 sprays into the nostril(s) as directed by provider Daily.      • glucose blood (Accu-Chek Amber Plus) test strip 1 strip by Other route 3 (Three) Times a Day.      • metoprolol succinate XL (TOPROL-XL) 25 MG 24 hr tablet Take 0.5 tablets by mouth Every Morning for 30 days. 15 tablet 0    • midodrine (PROAMATINE) 5 MG tablet Take 1 tablet by mouth 3 (Three) Times a Day Before Meals for 30 days. 90 tablet 0    • montelukast (SINGULAIR) 10 MG tablet Take 10 mg by mouth Every Night.      • omeprazole (priLOSEC) 40 MG capsule Take 40 mg by mouth Every Morning.          Current Meds:   Current Facility-Administered Medications   Medication Dose Route Frequency Provider Last Rate Last Admin   • albuterol sulfate HFA (PROVENTIL HFA;VENTOLIN HFA;PROAIR HFA) inhaler 2 puff  2 puff Inhalation Q4H PRN Haja Chowdhury MD       • apixaban (ELIQUIS) tablet 2.5 mg  2.5 mg Oral Q12H Haja Chowdhury MD       • ascorbic acid (VITAMIN C) tablet 500 mg  500 mg Oral Daily Haja Chowdhury MD       • budesonide-formoterol (SYMBICORT) 80-4.5 MCG/ACT inhaler 2 puff  2 puff Inhalation BID - RT Haja Chowdhury MD       • cetirizine (zyrTEC) tablet 5 mg  5 mg Oral Daily Haja Chowdhury MD       • cholecalciferol (VITAMIN D3) tablet 1,000 Units  1,000 Units Oral Daily Haja Chowdhury MD       • famotidine (PEPCID) tablet 20 mg  20 mg Oral BID Haja Chowdhury MD       • guaiFENesin (MUCINEX) 12 hr tablet 600 mg  600 mg Oral Q12H Haja Chowdhury MD       • [START ON 1/14/2022] metoprolol succinate XL (TOPROL-XL) 24 hr tablet 12.5 mg  12.5 mg Oral QAM Haja Chowdhury MD       • montelukast (SINGULAIR) tablet 10 mg  10 mg Oral Nightly Haja Chowdhury MD       • sodium chloride 0.9 % flush 10 mL  10 mL Intravenous PRN Dayo Presley PA       • zinc sulfate (ZINCATE) capsule 220 mg  220 mg Oral Daily Haja Chowdhury MD           Allergies:  Allergies   Allergen Reactions   • Sulfa Antibiotics Hives   • Adhesive Tape Hives     Paper tape   • Latex Rash       Social  "History:   Social History     Socioeconomic History   • Marital status:    Tobacco Use   • Smoking status: Former Smoker     Packs/day: 0.00     Years: 4.00     Pack years: 0.00     Types: Cigarettes     Quit date: 1966     Years since quittin.0   • Smokeless tobacco: Never Used   Vaping Use   • Vaping Use: Never used   Substance and Sexual Activity   • Alcohol use: No   • Drug use: No   • Sexual activity: Defer        Family History:  Family History   Problem Relation Age of Onset   • Malig Hyperthermia Neg Hx         Review of Systems: as per HPI, in addition:    General:      Denies weakness / fatigue,                       No fevers / chills                       no weight loss  HEENT:       no dysphagia / odynophagia  Neck:           normal range of motion, no swelling  Respiratory: no cough / congestion                      No shortness of air                       No wheezing  CV:              No chest pain                       No palpitations  Abdomen/GI: no nausea / vomiting                      No diarrhea / constipation                      No abdominal pain  :             no dysuria / urinary frequency                       No urgency, normal output  Endocrine:   no polyuria / polydipsia,                      No heat or cold intolerance  Skin:           no rashes or skin breakdown   Vascular:   No edema                     No claudication  Psych:        no depression/ anxiety  Neuro:        no focal weakness, no seizures  Musculoskeletal: no joint pain or deformities      Physical Exam:  Vitals:   Temp (24hrs), Av.8 °F (36.6 °C), Min:97.8 °F (36.6 °C), Max:97.8 °F (36.6 °C)    /61   Pulse 63   Temp 97.8 °F (36.6 °C)   Resp 18   Ht 157.5 cm (62\")   Wt 70.8 kg (156 lb)   SpO2 99%   BMI 28.53 kg/m²   Intake/Output:   No intake or output data in the 24 hours ending 22 1632     Wt Readings from Last 1 Encounters:   22 1209 70.8 kg (156 lb)       Exam:    General " Appearance:  Awake, alert, oriented x3, no acute distress  Well-appearing   Head and Face:  Normocephalic, atraumatic, mucus membranes moist, oropharynx clear   Eyes:  No icterus, pupils equal round and reactive to light, extraocular movements intact    ENMT: Moist mucosa, tongue symmetric    Neck: Supple  no jugular venous distention  no thyromegaly   Pulmonary:  Respiratory effort: Normal  Auscultation of lungs: Clear bilaterally  No wheezes  No rhonchi  Good air movement, good expansion   Chest wall:  No tenderness or deformity   Cardiovascular:  Auscultation of the heart: Normal rhythm, no murmurs  No edema of bilateral lower extremities   Abdomen:  Abdomen: soft, non-tender, normal bowel sounds all four quadrants, no masses   Liver and spleen: no hepatosplenomegaly   Musculoskeletal: Digits and nails: normal  Normal range of motion  No joint swelling or gross deformities    Skin: Skin inspection: color normal, no visible rashes or lesions  Skin palpation: texture, turgor normal, no palpable lesions   Lymphatic:  no cervical lymphadenopathy    Psychiatric: Judgement and insight: normal  Orientation to person place and time: normal  Mood and affect: normal       DATA:  Radiology and Labs:  The following labs independently reviewed by me, additional AM labs ordered  Old records independently reviewed showing ESRD  The following radiologic studies independently viewed by me, findings chest x-ray 2 days ago showed no focal disease  Interval notes, chart personally reviewed by me.  I have reviewed and summarized old records as detailed above  Plan of care discussed with Dr. Chowdhury  New problems include dislodged dialysis catheter    Dialysis patient access: Groin catheter    Risk/ complexity of medical care/ medical decision making: Moderate complexity, need for surgery  Chronic illness with severe exacerbation or progression      Labs:   Recent Results (from the past 24 hour(s))   Comprehensive Metabolic Panel     Collection Time: 01/13/22  2:11 PM    Specimen: Arm, Left; Blood   Result Value Ref Range    Glucose 71 65 - 99 mg/dL    BUN 15 8 - 23 mg/dL    Creatinine 4.34 (H) 0.57 - 1.00 mg/dL    Sodium 134 (L) 136 - 145 mmol/L    Potassium 3.5 3.5 - 5.2 mmol/L    Chloride 98 98 - 107 mmol/L    CO2 23.9 22.0 - 29.0 mmol/L    Calcium 7.2 (L) 8.6 - 10.5 mg/dL    Total Protein 5.9 (L) 6.0 - 8.5 g/dL    Albumin 3.30 (L) 3.50 - 5.20 g/dL    ALT (SGPT) 8 1 - 33 U/L    AST (SGOT) 25 1 - 32 U/L    Alkaline Phosphatase 91 39 - 117 U/L    Total Bilirubin 0.2 0.0 - 1.2 mg/dL    eGFR Non  Amer      eGFR  African Amer 12 (L) >60 mL/min/1.73    Globulin 2.6 gm/dL    A/G Ratio 1.3 g/dL    BUN/Creatinine Ratio 3.5 (L) 7.0 - 25.0    Anion Gap 12.1 5.0 - 15.0 mmol/L   CBC Auto Differential    Collection Time: 01/13/22  2:11 PM    Specimen: Blood   Result Value Ref Range    WBC 3.54 3.40 - 10.80 10*3/mm3    RBC 3.72 (L) 3.77 - 5.28 10*6/mm3    Hemoglobin 9.7 (L) 12.0 - 15.9 g/dL    Hematocrit 30.7 (L) 34.0 - 46.6 %    MCV 82.5 79.0 - 97.0 fL    MCH 26.1 (L) 26.6 - 33.0 pg    MCHC 31.6 31.5 - 35.7 g/dL    RDW 19.0 (H) 12.3 - 15.4 %    RDW-SD 55.5 (H) 37.0 - 54.0 fl    MPV 9.8 6.0 - 12.0 fL    Platelets 114 (L) 140 - 450 10*3/mm3    Neutrophil % 70.8 42.7 - 76.0 %    Lymphocyte % 13.0 (L) 19.6 - 45.3 %    Monocyte % 15.3 (H) 5.0 - 12.0 %    Eosinophil % 0.0 (L) 0.3 - 6.2 %    Basophil % 0.3 0.0 - 1.5 %    Immature Grans % 0.6 (H) 0.0 - 0.5 %    Neutrophils, Absolute 2.51 1.70 - 7.00 10*3/mm3    Lymphocytes, Absolute 0.46 (L) 0.70 - 3.10 10*3/mm3    Monocytes, Absolute 0.54 0.10 - 0.90 10*3/mm3    Eosinophils, Absolute 0.00 0.00 - 0.40 10*3/mm3    Basophils, Absolute 0.01 0.00 - 0.20 10*3/mm3    Immature Grans, Absolute 0.02 0.00 - 0.05 10*3/mm3    nRBC 0.0 0.0 - 0.2 /100 WBC   COVID-19,[facility] IN-HOUSE CEPHEID/TAB NP SWAB IN TRANSPORT MEDIA 8-12 HR TAT - Swab, Nasopharynx    Collection Time: 01/13/22  2:11 PM    Specimen: Nasopharynx; Swab    Result Value Ref Range    COVID19 Detected (C) Not Detected - Ref. Range       Radiology:  Imaging Results (Last 24 Hours)     ** No results found for the last 24 hours. **               ASSESSMENT:   ESRD on dialysis    Displacement of vascular dialysis catheter (HCC)  Anemia of CKD  Previous colonic mass status post bowel resection  Hypertension  Covid-19 testing positive      PLAN:   Vascular has been consulted for tunneled catheter exchange  Continue Tuesday Thursday Saturday schedule dialysis this admission  No need for additional dialysis at this time  Start Epogen for anemia  Unfortunately her COVID-19 test came back positive so she will need a COVID dialysis cohort unit arranged prior to discharge      Continue to monitor electrolytes and volume closely, avoid IV contrast and nephrotoxic medications     I appreciate the consult request, please call if any questions      Chester Kang M.D  [facility]  Office phone number: [phone]  Answering service phone number: [phone]        1/13/2022        Dictation via Dragon dictation software

## 2022-01-13 NOTE — CONSULTS
"Name: Nellie Vegas ADMIT: 2022   : 1945  PCP: Laurent Cortes DO    MRN: [id number] LOS: 0 days   AGE/SEX: 76 y.o. female  ROOM: [facility]     Inpatient Vascular Surgery Consult  Consult performed by: Beronica Jolly MD  Consult ordered by: Haja Chowdhury MD Ashlee Ann Vinyard, MD     LOS: 0 days   Patient Care Team:  Laurent Cortes DO as PCP - General (Internal Medicine)  Kvng Guerra MD as Consulting Physician (Pulmonary Disease)    Subjective     History of Present Illness  76 y.o. female with a h/o ESRD on HD /, asthma, COPD, type 2 diabetes mellitus, hypertension, hyperlipidemia, and sleep apnea who was admitted after her dialysis catheter \"came out\" earlier this afternoon.  Her last dialysis session was this morning and it was a full session.  She has a right arm axillary-axillary loop graft that has had multiple episodes of thrombosis requiring thrombectomy, most recently on 21 by Dr. Shahram Gallagher. This past November, she had an episode of infiltration after her graft was accessed and had a right femoral tunneled catheter placed at that time for access.  Her graft has not been used since that time.  On my exam, it is pulsatile.  Her arm swelling has greatly improved since November. She had a positive COVID 19 screening in the ER but is currently asymptomatic.  She has been vaccinated for COVID 19. 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    Review of Systems   Constitutional: Negative.    HENT: Negative.    Eyes: Negative.    Respiratory: Negative.    Cardiovascular: Negative.    Gastrointestinal: Negative.    Endocrine: Negative.    Genitourinary: Negative.    Musculoskeletal: Negative.    Skin: Negative.    Allergic/Immunologic: Negative.    Neurological: Negative.    Hematological: Negative.    Psychiatric/Behavioral: Negative.        Past Medical History:   Diagnosis Date   • Anemia    • Anesthesia complication     mother woke up in combative state "   • Arthritis     knees   • Asthma    • COPD (chronic obstructive pulmonary disease) (HCC)    • Diabetes mellitus (HCC)     type 2   • Dialysis patient (HCC)    • Disease of thyroid gland    • GERD (gastroesophageal reflux disease)    • Headache     after dialysis   • History of blood clots     BUE   • History of kidney stones    • Hyperlipidemia    • Hypertension    • Knee pain, bilateral    • Renal disease    • Sleep apnea     CPAP   • SOB (shortness of breath)    • 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)    • Weakness        Past Surgical History:   Procedure Laterality Date   • ARTERIOVENOUS FISTULA Right    • ARTERIOVENOUS FISTULA Left     REMOVED   • CENTRAL VENOUS CATHETER TUNNELED INSERTION DOUBLE LUMEN     •  SECTION     • COLON RESECTION Right 2021    Procedure: RIGHT HEMICOLECTOMY LAPAROSCOPIC;  Surgeon: Zbigniew Conner MD;  Location: [facility] OR;  Service: General;  Laterality: Right;   • COLONOSCOPY     • COLONOSCOPY N/A 2021    Procedure: COLONOSCOPY into cecum with biopsy;  Surgeon: Fabricio Monroe MD;  Location: [facility] ENDOSCOPY;  Service: Gastroenterology;  Laterality: N/A;  cecal mass   • EXPLORATORY LAPAROTOMY N/A 8/3/2021    Procedure: LAPAROTOMY EXPLORATORY, resection of ileocolonic anastomosis with anastomosis;  Surgeon: Zbigniew Conner MD;  Location: [facility] MAIN OR;  Service: General;  Laterality: N/A;   • INSERTION HEMODIALYSIS CATHETER Right 2021    Procedure: VENACAVAGRAM AND HEMODIALYSIS CATHETER INSERTION;  Surgeon: Karson Muller MD;  Location: [facility] HYBRID OR ;  Service: Vascular;  Laterality: Right;  Dr Bryant was primary surgeon   • INSERTION HEMODIALYSIS CATHETER N/A 2021    Procedure: HEMODIALYSIS CATHETER INSERTION;  Surgeon: Karson Muller MD;  Location: [facility] MAIN OR;  Service: Vascular;  Laterality: N/A;   • POLYPECTOMY      UTERINE   • SHUNT O GRAM Right 2019    Procedure: RIGHT ARM  DIALYSIS GRAFT revision and THROMBECTOMY;  Surgeon: Thierry Hardy MD;  Location: [facility] OR ;  Service: Vascular   • SHUNT O GRAM Right 2019    Procedure: RIGHT ARM DIALYSIS GRAFT THROMBECTOMY WITH STENTING;  Surgeon: Thierry Hardy MD;  Location: [facility] OR ;  Service: Vascular   • SHUNT O GRAM Right 2020    Procedure: RIGHT ARM ARTERIOVENOUS SHUNTOGRAM WITH THROMBECTOMY;  Surgeon: Thierry Hardy MD;  Location: [facility] OR ;  Service: Vascular;  Laterality: Right;   • SHUNT O GRAM Right 2021    Procedure: Right arm dialysis shuntogram with shunt thrombectomy;  Surgeon: Shahram Gallagher MD;  Location: [facility] OR ;  Service: Vascular;  Laterality: Right;   • SHUNT O GRAM Right 2021    Procedure: RIGHT UPPER EXTREMITY THROMBECTOMY AND SHUNTOGRAM;  Surgeon: Shahram Gallagher MD;  Location: [facility];  Service: Vascular;  Laterality: Right;       Family History   Problem Relation Age of Onset   • Malig Hyperthermia Neg Hx        Social History     Tobacco Use   • Smoking status: Former Smoker     Packs/day: 0.00     Years: 4.00     Pack years: 0.00     Types: Cigarettes     Quit date: 1966     Years since quittin.0   • Smokeless tobacco: Never Used   Vaping Use   • Vaping Use: Never used   Substance Use Topics   • Alcohol use: No   • Drug use: No       Allergies: Sulfa antibiotics, Adhesive tape, and Latex    Medications Prior to Admission   Medication Sig Dispense Refill Last Dose   • albuterol (PROVENTIL) (2.5 MG/3ML) 0.083% nebulizer solution Take 2.5 mg by nebulization 3 (Three) Times a Day As Needed for Wheezing. UNSURE OF DOSAGE   Past Week at Unknown time   • albuterol sulfate  (90 Base) MCG/ACT inhaler INHALE 2 PUFFS INTO THE LUNGS EVERY 4 HOURS AS NEEDED FOR WHEEZING   Past Week at Unknown time   • apixaban (ELIQUIS) 2.5 MG tablet tablet Take 2.5 mg by mouth 2 (Two) Times a Day.   2022 at Unknown time   •  Breo Ellipta 100-25 MCG/INH inhaler    Past Week at Unknown time   • budesonide-formoterol (SYMBICORT) 160-4.5 MCG/ACT inhaler Inhale 2 puffs 2 (Two) Times a Day.   Past Week at Unknown time   • Cholecalciferol (VITAMIN D3) 5000 units capsule capsule Take 5,000 Units by mouth Every Morning.   1/13/2022 at Unknown time   • fluticasone (FLONASE) 50 MCG/ACT nasal spray 2 sprays into the nostril(s) as directed by provider Daily.   1/13/2022 at Unknown time   • glucose blood (Accu-Chek Amber Plus) test strip 1 strip by Other route 3 (Three) Times a Day.   1/13/2022 at Unknown time   • montelukast (SINGULAIR) 10 MG tablet Take 10 mg by mouth Every Night.   1/12/2022 at Unknown time   • omeprazole (priLOSEC) 40 MG capsule Take 40 mg by mouth Every Morning.   1/13/2022 at Unknown time   • metoprolol succinate XL (TOPROL-XL) 25 MG 24 hr tablet Take 0.5 tablets by mouth Every Morning for 30 days. 15 tablet 0    • midodrine (PROAMATINE) 5 MG tablet Take 1 tablet by mouth 3 (Three) Times a Day Before Meals for 30 days. 90 tablet 0      vitamin C, 500 mg, Oral, Daily  budesonide-formoterol, 2 puff, Inhalation, BID - RT  cetirizine, 5 mg, Oral, Daily  cholecalciferol, 1,000 Units, Oral, Daily  famotidine, 20 mg, Oral, BID  guaiFENesin, 600 mg, Oral, Q12H  [START ON 1/14/2022] metoprolol succinate XL, 12.5 mg, Oral, QAM  montelukast, 10 mg, Oral, Nightly  zinc sulfate, 220 mg, Oral, Daily         •  albuterol sulfate HFA  •  [COMPLETED] Insert peripheral IV **AND** sodium chloride      Objective   Temp:  [97.8 °F (36.6 °C)-98.1 °F (36.7 °C)] 98.1 °F (36.7 °C)  Heart Rate:  [] 69  Resp:  [16-18] 18  BP: (126-161)/(61-86) 161/61    No intake/output data recorded.    Physical Exam  Constitutional:       General: She is not in acute distress.     Appearance: Normal appearance.   HENT:      Head: Normocephalic and atraumatic.      Right Ear: External ear normal.      Left Ear: External ear normal.      Nose: Nose normal.       Mouth/Throat:      Mouth: Mucous membranes are moist.      Pharynx: Oropharynx is clear.   Eyes:      Extraocular Movements: Extraocular movements intact.      Conjunctiva/sclera: Conjunctivae normal.      Pupils: Pupils are equal, round, and reactive to light.   Cardiovascular:      Rate and Rhythm: Normal rate and regular rhythm.      Comments: Right arm AV graft pulsatile, no appreciable swelling  Right radial pulse non palpable, hand warm with no ulceration  Pulmonary:      Effort: Pulmonary effort is normal. No respiratory distress.   Abdominal:      General: Abdomen is flat. There is no distension.      Palpations: Abdomen is soft.   Musculoskeletal:         General: No swelling or deformity.      Cervical back: Normal range of motion and neck supple.      Comments: Right femoral tunneled catheter site with no bleeding or hematoma   Skin:     General: Skin is warm and dry.      Capillary Refill: Capillary refill takes less than 2 seconds.   Neurological:      General: No focal deficit present.      Mental Status: She is alert.   Psychiatric:         Mood and Affect: Mood normal.         Behavior: Behavior normal.         Results from last 7 days   Lab Units 01/13/22  1411   WBC 10*3/mm3 3.54   HEMOGLOBIN g/dL 9.7*   PLATELETS 10*3/mm3 114*     Results from last 7 days   Lab Units 01/13/22  1411   SODIUM mmol/L 134*   POTASSIUM mmol/L 3.5   CHLORIDE mmol/L 98   CO2 mmol/L 23.9   BUN mg/dL 15   CREATININE mg/dL 4.34*   GLUCOSE mg/dL 71   Estimated Creatinine Clearance: 9.4 mL/min (A) (by C-G formula based on SCr of 4.34 mg/dL (H)).      Imaging Studies:  Duplex pending      Active Hospital Problems    Diagnosis  POA   • Displacement of vascular dialysis catheter (HCC) [T82.42XA]  Yes      Resolved Hospital Problems   No resolved problems to display.     Problem Points:  4:  Patient has a new problem, with additional work-up planned  Total problem points:4 or more    Data Points:  1:  I have reviewed or order  "clinical lab test  1:  I have reviewed or order radiology test (except heart catheterization or echo)  1:  I have reviewed or order medicine test (PFT, EKG, caridac echo or cath)  2:  I have reviewed and summation of old records and/or discussed the patients care with another health care provider  Total data points:4 or more    Risk Points:  High:  Any illness that poses threat to life or body funciton    MDM Prob point Data point Risk   SF 1 1 Minimal   Low 2 2 Low   Mod 3 3 Moderate   High 4 4 High     Code MDM History Exam Time   83288 SF/Low Detailed Detailed 30   60248 Mod Comprehensive Comprehensive 50   15321 High Comprehensive Comprehensive 70     Detailed history:  4 elements HPI or status of 3 chronic problems; 2-9 system ROS  Comprehensive:  4 elements HPI or status of 3 chronic problems;  10 system ROS    Detailed Exam:  12 findings from any organ system  Comprehensive Exam:  2 findings from each of 9 systems.     Billin    Assessment/Plan       Displacement of vascular dialysis catheter (HCC)        76 y.o. female  with a h/o ESRD on HD //S, asthma, COPD, type 2 diabetes mellitus, hypertension, hyperlipidemia, and sleep apnea who was admitted after her dialysis catheter \"came out\" earlier this afternoon. She has a right axillary loop AV graft that has long history of recurrent shunt thrombosis, with recent infiltration in 2021.  Graft has not been used since 2021.  She is also incidentally COVID 19 positive with no symptoms.     -we will plan for tunneled dialysis catheter placement tomorrow in the OR  -will obtain graft duplex to determine if fistulagram needed.  On exam, fistula pulsatile so I suspect recurrent central venous stenosis to be present.  However, I would recommend an outpatient fistulagram once she recovers from her COVID 19 infection.    I discussed the patients findings and my recommendations with patient.  Please call my office with any question: [phone] " [phone]    Beronica Jolly MD  01/13/22  17:36 EST

## 2022-01-13 NOTE — NURSING NOTE
This nurse was assessing patient and found her femoral dialysis cath completely pulled out and sitting in her pants. There was very little bleeding. Controlled with pressure and covered with gauze and tegaderm. MD notified.

## 2022-01-13 NOTE — H&P
History and physical    Primary care physician  Dr. SCOTT    Chief complaint  Displacement of dialysis catheter    History of present illness  76-year-old -American female with very complex past medical history and well-known to our service including end-stage renal disease on hemodialysis COPD chronic anemia hypercoagulable state tubular adenoma and gastroesophageal reflux disease sent to the ER from dialysis center where she was about to finish her dialysis and went to the restroom and partially pulled dialysis catheter out.  Patient has no other complaints.  Patient does have chronic cough but denies any increased shortness of breath fever chest pain abdominal pain nausea vomiting diarrhea.  Patient routine screening test for COVID came back positive.  Patient did receive 2 doses of vaccine but have not received the booster dose yet.    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)     • Weakness       PAST SURGICAL HISTORY              Procedure Laterality Date   • ARTERIOVENOUS FISTULA Right     • ARTERIOVENOUS FISTULA Left       REMOVED   • CENTRAL VENOUS CATHETER TUNNELED INSERTION DOUBLE LUMEN       •  SECTION       • COLON RESECTION Right 2021     Procedure: RIGHT HEMICOLECTOMY LAPAROSCOPIC;  Surgeon: Zbigniew Conner MD;  Location: [facility];  Service: General;  Laterality: Right;   • COLONOSCOPY      • COLONOSCOPY N/A 2021     Procedure: COLONOSCOPY into cecum with  biopsy;  Surgeon: Fabricio Monroe MD;  Location: [facility] ENDOSCOPY;  Service: Gastroenterology;  Laterality: N/A;  cecal mass   • EXPLORATORY LAPAROTOMY N/A 8/3/2021     Procedure: LAPAROTOMY EXPLORATORY, resection of ileocolonic anastomosis with anastomosis;  Surgeon: Zbigniew Conner MD;  Location: [facility] OR;  Service: General;  Laterality: N/A;   • INSERTION HEMODIALYSIS CATHETER Right 7/16/2021     Procedure: VENACAVAGRAM AND HEMODIALYSIS CATHETER INSERTION;  Surgeon: Karson Muller MD;  Location: [facility] OR 18/19;  Service: Vascular;  Laterality: Right;  Dr Bryant was primary surgeon   • INSERTION HEMODIALYSIS CATHETER N/A 11/5/2021     Procedure: HEMODIALYSIS CATHETER INSERTION;  Surgeon: Karson Muller MD;  Location: [facility] OR;  Service: Vascular;  Laterality: N/A;   • POLYPECTOMY         UTERINE   • SHUNT O GRAM Right 8/9/2019     Procedure: RIGHT ARM DIALYSIS GRAFT revision and THROMBECTOMY;  Surgeon: Thierry Hardy MD;  Location: [facility] OR 18/19;  Service: Vascular   • SHUNT O GRAM Right 8/22/2019     Procedure: RIGHT ARM DIALYSIS GRAFT THROMBECTOMY WITH STENTING;  Surgeon: Thierry Hardy MD;  Location: [facility] OR 18/19;  Service: Vascular   • SHUNT O GRAM Right 12/7/2020     Procedure: RIGHT ARM ARTERIOVENOUS SHUNTOGRAM WITH THROMBECTOMY;  Surgeon: Thierry Hardy MD;  Location: [facility] OR 18/19;  Service: Vascular;  Laterality: Right;   • SHUNT O GRAM Right 7/12/2021     Procedure: Right arm dialysis shuntogram with shunt thrombectomy;  Surgeon: Shahram Gallagher MD;  Location: [facility] 18/19;  Service: Vascular;  Laterality: Right;   • SHUNT O GRAM Right 7/19/2021     Procedure: RIGHT UPPER EXTREMITY THROMBECTOMY AND SHUNTOGRAM;  Surgeon: Shahram Gallagher MD;  Location: [facility];  Service: Vascular;  Laterality: Right;         FAMILY HISTORY           Problem Relation Age of Onset   • Malig Hyperthermia Neg Hx    "     SOCIAL HISTORY                 Socioeconomic History   • Marital status:    Tobacco Use   • Smoking status: Former Smoker       Packs/day: 0.00       Years: 4.00       Pack years: 0.00       Types: Cigarettes       Quit date: 1966       Years since quittin.0   • Smokeless tobacco: Never Used   Vaping Use   • Vaping Use: Never used   Substance and Sexual Activity   • Alcohol use: No   • Drug use: No   • Sexual activity: Defer         ALLERGIES  Sulfa antibiotics, Adhesive tape, and Latex  Home medications reviewed     REVIEW OF SYSTEMS  All systems reviewed and negative except for those discussed in HPI.      PHYSICAL EXAM   Blood pressure 161/61, pulse 63, temperature 97.8 °F (36.6 °C), resp. rate 18, height 152.4 cm (60\"), weight 64.1 kg (141 lb 5 oz), SpO2 99 %, not currently breastfeeding.    GENERAL: Alert, oriented, not distressed  HENT: head atraumatic, no nuchal rigidity  EYES: no scleral icterus, EOMI  CV: regular rhythm, regular rate, no murmur  RESPIRATORY: normal effort, decreased breath sounds bilaterally  ABDOMEN: soft, nontender nondistended bowel sounds positive  MUSCULOSKELETAL: Dialysis catheter partially displaced and right proximal femoral area.  No surrounding erythema or bleeding.  NEURO: alert, moves all extremities, follows commands  SKIN: warm, dry     LAB RESULTS  Lab Results (last 24 hours)     Procedure Component Value Units Date/Time    COVID PRE-OP / PRE-PROCEDURE SCREENING ORDER (NO ISOLATION) - Swab, Nasopharynx [[id number]]  (Abnormal) Collected: 22 1411    Specimen: Swab from Nasopharynx Updated: 22 1512    Narrative:      The following orders were created for panel order COVID PRE-OP / PRE-PROCEDURE SCREENING ORDER (NO ISOLATION) - Swab, Nasopharynx.  Procedure                               Abnormality         Status                     ---------                               -----------         ------                     COVID-19, [facility] " IN-HOUSE...[[id number]]  Abnormal            Final result                 Please view results for these tests on the individual orders.    COVID-19,[facility] IN-HOUSE CEPHEID/TAB NP SWAB IN TRANSPORT MEDIA 8-12 HR TAT - Swab, Nasopharynx [[id number]]  (Abnormal) Collected: 01/13/22 1411    Specimen: Swab from Nasopharynx Updated: 01/13/22 1512     COVID19 Detected    Narrative:      Fact sheet for providers: https://www.fda.gov/media/012669/download    Fact sheet for patients: https://www.fda.gov/media/671148/download    Test performed by PCR.    Comprehensive Metabolic Panel [[id number]]  (Abnormal) Collected: 01/13/22 1411    Specimen: Blood from Arm, Left Updated: 01/13/22 1452     Glucose 71 mg/dL      BUN 15 mg/dL      Creatinine 4.34 mg/dL      Sodium 134 mmol/L      Potassium 3.5 mmol/L      Comment: Specimen hemolyzed.  Results may be affected.        Chloride 98 mmol/L      CO2 23.9 mmol/L      Calcium 7.2 mg/dL      Total Protein 5.9 g/dL      Albumin 3.30 g/dL      ALT (SGPT) 8 U/L      Comment: Specimen hemolyzed.  Results may be affected.        AST (SGOT) 25 U/L      Comment: Specimen hemolyzed.  Results may be affected.        Alkaline Phosphatase 91 U/L      Total Bilirubin 0.2 mg/dL      eGFR Non  Amer --     Comment: <15 Indicative of kidney failure.        eGFR  African Amer 12 mL/min/1.73      Comment: <15 Indicative of kidney failure.        Globulin 2.6 gm/dL      A/G Ratio 1.3 g/dL      BUN/Creatinine Ratio 3.5     Anion Gap 12.1 mmol/L     Narrative:      GFR Normal >60  Chronic Kidney Disease <60  Kidney Failure <15      CBC & Differential [[id number]]  (Abnormal) Collected: 01/13/22 1411    Specimen: Blood Updated: 01/13/22 1426    Narrative:      The following orders were created for panel order CBC & Differential.  Procedure                               Abnormality         Status                     ---------                               -----------         ------                      CBC Auto Differential[[id number]]        Abnormal            Final result                 Please view results for these tests on the individual orders.    CBC Auto Differential [[id number]]  (Abnormal) Collected: 01/13/22 1411    Specimen: Blood Updated: 01/13/22 1426     WBC 3.54 10*3/mm3      RBC 3.72 10*6/mm3      Hemoglobin 9.7 g/dL      Hematocrit 30.7 %      MCV 82.5 fL      MCH 26.1 pg      MCHC 31.6 g/dL      RDW 19.0 %      RDW-SD 55.5 fl      MPV 9.8 fL      Platelets 114 10*3/mm3      Neutrophil % 70.8 %      Lymphocyte % 13.0 %      Monocyte % 15.3 %      Eosinophil % 0.0 %      Basophil % 0.3 %      Immature Grans % 0.6 %      Neutrophils, Absolute 2.51 10*3/mm3      Lymphocytes, Absolute 0.46 10*3/mm3      Monocytes, Absolute 0.54 10*3/mm3      Eosinophils, Absolute 0.00 10*3/mm3      Basophils, Absolute 0.01 10*3/mm3      Immature Grans, Absolute 0.02 10*3/mm3      nRBC 0.0 /100 WBC         Imaging Results (Last 24 Hours)     ** No results found for the last 24 hours. **          Current Facility-Administered Medications:   •  albuterol sulfate HFA (PROVENTIL HFA;VENTOLIN HFA;PROAIR HFA) inhaler 2 puff, 2 puff, Inhalation, Q4H PRN, Haja Chowdhury MD  •  apixaban (ELIQUIS) tablet 2.5 mg, 2.5 mg, Oral, Q12H, Haja Chowdhury MD  •  ascorbic acid (VITAMIN C) tablet 500 mg, 500 mg, Oral, Daily, Haja Chowdhury MD  •  budesonide-formoterol (SYMBICORT) 80-4.5 MCG/ACT inhaler 2 puff, 2 puff, Inhalation, BID - RT, Haja Chowdhury MD  •  cetirizine (zyrTEC) tablet 5 mg, 5 mg, Oral, Daily, Haja Chowdhury MD  •  cholecalciferol (VITAMIN D3) tablet 1,000 Units, 1,000 Units, Oral, Daily, Haja Chowdhury MD  •  famotidine (PEPCID) tablet 20 mg, 20 mg, Oral, BID, Haja Chowdhury MD  •  guaiFENesin (MUCINEX) 12 hr tablet 600 mg, 600 mg, Oral, Q12H, Haja Chowdhury MD  •  [START ON 1/14/2022] metoprolol succinate XL (TOPROL-XL) 24 hr tablet 12.5 mg, 12.5 mg, Oral, QAM, Haja Chowdhury MD  •  montelukast (SINGULAIR) tablet 10 mg, 10  mg, Oral, Nightly, Allan Chowdhury MD  •  [COMPLETED] Insert peripheral IV, , , Once **AND** sodium chloride 0.9 % flush 10 mL, 10 mL, Intravenous, PRN, Dayo Presley PA  •  zinc sulfate (ZINCATE) capsule 220 mg, 220 mg, Oral, Daily, Allan Chowdhury MD    ASSESSMENT  Hemodialysis catheter dislodgment  COVID-19 infection asymptomatic  End-stage renal disease on hemodialysis  COPD  Chronic anemia  Hypercoagulable state  Right hemicolectomy with history of tubular adenoma  Gastroesophageal reflux disease    PLAN  Admit  Symptomatic treatment for COVID-19 infection  Vascular surgery consult to replace hemodialysis catheter  Nephrology to follow patient for hemodialysis  Infectious disease consulted  Continue home medications  Stress ulcer DVT prophylaxis  Supportive care  Patient is full code  Discussed with nursing staff and nephrology   Follow closely further recommendation according to hospital course    ALLAN CHOWDHURY MD

## 2022-01-13 NOTE — ED PROVIDER NOTES
EMERGENCY DEPARTMENT ENCOUNTER    Room Number:  B09/09  Date of encounter:  1/13/2022  PCP: Laurent Cortes DO  Historian: Patient      I used full protective equipment while examining this patient.  This includes face mask, gloves and protective eyewear.  I washed my hands before entering the room and immediately upon leaving the room      HPI:  Chief Complaint: Vascular catheter issue  A complete HPI/ROS/PMH/PSH/SH/FH are unobtainable due to: Nothing    Context: Nellie Vegas is a 76 y.o. female who presents to the ED c/o partial dislodgment of a right femoral vein dialysis catheter.  Patient states she was going to the restroom at dialysis this morning when the catheter partially pulled out.  It appears to be approximately 2 cm out.  Patient states she had about 1 hour left on her dialysis treatment today.  She states she feels fine otherwise.  She denies any chest pain, shortness of breath, weakness, bleeding at the site.  Patient follows with Dr. Kang in nephrology.    Review of Medical Records  Reviewed admission from November 2021.  Patient had right femoral vein hemodialysis catheter placed after having a prior shunt thrombosis in the right arm.    PAST MEDICAL HISTORY  Active Ambulatory Problems     Diagnosis Date Noted   • ESRD (end stage renal disease) on dialysis ([facility]) 08/09/2019   • Thrombosis of kidney dialysis arteriovenous graft ([facility]) 08/09/2019   • Anemia of chronic renal failure, stage 5 ([facility]) 08/10/2019   • COPD exacerbation ([facility]) 11/28/2019   • Problem with dialysis access ([facility]) 12/07/2020   • Lower GI bleeding 07/05/2021   • Colonic mass 07/12/2021   • AV shunt thrombosis, subsequent encounter 07/15/2021   • Failure to thrive in adult 08/21/2021   • Severe malnutrition ([facility]) 08/22/2021   • Hematoma of arm, right, initial encounter 11/04/2021     Resolved Ambulatory Problems     Diagnosis Date Noted   • Anastomotic leak of intestine 08/03/2021     Past Medical History:   Diagnosis Date   •  Anemia    • Anesthesia complication    • Arthritis    • Asthma    • COPD (chronic obstructive pulmonary disease) (HCC)    • Diabetes mellitus (HCC)    • Dialysis patient (HCC)    • Disease of thyroid gland    • GERD (gastroesophageal reflux disease)    • Headache    • History of blood clots    • History of kidney stones    • Hyperlipidemia    • Hypertension    • Knee pain, bilateral    • Renal disease    • Sleep apnea    • SOB (shortness of breath)    • 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)    • Weakness          PAST SURGICAL HISTORY  Past Surgical History:   Procedure Laterality Date   • ARTERIOVENOUS FISTULA Right    • ARTERIOVENOUS FISTULA Left     REMOVED   • CENTRAL VENOUS CATHETER TUNNELED INSERTION DOUBLE LUMEN     •  SECTION     • COLON RESECTION Right 2021    Procedure: RIGHT HEMICOLECTOMY LAPAROSCOPIC;  Surgeon: Zbigniew Conner MD;  Location: [facility] MAIN OR;  Service: General;  Laterality: Right;   • COLONOSCOPY     • COLONOSCOPY N/A 2021    Procedure: COLONOSCOPY into cecum with biopsy;  Surgeon: Fabricio Monroe MD;  Location: [facility] ENDOSCOPY;  Service: Gastroenterology;  Laterality: N/A;  cecal mass   • EXPLORATORY LAPAROTOMY N/A 8/3/2021    Procedure: LAPAROTOMY EXPLORATORY, resection of ileocolonic anastomosis with anastomosis;  Surgeon: Zbigniew Conner MD;  Location: [facility] MAIN OR;  Service: General;  Laterality: N/A;   • INSERTION HEMODIALYSIS CATHETER Right 2021    Procedure: VENACAVAGRAM AND HEMODIALYSIS CATHETER INSERTION;  Surgeon: Karson Muller MD;  Location: [facility] HYBRID OR ;  Service: Vascular;  Laterality: Right;  Dr Bryant was primary surgeon   • INSERTION HEMODIALYSIS CATHETER N/A 2021    Procedure: HEMODIALYSIS CATHETER INSERTION;  Surgeon: Karson Muller MD;  Location: [facility] MAIN OR;  Service: Vascular;  Laterality: N/A;   • POLYPECTOMY      UTERINE   • SHUNT O GRAM Right 2019    Procedure:  RIGHT ARM DIALYSIS GRAFT revision and THROMBECTOMY;  Surgeon: Thierry Hardy MD;  Location: [facility] OR ;  Service: Vascular   • SHUNT O GRAM Right 2019    Procedure: RIGHT ARM DIALYSIS GRAFT THROMBECTOMY WITH STENTING;  Surgeon: Thierry Hardy MD;  Location: [facility] ;  Service: Vascular   • SHUNT O GRAM Right 2020    Procedure: RIGHT ARM ARTERIOVENOUS SHUNTOGRAM WITH THROMBECTOMY;  Surgeon: Thierry Hardy MD;  Location: [facility] ;  Service: Vascular;  Laterality: Right;   • SHUNT O GRAM Right 2021    Procedure: Right arm dialysis shuntogram with shunt thrombectomy;  Surgeon: Shahram Gallagher MD;  Location: [facility] ;  Service: Vascular;  Laterality: Right;   • SHUNT O GRAM Right 2021    Procedure: RIGHT UPPER EXTREMITY THROMBECTOMY AND SHUNTOGRAM;  Surgeon: Shahram Gallagher MD;  Location: [facility];  Service: Vascular;  Laterality: Right;         FAMILY HISTORY  Family History   Problem Relation Age of Onset   • Malig Hyperthermia Neg Hx          SOCIAL HISTORY  Social History     Socioeconomic History   • Marital status:    Tobacco Use   • Smoking status: Former Smoker     Packs/day: 0.00     Years: 4.00     Pack years: 0.00     Types: Cigarettes     Quit date: 1966     Years since quittin.0   • Smokeless tobacco: Never Used   Vaping Use   • Vaping Use: Never used   Substance and Sexual Activity   • Alcohol use: No   • Drug use: No   • Sexual activity: Defer         ALLERGIES  Sulfa antibiotics, Adhesive tape, and Latex        REVIEW OF SYSTEMS  All systems reviewed and negative except for those discussed in HPI.       PHYSICAL EXAM    I have reviewed the triage vital signs and nursing notes.    ED Triage Vitals [22 1032]   Temp Heart Rate Resp BP SpO2   -- (!) 135 16 155/61 94 %      Temp src Heart Rate Source Patient Position BP Location FiO2 (%)   -- Monitor -- -- --       Physical Exam  GENERAL:  Alert, oriented, not distressed  HENT: head atraumatic, no nuchal rigidity  EYES: no scleral icterus, EOMI  CV: regular rhythm, regular rate, no murmur  RESPIRATORY: normal effort, CTA  ABDOMEN: soft, nontender  MUSCULOSKELETAL: Dialysis catheter partially displaced and right proximal femoral area.  No surrounding erythema or bleeding.  NEURO: alert, moves all extremities, follows commands  SKIN: warm, dry        LAB RESULTS  Recent Results (from the past 24 hour(s))   Comprehensive Metabolic Panel    Collection Time: 01/13/22  2:11 PM    Specimen: Arm, Left; Blood   Result Value Ref Range    Glucose 71 65 - 99 mg/dL    BUN 15 8 - 23 mg/dL    Creatinine 4.34 (H) 0.57 - 1.00 mg/dL    Sodium 134 (L) 136 - 145 mmol/L    Potassium 3.5 3.5 - 5.2 mmol/L    Chloride 98 98 - 107 mmol/L    CO2 23.9 22.0 - 29.0 mmol/L    Calcium 7.2 (L) 8.6 - 10.5 mg/dL    Total Protein 5.9 (L) 6.0 - 8.5 g/dL    Albumin 3.30 (L) 3.50 - 5.20 g/dL    ALT (SGPT) 8 1 - 33 U/L    AST (SGOT) 25 1 - 32 U/L    Alkaline Phosphatase 91 39 - 117 U/L    Total Bilirubin 0.2 0.0 - 1.2 mg/dL    eGFR Non  Amer      eGFR  African Amer 12 (L) >60 mL/min/1.73    Globulin 2.6 gm/dL    A/G Ratio 1.3 g/dL    BUN/Creatinine Ratio 3.5 (L) 7.0 - 25.0    Anion Gap 12.1 5.0 - 15.0 mmol/L   CBC Auto Differential    Collection Time: 01/13/22  2:11 PM    Specimen: Blood   Result Value Ref Range    WBC 3.54 3.40 - 10.80 10*3/mm3    RBC 3.72 (L) 3.77 - 5.28 10*6/mm3    Hemoglobin 9.7 (L) 12.0 - 15.9 g/dL    Hematocrit 30.7 (L) 34.0 - 46.6 %    MCV 82.5 79.0 - 97.0 fL    MCH 26.1 (L) 26.6 - 33.0 pg    MCHC 31.6 31.5 - 35.7 g/dL    RDW 19.0 (H) 12.3 - 15.4 %    RDW-SD 55.5 (H) 37.0 - 54.0 fl    MPV 9.8 6.0 - 12.0 fL    Platelets 114 (L) 140 - 450 10*3/mm3    Neutrophil % 70.8 42.7 - 76.0 %    Lymphocyte % 13.0 (L) 19.6 - 45.3 %    Monocyte % 15.3 (H) 5.0 - 12.0 %    Eosinophil % 0.0 (L) 0.3 - 6.2 %    Basophil % 0.3 0.0 - 1.5 %    Immature Grans % 0.6 (H) 0.0 - 0.5 %     Neutrophils, Absolute 2.51 1.70 - 7.00 10*3/mm3    Lymphocytes, Absolute 0.46 (L) 0.70 - 3.10 10*3/mm3    Monocytes, Absolute 0.54 0.10 - 0.90 10*3/mm3    Eosinophils, Absolute 0.00 0.00 - 0.40 10*3/mm3    Basophils, Absolute 0.01 0.00 - 0.20 10*3/mm3    Immature Grans, Absolute 0.02 0.00 - 0.05 10*3/mm3    nRBC 0.0 0.0 - 0.2 /100 WBC       Ordered the above labs and independently reviewed the results.        RADIOLOGY  No Radiology Exams Resulted Within Past 24 Hours    I ordered the above noted radiological studies. Reviewed by me and discussed with radiologist.  See dictation for official radiology interpretation.      MEDICATIONS GIVEN IN ER    Medications   sodium chloride 0.9 % flush 10 mL (has no administration in time range)         PROGRESS, DATA ANALYSIS, CONSULTS, AND MEDICAL DECISION MAKING    All labs have been independently reviewed by me.  All radiology studies have been reviewed by me and discussed with radiologist dictating the report.   EKG's independently viewed and interpreted by me.  Discussion below represents my analysis of pertinent findings related to patient's condition, differential diagnosis, treatment plan and final disposition.    I have discussed case with Dr. Gutierrez, emergency room physician.  He has performed his own bedside examination and agrees with treatment plan.    ED Course as of 01/13/22 1457   Thu Jan 13, 2022   1202 Patient presents to ER after having her hemodialysis catheter partially pulled out this morning at dialysis.  I plan to discuss with vascular surgery. [EE]   1343 I discussed case with Dr. Chowdhury. He agrees to admit the patient. [EE]   1456 I discussed case with Dr. Kang, he agrees to consult.   [EE]   1456 Potassium: 3.5 [EE]   1456 Hemoglobin(!): 9.7 [EE]   1456 WBC: 3.54 [EE]      ED Course User Index  [EE] Dayo Presley PA       AS OF 14:57 EST VITALS:    BP - 161/61  HR - 63  TEMP - 97.8 °F (36.6 °C)  O2 SATS - 99%        DIAGNOSIS  Final diagnoses:    Displacement of vascular dialysis catheter, initial encounter (HCC)   ESRD on dialysis (HCC)         DISPOSITION  Admitted          EMR Dragon/Transcription disclaimer:   Much of this encounter note is an electronic transcription/translation of spoken language to printed text. The electronic translation of spoken language may permit erroneous, or at times, nonsensical words or phrases to be inadvertently transcribed; Although I have reviewed the note for such errors, some may still exist.      Dayo Presley PA  01/13/22 [id number]

## 2022-01-13 NOTE — ED PROVIDER NOTES
The INGRID and I have discussed this patients history, physical exam, and treatment plan. I have reviewed the documentation and personally had a face to face interaction with the patient. I affirm the documentation and agree with the treatment and plan.  The following note describes my personal findings    I provided a substantive portion of the care of this patient.  I personally performed the physical exam in its entirety for this encounter.      This patient is a 76-year-old female who presents to the emergency room today secondary to a partial dislodgment of her right femoral dialysis catheter. The patient states that the stitches came out and the catheter came out slightly. She did receive partial dialysis today and has no physical complaints.    Physical Exam  Vitals and nursing note reviewed.   Constitutional:       General: She is not in acute distress.     Appearance: She is well-developed.   HENT:      Head: Normocephalic.   Eyes:      Pupils: Pupils are equal, round, and reactive to light.   Cardiovascular:      Rate and Rhythm: Normal rate and regular rhythm.   Pulmonary:      Effort: Pulmonary effort is normal. No respiratory distress.      Breath sounds: Normal breath sounds.   Abdominal:      General: Bowel sounds are normal.      Palpations: Abdomen is soft.      Tenderness: There is no abdominal tenderness. There is no guarding or rebound.   Musculoskeletal:         General: Normal range of motion.      Cervical back: Normal range of motion and neck supple.   Skin:     General: Skin is warm and dry.      Findings: No rash.   Neurological:      Mental Status: She is alert and oriented to person, place, and time.       Plan: We will consult vascular surgery for their input on this partially dislodged dialysis catheter. We will reevaluate the patient following.      The patient was wearing a facemask upon entrance into the room and remained in such throughout their visit.  I was wearing PPE including a  facemask, eye protection, as well as gloves at any point entering the room and throughout the visit       Obi Gutierrez MD  01/13/22 [id number]

## 2022-01-13 NOTE — ED PROVIDER NOTES
EMERGENCY DEPARTMENT ENCOUNTER    CHIEF COMPLAINT  Chief Complaint: ***  History given by: ***  History limited by: ***  Room Number: B09/09  PMD: Laurent Cortes DO      HPI:  Pt is a 76 y.o. female who presents complaining of ***    Duration:  ***  Onset: ***  Timing: ***  Location: ***  Radiation: ***  Quality: ***  Intensity/Severity: ***  Progression: ***  Associated Symptoms: ***  Aggravating Factors: ***  Alleviating Factors: ***  Previous Episodes: ***  Treatment before arrival: ***    PAST MEDICAL HISTORY  Active Ambulatory Problems     Diagnosis Date Noted   • ESRD (end stage renal disease) on dialysis ([facility]) 08/09/2019   • Thrombosis of kidney dialysis arteriovenous graft ([facility]) 08/09/2019   • Anemia of chronic renal failure, stage 5 ([facility]) 08/10/2019   • COPD exacerbation ([facility]) 11/28/2019   • Problem with dialysis access ([facility]) 12/07/2020   • Lower GI bleeding 07/05/2021   • Colonic mass 07/12/2021   • AV shunt thrombosis, subsequent encounter 07/15/2021   • Failure to thrive in adult 08/21/2021   • Severe malnutrition ([facility]) 08/22/2021   • Hematoma of arm, right, initial encounter 11/04/2021     Resolved Ambulatory Problems     Diagnosis Date Noted   • Anastomotic leak of intestine 08/03/2021     Past Medical History:   Diagnosis Date   • Anemia    • Anesthesia complication    • Arthritis    • Asthma    • COPD (chronic obstructive pulmonary disease) ([facility])    • Diabetes mellitus ([facility])    • Dialysis patient ([facility])    • Disease of thyroid gland    • GERD (gastroesophageal reflux disease)    • Headache    • History of blood clots    • History of kidney stones    • Hyperlipidemia    • Hypertension    • Knee pain, bilateral    • Renal disease    • Sleep apnea    • SOB (shortness of breath)    • Thrombosis of renal dialysis arteriovenous graft ([facility])    • Weakness        PAST SURGICAL HISTORY  Past Surgical History:   Procedure Laterality Date   • ARTERIOVENOUS FISTULA Right    • ARTERIOVENOUS FISTULA Left      REMOVED   • CENTRAL VENOUS CATHETER TUNNELED INSERTION DOUBLE LUMEN     •  SECTION     • COLON RESECTION Right 2021    Procedure: RIGHT HEMICOLECTOMY LAPAROSCOPIC;  Surgeon: Zbigniew Conner MD;  Location: [facility] MAIN OR;  Service: General;  Laterality: Right;   • COLONOSCOPY  2016   • COLONOSCOPY N/A 2021    Procedure: COLONOSCOPY into cecum with biopsy;  Surgeon: Fabricio Monroe MD;  Location: [facility] ENDOSCOPY;  Service: Gastroenterology;  Laterality: N/A;  cecal mass   • EXPLORATORY LAPAROTOMY N/A 8/3/2021    Procedure: LAPAROTOMY EXPLORATORY, resection of ileocolonic anastomosis with anastomosis;  Surgeon: Zbigniew Conner MD;  Location: [facility] MAIN OR;  Service: General;  Laterality: N/A;   • INSERTION HEMODIALYSIS CATHETER Right 2021    Procedure: VENACAVAGRAM AND HEMODIALYSIS CATHETER INSERTION;  Surgeon: Karson Muller MD;  Location: [facility] OR ;  Service: Vascular;  Laterality: Right;  Dr Bryant was primary surgeon   • INSERTION HEMODIALYSIS CATHETER N/A 2021    Procedure: HEMODIALYSIS CATHETER INSERTION;  Surgeon: Karson Muller MD;  Location: [facility] MAIN OR;  Service: Vascular;  Laterality: N/A;   • POLYPECTOMY      UTERINE   • SHUNT O GRAM Right 2019    Procedure: RIGHT ARM DIALYSIS GRAFT revision and THROMBECTOMY;  Surgeon: Thierry Hardy MD;  Location: [facility] OR ;  Service: Vascular   • SHUNT O GRAM Right 2019    Procedure: RIGHT ARM DIALYSIS GRAFT THROMBECTOMY WITH STENTING;  Surgeon: Thierry Hardy MD;  Location: [facility] OR ;  Service: Vascular   • SHUNT O GRAM Right 2020    Procedure: RIGHT ARM ARTERIOVENOUS SHUNTOGRAM WITH THROMBECTOMY;  Surgeon: Thierry Hardy MD;  Location: [facility] OR ;  Service: Vascular;  Laterality: Right;   • SHUNT O GRAM Right 2021    Procedure: Right arm dialysis shuntogram with shunt thrombectomy;  Surgeon: Shahram Gallagher MD;  Location:  [facility] HYBRID OR ;  Service: Vascular;  Laterality: Right;   • SHUNT O GRAM Right 2021    Procedure: RIGHT UPPER EXTREMITY THROMBECTOMY AND SHUNTOGRAM;  Surgeon: Shahram Gallagher MD;  Location: [facility] OR;  Service: Vascular;  Laterality: Right;       FAMILY HISTORY  Family History   Problem Relation Age of Onset   • Malig Hyperthermia Neg Hx        SOCIAL HISTORY  Social History     Socioeconomic History   • Marital status:    Tobacco Use   • Smoking status: Former Smoker     Packs/day: 0.00     Years: 4.00     Pack years: 0.00     Types: Cigarettes     Quit date:      Years since quittin.0   • Smokeless tobacco: Never Used   Vaping Use   • Vaping Use: Never used   Substance and Sexual Activity   • Alcohol use: No   • Drug use: No   • Sexual activity: Defer       ALLERGIES  Sulfa antibiotics, Adhesive tape, and Latex    REVIEW OF SYSTEMS  Review of Systems    PHYSICAL EXAM  ED Triage Vitals [22 1032]   Temp Heart Rate Resp BP SpO2   -- (!) 135 16 155/61 94 %      Temp src Heart Rate Source Patient Position BP Location FiO2 (%)   -- Monitor -- -- --       Physical Exam    LAB RESULTS  Lab Results (last 24 hours)     ** No results found for the last 24 hours. **          I ordered the above labs and reviewed the results    RADIOLOGY  No orders to display        I ordered the above noted radiological studies. Interpreted by radiologist. Discussed with radiologist (***). Reviewed by me in PACS.       PROCEDURES  Procedures      PROGRESS AND CONSULTS     The patient was wearing a facemask upon entrance into the room and remained in such throughout their visit.  I was wearing PPE including a facemask, eye protection, as well as gloves at any point entering the room and throughout the visit      MEDICAL DECISION MAKING  Results were reviewed/discussed with the patient and they were also made aware of online access. Pt also made aware that some labs, such as cultures, will not be  resulted during ER visit and follow up with PMD is necessary.     MDM  Number of Diagnoses or Management Options     Amount and/or Complexity of Data Reviewed  Review and summarize past medical records: yes (Upon medical records review, the patient was last seen and evaluated on 11/4/2021 secondary to a shunt thrombosis with extremity hematoma)           DIAGNOSIS  Final diagnoses:   None       DISPOSITION  ***    Latest Documented Vital Signs:  As of 11:45 EST  BP- 155/61 HR- (!) 135 Temp-   O2 sat- 94%

## 2022-01-13 NOTE — ED TRIAGE NOTES
Pt was wearing a mask during assessment.  This RN wore appropriate PPE  Patient was at dialysis and her femoral access was pulled out while she was using the BR.

## 2022-01-13 NOTE — ED NOTES
Upon pt assessment, pts right femoral dialysis cath appears to be pulled out approx 3cm. No bleeding or drainage noted at this time.     This RN wore mask, gloves, and protective eyewear throughout patient encounter. Pt wearing mask at all times. Hand hygiene performed before and after entering room.          Adelia Patel RN  01/13/22 5611

## 2022-01-14 ENCOUNTER — ANESTHESIA EVENT (OUTPATIENT)
Dept: PERIOP | Facility: HOSPITAL | Age: 77
End: 2022-01-14

## 2022-01-14 ENCOUNTER — ANESTHESIA (OUTPATIENT)
Dept: PERIOP | Facility: HOSPITAL | Age: 77
End: 2022-01-14

## 2022-01-14 ENCOUNTER — APPOINTMENT (OUTPATIENT)
Dept: GENERAL RADIOLOGY | Facility: HOSPITAL | Age: 77
End: 2022-01-14

## 2022-01-14 ENCOUNTER — READMISSION MANAGEMENT (OUTPATIENT)
Dept: CALL CENTER | Facility: HOSPITAL | Age: 77
End: 2022-01-14

## 2022-01-14 VITALS
WEIGHT: 141.31 LBS | BODY MASS INDEX: 27.74 KG/M2 | SYSTOLIC BLOOD PRESSURE: 125 MMHG | RESPIRATION RATE: 18 BRPM | OXYGEN SATURATION: 94 % | TEMPERATURE: 97.9 F | HEART RATE: 63 BPM | DIASTOLIC BLOOD PRESSURE: 73 MMHG | HEIGHT: 60 IN

## 2022-01-14 LAB
ALBUMIN SERPL-MCNC: 2.9 G/DL (ref 3.5–5.2)
ALBUMIN/GLOB SERPL: 1.2 G/DL
ALP SERPL-CCNC: 81 U/L (ref 39–117)
ALT SERPL W P-5'-P-CCNC: 6 U/L (ref 1–33)
ANION GAP SERPL CALCULATED.3IONS-SCNC: 14.5 MMOL/L (ref 5–15)
AST SERPL-CCNC: 18 U/L (ref 1–32)
BASOPHILS # BLD AUTO: 0.02 10*3/MM3 (ref 0–0.2)
BASOPHILS NFR BLD AUTO: 0.7 % (ref 0–1.5)
BH CV VAS DIALYSIS ARTERIAL ANASTOMOSIS DIAMETER: 0.54 CM
BH CV VAS DIALYSIS ARTERIAL ANASTOMOSIS EDV: 101 CM/SEC
BH CV VAS DIALYSIS ARTERIAL ANASTOMOSIS PSV: 190 CM/SEC
BH CV VAS DIALYSIS CONDUIT DIST DEPTH: 2.85 CM
BH CV VAS DIALYSIS CONDUIT DIST DIAMETER: 0.92 CM
BH CV VAS DIALYSIS CONDUIT DIST EDV: 27 CM/SEC
BH CV VAS DIALYSIS CONDUIT DIST FLOW VOL: 987 ML/MIN
BH CV VAS DIALYSIS CONDUIT DIST PSV: 51 CM/SEC
BH CV VAS DIALYSIS CONDUIT MID DEPTH: 0.47 CM
BH CV VAS DIALYSIS CONDUIT MID DIAMETER: 0.27 CM
BH CV VAS DIALYSIS CONDUIT MID EDV: 406 CM/SEC
BH CV VAS DIALYSIS CONDUIT MID FLOW VOL: 667 ML/MIN
BH CV VAS DIALYSIS CONDUIT MID PSV: 794 CM/SEC
BH CV VAS DIALYSIS CONDUIT MID/DIST DEPTH: 0.32 CM
BH CV VAS DIALYSIS CONDUIT MID/DIST DIAMETER: 0.55 CM
BH CV VAS DIALYSIS CONDUIT MID/DIST EDV: 32 CM/SEC
BH CV VAS DIALYSIS CONDUIT MID/DIST FLOW VOL: 391 ML/MIN
BH CV VAS DIALYSIS CONDUIT MID/DIST PSV: 80 CM/SEC
BH CV VAS DIALYSIS CONDUIT PROX DEPTH: 0.62 CM
BH CV VAS DIALYSIS CONDUIT PROX DIAMETER: 0.66 CM
BH CV VAS DIALYSIS CONDUIT PROX EDV: 146 CM/SEC
BH CV VAS DIALYSIS CONDUIT PROX FLOW VOL: 1755 ML/MIN
BH CV VAS DIALYSIS CONDUIT PROX PSV: 258 CM/SEC
BH CV VAS DIALYSIS CONDUIT PROX/MID DEPTH: 0.34 CM
BH CV VAS DIALYSIS CONDUIT PROX/MID DIAMETER: 0.6 CM
BH CV VAS DIALYSIS CONDUIT PROX/MID EDV: 51 CM/SEC
BH CV VAS DIALYSIS CONDUIT PROX/MID FLOW VOL: 765 ML/MIN
BH CV VAS DIALYSIS CONDUIT PROX/MID PSV: 107 CM/SEC
BH CV VAS DIALYSIS PRE-INFLOW AXILLARY DIAMETER: 0.64 CM
BH CV VAS DIALYSIS PRE-INFLOW AXILLARY EDV: 47 CM/SEC
BH CV VAS DIALYSIS PRE-INFLOW AXILLARY FLOW VOL: 696 ML/MIN
BH CV VAS DIALYSIS PRE-INFLOW AXILLARY PSV: 113 CM/SEC
BH CV VAS DIALYSIS PRE-INFLOW BRACHIAL EDV: 12 CM/SEC
BH CV VAS DIALYSIS PRE-INFLOW BRACHIAL PSV: 103 CM/SEC
BH CV VAS DIALYSIS VENOUS ANASTOMOSIS DIAMETER: 1.1 CM
BH CV VAS DIALYSIS VENOUS ANASTOMOSIS EDV: 23 CM/SEC
BH CV VAS DIALYSIS VENOUS ANASTOMOSIS PSV: 48 CM/SEC
BH CV VAS DIALYSIS VENOUS OUTFLOW SUBCLAVIAN DIAMETER: 1 CM
BH CV VAS DIALYSIS VENOUS OUTFLOW SUBCLAVIAN EDV: 31 CM/SEC
BH CV VAS DIALYSIS VENOUS OUTFLOW SUBCLAVIAN PSV: 57 CM/SEC
BILIRUB SERPL-MCNC: 0.2 MG/DL (ref 0–1.2)
BUN SERPL-MCNC: 24 MG/DL (ref 8–23)
BUN/CREAT SERPL: 4.3 (ref 7–25)
CALCIUM SPEC-SCNC: 6.7 MG/DL (ref 8.6–10.5)
CHLORIDE SERPL-SCNC: 98 MMOL/L (ref 98–107)
CHOLEST SERPL-MCNC: 146 MG/DL (ref 0–200)
CO2 SERPL-SCNC: 21.5 MMOL/L (ref 22–29)
CREAT SERPL-MCNC: 5.56 MG/DL (ref 0.57–1)
CRP SERPL-MCNC: 5.89 MG/DL (ref 0–0.5)
D DIMER PPP FEU-MCNC: 1.88 MCGFEU/ML (ref 0–0.49)
DEPRECATED RDW RBC AUTO: 59.6 FL (ref 37–54)
EOSINOPHIL # BLD AUTO: 0.01 10*3/MM3 (ref 0–0.4)
EOSINOPHIL NFR BLD AUTO: 0.4 % (ref 0.3–6.2)
ERYTHROCYTE [DISTWIDTH] IN BLOOD BY AUTOMATED COUNT: 19.4 % (ref 12.3–15.4)
FERRITIN SERPL-MCNC: 1226 NG/ML (ref 13–150)
GFR SERPL CREATININE-BSD FRML MDRD: 9 ML/MIN/1.73
GFR SERPL CREATININE-BSD FRML MDRD: ABNORMAL ML/MIN/{1.73_M2}
GLOBULIN UR ELPH-MCNC: 2.5 GM/DL
GLUCOSE BLDC GLUCOMTR-MCNC: 136 MG/DL (ref 70–130)
GLUCOSE BLDC GLUCOMTR-MCNC: 61 MG/DL (ref 70–130)
GLUCOSE BLDC GLUCOMTR-MCNC: 62 MG/DL (ref 70–130)
GLUCOSE SERPL-MCNC: 71 MG/DL (ref 65–99)
HBA1C MFR BLD: 5.1 % (ref 4.8–5.6)
HCT VFR BLD AUTO: 32 % (ref 34–46.6)
HDLC SERPL-MCNC: 47 MG/DL (ref 40–60)
HGB BLD-MCNC: 9.5 G/DL (ref 12–15.9)
IMM GRANULOCYTES # BLD AUTO: 0.01 10*3/MM3 (ref 0–0.05)
IMM GRANULOCYTES NFR BLD AUTO: 0.4 % (ref 0–0.5)
LDLC SERPL CALC-MCNC: 73 MG/DL (ref 0–100)
LDLC/HDLC SERPL: 1.46 {RATIO}
LYMPHOCYTES # BLD AUTO: 0.44 10*3/MM3 (ref 0.7–3.1)
LYMPHOCYTES NFR BLD AUTO: 15.4 % (ref 19.6–45.3)
MAXIMAL PREDICTED HEART RATE: 145 BPM
MCH RBC QN AUTO: 25.3 PG (ref 26.6–33)
MCHC RBC AUTO-ENTMCNC: 29.7 G/DL (ref 31.5–35.7)
MCV RBC AUTO: 85.1 FL (ref 79–97)
MONOCYTES # BLD AUTO: 0.42 10*3/MM3 (ref 0.1–0.9)
MONOCYTES NFR BLD AUTO: 14.7 % (ref 5–12)
NEUTROPHILS NFR BLD AUTO: 1.95 10*3/MM3 (ref 1.7–7)
NEUTROPHILS NFR BLD AUTO: 68.4 % (ref 42.7–76)
NRBC BLD AUTO-RTO: 0.4 /100 WBC (ref 0–0.2)
NT-PROBNP SERPL-MCNC: 9361 PG/ML (ref 0–1800)
PLATELET # BLD AUTO: 120 10*3/MM3 (ref 140–450)
PMV BLD AUTO: 8.8 FL (ref 6–12)
POTASSIUM SERPL-SCNC: 2.4 MMOL/L (ref 3.5–5.2)
PROT SERPL-MCNC: 5.4 G/DL (ref 6–8.5)
RBC # BLD AUTO: 3.76 10*6/MM3 (ref 3.77–5.28)
SODIUM SERPL-SCNC: 134 MMOL/L (ref 136–145)
STRESS TARGET HR: 123 BPM
TRIGL SERPL-MCNC: 152 MG/DL (ref 0–150)
TSH SERPL DL<=0.05 MIU/L-ACNC: 1.58 UIU/ML (ref 0.27–4.2)
VLDLC SERPL-MCNC: 26 MG/DL (ref 5–40)
WBC NRBC COR # BLD: 2.85 10*3/MM3 (ref 3.4–10.8)

## 2022-01-14 PROCEDURE — A9270 NON-COVERED ITEM OR SERVICE: HCPCS | Performed by: SURGERY

## 2022-01-14 PROCEDURE — 63710000001 ASCORBIC ACID 500 MG TABLET: Performed by: SURGERY

## 2022-01-14 PROCEDURE — 0 LIDOCAINE 1 % SOLUTION 20 ML VIAL: Performed by: SURGERY

## 2022-01-14 PROCEDURE — 63710000001 CHOLECALCIFEROL 25 MCG (1000 UT) TABLET: Performed by: SURGERY

## 2022-01-14 PROCEDURE — A9270 NON-COVERED ITEM OR SERVICE: HCPCS | Performed by: INTERNAL MEDICINE

## 2022-01-14 PROCEDURE — G0378 HOSPITAL OBSERVATION PER HR: HCPCS

## 2022-01-14 PROCEDURE — 84443 ASSAY THYROID STIM HORMONE: CPT | Performed by: HOSPITALIST

## 2022-01-14 PROCEDURE — 85379 FIBRIN DEGRADATION QUANT: CPT | Performed by: HOSPITALIST

## 2022-01-14 PROCEDURE — 0 IOPAMIDOL PER 1 ML: Performed by: SURGERY

## 2022-01-14 PROCEDURE — 63710000001 ZINC SULFATE 220 (50 ZN) MG CAPSULE: Performed by: SURGERY

## 2022-01-14 PROCEDURE — 63710000001 POTASSIUM CHLORIDE 10 MEQ TABLET CONTROLLED-RELEASE: Performed by: INTERNAL MEDICINE

## 2022-01-14 PROCEDURE — 0 CEFAZOLIN IN DEXTROSE 2-4 GM/100ML-% SOLUTION: Performed by: STUDENT IN AN ORGANIZED HEALTH CARE EDUCATION/TRAINING PROGRAM

## 2022-01-14 PROCEDURE — 63710000001 GUAIFENESIN 600 MG TABLET SUSTAINED-RELEASE 12 HOUR: Performed by: SURGERY

## 2022-01-14 PROCEDURE — 82728 ASSAY OF FERRITIN: CPT | Performed by: HOSPITALIST

## 2022-01-14 PROCEDURE — 80061 LIPID PANEL: CPT | Performed by: HOSPITALIST

## 2022-01-14 PROCEDURE — C1750 CATH, HEMODIALYSIS,LONG-TERM: HCPCS | Performed by: SURGERY

## 2022-01-14 PROCEDURE — 63710000001 CETIRIZINE 10 MG TABLET: Performed by: SURGERY

## 2022-01-14 PROCEDURE — 85025 COMPLETE CBC W/AUTO DIFF WBC: CPT | Performed by: HOSPITALIST

## 2022-01-14 PROCEDURE — 83036 HEMOGLOBIN GLYCOSYLATED A1C: CPT | Performed by: HOSPITALIST

## 2022-01-14 PROCEDURE — 86140 C-REACTIVE PROTEIN: CPT | Performed by: HOSPITALIST

## 2022-01-14 PROCEDURE — 25010000002 HEPARIN (PORCINE) PER 1000 UNITS: Performed by: SURGERY

## 2022-01-14 PROCEDURE — 82962 GLUCOSE BLOOD TEST: CPT

## 2022-01-14 PROCEDURE — 25010000002 PHENYLEPHRINE 10 MG/ML SOLUTION: Performed by: ANESTHESIOLOGY

## 2022-01-14 PROCEDURE — 63710000001 FAMOTIDINE 20 MG TABLET: Performed by: SURGERY

## 2022-01-14 PROCEDURE — 83880 ASSAY OF NATRIURETIC PEPTIDE: CPT | Performed by: HOSPITALIST

## 2022-01-14 PROCEDURE — 25010000002 PROPOFOL 10 MG/ML EMULSION: Performed by: ANESTHESIOLOGY

## 2022-01-14 PROCEDURE — C1894 INTRO/SHEATH, NON-LASER: HCPCS | Performed by: SURGERY

## 2022-01-14 PROCEDURE — 80053 COMPREHEN METABOLIC PANEL: CPT | Performed by: HOSPITALIST

## 2022-01-14 RX ORDER — EPHEDRINE SULFATE 50 MG/ML
5 INJECTION, SOLUTION INTRAVENOUS ONCE AS NEEDED
Status: DISCONTINUED | OUTPATIENT
Start: 2022-01-14 | End: 2022-01-14 | Stop reason: HOSPADM

## 2022-01-14 RX ORDER — PHENYLEPHRINE HYDROCHLORIDE 10 MG/ML
INJECTION INTRAVENOUS AS NEEDED
Status: DISCONTINUED | OUTPATIENT
Start: 2022-01-14 | End: 2022-01-14 | Stop reason: SURG

## 2022-01-14 RX ORDER — FLUMAZENIL 0.1 MG/ML
0.2 INJECTION INTRAVENOUS AS NEEDED
Status: DISCONTINUED | OUTPATIENT
Start: 2022-01-14 | End: 2022-01-14 | Stop reason: HOSPADM

## 2022-01-14 RX ORDER — ONDANSETRON 2 MG/ML
4 INJECTION INTRAMUSCULAR; INTRAVENOUS ONCE AS NEEDED
Status: DISCONTINUED | OUTPATIENT
Start: 2022-01-14 | End: 2022-01-14 | Stop reason: HOSPADM

## 2022-01-14 RX ORDER — PROMETHAZINE HYDROCHLORIDE 25 MG/1
25 SUPPOSITORY RECTAL ONCE AS NEEDED
Status: DISCONTINUED | OUTPATIENT
Start: 2022-01-14 | End: 2022-01-14 | Stop reason: HOSPADM

## 2022-01-14 RX ORDER — HYDRALAZINE HYDROCHLORIDE 20 MG/ML
10 INJECTION INTRAMUSCULAR; INTRAVENOUS EVERY 4 HOURS PRN
Status: DISCONTINUED | OUTPATIENT
Start: 2022-01-14 | End: 2022-01-14 | Stop reason: HOSPADM

## 2022-01-14 RX ORDER — PROMETHAZINE HYDROCHLORIDE 25 MG/1
25 TABLET ORAL ONCE AS NEEDED
Status: DISCONTINUED | OUTPATIENT
Start: 2022-01-14 | End: 2022-01-14 | Stop reason: HOSPADM

## 2022-01-14 RX ORDER — GUAIFENESIN 600 MG/1
600 TABLET, EXTENDED RELEASE ORAL EVERY 12 HOURS SCHEDULED
Qty: 60 TABLET | Refills: 0 | Status: SHIPPED | OUTPATIENT
Start: 2022-01-14 | End: 2022-02-13

## 2022-01-14 RX ORDER — LABETALOL HYDROCHLORIDE 5 MG/ML
5 INJECTION, SOLUTION INTRAVENOUS
Status: DISCONTINUED | OUTPATIENT
Start: 2022-01-14 | End: 2022-01-14 | Stop reason: HOSPADM

## 2022-01-14 RX ORDER — DIPHENHYDRAMINE HYDROCHLORIDE 50 MG/ML
12.5 INJECTION INTRAMUSCULAR; INTRAVENOUS
Status: DISCONTINUED | OUTPATIENT
Start: 2022-01-14 | End: 2022-01-14 | Stop reason: HOSPADM

## 2022-01-14 RX ORDER — DIPHENHYDRAMINE HCL 25 MG
25 CAPSULE ORAL
Status: DISCONTINUED | OUTPATIENT
Start: 2022-01-14 | End: 2022-01-14 | Stop reason: HOSPADM

## 2022-01-14 RX ORDER — PROPOFOL 10 MG/ML
VIAL (ML) INTRAVENOUS AS NEEDED
Status: DISCONTINUED | OUTPATIENT
Start: 2022-01-14 | End: 2022-01-14 | Stop reason: SURG

## 2022-01-14 RX ORDER — PROPOFOL 10 MG/ML
VIAL (ML) INTRAVENOUS CONTINUOUS PRN
Status: DISCONTINUED | OUTPATIENT
Start: 2022-01-14 | End: 2022-01-14 | Stop reason: SURG

## 2022-01-14 RX ORDER — HYDRALAZINE HYDROCHLORIDE 20 MG/ML
5 INJECTION INTRAMUSCULAR; INTRAVENOUS
Status: DISCONTINUED | OUTPATIENT
Start: 2022-01-14 | End: 2022-01-14 | Stop reason: HOSPADM

## 2022-01-14 RX ORDER — FENTANYL CITRATE 50 UG/ML
25 INJECTION, SOLUTION INTRAMUSCULAR; INTRAVENOUS
Status: DISCONTINUED | OUTPATIENT
Start: 2022-01-14 | End: 2022-01-14 | Stop reason: HOSPADM

## 2022-01-14 RX ORDER — DEXTROSE MONOHYDRATE 25 G/50ML
INJECTION, SOLUTION INTRAVENOUS
Status: COMPLETED
Start: 2022-01-14 | End: 2022-01-14

## 2022-01-14 RX ORDER — EPHEDRINE SULFATE 50 MG/ML
INJECTION, SOLUTION INTRAVENOUS AS NEEDED
Status: DISCONTINUED | OUTPATIENT
Start: 2022-01-14 | End: 2022-01-14 | Stop reason: SURG

## 2022-01-14 RX ORDER — LIDOCAINE HYDROCHLORIDE 20 MG/ML
INJECTION, SOLUTION INFILTRATION; PERINEURAL AS NEEDED
Status: DISCONTINUED | OUTPATIENT
Start: 2022-01-14 | End: 2022-01-14 | Stop reason: SURG

## 2022-01-14 RX ORDER — HYDROCODONE BITARTRATE AND ACETAMINOPHEN 7.5; 325 MG/1; MG/1
2 TABLET ORAL EVERY 4 HOURS PRN
Status: DISCONTINUED | OUTPATIENT
Start: 2022-01-14 | End: 2022-01-14 | Stop reason: HOSPADM

## 2022-01-14 RX ORDER — NALOXONE HCL 0.4 MG/ML
0.2 VIAL (ML) INJECTION AS NEEDED
Status: DISCONTINUED | OUTPATIENT
Start: 2022-01-14 | End: 2022-01-14 | Stop reason: HOSPADM

## 2022-01-14 RX ORDER — SODIUM CHLORIDE 9 MG/ML
INJECTION, SOLUTION INTRAVENOUS CONTINUOUS PRN
Status: DISCONTINUED | OUTPATIENT
Start: 2022-01-14 | End: 2022-01-14 | Stop reason: SURG

## 2022-01-14 RX ORDER — HYDROCODONE BITARTRATE AND ACETAMINOPHEN 5; 325 MG/1; MG/1
1 TABLET ORAL ONCE AS NEEDED
Status: DISCONTINUED | OUTPATIENT
Start: 2022-01-14 | End: 2022-01-14 | Stop reason: HOSPADM

## 2022-01-14 RX ORDER — HYDROMORPHONE HYDROCHLORIDE 1 MG/ML
0.25 INJECTION, SOLUTION INTRAMUSCULAR; INTRAVENOUS; SUBCUTANEOUS
Status: DISCONTINUED | OUTPATIENT
Start: 2022-01-14 | End: 2022-01-14 | Stop reason: HOSPADM

## 2022-01-14 RX ORDER — HEPARIN SODIUM 1000 [USP'U]/ML
INJECTION, SOLUTION INTRAVENOUS; SUBCUTANEOUS AS NEEDED
Status: DISCONTINUED | OUTPATIENT
Start: 2022-01-14 | End: 2022-01-14 | Stop reason: HOSPADM

## 2022-01-14 RX ORDER — POTASSIUM CHLORIDE 750 MG/1
40 TABLET, FILM COATED, EXTENDED RELEASE ORAL ONCE
Status: COMPLETED | OUTPATIENT
Start: 2022-01-14 | End: 2022-01-14

## 2022-01-14 RX ADMIN — Medication 220 MG: at 12:09

## 2022-01-14 RX ADMIN — Medication 100 MCG/KG/MIN: at 09:32

## 2022-01-14 RX ADMIN — PHENYLEPHRINE HYDROCHLORIDE 50 MCG: 10 INJECTION, SOLUTION INTRAVENOUS at 09:50

## 2022-01-14 RX ADMIN — PHENYLEPHRINE HYDROCHLORIDE 100 MCG: 10 INJECTION, SOLUTION INTRAVENOUS at 10:08

## 2022-01-14 RX ADMIN — CETIRIZINE HYDROCHLORIDE 5 MG: 10 TABLET ORAL at 12:08

## 2022-01-14 RX ADMIN — PHENYLEPHRINE HYDROCHLORIDE 50 MCG: 10 INJECTION, SOLUTION INTRAVENOUS at 09:43

## 2022-01-14 RX ADMIN — OXYCODONE HYDROCHLORIDE AND ACETAMINOPHEN 500 MG: 500 TABLET ORAL at 12:09

## 2022-01-14 RX ADMIN — LIDOCAINE HYDROCHLORIDE 60 MG: 20 INJECTION, SOLUTION INFILTRATION; PERINEURAL at 09:29

## 2022-01-14 RX ADMIN — PHENYLEPHRINE HYDROCHLORIDE 50 MCG: 10 INJECTION, SOLUTION INTRAVENOUS at 10:00

## 2022-01-14 RX ADMIN — METOPROLOL SUCCINATE 12.5 MG: 25 TABLET, EXTENDED RELEASE ORAL at 05:57

## 2022-01-14 RX ADMIN — SODIUM CHLORIDE: 9 INJECTION, SOLUTION INTRAVENOUS at 09:24

## 2022-01-14 RX ADMIN — GUAIFENESIN 600 MG: 600 TABLET, EXTENDED RELEASE ORAL at 12:09

## 2022-01-14 RX ADMIN — Medication 1000 UNITS: at 12:09

## 2022-01-14 RX ADMIN — EPHEDRINE SULFATE 10 MG: 50 INJECTION INTRAVENOUS at 10:10

## 2022-01-14 RX ADMIN — FAMOTIDINE 20 MG: 20 TABLET, FILM COATED ORAL at 12:08

## 2022-01-14 RX ADMIN — IOPAMIDOL 5 ML: 510 INJECTION, SOLUTION INTRAVASCULAR at 10:26

## 2022-01-14 RX ADMIN — POTASSIUM CHLORIDE 40 MEQ: 750 TABLET, EXTENDED RELEASE ORAL at 11:02

## 2022-01-14 RX ADMIN — DEXTROSE MONOHYDRATE 25 ML: 500 INJECTION PARENTERAL at 11:37

## 2022-01-14 RX ADMIN — PROPOFOL 50 MG: 10 INJECTION, EMULSION INTRAVENOUS at 09:32

## 2022-01-14 RX ADMIN — CEFAZOLIN SODIUM 2 G: 2 INJECTION, SOLUTION INTRAVENOUS at 09:42

## 2022-01-14 NOTE — DISCHARGE SUMMARY
Discharge summary    Date of admission 1/13/2022  Date of discharge 1/14/2022    Final diagnosis  Status post new tunneled dialysis catheter insertion  Old hemodialysis catheter dislodgment  COVID-19 infection asymptomatic  End-stage renal disease on hemodialysis  Hypokalemia  COPD  Chronic anemia  Hypercoagulable state  Right hemicolectomy with history of tubular adenoma  Gastroesophageal reflux         Discharge medication      Current Facility-Administered Medications:   •  acetaminophen (TYLENOL) tablet 650 mg, 650 mg, Oral, Q4H PRN, Speedy Coello MD  •  albuterol sulfate HFA (PROVENTIL HFA;VENTOLIN HFA;PROAIR HFA) inhaler 2 puff, 2 puff, Inhalation, Q4H PRN, Speedy Coello MD  •  ascorbic acid (VITAMIN C) tablet 500 mg, 500 mg, Oral, Daily, Speedy Coello MD, 500 mg at 01/14/22 1209  •  budesonide-formoterol (SYMBICORT) 80-4.5 MCG/ACT inhaler 2 puff, 2 puff, Inhalation, BID - RT, Speedy Coello MD, 2 puff at 01/13/22 2026  •  cetirizine (zyrTEC) tablet 5 mg, 5 mg, Oral, Daily, Speedy Coello MD, 5 mg at 01/14/22 1208  •  cholecalciferol (VITAMIN D3) tablet 1,000 Units, 1,000 Units, Oral, Daily, Speedy Coello MD, 1,000 Units at 01/14/22 1209  •  famotidine (PEPCID) tablet 20 mg, 20 mg, Oral, BID, Speedy Coello MD, 20 mg at 01/14/22 1208  •  guaiFENesin (MUCINEX) 12 hr tablet 600 mg, 600 mg, Oral, Q12H, Speedy Coello MD, 600 mg at 01/14/22 1209  •  hydrALAZINE (APRESOLINE) injection 10 mg, 10 mg, Intravenous, Q4H PRN, Haja Chowdhury MD  •  metoprolol succinate XL (TOPROL-XL) 24 hr tablet 12.5 mg, 12.5 mg, Oral, QAM, Speedy Coello MD, 12.5 mg at 01/14/22 0557  •  montelukast (SINGULAIR) tablet 10 mg, 10 mg, Oral, Nightly, Speedy Coello MD, 10 mg at 01/13/22 7050  •  ondansetron (ZOFRAN) injection 4 mg, 4 mg, Intravenous, Q4H PRN, Speedy Coello MD  •  [COMPLETED] Insert peripheral IV, , , Once **AND** sodium chloride 0.9 %  flush 10 mL, 10 mL, Intravenous, PRN, Speedy Coello MD  •  zinc sulfate (ZINCATE) capsule 220 mg, 220 mg, Oral, Daily, Speedy Coello MD, 220 mg at 01/14/22 1209     Consents obtained  Infectious disease  Vascular surgery   Nephrology    Procedures  New tunneled dialysis catheter insertion    Hospital course  76-year-old -American female with history of end-stage renal disease on hemodialysis admitted to emergency room via hemodialysis center where she was getting dialysis and her hemodialysis catheter dislodged and finally came out in the hospital.  Patient admitted for new hemodialysis catheter placement and her screening work-up showed that she has COVID-19 infection with no symptoms except chronic cough.  Patient remain in isolation and further evaluated by vascular surgery infectious disease and nephrology followed the patient.  Patient underwent new dialysis catheter placement this morning and will be discharged home and to continue hemodialysis in the morning and 3 times a week.  Patient advised to continue quarantine for 14 days and take symptomatic treatment for COVID-19 infection.  Patient Eliquis was held and restarted at the time of discharge.    Discharge diet regular    Activity as tolerated    Medication as advised    Follow-up with primary doctor in 1 week and follow-up with nephrology and infectious disease per this section and take medications directed    ALLAN MARTIN MD

## 2022-01-14 NOTE — CONSULTS
CONSULT NOTE    Infectious Diseases - Keesha Douglas MD  [facility]       Patient Identification:  Name: Nellie Vegas  Age: 76 y.o.  Sex: female  :  1945  MRN: [id number]             Date of Consultation: 2022      Primary Care Physician: Laurent Cortes DO                               Requesting Physician: Dr. Chowdhury  Reason for Consultation: COVID-19 infection    Impression: Patient is a 76-year-old female with past medical history remarkable for type 2 diabetes, end-stage renal disease on hemodialysis via tunneled catheter, history of COPD chronic anemia and hypercoagulable state on chronic anticoagulation therapy per the usual state of her health when she went for her dialysis today.  About an hour into dialysis he wanted to go to the restroom resulting in her catheter getting stuck and partially pulled out exposing the cough.  Patient denies any associated fever chills cough or shortness of breath.  Patient has received 2 doses of COVID-vaccine and does not have any respiratory symptoms.  As part of the admission requirement with COVID-19 as she was tested and she came back positive.  Patient has been seen by vascular surgery service and is being planned to have dialysis catheter replaced.  Patient not exhibiting any symptoms of respiratory distress and able to maintain oxygen saturation greater than 95% on room air.  This presentation is consistent with:  1-asymptomatic COVID-19 infection in a patient who is vaccinated and does not exhibit any adverse consequences of COVID-19 infection and respiratory function.  2-end-stage renal disease with accidental removal of her femoral dialysis catheter requiring replacement  3-COPD  4-diabetes  5- hypothyroidism  6-chronic anticoagulation treatment for hypercoagulable state    Recommendations/Discussions:  At this juncture in her care I agree with the care plan consisting of supportive care.  Patient does not require any COVID-specific  treatment at this time given her preserved respiratory status and lack of symptoms.  Once patient major reason for hospitalization, i.e, dialysis catheter issues are addressed patient could be discharged from infectious disease standpoint anytime without any further work-up and management related to her positive COVID-19 assay.  Thank you Dr. Sweet for letting me be the part of your patient care please see above impression and recommendations    History of Present Illness:   Patient is a 76-year-old female with past medical history remarkable for type 2 diabetes, end-stage renal disease on hemodialysis via tunneled catheter, history of COPD chronic anemia and hypercoagulable state on chronic anticoagulation therapy per the usual state of her health when she went for her dialysis today.  About an hour into dialysis he wanted to go to the restroom resulting in her catheter getting stuck and partially pulled out exposing the cough.  Patient denies any associated fever chills cough or shortness of breath.  Patient has received 2 doses of COVID-vaccine and does not have any respiratory symptoms.  As part of the admission requirement with COVID-19 as she was tested and she came back positive.  Patient has been seen by vascular surgery service and is being planned to have dialysis catheter replaced.  Patient not exhibiting any symptoms of respiratory distress and able to maintain oxygen saturation greater than 95% on room air.      Past Medical History:  Past Medical History:   Diagnosis Date   • Anemia    • Anesthesia complication     mother woke up in combative state   • Arthritis     knees   • Asthma    • COPD (chronic obstructive pulmonary disease) (HCC)    • Diabetes mellitus (HCC)     type 2   • Dialysis patient ([facility])    • Disease of thyroid gland    • GERD (gastroesophageal reflux disease)    • Headache     after dialysis   • History of blood clots     BUE   • History of kidney stones    • Hyperlipidemia    •  Hypertension    • Knee pain, bilateral    • Renal disease    • Sleep apnea     CPAP   • SOB (shortness of breath)    • 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)    • Weakness      Past Surgical History:  Past Surgical History:   Procedure Laterality Date   • ARTERIOVENOUS FISTULA Right    • ARTERIOVENOUS FISTULA Left     REMOVED   • CENTRAL VENOUS CATHETER TUNNELED INSERTION DOUBLE LUMEN     •  SECTION     • COLON RESECTION Right 2021    Procedure: RIGHT HEMICOLECTOMY LAPAROSCOPIC;  Surgeon: Zbigniew Conner MD;  Location: [facility] OR;  Service: General;  Laterality: Right;   • COLONOSCOPY  2016   • COLONOSCOPY N/A 2021    Procedure: COLONOSCOPY into cecum with biopsy;  Surgeon: Fabricio Monroe MD;  Location: [facility] ENDOSCOPY;  Service: Gastroenterology;  Laterality: N/A;  cecal mass   • EXPLORATORY LAPAROTOMY N/A 8/3/2021    Procedure: LAPAROTOMY EXPLORATORY, resection of ileocolonic anastomosis with anastomosis;  Surgeon: Zbigniew Conner MD;  Location: [facility] OR;  Service: General;  Laterality: N/A;   • INSERTION HEMODIALYSIS CATHETER Right 2021    Procedure: VENACAVAGRAM AND HEMODIALYSIS CATHETER INSERTION;  Surgeon: Karson Muller MD;  Location: [facility] OR ;  Service: Vascular;  Laterality: Right;  Dr Bryant was primary surgeon   • INSERTION HEMODIALYSIS CATHETER N/A 2021    Procedure: HEMODIALYSIS CATHETER INSERTION;  Surgeon: Karson Muller MD;  Location: [facility] OR;  Service: Vascular;  Laterality: N/A;   • POLYPECTOMY      UTERINE   • SHUNT O GRAM Right 2019    Procedure: RIGHT ARM DIALYSIS GRAFT revision and THROMBECTOMY;  Surgeon: Thierry Hardy MD;  Location: [facility] OR ;  Service: Vascular   • SHUNT O GRAM Right 2019    Procedure: RIGHT ARM DIALYSIS GRAFT THROMBECTOMY WITH STENTING;  Surgeon: Thierry Hardy MD;  Location: [facility] OR ;  Service: Vascular   • SHUNT O GRAM  Right 12/7/2020    Procedure: RIGHT ARM ARTERIOVENOUS SHUNTOGRAM WITH THROMBECTOMY;  Surgeon: Thierry Hardy MD;  Location: [facility] OR 18/19;  Service: Vascular;  Laterality: Right;   • SHUNT O GRAM Right 7/12/2021    Procedure: Right arm dialysis shuntogram with shunt thrombectomy;  Surgeon: Shahram Gallagher MD;  Location: [facility] OR 18/19;  Service: Vascular;  Laterality: Right;   • SHUNT O GRAM Right 7/19/2021    Procedure: RIGHT UPPER EXTREMITY THROMBECTOMY AND SHUNTOGRAM;  Surgeon: Shahram Gallagher MD;  Location: [facility];  Service: Vascular;  Laterality: Right;      Home Meds:  Medications Prior to Admission   Medication Sig Dispense Refill Last Dose   • albuterol (PROVENTIL) (2.5 MG/3ML) 0.083% nebulizer solution Take 2.5 mg by nebulization 3 (Three) Times a Day As Needed for Wheezing. UNSURE OF DOSAGE   Past Week at Unknown time   • albuterol sulfate  (90 Base) MCG/ACT inhaler INHALE 2 PUFFS INTO THE LUNGS EVERY 4 HOURS AS NEEDED FOR WHEEZING   Past Week at Unknown time   • apixaban (ELIQUIS) 2.5 MG tablet tablet Take 2.5 mg by mouth 2 (Two) Times a Day.   1/13/2022 at Unknown time   • Breo Ellipta 100-25 MCG/INH inhaler    Past Week at Unknown time   • budesonide-formoterol (SYMBICORT) 160-4.5 MCG/ACT inhaler Inhale 2 puffs 2 (Two) Times a Day.   Past Week at Unknown time   • Cholecalciferol (VITAMIN D3) 5000 units capsule capsule Take 5,000 Units by mouth Every Morning.   1/13/2022 at Unknown time   • fluticasone (FLONASE) 50 MCG/ACT nasal spray 2 sprays into the nostril(s) as directed by provider Daily.   1/13/2022 at Unknown time   • glucose blood (Accu-Chek Amber Plus) test strip 1 strip by Other route 3 (Three) Times a Day.   1/13/2022 at Unknown time   • montelukast (SINGULAIR) 10 MG tablet Take 10 mg by mouth Every Night.   1/12/2022 at Unknown time   • omeprazole (priLOSEC) 40 MG capsule Take 40 mg by mouth Every Morning.   1/13/2022 at Unknown time   •  metoprolol succinate XL (TOPROL-XL) 25 MG 24 hr tablet Take 0.5 tablets by mouth Every Morning for 30 days. 15 tablet 0    • midodrine (PROAMATINE) 5 MG tablet Take 1 tablet by mouth 3 (Three) Times a Day Before Meals for 30 days. 90 tablet 0      Current Meds:     Current Facility-Administered Medications:   •  albuterol sulfate HFA (PROVENTIL HFA;VENTOLIN HFA;PROAIR HFA) inhaler 2 puff, 2 puff, Inhalation, Q4H PRN, Haja Chowdhury MD  •  ascorbic acid (VITAMIN C) tablet 500 mg, 500 mg, Oral, Daily, Haja Chowdhury MD, 500 mg at 22  •  budesonide-formoterol (SYMBICORT) 80-4.5 MCG/ACT inhaler 2 puff, 2 puff, Inhalation, BID - RT, Haja Chowdhury MD, 2 puff at 22  •  [START ON 2022] ceFAZolin in dextrose (ANCEF) IVPB solution 2 g, 2 g, Intravenous, Once, Beronica Jolly MD  •  cetirizine (zyrTEC) tablet 5 mg, 5 mg, Oral, Daily, Haja Chowdhury MD, 5 mg at 22  •  cholecalciferol (VITAMIN D3) tablet 1,000 Units, 1,000 Units, Oral, Daily, Haja Chowdhury MD, 1,000 Units at 22  •  famotidine (PEPCID) tablet 20 mg, 20 mg, Oral, BID, Haja Chowdhury MD  •  guaiFENesin (MUCINEX) 12 hr tablet 600 mg, 600 mg, Oral, Q12H, Haja Chowdhury MD  •  [START ON 2022] metoprolol succinate XL (TOPROL-XL) 24 hr tablet 12.5 mg, 12.5 mg, Oral, QAM, Haja Chowdhury MD  •  montelukast (SINGULAIR) tablet 10 mg, 10 mg, Oral, Nightly, Haja Chowdhury MD  •  [COMPLETED] Insert peripheral IV, , , Once **AND** sodium chloride 0.9 % flush 10 mL, 10 mL, Intravenous, PRN, Dayo Presley PA  •  zinc sulfate (ZINCATE) capsule 220 mg, 220 mg, Oral, Daily, Haja Chowdhury MD  Allergies:  Allergies   Allergen Reactions   • Sulfa Antibiotics Hives   • Adhesive Tape Hives     Paper tape   • Latex Rash     Social History:   Social History     Tobacco Use   • Smoking status: Former Smoker     Packs/day: 0.00     Years: 4.00     Pack years: 0.00     Types: Cigarettes     Quit date:      Years since quittin.0   •  "Smokeless tobacco: Never Used   Substance Use Topics   • Alcohol use: No      Family History:  Family History   Problem Relation Age of Onset   • Malig Hyperthermia Neg Hx           Review of Systems  See history of present illness and past medical history.   Constitutional: Remarkable for no fever or chills  Cardiovascular: Remarkable for no chest pain or shortness of breath  GI: Remarkable for no nausea vomiting or diarrhea  : Remarkable for no burning urination frequency or urgency  Musculoskeletal: Remarkable no new joint aches and pain  Neurological: Remarkable for no loss of consciousness or continence.    Remainder of ROS is negative.      Vitals:   /61   Pulse 70   Temp 98.1 °F (36.7 °C) (Oral)   Resp 16   Ht 152.4 cm (60\")   Wt 64.1 kg (141 lb 5 oz)   SpO2 99%   BMI 27.60 kg/m²   I/O: No intake or output data in the 24 hours ending 01/13/22 2034  Exam:  Patient is examined using the personal protective equipment as per guidelines from infection control for this particular patient as enacted.  Hand washing was performed before and after patient interaction.  General Appearance:   Awake comfortable chronically ill nontoxic-appearing female who does not appear to be in any acute distress   Head:    Normocephalic, without obvious abnormality, atraumatic   Eyes:    PERRL, conjunctivae/corneas clear, EOM's intact, both eyes   Ears:    Normal external ear canals, both ears   Nose:   Nares normal, septum midline, mucosa normal, no drainage    or sinus tenderness   Throat:   Lips, tongue, gums normal; oral mucosa pink and moist   Neck:   Supple, symmetrical, trachea midline, no adenopathy;     thyroid:  no enlargement/tenderness/nodules; no carotid    bruit or JVD   Back:     Symmetric, no curvature, ROM normal, no CVA tenderness   Lungs:     Clear to auscultation bilaterally, respirations unlabored   Chest Wall:    No tenderness or deformity    Heart:   S1-S2 regular   Abdomen:    Soft nontender "   Extremities:  Prior left arm AV fistula in place   Pulses:   Pulses palpable in all extremities; symmetric all extremities   Skin:   Skin color normal, Skin is warm and dry,  no rashes or palpable lesions   Neurologic:  Grossly nonfocal       Data Review:    I reviewed the patient's new clinical results.  Results from last 7 days   Lab Units 01/13/22  1411   WBC 10*3/mm3 3.54   HEMOGLOBIN g/dL 9.7*   PLATELETS 10*3/mm3 114*     Results from last 7 days   Lab Units 01/13/22  1411   SODIUM mmol/L 134*   POTASSIUM mmol/L 3.5   CHLORIDE mmol/L 98   CO2 mmol/L 23.9   BUN mg/dL 15   CREATININE mg/dL 4.34*   CALCIUM mg/dL 7.2*   GLUCOSE mg/dL 71     Microbiology Results (last 10 days)     Procedure Component Value - Date/Time    COVID PRE-OP / PRE-PROCEDURE SCREENING ORDER (NO ISOLATION) - Swab, Nasopharynx [[id number]]  (Abnormal) Collected: 01/13/22 1411    Lab Status: Final result Specimen: Swab from Nasopharynx Updated: 01/13/22 1512    Narrative:      The following orders were created for panel order COVID PRE-OP / PRE-PROCEDURE SCREENING ORDER (NO ISOLATION) - Swab, Nasopharynx.  Procedure                               Abnormality         Status                     ---------                               -----------         ------                     COVID-19,[facility] IN-HOUSE...[[id number]]  Abnormal            Final result                 Please view results for these tests on the individual orders.    COVID-19,[facility] IN-HOUSE CEPHEID/TAB NP SWAB IN TRANSPORT MEDIA 8-12 HR TAT - Swab, Nasopharynx [[id number]]  (Abnormal) Collected: 01/13/22 1411    Lab Status: Final result Specimen: Swab from Nasopharynx Updated: 01/13/22 1512     COVID19 Detected    Narrative:      Fact sheet for providers: https://www.fda.gov/media/026852/download    Fact sheet for patients: https://www.fda.gov/media/946468/download    Test performed by PCR.        XR Chest 1 View    Result Date: 1/13/2022  1. Bilateral hilar enlargement. This may  be related to pulmonary arterial enlargement though hilar anthony enlargement is also in the differential diagnosis. This appears to be stable. 2. Lungs appear clear and there is no evidence for perihilar edema. 3. Extensive vascular stents. 4. Advanced arthritis of the left shoulder.  This report was finalized on 1/13/2022 7:22 PM by Dr. Nathaniel Thao M.D.      No orders to display         Assessment:  Active Hospital Problems    Diagnosis  POA   • Displacement of vascular dialysis catheter (HCC) [T82.42XA]  Yes      Resolved Hospital Problems   No resolved problems to display.         Plan:  See above  Keesha Jorgensen MD   1/13/2022  20:34 EST    Much of this encounter note is an electronic transcription/translation of spoken language to printed text. The electronic translation of spoken language may permit erroneous, or at times, nonsensical words or phrases to be inadvertently transcribed; Although I have reviewed the note for such errors, some may still exist

## 2022-01-14 NOTE — PROGRESS NOTES
LOS: 1 day   Patient Care Team:  Laurent Cortes DO as PCP - General (Internal Medicine)  Kvng Guerra MD as Consulting Physician (Pulmonary Disease)        Subjective     Interval History:     Patient Complaints:   Patient Denies:  NV       Review of Systems:    Weak    Objective     Vital Signs  Temp:  [97.8 °F (36.6 °C)-98.3 °F (36.8 °C)] 97.9 °F (36.6 °C)  Heart Rate:  [63-70] 63  Resp:  [16-20] 18  BP: (116-140)/() 125/73    Physical Exam:     General Appearance:    Alert, cooperative, in no acute distress   Head:    Normocephalic, without obvious abnormality, atraumatic   Eyes:            Lids and lashes normal, conjunctivae and sclerae normal, no   icterus, no pallor, corneas clear, PERRLA   Ears:    Ears appear intact with no abnormalities noted   Throat:   No oral lesions, no thrush, oral mucosa moist   Neck:   No adenopathy, supple, trachea midline, no thyromegaly, no     carotid bruit, no JVD   Back:     No kyphosis present, no scoliosis present, no skin lesions,       erythema or scars, no tenderness to percussion or                   palpation,   range of motion normal   Lungs:     Clear to auscultation,respirations regular, even and                   unlabored    Heart:    Regular rhythm and normal rate, normal S1 and S2, no            murmur, no gallop, no rub, no click   Breast Exam:    Deferred   Abdomen:     Normal bowel sounds, no masses, no organomegaly, soft        non-tender, non-distended, no guarding, no rebound                 tenderness   Genitalia:    Deferred   Extremities:   Moves all extremities well, no edema, no cyanosis, no              redness   Pulses:   Pulses palpable and equal bilaterally   Skin:   No bleeding, bruising or rash   Lymph nodes:   No palpable adenopathy   Neurologic:   Cranial nerves 2 - 12 grossly intact, sensation intact, DTR        present and equal bilaterally          Results Review:      Lab Results (last 72 hours)     Procedure  Component Value Units Date/Time    Hemoglobin A1c [[id number]]  (Normal) Collected: 01/14/22 0604    Specimen: Blood Updated: 01/14/22 1318     Hemoglobin A1C 5.10 %     Narrative:      Hemoglobin A1C Ranges:    Increased Risk for Diabetes  5.7% to 6.4%  Diabetes                     >= 6.5%  Diabetic Goal                < 7.0%    POC Glucose Once [[id number]]  (Abnormal) Collected: 01/14/22 1147    Specimen: Blood Updated: 01/14/22 1148     Glucose 136 mg/dL      Comment: Meter: [id number] : [id number] Hitesh RASCON RN       POC Glucose Once [[id number]]  (Abnormal) Collected: 01/14/22 1134    Specimen: Blood Updated: 01/14/22 1135     Glucose 61 mg/dL      Comment: Meter: [id number] : [id number] Hitesh RASCON RN       BNP [[id number]]  (Abnormal) Collected: 01/14/22 0603    Specimen: Blood Updated: 01/14/22 1127     proBNP 9,361.0 pg/mL     Narrative:      Among patients with dyspnea, NT-proBNP is highly sensitive for the detection of acute congestive heart failure. In addition NT-proBNP of <300 pg/ml effectively rules out acute congestive heart failure with 99% negative predictive value.    Results may be falsely decreased if patient taking Biotin.      POC Glucose Once [[id number]]  (Abnormal) Collected: 01/14/22 1123    Specimen: Blood Updated: 01/14/22 1125     Glucose 62 mg/dL      Comment: Meter: [id number] : [id number] Hitesh RASCON RN       Comprehensive Metabolic Panel [[id number]]  (Abnormal) Collected: 01/14/22 0603    Specimen: Blood Updated: 01/14/22 0657     Glucose 71 mg/dL      BUN 24 mg/dL      Creatinine 5.56 mg/dL      Sodium 134 mmol/L      Potassium 2.4 mmol/L      Chloride 98 mmol/L      CO2 21.5 mmol/L      Calcium 6.7 mg/dL      Total Protein 5.4 g/dL      Albumin 2.90 g/dL      ALT (SGPT) 6 U/L      AST (SGOT) 18 U/L      Alkaline Phosphatase 81 U/L      Total Bilirubin 0.2 mg/dL      eGFR Non  Amer --     Comment: <15 Indicative of kidney failure.         eGFR  African Amer 9 mL/min/1.73      Comment: <15 Indicative of kidney failure.        Globulin 2.5 gm/dL      A/G Ratio 1.2 g/dL      BUN/Creatinine Ratio 4.3     Anion Gap 14.5 mmol/L     Narrative:      GFR Normal >60  Chronic Kidney Disease <60  Kidney Failure <15      D-dimer, Quantitative [[id number]]  (Abnormal) Collected: 01/14/22 0603    Specimen: Blood Updated: 01/14/22 0654     D-Dimer, Quantitative 1.88 MCGFEU/mL     Narrative:      The Stago D-Dimer test used in conjunction with a clinical pretest probability (PTP) assessment model, has been approved by the FDA to rule out the presence of venous thromboembolism (VTE) in outpatients suspected of deep venous thrombosis (DVT) or pulmonary embolism (PE). The cut-off for negative predictive value is <0.50 MCGFEU/mL.    TSH [[id number]]  (Normal) Collected: 01/14/22 0603    Specimen: Blood Updated: 01/14/22 0653     TSH 1.580 uIU/mL     Ferritin [[id number]]  (Abnormal) Collected: 01/14/22 0603    Specimen: Blood Updated: 01/14/22 0653     Ferritin 1,226.00 ng/mL     Narrative:      Results may be falsely decreased if patient taking Biotin.      C-reactive Protein [[id number]]  (Abnormal) Collected: 01/14/22 0603    Specimen: Blood Updated: 01/14/22 0647     C-Reactive Protein 5.89 mg/dL     Lipid Panel [[id number]]  (Abnormal) Collected: 01/14/22 0603    Specimen: Blood Updated: 01/14/22 0647     Total Cholesterol 146 mg/dL      Triglycerides 152 mg/dL      HDL Cholesterol 47 mg/dL      LDL Cholesterol  73 mg/dL      VLDL Cholesterol 26 mg/dL      LDL/HDL Ratio 1.46    Narrative:      Cholesterol Reference Ranges  (U.S. Department of Health and Human Services ATP III Classifications)    Desirable          <200 mg/dL  Borderline High    200-239 mg/dL  High Risk          >240 mg/dL      Triglyceride Reference Ranges  (U.S. Department of Health and Human Services ATP III Classifications)    Normal           <150 mg/dL  Borderline High  150-199 mg/dL  High              200-499 mg/dL  Very High        >500 mg/dL    HDL Reference Ranges  (U.S. Department of Health and Human Services ATP III Classifcations)    Low     <40 mg/dl (major risk factor for CHD)  High    >60 mg/dl ('negative' risk factor for CHD)        LDL Reference Ranges  (U.S. Department of Health and Human Services ATP III Classifcations)    Optimal          <100 mg/dL  Near Optimal     100-129 mg/dL  Borderline High  130-159 mg/dL  High             160-189 mg/dL  Very High        >189 mg/dL    CBC & Differential [[id number]]  (Abnormal) Collected: 01/14/22 0603    Specimen: Blood Updated: 01/14/22 0633    Narrative:      The following orders were created for panel order CBC & Differential.  Procedure                               Abnormality         Status                     ---------                               -----------         ------                     CBC Auto Differential[[id number]]        Abnormal            Final result                 Please view results for these tests on the individual orders.    CBC Auto Differential [[id number]]  (Abnormal) Collected: 01/14/22 0603    Specimen: Blood Updated: 01/14/22 0633     WBC 2.85 10*3/mm3      RBC 3.76 10*6/mm3      Hemoglobin 9.5 g/dL      Hematocrit 32.0 %      MCV 85.1 fL      MCH 25.3 pg      MCHC 29.7 g/dL      RDW 19.4 %      RDW-SD 59.6 fl      MPV 8.8 fL      Platelets 120 10*3/mm3      Neutrophil % 68.4 %      Lymphocyte % 15.4 %      Monocyte % 14.7 %      Eosinophil % 0.4 %      Basophil % 0.7 %      Immature Grans % 0.4 %      Neutrophils, Absolute 1.95 10*3/mm3      Lymphocytes, Absolute 0.44 10*3/mm3      Monocytes, Absolute 0.42 10*3/mm3      Eosinophils, Absolute 0.01 10*3/mm3      Basophils, Absolute 0.02 10*3/mm3      Immature Grans, Absolute 0.01 10*3/mm3      nRBC 0.4 /100 WBC     COVID PRE-OP / PRE-PROCEDURE SCREENING ORDER (NO ISOLATION) - Swab, Nasopharynx [[id number]]  (Abnormal) Collected: 01/13/22 1411    Specimen: Swab from Nasopharynx  Updated: 01/13/22 1512    Narrative:      The following orders were created for panel order COVID PRE-OP / PRE-PROCEDURE SCREENING ORDER (NO ISOLATION) - Swab, Nasopharynx.  Procedure                               Abnormality         Status                     ---------                               -----------         ------                     COVID-19,[facility]...[[id number]]  Abnormal            Final result                 Please view results for these tests on the individual orders.    COVID-19,[facility] CEPHEID/TAB NP SWAB IN TRANSPORT MEDIA 8-12 HR TAT - Swab, Nasopharynx [[id number]]  (Abnormal) Collected: 01/13/22 1411    Specimen: Swab from Nasopharynx Updated: 01/13/22 1512     COVID19 Detected    Narrative:      Fact sheet for providers: https://www.fda.gov/media/725571/download    Fact sheet for patients: https://www.fda.gov/media/798490/download    Test performed by PCR.    Comprehensive Metabolic Panel [[id number]]  (Abnormal) Collected: 01/13/22 1411    Specimen: Blood from Arm, Left Updated: 01/13/22 1452     Glucose 71 mg/dL      BUN 15 mg/dL      Creatinine 4.34 mg/dL      Sodium 134 mmol/L      Potassium 3.5 mmol/L      Comment: Specimen hemolyzed.  Results may be affected.        Chloride 98 mmol/L      CO2 23.9 mmol/L      Calcium 7.2 mg/dL      Total Protein 5.9 g/dL      Albumin 3.30 g/dL      ALT (SGPT) 8 U/L      Comment: Specimen hemolyzed.  Results may be affected.        AST (SGOT) 25 U/L      Comment: Specimen hemolyzed.  Results may be affected.        Alkaline Phosphatase 91 U/L      Total Bilirubin 0.2 mg/dL      eGFR Non  Amer --     Comment: <15 Indicative of kidney failure.        eGFR  African Amer 12 mL/min/1.73      Comment: <15 Indicative of kidney failure.        Globulin 2.6 gm/dL      A/G Ratio 1.3 g/dL      BUN/Creatinine Ratio 3.5     Anion Gap 12.1 mmol/L     Narrative:      GFR Normal >60  Chronic Kidney Disease <60  Kidney Failure <15      CBC &  Differential [[id number]]  (Abnormal) Collected: 01/13/22 1411    Specimen: Blood Updated: 01/13/22 1426    Narrative:      The following orders were created for panel order CBC & Differential.  Procedure                               Abnormality         Status                     ---------                               -----------         ------                     CBC Auto Differential[[id number]]        Abnormal            Final result                 Please view results for these tests on the individual orders.    CBC Auto Differential [[id number]]  (Abnormal) Collected: 01/13/22 1411    Specimen: Blood Updated: 01/13/22 1426     WBC 3.54 10*3/mm3      RBC 3.72 10*6/mm3      Hemoglobin 9.7 g/dL      Hematocrit 30.7 %      MCV 82.5 fL      MCH 26.1 pg      MCHC 31.6 g/dL      RDW 19.0 %      RDW-SD 55.5 fl      MPV 9.8 fL      Platelets 114 10*3/mm3      Neutrophil % 70.8 %      Lymphocyte % 13.0 %      Monocyte % 15.3 %      Eosinophil % 0.0 %      Basophil % 0.3 %      Immature Grans % 0.6 %      Neutrophils, Absolute 2.51 10*3/mm3      Lymphocytes, Absolute 0.46 10*3/mm3      Monocytes, Absolute 0.54 10*3/mm3      Eosinophils, Absolute 0.00 10*3/mm3      Basophils, Absolute 0.01 10*3/mm3      Immature Grans, Absolute 0.02 10*3/mm3      nRBC 0.0 /100 WBC           Imaging Results (Last 72 Hours)     Procedure Component Value Units Date/Time    Arteriogram (Autofinalize) [[id number]] Resulted: 01/14/22 1027     Updated: 01/14/22 1027    Narrative:      This procedure was auto-finalized with no dictation required.    XR Chest 1 View [[id number]] Collected: 01/13/22 1839     Updated: 01/13/22 1925    Narrative:      CHEST SINGLE VIEW     HISTORY: Covid-19     COMPARISON: [facility] chest 11/04/2021, 05/03/2021, two-view chest 11/28/2019.     FINDINGS: Heart size is within normal limits. There is hilar enlargement  that may be related to pulmonary arterial enlargement. It is difficult  to exclude anthony enlargement though this  appears to be stable. There is  prominence of the epicardial fat pads. No focal airspace disease is  demonstrated. Extensive right vascular stents extend from the region of  the brachiocephalic to the subclavian, axillary, brachial vasculature.  Left axillary vascular stent is also noted. 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       Impression:      1. Bilateral hilar enlargement. This may be related to pulmonary  arterial enlargement though hilar anthony enlargement is also in the  differential diagnosis. This appears to be stable.  2. Lungs appear clear and there is no evidence for perihilar edema.  3. Extensive vascular stents.  4. Advanced arthritis of the left shoulder.     This report was finalized on 1/13/2022 7:22 PM by Dr. Nathaniel Thao M.D.               Medication Review:     Hospital Medications (active)       Dose Frequency Start End    acetaminophen (TYLENOL) tablet 650 mg 650 mg Every 4 Hours PRN 1/13/2022     Admin Instructions: Do not exceed 4 grams of acetaminophen in a 24 hr period. Max dose of 2gm for AST/ALT greater than 120 units/L      If given for pain, use the following pain scale:   Mild Pain = Pain Score of 1-3, CPOT 1-2  Moderate Pain = Pain Score of 4-6, CPOT 3-4  Severe Pain = Pain Score of 7-10, CPOT 5-8    Route: Oral    albuterol sulfate HFA (PROVENTIL HFA;VENTOLIN HFA;PROAIR HFA) inhaler 2 puff 2 puff Every 4 Hours PRN 1/13/2022     Admin Instructions:  Shake well.    Route: Inhalation    ascorbic acid (VITAMIN C) tablet 500 mg 500 mg Daily 1/13/2022     Route: Oral    budesonide-formoterol (SYMBICORT) 80-4.5 MCG/ACT inhaler 2 puff 2 puff 2 Times Daily - RT 1/13/2022     Admin Instructions: noman well.  Rinse mouth after use, do not swallow water.  Send aerosols to pharmacy in ziplock bag for proper disposal.    Route: Inhalation    cetirizine (zyrTEC) tablet 5 mg 5 mg Daily 1/13/2022     Route: Oral    cholecalciferol (VITAMIN D3) tablet 1,000 Units 1,000 Units  "Daily 1/13/2022     Route: Oral    famotidine (PEPCID) tablet 20 mg 20 mg 2 Times Daily 1/13/2022     Route: Oral    guaiFENesin (MUCINEX) 12 hr tablet 600 mg 600 mg Every 12 Hours Scheduled 1/13/2022     Admin Instructions: Caution: Look alike/sound alike drug alert               Do not crush, split, or chew.    Route: Oral    hydrALAZINE (APRESOLINE) injection 10 mg 10 mg Every 4 Hours PRN 1/14/2022     Admin Instructions:   Caution: Look alike/sound alike drug alert    Route: Intravenous    metoprolol succinate XL (TOPROL-XL) 24 hr tablet 12.5 mg 12.5 mg Every Morning 1/14/2022     Admin Instructions: Do not crush or chew.    Route: Oral    montelukast (SINGULAIR) tablet 10 mg 10 mg Nightly 1/13/2022     Route: Oral    ondansetron (ZOFRAN) injection 4 mg 4 mg Every 4 Hours PRN 1/13/2022     Route: Intravenous    sodium chloride 0.9 % flush 10 mL 10 mL As Needed 1/13/2022     Route: Intravenous    Cosign for Ordering: Accepted by Obi Gutierrez MD on 1/13/2022  2:31 PM    Linked Group 1: \"And\" Linked Group Details        zinc sulfate (ZINCATE) capsule 220 mg 220 mg Daily 1/13/2022     Route: Oral        vitamin C, 500 mg, Oral, Daily  budesonide-formoterol, 2 puff, Inhalation, BID - RT  cetirizine, 5 mg, Oral, Daily  cholecalciferol, 1,000 Units, Oral, Daily  famotidine, 20 mg, Oral, BID  guaiFENesin, 600 mg, Oral, Q12H  metoprolol succinate XL, 12.5 mg, Oral, QAM  montelukast, 10 mg, Oral, Nightly  zinc sulfate, 220 mg, Oral, Daily        Assessment/Plan         ESRD (end stage renal disease) on dialysis (HCC)    Displacement of vascular dialysis catheter (HCC)    -asymptomatic COVID-19 infection in a patient who is vaccinated and does not exhibit any adverse consequences of COVID-19 infection and respiratory function.  2-end-stage renal disease with accidental removal of her femoral dialysis catheter requiring replacement  3-COPD  4-diabetes  5- hypothyroidism  6-chronic anticoagulation treatment " for hypercoagulable state     Recommendations/Discussions:  At this juncture in her care I agree with the care plan consisting of supportive care.  Patient does not require any COVID-specific treatment at this time given her preserved respiratory status and lack of symptoms.  Once patient major reason for hospitalization, i.e, dialysis catheter issues are addressed patient could be discharged from infectious disease standpoint anytime without any further work-up and management related to her positive COVID-19 assay              Dacia Goff MD  01/14/22  15:38 EST

## 2022-01-14 NOTE — PROGRESS NOTES
LOS: 1 day     Chief Complaint/ Reason for encounter: ESRD  Chief Complaint   Patient presents with   • Vascular Access Problem         Subjective   Patient is doing well today with no new complaints  Good appetite with no nausea or vomiting  No shortness of breath chest pain or edema  Voiding well with no dysuria  Patient was seen in the PACU status post tunnel catheter replacement doing well with no new problems or complaints        Medical history reviewed:  History of Present Illness    Subjective    History taken from: Patient and chart    Vital Signs  Temp:  [97.8 °F (36.6 °C)-98.3 °F (36.8 °C)] 97.9 °F (36.6 °C)  Heart Rate:  [63-70] 63  Resp:  [16-20] 18  BP: (116-161)/() 125/73       Wt Readings from Last 1 Encounters:   01/13/22 1642 64.1 kg (141 lb 5 oz)   01/13/22 1209 70.8 kg (156 lb)       Objective    Objective:  General Appearance:  Comfortable, well-appearing, in no acute distress and not in pain.  Awake, alert, oriented  HEENT: Mucous membranes moist, no injury, oropharynx clear  Lungs:  Normal effort and normal respiratory rate.  Breath sounds clear to auscultation.  No  respiratory distress.  No rales, decreased breath sounds or rhonchi.    Heart: Normal rate.  Regular rhythm.  S1 normal.  No murmur.   Abdomen: Abdomen is soft.  Bowel sounds are normal, no abdominal tenderness.  There is no rebound or guarding  Extremities: Normal range of motion.  Trace edema of bilateral lower extremities, distal pulses intact  Neurological: No focal motor or sensory deficits, pupils reactive  Skin:  Warm and dry.  No rash or cyanosis.       Results Review:    Intake/Output:     Intake/Output Summary (Last 24 hours) at 1/14/2022 1358  Last data filed at 1/14/2022 1014  Gross per 24 hour   Intake 360 ml   Output --   Net 360 ml         DATA:  Radiology and Labs:  The following labs independently reviewed by me. Additional labs ordered for tomorrow a.m.  Interval notes, chart personally reviewed by me.    Old records independently reviewed showing ESRD on dialysis  Discussed with patient and with the nurse in recovery room    Risk/ complexity of medical care/ medical decision making moderate complexity, need for OR catheter replacement today    Labs:   Recent Results (from the past 24 hour(s))   Comprehensive Metabolic Panel    Collection Time: 01/13/22  2:11 PM    Specimen: Arm, Left; Blood   Result Value Ref Range    Glucose 71 65 - 99 mg/dL    BUN 15 8 - 23 mg/dL    Creatinine 4.34 (H) 0.57 - 1.00 mg/dL    Sodium 134 (L) 136 - 145 mmol/L    Potassium 3.5 3.5 - 5.2 mmol/L    Chloride 98 98 - 107 mmol/L    CO2 23.9 22.0 - 29.0 mmol/L    Calcium 7.2 (L) 8.6 - 10.5 mg/dL    Total Protein 5.9 (L) 6.0 - 8.5 g/dL    Albumin 3.30 (L) 3.50 - 5.20 g/dL    ALT (SGPT) 8 1 - 33 U/L    AST (SGOT) 25 1 - 32 U/L    Alkaline Phosphatase 91 39 - 117 U/L    Total Bilirubin 0.2 0.0 - 1.2 mg/dL    eGFR Non  Amer      eGFR  African Amer 12 (L) >60 mL/min/1.73    Globulin 2.6 gm/dL    A/G Ratio 1.3 g/dL    BUN/Creatinine Ratio 3.5 (L) 7.0 - 25.0    Anion Gap 12.1 5.0 - 15.0 mmol/L   CBC Auto Differential    Collection Time: 01/13/22  2:11 PM    Specimen: Blood   Result Value Ref Range    WBC 3.54 3.40 - 10.80 10*3/mm3    RBC 3.72 (L) 3.77 - 5.28 10*6/mm3    Hemoglobin 9.7 (L) 12.0 - 15.9 g/dL    Hematocrit 30.7 (L) 34.0 - 46.6 %    MCV 82.5 79.0 - 97.0 fL    MCH 26.1 (L) 26.6 - 33.0 pg    MCHC 31.6 31.5 - 35.7 g/dL    RDW 19.0 (H) 12.3 - 15.4 %    RDW-SD 55.5 (H) 37.0 - 54.0 fl    MPV 9.8 6.0 - 12.0 fL    Platelets 114 (L) 140 - 450 10*3/mm3    Neutrophil % 70.8 42.7 - 76.0 %    Lymphocyte % 13.0 (L) 19.6 - 45.3 %    Monocyte % 15.3 (H) 5.0 - 12.0 %    Eosinophil % 0.0 (L) 0.3 - 6.2 %    Basophil % 0.3 0.0 - 1.5 %    Immature Grans % 0.6 (H) 0.0 - 0.5 %    Neutrophils, Absolute 2.51 1.70 - 7.00 10*3/mm3    Lymphocytes, Absolute 0.46 (L) 0.70 - 3.10 10*3/mm3    Monocytes, Absolute 0.54 0.10 - 0.90 10*3/mm3    Eosinophils,  Absolute 0.00 0.00 - 0.40 10*3/mm3    Basophils, Absolute 0.01 0.00 - 0.20 10*3/mm3    Immature Grans, Absolute 0.02 0.00 - 0.05 10*3/mm3    nRBC 0.0 0.0 - 0.2 /100 WBC   COVID-19,[facility] IN-HOUSE CEPHEID/TAB NP SWAB IN TRANSPORT MEDIA 8-12 HR TAT - Swab, Nasopharynx    Collection Time: 01/13/22  2:11 PM    Specimen: Nasopharynx; Swab   Result Value Ref Range    COVID19 Detected (C) Not Detected - Ref. Range   Duplex Hemodialysis Access CAR    Collection Time: 01/13/22  7:32 PM   Result Value Ref Range    Target HR (85%) 123 bpm    Max. Pred. HR (100%) 145 bpm    PRE-INFLOW AXILLARY  cm/sec    PRE-INFLOW AXILLARY EDV 47 cm/sec    PRE-INFLOW AXILLARY DIAMETER 0.64 cm    PRE-INFLOW AXILLARY FLOW  mL/min    PRE-INFLOW BRACHIAL  cm/sec    PRE-INFLOW BRACHIAL EDV 12 cm/sec    ARTERIAL ANASTOMOSIS  cm/sec    ARTERIAL ANASTOMOSIS  cm/sec    ARTERIAL ANASTOMOSIS DIAMETER 0.54 cm    CONDUIT PROX  cm/sec    CONDUIT PROX  cm/sec    CONDUIT PROX DIAMETER 0.66 cm    CONDUIT PROX FLOW VOL 1,755 mL/min    CONDUIT PROX DEPTH 0.62 cm    CONDUIT PROX/MID  cm/sec    CONDUIT PROX/MID EDV 51 cm/sec    CONDUIT PROX/MID DIAMETER 0.60 cm    CONDUIT PROX/MID FLOW  mL/min    CONDUIT PROX/MID DEPTH 0.34 cm    CONDUIT MID  cm/sec     CONDUIT MID  cm/sec    CONDUIT MID DIAMETER 0.27 cm    CONDUIT MID FLOW  mL/min    CONDUIT MID DEPTH 0.47 cm    CONDUIT MID/DIST PSV 80 cm/sec    CONDUIT MID/DIST EDV 32 cm/sec    CONDUIT MID/DIST DIAMETER 0.55 cm    CONDUIT MID/DIST FLOW  mL/min    CONDUIT MID/DIST DEPTH 0.32 cm    CONDUIT DIST PSV 51 cm/sec    CONDUIT DIST EDV 27 cm/sec    CONDUIT DIST DIAMETER 0.92 cm    CONDUIT DIST FLOW  mL/min    CONDUIT DIST DEPTH 2.85 cm    VENOUS ANASTOMOSIS PSV 48 cm/sec    VENOUS ANASTOMOSIS EDV 23 cm/sec    VENOUS ANASTOMOSIS DIAMETER 1.10 cm    VENOUS OUTFLOW SUBCLAVIAN PSV 57 cm/sec    VENOUS OUTFLOW SUBCLAVIAN EDV 31  cm/sec    VENOUS OUTFLOW SUBCLAVIAN DIAMETER 1.00 cm   Comprehensive Metabolic Panel    Collection Time: 01/14/22  6:03 AM    Specimen: Blood   Result Value Ref Range    Glucose 71 65 - 99 mg/dL    BUN 24 (H) 8 - 23 mg/dL    Creatinine 5.56 (H) 0.57 - 1.00 mg/dL    Sodium 134 (L) 136 - 145 mmol/L    Potassium 2.4 (C) 3.5 - 5.2 mmol/L    Chloride 98 98 - 107 mmol/L    CO2 21.5 (L) 22.0 - 29.0 mmol/L    Calcium 6.7 (L) 8.6 - 10.5 mg/dL    Total Protein 5.4 (L) 6.0 - 8.5 g/dL    Albumin 2.90 (L) 3.50 - 5.20 g/dL    ALT (SGPT) 6 1 - 33 U/L    AST (SGOT) 18 1 - 32 U/L    Alkaline Phosphatase 81 39 - 117 U/L    Total Bilirubin 0.2 0.0 - 1.2 mg/dL    eGFR Non  Amer      eGFR  African Amer 9 (L) >60 mL/min/1.73    Globulin 2.5 gm/dL    A/G Ratio 1.2 g/dL    BUN/Creatinine Ratio 4.3 (L) 7.0 - 25.0    Anion Gap 14.5 5.0 - 15.0 mmol/L   BNP    Collection Time: 01/14/22  6:03 AM    Specimen: Blood   Result Value Ref Range    proBNP 9,361.0 (H) 0.0-1,800.0 pg/mL   D-dimer, Quantitative    Collection Time: 01/14/22  6:03 AM    Specimen: Blood   Result Value Ref Range    D-Dimer, Quantitative 1.88 (H) 0.00 - 0.49 MCGFEU/mL   C-reactive Protein    Collection Time: 01/14/22  6:03 AM    Specimen: Blood   Result Value Ref Range    C-Reactive Protein 5.89 (H) 0.00 - 0.50 mg/dL   TSH    Collection Time: 01/14/22  6:03 AM    Specimen: Blood   Result Value Ref Range    TSH 1.580 0.270 - 4.200 uIU/mL   Lipid Panel    Collection Time: 01/14/22  6:03 AM    Specimen: Blood   Result Value Ref Range    Total Cholesterol 146 0 - 200 mg/dL    Triglycerides 152 (H) 0 - 150 mg/dL    HDL Cholesterol 47 40 - 60 mg/dL    LDL Cholesterol  73 0 - 100 mg/dL    VLDL Cholesterol 26 5 - 40 mg/dL    LDL/HDL Ratio 1.46    Ferritin    Collection Time: 01/14/22  6:03 AM    Specimen: Blood   Result Value Ref Range    Ferritin 1,226.00 (H) 13.00 - 150.00 ng/mL   CBC Auto Differential    Collection Time: 01/14/22  6:03 AM    Specimen: Blood   Result Value  Ref Range    WBC 2.85 (L) 3.40 - 10.80 10*3/mm3    RBC 3.76 (L) 3.77 - 5.28 10*6/mm3    Hemoglobin 9.5 (L) 12.0 - 15.9 g/dL    Hematocrit 32.0 (L) 34.0 - 46.6 %    MCV 85.1 79.0 - 97.0 fL    MCH 25.3 (L) 26.6 - 33.0 pg    MCHC 29.7 (L) 31.5 - 35.7 g/dL    RDW 19.4 (H) 12.3 - 15.4 %    RDW-SD 59.6 (H) 37.0 - 54.0 fl    MPV 8.8 6.0 - 12.0 fL    Platelets 120 (L) 140 - 450 10*3/mm3    Neutrophil % 68.4 42.7 - 76.0 %    Lymphocyte % 15.4 (L) 19.6 - 45.3 %    Monocyte % 14.7 (H) 5.0 - 12.0 %    Eosinophil % 0.4 0.3 - 6.2 %    Basophil % 0.7 0.0 - 1.5 %    Immature Grans % 0.4 0.0 - 0.5 %    Neutrophils, Absolute 1.95 1.70 - 7.00 10*3/mm3    Lymphocytes, Absolute 0.44 (L) 0.70 - 3.10 10*3/mm3    Monocytes, Absolute 0.42 0.10 - 0.90 10*3/mm3    Eosinophils, Absolute 0.01 0.00 - 0.40 10*3/mm3    Basophils, Absolute 0.02 0.00 - 0.20 10*3/mm3    Immature Grans, Absolute 0.01 0.00 - 0.05 10*3/mm3    nRBC 0.4 (H) 0.0 - 0.2 /100 WBC   Hemoglobin A1c    Collection Time: 01/14/22  6:04 AM    Specimen: Blood   Result Value Ref Range    Hemoglobin A1C 5.10 4.80 - 5.60 %   POC Glucose Once    Collection Time: 01/14/22 11:23 AM    Specimen: Blood   Result Value Ref Range    Glucose 62 (L) 70 - 130 mg/dL   POC Glucose Once    Collection Time: 01/14/22 11:34 AM    Specimen: Blood   Result Value Ref Range    Glucose 61 (L) 70 - 130 mg/dL   POC Glucose Once    Collection Time: 01/14/22 11:47 AM    Specimen: Blood   Result Value Ref Range    Glucose 136 (H) 70 - 130 mg/dL       Radiology:  Imaging Results (Last 24 Hours)     Procedure Component Value Units Date/Time    Arteriogram (Autofinalize) [[id number]] Resulted: 01/14/22 1027     Updated: 01/14/22 1027    Narrative:      This procedure was auto-finalized with no dictation required.    XR Chest 1 View [[id number]] Collected: 01/13/22 1839     Updated: 01/13/22 1925    Narrative:      CHEST SINGLE VIEW     HISTORY: Covid-19     COMPARISON: AP chest 11/04/2021, 05/03/2021, two-view chest  11/28/2019.     FINDINGS: Heart size is within normal limits. There is hilar enlargement  that may be related to pulmonary arterial enlargement. It is difficult  to exclude anthony enlargement though this appears to be stable. There is  prominence of the epicardial fat pads. No focal airspace disease is  demonstrated. Extensive right vascular stents extend from the region of  the brachiocephalic to the subclavian, axillary, brachial vasculature.  Left axillary vascular stent is also noted. 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       Impression:      1. Bilateral hilar enlargement. This may be related to pulmonary  arterial enlargement though hilar anthony enlargement is also in the  differential diagnosis. This appears to be stable.  2. Lungs appear clear and there is no evidence for perihilar edema.  3. Extensive vascular stents.  4. Advanced arthritis of the left shoulder.     This report was finalized on 1/13/2022 7:22 PM by Dr. Nathaniel Thao M.D.                Medications have been reviewed:  Current Facility-Administered Medications   Medication Dose Route Frequency Provider Last Rate Last Admin   • acetaminophen (TYLENOL) tablet 650 mg  650 mg Oral Q4H PRN Speedy Coello MD       • albuterol sulfate HFA (PROVENTIL HFA;VENTOLIN HFA;PROAIR HFA) inhaler 2 puff  2 puff Inhalation Q4H PRN Speedy Coello MD       • ascorbic acid (VITAMIN C) tablet 500 mg  500 mg Oral Daily Speedy Coello MD   500 mg at 01/14/22 1209   • budesonide-formoterol (SYMBICORT) 80-4.5 MCG/ACT inhaler 2 puff  2 puff Inhalation BID - RT Speedy Coello MD   2 puff at 01/13/22 2026   • cetirizine (zyrTEC) tablet 5 mg  5 mg Oral Daily Speedy Coello MD   5 mg at 01/14/22 1208   • cholecalciferol (VITAMIN D3) tablet 1,000 Units  1,000 Units Oral Daily Speedy Coello MD   1,000 Units at 01/14/22 1209   • famotidine (PEPCID) tablet 20 mg  20 mg Oral BID Speedy Coello MD    20 mg at 01/14/22 1208   • guaiFENesin (MUCINEX) 12 hr tablet 600 mg  600 mg Oral Q12H Speedy Coello MD   600 mg at 01/14/22 1209   • hydrALAZINE (APRESOLINE) injection 10 mg  10 mg Intravenous Q4H PRN Haja Chowdhury MD       • metoprolol succinate XL (TOPROL-XL) 24 hr tablet 12.5 mg  12.5 mg Oral QAM Speedy Coello MD   12.5 mg at 01/14/22 0557   • montelukast (SINGULAIR) tablet 10 mg  10 mg Oral Nightly Speedy Coello MD   10 mg at 01/13/22 2141   • ondansetron (ZOFRAN) injection 4 mg  4 mg Intravenous Q4H PRN Speedy Coello MD       • sodium chloride 0.9 % flush 10 mL  10 mL Intravenous PRN Speedy Coello MD       • zinc sulfate (ZINCATE) capsule 220 mg  220 mg Oral Daily Speedy Coello MD   220 mg at 01/14/22 1209       ASSESSMENT:  ESRD on dialysis    Displacement of vascular dialysis catheter, replaced in OR today  Anemia of CKD  Previous colonic mass status post bowel resection  Hypertension  Covid-19 testing positive        PLAN:   Vascular has placed a new tunneled catheter today  Continue Tuesday Thursday Saturday dialysis schedule  She can return to her usual dialysis unit/chair tomorrow morning with an isolation room COVID precautions  Epogen for anemia  Replace potassium orally.  4K bath with dialysis tomorrow    Discussed with Dr. Chowdhury.  Stable for discharge home today from renal standpoint with patient to follow-up at her outpatient dialysis center in the morning    I will enter dialysis orders for tomorrow just in case discharge is delayed but she can go home from my standpoint later today      Chester Kang MD   [facility]   Office phone number: [phone]  Answering service phone number: [phone]    01/14/22  13:58 EST    Dictation performed using Dragon dictation software

## 2022-01-14 NOTE — ANESTHESIA PREPROCEDURE EVALUATION
" Anesthesia Evaluation     Patient summary reviewed and Nursing notes reviewed   NPO Solid Status: > 8 hours  NPO Liquid Status: > 2 hours           Airway   Mallampati: II  TM distance: >3 FB  Neck ROM: full  No difficulty expected  Dental      Pulmonary    (+) COPD, asthma,shortness of breath, sleep apnea on CPAP,   Cardiovascular     ECG reviewed  PT is on anticoagulation therapy    (+) hypertension, dysrhythmias (incomplete RBBB), hyperlipidemia,       Neuro/Psych  GI/Hepatic/Renal/Endo    (+)  GERD, GI bleeding lower , renal disease (T, TH, S) dialysis, diabetes mellitus,     Musculoskeletal     Abdominal    Substance History      OB/GYN          Other   arthritis, blood dyscrasia anemia,                   Anesthesia Plan    ASA 3     MAC   (I have reviewed the patient's history and chart with the patient, including all pertinent laboratory results and imaging. I have explained the risks of anesthesia including but not limited to dental damage, corneal abrasion, nerve injury, MI, stroke, aspiration, and death. Patient has agreed to proceed. '    /72 (BP Location: Left arm, Patient Position: Lying)   Pulse 67   Temp 36.6 °C (97.9 °F) (Oral)   Resp 16   Ht 152.4 cm (60\")   Wt 64.1 kg (141 lb 5 oz)   SpO2 99%   BMI 27.60 kg/m²     Pt coming straight to OR 2/2 covid status)  intravenous induction     Anesthetic plan, all risks, benefits, and alternatives have been provided, discussed and informed consent has been obtained with: patient.      "

## 2022-01-14 NOTE — PROGRESS NOTES
Case Management Discharge Note      Final Note: Discharged home and continue with HD at [facility] on [address] [facility].  Ailyn Nur, RN         Transportation Services  Private: Car    Final Discharge Disposition Code: 01 - home or self-care

## 2022-01-14 NOTE — ANESTHESIA POSTPROCEDURE EVALUATION
Patient: Nellie Vegas    Procedure Summary     Date: 01/14/22 Room / Location: [facility] OR 18 [facility] HYBRID OR 18/19    Anesthesia Start: 0924 Anesthesia Stop: 1047    Procedure: inferior vena cava gram, femoral dialysis catheter placement (Left ) Diagnosis:       Displacement of vascular dialysis catheter, initial encounter (HCC)      (Displacement of vascular dialysis catheter, initial encounter ([facility]) [T82.42XA])    Surgeons: Karson Muller MD Provider: Barbra Dunaway MD    Anesthesia Type: MAC ASA Status: 3          Anesthesia Type: MAC    Vitals  Vitals Value Taken Time   /70 01/14/22 1121   Temp 36.6 °C (97.8 °F) 01/14/22 1038   Pulse 75 01/14/22 1125   Resp 20 01/14/22 1038   SpO2 100 % 01/14/22 1125   Vitals shown include unvalidated device data.        Post Anesthesia Care and Evaluation    Patient location during evaluation: bedside  Patient participation: complete - patient participated  Level of consciousness: awake  Pain management: adequate  Airway patency: patent  Anesthetic complications: No anesthetic complications    Cardiovascular status: acceptable  Respiratory status: acceptable  Hydration status: acceptable

## 2022-01-14 NOTE — OP NOTE
Operative Note  Location: [facility]  Date of Admission:  1/13/2022  OR Date: 1/14/2022    Pre-op Diagnosis:   Displacement of vascular dialysis catheter, initial encounter ([facility]) [T82.42XA]    Post-op Diagnosis:     Same    Procedure:  1) Tunneled dialysis catheter insertion (37546)  2) Ultrasound guided vascular access (16208)  3) inferior venacavogram    Assistant: Brintey Juárez RN    Anesthesia: Monitored Anesthesia Care    Estimated Blood Loss: minimal    Staff:   Circulator: Ailyn Hauser RN  Scrub Person: Cruzito Juárez Luis  Vascular Radiology Technician: Niya Lake    Complications: None    Specimen: None    Findings:  33 cm catheter did not flush well.  Venogram showed inferior vena cava stenosis but no fibrin sheath.  44 cm catheter had to be placed with good flow.  Catheter exchanges in the future might be difficult and patient is at risk of losing long-term dialysis access.    Implants: Nothing was implanted during the procedure    Indications:    The patient is an 76 y.o. female referred for tunneled dialysis catheter placement.  The risks, benefits, and alternatives have been discussed with the patient and/or family and include but are not limited to injury to artery, vein, lung, bleeding, and infection.    Procedure:  The patient was taken to the operating room and placed supine on the operating room table. After the induction of IV sedation, the groins were prepped and draped with ChloraPrep.  The left common femoral vein was evaluated under ultrasound.  Patient had a high bifurcation of the profunda so this was avoided.  The femoral vein was compressible and accessed under direct guidance with a micropuncture needle.  Micropuncture wire was advanced into the inferior vena cava without difficulty.  Exit site was chosen in the right thigh and this was all anesthetized.  I used a 33 cm catheter and tunneled this in an antegrade fashion.  The micropuncture wire was exchanged for a large  J-wire and then serial dilatation was performed ultimately placing the peel-away sheath.  The distal tip of the catheter was placed into the peel-away sheath and we saw this advanced up into the inferior vena cava without any significant difficulty.  The peel-away sheath was removed.  However,the 33 cm catheter but did not get good flow in the lumens.  Despite advancing this all the way into the hub I still could not get good flow in both lumens.  For this reason, I did an inferior venacavogram that demonstrated no fibrin sheath but she does have a small/stenotic inferior vena cava.  At this point I elected to place a long angled Glidewire through the lumen, and externalized the wire, placed a new peel-away sheath from a 44 cm catheter kit with the thought that extending into the more normal vena cava we get better flow.  This was done without difficulty and she had a nice flow through both lumens now.  The catheter was flushed with heparinized saline and then locked with concentrated heparin.  Secured to the skin at the exit site with nylons and Vicryl sutures and then another Vicryl suture was used for the entry site in the groin.  Sterile dressings were applied.  Patient tolerated the procedure well.    Active Hospital Problems    Diagnosis  POA   • Displacement of vascular dialysis catheter (HCC) [T82.42XA]  Yes      Resolved Hospital Problems   No resolved problems to display.      Speedy Coello MD     Date: 1/14/2022  Time: 10:29 EST

## 2022-01-14 NOTE — PLAN OF CARE
Goal Outcome Evaluation:  Plan of Care Reviewed With: patient        Progress: improving  Outcome Summary: return from OR AAOx4.  no c/o pain or nausea.  c/o being hungry.  food obtained and given. left tunnelled dialysis cath intact to left femoral /groin.  potassium 2.4 this am.  one time dose given PO in PACU.  d/w dr chowdhury who states no further replacement needed.  pt has good bed mobility.  optifoam sacral wound applied to coccyx this am after washing with soap and water.  bed alarm in use for safety.  per D/C planner , pt is able to return to her dialysis center for dialysis tomorrow with COVID positivie status.   rec'd call from Dr Chowdhury this afternoon who states OK for pt to be DC today per Dr Kang.

## 2022-01-14 NOTE — NURSING NOTE
CWON note Consult received for evaluation of sacral skin/ buttocks. H&P  And photos under media tab reviewed. Appropriate wound care and prevention standing orders have been placed in Epic. Please re-consult for an additional needs or if wounds/ skin worsen with current treatment.

## 2022-01-14 NOTE — PAYOR COMM NOTE
"Nellie Devries (76 y.o. Female)     REQUEST FOR OBSERVATION AUTH FOR MEMBER [id number]      CONTACT DENISMICAH  P# [phone]  F# [phone]              Date of Birth Social Security Number Address Home Phone MRN    [id number]  [id number] [facility] [phone] [id number]    Sikh Marital Status             Non-Orthodox        Admission Date Admission Type Admitting Provider Attending Provider Department, Room/Bed    1/13/22 Emergency Haja Chowdhury MD Ahmed, Aftab, MD [facility] MAIN OR, [facility] Main OR/MAIN OR    Discharge Date Discharge Disposition Discharge Destination                         Attending Provider: Haja Chowdhury MD    Allergies: Sulfa Antibiotics, Adhesive Tape, Latex    Isolation: Enh Drop/Con   Infection: COVID (confirmed) (01/13/22)   Code Status: CPR   Advance Care Planning Activity    Ht: 152.4 cm (60\")   Wt: 64.1 kg (141 lb 5 oz)    Admission Cmt: None   Principal Problem: None                Active Insurance as of 1/13/2022     Primary Coverage     Payor Plan Insurance Group Employer/Plan Group    [facility] WELL[employer]ARE MEDICARE REPLACEMENT      Payor Plan Address Payor Plan Phone Number Payor Plan Fax Number Effective Dates    [address] [address] [phone]  1/1/2022 - None Entered    [facility] [address]       Subscriber Name Subscriber Birth Date Member ID       NELLIE DEVRIES 1945 [id number]           Secondary Coverage     Payor Plan Insurance Group Employer/Plan Group    [place]      Payor Plan Address Payor Plan Phone Number Payor Plan Fax Number Effective Dates    [address]   11/1/2019 - None Entered    [facility] [address]       Subscriber Name Subscriber Birth Date Member ID       NELLIE DEVRIES 1945 [id number]                 Emergency Contacts      (Rel.) Home Phone Work Phone Mobile Phone    CHRISTINE DEVRIES (Daughter) " [phone] -- [phone]    HENNY KELSEY (Daughter) [phone] -- [phone]    Heidi Vegas (Daughter) [phone] -- [phone]    Sadie Ham (Daughter) -- -- [phone]               History & Physical      Haja Chowdhury MD at 22 1350          History and physical    Primary care physician  Dr. SCOTT    Chief complaint  Displacement of dialysis catheter    History of present illness  76-year-old -American female with very complex past medical history and well-known to our service including end-stage renal disease on hemodialysis COPD chronic anemia hypercoagulable state tubular adenoma and gastroesophageal reflux disease sent to the ER from dialysis center where she was about to finish her dialysis and went to the restroom and partially pulled dialysis catheter out.  Patient has no other complaints.  Patient does have chronic cough but denies any increased shortness of breath fever chest pain abdominal pain nausea vomiting diarrhea.  Patient routine screening test for COVID came back positive.  Patient did receive 2 doses of vaccine but have not received the booster dose yet.    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)     • Weakness       PAST SURGICAL HISTORY              Procedure Laterality Date   • ARTERIOVENOUS FISTULA Right     • ARTERIOVENOUS FISTULA Left       REMOVED   • CENTRAL VENOUS CATHETER TUNNELED INSERTION DOUBLE LUMEN       •  SECTION       • COLON  RESECTION Right 7/9/2021     Procedure: RIGHT HEMICOLECTOMY LAPAROSCOPIC;  Surgeon: Zbigniew Conner MD;  Location: [facility] MAIN OR;  Service: General;  Laterality: Right;   • COLONOSCOPY   2016   • COLONOSCOPY N/A 7/7/2021     Procedure: COLONOSCOPY into cecum with biopsy;  Surgeon: Fabricio Monroe MD;  Location: [facility] ENDOSCOPY;  Service: Gastroenterology;  Laterality: N/A;  cecal mass   • EXPLORATORY LAPAROTOMY N/A 8/3/2021     Procedure: LAPAROTOMY EXPLORATORY, resection of ileocolonic anastomosis with anastomosis;  Surgeon: Zbigniew Conner MD;  Location: [facility] MAIN OR;  Service: General;  Laterality: N/A;   • INSERTION HEMODIALYSIS CATHETER Right 7/16/2021     Procedure: VENACAVAGRAM AND HEMODIALYSIS CATHETER INSERTION;  Surgeon: Karson Muller MD;  [facility];  Service: Vascular;  Laterality: Right;  Dr Bryant was primary surgeon   • INSERTION HEMODIALYSIS CATHETER N/A 11/5/2021     Procedure: HEMODIALYSIS CATHETER INSERTION;  Surgeon: Karson Muller MD;  Location: [facility] OR;  Service: Vascular;  Laterality: N/A;   • POLYPECTOMY         UTERINE   • SHUNT O GRAM Right 8/9/2019     Procedure: RIGHT ARM DIALYSIS GRAFT revision and THROMBECTOMY;  Surgeon: Thierry Hardy MD;  [facility];  Service: Vascular   • SHUNT O GRAM Right 8/22/2019     Procedure: RIGHT ARM DIALYSIS GRAFT THROMBECTOMY WITH STENTING;  Surgeon: Thierry Hardy MD;  [facility];  Service: Vascular   • SHUNT O GRAM Right 12/7/2020     Procedure: RIGHT ARM ARTERIOVENOUS SHUNTOGRAM WITH THROMBECTOMY;  Surgeon: Thierry Hardy MD;  [facility];  Service: Vascular;  Laterality: Right;   • SHUNT O GRAM Right 7/12/2021     Procedure: Right arm dialysis shuntogram with shunt thrombectomy;  Surgeon: Shahram Gallagher MD;  [facility];  Service: Vascular;  Laterality: Right;   • SHUNT O GRAM Right  "2021     Procedure: RIGHT UPPER EXTREMITY THROMBECTOMY AND SHUNTOGRAM;  Surgeon: Shahram Gallagher MD;  Location: [facility] MAIN OR;  Service: Vascular;  Laterality: Right;         FAMILY HISTORY           Problem Relation Age of Onset   • Malig Hyperthermia Neg Hx        SOCIAL HISTORY                 Socioeconomic History   • Marital status:    Tobacco Use   • Smoking status: Former Smoker       Packs/day: 0.00       Years: 4.00       Pack years: 0.00       Types: Cigarettes       Quit date: 1966       Years since quittin.0   • Smokeless tobacco: Never Used   Vaping Use   • Vaping Use: Never used   Substance and Sexual Activity   • Alcohol use: No   • Drug use: No   • Sexual activity: Defer         ALLERGIES  Sulfa antibiotics, Adhesive tape, and Latex  Home medications reviewed     REVIEW OF SYSTEMS  All systems reviewed and negative except for those discussed in HPI.      PHYSICAL EXAM   Blood pressure 161/61, pulse 63, temperature 97.8 °F (36.6 °C), resp. rate 18, height 152.4 cm (60\"), weight 64.1 kg (141 lb 5 oz), SpO2 99 %, not currently breastfeeding.    GENERAL: Alert, oriented, not distressed  HENT: head atraumatic, no nuchal rigidity  EYES: no scleral icterus, EOMI  CV: regular rhythm, regular rate, no murmur  RESPIRATORY: normal effort, decreased breath sounds bilaterally  ABDOMEN: soft, nontender nondistended bowel sounds positive  MUSCULOSKELETAL: Dialysis catheter partially displaced and right proximal femoral area.  No surrounding erythema or bleeding.  NEURO: alert, moves all extremities, follows commands  SKIN: warm, dry     LAB RESULTS  Lab Results (last 24 hours)     Procedure Component Value Units Date/Time    COVID PRE-OP / PRE-PROCEDURE SCREENING ORDER (NO ISOLATION) - Swab, Nasopharynx [[id number]]  (Abnormal) Collected: 22 1411    Specimen: Swab from Nasopharynx Updated: 22 1512    Narrative:      The following orders were created for panel order COVID PRE-OP / " PRE-PROCEDURE SCREENING ORDER (NO ISOLATION) - Swab, Nasopharynx.  Procedure                               Abnormality         Status                     ---------                               -----------         ------                     COVID-19,[facility] IN-HOUSE...[[id number]]  Abnormal            Final result                 Please view results for these tests on the individual orders.    COVID-19,[facility] IN-HOUSE CEPHEID/TAB NP SWAB IN TRANSPORT MEDIA 8-12 HR TAT - Swab, Nasopharynx [[id number]]  (Abnormal) Collected: 01/13/22 1411    Specimen: Swab from Nasopharynx Updated: 01/13/22 1512     COVID19 Detected    Narrative:      Fact sheet for providers: https://www.fda.gov/media/777088/download    Fact sheet for patients: https://www.fda.gov/media/101624/download    Test performed by PCR.    Comprehensive Metabolic Panel [[id number]]  (Abnormal) Collected: 01/13/22 1411    Specimen: Blood from Arm, Left Updated: 01/13/22 1452     Glucose 71 mg/dL      BUN 15 mg/dL      Creatinine 4.34 mg/dL      Sodium 134 mmol/L      Potassium 3.5 mmol/L      Comment: Specimen hemolyzed.  Results may be affected.        Chloride 98 mmol/L      CO2 23.9 mmol/L      Calcium 7.2 mg/dL      Total Protein 5.9 g/dL      Albumin 3.30 g/dL      ALT (SGPT) 8 U/L      Comment: Specimen hemolyzed.  Results may be affected.        AST (SGOT) 25 U/L      Comment: Specimen hemolyzed.  Results may be affected.        Alkaline Phosphatase 91 U/L      Total Bilirubin 0.2 mg/dL      eGFR Non  Amer --     Comment: <15 Indicative of kidney failure.        eGFR  African Amer 12 mL/min/1.73      Comment: <15 Indicative of kidney failure.        Globulin 2.6 gm/dL      A/G Ratio 1.3 g/dL      BUN/Creatinine Ratio 3.5     Anion Gap 12.1 mmol/L     Narrative:      GFR Normal >60  Chronic Kidney Disease <60  Kidney Failure <15      CBC & Differential [[id number]]  (Abnormal) Collected: 01/13/22 1411    Specimen: Blood Updated: 01/13/22 1426     Narrative:      The following orders were created for panel order CBC & Differential.  Procedure                               Abnormality         Status                     ---------                               -----------         ------                     CBC Auto Differential[[id number]]        Abnormal            Final result                 Please view results for these tests on the individual orders.    CBC Auto Differential [[id number]]  (Abnormal) Collected: 01/13/22 1411    Specimen: Blood Updated: 01/13/22 1426     WBC 3.54 10*3/mm3      RBC 3.72 10*6/mm3      Hemoglobin 9.7 g/dL      Hematocrit 30.7 %      MCV 82.5 fL      MCH 26.1 pg      MCHC 31.6 g/dL      RDW 19.0 %      RDW-SD 55.5 fl      MPV 9.8 fL      Platelets 114 10*3/mm3      Neutrophil % 70.8 %      Lymphocyte % 13.0 %      Monocyte % 15.3 %      Eosinophil % 0.0 %      Basophil % 0.3 %      Immature Grans % 0.6 %      Neutrophils, Absolute 2.51 10*3/mm3      Lymphocytes, Absolute 0.46 10*3/mm3      Monocytes, Absolute 0.54 10*3/mm3      Eosinophils, Absolute 0.00 10*3/mm3      Basophils, Absolute 0.01 10*3/mm3      Immature Grans, Absolute 0.02 10*3/mm3      nRBC 0.0 /100 WBC         Imaging Results (Last 24 Hours)     ** No results found for the last 24 hours. **          Current Facility-Administered Medications:   •  albuterol sulfate HFA (PROVENTIL HFA;VENTOLIN HFA;PROAIR HFA) inhaler 2 puff, 2 puff, Inhalation, Q4H PRN, Haja Chowdhury MD  •  apixaban (ELIQUIS) tablet 2.5 mg, 2.5 mg, Oral, Q12H, Haja Chowdhury MD  •  ascorbic acid (VITAMIN C) tablet 500 mg, 500 mg, Oral, Daily, Haja Chowdhury MD  •  budesonide-formoterol (SYMBICORT) 80-4.5 MCG/ACT inhaler 2 puff, 2 puff, Inhalation, BID - RT, Haja Chowdhury MD  •  cetirizine (zyrTEC) tablet 5 mg, 5 mg, Oral, Daily, Haja Chowdhury MD  •  cholecalciferol (VITAMIN D3) tablet 1,000 Units, 1,000 Units, Oral, Daily, Haja Chowdhury MD  •  famotidine (PEPCID) tablet 20 mg, 20 mg, Oral, BID, Luciana  MD Allan  •  guaiFENesin (MUCINEX) 12 hr tablet 600 mg, 600 mg, Oral, Q12H, Allan Chowdhury MD  •  [START ON 1/14/2022] metoprolol succinate XL (TOPROL-XL) 24 hr tablet 12.5 mg, 12.5 mg, Oral, QAM, Allan Chowdhury MD  •  montelukast (SINGULAIR) tablet 10 mg, 10 mg, Oral, Nightly, Allan Chowdhury MD  •  [COMPLETED] Insert peripheral IV, , , Once **AND** sodium chloride 0.9 % flush 10 mL, 10 mL, Intravenous, PRN, Dayo Presley, PA  •  zinc sulfate (ZINCATE) capsule 220 mg, 220 mg, Oral, Daily, Allan Chowdhury MD    ASSESSMENT  Hemodialysis catheter dislodgment  COVID-19 infection asymptomatic  End-stage renal disease on hemodialysis  COPD  Chronic anemia  Hypercoagulable state  Right hemicolectomy with history of tubular adenoma  Gastroesophageal reflux disease    PLAN  Admit  Symptomatic treatment for COVID-19 infection  Vascular surgery consult to replace hemodialysis catheter  Nephrology to follow patient for hemodialysis  Infectious disease consulted  Continue home medications  Stress ulcer DVT prophylaxis  Supportive care  Patient is full code  Discussed with nursing staff and nephrology   Follow closely further recommendation according to hospital course    ALLAN CHOWDHURY MD    Electronically signed by Allan Chowdhury MD at 01/13/22 1659          Emergency Department Notes      Summer De La Rosa RN at 01/13/22 1034        Pt was wearing a mask during assessment.  This RN wore appropriate PPE  Patient was at dialysis and her femoral access was pulled out while she was using the BR.     Electronically signed by Summer De La Rosa RN at 01/13/22 1034     Dayo Presley PA at 01/13/22 1202     Attestation signed by Obi Gutierrez MD at 01/13/22 1521    MD ATTESTATION NOTE    The INGRID and I have discussed this patient's history, physical exam, and treatment plan.  I have reviewed the documentation and personally had a face to face interaction with the patient. I affirm the documentation and agree with the treatment and  plan.  The attached note describes my personal findings.    I agree with the PA's or NP's findings and plan.  I reviewed the PA's or NP's note.  Obi Gutierrez MD                  EMERGENCY DEPARTMENT ENCOUNTER    Room Number:  B09/09  Date of encounter:  1/13/2022  PCP: Laurent Cortes DO  Historian: Patient      I used full protective equipment while examining this patient.  This includes face mask, gloves and protective eyewear.  I washed my hands before entering the room and immediately upon leaving the room      HPI:  Chief Complaint: Vascular catheter issue  A complete HPI/ROS/PMH/PSH/SH/FH are unobtainable due to: Nothing    Context: Nellie Vegas is a 76 y.o. female who presents to the ED c/o partial dislodgment of a right femoral vein dialysis catheter.  Patient states she was going to the restroom at dialysis this morning when the catheter partially pulled out.  It appears to be approximately 2 cm out.  Patient states she had about 1 hour left on her dialysis treatment today.  She states she feels fine otherwise.  She denies any chest pain, shortness of breath, weakness, bleeding at the site.  Patient follows with Dr. Kang in nephrology.    Review of Medical Records  Reviewed admission from November 2021.  Patient had right femoral vein hemodialysis catheter placed after having a prior shunt thrombosis in the right arm.    PAST MEDICAL HISTORY  Active Ambulatory Problems     Diagnosis Date Noted   • ESRD (end stage renal disease) on dialysis ([facility]) 08/09/2019   • Thrombosis of kidney dialysis arteriovenous graft ([facility]) 08/09/2019   • Anemia of chronic renal failure, stage 5 ([facility]) 08/10/2019   • COPD exacerbation ([facility]) 11/28/2019   • Problem with dialysis access ([facility]) 12/07/2020   • Lower GI bleeding 07/05/2021   • Colonic mass 07/12/2021   • AV shunt thrombosis, subsequent encounter 07/15/2021   • Failure to thrive in adult 08/21/2021   • Severe malnutrition ([facility]) 08/22/2021   • Hematoma of arm,  right, initial encounter 2021     Resolved Ambulatory Problems     Diagnosis Date Noted   • Anastomotic leak of intestine 2021     Past Medical History:   Diagnosis Date   • Anemia    • Anesthesia complication    • Arthritis    • Asthma    • COPD (chronic obstructive pulmonary disease) (HCC)    • Diabetes mellitus (HCC)    • Dialysis patient (HCC)    • Disease of thyroid gland    • GERD (gastroesophageal reflux disease)    • Headache    • History of blood clots    • History of kidney stones    • Hyperlipidemia    • Hypertension    • Knee pain, bilateral    • Renal disease    • Sleep apnea    • SOB (shortness of breath)    • 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)    • Weakness          PAST SURGICAL HISTORY  Past Surgical History:   Procedure Laterality Date   • ARTERIOVENOUS FISTULA Right    • ARTERIOVENOUS FISTULA Left     REMOVED   • CENTRAL VENOUS CATHETER TUNNELED INSERTION DOUBLE LUMEN     •  SECTION     • COLON RESECTION Right 2021    Procedure: RIGHT HEMICOLECTOMY LAPAROSCOPIC;  Surgeon: Zbigniew Conner MD;  Location: [facility];  Service: General;  Laterality: Right;   • COLONOSCOPY     • COLONOSCOPY N/A 2021    Procedure: COLONOSCOPY into cecum with biopsy;  Surgeon: Fabricio Monroe MD;  Location: [facility] ENDOSCOPY;  Service: Gastroenterology;  Laterality: N/A;  cecal mass   • EXPLORATORY LAPAROTOMY N/A 8/3/2021    Procedure: LAPAROTOMY EXPLORATORY, resection of ileocolonic anastomosis with anastomosis;  Surgeon: Zbigniew Conner MD;  Location: [facility] OR;  Service: General;  Laterality: N/A;   • INSERTION HEMODIALYSIS CATHETER Right 2021    Procedure: VENACAVAGRAM AND HEMODIALYSIS CATHETER INSERTION;  Surgeon: Karson Muller MD;  [facility]  Service: Vascular;  Laterality: Right;  Dr Bryant was primary surgeon   • INSERTION HEMODIALYSIS CATHETER N/A 2021    Procedure: HEMODIALYSIS CATHETER  INSERTION;  Surgeon: Karson Muller MD;  Location: [facility];  Service: Vascular;  Laterality: N/A;   • POLYPECTOMY      UTERINE   • SHUNT O GRAM Right 2019    Procedure: RIGHT ARM DIALYSIS GRAFT revision and THROMBECTOMY;  Surgeon: Thierry Hardy MD;  [facility] ;  Service: Vascular   • SHUNT O GRAM Right 2019    Procedure: RIGHT ARM DIALYSIS GRAFT THROMBECTOMY WITH STENTING;  Surgeon: Thierry Hardy MD;  [facility] ;  Service: Vascular   • SHUNT O GRAM Right 2020    Procedure: RIGHT ARM ARTERIOVENOUS SHUNTOGRAM WITH THROMBECTOMY;  Surgeon: Thierry Hardy MD;  [facility] ;  Service: Vascular;  Laterality: Right;   • SHUNT O GRAM Right 2021    Procedure: Right arm dialysis shuntogram with shunt thrombectomy;  Surgeon: Shahram Gallagher MD;  Location: [facility] ;  Service: Vascular;  Laterality: Right;   • SHUNT O GRAM Right 2021    Procedure: RIGHT UPPER EXTREMITY THROMBECTOMY AND SHUNTOGRAM;  Surgeon: Shahram Gallagher MD;  Location: [facility];  Service: Vascular;  Laterality: Right;         FAMILY HISTORY  Family History   Problem Relation Age of Onset   • Malig Hyperthermia Neg Hx          SOCIAL HISTORY  Social History     Socioeconomic History   • Marital status:    Tobacco Use   • Smoking status: Former Smoker     Packs/day: 0.00     Years: 4.00     Pack years: 0.00     Types: Cigarettes     Quit date: 1966     Years since quittin.0   • Smokeless tobacco: Never Used   Vaping Use   • Vaping Use: Never used   Substance and Sexual Activity   • Alcohol use: No   • Drug use: No   • Sexual activity: Defer         ALLERGIES  Sulfa antibiotics, Adhesive tape, and Latex        REVIEW OF SYSTEMS  All systems reviewed and negative except for those discussed in HPI.       PHYSICAL EXAM    I have reviewed the triage vital signs and nursing notes.    ED Triage Vitals [22 1032]    Temp Heart Rate Resp BP SpO2   -- (!) 135 16 155/61 94 %      Temp src Heart Rate Source Patient Position BP Location FiO2 (%)   -- Monitor -- -- --       Physical Exam  GENERAL: Alert, oriented, not distressed  HENT: head atraumatic, no nuchal rigidity  EYES: no scleral icterus, EOMI  CV: regular rhythm, regular rate, no murmur  RESPIRATORY: normal effort, CTA  ABDOMEN: soft, nontender  MUSCULOSKELETAL: Dialysis catheter partially displaced and right proximal femoral area.  No surrounding erythema or bleeding.  NEURO: alert, moves all extremities, follows commands  SKIN: warm, dry        LAB RESULTS  Recent Results (from the past 24 hour(s))   Comprehensive Metabolic Panel    Collection Time: 01/13/22  2:11 PM    Specimen: Arm, Left; Blood   Result Value Ref Range    Glucose 71 65 - 99 mg/dL    BUN 15 8 - 23 mg/dL    Creatinine 4.34 (H) 0.57 - 1.00 mg/dL    Sodium 134 (L) 136 - 145 mmol/L    Potassium 3.5 3.5 - 5.2 mmol/L    Chloride 98 98 - 107 mmol/L    CO2 23.9 22.0 - 29.0 mmol/L    Calcium 7.2 (L) 8.6 - 10.5 mg/dL    Total Protein 5.9 (L) 6.0 - 8.5 g/dL    Albumin 3.30 (L) 3.50 - 5.20 g/dL    ALT (SGPT) 8 1 - 33 U/L    AST (SGOT) 25 1 - 32 U/L    Alkaline Phosphatase 91 39 - 117 U/L    Total Bilirubin 0.2 0.0 - 1.2 mg/dL    eGFR Non  Amer      eGFR  African Amer 12 (L) >60 mL/min/1.73    Globulin 2.6 gm/dL    A/G Ratio 1.3 g/dL    BUN/Creatinine Ratio 3.5 (L) 7.0 - 25.0    Anion Gap 12.1 5.0 - 15.0 mmol/L   CBC Auto Differential    Collection Time: 01/13/22  2:11 PM    Specimen: Blood   Result Value Ref Range    WBC 3.54 3.40 - 10.80 10*3/mm3    RBC 3.72 (L) 3.77 - 5.28 10*6/mm3    Hemoglobin 9.7 (L) 12.0 - 15.9 g/dL    Hematocrit 30.7 (L) 34.0 - 46.6 %    MCV 82.5 79.0 - 97.0 fL    MCH 26.1 (L) 26.6 - 33.0 pg    MCHC 31.6 31.5 - 35.7 g/dL    RDW 19.0 (H) 12.3 - 15.4 %    RDW-SD 55.5 (H) 37.0 - 54.0 fl    MPV 9.8 6.0 - 12.0 fL    Platelets 114 (L) 140 - 450 10*3/mm3    Neutrophil % 70.8 42.7 - 76.0 %     Lymphocyte % 13.0 (L) 19.6 - 45.3 %    Monocyte % 15.3 (H) 5.0 - 12.0 %    Eosinophil % 0.0 (L) 0.3 - 6.2 %    Basophil % 0.3 0.0 - 1.5 %    Immature Grans % 0.6 (H) 0.0 - 0.5 %    Neutrophils, Absolute 2.51 1.70 - 7.00 10*3/mm3    Lymphocytes, Absolute 0.46 (L) 0.70 - 3.10 10*3/mm3    Monocytes, Absolute 0.54 0.10 - 0.90 10*3/mm3    Eosinophils, Absolute 0.00 0.00 - 0.40 10*3/mm3    Basophils, Absolute 0.01 0.00 - 0.20 10*3/mm3    Immature Grans, Absolute 0.02 0.00 - 0.05 10*3/mm3    nRBC 0.0 0.0 - 0.2 /100 WBC       Ordered the above labs and independently reviewed the results.        RADIOLOGY  No Radiology Exams Resulted Within Past 24 Hours    I ordered the above noted radiological studies. Reviewed by me and discussed with radiologist.  See dictation for official radiology interpretation.      MEDICATIONS GIVEN IN ER    Medications   sodium chloride 0.9 % flush 10 mL (has no administration in time range)         PROGRESS, DATA ANALYSIS, CONSULTS, AND MEDICAL DECISION MAKING    All labs have been independently reviewed by me.  All radiology studies have been reviewed by me and discussed with radiologist dictating the report.   EKG's independently viewed and interpreted by me.  Discussion below represents my analysis of pertinent findings related to patient's condition, differential diagnosis, treatment plan and final disposition.    I have discussed case with Dr. Gutierrez, emergency room physician.  He has performed his own bedside examination and agrees with treatment plan.    ED Course as of 01/13/22 1457   Thu Jan 13, 2022   1202 Patient presents to ER after having her hemodialysis catheter partially pulled out this morning at dialysis.  I plan to discuss with vascular surgery. [EE]   1343 I discussed case with Dr. Chowdhury. He agrees to admit the patient. [EE]   1456 I discussed case with Dr. Kang, he agrees to consult.   [EE]   1456 Potassium: 3.5 [EE]   1456 Hemoglobin(!): 9.7 [EE]   1456 WBC: 3.54 [EE]       ED Course User Index  [EE] Dayo Presley PA       AS OF 14:57 EST VITALS:    BP - 161/61  HR - 63  TEMP - 97.8 °F (36.6 °C)  O2 SATS - 99%        DIAGNOSIS  Final diagnoses:   Displacement of vascular dialysis catheter, initial encounter (HCC)   ESRD on dialysis (HCC)         DISPOSITION  Admitted          EMR Dragon/Transcription disclaimer:   Much of this encounter note is an electronic transcription/translation of spoken language to printed text. The electronic translation of spoken language may permit erroneous, or at times, nonsensical words or phrases to be inadvertently transcribed; Although I have reviewed the note for such errors, some may still exist.      Dayo Presley PA  01/13/22 1457      Electronically signed by Obi Gutierrez MD at 01/13/22 1521     Obi Gutierrez MD at 01/13/22 1204        The [facility]GR[facility] and I have discussed this patients history, physical exam, and treatment plan. I have reviewed the documentation and personally had a face to face interaction with the patient. I affirm the documentation and agree with the treatment and plan.  The following note describes my personal findings    I provided a substantive portion of the care of this patient.  I personally performed the physical exam in its entirety for this encounter.      This patient is a 76-year-old female who presents to the emergency room today secondary to a partial dislodgment of her right femoral dialysis catheter. The patient states that the stitches came out and the catheter came out slightly. She did receive partial dialysis today and has no physical complaints.    Physical Exam  Vitals and nursing note reviewed.   Constitutional:       General: She is not in acute distress.     Appearance: She is well-developed.   HENT:      Head: Normocephalic.   Eyes:      Pupils: Pupils are equal, round, and reactive to light.   Cardiovascular:      Rate and Rhythm: Normal rate and regular rhythm.   Pulmonary:      Effort:  Pulmonary effort is normal. No respiratory distress.      Breath sounds: Normal breath sounds.   Abdominal:      General: Bowel sounds are normal.      Palpations: Abdomen is soft.      Tenderness: There is no abdominal tenderness. There is no guarding or rebound.   Musculoskeletal:         General: Normal range of motion.      Cervical back: Normal range of motion and neck supple.   Skin:     General: Skin is warm and dry.      Findings: No rash.   Neurological:      Mental Status: She is alert and oriented to person, place, and time.       Plan: We will consult vascular surgery for their input on this partially dislodged dialysis catheter. We will reevaluate the patient following.      The patient was wearing a facemask upon entrance into the room and remained in such throughout their visit.  I was wearing PPE including a facemask, eye protection, as well as gloves at any point entering the room and throughout the visit       Obi Gutierrez MD  01/13/22 1521      Electronically signed by Obi Gutierrez MD at 01/13/22 1521     Adelia Patel RN at 01/13/22 1212        Upon pt assessment, pts right femoral dialysis cath appears to be pulled out approx 3cm. No bleeding or drainage noted at this time.     This RN wore mask, gloves, and protective eyewear throughout patient encounter. Pt wearing mask at all times. Hand hygiene performed before and after entering room.          Adelia Patel RN  01/13/22 1214      Electronically signed by Adelia Patel RN at 01/13/22 1214          Operative/Procedure Notes (last 48 hours)      Speedy Coello MD at 01/14/22 0950          Operative Note  Location: [facility]  Date of Admission:  1/13/2022  OR Date: 1/14/2022    Pre-op Diagnosis:   Displacement of vascular dialysis catheter, initial encounter ([facility]) [T82.42XA]    Post-op Diagnosis:     Same    Procedure:  1) Tunneled dialysis catheter insertion (41865)  2) Ultrasound guided  vascular access (90909)  3) inferior venacavogram    Assistant: Britney Juárez  RN    Anesthesia: Monitored Anesthesia Care    Estimated Blood Loss: minimal    Staff:   Circulator: Ailyn Hauser RN  Scrub Person: Britney Juárez; Geoff Fernandez  Vascular Radiology Technician: Niya Lake    Complications: None    Specimen: None    Findings:  33 cm catheter did not flush well.  Venogram showed inferior vena cava stenosis but no fibrin sheath.  44 cm catheter had to be placed with good flow.  Catheter exchanges in the future might be difficult and patient is at risk of losing long-term dialysis access.    Implants: Nothing was implanted during the procedure    Indications:    The patient is an 76 y.o. female referred for tunneled dialysis catheter placement.  The risks, benefits, and alternatives have been discussed with the patient and/or family and include but are not limited to injury to artery, vein, lung, bleeding, and infection.    Procedure:  The patient was taken to the operating room and placed supine on the operating room table. After the induction of IV sedation, the groins were prepped and draped with ChloraPrep.  The left common femoral vein was evaluated under ultrasound.  Patient had a high bifurcation of the profunda so this was avoided.  The femoral vein was compressible and accessed under direct guidance with a micropuncture needle.  Micropuncture wire was advanced into the inferior vena cava without difficulty.  Exit site was chosen in the right thigh and this was all anesthetized.  I used a 33 cm catheter and tunneled this in an antegrade fashion.  The micropuncture wire was exchanged for a large J-wire and then serial dilatation was performed ultimately placing the peel-away sheath.  The distal tip of the catheter was placed into the peel-away sheath and we saw this advanced up into the inferior vena cava without any significant difficulty.  The peel-away sheath was removed.  However,the 33 cm  catheter but did not get good flow in the lumens.  Despite advancing this all the way into the hub I still could not get good flow in both lumens.  For this reason, I did an inferior venacavogram that demonstrated no fibrin sheath but she does have a small/stenotic inferior vena cava.  At this point I elected to place a long angled Glidewire through the lumen, and externalized the wire, placed a new peel-away sheath from a 44 cm catheter kit with the thought that extending into the more normal vena cava we get better flow.  This was done without difficulty and she had a nice flow through both lumens now.  The catheter was flushed with heparinized saline and then locked with concentrated heparin.  Secured to the skin at the exit site with nylons and Vicryl sutures and then another Vicryl suture was used for the entry site in the groin.  Sterile dressings were applied.  Patient tolerated the procedure well.    Active Hospital Problems    Diagnosis  POA   • Displacement of vascular dialysis catheter (HCC) [T82.42XA]  Yes      Resolved Hospital Problems   No resolved problems to display.      Speedy Coello MD     Date: 2022  Time: 10:29 EST                Electronically signed by Speedy Coello MD at 22 1036         Physician Progress Notes (last 48 hours)  Notes from 22 1128 through 22 1128   No notes of this type exist for this encounter.          Consult Notes (last 48 hours)      Keesha Jorgensen MD at 22      Consult Orders    1. Inpatient Infectious Diseases Consult [[id number]] ordered by Haja Chowdhury MD at 22 1608               CONSULT NOTE    Infectious Diseases - Keesha Douglas MD  [facility]       Patient Identification:  Name: Nellie Vegas  Age: 76 y.o.  Sex: female  :  1945  MRN: [id number]             Date of Consultation: 2022      Primary Care Physician: Laurent Cortes DO                               Requesting  Physician: Dr. Chowdhury  Reason for Consultation: COVID-19 infection    Impression: Patient is a 76-year-old female with past medical history remarkable for type 2 diabetes, end-stage renal disease on hemodialysis via tunneled catheter, history of COPD chronic anemia and hypercoagulable state on chronic anticoagulation therapy per the usual state of her health when she went for her dialysis today.  About an hour into dialysis he wanted to go to the restroom resulting in her catheter getting stuck and partially pulled out exposing the cough.  Patient denies any associated fever chills cough or shortness of breath.  Patient has received 2 doses of COVID-vaccine and does not have any respiratory symptoms.  As part of the admission requirement with COVID-19 as she was tested and she came back positive.  Patient has been seen by vascular surgery service and is being planned to have dialysis catheter replaced.  Patient not exhibiting any symptoms of respiratory distress and able to maintain oxygen saturation greater than 95% on room air.  This presentation is consistent with:  1-asymptomatic COVID-19 infection in a patient who is vaccinated and does not exhibit any adverse consequences of COVID-19 infection and respiratory function.  2-end-stage renal disease with accidental removal of her femoral dialysis catheter requiring replacement  3-COPD  4-diabetes  5- hypothyroidism  6-chronic anticoagulation treatment for hypercoagulable state    Recommendations/Discussions:  At this juncture in her care I agree with the care plan consisting of supportive care.  Patient does not require any COVID-specific treatment at this time given her preserved respiratory status and lack of symptoms.  Once patient major reason for hospitalization, i.e, dialysis catheter issues are addressed patient could be discharged from infectious disease standpoint anytime without any further work-up and management related to her positive COVID-19  assay.  Thank you Dr. Sweet for letting me be the part of your patient care please see above impression and recommendations    History of Present Illness:   Patient is a 76-year-old female with past medical history remarkable for type 2 diabetes, end-stage renal disease on hemodialysis via tunneled catheter, history of COPD chronic anemia and hypercoagulable state on chronic anticoagulation therapy per the usual state of her health when she went for her dialysis today.  About an hour into dialysis he wanted to go to the restroom resulting in her catheter getting stuck and partially pulled out exposing the cough.  Patient denies any associated fever chills cough or shortness of breath.  Patient has received 2 doses of COVID-vaccine and does not have any respiratory symptoms.  As part of the admission requirement with COVID-19 as she was tested and she came back positive.  Patient has been seen by vascular surgery service and is being planned to have dialysis catheter replaced.  Patient not exhibiting any symptoms of respiratory distress and able to maintain oxygen saturation greater than 95% on room air.      Past Medical History:  Past Medical History:   Diagnosis Date   • Anemia    • Anesthesia complication     mother woke up in combative state   • Arthritis     knees   • Asthma    • COPD (chronic obstructive pulmonary disease) ([facility])    • Diabetes mellitus (HCC)     type 2   • Dialysis patient ([facility])    • Disease of thyroid gland    • GERD (gastroesophageal reflux disease)    • Headache     after dialysis   • History of blood clots     BUE   • History of kidney stones    • Hyperlipidemia    • Hypertension    • Knee pain, bilateral    • Renal disease    • Sleep apnea     CPAP   • SOB (shortness of breath)    • 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)    • Weakness      Past Surgical History:  Past Surgical History:   Procedure Laterality Date   • ARTERIOVENOUS FISTULA Right    • ARTERIOVENOUS FISTULA  Left     REMOVED   • CENTRAL VENOUS CATHETER TUNNELED INSERTION DOUBLE LUMEN     •  SECTION     • COLON RESECTION Right 2021    Procedure: RIGHT HEMICOLECTOMY LAPAROSCOPIC;  Surgeon: Zbigniew Conner MD;  Location: [facility] MAIN OR;  Service: General;  Laterality: Right;   • COLONOSCOPY  2016   • COLONOSCOPY N/A 2021    Procedure: COLONOSCOPY into cecum with biopsy;  Surgeon: Fabricio Monroe MD;  Location: [facility] ENDOSCOPY;  Service: Gastroenterology;  Laterality: N/A;  cecal mass   • EXPLORATORY LAPAROTOMY N/A 8/3/2021    Procedure: LAPAROTOMY EXPLORATORY, resection of ileocolonic anastomosis with anastomosis;  Surgeon: Zbigniew Conner MD;  Location: [facility] MAIN OR;  Service: General;  Laterality: N/A;   • INSERTION HEMODIALYSIS CATHETER Right 2021    Procedure: VENACAVAGRAM AND HEMODIALYSIS CATHETER INSERTION;  Surgeon: Karson Muller MD;  Location: [facility] OR ;  Service: Vascular;  Laterality: Right;  Dr Bryant was primary surgeon   • INSERTION HEMODIALYSIS CATHETER N/A 2021    Procedure: HEMODIALYSIS CATHETER INSERTION;  Surgeon: Karson Muller MD;  Location: [facility] MAIN OR;  Service: Vascular;  Laterality: N/A;   • POLYPECTOMY      UTERINE   • SHUNT O GRAM Right 2019    Procedure: RIGHT ARM DIALYSIS GRAFT revision and THROMBECTOMY;  Surgeon: Thierry Hardy MD;  Location: [facility] OR ;  Service: Vascular   • SHUNT O GRAM Right 2019    Procedure: RIGHT ARM DIALYSIS GRAFT THROMBECTOMY WITH STENTING;  Surgeon: Thierry Hardy MD;  Location: [facility] OR ;  Service: Vascular   • SHUNT O GRAM Right 2020    Procedure: RIGHT ARM ARTERIOVENOUS SHUNTOGRAM WITH THROMBECTOMY;  Surgeon: Thierry Hardy MD;  Location: [facility] OR ;  Service: Vascular;  Laterality: Right;   • SHUNT O GRAM Right 2021    Procedure: Right arm dialysis shuntogram with shunt thrombectomy;  Surgeon: Shahram Gallagher MD;   Location: [facility] OR 18/19;  Service: Vascular;  Laterality: Right;   • SHUNT O GRAM Right 7/19/2021    Procedure: RIGHT UPPER EXTREMITY THROMBECTOMY AND SHUNTOGRAM;  Surgeon: Shahram Gallagher MD;  Location: [facility] MAIN OR;  Service: Vascular;  Laterality: Right;      Home Meds:  Medications Prior to Admission   Medication Sig Dispense Refill Last Dose   • albuterol (PROVENTIL) (2.5 MG/3ML) 0.083% nebulizer solution Take 2.5 mg by nebulization 3 (Three) Times a Day As Needed for Wheezing. UNSURE OF DOSAGE   Past Week at Unknown time   • albuterol sulfate  (90 Base) MCG/ACT inhaler INHALE 2 PUFFS INTO THE LUNGS EVERY 4 HOURS AS NEEDED FOR WHEEZING   Past Week at Unknown time   • apixaban (ELIQUIS) 2.5 MG tablet tablet Take 2.5 mg by mouth 2 (Two) Times a Day.   1/13/2022 at Unknown time   • Breo Ellipta 100-25 MCG/INH inhaler    Past Week at Unknown time   • budesonide-formoterol (SYMBICORT) 160-4.5 MCG/ACT inhaler Inhale 2 puffs 2 (Two) Times a Day.   Past Week at Unknown time   • Cholecalciferol (VITAMIN D3) 5000 units capsule capsule Take 5,000 Units by mouth Every Morning.   1/13/2022 at Unknown time   • fluticasone (FLONASE) 50 MCG/ACT nasal spray 2 sprays into the nostril(s) as directed by provider Daily.   1/13/2022 at Unknown time   • glucose blood (Accu-Chek Amber Plus) test strip 1 strip by Other route 3 (Three) Times a Day.   1/13/2022 at Unknown time   • montelukast (SINGULAIR) 10 MG tablet Take 10 mg by mouth Every Night.   1/12/2022 at Unknown time   • omeprazole (priLOSEC) 40 MG capsule Take 40 mg by mouth Every Morning.   1/13/2022 at Unknown time   • metoprolol succinate XL (TOPROL-XL) 25 MG 24 hr tablet Take 0.5 tablets by mouth Every Morning for 30 days. 15 tablet 0    • midodrine (PROAMATINE) 5 MG tablet Take 1 tablet by mouth 3 (Three) Times a Day Before Meals for 30 days. 90 tablet 0      Current Meds:     Current Facility-Administered Medications:   •  albuterol sulfate HFA  (PROVENTIL HFA;VENTOLIN HFA;PROAIR HFA) inhaler 2 puff, 2 puff, Inhalation, Q4H PRN, Haja Chowdhury MD  •  ascorbic acid (VITAMIN C) tablet 500 mg, 500 mg, Oral, Daily, Haja Chowdhury MD, 500 mg at 22  •  budesonide-formoterol (SYMBICORT) 80-4.5 MCG/ACT inhaler 2 puff, 2 puff, Inhalation, BID - RT, Haja Chowdhury MD, 2 puff at 22  •  [START ON 2022] ceFAZolin in dextrose (ANCEF) IVPB solution 2 g, 2 g, Intravenous, Once, Beronica Jolly MD  •  cetirizine (zyrTEC) tablet 5 mg, 5 mg, Oral, Daily, Haja Chowdhury MD, 5 mg at 22  •  cholecalciferol (VITAMIN D3) tablet 1,000 Units, 1,000 Units, Oral, Daily, Haja Chowdhury MD, 1,000 Units at 22  •  famotidine (PEPCID) tablet 20 mg, 20 mg, Oral, BID, Haja hCowdhury MD  •  guaiFENesin (MUCINEX) 12 hr tablet 600 mg, 600 mg, Oral, Q12H, Haja Chowdhury MD  •  [START ON 2022] metoprolol succinate XL (TOPROL-XL) 24 hr tablet 12.5 mg, 12.5 mg, Oral, QAM, Haja Chowdhury MD  •  montelukast (SINGULAIR) tablet 10 mg, 10 mg, Oral, Nightly, Haja Chowdhury MD  •  [COMPLETED] Insert peripheral IV, , , Once **AND** sodium chloride 0.9 % flush 10 mL, 10 mL, Intravenous, PRN, Dayo Presley PA  •  zinc sulfate (ZINCATE) capsule 220 mg, 220 mg, Oral, Daily, Haja Chowdhury MD  Allergies:  Allergies   Allergen Reactions   • Sulfa Antibiotics Hives   • Adhesive Tape Hives     Paper tape   • Latex Rash     Social History:   Social History     Tobacco Use   • Smoking status: Former Smoker     Packs/day: 0.00     Years: 4.00     Pack years: 0.00     Types: Cigarettes     Quit date:      Years since quittin.0   • Smokeless tobacco: Never Used   Substance Use Topics   • Alcohol use: No      Family History:  Family History   Problem Relation Age of Onset   • Malig Hyperthermia Neg Hx           Review of Systems  See history of present illness and past medical history.   Constitutional: Remarkable for no fever or chills  Cardiovascular:  "Remarkable for no chest pain or shortness of breath  GI: Remarkable for no nausea vomiting or diarrhea  : Remarkable for no burning urination frequency or urgency  Musculoskeletal: Remarkable no new joint aches and pain  Neurological: Remarkable for no loss of consciousness or continence.    Remainder of ROS is negative.      Vitals:   /61   Pulse 70   Temp 98.1 °F (36.7 °C) (Oral)   Resp 16   Ht 152.4 cm (60\")   Wt 64.1 kg (141 lb 5 oz)   SpO2 99%   BMI 27.60 kg/m²   I/O: No intake or output data in the 24 hours ending 01/13/22 2034  Exam:  Patient is examined using the personal protective equipment as per guidelines from infection control for this particular patient as enacted.  Hand washing was performed before and after patient interaction.  General Appearance:   Awake comfortable chronically ill nontoxic-appearing female who does not appear to be in any acute distress   Head:    Normocephalic, without obvious abnormality, atraumatic   Eyes:    PERRL, conjunctivae/corneas clear, EOM's intact, both eyes   Ears:    Normal external ear canals, both ears   Nose:   Nares normal, septum midline, mucosa normal, no drainage    or sinus tenderness   Throat:   Lips, tongue, gums normal; oral mucosa pink and moist   Neck:   Supple, symmetrical, trachea midline, no adenopathy;     thyroid:  no enlargement/tenderness/nodules; no carotid    bruit or JVD   Back:     Symmetric, no curvature, ROM normal, no CVA tenderness   Lungs:     Clear to auscultation bilaterally, respirations unlabored   Chest Wall:    No tenderness or deformity    Heart:   S1-S2 regular   Abdomen:    Soft nontender   Extremities:  Prior left arm AV fistula in place   Pulses:   Pulses palpable in all extremities; symmetric all extremities   Skin:   Skin color normal, Skin is warm and dry,  no rashes or palpable lesions   Neurologic:  Grossly nonfocal       Data Review:    I reviewed the patient's new clinical results.  Results from last 7 " days   Lab Units 01/13/22  1411   WBC 10*3/mm3 3.54   HEMOGLOBIN g/dL 9.7*   PLATELETS 10*3/mm3 114*     Results from last 7 days   Lab Units 01/13/22  1411   SODIUM mmol/L 134*   POTASSIUM mmol/L 3.5   CHLORIDE mmol/L 98   CO2 mmol/L 23.9   BUN mg/dL 15   CREATININE mg/dL 4.34*   CALCIUM mg/dL 7.2*   GLUCOSE mg/dL 71     Microbiology Results (last 10 days)     Procedure Component Value - Date/Time    COVID PRE-OP / PRE-PROCEDURE SCREENING ORDER (NO ISOLATION) - Swab, Nasopharynx [[id number]]  (Abnormal) Collected: 01/13/22 1411    Lab Status: Final result Specimen: Swab from Nasopharynx Updated: 01/13/22 1512    Narrative:      The following orders were created for panel order COVID PRE-OP / PRE-PROCEDURE SCREENING ORDER (NO ISOLATION) - Swab, Nasopharynx.  Procedure                               Abnormality         Status                     ---------                               -----------         ------                     COVID-19,[facility] IN-HOUSE...[[id number]]  Abnormal            Final result                 Please view results for these tests on the individual orders.    COVID-19,[facility]HOUSE CEPHEID/TAB NP SWAB IN TRANSPORT MEDIA 8-12 HR TAT - Swab, Nasopharynx [[id number]]  (Abnormal) Collected: 01/13/22 1411    Lab Status: Final result Specimen: Swab from Nasopharynx Updated: 01/13/22 1512     COVID19 Detected    Narrative:      Fact sheet for providers: https://www.fda.gov/media/113582/download    Fact sheet for patients: https://www.fda.gov/media/213965/download    Test performed by PCR.        XR Chest 1 View    Result Date: 1/13/2022  1. Bilateral hilar enlargement. This may be related to pulmonary arterial enlargement though hilar anthony enlargement is also in the differential diagnosis. This appears to be stable. 2. Lungs appear clear and there is no evidence for perihilar edema. 3. Extensive vascular stents. 4. Advanced arthritis of the left shoulder.  This report was finalized on 1/13/2022 7:22  "PM by Dr. Nathaniel Thao M.D.      No orders to display         Assessment:  Active Hospital Problems    Diagnosis  POA   • Displacement of vascular dialysis catheter (HCC) [T82.42XA]  Yes      Resolved Hospital Problems   No resolved problems to display.         Plan:  See above  Keesha Jorgensen MD   2022  20:34 EST    Much of this encounter note is an electronic transcription/translation of spoken language to printed text. The electronic translation of spoken language may permit erroneous, or at times, nonsensical words or phrases to be inadvertently transcribed; Although I have reviewed the note for such errors, some may still exist      Electronically signed by Keesha Jorgensen MD at 22 0631     Beronica Jolly MD at 22 1736      Consult Orders    1. Inpatient Vascular Surgery Consult [[id number]] ordered by Haja Chowdhury MD               Name: Nellie Vegas ADMIT: 2022   : 1945  PCP: Laurent Cortes DO    MRN: [id number] LOS: 0 days   AGE/SEX: 76 y.o. female  ROOM: [facility]     Inpatient Vascular Surgery Consult  Consult performed by: Beronica Jolly MD  Consult ordered by: Haja Chowdhury MD Ashlee Ann Vinyard, MD     LOS: 0 days   Patient Care Team:  Laurent Cortes DO as PCP - General (Internal Medicine)  Kvng Guerra MD as Consulting Physician (Pulmonary Disease)    Subjective     History of Present Illness  76 y.o. female with a h/o ESRD on HD //S, asthma, COPD, type 2 diabetes mellitus, hypertension, hyperlipidemia, and sleep apnea who was admitted after her dialysis catheter \"came out\" earlier this afternoon.  Her last dialysis session was this morning and it was a full session.  She has a right arm axillary-axillary loop graft that has had multiple episodes of thrombosis requiring thrombectomy, most recently on 21 by Dr. Shahram Gallagher. This past November, she had an episode of infiltration after her graft was accessed and had a right femoral " tunneled catheter placed at that time for access.  Her graft has not been used since that time.  On my exam, it is pulsatile.  Her arm swelling has greatly improved since November. She had a positive COVID 19 screening in the ER but is currently asymptomatic.  She has been vaccinated for COVID 19. 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    Review of Systems   Constitutional: Negative.    HENT: Negative.    Eyes: Negative.    Respiratory: Negative.    Cardiovascular: Negative.    Gastrointestinal: Negative.    Endocrine: Negative.    Genitourinary: Negative.    Musculoskeletal: Negative.    Skin: Negative.    Allergic/Immunologic: Negative.    Neurological: Negative.    Hematological: Negative.    Psychiatric/Behavioral: Negative.        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;  Service: General;  Laterality: Right;   • COLONOSCOPY  2016   • COLONOSCOPY N/A 2021    Procedure: COLONOSCOPY into cecum with biopsy;  Surgeon: Fer  Fabricio Omalley MD;  Location: [facility] ENDOSCOPY;  Service: Gastroenterology;  Laterality: N/A;  cecal mass   • EXPLORATORY LAPAROTOMY N/A 8/3/2021    Procedure: LAPAROTOMY EXPLORATORY, resection of ileocolonic anastomosis with anastomosis;  Surgeon: Zbigniew Conner MD;  Location: [facility];  Service: General;  Laterality: N/A;   • INSERTION HEMODIALYSIS CATHETER Right 7/16/2021    Procedure: VENACAVAGRAM AND HEMODIALYSIS CATHETER INSERTION;  Surgeon: Karson Muller MD;  Location: [facility] OR 18/19;  Service: Vascular;  Laterality: Right;  Dr Bryant was primary surgeon   • INSERTION HEMODIALYSIS CATHETER N/A 11/5/2021    Procedure: HEMODIALYSIS CATHETER INSERTION;  Surgeon: Karson Muller MD;  Location: [facility] OR;  Service: Vascular;  Laterality: N/A;   • POLYPECTOMY      UTERINE   • SHUNT O GRAM Right 8/9/2019    Procedure: RIGHT ARM DIALYSIS GRAFT revision and THROMBECTOMY;  Surgeon: Thierry Hardy MD;  Location: [facility] 18/19;  Service: Vascular   • SHUNT O GRAM Right 8/22/2019    Procedure: RIGHT ARM DIALYSIS GRAFT THROMBECTOMY WITH STENTING;  Surgeon: Thierry Hardy MD;  Location: [facility] OR 18/19;  Service: Vascular   • SHUNT O GRAM Right 12/7/2020    Procedure: RIGHT ARM ARTERIOVENOUS SHUNTOGRAM WITH THROMBECTOMY;  Surgeon: Thierry Hardy MD;  Location: [facility] OR 18/19;  Service: Vascular;  Laterality: Right;   • SHUNT O GRAM Right 7/12/2021    Procedure: Right arm dialysis shuntogram with shunt thrombectomy;  Surgeon: Shahram Gallagher MD;  Location: [facility] 18/19;  Service: Vascular;  Laterality: Right;   • SHUNT O GRAM Right 7/19/2021    Procedure: RIGHT UPPER EXTREMITY THROMBECTOMY AND SHUNTOGRAM;  Surgeon: Shahram Gallagher MD;  Location: [facility];  Service: Vascular;  Laterality: Right;       Family History   Problem Relation Age of Onset   • Malig Hyperthermia Neg Hx        Social History     Tobacco Use   • Smoking  status: Former Smoker     Packs/day: 0.00     Years: 4.00     Pack years: 0.00     Types: Cigarettes     Quit date:      Years since quittin.0   • Smokeless tobacco: Never Used   Vaping Use   • Vaping Use: Never used   Substance Use Topics   • Alcohol use: No   • Drug use: No       Allergies: Sulfa antibiotics, Adhesive tape, and Latex    Medications Prior to Admission   Medication Sig Dispense Refill Last Dose   • albuterol (PROVENTIL) (2.5 MG/3ML) 0.083% nebulizer solution Take 2.5 mg by nebulization 3 (Three) Times a Day As Needed for Wheezing. UNSURE OF DOSAGE   Past Week at Unknown time   • albuterol sulfate  (90 Base) MCG/ACT inhaler INHALE 2 PUFFS INTO THE LUNGS EVERY 4 HOURS AS NEEDED FOR WHEEZING   Past Week at Unknown time   • apixaban (ELIQUIS) 2.5 MG tablet tablet Take 2.5 mg by mouth 2 (Two) Times a Day.   2022 at Unknown time   • Breo Ellipta 100-25 MCG/INH inhaler    Past Week at Unknown time   • budesonide-formoterol (SYMBICORT) 160-4.5 MCG/ACT inhaler Inhale 2 puffs 2 (Two) Times a Day.   Past Week at Unknown time   • Cholecalciferol (VITAMIN D3) 5000 units capsule capsule Take 5,000 Units by mouth Every Morning.   2022 at Unknown time   • fluticasone (FLONASE) 50 MCG/ACT nasal spray 2 sprays into the nostril(s) as directed by provider Daily.   2022 at Unknown time   • glucose blood (Accu-Chek Amber Plus) test strip 1 strip by Other route 3 (Three) Times a Day.   2022 at Unknown time   • montelukast (SINGULAIR) 10 MG tablet Take 10 mg by mouth Every Night.   2022 at Unknown time   • omeprazole (priLOSEC) 40 MG capsule Take 40 mg by mouth Every Morning.   2022 at Unknown time   • metoprolol succinate XL (TOPROL-XL) 25 MG 24 hr tablet Take 0.5 tablets by mouth Every Morning for 30 days. 15 tablet 0    • midodrine (PROAMATINE) 5 MG tablet Take 1 tablet by mouth 3 (Three) Times a Day Before Meals for 30 days. 90 tablet 0      vitamin C, 500 mg, Oral,  Daily  budesonide-formoterol, 2 puff, Inhalation, BID - RT  cetirizine, 5 mg, Oral, Daily  cholecalciferol, 1,000 Units, Oral, Daily  famotidine, 20 mg, Oral, BID  guaiFENesin, 600 mg, Oral, Q12H  [START ON 1/14/2022] metoprolol succinate XL, 12.5 mg, Oral, QAM  montelukast, 10 mg, Oral, Nightly  zinc sulfate, 220 mg, Oral, Daily         •  albuterol sulfate HFA  •  [COMPLETED] Insert peripheral IV **AND** sodium chloride      Objective   Temp:  [97.8 °F (36.6 °C)-98.1 °F (36.7 °C)] 98.1 °F (36.7 °C)  Heart Rate:  [] 69  Resp:  [16-18] 18  BP: (126-161)/(61-86) 161/61    No intake/output data recorded.    Physical Exam  Constitutional:       General: She is not in acute distress.     Appearance: Normal appearance.   HENT:      Head: Normocephalic and atraumatic.      Right Ear: External ear normal.      Left Ear: External ear normal.      Nose: Nose normal.      Mouth/Throat:      Mouth: Mucous membranes are moist.      Pharynx: Oropharynx is clear.   Eyes:      Extraocular Movements: Extraocular movements intact.      Conjunctiva/sclera: Conjunctivae normal.      Pupils: Pupils are equal, round, and reactive to light.   Cardiovascular:      Rate and Rhythm: Normal rate and regular rhythm.      Comments: Right arm AV graft pulsatile, no appreciable swelling  Right radial pulse non palpable, hand warm with no ulceration  Pulmonary:      Effort: Pulmonary effort is normal. No respiratory distress.   Abdominal:      General: Abdomen is flat. There is no distension.      Palpations: Abdomen is soft.   Musculoskeletal:         General: No swelling or deformity.      Cervical back: Normal range of motion and neck supple.      Comments: Right femoral tunneled catheter site with no bleeding or hematoma   Skin:     General: Skin is warm and dry.      Capillary Refill: Capillary refill takes less than 2 seconds.   Neurological:      General: No focal deficit present.      Mental Status: She is alert.   Psychiatric:          Mood and Affect: Mood normal.         Behavior: Behavior normal.         Results from last 7 days   Lab Units 22  1411   WBC 10*3/mm3 3.54   HEMOGLOBIN g/dL 9.7*   PLATELETS 10*3/mm3 114*     Results from last 7 days   Lab Units 22  1411   SODIUM mmol/L 134*   POTASSIUM mmol/L 3.5   CHLORIDE mmol/L 98   CO2 mmol/L 23.9   BUN mg/dL 15   CREATININE mg/dL 4.34*   GLUCOSE mg/dL 71   Estimated Creatinine Clearance: 9.4 mL/min (A) (by C-G formula based on SCr of 4.34 mg/dL (H)).      Imaging Studies:  Duplex pending      Active Hospital Problems    Diagnosis  POA   • Displacement of vascular dialysis catheter (HCC) [T82.42XA]  Yes      Resolved Hospital Problems   No resolved problems to display.     Problem Points:  4:  Patient has a new problem, with additional work-up planned  Total problem points:4 or more    Data Points:  1:  I have reviewed or order clinical lab test  1:  I have reviewed or order radiology test (except heart catheterization or echo)  1:  I have reviewed or order medicine test (PFT, EKG, caridac echo or cath)  2:  I have reviewed and summation of old records and/or discussed the patients care with another health care provider  Total data points:4 or more    Risk Points:  High:  Any illness that poses threat to life or body funciton    MDM Prob point Data point Risk   SF 1 1 Minimal   Low 2 2 Low   Mod 3 3 Moderate   High 4 4 High     Code MDM History Exam Time   89410 SF/Low Detailed Detailed 30   47281 Mod Comprehensive Comprehensive 50   79732 High Comprehensive Comprehensive 70     Detailed history:  4 elements HPI or status of 3 chronic problems; 2-9 system ROS  Comprehensive:  4 elements HPI or status of 3 chronic problems;  10 system ROS    Detailed Exam:  12 findings from any organ system  Comprehensive Exam:  2 findings from each of 9 systems.     [place]illin    Assessment/Plan       Displacement of vascular dialysis catheter (HCC)        76 y.o. female  with a h/o ESRD on  "HD //S, asthma, COPD, type 2 diabetes mellitus, hypertension, hyperlipidemia, and sleep apnea who was admitted after her dialysis catheter \"came out\" earlier this afternoon. She has a right axillary loop AV graft that has long history of recurrent shunt thrombosis, with recent infiltration in 2021.  Graft has not been used since 2021.  She is also incidentally COVID 19 positive with no symptoms.     -we will plan for tunneled dialysis catheter placement tomorrow in the OR  -will obtain graft duplex to determine if fistulagram needed.  On exam, fistula pulsatile so I suspect recurrent central venous stenosis to be present.  However, I would recommend an outpatient fistulagram once she recovers from her COVID 19 infection.    I discussed the patients findings and my recommendations with patient.  Please call my office with any question: [phone]    Beronica Jolly MD  22  17:36 EST                  Electronically signed by Beronica Jolly MD at 22 2280     Chester Kang MD at 22 1632      Consult Orders    1. Nephrology (on -call MD unless specified) [[id number]] ordered by Dayo Presley PA at 22 1326                                                                                                               [facility]                                                                                             Nephrology Initial Consult Note    Patient Identification:  Name: Nellie Vegas MRN: [id number]  Age: 76 y.o. : 1945  Sex: female  Date:2022    Requesting Physician: As per consult order.  Reason for Consultation: ESRD management  Information from:patient/ family/ chart      History of Present Illness: This is a 76 y.o. year old female with end-stage renal disease on dialysis .  She was receiving her dialysis today at her unit, no issues at the start of her dialysis.  She got up to use the " restroom with about 1 hour to go and accidentally got her catheter stuck on her closed, catheter was pulled out exposing the cuff and she was sent to the ER.  She completed all but 1 hour of her treatment denies any complaints.  No fevers chills shortness of breath chest pain or edema.  Vascular has been consulted for catheter replacement.  She received 2 doses of the COVID-vaccine, has not received the booster, did test positive for COVID-19 in the ER but has no respiratory symptoms.  She denies any new complaints at this time    The following medical history and medications personally reviewed by me:    Problem List:   Patient Active Problem List    Diagnosis    • Displacement of vascular dialysis catheter ([facility]) [T82.42XA]    • Hematoma of arm, right, initial encounter [S40.021A]    • Severe malnutrition ([facility]) [E43]    • Failure to thrive in adult [R62.7]    • AV shunt thrombosis, subsequent encounter [T82.868D]    • Colonic mass [K63.89]    • Lower GI bleeding [K92.2]    • Problem with dialysis access ([facility]) [T82.898A]    • COPD exacerbation ([facility]) [J44.1]    • Anemia of chronic renal failure, stage 5 ([facility]) [N18.5, D63.1]    • ESRD (end stage renal disease) on dialysis ([facility]) [N18.6, Z99.2]    • Thrombosis of kidney dialysis arteriovenous graft ([facility]) [T82.868A]        Past Medical History:  Past Medical History:   Diagnosis Date   • Anemia    • Anesthesia complication     mother woke up in combative state   • Arthritis     knees   • Asthma    • COPD (chronic obstructive pulmonary disease) ([facility])    • Diabetes mellitus ([facility])     type 2   • Dialysis patient ([facility])    • Disease of thyroid gland    • GERD (gastroesophageal reflux disease)    • Headache     after dialysis   • History of blood clots     BUE   • History of kidney stones    • Hyperlipidemia    • Hypertension    • Knee pain, bilateral    • Renal disease    • Sleep apnea     CPAP   • SOB (shortness of breath)    • Thrombosis of renal dialysis arteriovenous  graft (HCC)    • Weakness        Past Surgical History:  Past Surgical History:   Procedure Laterality Date   • ARTERIOVENOUS FISTULA Right    • ARTERIOVENOUS FISTULA Left     REMOVED   • CENTRAL VENOUS CATHETER TUNNELED INSERTION DOUBLE LUMEN     •  SECTION     • COLON RESECTION Right 2021    Procedure: RIGHT HEMICOLECTOMY LAPAROSCOPIC;  Surgeon: Zbigniew Conner MD;  Location: [facility] MAIN OR;  Service: General;  Laterality: Right;   • COLONOSCOPY  2016   • COLONOSCOPY N/A 2021    Procedure: COLONOSCOPY into cecum with biopsy;  Surgeon: Fabricio Monroe MD;  Location: [facility] ENDOSCOPY;  Service: Gastroenterology;  Laterality: N/A;  cecal mass   • EXPLORATORY LAPAROTOMY N/A 8/3/2021    Procedure: LAPAROTOMY EXPLORATORY, resection of ileocolonic anastomosis with anastomosis;  Surgeon: Zbigniew Conner MD;  Location: [facility] OR;  Service: General;  Laterality: N/A;   • INSERTION HEMODIALYSIS CATHETER Right 2021    Procedure: VENACAVAGRAM AND HEMODIALYSIS CATHETER INSERTION;  Surgeon: Karson Muller MD;  Location: [facility] OR ;  Service: Vascular;  Laterality: Right;  Dr Bryant was primary surgeon   • INSERTION HEMODIALYSIS CATHETER N/A 2021    Procedure: HEMODIALYSIS CATHETER INSERTION;  Surgeon: Karson Muller MD;  Location: [facility] MAIN OR;  Service: Vascular;  Laterality: N/A;   • POLYPECTOMY      UTERINE   • SHUNT O GRAM Right 2019    Procedure: RIGHT ARM DIALYSIS GRAFT revision and THROMBECTOMY;  Surgeon: Thierry Hardy MD;  Location: [facility] OR ;  Service: Vascular   • SHUNT O GRAM Right 2019    Procedure: RIGHT ARM DIALYSIS GRAFT THROMBECTOMY WITH STENTING;  Surgeon: Thierry Hardy MD;  Location: [facility] OR ;  Service: Vascular   • SHUNT O GRAM Right 2020    Procedure: RIGHT ARM ARTERIOVENOUS SHUNTOGRAM WITH THROMBECTOMY;  Surgeon: Thierry Hardy MD;  Location: [facility] OR ;  Service:  Vascular;  Laterality: Right;   • SHUNT O GRAM Right 7/12/2021    Procedure: Right arm dialysis shuntogram with shunt thrombectomy;  Surgeon: Shahram Gallagher MD;  Location: [facility] OR 18/19;  Service: Vascular;  Laterality: Right;   • SHUNT O GRAM Right 7/19/2021    Procedure: RIGHT UPPER EXTREMITY THROMBECTOMY AND SHUNTOGRAM;  Surgeon: Shahram Gallagher MD;  Location: [facility] MAIN OR;  Service: Vascular;  Laterality: Right;        Home Meds:   Medications Prior to Admission   Medication Sig Dispense Refill Last Dose   • albuterol (PROVENTIL) (2.5 MG/3ML) 0.083% nebulizer solution Take 2.5 mg by nebulization 3 (Three) Times a Day As Needed for Wheezing. UNSURE OF DOSAGE      • albuterol sulfate  (90 Base) MCG/ACT inhaler INHALE 2 PUFFS INTO THE LUNGS EVERY 4 HOURS AS NEEDED FOR WHEEZING      • apixaban (ELIQUIS) 2.5 MG tablet tablet Take 2.5 mg by mouth 2 (Two) Times a Day.      • Breo Ellipta 100-25 MCG/INH inhaler       • budesonide-formoterol (SYMBICORT) 160-4.5 MCG/ACT inhaler Inhale 2 puffs 2 (Two) Times a Day.      • Cholecalciferol (VITAMIN D3) 5000 units capsule capsule Take 5,000 Units by mouth Every Morning.      • fluticasone (FLONASE) 50 MCG/ACT nasal spray 2 sprays into the nostril(s) as directed by provider Daily.      • glucose blood (Accu-Chek Amber Plus) test strip 1 strip by Other route 3 (Three) Times a Day.      • metoprolol succinate XL (TOPROL-XL) 25 MG 24 hr tablet Take 0.5 tablets by mouth Every Morning for 30 days. 15 tablet 0    • midodrine (PROAMATINE) 5 MG tablet Take 1 tablet by mouth 3 (Three) Times a Day Before Meals for 30 days. 90 tablet 0    • montelukast (SINGULAIR) 10 MG tablet Take 10 mg by mouth Every Night.      • omeprazole (priLOSEC) 40 MG capsule Take 40 mg by mouth Every Morning.          Current Meds:   Current Facility-Administered Medications   Medication Dose Route Frequency Provider Last Rate Last Admin   • albuterol sulfate HFA (PROVENTIL  HFA;VENTOLIN HFA;PROAIR HFA) inhaler 2 puff  2 puff Inhalation Q4H PRN Haja Chowdhury MD       • apixaban (ELIQUIS) tablet 2.5 mg  2.5 mg Oral Q12H Haja Chowdhury MD       • ascorbic acid (VITAMIN C) tablet 500 mg  500 mg Oral Daily Haja Chowdhury MD       • budesonide-formoterol (SYMBICORT) 80-4.5 MCG/ACT inhaler 2 puff  2 puff Inhalation BID - RT Haja Chowdhury MD       • cetirizine (zyrTEC) tablet 5 mg  5 mg Oral Daily Haja Chowdhury MD       • cholecalciferol (VITAMIN D3) tablet 1,000 Units  1,000 Units Oral Daily Haja Chowdhury MD       • famotidine (PEPCID) tablet 20 mg  20 mg Oral BID Haja Chowdhury MD       • guaiFENesin (MUCINEX) 12 hr tablet 600 mg  600 mg Oral Q12H Haja Chowdhury MD       • [START ON 2022] metoprolol succinate XL (TOPROL-XL) 24 hr tablet 12.5 mg  12.5 mg Oral QAM Haja Chowdhury MD       • montelukast (SINGULAIR) tablet 10 mg  10 mg Oral Nightly Haja Chowdhury MD       • sodium chloride 0.9 % flush 10 mL  10 mL Intravenous PRN Dayo Presley PA       • zinc sulfate (ZINCATE) capsule 220 mg  220 mg Oral Daily Haja Chowdhury MD           Allergies:  Allergies   Allergen Reactions   • Sulfa Antibiotics Hives   • Adhesive Tape Hives     Paper tape   • Latex Rash       Social History:   Social History     Socioeconomic History   • Marital status:    Tobacco Use   • Smoking status: Former Smoker     Packs/day: 0.00     Years: 4.00     Pack years: 0.00     Types: Cigarettes     Quit date: 1966     Years since quittin.0   • Smokeless tobacco: Never Used   Vaping Use   • Vaping Use: Never used   Substance and Sexual Activity   • Alcohol use: No   • Drug use: No   • Sexual activity: Defer        Family History:  Family History   Problem Relation Age of Onset   • Malig Hyperthermia Neg Hx         Review of Systems: as per HPI, in addition:    General:      Denies weakness / fatigue,                       No fevers / chills                       no weight loss  HEENT:       no dysphagia /  "odynophagia  Neck:           normal range of motion, no swelling  Respiratory: no cough / congestion                      No shortness of air                       No wheezing  CV:              No chest pain                       No palpitations  Abdomen/GI: no nausea / vomiting                      No diarrhea / constipation                      No abdominal pain  :             no dysuria / urinary frequency                       No urgency, normal output  Endocrine:   no polyuria / polydipsia,                      No heat or cold intolerance  Skin:           no rashes or skin breakdown   Vascular:   No edema                     No claudication  Psych:        no depression/ anxiety  Neuro:        no focal weakness, no seizures  Musculoskeletal: no joint pain or deformities      Physical Exam:  Vitals:   Temp (24hrs), Av.8 °F (36.6 °C), Min:97.8 °F (36.6 °C), Max:97.8 °F (36.6 °C)    /61   Pulse 63   Temp 97.8 °F (36.6 °C)   Resp 18   Ht 157.5 cm (62\")   Wt 70.8 kg (156 lb)   SpO2 99%   BMI 28.53 kg/m²   Intake/Output:   No intake or output data in the 24 hours ending 22 1632     Wt Readings from Last 1 Encounters:   22 1209 70.8 kg (156 lb)       Exam:    General Appearance:  Awake, alert, oriented x3, no acute distress  Well-appearing   Head and Face:  Normocephalic, atraumatic, mucus membranes moist, oropharynx clear   Eyes:  No icterus, pupils equal round and reactive to light, extraocular movements intact    ENMT: Moist mucosa, tongue symmetric    Neck: Supple  no jugular venous distention  no thyromegaly   Pulmonary:  Respiratory effort: Normal  Auscultation of lungs: Clear bilaterally  No wheezes  No rhonchi  Good air movement, good expansion   Chest wall:  No tenderness or deformity   Cardiovascular:  Auscultation of the heart: Normal rhythm, no murmurs  No edema of bilateral lower extremities   Abdomen:  Abdomen: soft, non-tender, normal bowel sounds all four quadrants, no " masses   Liver and spleen: no hepatosplenomegaly   Musculoskeletal: Digits and nails: normal  Normal range of motion  No joint swelling or gross deformities    Skin: Skin inspection: color normal, no visible rashes or lesions  Skin palpation: texture, turgor normal, no palpable lesions   Lymphatic:  no cervical lymphadenopathy    Psychiatric: Judgement and insight: normal  Orientation to person place and time: normal  Mood and affect: normal       DATA:  Radiology and Labs:  The following labs independently reviewed by me, additional AM labs ordered  Old records independently reviewed showing ESRD  The following radiologic studies independently viewed by me, findings chest x-ray 2 days ago showed no focal disease  Interval notes, chart personally reviewed by me.  I have reviewed and summarized old records as detailed above  Plan of care discussed with Dr. Chowdhury  New problems include dislodged dialysis catheter    Dialysis patient access: Groin catheter    Risk/ complexity of medical care/ medical decision making: Moderate complexity, need for surgery  Chronic illness with severe exacerbation or progression      Labs:   Recent Results (from the past 24 hour(s))   Comprehensive Metabolic Panel    Collection Time: 01/13/22  2:11 PM    Specimen: Arm, Left; Blood   Result Value Ref Range    Glucose 71 65 - 99 mg/dL    BUN 15 8 - 23 mg/dL    Creatinine 4.34 (H) 0.57 - 1.00 mg/dL    Sodium 134 (L) 136 - 145 mmol/L    Potassium 3.5 3.5 - 5.2 mmol/L    Chloride 98 98 - 107 mmol/L    CO2 23.9 22.0 - 29.0 mmol/L    Calcium 7.2 (L) 8.6 - 10.5 mg/dL    Total Protein 5.9 (L) 6.0 - 8.5 g/dL    Albumin 3.30 (L) 3.50 - 5.20 g/dL    ALT (SGPT) 8 1 - 33 U/L    AST (SGOT) 25 1 - 32 U/L    Alkaline Phosphatase 91 39 - 117 U/L    Total Bilirubin 0.2 0.0 - 1.2 mg/dL    eGFR Non  Amer      eGFR  African Amer 12 (L) >60 mL/min/1.73    Globulin 2.6 gm/dL    A/G Ratio 1.3 g/dL    BUN/Creatinine Ratio 3.5 (L) 7.0 - 25.0    Anion Gap  12.1 5.0 - 15.0 mmol/L   CBC Auto Differential    Collection Time: 01/13/22  2:11 PM    Specimen: Blood   Result Value Ref Range    WBC 3.54 3.40 - 10.80 10*3/mm3    RBC 3.72 (L) 3.77 - 5.28 10*6/mm3    Hemoglobin 9.7 (L) 12.0 - 15.9 g/dL    Hematocrit 30.7 (L) 34.0 - 46.6 %    MCV 82.5 79.0 - 97.0 fL    MCH 26.1 (L) 26.6 - 33.0 pg    MCHC 31.6 31.5 - 35.7 g/dL    RDW 19.0 (H) 12.3 - 15.4 %    RDW-SD 55.5 (H) 37.0 - 54.0 fl    MPV 9.8 6.0 - 12.0 fL    Platelets 114 (L) 140 - 450 10*3/mm3    Neutrophil % 70.8 42.7 - 76.0 %    Lymphocyte % 13.0 (L) 19.6 - 45.3 %    Monocyte % 15.3 (H) 5.0 - 12.0 %    Eosinophil % 0.0 (L) 0.3 - 6.2 %    Basophil % 0.3 0.0 - 1.5 %    Immature Grans % 0.6 (H) 0.0 - 0.5 %    Neutrophils, Absolute 2.51 1.70 - 7.00 10*3/mm3    Lymphocytes, Absolute 0.46 (L) 0.70 - 3.10 10*3/mm3    Monocytes, Absolute 0.54 0.10 - 0.90 10*3/mm3    Eosinophils, Absolute 0.00 0.00 - 0.40 10*3/mm3    Basophils, Absolute 0.01 0.00 - 0.20 10*3/mm3    Immature Grans, Absolute 0.02 0.00 - 0.05 10*3/mm3    nRBC 0.0 0.0 - 0.2 /100 WBC   COVID-19,[facility] IN-HOUSE CEPHEID/TAB NP SWAB IN TRANSPORT MEDIA 8-12 HR TAT - Swab, Nasopharynx    Collection Time: 01/13/22  2:11 PM    Specimen: Nasopharynx; Swab   Result Value Ref Range    COVID19 Detected (C) Not Detected - Ref. Range       Radiology:  Imaging Results (Last 24 Hours)     ** No results found for the last 24 hours. **               ASSESSMENT:   ESRD on dialysis    Displacement of vascular dialysis catheter (HCC)  Anemia of CKD  Previous colonic mass status post bowel resection  Hypertension  Covid-19 testing positive      PLAN:   Vascular has been consulted for tunneled catheter exchange  Continue Tuesday Thursday Saturday schedule dialysis this admission  No need for additional dialysis at this time  Start Epogen for anemia  Unfortunately her COVID-19 test came back positive so she will need a COVID dialysis cohort unit arranged prior to discharge      Continue  to monitor electrolytes and volume closely, avoid IV contrast and nephrotoxic medications     I appreciate the consult request, please call if any questions      Chester Kang M.D  [facility]  Office phone number: [phone]  Answering service phone number: [phone]        1/13/2022        Dictation via Dragon dictation software      Electronically signed by Chesetr Kang MD at 01/13/22 4615

## 2022-01-14 NOTE — PROGRESS NOTES
Continued Stay Note  [facility]     Patient Name: Nellie Vegas  MRN: [id number]  Today's Date: 1/14/2022    Admit Date: 1/13/2022     Discharge Plan     Row Name 01/14/22 1320       Plan    Plan Home and continue with HD at [facility] on [facility] on [address]    Plan Comments Patient is current with HD at [facility] on [facility] on [address]. Called Lisandra with [facility] at [phone] ext [phone] to discuss COVID (+). Lisandra stated CCP would need to call patient's HD center. Called and spoke to Meryl with [facility] at [phone] who confirmed patient can return at her normal chair time starting on Saturday. Stated patient will be in an isolation room. Updated RN               Discharge Codes    No documentation.               Expected Discharge Date and Time     Expected Discharge Date Expected Discharge Time    Jan 14, 2022             Ailyn Nur, RN

## 2022-01-14 NOTE — PLAN OF CARE
Goal Outcome Evaluation:  Plan of Care Reviewed With: patient        Progress: no change  Outcome Summary: admitted for displacement of dialysis access.  pt. tested positive for covid upon admission testing.  Scheduled for HD cath insertion by Dr. Muller today. Consent signed.  Falls precaution maintained.    Dr. Jorgensen was consulted for her covid infection

## 2022-01-15 ENCOUNTER — READMISSION MANAGEMENT (OUTPATIENT)
Dept: CALL CENTER | Facility: HOSPITAL | Age: 77
End: 2022-01-15

## 2022-01-15 NOTE — OUTREACH NOTE
Prep Survey      Responses   [facility] [place]   Is LACE score < 7 ? No   Emergency Room discharge w/ pulse ox? No   Eligibility Readm Mgmt   Discharge diagnosis Status post new tunneled dialysis catheter insertion covid 19   Does the patient have one of the following disease processes/diagnoses(primary or secondary)? COVID-19   Does the patient have Home health ordered? No   Is there a DME ordered? No   Comments regarding appointments HD at [facility]    Prep survey completed? Yes          Suzanne Ward RN

## 2022-01-15 NOTE — OUTREACH NOTE
COVID-19 Week 1 Survey      Responses   [facility] patient discharged from? [place]   Does the patient have one of the following disease processes/diagnoses(primary or secondary)? COVID-19   COVID-19 underlying condition? None   Call Number Call 1   Week 1 Call successful? No   Discharge diagnosis Status post new tunneled dialysis catheter insertion covid 19          Marlene Talavera RN

## 2022-01-16 ENCOUNTER — READMISSION MANAGEMENT (OUTPATIENT)
Dept: CALL CENTER | Facility: HOSPITAL | Age: 77
End: 2022-01-16

## 2022-01-16 NOTE — OUTREACH NOTE
COVID-19 Week 1 Survey      Responses   [facility] patient discharged from? [place]   Does the patient have one of the following disease processes/diagnoses(primary or secondary)? COVID-19   COVID-19 underlying condition? None   Call Number Call 2   Week 1 Call successful? Yes   Call start time 1302   Call end time 1306   General alerts for this patient Pt is staying with daughter --Lily   Discharge diagnosis Status post new tunneled dialysis catheter insertion covid 19   Is patient permission given to speak with other caregiver? Yes   List who call center can speak with Yeyo   Person spoke with today (if not patient) and relationship daughter   Medication comments Yeyo states she thinks meds are going well for her Mom   Psychosocial issues? No   Is the patient/caregiver able to teach back the hierarchy of who to call/visit for symptoms/problems? PCP, Specialist, Home health nurse, Urgent Care, ED, 911 Yes   COVID-19 call completed? Yes   Wrap up additional comments DaughterYeyo does not have alot of info on Mom.          Ana Quezada, RN

## 2022-01-17 ENCOUNTER — READMISSION MANAGEMENT (OUTPATIENT)
Dept: CALL CENTER | Facility: HOSPITAL | Age: 77
End: 2022-01-17

## 2022-01-17 NOTE — OUTREACH NOTE
COVID-19 Week 1 Survey      Responses   [facility] patient discharged from? [place]   Does the patient have one of the following disease processes/diagnoses(primary or secondary)? COVID-19   COVID-19 underlying condition? None   Call Number Call 3   Week 1 Call successful? No   Discharge diagnosis Status post new tunneled dialysis catheter insertion covid 19          Barbra Broussard RN

## 2022-01-17 NOTE — PAYOR COMM NOTE
"Nellie Devries (76 y.o. Female)     DC SUMMARY FOR [id number]                Date of Birth Social Security Number Address Home Phone MRN    1945  [id number] [facility] [address] [phone] [id number]    Hinduism Marital Status             Non-Sikh        Admission Date Admission Type Admitting Provider Attending Provider Department, Room/Bed    1/13/22 Emergency Haja Chowdhury MD  [address], P678/1    Discharge Date Discharge Disposition Discharge Destination          1/14/2022 Home or Self Care              Attending Provider: (none)   Allergies: Sulfa Antibiotics, Adhesive Tape, Latex    Isolation: None   Infection: COVID (confirmed) (01/13/22)   Code Status: Prior   Advance Care Planning Activity    Ht: 152.4 cm (60\")   Wt: 64.1 kg (141 lb 5 oz)    Admission Cmt: None   Principal Problem: None                Active Insurance as of 1/13/2022     Primary Coverage     Payor Plan Insurance Group Employer/Plan Group    [facility] MEDICARE REPLACEMENT [employer] MEDICARE REPLACEMENT      Payor Plan Address Payor Plan Phone Number Payor Plan Fax Number Effective Dates    [address] [phone]  1/1/2022 - None Entered    [facility] [id number]       Subscriber Name Subscriber Birth Date Member ID       NELLIE DEVRIES 1945 [id number]           Secondary Coverage     Payor Plan Insurance Group Employer/Plan Group    [employer]      Payor Plan Address Payor Plan Phone Number Payor Plan Fax Number Effective Dates    [address]   11/1/2019 - None Entered    [facility] [phone]       Subscriber Name Subscriber Birth Date Member ID       NELLIE DEVRIES 1945 [id number]                 Emergency Contacts      (Rel.) Home Phone Work Phone Mobile Phone    CHRISTINE DEVRIES (Daughter) [phone] -- [phone]    HENNY KELSEY (Daughter) [phone] -- [phone]    Heidi Devries " (Daughter) [phone] -- [phone]    Sadie Ham (Daughter) -- -- [phone]               Discharge Summary      Haja Chowdhury MD at 01/14/22 1316        Discharge summary    Date of admission 1/13/2022  Date of discharge 1/14/2022    Final diagnosis  Status post new tunneled dialysis catheter insertion  Old hemodialysis catheter dislodgment  COVID-19 infection asymptomatic  End-stage renal disease on hemodialysis  Hypokalemia  COPD  Chronic anemia  Hypercoagulable state  Right hemicolectomy with history of tubular adenoma  Gastroesophageal reflux         Discharge medication      Current Facility-Administered Medications:   •  acetaminophen (TYLENOL) tablet 650 mg, 650 mg, Oral, Q4H PRN, Speedy Coello MD  •  albuterol sulfate HFA (PROVENTIL HFA;VENTOLIN HFA;PROAIR HFA) inhaler 2 puff, 2 puff, Inhalation, Q4H PRN, Speedy Coello MD  •  ascorbic acid (VITAMIN C) tablet 500 mg, 500 mg, Oral, Daily, Speedy Coello MD, 500 mg at 01/14/22 1209  •  budesonide-formoterol (SYMBICORT) 80-4.5 MCG/ACT inhaler 2 puff, 2 puff, Inhalation, BID - RT, Speedy Coello MD, 2 puff at 01/13/22 2026  •  cetirizine (zyrTEC) tablet 5 mg, 5 mg, Oral, Daily, Speedy Coello MD, 5 mg at 01/14/22 1208  •  cholecalciferol (VITAMIN D3) tablet 1,000 Units, 1,000 Units, Oral, Daily, Speedy Coello MD, 1,000 Units at 01/14/22 1209  •  famotidine (PEPCID) tablet 20 mg, 20 mg, Oral, BID, Speedy Coello MD, 20 mg at 01/14/22 1208  •  guaiFENesin (MUCINEX) 12 hr tablet 600 mg, 600 mg, Oral, Q12H, Speedy Coello MD, 600 mg at 01/14/22 1209  •  hydrALAZINE (APRESOLINE) injection 10 mg, 10 mg, Intravenous, Q4H PRN, Haja Chowdhury MD  •  metoprolol succinate XL (TOPROL-XL) 24 hr tablet 12.5 mg, 12.5 mg, Oral, QAM, Speedy Coello MD, 12.5 mg at 01/14/22 0557  •  montelukast (SINGULAIR) tablet 10 mg, 10 mg, Oral, Nightly, Speedy Coello MD, 10 mg at 01/13/22 2780  •   ondansetron (ZOFRAN) injection 4 mg, 4 mg, Intravenous, Q4H PRN, Speedy Coello MD  •  [COMPLETED] Insert peripheral IV, , , Once **AND** sodium chloride 0.9 % flush 10 mL, 10 mL, Intravenous, PRN, Speedy Coello MD  •  zinc sulfate (ZINCATE) capsule 220 mg, 220 mg, Oral, Daily, Speedy Coello MD, 220 mg at 01/14/22 1204     Consents obtained  Infectious disease  Vascular surgery   Nephrology    Procedures  New tunneled dialysis catheter insertion    Hospital course  76-year-old -American female with history of end-stage renal disease on hemodialysis admitted to emergency room via hemodialysis center where she was getting dialysis and her hemodialysis catheter dislodged and finally came out in the hospital.  Patient admitted for new hemodialysis catheter placement and her screening work-up showed that she has COVID-19 infection with no symptoms except chronic cough.  Patient remain in isolation and further evaluated by vascular surgery infectious disease and nephrology followed the patient.  Patient underwent new dialysis catheter placement this morning and will be discharged home and to continue hemodialysis in the morning and 3 times a week.  Patient advised to continue quarantine for 14 days and take symptomatic treatment for COVID-19 infection.  Patient Eliquis was held and restarted at the time of discharge.    Discharge diet regular    Activity as tolerated    Medication as advised    Follow-up with primary doctor in 1 week and follow-up with nephrology and infectious disease per this section and take medications directed    HAJA MARTIN MD        Electronically signed by Haja Martin MD at 01/14/22 5889

## 2022-01-21 ENCOUNTER — READMISSION MANAGEMENT (OUTPATIENT)
Dept: CALL CENTER | Facility: HOSPITAL | Age: 77
End: 2022-01-21

## 2022-01-21 NOTE — OUTREACH NOTE
COVID-19 Week 2 Survey      Responses   [facility] patient discharged from? [place]   Does the patient have one of the following disease processes/diagnoses(primary or secondary)? COVID-19   COVID-19 underlying condition? None   Call Number Call 1   COVID-19 Week 2: Call 1 attempt successful? Yes   Call start time 1118   Call end time 1120   Discharge diagnosis Status post new tunneled dialysis catheter insertion covid 19   Comments regarding appointments Pt did have dialysis yesterday sent home early due to catheter dysfunction.   Has the patient kept scheduled appointments due by today? Yes   COVID-19 call completed? Yes   Wrap up additional comments Daughter reports pt feeling ok. Trouble with diaylsis catheter yesterday.          Barbra Broussard RN

## 2022-01-28 ENCOUNTER — READMISSION MANAGEMENT (OUTPATIENT)
Dept: CALL CENTER | Facility: HOSPITAL | Age: 77
End: 2022-01-28

## 2022-01-28 NOTE — OUTREACH NOTE
COVID-19 Week 3 Survey      Responses   [facility] patient discharged from? [place]   Does the patient have one of the following disease processes/diagnoses(primary or secondary)? COVID-19   COVID-19 underlying condition? None   Call Number Call 1   COVID-19 Week 3: Call 1 attempt successful? No   Discharge diagnosis Status post new tunneled dialysis catheter insertion,  covid 19          Breana Beaulieu RN

## 2022-02-04 ENCOUNTER — READMISSION MANAGEMENT (OUTPATIENT)
Dept: CALL CENTER | Facility: HOSPITAL | Age: 77
End: 2022-02-04

## 2022-02-04 NOTE — OUTREACH NOTE
COVID-19 Week 4 Survey      Responses   [facility] patient discharged from? [place]   Does the patient have one of the following disease processes/diagnoses(primary or secondary)? COVID-19   COVID-19 underlying condition? None   Call Number Call 1   COVID-19 Week 4: Call 1 attempt successful? No   Discharge diagnosis Status post new tunneled dialysis catheter insertion,  covid 19          Janelle Franklin RN

## 2022-11-27 NOTE — PROGRESS NOTES
"Daily progress note    Chief complaint  Doing same  No new complaints  Still with cough but denies shortness of breath fever chills  Going for hemodialysis catheter placement    History of present illness  76-year-old -American female with very complex past medical history and well-known to our service including end-stage renal disease on hemodialysis COPD chronic anemia hypercoagulable state tubular adenoma and gastroesophageal reflux disease sent to the ER from dialysis center where she was about to finish her dialysis and went to the restroom and partially pulled dialysis catheter out.  Patient has no other complaints.  Patient does have chronic cough but denies any increased shortness of breath fever chest pain abdominal pain nausea vomiting diarrhea.  Patient routine screening test for COVID came back positive.  Patient did receive 2 doses of vaccine but have not received the booster dose yet.     REVIEW OF SYSTEMS  All systems reviewed and negative except for those discussed in HPI.      PHYSICAL EXAM   Blood pressure (!) 140/127, pulse 70, temperature 97.8 °F (36.6 °C), temperature source Oral, resp. rate 20, height 152.4 cm (60\"), weight 64.1 kg (141 lb 5 oz), SpO2 100 %, not currently breastfeeding.    GENERAL: Alert, oriented, not distressed  HENT: head atraumatic, no nuchal rigidity  EYES: no scleral icterus, EOMI  CV: regular rhythm, regular rate, no murmur  RESPIRATORY: normal effort, decreased breath sounds bilaterally  ABDOMEN: soft, nontender nondistended bowel sounds positive  MUSCULOSKELETAL: Dialysis catheter partially displaced and right proximal femoral area.  No surrounding erythema or bleeding.  NEURO: alert, moves all extremities, follows commands  SKIN: warm, dry     LAB RESULTS  Lab Results (last 24 hours)     Procedure Component Value Units Date/Time    POC Glucose Once [[id number]]  (Abnormal) Collected: 01/14/22 1147    Specimen: Blood Updated: 01/14/22 1148     Glucose 136 mg/dL  " Patient ambulatory to triage. Patient c\o chronic back pain. Patient states pain in located in her lower back. Patient also c\o diarrhea and emesis.  Patient states this began today     Comment: Meter: [id number] : [id number] Hitesh RASCON RN       POC Glucose Once [[id number]]  (Abnormal) Collected: 01/14/22 1134    Specimen: Blood Updated: 01/14/22 1135     Glucose 61 mg/dL      Comment: Meter: [id number] : [id number] Hitesh RASCON RN       BNP [[id number]]  (Abnormal) Collected: 01/14/22 0603    Specimen: Blood Updated: 01/14/22 1127     proBNP 9,361.0 pg/mL     Narrative:      Among patients with dyspnea, NT-proBNP is highly sensitive for the detection of acute congestive heart failure. In addition NT-proBNP of <300 pg/ml effectively rules out acute congestive heart failure with 99% negative predictive value.    Results may be falsely decreased if patient taking Biotin.      POC Glucose Once [[id number]]  (Abnormal) Collected: 01/14/22 1123    Specimen: Blood Updated: 01/14/22 1125     Glucose 62 mg/dL      Comment: Meter: [id number] : [id number] Hitesh RASCON RN       Comprehensive Metabolic Panel [[id number]]  (Abnormal) Collected: 01/14/22 0603    Specimen: Blood Updated: 01/14/22 0657     Glucose 71 mg/dL      BUN 24 mg/dL      Creatinine 5.56 mg/dL      Sodium 134 mmol/L      Potassium 2.4 mmol/L      Chloride 98 mmol/L      CO2 21.5 mmol/L      Calcium 6.7 mg/dL      Total Protein 5.4 g/dL      Albumin 2.90 g/dL      ALT (SGPT) 6 U/L      AST (SGOT) 18 U/L      Alkaline Phosphatase 81 U/L      Total Bilirubin 0.2 mg/dL      eGFR Non  Amer --     Comment: <15 Indicative of kidney failure.        eGFR  African Amer 9 mL/min/1.73      Comment: <15 Indicative of kidney failure.        Globulin 2.5 gm/dL      A/G Ratio 1.2 g/dL      BUN/Creatinine Ratio 4.3     Anion Gap 14.5 mmol/L     Narrative:      GFR Normal >60  Chronic Kidney Disease <60  Kidney Failure <15      D-dimer, Quantitative [[id number]]  (Abnormal) Collected: 01/14/22 0603    Specimen: Blood Updated: 01/14/22 0654     D-Dimer, Quantitative 1.88 MCGFEU/mL     Narrative:      The Bobo  D-Dimer test used in conjunction with a clinical pretest probability (PTP) assessment model, has been approved by the FDA to rule out the presence of venous thromboembolism (VTE) in outpatients suspected of deep venous thrombosis (DVT) or pulmonary embolism (PE). The cut-off for negative predictive value is <0.50 MCGFEU/mL.    TSH [[id number]]  (Normal) Collected: 01/14/22 0603    Specimen: Blood Updated: 01/14/22 0653     TSH 1.580 uIU/mL     Ferritin [[id number]]  (Abnormal) Collected: 01/14/22 0603    Specimen: Blood Updated: 01/14/22 0653     Ferritin 1,226.00 ng/mL     Narrative:      Results may be falsely decreased if patient taking Biotin.      C-reactive Protein [[id number]]  (Abnormal) Collected: 01/14/22 0603    Specimen: Blood Updated: 01/14/22 0647     C-Reactive Protein 5.89 mg/dL     Lipid Panel [[id number]]  (Abnormal) Collected: 01/14/22 0603    Specimen: Blood Updated: 01/14/22 0647     Total Cholesterol 146 mg/dL      Triglycerides 152 mg/dL      HDL Cholesterol 47 mg/dL      LDL Cholesterol  73 mg/dL      VLDL Cholesterol 26 mg/dL      LDL/HDL Ratio 1.46    Narrative:      Cholesterol Reference Ranges  (U.S. Department of Health and Human Services ATP III Classifications)    Desirable          <200 mg/dL  Borderline High    200-239 mg/dL  High Risk          >240 mg/dL      Triglyceride Reference Ranges  (U.S. Department of Health and Human Services ATP III Classifications)    Normal           <150 mg/dL  Borderline High  150-199 mg/dL  High             200-499 mg/dL  Very High        >500 mg/dL    HDL Reference Ranges  (U.S. Department of Health and Human Services ATP III Classifcations)    Low     <40 mg/dl (major risk factor for CHD)  High    >60 mg/dl ('negative' risk factor for CHD)        LDL Reference Ranges  (U.S. Department of Health and Human Services ATP III Classifcations)    Optimal          <100 mg/dL  Near Optimal     100-129 mg/dL  Borderline High  130-159 mg/dL  High              160-189 mg/dL  Very High        >189 mg/dL    CBC & Differential [[id number]]  (Abnormal) Collected: 01/14/22 0603    Specimen: Blood Updated: 01/14/22 0633    Narrative:      The following orders were created for panel order CBC & Differential.  Procedure                               Abnormality         Status                     ---------                               -----------         ------                     CBC Auto Differential[[id number]]        Abnormal            Final result                 Please view results for these tests on the individual orders.    CBC Auto Differential [[id number]]  (Abnormal) Collected: 01/14/22 0603    Specimen: Blood Updated: 01/14/22 0633     WBC 2.85 10*3/mm3      RBC 3.76 10*6/mm3      Hemoglobin 9.5 g/dL      Hematocrit 32.0 %      MCV 85.1 fL      MCH 25.3 pg      MCHC 29.7 g/dL      RDW 19.4 %      RDW-SD 59.6 fl      MPV 8.8 fL      Platelets 120 10*3/mm3      Neutrophil % 68.4 %      Lymphocyte % 15.4 %      Monocyte % 14.7 %      Eosinophil % 0.4 %      Basophil % 0.7 %      Immature Grans % 0.4 %      Neutrophils, Absolute 1.95 10*3/mm3      Lymphocytes, Absolute 0.44 10*3/mm3      Monocytes, Absolute 0.42 10*3/mm3      Eosinophils, Absolute 0.01 10*3/mm3      Basophils, Absolute 0.02 10*3/mm3      Immature Grans, Absolute 0.01 10*3/mm3      nRBC 0.4 /100 WBC     Hemoglobin A1c [[id number]] Collected: 01/14/22 0604    Specimen: Blood Updated: 01/14/22 0604    COVID PRE-OP / PRE-PROCEDURE SCREENING ORDER (NO ISOLATION) - Swab, Nasopharynx [[id number]]  (Abnormal) Collected: 01/13/22 1411    Specimen: Swab from Nasopharynx Updated: 01/13/22 1512    Narrative:      The following orders were created for panel order COVID PRE-OP / PRE-PROCEDURE SCREENING ORDER (NO ISOLATION) - Swab, Nasopharynx.  Procedure                               Abnormality         Status                     ---------                               -----------         ------                      COVID-19,[facility]...[[id number]]  Abnormal            Final result                 Please view results for these tests on the individual orders.    COVID-19,[facility] CEPHEID/TAB NP SWAB IN TRANSPORT MEDIA 8-12 HR TAT - Swab, Nasopharynx [[id number]]  (Abnormal) Collected: 01/13/22 1411    Specimen: Swab from Nasopharynx Updated: 01/13/22 1512     COVID19 Detected    Narrative:      Fact sheet for providers: https://www.fda.gov/media/585530/download    Fact sheet for patients: https://www.fda.gov/media/096491/download    Test performed by PCR.    Comprehensive Metabolic Panel [[id number]]  (Abnormal) Collected: 01/13/22 1411    Specimen: Blood from Arm, Left Updated: 01/13/22 1452     Glucose 71 mg/dL      BUN 15 mg/dL      Creatinine 4.34 mg/dL      Sodium 134 mmol/L      Potassium 3.5 mmol/L      Comment: Specimen hemolyzed.  Results may be affected.        Chloride 98 mmol/L      CO2 23.9 mmol/L      Calcium 7.2 mg/dL      Total Protein 5.9 g/dL      Albumin 3.30 g/dL      ALT (SGPT) 8 U/L      Comment: Specimen hemolyzed.  Results may be affected.        AST (SGOT) 25 U/L      Comment: Specimen hemolyzed.  Results may be affected.        Alkaline Phosphatase 91 U/L      Total Bilirubin 0.2 mg/dL      eGFR Non  Amer --     Comment: <15 Indicative of kidney failure.        eGFR  African Amer 12 mL/min/1.73      Comment: <15 Indicative of kidney failure.        Globulin 2.6 gm/dL      A/G Ratio 1.3 g/dL      BUN/Creatinine Ratio 3.5     Anion Gap 12.1 mmol/L     Narrative:      GFR Normal >60  Chronic Kidney Disease <60  Kidney Failure <15      CBC & Differential [[id number]]  (Abnormal) Collected: 01/13/22 1411    Specimen: Blood Updated: 01/13/22 1426    Narrative:      The following orders were created for panel order CBC & Differential.  Procedure                               Abnormality         Status                     ---------                               -----------         ------                      CBC Auto Differential[[id number]]        Abnormal            Final result                 Please view results for these tests on the individual orders.    CBC Auto Differential [[id number]]  (Abnormal) Collected: 01/13/22 1411    Specimen: Blood Updated: 01/13/22 1426     WBC 3.54 10*3/mm3      RBC 3.72 10*6/mm3      Hemoglobin 9.7 g/dL      Hematocrit 30.7 %      MCV 82.5 fL      MCH 26.1 pg      MCHC 31.6 g/dL      RDW 19.0 %      RDW-SD 55.5 fl      MPV 9.8 fL      Platelets 114 10*3/mm3      Neutrophil % 70.8 %      Lymphocyte % 13.0 %      Monocyte % 15.3 %      Eosinophil % 0.0 %      Basophil % 0.3 %      Immature Grans % 0.6 %      Neutrophils, Absolute 2.51 10*3/mm3      Lymphocytes, Absolute 0.46 10*3/mm3      Monocytes, Absolute 0.54 10*3/mm3      Eosinophils, Absolute 0.00 10*3/mm3      Basophils, Absolute 0.01 10*3/mm3      Immature Grans, Absolute 0.02 10*3/mm3      nRBC 0.0 /100 WBC         Imaging Results (Last 24 Hours)     Procedure Component Value Units Date/Time    Arteriogram (Autofinalize) [[id number]] Resulted: 01/14/22 1027     Updated: 01/14/22 1027    Narrative:      This procedure was auto-finalized with no dictation required.    XR Chest 1 View [[id number]] Collected: 01/13/22 1839     Updated: 01/13/22 1925    Narrative:      CHEST SINGLE VIEW     HISTORY: Covid-19     COMPARISON: AP chest 11/04/2021, 05/03/2021, two-view chest 11/28/2019.     FINDINGS: Heart size is within normal limits. There is hilar enlargement  that may be related to pulmonary arterial enlargement. It is difficult  to exclude anthony enlargement though this appears to be stable. There is  prominence of the epicardial fat pads. No focal airspace disease is  demonstrated. Extensive right vascular stents extend from the region of  the brachiocephalic to the subclavian, axillary, brachial vasculature.  Left axillary vascular stent is also noted. 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        Impression:      1. Bilateral hilar enlargement. This may be related to pulmonary  arterial enlargement though hilar anthony enlargement is also in the  differential diagnosis. This appears to be stable.  2. Lungs appear clear and there is no evidence for perihilar edema.  3. Extensive vascular stents.  4. Advanced arthritis of the left shoulder.     This report was finalized on 1/13/2022 7:22 PM by Dr. Nathaniel Thao M.D.             Current Facility-Administered Medications:   •  acetaminophen (TYLENOL) tablet 650 mg, 650 mg, Oral, Q4H PRN, Speedy Colelo MD  •  albuterol sulfate HFA (PROVENTIL HFA;VENTOLIN HFA;PROAIR HFA) inhaler 2 puff, 2 puff, Inhalation, Q4H PRN, Speedy Coello MD  •  ascorbic acid (VITAMIN C) tablet 500 mg, 500 mg, Oral, Daily, Speedy Coello MD, 500 mg at 01/14/22 1209  •  budesonide-formoterol (SYMBICORT) 80-4.5 MCG/ACT inhaler 2 puff, 2 puff, Inhalation, BID - RT, Speedy Coello MD, 2 puff at 01/13/22 2026  •  cetirizine (zyrTEC) tablet 5 mg, 5 mg, Oral, Daily, Speedy Coello MD, 5 mg at 01/14/22 1208  •  cholecalciferol (VITAMIN D3) tablet 1,000 Units, 1,000 Units, Oral, Daily, Speedy Coello MD, 1,000 Units at 01/14/22 1209  •  famotidine (PEPCID) tablet 20 mg, 20 mg, Oral, BID, Speedy Coello MD, 20 mg at 01/14/22 1208  •  guaiFENesin (MUCINEX) 12 hr tablet 600 mg, 600 mg, Oral, Q12H, Speedy Coello MD, 600 mg at 01/14/22 1209  •  metoprolol succinate XL (TOPROL-XL) 24 hr tablet 12.5 mg, 12.5 mg, Oral, QAM, Speedy Coello MD, 12.5 mg at 01/14/22 0557  •  montelukast (SINGULAIR) tablet 10 mg, 10 mg, Oral, Nightly, Speedy Coello MD, 10 mg at 01/13/22 5460  •  ondansetron (ZOFRAN) injection 4 mg, 4 mg, Intravenous, Q4H PRN, Speedy Coello MD  •  [COMPLETED] Insert peripheral IV, , , Once **AND** sodium chloride 0.9 % flush 10 mL, 10 mL, Intravenous, PRN, Speedy Coello MD  •  zinc sulfate  (ZINCATE) capsule 220 mg, 220 mg, Oral, Daily, Speedy Coello MD, 220 mg at 01/14/22 1209    ASSESSMENT  Hemodialysis catheter dislodgment  COVID-19 infection asymptomatic  End-stage renal disease on hemodialysis  Hypokalemia  COPD  Chronic anemia  Hypercoagulable state  Right hemicolectomy with history of tubular adenoma  Gastroesophageal reflux disease    PLAN  CPM  Replace potassium  New hemodialysis catheter placement today  Symptomatic treatment for COVID-19 infection  Vascular surgery input appreciated  Nephrology and infectious disease to follow patient  Continue home medications  Stress ulcer DVT prophylaxis  Supportive care  Discussed with nursing staff and nephrology   Follow closely further recommendation according to hospital course    ALLAN MARTIN MD

## 2022-12-08 ENCOUNTER — TRANSCRIBE ORDERS (OUTPATIENT)
Dept: ADMINISTRATIVE | Facility: HOSPITAL | Age: 77
End: 2022-12-08

## 2022-12-08 DIAGNOSIS — E05.80 SECONDARY HYPERTHYROIDISM: Primary | ICD-10-CM

## 2022-12-20 ENCOUNTER — HOSPITAL ENCOUNTER (OUTPATIENT)
Dept: NUCLEAR MEDICINE | Facility: HOSPITAL | Age: 77
Discharge: HOME | End: 2022-12-20

## 2022-12-20 DIAGNOSIS — E05.80 SECONDARY HYPERTHYROIDISM: ICD-10-CM

## 2022-12-20 PROCEDURE — 0 TECHNETIUM SESTAMIBI: Performed by: SURGERY

## 2022-12-20 PROCEDURE — 78072 PARATHYRD PLANAR W/SPECT&CT: CPT

## 2022-12-20 PROCEDURE — A9500 TC99M SESTAMIBI: HCPCS | Performed by: SURGERY

## 2022-12-20 RX ADMIN — TECHNETIUM TC 99M SESTAMIBI 1 DOSE: 1 INJECTION INTRAVENOUS at 10:03

## 2023-10-26 NOTE — CONSULTS
RENAL/KCC:    Referring Provider: Dr. Chowdhury  Reason for Consultation: ESRD    Subjective     Chief complaint: Swollen access arm    History of present illness:  Patient is a 75 yo AAF with h/o ESRD on TTS HD who presents to the ER with continued swelling in access arm s/p infiltration.  Her last HD was on Saturday.  She denies any CP or SOA.  She has been seen by Vascular with plans for permcath placement and resting her arm for now.    History  Past Medical History:   Diagnosis Date   • Anemia    • Anesthesia complication     mother woke up in combative state   • Arthritis     knees   • Asthma    • COPD (chronic obstructive pulmonary disease) (HCC)    • Diabetes mellitus (HCC)     type 2   • Dialysis patient (HCC)    • Disease of thyroid gland    • GERD (gastroesophageal reflux disease)    • Headache     after dialysis   • History of blood clots     BUE   • History of kidney stones    • Hyperlipidemia    • Hypertension    • Knee pain, bilateral    • Renal disease    • Sleep apnea     CPAP   • SOB (shortness of breath)    • 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 OR;  Service: General;  Laterality: Right;   • COLONOSCOPY     • COLONOSCOPY N/A 2021    Procedure: COLONOSCOPY into cecum with biopsy;  Surgeon: Fabricio Monroe MD;  Location: [facility] ENDOSCOPY;  Service: Gastroenterology;  Laterality: N/A;  cecal mass   • EXPLORATORY LAPAROTOMY N/A 8/3/2021    Procedure: LAPAROTOMY EXPLORATORY, resection of ileocolonic anastomosis with anastomosis;  Surgeon: Zbigniew Conner MD;  Location: [facility] MAIN OR;   Service: General;  Laterality: N/A;   • INSERTION HEMODIALYSIS CATHETER Right 2021    Procedure: VENACAVAGRAM AND HEMODIALYSIS CATHETER INSERTION;  Surgeon: Karson Muller MD;  [facility] ;  Service: Vascular;  Laterality: Right;  Dr Bryant was primary surgeon   • POLYPECTOMY      UTERINE   • SHUNT O GRAM Right 2019    Procedure: RIGHT ARM DIALYSIS GRAFT revision and THROMBECTOMY;  Surgeon: Thierry Hardy MD;  Location: [facility] ;  Service: Vascular   • SHUNT O GRAM Right 2019    Procedure: RIGHT ARM DIALYSIS GRAFT THROMBECTOMY WITH STENTING;  Surgeon: Thierry Hardy MD;  Location: [facility] ;  Service: Vascular   • SHUNT O GRAM Right 2020    Procedure: RIGHT ARM ARTERIOVENOUS SHUNTOGRAM WITH THROMBECTOMY;  Surgeon: Thierry Hardy MD;  Location: [facility] ;  Service: Vascular;  Laterality: Right;   • SHUNT O GRAM Right 2021    Procedure: Right arm dialysis shuntogram with shunt thrombectomy;  Surgeon: Shahram Gallagher MD;  Location: [facility] ;  Service: Vascular;  Laterality: Right;   • SHUNT O GRAM Right 2021    Procedure: RIGHT UPPER EXTREMITY THROMBECTOMY AND SHUNTOGRAM;  Surgeon: Shahram Gallagher MD;  Location: [facility];  Service: Vascular;  Laterality: Right;   ,   Family History   Problem Relation Age of Onset   • Malig Hyperthermia Neg Hx    ,   Social History     Socioeconomic History   • Marital status:    Tobacco Use   • Smoking status: Former Smoker     Packs/day: 0.00     Years: 4.00     Pack years: 0.00     Types: Cigarettes     Quit date:      Years since quittin.8   • Smokeless tobacco: Never Used   Vaping Use   • Vaping Use: Never used   Substance and Sexual Activity   • Alcohol use: No   • Drug use: No   • Sexual activity: Defer     E-cigarette/Vaping   • E-cigarette/Vaping Use Never User    • Passive Exposure No    • Counseling Given No       E-cigarette/Vaping Substances   • Nicotine No    • THC No    • CBD No    • Flavoring No      E-cigarette/Vaping Devices   • Disposable No    • Pre-filled or Refillable Cartridge No    • Refillable Tank No    • Pre-filled Pod No        ,   Medications Prior to Admission   Medication Sig Dispense Refill Last Dose   • albuterol (PROVENTIL) (2.5 MG/3ML) 0.083% nebulizer solution Take 2.5 mg by nebulization 3 (Three) Times a Day As Needed for Wheezing. UNSURE OF DOSAGE   11/4/2021 at Unknown time   • albuterol sulfate  (90 Base) MCG/ACT inhaler INHALE 2 PUFFS INTO THE LUNGS EVERY 4 HOURS AS NEEDED FOR WHEEZING   11/4/2021 at Unknown time   • apixaban (ELIQUIS) 2.5 MG tablet tablet Take 2.5 mg by mouth 2 (Two) Times a Day.   11/4/2021 at Unknown time   • Breo Ellipta 100-25 MCG/INH inhaler       • budesonide-formoterol (SYMBICORT) 160-4.5 MCG/ACT inhaler Inhale 2 puffs 2 (Two) Times a Day.   11/4/2021 at Unknown time   • Cholecalciferol (VITAMIN D3) 5000 units capsule capsule Take 5,000 Units by mouth Every Morning.   11/3/2021 at Unknown time   • fluticasone (FLONASE) 50 MCG/ACT nasal spray 2 sprays into the nostril(s) as directed by provider Daily.   11/3/2021 at Unknown time   • glucose blood (Accu-Chek Amber Plus) test strip 1 strip by Other route 3 (Three) Times a Day.      • metoprolol succinate XL (TOPROL-XL) 25 MG 24 hr tablet Take 0.5 tablets by mouth Every Morning for 30 days. 15 tablet 0 11/4/2021 at Unknown time   • midodrine (PROAMATINE) 5 MG tablet Take 1 tablet by mouth 3 (Three) Times a Day Before Meals for 30 days. 90 tablet 0 11/4/2021 at Unknown time   • montelukast (SINGULAIR) 10 MG tablet Take 10 mg by mouth Every Night.   11/3/2021 at Unknown time   • omeprazole (priLOSEC) 40 MG capsule Take 40 mg by mouth Every Morning.   11/4/2021 at Unknown time   , Scheduled Meds:  ceFAZolin, 2 g, Intravenous, Once  sodium chloride, 10 mL, Intravenous, Q12H    , Continuous Infusions:  sodium chloride,  9 mL/hr    , PRN Meds:  •  [MAR Hold] albumin human  •  hydrALAZINE  •  sodium chloride  •  sodium chloride and Allergies:  Sulfa antibiotics, Adhesive tape, and Latex    Review of Systems  Pertinent items are noted in HPI    Objective     Vital Signs  Temp:  [97.5 °F (36.4 °C)-98.1 °F (36.7 °C)] 98 °F (36.7 °C)  Heart Rate:  [] 77  Resp:  [16-18] 16  BP: (113-200)/(64-99) 163/79    No intake/output data recorded.  No intake/output data recorded.    Physical Exam:  GEN: Awake, NAD  ENT: PERRL, EOMI, MMM  NECK: Supple, no JVD  CHEST: CTAB, no W/R/C  CV: RRR, no M/G/R  ABD: Soft, NT, +BS  SKIN: Warm and Dry  NEURO: CN's intact      Results Review:   I reviewed the patient's new clinical results.    WBC WBC   Date Value Ref Range Status   11/04/2021 4.78 3.40 - 10.80 10*3/mm3 Final      HGB Hemoglobin   Date Value Ref Range Status   11/04/2021 8.8 (L) 12.0 - 15.9 g/dL Final      HCT Hematocrit   Date Value Ref Range Status   11/04/2021 31.0 (L) 34.0 - 46.6 % Final      Platlets No results found for: LABPLAT   MCV MCV   Date Value Ref Range Status   11/04/2021 87.3 79.0 - 97.0 fL Final          Sodium Sodium   Date Value Ref Range Status   11/04/2021 149 (H) 136 - 145 mmol/L Final      Potassium Potassium   Date Value Ref Range Status   11/04/2021 5.2 3.5 - 5.2 mmol/L Final      Chloride Chloride   Date Value Ref Range Status   11/04/2021 114 (H) 98 - 107 mmol/L Final      CO2 CO2   Date Value Ref Range Status   11/04/2021 17.3 (L) 22.0 - 29.0 mmol/L Final      BUN BUN   Date Value Ref Range Status   11/04/2021 55 (H) 8 - 23 mg/dL Final      Creatinine Creatinine   Date Value Ref Range Status   11/04/2021 11.82 (H) 0.57 - 1.00 mg/dL Final      Calcium Calcium   Date Value Ref Range Status   11/04/2021 7.4 (L) 8.6 - 10.5 mg/dL Final      PO4 No results found for: CAPO4   Albumin Albumin   Date Value Ref Range Status   11/04/2021 3.30 (L) 3.50 - 5.20 g/dL Final      Magnesium No results found for: MG   Uric Acid No  results found for: URICACID         ceFAZolin, 2 g, Intravenous, Once  sodium chloride, 10 mL, Intravenous, Q12H      sodium chloride, 9 mL/hr        Assessment/Plan       AV shunt thrombosis, subsequent encounter    Hematoma of arm, right, initial encounter  ESRD  HTN  Fluid overload  Hyperkalemia    PLAN: HD today after permcath placement.  Rest arm and follow-up with Vascular outpatient.  Will follow.    Shahram Milner MD   [facility]  11/05/21  @NOW     Size Of Lesion In Cm: 1.2

## 2025-07-14 ENCOUNTER — TRANSCRIBE ORDERS (OUTPATIENT)
Age: 80
End: 2025-07-14
Payer: MEDICARE

## 2025-07-14 DIAGNOSIS — I25.2 HISTORY OF MI (MYOCARDIAL INFARCTION): Primary | ICD-10-CM

## 2025-07-14 DIAGNOSIS — I50.9 STAGE B ACC/AHA ASYMPTOMATIC HEART FAILURE: ICD-10-CM

## 2025-08-07 ENCOUNTER — OFFICE VISIT (OUTPATIENT)
Dept: CARDIOLOGY | Facility: CLINIC | Age: 80
End: 2025-08-07
Payer: MEDICARE

## 2025-08-07 VITALS
HEART RATE: 79 BPM | DIASTOLIC BLOOD PRESSURE: 72 MMHG | HEIGHT: 60 IN | BODY MASS INDEX: 26.07 KG/M2 | WEIGHT: 132.8 LBS | OXYGEN SATURATION: 97 % | SYSTOLIC BLOOD PRESSURE: 140 MMHG

## 2025-08-07 DIAGNOSIS — R00.2 PALPITATIONS: ICD-10-CM

## 2025-08-07 DIAGNOSIS — R06.09 DYSPNEA ON EXERTION: ICD-10-CM

## 2025-08-07 DIAGNOSIS — R07.2 PRECORDIAL PAIN: ICD-10-CM

## 2025-08-07 DIAGNOSIS — I47.19 ATRIAL TACHYCARDIA: Primary | ICD-10-CM

## 2025-08-07 DIAGNOSIS — I47.10 PAROXYSMAL SVT (SUPRAVENTRICULAR TACHYCARDIA): ICD-10-CM

## 2025-08-07 PROCEDURE — 1159F MED LIST DOCD IN RCRD: CPT | Performed by: STUDENT IN AN ORGANIZED HEALTH CARE EDUCATION/TRAINING PROGRAM

## 2025-08-07 PROCEDURE — 99204 OFFICE O/P NEW MOD 45 MIN: CPT | Performed by: STUDENT IN AN ORGANIZED HEALTH CARE EDUCATION/TRAINING PROGRAM

## 2025-08-07 PROCEDURE — 1160F RVW MEDS BY RX/DR IN RCRD: CPT | Performed by: STUDENT IN AN ORGANIZED HEALTH CARE EDUCATION/TRAINING PROGRAM

## 2025-08-07 PROCEDURE — 93000 ELECTROCARDIOGRAM COMPLETE: CPT | Performed by: STUDENT IN AN ORGANIZED HEALTH CARE EDUCATION/TRAINING PROGRAM

## 2025-08-07 RX ORDER — VIT B COMP NO.3/FOLIC/C/BIOTIN 1 MG-60 MG
1 TABLET ORAL DAILY
COMMUNITY

## 2025-08-07 RX ORDER — SODIUM BICARBONATE 650 MG/1
1 TABLET ORAL EVERY 12 HOURS SCHEDULED
COMMUNITY
Start: 2025-05-11

## 2025-08-07 RX ORDER — LOSARTAN POTASSIUM 25 MG/1
TABLET ORAL
COMMUNITY
Start: 2025-06-27

## 2025-08-07 RX ORDER — SENNOSIDES 8.6 MG
CAPSULE ORAL 3 TIMES DAILY
COMMUNITY

## 2025-08-07 RX ORDER — LISINOPRIL 5 MG/1
TABLET ORAL DAILY
COMMUNITY

## 2025-08-07 RX ORDER — MIDODRINE HYDROCHLORIDE 5 MG/1
TABLET ORAL
COMMUNITY
Start: 2025-07-13 | End: 2025-08-07

## 2025-08-07 RX ORDER — METOPROLOL SUCCINATE 50 MG/1
50 TABLET, EXTENDED RELEASE ORAL EVERY MORNING
Qty: 90 TABLET | Refills: 3 | Status: SHIPPED | OUTPATIENT
Start: 2025-08-07 | End: 2026-08-07

## 2025-08-29 ENCOUNTER — TELEPHONE (OUTPATIENT)
Dept: CARDIOLOGY | Age: 80
End: 2025-08-29
Payer: MEDICARE

## (undated) DEVICE — CVR PROB 96IN LF STRL

## (undated) DEVICE — SYR LUERLOK 20CC BX/50

## (undated) DEVICE — TRAP FLD MINIVAC MEGADYNE 100ML

## (undated) DEVICE — FOGARTY CORKSCREW CATHETER 6F 10MM 80CM: Brand: FOGARTY CORKSCREW

## (undated) DEVICE — KT ORCA ORCAPOD DISP STRL

## (undated) DEVICE — STPLR SKIN VISISTAT WD 35CT

## (undated) DEVICE — THE SINGLE USE ETRAP – POLYP TRAP IS USED FOR SUCTION RETRIEVAL OF ENDOSCOPICALLY REMOVED POLYPS.: Brand: ETRAP

## (undated) DEVICE — SUT SILK 2/0 SH CR8 18IN CR8 C012D

## (undated) DEVICE — GLV SURG SIGNATURE ESSENTIAL PF LTX SZ7.5

## (undated) DEVICE — ENDOPATH XCEL UNIVERSAL TROCAR STABLILITY SLEEVES: Brand: ENDOPATH XCEL

## (undated) DEVICE — SUT SILK 0 TIES 18IN SA66G

## (undated) DEVICE — NDL HYPO PRECISIONGLIDE REG 25G 1 1/2

## (undated) DEVICE — Device

## (undated) DEVICE — WOUND RETRACTOR AND PROTECTOR: Brand: ALEXIS WOUND PROTECTOR-RETRACTOR

## (undated) DEVICE — GAUZE,SPONGE,4"X4",16PLY,XRAY,STRL,LF: Brand: MEDLINE

## (undated) DEVICE — VESSEL LOOPS X-RAY DETECTABLE: Brand: DEROYAL

## (undated) DEVICE — ST ACC MICROPUNCTURE STFF .018 ECHO/PLDM/TP 4F/10CM 21G/7CM

## (undated) DEVICE — TOWEL,OR,DSP,ST,BLUE,STD,4/PK,20PK/CS: Brand: MEDLINE

## (undated) DEVICE — ST. SORBAVIEW ULTIMATE IJ SYSTEM A,C: Brand: CENTURION

## (undated) DEVICE — ANTIBACTERIAL UNDYED BRAIDED (POLYGLACTIN 910), SYNTHETIC ABSORBABLE SUTURE: Brand: COATED VICRYL

## (undated) DEVICE — SUT GUT CHRM 3/0 SH 36IN G172H

## (undated) DEVICE — ELECTRD BLD EZ CLN MOD XLNG 2.75IN

## (undated) DEVICE — GLV SURG BIOGEL LTX PF 8 1/2

## (undated) DEVICE — DRAPE,REIN 53X77,STERILE: Brand: MEDLINE

## (undated) DEVICE — KT CATH TAL PALINDROME SAPPHIRE 14.5F33CM

## (undated) DEVICE — INTENDED FOR TISSUE SEPARATION, AND OTHER PROCEDURES THAT REQUIRE A SHARP SURGICAL BLADE TO PUNCTURE OR CUT.: Brand: BARD-PARKER ® CARBON RIB-BACK BLADES

## (undated) DEVICE — GOWN,REINF,POLY,SIRUS,BRTH SLV,XLNG/XXL: Brand: MEDLINE

## (undated) DEVICE — SPNG LAP 18X18IN LF STRL PK/5

## (undated) DEVICE — SYR LL TP 10ML STRL

## (undated) DEVICE — SPK PIN NSR FLUID NONVNT 2WY MINI LL LF

## (undated) DEVICE — INFLATION DEVICE: Brand: ENCORE™ 26

## (undated) DEVICE — BIOPATCH™ ANTIMICROBIAL DRESSING WITH CHLORHEXIDINE GLUCONATE IS A HYDROPHILLIC POLYURETHANE ABSORPTIVE FOAM WITH CHLORHEXIDINE GLUCONATE (CHG) WHICH INHIBITS BACTERIAL GROWTH UNDER THE DRESSING. THE DRESSING IS INTENDED TO BE USED TO ABSORB EXUDATE, COVER A WOUND CAUSED BY VASCULAR AND NONVASCULAR PERCUTANEOUS MEDICAL DEVICES DURING SURGERY, AS WELL AS REDUCE LOCAL INFECTION AND COLONIZATION OF MICROORGANISMS.: Brand: BIOPATCH

## (undated) DEVICE — ADHS SKIN SURG TISS VISC PREMIERPRO EXOFIN HI/VISC FAST/DRY

## (undated) DEVICE — SUT PROLN 3/0 SH D/A 36IN 8522H

## (undated) DEVICE — SOL NACL 0.9PCT 1000ML

## (undated) DEVICE — SPNG GZ WOVN 4X4IN 12PLY 10/BX STRL

## (undated) DEVICE — GOWN,PREVENTION PLUS,XLONG/XLARGE,STRL: Brand: MEDLINE

## (undated) DEVICE — APPL CHLORAPREP HI/LITE 26ML ORNG

## (undated) DEVICE — WIPE INST MEROCEL

## (undated) DEVICE — SENSR O2 OXIMAX FNGR A/ 18IN NONSTR

## (undated) DEVICE — FOGARTY GRAFT THROMBECTOMY CATHETER: Brand: FOGARTY

## (undated) DEVICE — HARMONIC ACE +7 LAPAROSCOPIC SHEARS ADVANCED HEMOSTASIS 5MM DIAMETER 36CM SHAFT LENGTH  FOR USE WITH GRAY HAND PIECE ONLY: Brand: HARMONIC ACE

## (undated) DEVICE — FOGARTY CORKSCREW CATHETER 5F 8MM 80CM: Brand: FOGARTY CORKSCREW

## (undated) DEVICE — ARMADA 35 PTA CATHETER 8 MM X 60 MM X 80 CM / OVER-THE-WIRE: Brand: ARMADA

## (undated) DEVICE — 1 ML TUBERCULIN SYRINGE REGULAR TIP: Brand: MONOJECT

## (undated) DEVICE — ADAPT CLN BIOGUARD AIR/H2O DISP

## (undated) DEVICE — LAPAROSCOPIC SMOKE FILTRATION SYSTEM: Brand: PALL LAPAROSHIELD® PLUS LAPAROSCOPIC SMOKE FILTRATION SYSTEM

## (undated) DEVICE — TOTAL TRAY, 16FR 10ML SIL FOLEY, URN: Brand: MEDLINE

## (undated) DEVICE — ENDOCUT SCISSOR TIP, DISPOSABLE: Brand: RENEW

## (undated) DEVICE — SUT SILK 2/0 TIES 18IN A185H

## (undated) DEVICE — DRSNG WND BORDR/ADHS NONADHR/GZ LF 4X4IN STRL

## (undated) DEVICE — MEDI-VAC YANKAUER SUCTION HANDLE W/BULBOUS TIP: Brand: CARDINAL HEALTH

## (undated) DEVICE — PK ANGIO CERBRL RAD 40

## (undated) DEVICE — PTA BALLOON DILATATION CATHETER: Brand: MUSTANG™

## (undated) DEVICE — LOU MINOR PROCEDURE: Brand: MEDLINE INDUSTRIES, INC.

## (undated) DEVICE — 4F 80 CM NOVASIL SILICONE SINGLE LUMEN EMBOLECTOMY CATHETER, EIFU: Brand: LEMAITRE EMBOLECTOMY CATHETER

## (undated) DEVICE — PK ANGIO 40

## (undated) DEVICE — RADIFOCUS GLIDEWIRE: Brand: GLIDEWIRE

## (undated) DEVICE — COVER,MAYO STAND,STERILE: Brand: MEDLINE

## (undated) DEVICE — SYR LUERLOK 5CC

## (undated) DEVICE — SUT MNCRYL PLS ANTIB UD 4/0 PS2 18IN

## (undated) DEVICE — 4F 80 CM LEMAITRE EMBOLECTOMY CATHETER, EIFU: Brand: LEMAITRE EMBOLECTOMY CATHETER

## (undated) DEVICE — SCANLAN® SUTURE BOOT™ INSTRUMENT JAW COVERS - ORIGINAL YELLOW, STANDARD PKG (5 PAIR/CARTRIDGE, 1 CARTRIDGE/PKG): Brand: SCANLAN® SUTURE BOOT™ INSTRUMENT JAW COVERS

## (undated) DEVICE — GLV SURG BIOGEL LTX PF 7 1/2

## (undated) DEVICE — SUT ETHLN 2/0 PS 18IN 585H

## (undated) DEVICE — UNDERGLV SURG BIOGEL INDICAT PI SZ8.5 BLU

## (undated) DEVICE — TUBING, SUCTION, 1/4" X 20', STRAIGHT: Brand: MEDLINE INDUSTRIES, INC.

## (undated) DEVICE — 3M™ IOBAN™ 2 ANTIMICROBIAL INCISE DRAPE 6650EZ: Brand: IOBAN™ 2

## (undated) DEVICE — SUT SILK 3/0 TIES 18IN A184H

## (undated) DEVICE — LN SMPL CO2 SHTRM SD STREAM W/M LUER

## (undated) DEVICE — POOLE SUCTION HANDLE: Brand: CARDINAL HEALTH

## (undated) DEVICE — PINNACLE INTRODUCER SHEATH: Brand: PINNACLE

## (undated) DEVICE — ISOLATION BAG: Brand: CONVERTORS

## (undated) DEVICE — ENSEAL TEMPERATURE CONTROLLED TISSUE SEALING TECHNOLOGY DISPOSABLE TISSUE SEALING DEVICE TAPTRONIC TRIGGER ACTIVATED POWER 5MM JAW STYLE: Brand: ENSEAL

## (undated) DEVICE — PENCL E/S ULTRAVAC TELESCP NOSE HOLSTR 10FT

## (undated) DEVICE — ST ACC MICROPUNCTURE STFF .018 ECHO/PLDM/TP 5F/10CM 21G/7CM

## (undated) DEVICE — FOGARTY CORKSCREW CATHETER: Brand: FOGARTY CORKSCREW

## (undated) DEVICE — LP VESL MAXI 2.5X1MM RED 2PK

## (undated) DEVICE — SINGLE-USE BIOPSY FORCEPS: Brand: RADIAL JAW 4

## (undated) DEVICE — DECANT BG O JET

## (undated) DEVICE — SUT PDS 1 CT1 36IN Z347H

## (undated) DEVICE — ENDOPATH XCEL BLADELESS TROCARS WITH STABILITY SLEEVES: Brand: ENDOPATH XCEL

## (undated) DEVICE — INTROFLXOR CHECKFLO .059 ANL0 4F90

## (undated) DEVICE — CANN O2 ETCO2 FITS ALL CONN CO2 SMPL A/ 7IN DISP LF

## (undated) DEVICE — SNAR POLYP CAPTIVATOR RND STFF 2.4 240CM 10MM 1P/U

## (undated) DEVICE — SUT SILK 3/0 SH CR5 18IN C0135

## (undated) DEVICE — APPL CHLORAPREP W/TINT 26ML ORNG

## (undated) DEVICE — DRAPE,T,LAPARO,TRANS,STERILE: Brand: MEDLINE

## (undated) DEVICE — BLADE SHIELD SCALPEL HOLDER: Brand: DEVON

## (undated) DEVICE — THE TORRENT IRRIGATION SCOPE CONNECTOR IS USED WITH THE TORRENT IRRIGATION TUBING TO PROVIDE IRRIGATION FLUIDS SUCH AS STERILE WATER DURING GASTROINTESTINAL ENDOSCOPIC PROCEDURES WHEN USED IN CONJUNCTION WITH AN IRRIGATION PUMP (OR ELECTROSURGICAL UNIT).: Brand: TORRENT

## (undated) DEVICE — SUT SILK 2/0 SH 30IN K833H

## (undated) DEVICE — TUBING, SUCTION, 1/4" X 10', STRAIGHT: Brand: MEDLINE

## (undated) DEVICE — 30977 SEE SHARP - ENHANCED INTRAOPERATIVE LAPAROSCOPE CLEANING & DEFOGGING: Brand: 30977 SEE SHARP - ENHANCED INTRAOPERATIVE LAPAROSCOPE CLEANING & DEFOGGING

## (undated) DEVICE — PK PROC MAJ 40

## (undated) DEVICE — TBG ART PRESS 24 IN

## (undated) DEVICE — IRRIGATOR BULB ASEPTO 60CC STRL

## (undated) DEVICE — COVER,LIGHT HANDLE,FLX,2/PK: Brand: MEDLINE INDUSTRIES, INC.

## (undated) DEVICE — ECHELON FLEX 60 ARTICULATING ENDOSCOPIC LINEAR CUTTER (NO CARTRIDGE): Brand: ECHELON FLEX ENDOPATH

## (undated) DEVICE — LOU LAP SIGMOID COLON: Brand: MEDLINE INDUSTRIES, INC.

## (undated) DEVICE — DEV COND GAS LAP INSUFLOW W/LUER CONN

## (undated) DEVICE — RADIFOCUS TORQUE DEVICE MULTI-TORQUE VISE: Brand: RADIFOCUS TORQUE DEVICE

## (undated) DEVICE — ADHS SKIN DERMABOND TOP ADVANCED

## (undated) DEVICE — SUT PROLN 5/0 RB1 D/A 36IN 8556H

## (undated) DEVICE — BARRIER, ABSORBABLE, ADHESION: Brand: SEPRAFILM® PROCEDURE PACK

## (undated) DEVICE — GLV SURG SENSICARE PI PF LF 8.5 GRN STRL

## (undated) DEVICE — CATH GUIDE SOFTVU FLUSH HT STR .035 5F 65CM

## (undated) DEVICE — PATIENT RETURN ELECTRODE, SINGLE-USE, CONTACT QUALITY MONITORING, ADULT, WITH 9FT CORD, FOR PATIENTS WEIGING OVER 33LBS. (15KG): Brand: MEGADYNE

## (undated) DEVICE — LEGGINGS, PAIR, CLEAR, STERILE: Brand: MEDLINE

## (undated) DEVICE — DRP C/ARM 41X74IN

## (undated) DEVICE — SUT VIC 0/0 UR6 27IN DYED J603H

## (undated) DEVICE — DRSNG WND GZ PAD BORDERED 4X8IN STRL

## (undated) DEVICE — DISPOSABLE GRASPER CARTRIDGE: Brand: DIRECT DRIVE REPOSABLE GRASPERS

## (undated) DEVICE — 3F 80 CM NOVASIL SILICONE SINGLE LUMEN EMBOLECTOMY CATHETER, EIFU: Brand: LEMAITRE EMBOLECTOMY CATHETER

## (undated) DEVICE — COUNT NDL MAG XLG

## (undated) DEVICE — SEALANT HEMOS FLOSEAL MATRX W/MALL TP 5ML EA/6

## (undated) DEVICE — RADIFOCUS GLIDEWIRE ADVANTAGE GUIDEWIRE: Brand: GLIDEWIRE ADVANTAGE

## (undated) DEVICE — 3F 80 CM LEMAITRE EMBOLECTOMY CATHETER, EIFU: Brand: LEMAITRE EMBOLECTOMY CATHETER